# Patient Record
Sex: FEMALE | Race: WHITE | NOT HISPANIC OR LATINO | Employment: OTHER | URBAN - METROPOLITAN AREA
[De-identification: names, ages, dates, MRNs, and addresses within clinical notes are randomized per-mention and may not be internally consistent; named-entity substitution may affect disease eponyms.]

---

## 2017-01-23 ENCOUNTER — OFFICE VISIT - HEALTHEAST (OUTPATIENT)
Dept: ENDOCRINOLOGY | Facility: CLINIC | Age: 67
End: 2017-01-23

## 2017-01-23 DIAGNOSIS — E05.90 HYPERTHYROIDISM: ICD-10-CM

## 2017-01-23 LAB
CREAT SERPL-MCNC: 1.23 MG/DL (ref 0.6–1.1)
GFR SERPL CREATININE-BSD FRML MDRD: 44 ML/MIN/1.73M2

## 2017-01-23 RX ORDER — VALACYCLOVIR HYDROCHLORIDE 500 MG/1
500 TABLET, FILM COATED ORAL DAILY
Status: SHIPPED | COMMUNITY
Start: 2017-01-23

## 2017-01-23 ASSESSMENT — MIFFLIN-ST. JEOR: SCORE: 1228.48

## 2017-01-26 ENCOUNTER — COMMUNICATION - HEALTHEAST (OUTPATIENT)
Dept: ENDOCRINOLOGY | Facility: CLINIC | Age: 67
End: 2017-01-26

## 2017-01-26 DIAGNOSIS — E05.90 THYROTOXICOSIS: ICD-10-CM

## 2017-02-20 ENCOUNTER — RECORDS - HEALTHEAST (OUTPATIENT)
Dept: LAB | Facility: CLINIC | Age: 67
End: 2017-02-20

## 2017-02-20 LAB
CHOLEST SERPL-MCNC: 206 MG/DL
FASTING STATUS PATIENT QL REPORTED: NO
HDLC SERPL-MCNC: 60 MG/DL
LDLC SERPL CALC-MCNC: 99 MG/DL
TRIGL SERPL-MCNC: 236 MG/DL

## 2017-02-23 ENCOUNTER — COMMUNICATION - HEALTHEAST (OUTPATIENT)
Dept: ADMINISTRATIVE | Facility: CLINIC | Age: 67
End: 2017-02-23

## 2017-03-07 ENCOUNTER — AMBULATORY - HEALTHEAST (OUTPATIENT)
Dept: LAB | Facility: CLINIC | Age: 67
End: 2017-03-07

## 2017-03-07 DIAGNOSIS — E05.90 THYROTOXICOSIS: ICD-10-CM

## 2017-06-30 ENCOUNTER — COMMUNICATION - HEALTHEAST (OUTPATIENT)
Dept: LAB | Facility: CLINIC | Age: 67
End: 2017-06-30

## 2017-06-30 ENCOUNTER — AMBULATORY - HEALTHEAST (OUTPATIENT)
Dept: ENDOCRINOLOGY | Facility: CLINIC | Age: 67
End: 2017-06-30

## 2017-06-30 DIAGNOSIS — E05.90 THYROTOXICOSIS: ICD-10-CM

## 2017-07-17 ENCOUNTER — AMBULATORY - HEALTHEAST (OUTPATIENT)
Dept: LAB | Facility: CLINIC | Age: 67
End: 2017-07-17

## 2017-07-17 DIAGNOSIS — E05.90 THYROTOXICOSIS: ICD-10-CM

## 2017-07-24 ENCOUNTER — OFFICE VISIT - HEALTHEAST (OUTPATIENT)
Dept: ENDOCRINOLOGY | Facility: CLINIC | Age: 67
End: 2017-07-24

## 2017-07-24 DIAGNOSIS — E05.90 HYPERTHYROIDISM: ICD-10-CM

## 2017-07-24 ASSESSMENT — MIFFLIN-ST. JEOR: SCORE: 1266.58

## 2017-10-03 ENCOUNTER — HOSPITAL ENCOUNTER (OUTPATIENT)
Dept: CT IMAGING | Facility: HOSPITAL | Age: 67
Discharge: HOME | End: 2017-10-03

## 2017-10-03 ENCOUNTER — RECORDS - HEALTHEAST (OUTPATIENT)
Dept: ADMINISTRATIVE | Facility: OTHER | Age: 67
End: 2017-10-03

## 2017-10-03 DIAGNOSIS — R10.30 LOWER ABDOMINAL PAIN: ICD-10-CM

## 2018-02-06 ENCOUNTER — RECORDS - HEALTHEAST (OUTPATIENT)
Dept: LAB | Facility: CLINIC | Age: 68
End: 2018-02-06

## 2018-02-06 LAB
ALBUMIN SERPL-MCNC: 4.1 G/DL (ref 3.5–5)
ALP SERPL-CCNC: 74 U/L (ref 45–120)
ALT SERPL W P-5'-P-CCNC: 38 U/L (ref 0–45)
ANION GAP SERPL CALCULATED.3IONS-SCNC: 12 MMOL/L (ref 5–18)
AST SERPL W P-5'-P-CCNC: 41 U/L (ref 0–40)
BASOPHILS # BLD AUTO: 0.1 THOU/UL (ref 0–0.2)
BASOPHILS NFR BLD AUTO: 1 % (ref 0–2)
BILIRUB SERPL-MCNC: 0.7 MG/DL (ref 0–1)
BUN SERPL-MCNC: 17 MG/DL (ref 8–22)
CALCIUM SERPL-MCNC: 9.7 MG/DL (ref 8.5–10.5)
CHLORIDE BLD-SCNC: 91 MMOL/L (ref 98–107)
CHOLEST SERPL-MCNC: 195 MG/DL
CO2 SERPL-SCNC: 28 MMOL/L (ref 22–31)
CREAT SERPL-MCNC: 0.88 MG/DL (ref 0.6–1.1)
EOSINOPHIL # BLD AUTO: 0.2 THOU/UL (ref 0–0.4)
EOSINOPHIL NFR BLD AUTO: 2 % (ref 0–6)
ERYTHROCYTE [DISTWIDTH] IN BLOOD BY AUTOMATED COUNT: 11.9 % (ref 11–14.5)
FASTING STATUS PATIENT QL REPORTED: ABNORMAL
GFR SERPL CREATININE-BSD FRML MDRD: >60 ML/MIN/1.73M2
GLUCOSE BLD-MCNC: 151 MG/DL (ref 70–125)
HCT VFR BLD AUTO: 43.1 % (ref 35–47)
HDLC SERPL-MCNC: 66 MG/DL
HGB BLD-MCNC: 15.3 G/DL (ref 12–16)
LDLC SERPL CALC-MCNC: 72 MG/DL
LYMPHOCYTES # BLD AUTO: 3.3 THOU/UL (ref 0.8–4.4)
LYMPHOCYTES NFR BLD AUTO: 34 % (ref 20–40)
MCH RBC QN AUTO: 35.6 PG (ref 27–34)
MCHC RBC AUTO-ENTMCNC: 35.5 G/DL (ref 32–36)
MCV RBC AUTO: 100 FL (ref 80–100)
MONOCYTES # BLD AUTO: 0.9 THOU/UL (ref 0–0.9)
MONOCYTES NFR BLD AUTO: 9 % (ref 2–10)
NEUTROPHILS # BLD AUTO: 5 THOU/UL (ref 2–7.7)
NEUTROPHILS NFR BLD AUTO: 54 % (ref 50–70)
PLATELET # BLD AUTO: 288 THOU/UL (ref 140–440)
PMV BLD AUTO: 11.1 FL (ref 8.5–12.5)
POTASSIUM BLD-SCNC: 4.2 MMOL/L (ref 3.5–5)
PROT SERPL-MCNC: 7.1 G/DL (ref 6–8)
RBC # BLD AUTO: 4.3 MILL/UL (ref 3.8–5.4)
SODIUM SERPL-SCNC: 131 MMOL/L (ref 136–145)
TRIGL SERPL-MCNC: 284 MG/DL
WBC: 9.5 THOU/UL (ref 4–11)

## 2018-02-07 LAB — HCV AB SERPL QL IA: NEGATIVE

## 2018-04-16 ENCOUNTER — RECORDS - HEALTHEAST (OUTPATIENT)
Dept: LAB | Facility: CLINIC | Age: 68
End: 2018-04-16

## 2018-04-16 LAB
MAGNESIUM SERPL-MCNC: 1.5 MG/DL (ref 1.8–2.6)
VIT B12 SERPL-MCNC: 972 PG/ML (ref 213–816)

## 2018-04-17 LAB — 25(OH)D3 SERPL-MCNC: 60.4 NG/ML (ref 30–80)

## 2018-05-09 ENCOUNTER — COMMUNICATION - HEALTHEAST (OUTPATIENT)
Dept: ADMINISTRATIVE | Facility: CLINIC | Age: 68
End: 2018-05-09

## 2018-05-09 DIAGNOSIS — E05.90 THYROTOXICOSIS: ICD-10-CM

## 2018-05-21 ENCOUNTER — RECORDS - HEALTHEAST (OUTPATIENT)
Dept: LAB | Facility: CLINIC | Age: 68
End: 2018-05-21

## 2018-05-21 LAB
ALBUMIN SERPL-MCNC: 4.2 G/DL (ref 3.5–5)
ALP SERPL-CCNC: 74 U/L (ref 45–120)
ALT SERPL W P-5'-P-CCNC: 29 U/L (ref 0–45)
ANION GAP SERPL CALCULATED.3IONS-SCNC: 16 MMOL/L (ref 5–18)
AST SERPL W P-5'-P-CCNC: 35 U/L (ref 0–40)
BASOPHILS # BLD AUTO: 0.1 THOU/UL (ref 0–0.2)
BASOPHILS NFR BLD AUTO: 1 % (ref 0–2)
BILIRUB SERPL-MCNC: 0.6 MG/DL (ref 0–1)
BUN SERPL-MCNC: 15 MG/DL (ref 8–22)
CALCIUM SERPL-MCNC: 9.5 MG/DL (ref 8.5–10.5)
CHLORIDE BLD-SCNC: 88 MMOL/L (ref 98–107)
CHOLEST SERPL-MCNC: 193 MG/DL
CO2 SERPL-SCNC: 24 MMOL/L (ref 22–31)
CREAT SERPL-MCNC: 1.1 MG/DL (ref 0.6–1.1)
EOSINOPHIL # BLD AUTO: 0.1 THOU/UL (ref 0–0.4)
EOSINOPHIL NFR BLD AUTO: 1 % (ref 0–6)
ERYTHROCYTE [DISTWIDTH] IN BLOOD BY AUTOMATED COUNT: 13.1 % (ref 11–14.5)
FASTING STATUS PATIENT QL REPORTED: ABNORMAL
GFR SERPL CREATININE-BSD FRML MDRD: 49 ML/MIN/1.73M2
GLUCOSE BLD-MCNC: 242 MG/DL (ref 70–125)
HCT VFR BLD AUTO: 38.5 % (ref 35–47)
HDLC SERPL-MCNC: 65 MG/DL
HGB BLD-MCNC: 13.7 G/DL (ref 12–16)
LDLC SERPL CALC-MCNC: 79 MG/DL
LYMPHOCYTES # BLD AUTO: 2 THOU/UL (ref 0.8–4.4)
LYMPHOCYTES NFR BLD AUTO: 20 % (ref 20–40)
MAGNESIUM SERPL-MCNC: 1.6 MG/DL (ref 1.8–2.6)
MCH RBC QN AUTO: 35.9 PG (ref 27–34)
MCHC RBC AUTO-ENTMCNC: 35.6 G/DL (ref 32–36)
MCV RBC AUTO: 101 FL (ref 80–100)
MONOCYTES # BLD AUTO: 0.6 THOU/UL (ref 0–0.9)
MONOCYTES NFR BLD AUTO: 6 % (ref 2–10)
NEUTROPHILS # BLD AUTO: 7 THOU/UL (ref 2–7.7)
NEUTROPHILS NFR BLD AUTO: 72 % (ref 50–70)
PLATELET # BLD AUTO: 292 THOU/UL (ref 140–440)
PMV BLD AUTO: 11.3 FL (ref 8.5–12.5)
POTASSIUM BLD-SCNC: 4.4 MMOL/L (ref 3.5–5)
PROT SERPL-MCNC: 6.7 G/DL (ref 6–8)
RBC # BLD AUTO: 3.82 MILL/UL (ref 3.8–5.4)
SODIUM SERPL-SCNC: 128 MMOL/L (ref 136–145)
TRIGL SERPL-MCNC: 246 MG/DL
WBC: 9.7 THOU/UL (ref 4–11)

## 2018-06-05 ENCOUNTER — HOSPITAL ENCOUNTER (OUTPATIENT)
Dept: MAMMOGRAPHY | Facility: CLINIC | Age: 68
Discharge: HOME | End: 2018-06-05

## 2018-06-05 DIAGNOSIS — Z12.31 VISIT FOR SCREENING MAMMOGRAM: ICD-10-CM

## 2018-07-24 ENCOUNTER — RECORDS - HEALTHEAST (OUTPATIENT)
Dept: LAB | Facility: CLINIC | Age: 68
End: 2018-07-24

## 2018-07-25 LAB
ALBUMIN SERPL-MCNC: 4.2 G/DL (ref 3.5–5)
ALP SERPL-CCNC: 60 U/L (ref 45–120)
ALT SERPL W P-5'-P-CCNC: 27 U/L (ref 0–45)
ANION GAP SERPL CALCULATED.3IONS-SCNC: 12 MMOL/L (ref 5–18)
AST SERPL W P-5'-P-CCNC: 32 U/L (ref 0–40)
BILIRUB SERPL-MCNC: 0.9 MG/DL (ref 0–1)
BUN SERPL-MCNC: 17 MG/DL (ref 8–22)
CALCIUM SERPL-MCNC: 10 MG/DL (ref 8.5–10.5)
CHLORIDE BLD-SCNC: 90 MMOL/L (ref 98–107)
CO2 SERPL-SCNC: 25 MMOL/L (ref 22–31)
CREAT SERPL-MCNC: 1.06 MG/DL (ref 0.6–1.1)
GFR SERPL CREATININE-BSD FRML MDRD: 51 ML/MIN/1.73M2
GLUCOSE BLD-MCNC: 147 MG/DL (ref 70–125)
LIPASE SERPL-CCNC: 25 U/L (ref 0–52)
MAGNESIUM SERPL-MCNC: 2.1 MG/DL (ref 1.8–2.6)
POTASSIUM BLD-SCNC: 4 MMOL/L (ref 3.5–5)
PROT SERPL-MCNC: 6.8 G/DL (ref 6–8)
SODIUM SERPL-SCNC: 127 MMOL/L (ref 136–145)
T4 FREE SERPL-MCNC: 0.8 NG/DL (ref 0.7–1.8)
TSH SERPL DL<=0.005 MIU/L-ACNC: 2.51 UIU/ML (ref 0.3–5)

## 2018-08-09 ENCOUNTER — COMMUNICATION - HEALTHEAST (OUTPATIENT)
Dept: ENDOCRINOLOGY | Facility: CLINIC | Age: 68
End: 2018-08-09

## 2018-08-09 DIAGNOSIS — E05.90 THYROTOXICOSIS: ICD-10-CM

## 2018-08-23 ENCOUNTER — OFFICE VISIT - HEALTHEAST (OUTPATIENT)
Dept: ENDOCRINOLOGY | Facility: CLINIC | Age: 68
End: 2018-08-23

## 2018-08-23 DIAGNOSIS — E05.90 THYROTOXICOSIS: ICD-10-CM

## 2018-08-23 RX ORDER — AMOXICILLIN 500 MG/1
2000 TABLET, FILM COATED ORAL PRN
Status: SHIPPED | COMMUNITY
Start: 2018-08-23 | End: 2024-07-31

## 2018-08-23 ASSESSMENT — MIFFLIN-ST. JEOR: SCORE: 1226.21

## 2018-08-24 ENCOUNTER — COMMUNICATION - HEALTHEAST (OUTPATIENT)
Dept: LAB | Facility: CLINIC | Age: 68
End: 2018-08-24

## 2018-08-28 ENCOUNTER — RECORDS - HEALTHEAST (OUTPATIENT)
Dept: LAB | Facility: CLINIC | Age: 68
End: 2018-08-28

## 2018-08-28 LAB
CREAT UR-MCNC: 103.7 MG/DL
MICROALBUMIN UR-MCNC: 1.16 MG/DL (ref 0–1.99)
MICROALBUMIN/CREAT UR: 11.2 MG/G

## 2018-08-29 ENCOUNTER — RECORDS - HEALTHEAST (OUTPATIENT)
Dept: LAB | Facility: CLINIC | Age: 68
End: 2018-08-29

## 2018-08-29 LAB
T4 FREE SERPL-MCNC: 0.8 NG/DL (ref 0.7–1.8)
TSH SERPL DL<=0.005 MIU/L-ACNC: 2.28 UIU/ML (ref 0.3–5)
VIT B12 SERPL-MCNC: 833 PG/ML (ref 213–816)

## 2018-08-30 LAB — 25(OH)D3 SERPL-MCNC: 68.9 NG/ML (ref 30–80)

## 2018-10-28 ENCOUNTER — COMMUNICATION - HEALTHEAST (OUTPATIENT)
Dept: ENDOCRINOLOGY | Facility: CLINIC | Age: 68
End: 2018-10-28

## 2018-10-28 DIAGNOSIS — E05.90 THYROTOXICOSIS: ICD-10-CM

## 2018-11-20 ENCOUNTER — RECORDS - HEALTHEAST (OUTPATIENT)
Dept: LAB | Facility: CLINIC | Age: 68
End: 2018-11-20

## 2018-11-20 LAB
ANION GAP SERPL CALCULATED.3IONS-SCNC: 11 MMOL/L (ref 5–18)
BUN SERPL-MCNC: 17 MG/DL (ref 8–22)
CALCIUM SERPL-MCNC: 10.3 MG/DL (ref 8.5–10.5)
CHLORIDE BLD-SCNC: 93 MMOL/L (ref 98–107)
CO2 SERPL-SCNC: 28 MMOL/L (ref 22–31)
CREAT SERPL-MCNC: 0.97 MG/DL (ref 0.6–1.1)
GFR SERPL CREATININE-BSD FRML MDRD: 57 ML/MIN/1.73M2
GLUCOSE BLD-MCNC: 124 MG/DL (ref 70–125)
POTASSIUM BLD-SCNC: 4.2 MMOL/L (ref 3.5–5)
SODIUM SERPL-SCNC: 132 MMOL/L (ref 136–145)

## 2019-01-20 ENCOUNTER — COMMUNICATION - HEALTHEAST (OUTPATIENT)
Dept: ENDOCRINOLOGY | Facility: CLINIC | Age: 69
End: 2019-01-20

## 2019-01-20 DIAGNOSIS — E05.90 THYROTOXICOSIS: ICD-10-CM

## 2019-02-25 ENCOUNTER — RECORDS - HEALTHEAST (OUTPATIENT)
Dept: LAB | Facility: CLINIC | Age: 69
End: 2019-02-25

## 2019-02-26 LAB
ANION GAP SERPL CALCULATED.3IONS-SCNC: 12 MMOL/L (ref 5–18)
BUN SERPL-MCNC: 18 MG/DL (ref 8–22)
CALCIUM SERPL-MCNC: 9.8 MG/DL (ref 8.5–10.5)
CHLORIDE BLD-SCNC: 91 MMOL/L (ref 98–107)
CO2 SERPL-SCNC: 27 MMOL/L (ref 22–31)
CREAT SERPL-MCNC: 1.15 MG/DL (ref 0.6–1.1)
GFR SERPL CREATININE-BSD FRML MDRD: 47 ML/MIN/1.73M2
GLUCOSE BLD-MCNC: 112 MG/DL (ref 70–125)
POTASSIUM BLD-SCNC: 4.5 MMOL/L (ref 3.5–5)
SODIUM SERPL-SCNC: 130 MMOL/L (ref 136–145)

## 2019-04-03 ENCOUNTER — RECORDS - HEALTHEAST (OUTPATIENT)
Dept: LAB | Facility: CLINIC | Age: 69
End: 2019-04-03

## 2019-04-03 ENCOUNTER — RECORDS - HEALTHEAST (OUTPATIENT)
Dept: ADMINISTRATIVE | Facility: OTHER | Age: 69
End: 2019-04-03

## 2019-04-03 ENCOUNTER — AMBULATORY - HEALTHEAST (OUTPATIENT)
Dept: CARDIOLOGY | Facility: CLINIC | Age: 69
End: 2019-04-03

## 2019-04-03 LAB
ALBUMIN SERPL-MCNC: 4.5 G/DL (ref 3.5–5)
ALP SERPL-CCNC: 71 U/L (ref 45–120)
ALT SERPL W P-5'-P-CCNC: 41 U/L (ref 0–45)
ANION GAP SERPL CALCULATED.3IONS-SCNC: 12 MMOL/L (ref 5–18)
AST SERPL W P-5'-P-CCNC: 40 U/L (ref 0–40)
BILIRUB SERPL-MCNC: 0.7 MG/DL (ref 0–1)
BUN SERPL-MCNC: 19 MG/DL (ref 8–22)
CALCIUM SERPL-MCNC: 10.2 MG/DL (ref 8.5–10.5)
CHLORIDE BLD-SCNC: 91 MMOL/L (ref 98–107)
CO2 SERPL-SCNC: 27 MMOL/L (ref 22–31)
CREAT SERPL-MCNC: 1.01 MG/DL (ref 0.6–1.1)
GFR SERPL CREATININE-BSD FRML MDRD: 54 ML/MIN/1.73M2
GLUCOSE BLD-MCNC: 158 MG/DL (ref 70–125)
MAGNESIUM SERPL-MCNC: 1.9 MG/DL (ref 1.8–2.6)
POTASSIUM BLD-SCNC: 4.7 MMOL/L (ref 3.5–5)
PROT SERPL-MCNC: 7.3 G/DL (ref 6–8)
SODIUM SERPL-SCNC: 130 MMOL/L (ref 136–145)
TSH SERPL DL<=0.005 MIU/L-ACNC: 13.6 UIU/ML (ref 0.3–5)

## 2019-04-04 ENCOUNTER — COMMUNICATION - HEALTHEAST (OUTPATIENT)
Dept: ADMINISTRATIVE | Facility: CLINIC | Age: 69
End: 2019-04-04

## 2019-04-04 ENCOUNTER — AMBULATORY - HEALTHEAST (OUTPATIENT)
Dept: CARDIOLOGY | Facility: CLINIC | Age: 69
End: 2019-04-04

## 2019-04-04 ENCOUNTER — RECORDS - HEALTHEAST (OUTPATIENT)
Dept: ADMINISTRATIVE | Facility: OTHER | Age: 69
End: 2019-04-04

## 2019-04-04 ENCOUNTER — OFFICE VISIT - HEALTHEAST (OUTPATIENT)
Dept: CARDIOLOGY | Facility: CLINIC | Age: 69
End: 2019-04-04

## 2019-04-04 DIAGNOSIS — I10 ESSENTIAL HYPERTENSION: ICD-10-CM

## 2019-04-04 DIAGNOSIS — E78.5 DYSLIPIDEMIA: ICD-10-CM

## 2019-04-04 DIAGNOSIS — R06.09 DYSPNEA ON EXERTION: ICD-10-CM

## 2019-04-04 DIAGNOSIS — R00.2 PALPITATIONS: ICD-10-CM

## 2019-04-04 DIAGNOSIS — E03.9 HYPOTHYROIDISM, UNSPECIFIED TYPE: ICD-10-CM

## 2019-04-04 LAB
T3FREE SERPL-MCNC: 3.2 PG/ML (ref 1.9–3.9)
T4 FREE SERPL-MCNC: 0.6 NG/DL (ref 0.7–1.8)

## 2019-04-04 RX ORDER — OLMESARTAN MEDOXOMIL 40 MG/1
40 TABLET ORAL DAILY
Status: SHIPPED | COMMUNITY
Start: 2019-04-04 | End: 2024-03-13

## 2019-04-04 ASSESSMENT — MIFFLIN-ST. JEOR: SCORE: 1243.9

## 2019-04-05 ENCOUNTER — COMMUNICATION - HEALTHEAST (OUTPATIENT)
Dept: ENDOCRINOLOGY | Facility: CLINIC | Age: 69
End: 2019-04-05

## 2019-04-05 DIAGNOSIS — E05.90 HYPERTHYROIDISM: ICD-10-CM

## 2019-04-08 ENCOUNTER — AMBULATORY - HEALTHEAST (OUTPATIENT)
Dept: LAB | Facility: CLINIC | Age: 69
End: 2019-04-08

## 2019-04-08 DIAGNOSIS — E05.90 HYPERTHYROIDISM: ICD-10-CM

## 2019-04-08 LAB
CORTIS SERPL-MCNC: 8.5 UG/DL
T4 FREE SERPL-MCNC: 0.7 NG/DL (ref 0.7–1.8)
TSH SERPL DL<=0.005 MIU/L-ACNC: 9.09 UIU/ML (ref 0.3–5)

## 2019-04-10 ENCOUNTER — HOSPITAL ENCOUNTER (OUTPATIENT)
Dept: NUCLEAR MEDICINE | Facility: HOSPITAL | Age: 69
Discharge: HOME | End: 2019-04-10
Attending: INTERNAL MEDICINE

## 2019-04-10 ENCOUNTER — HOSPITAL ENCOUNTER (OUTPATIENT)
Dept: CARDIOLOGY | Facility: HOSPITAL | Age: 69
Discharge: HOME | End: 2019-04-10
Attending: INTERNAL MEDICINE

## 2019-04-10 DIAGNOSIS — R06.09 OTHER FORMS OF DYSPNEA: ICD-10-CM

## 2019-04-10 DIAGNOSIS — R06.09 DYSPNEA ON EXERTION: ICD-10-CM

## 2019-04-10 DIAGNOSIS — R00.2 PALPITATIONS: ICD-10-CM

## 2019-04-10 LAB — ACTH PLAS-MCNC: 14 PG/ML

## 2019-04-15 ENCOUNTER — AMBULATORY - HEALTHEAST (OUTPATIENT)
Dept: ENDOCRINOLOGY | Facility: CLINIC | Age: 69
End: 2019-04-15

## 2019-04-15 ENCOUNTER — COMMUNICATION - HEALTHEAST (OUTPATIENT)
Dept: ENDOCRINOLOGY | Facility: CLINIC | Age: 69
End: 2019-04-15

## 2019-04-15 DIAGNOSIS — E05.90 THYROTOXICOSIS: ICD-10-CM

## 2019-04-15 LAB
CV STRESS CURRENT BP HE: NORMAL
CV STRESS CURRENT HR HE: 76
CV STRESS CURRENT HR HE: 76
CV STRESS CURRENT HR HE: 77
CV STRESS CURRENT HR HE: 78
CV STRESS CURRENT HR HE: 79
CV STRESS CURRENT HR HE: 80
CV STRESS CURRENT HR HE: 82
CV STRESS CURRENT HR HE: 82
CV STRESS CURRENT HR HE: 84
CV STRESS CURRENT HR HE: 84
CV STRESS CURRENT HR HE: 88
CV STRESS CURRENT HR HE: 88
CV STRESS DEVIATION TIME HE: NORMAL
CV STRESS ECHO PERCENT HR HE: NORMAL
CV STRESS EXERCISE STAGE HE: NORMAL
CV STRESS FINAL RESTING BP HE: NORMAL
CV STRESS FINAL RESTING HR HE: 78
CV STRESS MAX HR HE: 88
CV STRESS MAX TREADMILL GRADE HE: 0
CV STRESS MAX TREADMILL SPEED HE: 0
CV STRESS PEAK DIA BP HE: NORMAL
CV STRESS PEAK SYS BP HE: NORMAL
CV STRESS PHASE HE: NORMAL
CV STRESS PROTOCOL HE: NORMAL
CV STRESS RESTING PT POSITION HE: NORMAL
CV STRESS ST DEVIATION AMOUNT HE: NORMAL
CV STRESS ST DEVIATION ELEVATION HE: NORMAL
CV STRESS ST EVELATION AMOUNT HE: NORMAL
CV STRESS TEST TYPE HE: NORMAL
CV STRESS TOTAL STAGE TIME MIN 1 HE: NORMAL
NUC STRESS EJECTION FRACTION: 75 %
STRESS ECHO BASELINE BP: NORMAL
STRESS ECHO BASELINE HR: 76
STRESS ECHO CALCULATED PERCENT HR: 58 %
STRESS ECHO LAST STRESS BP: NORMAL
STRESS ECHO LAST STRESS HR: 82

## 2019-05-03 ENCOUNTER — OFFICE VISIT - HEALTHEAST (OUTPATIENT)
Dept: CARDIOLOGY | Facility: CLINIC | Age: 69
End: 2019-05-03

## 2019-05-03 DIAGNOSIS — I49.1 PREMATURE ATRIAL CONTRACTIONS: ICD-10-CM

## 2019-05-03 DIAGNOSIS — I10 ESSENTIAL HYPERTENSION: ICD-10-CM

## 2019-05-03 DIAGNOSIS — E05.90 THYROTOXICOSIS WITHOUT THYROID STORM, UNSPECIFIED THYROTOXICOSIS TYPE: ICD-10-CM

## 2019-05-03 DIAGNOSIS — R06.09 DYSPNEA ON EXERTION: ICD-10-CM

## 2019-05-03 ASSESSMENT — MIFFLIN-ST. JEOR: SCORE: 1234.83

## 2019-05-28 ENCOUNTER — RECORDS - HEALTHEAST (OUTPATIENT)
Dept: LAB | Facility: CLINIC | Age: 69
End: 2019-05-28

## 2019-05-28 LAB — TSH SERPL DL<=0.005 MIU/L-ACNC: 3.25 UIU/ML (ref 0.3–5)

## 2019-07-11 ENCOUNTER — RECORDS - HEALTHEAST (OUTPATIENT)
Dept: ADMINISTRATIVE | Facility: OTHER | Age: 69
End: 2019-07-11

## 2019-07-11 ENCOUNTER — RECORDS - HEALTHEAST (OUTPATIENT)
Dept: LAB | Facility: CLINIC | Age: 69
End: 2019-07-11

## 2019-07-11 LAB
ALBUMIN SERPL-MCNC: 4.3 G/DL (ref 3.5–5)
ALP SERPL-CCNC: 64 U/L (ref 45–120)
ALT SERPL W P-5'-P-CCNC: 23 U/L (ref 0–45)
ANION GAP SERPL CALCULATED.3IONS-SCNC: 10 MMOL/L (ref 5–18)
AST SERPL W P-5'-P-CCNC: 31 U/L (ref 0–40)
BILIRUB SERPL-MCNC: 0.6 MG/DL (ref 0–1)
BUN SERPL-MCNC: 13 MG/DL (ref 8–28)
CALCIUM SERPL-MCNC: 10.1 MG/DL (ref 8.5–10.5)
CHLORIDE BLD-SCNC: 88 MMOL/L (ref 98–107)
CHOLEST SERPL-MCNC: 166 MG/DL
CO2 SERPL-SCNC: 27 MMOL/L (ref 22–31)
CREAT SERPL-MCNC: 1.05 MG/DL (ref 0.6–1.1)
ERYTHROCYTE [SEDIMENTATION RATE] IN BLOOD BY WESTERGREN METHOD: 4 MM/HR (ref 0–20)
FASTING STATUS PATIENT QL REPORTED: NO
GFR SERPL CREATININE-BSD FRML MDRD: 52 ML/MIN/1.73M2
GLUCOSE BLD-MCNC: 100 MG/DL (ref 70–125)
HDLC SERPL-MCNC: 71 MG/DL
LDLC SERPL CALC-MCNC: 72 MG/DL
MAGNESIUM SERPL-MCNC: 2 MG/DL (ref 1.8–2.6)
POTASSIUM BLD-SCNC: 3.9 MMOL/L (ref 3.5–5)
PROT SERPL-MCNC: 6.9 G/DL (ref 6–8)
SODIUM SERPL-SCNC: 125 MMOL/L (ref 136–145)
TRIGL SERPL-MCNC: 116 MG/DL
TSH SERPL DL<=0.005 MIU/L-ACNC: 3.24 UIU/ML (ref 0.3–5)

## 2019-07-12 ENCOUNTER — AMBULATORY - HEALTHEAST (OUTPATIENT)
Dept: VASCULAR SURGERY | Facility: CLINIC | Age: 69
End: 2019-07-12

## 2019-07-12 DIAGNOSIS — I73.9 PAD (PERIPHERAL ARTERY DISEASE) (H): ICD-10-CM

## 2019-07-12 DIAGNOSIS — I73.9 PVD (PERIPHERAL VASCULAR DISEASE) (H): ICD-10-CM

## 2019-07-12 LAB — 25(OH)D3 SERPL-MCNC: 71.4 NG/ML (ref 30–80)

## 2019-07-18 ENCOUNTER — RECORDS - HEALTHEAST (OUTPATIENT)
Dept: LAB | Facility: CLINIC | Age: 69
End: 2019-07-18

## 2019-07-19 LAB
ANION GAP SERPL CALCULATED.3IONS-SCNC: 11 MMOL/L (ref 5–18)
BUN SERPL-MCNC: 13 MG/DL (ref 8–28)
CALCIUM SERPL-MCNC: 9.8 MG/DL (ref 8.5–10.5)
CHLORIDE BLD-SCNC: 87 MMOL/L (ref 98–107)
CO2 SERPL-SCNC: 28 MMOL/L (ref 22–31)
CREAT SERPL-MCNC: 1.14 MG/DL (ref 0.6–1.1)
GFR SERPL CREATININE-BSD FRML MDRD: 47 ML/MIN/1.73M2
GLUCOSE BLD-MCNC: 112 MG/DL (ref 70–125)
POTASSIUM BLD-SCNC: 4.1 MMOL/L (ref 3.5–5)
SODIUM SERPL-SCNC: 126 MMOL/L (ref 136–145)

## 2019-08-01 ENCOUNTER — RECORDS - HEALTHEAST (OUTPATIENT)
Dept: LAB | Facility: CLINIC | Age: 69
End: 2019-08-01

## 2019-08-02 LAB
ALBUMIN SERPL-MCNC: 4.3 G/DL (ref 3.5–5)
ANION GAP SERPL CALCULATED.3IONS-SCNC: 8 MMOL/L (ref 5–18)
BUN SERPL-MCNC: 14 MG/DL (ref 8–28)
CALCIUM SERPL-MCNC: 9.9 MG/DL (ref 8.5–10.5)
CHLORIDE BLD-SCNC: 95 MMOL/L (ref 98–107)
CO2 SERPL-SCNC: 26 MMOL/L (ref 22–31)
CREAT SERPL-MCNC: 1 MG/DL (ref 0.6–1.1)
GFR SERPL CREATININE-BSD FRML MDRD: 55 ML/MIN/1.73M2
GLUCOSE BLD-MCNC: 204 MG/DL (ref 70–125)
PHOSPHATE SERPL-MCNC: 2.9 MG/DL (ref 2.5–4.5)
POTASSIUM BLD-SCNC: 4.1 MMOL/L (ref 3.5–5)
SODIUM SERPL-SCNC: 129 MMOL/L (ref 136–145)

## 2019-08-13 ENCOUNTER — RECORDS - HEALTHEAST (OUTPATIENT)
Dept: LAB | Facility: CLINIC | Age: 69
End: 2019-08-13

## 2019-08-13 LAB
ANION GAP SERPL CALCULATED.3IONS-SCNC: 14 MMOL/L (ref 5–18)
BUN SERPL-MCNC: 14 MG/DL (ref 8–28)
CALCIUM SERPL-MCNC: 10.4 MG/DL (ref 8.5–10.5)
CHLORIDE BLD-SCNC: 88 MMOL/L (ref 98–107)
CO2 SERPL-SCNC: 28 MMOL/L (ref 22–31)
CREAT SERPL-MCNC: 0.92 MG/DL (ref 0.6–1.1)
GFR SERPL CREATININE-BSD FRML MDRD: 60 ML/MIN/1.73M2
GLUCOSE BLD-MCNC: 162 MG/DL (ref 70–125)
POTASSIUM BLD-SCNC: 3.9 MMOL/L (ref 3.5–5)
SODIUM SERPL-SCNC: 130 MMOL/L (ref 136–145)

## 2019-08-28 ENCOUNTER — COMMUNICATION - HEALTHEAST (OUTPATIENT)
Dept: ADMINISTRATIVE | Facility: CLINIC | Age: 69
End: 2019-08-28

## 2019-09-11 ENCOUNTER — RECORDS - HEALTHEAST (OUTPATIENT)
Dept: LAB | Facility: CLINIC | Age: 69
End: 2019-09-11

## 2019-09-11 LAB
ANION GAP SERPL CALCULATED.3IONS-SCNC: 10 MMOL/L (ref 5–18)
BUN SERPL-MCNC: 16 MG/DL (ref 8–28)
CALCIUM SERPL-MCNC: 10.1 MG/DL (ref 8.5–10.5)
CHLORIDE BLD-SCNC: 96 MMOL/L (ref 98–107)
CO2 SERPL-SCNC: 24 MMOL/L (ref 22–31)
CREAT SERPL-MCNC: 0.91 MG/DL (ref 0.6–1.1)
GFR SERPL CREATININE-BSD FRML MDRD: >60 ML/MIN/1.73M2
GLUCOSE BLD-MCNC: 132 MG/DL (ref 70–125)
POTASSIUM BLD-SCNC: 4.9 MMOL/L (ref 3.5–5)
SODIUM SERPL-SCNC: 130 MMOL/L (ref 136–145)

## 2019-09-12 ENCOUNTER — COMMUNICATION - HEALTHEAST (OUTPATIENT)
Dept: ADMINISTRATIVE | Facility: CLINIC | Age: 69
End: 2019-09-12

## 2019-10-07 ENCOUNTER — RECORDS - HEALTHEAST (OUTPATIENT)
Dept: LAB | Facility: CLINIC | Age: 69
End: 2019-10-07

## 2019-10-07 ENCOUNTER — AMBULATORY - HEALTHEAST (OUTPATIENT)
Dept: CARDIOLOGY | Facility: CLINIC | Age: 69
End: 2019-10-07

## 2019-10-07 ENCOUNTER — RECORDS - HEALTHEAST (OUTPATIENT)
Dept: ADMINISTRATIVE | Facility: OTHER | Age: 69
End: 2019-10-07

## 2019-10-07 LAB
ALBUMIN SERPL-MCNC: 4.4 G/DL (ref 3.5–5)
ALP SERPL-CCNC: 84 U/L (ref 45–120)
ALT SERPL W P-5'-P-CCNC: 37 U/L (ref 0–45)
ANION GAP SERPL CALCULATED.3IONS-SCNC: 14 MMOL/L (ref 5–18)
AST SERPL W P-5'-P-CCNC: 27 U/L (ref 0–40)
BILIRUB SERPL-MCNC: 0.7 MG/DL (ref 0–1)
BUN SERPL-MCNC: 31 MG/DL (ref 8–28)
CALCIUM SERPL-MCNC: 10.7 MG/DL (ref 8.5–10.5)
CHLORIDE BLD-SCNC: 87 MMOL/L (ref 98–107)
CO2 SERPL-SCNC: 27 MMOL/L (ref 22–31)
CREAT SERPL-MCNC: 1.33 MG/DL (ref 0.6–1.1)
CREAT UR-MCNC: 81.5 MG/DL
GFR SERPL CREATININE-BSD FRML MDRD: 39 ML/MIN/1.73M2
GLUCOSE BLD-MCNC: 234 MG/DL (ref 70–125)
MICROALBUMIN UR-MCNC: <0.5 MG/DL (ref 0–1.99)
MICROALBUMIN/CREAT UR: NORMAL MG/G{CREAT}
POTASSIUM BLD-SCNC: 4.1 MMOL/L (ref 3.5–5)
PROT SERPL-MCNC: 7.1 G/DL (ref 6–8)
SODIUM SERPL-SCNC: 128 MMOL/L (ref 136–145)
TSH SERPL DL<=0.005 MIU/L-ACNC: 1.73 UIU/ML (ref 0.3–5)

## 2019-10-09 LAB — BACTERIA SPEC CULT: NORMAL

## 2019-10-10 ENCOUNTER — RECORDS - HEALTHEAST (OUTPATIENT)
Dept: LAB | Facility: CLINIC | Age: 69
End: 2019-10-10

## 2019-10-10 ENCOUNTER — OFFICE VISIT - HEALTHEAST (OUTPATIENT)
Dept: CARDIOLOGY | Facility: CLINIC | Age: 69
End: 2019-10-10

## 2019-10-10 DIAGNOSIS — I10 ESSENTIAL HYPERTENSION: ICD-10-CM

## 2019-10-10 DIAGNOSIS — I49.1 PAC (PREMATURE ATRIAL CONTRACTION): ICD-10-CM

## 2019-10-10 ASSESSMENT — MIFFLIN-ST. JEOR: SCORE: 1157.72

## 2019-10-11 LAB — 25(OH)D3 SERPL-MCNC: 56.6 NG/ML (ref 30–80)

## 2019-11-14 ENCOUNTER — RECORDS - HEALTHEAST (OUTPATIENT)
Dept: LAB | Facility: CLINIC | Age: 69
End: 2019-11-14

## 2019-11-14 LAB
ANION GAP SERPL CALCULATED.3IONS-SCNC: 15 MMOL/L (ref 5–18)
BUN SERPL-MCNC: 22 MG/DL (ref 8–28)
CALCIUM SERPL-MCNC: 10.1 MG/DL (ref 8.5–10.5)
CHLORIDE BLD-SCNC: 86 MMOL/L (ref 98–107)
CO2 SERPL-SCNC: 25 MMOL/L (ref 22–31)
CREAT SERPL-MCNC: 1.27 MG/DL (ref 0.6–1.1)
GFR SERPL CREATININE-BSD FRML MDRD: 42 ML/MIN/1.73M2
GLUCOSE BLD-MCNC: 251 MG/DL (ref 70–125)
POTASSIUM BLD-SCNC: 4.3 MMOL/L (ref 3.5–5)
SODIUM SERPL-SCNC: 126 MMOL/L (ref 136–145)

## 2019-11-26 ENCOUNTER — RECORDS - HEALTHEAST (OUTPATIENT)
Dept: LAB | Facility: CLINIC | Age: 69
End: 2019-11-26

## 2019-11-28 LAB — BACTERIA SPEC CULT: ABNORMAL

## 2019-12-11 ENCOUNTER — HOSPITAL ENCOUNTER (OUTPATIENT)
Dept: MAMMOGRAPHY | Facility: CLINIC | Age: 69
Discharge: HOME | End: 2019-12-11
Attending: FAMILY MEDICINE

## 2019-12-11 DIAGNOSIS — Z12.31 VISIT FOR SCREENING MAMMOGRAM: ICD-10-CM

## 2020-01-14 ENCOUNTER — TRANSFERRED RECORDS (OUTPATIENT)
Dept: HEALTH INFORMATION MANAGEMENT | Facility: CLINIC | Age: 70
End: 2020-01-14

## 2020-01-14 ENCOUNTER — RECORDS - HEALTHEAST (OUTPATIENT)
Dept: LAB | Facility: CLINIC | Age: 70
End: 2020-01-14

## 2020-01-14 LAB
ANION GAP SERPL CALCULATED.3IONS-SCNC: 10 MMOL/L (ref 5–18)
BUN SERPL-MCNC: 15 MG/DL (ref 8–28)
CALCIUM SERPL-MCNC: 9.3 MG/DL (ref 8.5–10.5)
CHLORIDE BLD-SCNC: 97 MMOL/L (ref 98–107)
CO2 SERPL-SCNC: 24 MMOL/L (ref 22–31)
CREAT SERPL-MCNC: 1.08 MG/DL (ref 0.6–1.1)
GFR SERPL CREATININE-BSD FRML MDRD: 50 ML/MIN/1.73M2
GLUCOSE BLD-MCNC: 266 MG/DL (ref 70–125)
POTASSIUM BLD-SCNC: 4.9 MMOL/L (ref 3.5–5)
SODIUM SERPL-SCNC: 131 MMOL/L (ref 136–145)

## 2020-01-28 ENCOUNTER — RECORDS - HEALTHEAST (OUTPATIENT)
Dept: LAB | Facility: CLINIC | Age: 70
End: 2020-01-28

## 2020-01-28 LAB
ANION GAP SERPL CALCULATED.3IONS-SCNC: 14 MMOL/L (ref 5–18)
BUN SERPL-MCNC: 13 MG/DL (ref 8–28)
CALCIUM SERPL-MCNC: 10.1 MG/DL (ref 8.5–10.5)
CHLORIDE BLD-SCNC: 95 MMOL/L (ref 98–107)
CO2 SERPL-SCNC: 26 MMOL/L (ref 22–31)
CREAT SERPL-MCNC: 1.01 MG/DL (ref 0.6–1.1)
ERYTHROCYTE [SEDIMENTATION RATE] IN BLOOD BY WESTERGREN METHOD: 1 MM/HR (ref 0–20)
GFR SERPL CREATININE-BSD FRML MDRD: 54 ML/MIN/1.73M2
GLUCOSE BLD-MCNC: 187 MG/DL (ref 70–125)
POTASSIUM BLD-SCNC: 4.8 MMOL/L (ref 3.5–5)
SODIUM SERPL-SCNC: 135 MMOL/L (ref 136–145)

## 2020-01-29 LAB — MAGNESIUM SERPL-MCNC: 1.8 MG/DL (ref 1.8–2.6)

## 2020-02-24 ENCOUNTER — RECORDS - HEALTHEAST (OUTPATIENT)
Dept: LAB | Facility: CLINIC | Age: 70
End: 2020-02-24

## 2020-02-25 ENCOUNTER — AMBULATORY - HEALTHEAST (OUTPATIENT)
Dept: PALLIATIVE MEDICINE | Facility: OTHER | Age: 70
End: 2020-02-25

## 2020-02-25 DIAGNOSIS — M79.7 FIBROMYALGIA: ICD-10-CM

## 2020-02-25 LAB
ALBUMIN SERPL-MCNC: 4.3 G/DL (ref 3.5–5)
ALP SERPL-CCNC: 66 U/L (ref 45–120)
ALT SERPL W P-5'-P-CCNC: 21 U/L (ref 0–45)
ANION GAP SERPL CALCULATED.3IONS-SCNC: 12 MMOL/L (ref 5–18)
AST SERPL W P-5'-P-CCNC: 27 U/L (ref 0–40)
BASOPHILS # BLD AUTO: 0.1 THOU/UL (ref 0–0.2)
BASOPHILS NFR BLD AUTO: 1 % (ref 0–2)
BILIRUB SERPL-MCNC: 0.9 MG/DL (ref 0–1)
BUN SERPL-MCNC: 16 MG/DL (ref 8–28)
C REACTIVE PROTEIN LHE: 0.5 MG/DL (ref 0–0.8)
CALCIUM SERPL-MCNC: 10.1 MG/DL (ref 8.5–10.5)
CHLORIDE BLD-SCNC: 97 MMOL/L (ref 98–107)
CO2 SERPL-SCNC: 25 MMOL/L (ref 22–31)
CREAT SERPL-MCNC: 1.12 MG/DL (ref 0.6–1.1)
EOSINOPHIL # BLD AUTO: 0.2 THOU/UL (ref 0–0.4)
EOSINOPHIL NFR BLD AUTO: 1 % (ref 0–6)
ERYTHROCYTE [DISTWIDTH] IN BLOOD BY AUTOMATED COUNT: 11.6 % (ref 11–14.5)
ERYTHROCYTE [SEDIMENTATION RATE] IN BLOOD BY WESTERGREN METHOD: 5 MM/HR (ref 0–20)
GFR SERPL CREATININE-BSD FRML MDRD: 48 ML/MIN/1.73M2
GLUCOSE BLD-MCNC: 241 MG/DL (ref 70–125)
HCT VFR BLD AUTO: 41.9 % (ref 35–47)
HGB BLD-MCNC: 14.2 G/DL (ref 12–16)
LYMPHOCYTES # BLD AUTO: 2.5 THOU/UL (ref 0.8–4.4)
LYMPHOCYTES NFR BLD AUTO: 22 % (ref 20–40)
MAGNESIUM SERPL-MCNC: 1.9 MG/DL (ref 1.8–2.6)
MCH RBC QN AUTO: 33 PG (ref 27–34)
MCHC RBC AUTO-ENTMCNC: 33.9 G/DL (ref 32–36)
MCV RBC AUTO: 97 FL (ref 80–100)
MONOCYTES # BLD AUTO: 0.8 THOU/UL (ref 0–0.9)
MONOCYTES NFR BLD AUTO: 7 % (ref 2–10)
NEUTROPHILS # BLD AUTO: 7.9 THOU/UL (ref 2–7.7)
NEUTROPHILS NFR BLD AUTO: 69 % (ref 50–70)
PLATELET # BLD AUTO: 272 THOU/UL (ref 140–440)
PMV BLD AUTO: 12.4 FL (ref 8.5–12.5)
POTASSIUM BLD-SCNC: 4.9 MMOL/L (ref 3.5–5)
PROT SERPL-MCNC: 6.8 G/DL (ref 6–8)
RBC # BLD AUTO: 4.3 MILL/UL (ref 3.8–5.4)
SODIUM SERPL-SCNC: 134 MMOL/L (ref 136–145)
TSH SERPL DL<=0.005 MIU/L-ACNC: 0.31 UIU/ML (ref 0.3–5)
VIT B12 SERPL-MCNC: 1269 PG/ML (ref 213–816)
WBC: 11.5 THOU/UL (ref 4–11)

## 2020-02-27 ENCOUNTER — RECORDS - HEALTHEAST (OUTPATIENT)
Dept: LAB | Facility: CLINIC | Age: 70
End: 2020-02-27

## 2020-02-27 LAB — T4 FREE SERPL-MCNC: 1.1 NG/DL (ref 0.7–1.8)

## 2020-05-05 ENCOUNTER — TRANSFERRED RECORDS (OUTPATIENT)
Dept: HEALTH INFORMATION MANAGEMENT | Facility: CLINIC | Age: 70
End: 2020-05-05

## 2020-06-10 ENCOUNTER — AMBULATORY - HEALTHEAST (OUTPATIENT)
Dept: CARDIOLOGY | Facility: CLINIC | Age: 70
End: 2020-06-10

## 2020-06-10 ENCOUNTER — RECORDS - HEALTHEAST (OUTPATIENT)
Dept: ADMINISTRATIVE | Facility: OTHER | Age: 70
End: 2020-06-10

## 2020-06-11 ENCOUNTER — OFFICE VISIT - HEALTHEAST (OUTPATIENT)
Dept: CARDIOLOGY | Facility: CLINIC | Age: 70
End: 2020-06-11

## 2020-06-11 DIAGNOSIS — I49.1 PAC (PREMATURE ATRIAL CONTRACTION): ICD-10-CM

## 2020-06-11 DIAGNOSIS — I10 ESSENTIAL HYPERTENSION: ICD-10-CM

## 2020-06-11 DIAGNOSIS — E78.5 DYSLIPIDEMIA: ICD-10-CM

## 2020-06-11 RX ORDER — METOPROLOL SUCCINATE 100 MG/1
200 TABLET, EXTENDED RELEASE ORAL
Status: ON HOLD | COMMUNITY
Start: 2020-06-08 | End: 2021-09-22

## 2020-06-22 ENCOUNTER — RECORDS - HEALTHEAST (OUTPATIENT)
Dept: LAB | Facility: CLINIC | Age: 70
End: 2020-06-22

## 2020-06-22 LAB
ANION GAP SERPL CALCULATED.3IONS-SCNC: 11 MMOL/L (ref 5–18)
BUN SERPL-MCNC: 14 MG/DL (ref 8–28)
CALCIUM SERPL-MCNC: 9.6 MG/DL (ref 8.5–10.5)
CHLORIDE BLD-SCNC: 88 MMOL/L (ref 98–107)
CO2 SERPL-SCNC: 28 MMOL/L (ref 22–31)
CREAT SERPL-MCNC: 1.07 MG/DL (ref 0.6–1.1)
GFR SERPL CREATININE-BSD FRML MDRD: 51 ML/MIN/1.73M2
GLUCOSE BLD-MCNC: 136 MG/DL (ref 70–125)
POTASSIUM BLD-SCNC: 5.1 MMOL/L (ref 3.5–5)
SODIUM SERPL-SCNC: 127 MMOL/L (ref 136–145)

## 2020-06-25 ENCOUNTER — COMMUNICATION - HEALTHEAST (OUTPATIENT)
Dept: CARDIOLOGY | Facility: CLINIC | Age: 70
End: 2020-06-25

## 2020-07-01 ENCOUNTER — COMMUNICATION - HEALTHEAST (OUTPATIENT)
Dept: CARDIOLOGY | Facility: CLINIC | Age: 70
End: 2020-07-01

## 2020-07-09 ENCOUNTER — RECORDS - HEALTHEAST (OUTPATIENT)
Dept: LAB | Facility: CLINIC | Age: 70
End: 2020-07-09

## 2020-07-09 LAB
ALBUMIN SERPL-MCNC: 4 G/DL (ref 3.5–5)
ALP SERPL-CCNC: 95 U/L (ref 45–120)
ALT SERPL W P-5'-P-CCNC: 32 U/L (ref 0–45)
ANION GAP SERPL CALCULATED.3IONS-SCNC: 11 MMOL/L (ref 5–18)
AST SERPL W P-5'-P-CCNC: 36 U/L (ref 0–40)
BILIRUB SERPL-MCNC: 0.9 MG/DL (ref 0–1)
BUN SERPL-MCNC: 26 MG/DL (ref 8–28)
CALCIUM SERPL-MCNC: 9.1 MG/DL (ref 8.5–10.5)
CHLORIDE BLD-SCNC: 86 MMOL/L (ref 98–107)
CO2 SERPL-SCNC: 26 MMOL/L (ref 22–31)
CREAT SERPL-MCNC: 1.14 MG/DL (ref 0.6–1.1)
GFR SERPL CREATININE-BSD FRML MDRD: 47 ML/MIN/1.73M2
GLUCOSE BLD-MCNC: 113 MG/DL (ref 70–125)
LIPASE SERPL-CCNC: 33 U/L (ref 0–52)
POTASSIUM BLD-SCNC: 5.5 MMOL/L (ref 3.5–5)
PROT SERPL-MCNC: 6.4 G/DL (ref 6–8)
SODIUM SERPL-SCNC: 123 MMOL/L (ref 136–145)

## 2020-07-10 LAB — BACTERIA SPEC CULT: NORMAL

## 2020-07-13 ENCOUNTER — RECORDS - HEALTHEAST (OUTPATIENT)
Dept: LAB | Facility: CLINIC | Age: 70
End: 2020-07-13

## 2020-07-13 LAB
ANION GAP SERPL CALCULATED.3IONS-SCNC: 6 MMOL/L (ref 5–18)
BUN SERPL-MCNC: 10 MG/DL (ref 8–28)
CALCIUM SERPL-MCNC: 8.8 MG/DL (ref 8.5–10.5)
CHLORIDE BLD-SCNC: 96 MMOL/L (ref 98–107)
CO2 SERPL-SCNC: 29 MMOL/L (ref 22–31)
CREAT SERPL-MCNC: 1 MG/DL (ref 0.6–1.1)
GFR SERPL CREATININE-BSD FRML MDRD: 55 ML/MIN/1.73M2
GLUCOSE BLD-MCNC: 155 MG/DL (ref 70–125)
POTASSIUM BLD-SCNC: 4.6 MMOL/L (ref 3.5–5)
SODIUM SERPL-SCNC: 131 MMOL/L (ref 136–145)

## 2020-08-10 ENCOUNTER — RECORDS - HEALTHEAST (OUTPATIENT)
Dept: LAB | Facility: CLINIC | Age: 70
End: 2020-08-10

## 2020-08-10 LAB
ANION GAP SERPL CALCULATED.3IONS-SCNC: 10 MMOL/L (ref 5–18)
BUN SERPL-MCNC: 12 MG/DL (ref 8–28)
CALCIUM SERPL-MCNC: 9.2 MG/DL (ref 8.5–10.5)
CHLORIDE BLD-SCNC: 98 MMOL/L (ref 98–107)
CHOLEST SERPL-MCNC: 209 MG/DL
CO2 SERPL-SCNC: 26 MMOL/L (ref 22–31)
CREAT SERPL-MCNC: 0.94 MG/DL (ref 0.6–1.1)
FASTING STATUS PATIENT QL REPORTED: NO
GFR SERPL CREATININE-BSD FRML MDRD: 59 ML/MIN/1.73M2
GLUCOSE BLD-MCNC: 107 MG/DL (ref 70–125)
HDLC SERPL-MCNC: 70 MG/DL
LDLC SERPL CALC-MCNC: 112 MG/DL
POTASSIUM BLD-SCNC: 4.9 MMOL/L (ref 3.5–5)
SODIUM SERPL-SCNC: 134 MMOL/L (ref 136–145)
TRIGL SERPL-MCNC: 133 MG/DL

## 2020-09-21 ENCOUNTER — RECORDS - HEALTHEAST (OUTPATIENT)
Dept: LAB | Facility: CLINIC | Age: 70
End: 2020-09-21

## 2020-09-23 LAB — BACTERIA SPEC CULT: ABNORMAL

## 2020-10-05 ENCOUNTER — RECORDS - HEALTHEAST (OUTPATIENT)
Dept: LAB | Facility: CLINIC | Age: 70
End: 2020-10-05

## 2020-10-05 LAB
ANION GAP SERPL CALCULATED.3IONS-SCNC: 10 MMOL/L (ref 5–18)
BUN SERPL-MCNC: 16 MG/DL (ref 8–28)
CALCIUM SERPL-MCNC: 9.7 MG/DL (ref 8.5–10.5)
CHLORIDE BLD-SCNC: 97 MMOL/L (ref 98–107)
CO2 SERPL-SCNC: 27 MMOL/L (ref 22–31)
CREAT SERPL-MCNC: 0.96 MG/DL (ref 0.6–1.1)
CREAT UR-MCNC: 87.2 MG/DL
GFR SERPL CREATININE-BSD FRML MDRD: 57 ML/MIN/1.73M2
GLUCOSE BLD-MCNC: 121 MG/DL (ref 70–125)
MICROALBUMIN UR-MCNC: 1.65 MG/DL (ref 0–1.99)
MICROALBUMIN/CREAT UR: 18.9 MG/G
POTASSIUM BLD-SCNC: 4.3 MMOL/L (ref 3.5–5)
SODIUM SERPL-SCNC: 134 MMOL/L (ref 136–145)

## 2020-12-26 ENCOUNTER — RECORDS - HEALTHEAST (OUTPATIENT)
Dept: LAB | Facility: CLINIC | Age: 70
End: 2020-12-26

## 2020-12-30 LAB
BACTERIA SPEC CULT: ABNORMAL
BACTERIA SPEC CULT: ABNORMAL

## 2021-01-11 ENCOUNTER — RECORDS - HEALTHEAST (OUTPATIENT)
Dept: ADMINISTRATIVE | Facility: OTHER | Age: 71
End: 2021-01-11

## 2021-03-03 ENCOUNTER — RECORDS - HEALTHEAST (OUTPATIENT)
Dept: LAB | Facility: CLINIC | Age: 71
End: 2021-03-03

## 2021-03-03 ENCOUNTER — RECORDS - HEALTHEAST (OUTPATIENT)
Dept: ADMINISTRATIVE | Facility: OTHER | Age: 71
End: 2021-03-03

## 2021-03-03 LAB
ALBUMIN SERPL-MCNC: 3.8 G/DL (ref 3.5–5)
ALP SERPL-CCNC: 85 U/L (ref 45–120)
ALT SERPL W P-5'-P-CCNC: 34 U/L (ref 0–45)
ANION GAP SERPL CALCULATED.3IONS-SCNC: 12 MMOL/L (ref 5–18)
AST SERPL W P-5'-P-CCNC: 34 U/L (ref 0–40)
BASOPHILS # BLD AUTO: 0.1 THOU/UL (ref 0–0.2)
BASOPHILS NFR BLD AUTO: 1 % (ref 0–2)
BILIRUB SERPL-MCNC: 0.6 MG/DL (ref 0–1)
BUN SERPL-MCNC: 16 MG/DL (ref 8–28)
C REACTIVE PROTEIN LHE: 3.6 MG/DL (ref 0–0.8)
CALCIUM SERPL-MCNC: 9 MG/DL (ref 8.5–10.5)
CHLORIDE BLD-SCNC: 102 MMOL/L (ref 98–107)
CK SERPL-CCNC: 57 U/L (ref 30–190)
CO2 SERPL-SCNC: 24 MMOL/L (ref 22–31)
CREAT SERPL-MCNC: 0.83 MG/DL (ref 0.6–1.1)
EOSINOPHIL # BLD AUTO: 0.3 THOU/UL (ref 0–0.4)
EOSINOPHIL NFR BLD AUTO: 2 % (ref 0–6)
ERYTHROCYTE [DISTWIDTH] IN BLOOD BY AUTOMATED COUNT: 12.3 % (ref 11–14.5)
ERYTHROCYTE [SEDIMENTATION RATE] IN BLOOD BY WESTERGREN METHOD: 10 MM/HR (ref 0–20)
GFR SERPL CREATININE-BSD FRML MDRD: >60 ML/MIN/1.73M2
GLUCOSE BLD-MCNC: 136 MG/DL (ref 70–125)
HCT VFR BLD AUTO: 41 % (ref 35–47)
HGB BLD-MCNC: 13.8 G/DL (ref 12–16)
IMM GRANULOCYTES # BLD: 0 THOU/UL
IMM GRANULOCYTES NFR BLD: 0 %
LYMPHOCYTES # BLD AUTO: 2.2 THOU/UL (ref 0.8–4.4)
LYMPHOCYTES NFR BLD AUTO: 18 % (ref 20–40)
MCH RBC QN AUTO: 32.5 PG (ref 27–34)
MCHC RBC AUTO-ENTMCNC: 33.7 G/DL (ref 32–36)
MCV RBC AUTO: 97 FL (ref 80–100)
MONOCYTES # BLD AUTO: 0.9 THOU/UL (ref 0–0.9)
MONOCYTES NFR BLD AUTO: 8 % (ref 2–10)
NEUTROPHILS # BLD AUTO: 8.5 THOU/UL (ref 2–7.7)
NEUTROPHILS NFR BLD AUTO: 71 % (ref 50–70)
PLATELET # BLD AUTO: 282 THOU/UL (ref 140–440)
PMV BLD AUTO: 11.8 FL (ref 8.5–12.5)
POTASSIUM BLD-SCNC: 4.5 MMOL/L (ref 3.5–5)
PROT SERPL-MCNC: 6.4 G/DL (ref 6–8)
RBC # BLD AUTO: 4.25 MILL/UL (ref 3.8–5.4)
RHEUMATOID FACT SERPL-ACNC: <15 IU/ML (ref 0–30)
SODIUM SERPL-SCNC: 138 MMOL/L (ref 136–145)
TSH SERPL DL<=0.005 MIU/L-ACNC: 2.41 UIU/ML (ref 0.3–5)
WBC: 11.9 THOU/UL (ref 4–11)

## 2021-03-04 LAB — B BURGDOR IGG+IGM SER QL: 0.22 INDEX VALUE

## 2021-03-05 LAB — ANA SER QL: 0.4 U

## 2021-03-26 ENCOUNTER — RECORDS - HEALTHEAST (OUTPATIENT)
Dept: ADMINISTRATIVE | Facility: OTHER | Age: 71
End: 2021-03-26

## 2021-03-30 ENCOUNTER — AMBULATORY - HEALTHEAST (OUTPATIENT)
Dept: ADMINISTRATIVE | Facility: CLINIC | Age: 71
End: 2021-03-30

## 2021-03-30 DIAGNOSIS — G60.9 IDIOPATHIC PERIPHERAL NEUROPATHY: ICD-10-CM

## 2021-03-30 DIAGNOSIS — M79.7 FIBROMYALGIA: ICD-10-CM

## 2021-04-05 ENCOUNTER — TRANSFERRED RECORDS (OUTPATIENT)
Dept: HEALTH INFORMATION MANAGEMENT | Facility: CLINIC | Age: 71
End: 2021-04-05

## 2021-04-11 ENCOUNTER — RECORDS - HEALTHEAST (OUTPATIENT)
Dept: LAB | Facility: CLINIC | Age: 71
End: 2021-04-11

## 2021-04-13 LAB — BACTERIA SPEC CULT: ABNORMAL

## 2021-05-18 ENCOUNTER — RECORDS - HEALTHEAST (OUTPATIENT)
Dept: ADMINISTRATIVE | Facility: OTHER | Age: 71
End: 2021-05-18

## 2021-05-18 RX ORDER — ACETAMINOPHEN 500 MG
1000 TABLET ORAL 2 TIMES DAILY
Status: ON HOLD | COMMUNITY
Start: 2021-05-18 | End: 2023-08-22

## 2021-05-24 ENCOUNTER — RECORDS - HEALTHEAST (OUTPATIENT)
Dept: ADMINISTRATIVE | Facility: CLINIC | Age: 71
End: 2021-05-24

## 2021-05-27 ENCOUNTER — RECORDS - HEALTHEAST (OUTPATIENT)
Dept: ADMINISTRATIVE | Facility: CLINIC | Age: 71
End: 2021-05-27

## 2021-05-27 NOTE — PATIENT INSTRUCTIONS - HE
Paige Mari,    It is my pleasure to see you today at the [facility].    My recommendations for this visit are:    1. TSH is significantly elevated, check catherine T3 and free T4, please call Endo physician today  2. Nuclear stress test and Holter for evaluation of heart   3. Monitor your blood pressure  4. Will see you again in 4 week      Daymohamud Mg MD, PhD

## 2021-05-27 NOTE — TELEPHONE ENCOUNTER
Dr. Klein patient     Per patient saw her Cardiologist today and was informed that her TSH is severally elevated. Please have Dr. Klein review her lab results in Epic and advise on what to do.     Cardiologist is Dr. Haritha yousif/[facility]    Paige @ [phone]

## 2021-05-28 ENCOUNTER — RECORDS - HEALTHEAST (OUTPATIENT)
Dept: ADMINISTRATIVE | Facility: CLINIC | Age: 71
End: 2021-05-28

## 2021-05-29 ENCOUNTER — RECORDS - HEALTHEAST (OUTPATIENT)
Dept: ADMINISTRATIVE | Facility: CLINIC | Age: 71
End: 2021-05-29

## 2021-05-30 ENCOUNTER — RECORDS - HEALTHEAST (OUTPATIENT)
Dept: ADMINISTRATIVE | Facility: CLINIC | Age: 71
End: 2021-05-30

## 2021-05-30 VITALS — HEIGHT: 62 IN | BODY MASS INDEX: 30.84 KG/M2 | WEIGHT: 167.6 LBS

## 2021-05-31 ENCOUNTER — RECORDS - HEALTHEAST (OUTPATIENT)
Dept: ADMINISTRATIVE | Facility: CLINIC | Age: 71
End: 2021-05-31

## 2021-05-31 VITALS — BODY MASS INDEX: 32.39 KG/M2 | HEIGHT: 62 IN | WEIGHT: 176 LBS

## 2021-06-01 ENCOUNTER — RECORDS - HEALTHEAST (OUTPATIENT)
Dept: ADMINISTRATIVE | Facility: CLINIC | Age: 71
End: 2021-06-01

## 2021-06-01 VITALS — WEIGHT: 167.1 LBS | HEIGHT: 62 IN | BODY MASS INDEX: 30.75 KG/M2

## 2021-06-02 VITALS — WEIGHT: 171 LBS | HEIGHT: 62 IN | BODY MASS INDEX: 31.47 KG/M2

## 2021-06-03 VITALS — BODY MASS INDEX: 31.1 KG/M2 | WEIGHT: 169 LBS | HEIGHT: 62 IN

## 2021-06-03 VITALS
HEART RATE: 76 BPM | HEIGHT: 62 IN | RESPIRATION RATE: 16 BRPM | WEIGHT: 152 LBS | DIASTOLIC BLOOD PRESSURE: 68 MMHG | SYSTOLIC BLOOD PRESSURE: 118 MMHG | BODY MASS INDEX: 27.97 KG/M2

## 2021-06-04 VITALS
SYSTOLIC BLOOD PRESSURE: 118 MMHG | BODY MASS INDEX: 28.9 KG/M2 | DIASTOLIC BLOOD PRESSURE: 69 MMHG | HEART RATE: 68 BPM | WEIGHT: 158 LBS

## 2021-06-05 ENCOUNTER — RECORDS - HEALTHEAST (OUTPATIENT)
Dept: RADIATION ONCOLOGY | Age: 71
End: 2021-06-05

## 2021-06-05 ENCOUNTER — RECORDS - HEALTHEAST (OUTPATIENT)
Dept: CARDIOLOGY | Facility: CLINIC | Age: 71
End: 2021-06-05

## 2021-06-05 DIAGNOSIS — R09.89 OTHER DYSPNEA AND RESPIRATORY ABNORMALITY: ICD-10-CM

## 2021-06-05 DIAGNOSIS — R55 SYNCOPE AND COLLAPSE: ICD-10-CM

## 2021-06-05 DIAGNOSIS — R06.09 OTHER DYSPNEA AND RESPIRATORY ABNORMALITY: ICD-10-CM

## 2021-06-08 NOTE — PROGRESS NOTES
Progress Note    Reason for Visit:  Chief Complaint     Thyroid Problem          Progress Note:    HPI:     Liam Canada MD  This patient is seen in consultation at the request of  because of hyperthyroidism.  Thank you for referring this pleasant 66-year-old female patient who initially started with hypothyroidism and has been intact on Synthroid 14 years then she became hyperthyroid and is currently on Tapazole 5 mg daily.  Patient has been on Tapazole for 5 years.    Her cousin had a thyroid storm and she received radioactive iodine treatment.    Last TSH is normal.    She is taking amitriptyline 20 mg aspirin 81 mg atenolol 50 mg chlorthalidone 25 mg she has no long-standing low-sodium vitamin D 5000 units a day Celexa 40 mg.    The patient had hysterectomy she's taken estradiol 1 mg daily.    She has type 2 diabetes is taken insulin Lantus 40 units and she takes NovoLog 4 units with breakfast and supper and 8 units if she ate a bedtime snack she takes it at bedtime last A1c is 6.5    She's think was 100 mg Pravachol 20 mg.    Review of Systems:    Nervous System: No headache, dizziness, fainting or memory loss. No tingling sensation of hand or feet.  Ears: No hearing loss or ringing in the ears  Eyes: No blurring of vision, redness, itching or dryness.  Nose: No nosebleed or loss of smell  Mouth: No mouth sores or loss of taste  Throat: No hoarseness or difficulty swallowing  Neck: No enlarged thyroid or lymph nodes.  Heart: No chest pain, palpitation or irregular heartbeat. No swelling of hands or feet  Lungs: No shortness of breath, cough, night sweats, wheezing or hemoptysis.  Gastrointestinal: No nausea or vomiting, constipation or diarrhea.  No acid reflux, abdominal pain or blood in stools.  Kidney/Bladdr: No polyuria, polydipsia, nocturia or hematuria.  Genital/Sexual: No loss of libido  Skin: No rash, hair loss or hirsutism.  No abnormal striae  Muscles/Joints/Bones: No morning stiffness, muscle  aches and pain or loss of height.    Current Medications:  Current Outpatient Prescriptions   Medication Sig     amitriptyline (ELAVIL) 10 MG tablet Take 20 mg by mouth bedtime as needed for sleep.     aspirin 81 MG EC tablet Take 81 mg by mouth daily.     atenolol (TENORMIN) 25 MG tablet Take 50 mg by mouth 2 (two) times a day.      blood sugar diagnostic (CONTOUR TEST STRIPS) Strp Use As Directed.     cetirizine (ZYRTEC) 10 MG tablet Take 10 mg by mouth daily as needed (itching).      chlorthalidone (HYGROTEN) 25 MG tablet Take 25 mg by mouth daily.     cholecalciferol, vitamin D3, 5,000 unit Tab Take 5,000 Units by mouth daily.     citalopram (CELEXA) 20 MG tablet Take 40 mg by mouth daily.      estradiol (ESTRACE) 2 MG tablet Take 1 mg by mouth daily. Takes 1/2 tab = 1mg     insulin aspart (NOVOLOG) 100 unit/mL injection Inject 4 Units under the skin daily with supper. Takes at breakfast and dinner, if eats before bedtime should take 8 units     insulin glargine (LANTUS) 100 unit/mL injection Inject 40 Units under the skin bedtime.      levoFLOXacin (LEVAQUIN) 500 MG tablet Take 500 mg by mouth daily.     lidocaine (XYLOCAINE) 5 % ointment Apply 1 application topically 3 (three) times a day as needed. For knee or back     losartan (COZAAR) 100 MG tablet Take 100 mg by mouth daily.     methimazole (TAPAZOLE) 5 MG tablet Take 5 mg by mouth daily.     metroNIDAZOLE (FLAGYL) 500 MG tablet Take 500 mg by mouth 3 (three) times a day.     MICRO THIN LANCETS MISC Use As Directed.     pravastatin (PRAVACHOL) 20 MG tablet Take 20 mg by mouth daily.      tiZANidine (ZANAFLEX) 2 MG tablet Take 4 mg by mouth bedtime.      traMADol (ULTRAM) 50 mg tablet Take 50 mg by mouth every 6 (six) hours as needed for pain.     valACYclovir (VALTREX) 1000 MG tablet Take 1,000 mg by mouth 2 (two) times a day.     cloNIDine HCl (CATAPRES) 0.1 MG tablet Take 0.1 mg by mouth 3 (three) times a day as needed. For SBP > 180 or DBP > 100      liraglutide (VICTOZA 2-TANNER) 0.6 mg/0.1 mL (18 mg/3 mL) PnIj injection Inject 1.2 mg under the skin daily. Not available from inpatient hospital pharmacy - OK for patient to use own supply     omeprazole (PRILOSEC) 20 MG capsule Take 20 mg by mouth daily.       Patients Active Problems:  Patient Active Problem List   Diagnosis     Hypertension     Taking Female Hormones For Postmenopausal HRT     Type 2 Diabetes Mellitus     Leukocytosis     Nicotine Dependence     Hyperthyroidism     Back Pain     Intractable headache     Knee osteoarthritis     HTN     KP on CPAP     GERD (gastroesophageal reflux disease)     Depression     Status post total knee replacement     Acute blood loss anemia     Spondylisthesis     Post-op pain     Accelerated hypertension     Altered mental state     Malignant hypertensive urgency     Headache     N&V (nausea and vomiting)     KP (obstructive sleep apnea)     Hypertensive emergency     Acute diverticulitis     Hyponatremia     Leukocytosis, unspecified type     Acute cystitis without hematuria     DM2       History:   reports that she quit smoking about 3 years ago. She does not have any smokeless tobacco history on file. She reports that she does not drink alcohol or use illicit drugs.   reports that she quit smoking about 3 years ago. She does not have any smokeless tobacco history on file. She reports that she does not drink alcohol or use illicit drugs.  History   Smoking Status     Former Smoker     Quit date: 1/30/2013   Smokeless Tobacco     Not on file      reports that she quit smoking about 3 years ago. She does not have any smokeless tobacco history on file. She reports that she does not drink alcohol or use illicit drugs.  History   Sexual Activity     Sexual activity: Not on file     Past Medical History   Diagnosis Date     Aseptic meningitis 6/1/09     Depression      Diabetes mellitus      Diverticulitis      GERD (gastroesophageal reflux disease)      Headache       History of D&C      x3     Hyperlipidemia      Hypertension      Hypothyroid      hypothyroid     Meningitis 2009     aseptic     Seasonal asthma      mild seasonal wheezing     Sleep apnea      not using her CPAP     Type 2 diabetes mellitus      UTI (urinary tract infection)      Vitamin D deficiency      Wheezing      mild, seasonal     No family history on file.  Past Medical History   Diagnosis Date     Aseptic meningitis 6/1/09     Depression      Diabetes mellitus      Diverticulitis      GERD (gastroesophageal reflux disease)      Headache      History of D&C      x3     Hyperlipidemia      Hypertension      Hypothyroid      hypothyroid     Meningitis 2009     aseptic     Seasonal asthma      mild seasonal wheezing     Sleep apnea      not using her CPAP     Type 2 diabetes mellitus      UTI (urinary tract infection)      Vitamin D deficiency      Wheezing      mild, seasonal     Past Surgical History   Procedure Laterality Date     Pr total abdom hysterectomy       Description: Hysterectomy;  Recorded: 06/03/2010;     Pr removal of ovary(s)       Description: Oophorectomy;  Recorded: 06/03/2010;     Appendectomy       at age 3     Bowel resection  12 years ago     Dilation and curettage of uterus       x3     Tonsillectomy and adenoidectomy       age 10     Bowel obstruction  2005     Knee arthroscopy w/ meniscectomy  3/10/14     partial medial and lateral meniscectomies, subpatellar chondroplasty     Pr total knee arthroplasty Right 11/24/2014     Procedure: RIGHT TOTAL KNEE ARTHROPLASTY;  Surgeon: Jules Harris MD;  Location: [facility];  Service: Orthopedics     Pr total abdom hysterectomy       Description: Hysterectomy;  Recorded: 06/03/2010;     Pr removal of ovary(s)       Description: Oophorectomy;  Recorded: 06/03/2010;     Joint replacement       Pr arthrodesis ant interbody min discectomy,lumbar N/A 1/30/2015     Procedure: ANTERIOR LUMBAR INTERBODY FUSION L4-5, INTERBODY GRAFT, BMP  "INSTRUMENTATION;  Surgeon: Zeeshan Guillaume MD;  Location: [facility] OR;  Service: Spine     Oophorectomy       Hysterectomy       age 39     Colon surgery  2004     sigmoid colectomy     Blepharoplasty Bilateral 8/17/2015     Procedure: BLEPHAROPLASTY BILATERAL UPPER LIDS;  Surgeon: Chasidy Bryant MD;  Location: [facility] OR;  Service:      Back surgery       Toe surgery         Vitals   height is 5' 2\" (1.575 m) and weight is 167 lb 9.6 oz (76 kg). Her blood pressure is 108/58.         Exam  General appearance: The patient looked well, not in acute distress.  Eyes: no evidence of thyroid eye disease.   Retinal exam: No evidence of diabetic retinopathy.  Mouth and Throat: Normal  Neck: No evidence of thyromegaly, enlarged lymph node or tenderness  Chest: Trachea is central. Chest is clear to auscultation and percussion. Breat sounds are normal.  Cardiovascular exam: JVP is not raised. Heart sounds are normal, no murmurs or rub  Peripheral pulses are palpable.   Abdomen: No masses or tenderness.    Back: No vertebral tenderness or kyphosis.  Extremities: No evidence of leg edema.   Skin: Normal to touch.  No abnormal striae  Neurologic exam:  Visual fields are intact by confrontation, grossly intact. No evidence of peripheral neuropathy.  Detailed foot exam normal.        Diagnosis:  No diagnosis found.    Orders:   No orders of the defined types were placed in this encounter.        Assessment and Plan:  Hyperthyroidism I have discussed the pathophysiology of the disease was a patient with different modalities of treatment I discussed side effects of Tapazole including a granulocytosis.    The patient is willing to do radioactive iodine treatment.  I did advise her I would check her thyroid function test and decrease Tapazole 2, 2.5 mg once a day.  Loss TSH is 2.4 before that it was 5.38.    We may try and stop Tapazole altogether.  The patient became hypothyroid again then we will do radioactive " iodine treatment and the patient agrees.    Type 2 diabetes well controlled at 6.4.    HRT she had hysterectomy 14 years ago she is quite adamant to continue with estrogen replacement despite side effects    Hypertension blood pressure is well controlled    Patient will return to clinic in 6 month I did spent 60 minutes with the patient more the 50% was spent on counseling and managing her care.

## 2021-06-09 NOTE — TELEPHONE ENCOUNTER
MICKY RN confirmed with pt and PCP that pt is taking Metoprolol succ 200 mg two times a day. Will update med list.

## 2021-06-09 NOTE — TELEPHONE ENCOUNTER
----- Message from Lucero Vincent sent at 7/1/2020  9:39 AM CDT -----    Primary cardiologist: Dr. Mg    Detailed reason for call: Bonnie from [facility] is calling to verify patient's metoprolol succinate (TOPROL-XL) 100 MG 24 hr tablet dose and wanted to update the medication list. Please advise [phone]    Device? no    Additional Info:

## 2021-06-09 NOTE — TELEPHONE ENCOUNTER
----- Message from Lucero Vincent sent at 6/25/2020  2:53 PM CDT -----    Primary cardiologist: Dr. Mg    Detailed reason for call: [facility] is calling to verify the dosage of the metoprolol succinate (TOPROL-XL) 100 MG 24 hr tablet so they can send it to the [facility] pharmacy. Please advise [phone]    Device? no

## 2021-06-09 NOTE — TELEPHONE ENCOUNTER
Reviewed med list with [facility]. Confused a bout dose of Atenolol vs Metoprolol. Pharm will call pt and then fax updated list. [phone].

## 2021-06-12 NOTE — PROGRESS NOTES
Progress Note    Reason for Visit:  Chief Complaint     Follow-up          Progress Note:    HPI:    This pleasant 67-year-old female patient is here for follow-up because of hypotension hyperthyroidism.    She was on Tapazole 2.5 mg once a day.    She also has type 2 diabetes.    Component      Latest Ref Rng & Units 3/30/2017 6/5/2017 7/17/2017   Sodium      136 - 145 mmol/L 128 (L) 133 (L)    Potassium      3.5 - 5.0 mmol/L 4.5 4.2    Chloride      98 - 107 mmol/L 87 (L) 95 (L)    CO2      22 - 31 mmol/L 26 23    Anion Gap, Calculation      5 - 18 mmol/L 15 15    Glucose      70 - 125 mg/dL 350 (H) 148 (H)    Calcium      8.5 - 10.5 mg/dL 9.9 9.8    BUN      8 - 22 mg/dL 18 18    Creatinine      0.60 - 1.10 mg/dL 1.13 (H) 0.93    GFR MDRD Af Amer      >60 mL/min/1.73m2 58 (L) >60    GFR MDRD Non Af Amer      >60 mL/min/1.73m2 48 (L) 60 (L)    Cholesterol      <=199 mg/dL      Triglycerides      <=149 mg/dL      HDL Cholesterol      >=50 mg/dL      LDL Calculated      <=129 mg/dL      Patient Fasting > 8hrs?            Free T4      0.7 - 1.8 ng/dL   0.9   T3, Total      45 - 175 ng/dL   121   TSH      0.30 - 5.00 uIU/mL   1.11         Review of Systems:    Nervous System: No headache, dizziness, fainting or memory loss. No tingling sensation of hand or feet.  Ears: No hearing loss or ringing in the ears  Eyes: No blurring of vision, redness, itching or dryness.  Nose: No nosebleed or loss of smell  Mouth: No mouth sores or loss of taste  Throat: No hoarseness or difficulty swallowing  Neck: No enlarged thyroid or lymph nodes.  Heart: No chest pain, palpitation or irregular heartbeat. No swelling of hands or feet  Lungs: No shortness of breath, cough, night sweats, wheezing or hemoptysis.  Gastrointestinal: No nausea or vomiting, constipation or diarrhea.  No acid reflux, abdominal pain or blood in stools.  Kidney/Bladdr: No polyuria, polydipsia, nocturia or hematuria.  Genital/Sexual: No loss of libido  Skin: No  rash, hair loss or hirsutism.  No abnormal striae  Muscles/Joints/Bones: No morning stiffness, muscle aches and pain or loss of height.    Current Medications:  Current Outpatient Prescriptions   Medication Sig     amitriptyline (ELAVIL) 10 MG tablet Take 20 mg by mouth bedtime as needed for sleep.     aspirin 81 MG EC tablet Take 81 mg by mouth daily.     atenolol (TENORMIN) 25 MG tablet Take 50 mg by mouth 2 (two) times a day.      blood sugar diagnostic (CONTOUR TEST STRIPS) Strp Use As Directed.     cetirizine (ZYRTEC) 10 MG tablet Take 10 mg by mouth daily as needed (itching).      chlorthalidone (HYGROTEN) 25 MG tablet Take 25 mg by mouth daily.     cholecalciferol, vitamin D3, 5,000 unit Tab Take 5,000 Units by mouth daily.     citalopram (CELEXA) 20 MG tablet Take 40 mg by mouth daily.      cloNIDine HCl (CATAPRES) 0.1 MG tablet Take 0.1 mg by mouth 3 (three) times a day as needed. For SBP > 180 or DBP > 100     estradiol (ESTRACE) 2 MG tablet Take 1 mg by mouth daily. Takes 1/2 tab = 1mg     insulin aspart (NOVOLOG) 100 unit/mL injection Inject 4 Units under the skin daily with supper. Takes at breakfast and dinner, if eats before bedtime should take 8 units     insulin glargine (LANTUS) 100 unit/mL injection Inject 40 Units under the skin bedtime.      levoFLOXacin (LEVAQUIN) 500 MG tablet Take 500 mg by mouth daily.     lidocaine (XYLOCAINE) 5 % ointment Apply 1 application topically 3 (three) times a day as needed. For knee or back     liraglutide (VICTOZA 2-TANNER) 0.6 mg/0.1 mL (18 mg/3 mL) PnIj injection Inject 1.2 mg under the skin daily. Not available from inpatient hospital pharmacy - OK for patient to use own supply     losartan (COZAAR) 100 MG tablet Take 100 mg by mouth daily.     methIMAzole (TAPAZOLE) 5 MG tablet Take 0.5 tablets (2.5 mg total) by mouth daily.     metroNIDAZOLE (FLAGYL) 500 MG tablet Take 500 mg by mouth 3 (three) times a day.     MICRO THIN LANCETS MISC Use As Directed.      omeprazole (PRILOSEC) 20 MG capsule Take 20 mg by mouth daily.     pravastatin (PRAVACHOL) 20 MG tablet Take 20 mg by mouth daily.      tiZANidine (ZANAFLEX) 2 MG tablet Take 4 mg by mouth bedtime.      traMADol (ULTRAM) 50 mg tablet Take 50 mg by mouth every 6 (six) hours as needed for pain.     valACYclovir (VALTREX) 1000 MG tablet Take 1,000 mg by mouth 2 (two) times a day.       Patients Active Problems:  Patient Active Problem List   Diagnosis     Hypertension     Taking Female Hormones For Postmenopausal HRT     Type 2 Diabetes Mellitus     Leukocytosis     Nicotine Dependence     Hyperthyroidism     Back Pain     Intractable headache     Knee osteoarthritis     HTN     KP on CPAP     GERD (gastroesophageal reflux disease)     Depression     Status post total knee replacement     Acute blood loss anemia     Spondylisthesis     Post-op pain     Accelerated hypertension     Altered mental state     Malignant hypertensive urgency     Headache     N&V (nausea and vomiting)     KP (obstructive sleep apnea)     Hypertensive emergency     Acute diverticulitis     Hyponatremia     Leukocytosis, unspecified type     Acute cystitis without hematuria     DM2       History:   reports that she quit smoking about 4 years ago. She does not have any smokeless tobacco history on file. She reports that she does not drink alcohol or use illicit drugs.   reports that she quit smoking about 4 years ago. She does not have any smokeless tobacco history on file. She reports that she does not drink alcohol or use illicit drugs.  History   Smoking Status     Former Smoker     Quit date: 1/30/2013   Smokeless Tobacco     Not on file      reports that she quit smoking about 4 years ago. She does not have any smokeless tobacco history on file. She reports that she does not drink alcohol or use illicit drugs.  History   Sexual Activity     Sexual activity: Not on file     Past Medical History:   Diagnosis Date     Aseptic meningitis  6/1/09     Depression      Diabetes mellitus      Diverticulitis      GERD (gastroesophageal reflux disease)      Headache      History of D&C     x3     Hyperlipidemia      Hypertension      Hypothyroid     hypothyroid     Meningitis 2009    aseptic     Seasonal asthma     mild seasonal wheezing     Sleep apnea     not using her CPAP     Type 2 diabetes mellitus      UTI (urinary tract infection)      Vitamin D deficiency      Wheezing     mild, seasonal     No family history on file.  Past Medical History:   Diagnosis Date     Aseptic meningitis 6/1/09     Depression      Diabetes mellitus      Diverticulitis      GERD (gastroesophageal reflux disease)      Headache      History of D&C     x3     Hyperlipidemia      Hypertension      Hypothyroid     hypothyroid     Meningitis 2009    aseptic     Seasonal asthma     mild seasonal wheezing     Sleep apnea     not using her CPAP     Type 2 diabetes mellitus      UTI (urinary tract infection)      Vitamin D deficiency      Wheezing     mild, seasonal     Past Surgical History:   Procedure Laterality Date     APPENDECTOMY      at age 3     BACK SURGERY       BLEPHAROPLASTY Bilateral 8/17/2015    Procedure: BLEPHAROPLASTY BILATERAL UPPER LIDS;  Surgeon: Chasidy Bryant MD;  Location: [facility];  Service:      bowel obstruction  2005     BOWEL RESECTION  12 years ago     COLON SURGERY  2004    sigmoid colectomy     DILATION AND CURETTAGE OF UTERUS      x3     HYSTERECTOMY      age 39     JOINT REPLACEMENT       KNEE ARTHROSCOPY W/ MENISCECTOMY  3/10/14    partial medial and lateral meniscectomies, subpatellar chondroplasty     OOPHORECTOMY       MS ARTHRODESIS ANT INTERBODY MIN DISCECTOMY,LUMBAR N/A 1/30/2015    Procedure: ANTERIOR LUMBAR INTERBODY FUSION L4-5, INTERBODY GRAFT, BMP INSTRUMENTATION;  Surgeon: Zeeshan Guillaume MD;  Location: [facility];  Service: Spine     MS REMOVAL OF OVARY(S)      Description: Oophorectomy;  Recorded: 06/03/2010;      "NE REMOVAL OF OVARY(S)      Description: Oophorectomy;  Recorded: 06/03/2010;     NE TOTAL ABDOM HYSTERECTOMY      Description: Hysterectomy;  Recorded: 06/03/2010;     NE TOTAL ABDOM HYSTERECTOMY      Description: Hysterectomy;  Recorded: 06/03/2010;     NE TOTAL KNEE ARTHROPLASTY Right 11/24/2014    Procedure: RIGHT TOTAL KNEE ARTHROPLASTY;  Surgeon: Jules Harris MD;  Location: [facility];  Service: Orthopedics     TOE SURGERY       TONSILLECTOMY AND ADENOIDECTOMY      age 10       Vitals   height is 5' 2\" (1.575 m) and weight is 176 lb (79.8 kg). Her blood pressure is 142/78.         Exam  General appearance: The patient looked well, not in acute distress.  Eyes: no evidence of thyroid eye disease.   Retinal exam: No evidence of diabetic retinopathy.  Mouth and Throat: Normal  Neck: No evidence of thyromegaly, enlarged lymph node or tenderness  Chest: Trachea is central. Chest is clear to auscultation and percussion. Breat sounds are normal.  Cardiovascular exam: JVP is not raised. Heart sounds are normal, no murmurs or rub  Peripheral pulses are palpable.   Abdomen: No masses or tenderness.    Back: No vertebral tenderness or kyphosis.  Extremities: No evidence of leg edema.   Skin: Normal to touch.  No abnormal striae  Neurologic exam:  Visual fields are intact by confrontation, grossly intact. No evidence of peripheral neuropathy.  Detailed foot exam normal.        Diagnosis:  No diagnosis found.    Orders:   No orders of the defined types were placed in this encounter.        Assessment and Plan:  Hyperthyroidism.    She was on Tapazole 2.5 mg once a day was stopped that 6 months ago her TSH is 1.1 free T4 came up from 0.7-0.9.    Total T3 came up from 99-1 21.    She does have heat intolerance with sweating.    I would put her back on Tapazole 2.5 mg once a day most likely will keep her on that long-term.    She has type 2 diabetes she is taken NovoLog 13 units 3 times a day Lantus 65 units at " bedtime    She stopped Victoza her A1c went up to 8.7 she is back on it 1.2 mg daily her blood sugar before breakfast the highest is 125 so we will continue with that    She does have hypertension difficult to control she takes atenolol 50 mg twice a day chlorthalidone 25 mg clonidine 0.1-0.3 when she gets spikes in her blood pressure which is difficult to control she takes losartan 100 mg this is followed by the primary care physician hopefully that episode will help control the blood pressure.    She does have hyperlipidemia on Pravachol 20 mg with continues at    Blood pressure 142/70 Ht she takes amitriptyline 20 mg plus aspirin.  Vitamin D 5000 units a day will monitor that    Patient return to clinic in 6 month I did spend 40 minutes with the patient more than 50% was spent on counseling and managing her care.

## 2021-06-14 ENCOUNTER — RECORDS - HEALTHEAST (OUTPATIENT)
Dept: ADMINISTRATIVE | Facility: OTHER | Age: 71
End: 2021-06-14

## 2021-06-14 RX ORDER — ARIPIPRAZOLE 2 MG/1
2 TABLET ORAL DAILY
Status: SHIPPED | COMMUNITY
Start: 2021-06-13 | End: 2024-03-13

## 2021-06-14 RX ORDER — PREGABALIN 150 MG/1
150 CAPSULE ORAL 2 TIMES DAILY
Status: ON HOLD | COMMUNITY
Start: 2021-05-23 | End: 2021-09-22

## 2021-06-19 NOTE — LETTER
Letter by Chaka Mg MD at      Author: Chaka Mg MD Service: -- Author Type: --    Filed:  Encounter Date: 8/28/2019 Status: (Other)         Paige Mari  [address]  [place] [address]      August 28, 2019      Dear Paige,    This letter is to remind you that you will be due for your follow up appointment with Dr. Chaka Mg . To help ensure you are in the best health possible, a regular follow-up with your cardiologist is essential.     Please call our Patient Scheduling Line at [phone] to schedule your appointment at your earliest convenience.  If you have recently scheduled an appointment, please disregard this letter.    We look forward to seeing you again. As always, we are available at the number  above for any questions or concerns you may have.      Sincerely,     The Physicians and Staff of [facility]

## 2021-06-19 NOTE — LETTER
Letter by Chaka Mg MD at      Author: Chaka Mg MD Service: -- Author Type: --    Filed:  Encounter Date: 9/12/2019 Status: (Other)         Paige Mari  [address]  [place] [address]      September 12, 2019      Dear Paige,    This letter is to remind you that you will be due for your follow up appointment with Dr. Chaka Mg  . To help ensure you are in the best health possible, a regular follow-up with your cardiologist is essential.     Please call our Patient Scheduling Line at [phone] to schedule your appointment at your earliest convenience.  If you have recently scheduled an appointment, please disregard this letter.    We look forward to seeing you again. As always, we are available at the number  above for any questions or concerns you may have.      Sincerely,     The Physicians and Staff of [facility]

## 2021-06-20 NOTE — PROGRESS NOTES
Progress Note    Reason for Visit:  Chief Complaint     Thyroid Problem          Progress Note:    HPI:       This patient is here for follow-up because of type 2 diabetes and hyper thyroidism    Component      Latest Ref Rng & Units 5/21/2018 7/24/2018   Sodium      136 - 145 mmol/L 128 (L) 127 (L)   Potassium      3.5 - 5.0 mmol/L 4.4 4.0   Chloride      98 - 107 mmol/L 88 (L) 90 (L)   CO2      22 - 31 mmol/L 24 25   Anion Gap, Calculation      5 - 18 mmol/L 16 12   Glucose      70 - 125 mg/dL 242 (H) 147 (H)   BUN      8 - 22 mg/dL 15 17   Creatinine      0.60 - 1.10 mg/dL 1.10 1.06   GFR MDRD Af Amer      >60 mL/min/1.73m2 60 (L) >60   GFR MDRD Non Af Amer      >60 mL/min/1.73m2 49 (L) 51 (L)   Bilirubin, Total      0.0 - 1.0 mg/dL 0.6 0.9   Calcium      8.5 - 10.5 mg/dL 9.5 10.0   Protein, Total      6.0 - 8.0 g/dL 6.7 6.8   ALBUMIN      3.5 - 5.0 g/dL 4.2 4.2   Alkaline Phosphatase      45 - 120 U/L 74 60   AST      0 - 40 U/L 35 32   ALT      0 - 45 U/L 29 27   Cholesterol      <=199 mg/dL 193    Triglycerides      <=149 mg/dL 246 (H)    HDL Cholesterol      >=50 mg/dL 65    LDL Calculated      <=129 mg/dL 79    Patient Fasting > 8hrs?       Unknown    Free T4      0.7 - 1.8 ng/dL  0.8   T3, Total      45 - 175 ng/dL     TSH      0.30 - 5.00 uIU/mL  2.51   Vitamin D, Total (25-Hydroxy)      30.0 - 80.0 ng/mL         Review of Systems:    Nervous System: No headache, dizziness, fainting or memory loss. No tingling sensation of hand or feet.  Ears: No hearing loss or ringing in the ears  Eyes: No blurring of vision, redness, itching or dryness.  Nose: No nosebleed or loss of smell  Mouth: No mouth sores or loss of taste  Throat: No hoarseness or difficulty swallowing  Neck: No enlarged thyroid or lymph nodes.  Heart: No chest pain, palpitation or irregular heartbeat. No swelling of hands or feet  Lungs: No shortness of breath, cough, night sweats, wheezing or hemoptysis.  Gastrointestinal: No nausea or  vomiting, constipation or diarrhea.  No acid reflux, abdominal pain or blood in stools.  Kidney/Bladdr: No polyuria, polydipsia, nocturia or hematuria.  Genital/Sexual: No loss of libido  Skin: No rash, hair loss or hirsutism.  No abnormal striae  Muscles/Joints/Bones: No morning stiffness, muscle aches and pain or loss of height.    Current Medications:  Current Outpatient Prescriptions   Medication Sig     amitriptyline (ELAVIL) 10 MG tablet Take 20 mg by mouth bedtime as needed for sleep.     aspirin 81 MG EC tablet Take 81 mg by mouth daily.     atenolol (TENORMIN) 25 MG tablet Take 50 mg by mouth 2 (two) times a day.      atenolol (TENORMIN) 50 MG tablet TALE 1 TAB(S) BY MOUTH TWICE A DAY FOR HIGH BLOOD PRESSURE     blood sugar diagnostic (CONTOUR TEST STRIPS) Strp Use As Directed.     cetirizine (ZYRTEC) 10 MG tablet Take 10 mg by mouth daily as needed (itching).      chlorthalidone (HYGROTEN) 25 MG tablet Take 25 mg by mouth daily.     cholecalciferol, vitamin D3, 5,000 unit Tab Take 5,000 Units by mouth daily.     citalopram (CELEXA) 20 MG tablet Take 40 mg by mouth daily.      cloNIDine HCl (CATAPRES) 0.1 MG tablet Take 0.1 mg by mouth 3 (three) times a day as needed. For SBP > 180 or DBP > 100     estradiol (ESTRACE) 2 MG tablet Take 1 mg by mouth daily. Takes 1/2 tab = 1mg     fluconazole (DIFLUCAN) 150 MG tablet TAKE 1 TAB(S) BY MOUTH ONCE A WEEK FOR YEAST ORALLY     insulin aspart (NOVOLOG) 100 unit/mL injection Inject 4 Units under the skin daily with supper. Takes at breakfast and dinner, if eats before bedtime should take 8 units     insulin glargine (LANTUS) 100 unit/mL injection Inject 40 Units under the skin bedtime.      LANTUS SOLOSTAR 100 unit/mL (3 mL) pen INJECT 58 UNITS UNDER SKIN EVERY NIGHT AT BEDTIME     levoFLOXacin (LEVAQUIN) 500 MG tablet Take 500 mg by mouth daily.     lidocaine (XYLOCAINE) 5 % ointment Apply 1 application topically 3 (three) times a day as needed. For knee or back  "    liraglutide (VICTOZA 2-TANNER) 0.6 mg/0.1 mL (18 mg/3 mL) PnIj injection Inject 1.2 mg under the skin daily. Not available from inpatient hospital pharmacy - OK for patient to use own supply     losartan (COZAAR) 100 MG tablet Take 100 mg by mouth daily.     methIMAzole (TAPAZOLE) 5 MG tablet TAKE 0.5 TABLETS (2.5 MG TOTAL) BY MOUTH DAILY.     metroNIDAZOLE (FLAGYL) 500 MG tablet Take 500 mg by mouth 3 (three) times a day.     MICRO THIN LANCETS MISC Use As Directed.     NOVOFINE 32 32 gauge x 1/4\" Ndle USE TO TEST TWICE A DAY     NOVOLOG FLEXPEN 100 unit/mL injection pen      omeprazole (PRILOSEC) 20 MG capsule Take 20 mg by mouth daily.     pravastatin (PRAVACHOL) 20 MG tablet Take 20 mg by mouth daily.      tiZANidine (ZANAFLEX) 2 MG tablet Take 4 mg by mouth bedtime.      traMADol (ULTRAM) 50 mg tablet Take 50 mg by mouth every 6 (six) hours as needed for pain.     valACYclovir (VALTREX) 1000 MG tablet Take 1,000 mg by mouth 2 (two) times a day.       Patients Active Problems:  Patient Active Problem List   Diagnosis     Hypertension     Taking Female Hormones For Postmenopausal HRT     Type 2 Diabetes Mellitus     Leukocytosis     Nicotine Dependence     Hyperthyroidism     Back Pain     Intractable headache     Knee osteoarthritis     HTN     KP on CPAP     GERD (gastroesophageal reflux disease)     Depression     Status post total knee replacement     Acute blood loss anemia     Spondylisthesis     Post-op pain     Accelerated hypertension     Altered mental state     Malignant hypertensive urgency     Headache     N&V (nausea and vomiting)     KP (obstructive sleep apnea)     Hypertensive emergency     Acute diverticulitis     Hyponatremia     Leukocytosis, unspecified type     Acute cystitis without hematuria     DM2       History:   reports that she quit smoking about 5 years ago. She does not have any smokeless tobacco history on file. She reports that she does not drink alcohol or use illicit drugs.   " reports that she quit smoking about 5 years ago. She does not have any smokeless tobacco history on file. She reports that she does not drink alcohol or use illicit drugs.  History   Smoking Status     Former Smoker     Quit date: 1/30/2013   Smokeless Tobacco     Not on file      reports that she quit smoking about 5 years ago. She does not have any smokeless tobacco history on file. She reports that she does not drink alcohol or use illicit drugs.  History   Sexual Activity     Sexual activity: Not on file     Past Medical History:   Diagnosis Date     Aseptic meningitis 6/1/09     Depression      Diabetes mellitus (H)      Diverticulitis      GERD (gastroesophageal reflux disease)      Headache      History of D&C     x3     Hyperlipidemia      Hypertension      Hypothyroid     hypothyroid     Meningitis 2009    aseptic     Seasonal asthma     mild seasonal wheezing     Sleep apnea     not using her CPAP     Type 2 diabetes mellitus (H)      UTI (urinary tract infection)      Vitamin D deficiency      Wheezing     mild, seasonal     Family History   Problem Relation Age of Onset     Breast cancer Neg Hx      Past Medical History:   Diagnosis Date     Aseptic meningitis 6/1/09     Depression      Diabetes mellitus (H)      Diverticulitis      GERD (gastroesophageal reflux disease)      Headache      History of D&C     x3     Hyperlipidemia      Hypertension      Hypothyroid     hypothyroid     Meningitis 2009    aseptic     Seasonal asthma     mild seasonal wheezing     Sleep apnea     not using her CPAP     Type 2 diabetes mellitus (H)      UTI (urinary tract infection)      Vitamin D deficiency      Wheezing     mild, seasonal     Past Surgical History:   Procedure Laterality Date     APPENDECTOMY      at age 3     BACK SURGERY       BLEPHAROPLASTY Bilateral 8/17/2015    Procedure: BLEPHAROPLASTY BILATERAL UPPER LIDS;  Surgeon: Chasidy Bryant MD;  Location: [facility];  Service:      bowel obstruction  2005      BOWEL RESECTION  12 years ago     COLON SURGERY  2004    sigmoid colectomy     DILATION AND CURETTAGE OF UTERUS      x3     HYSTERECTOMY      age 39     JOINT REPLACEMENT       KNEE ARTHROSCOPY W/ MENISCECTOMY  3/10/14    partial medial and lateral meniscectomies, subpatellar chondroplasty     OOPHORECTOMY       [place] ARTHRODESIS ANT INTERBODY MIN DISCECTOMY,LUMBAR N/A 1/30/2015    Procedure: ANTERIOR LUMBAR INTERBODY FUSION L4-5, INTERBODY GRAFT, BMP INSTRUMENTATION;  Surgeon: Zeeshan Guillaume MD;  Location: [facility];  Service: Spine     WA REMOVAL OF OVARY(S)      Description: Oophorectomy;  Recorded: 06/03/2010;     WA REMOVAL OF OVARY(S)      Description: Oophorectomy;  Recorded: 06/03/2010;     WA TOTAL ABDOM HYSTERECTOMY      Description: Hysterectomy;  Recorded: 06/03/2010;     WA TOTAL ABDOM HYSTERECTOMY      Description: Hysterectomy;  Recorded: 06/03/2010;     WA TOTAL KNEE ARTHROPLASTY Right 11/24/2014    Procedure: RIGHT TOTAL KNEE ARTHROPLASTY;  Surgeon: Jules Harris MD;  Location: [facility];  Service: Orthopedics     TOE SURGERY       TONSILLECTOMY AND ADENOIDECTOMY      age 10       Vitals   vitals were not taken for this visit.        Exam  General appearance: The patient looked well, not in acute distress.  Eyes: no evidence of thyroid eye disease.   Retinal exam: No evidence of diabetic retinopathy.  Mouth and Throat: Normal  Neck: No evidence of thyromegaly, enlarged lymph node or tenderness  Chest: Trachea is central. Chest is clear to auscultation and percussion. Breat sounds are normal.  Cardiovascular exam: JVP is not raised. Heart sounds are normal, no murmurs or rub  Peripheral pulses are palpable.   Abdomen: No masses or tenderness.    Back: No vertebral tenderness or kyphosis.  Extremities: No evidence of leg edema.   Skin: Normal to touch.  No abnormal striae  Neurologic exam:  Visual fields are intact by confrontation, grossly intact. No evidence of peripheral  neuropathy.  Detailed foot exam normal.        Diagnosis:  No diagnosis found.    Orders:   No orders of the defined types were placed in this encounter.        Assessment and Plan: Type 2 diabetes.  Patient is currently on Lantus or similar analog at 65 units at bedtime she is also taking NovoLog 15 units 3 times a day last A1c 7.2 blood sugar before breakfast ranges between 70 and 120.    She takes aspirin 81 mg.    She has severe peripheral neuropathy she is on Neurontin 100 mg daily we will increase that to 300.    We will continue with the same as her blood sugar.    Her sodium has been chronically low she has had chronic hyponatremia 127 for so many years this is mainly due to her medication so will she is taking chlorthalidone 25 mg daily advised her to take 12.5 mg daily.    She takes aspirin 81 mg vitamin D 5000 units a day her vitamin D is 60 will continue with that she takes Celexa 20 mg chronic    She has hypertension on clonidine 0.1 mg twice a day blood pressure 100/66 she will monitor that regularly    She takes Victoza 1.2 mg daily would continues that she could not tolerate losartan she is taking irbesartan 300 mg daily we will continue to again    Hyperthyroidism on Tapazole 2.5 mg daily we try to stop that in the past but the patient became hyperthyroid thyroid TSH is 2.513 T4 0.8 we will continue his    Detailed foot exam is normal    She has hypertension again noted on metoprolol 100 mg twice a day    Hyperlipidemia on Pravachol 20 mg daily controlled    Creatinine is 1.06.  Patient will return to clinic in 6 months.    I have reviewed and ordered clinical lab test    I have reviewed and ordered radiology tests.    I have reviewed and ordered her medication as required.    I have reviewed her test results and advised with the performing physician.    I have reviewed the patient's old records.    I have reviewed and summarized the patient old records.    I did spend 40 minutes with the patient  more than 50% was spent on counseling and managing her care.

## 2021-06-26 ENCOUNTER — HEALTH MAINTENANCE LETTER (OUTPATIENT)
Age: 71
End: 2021-06-26

## 2021-06-27 NOTE — PROGRESS NOTES
Progress Notes by Chaka Mg MD at 4/4/2019  9:30 AM     Author: Chaka Mg MD Service: -- Author Type: Physician    Filed: 4/4/2019 10:27 AM Encounter Date: 4/4/2019 Status: Signed    : Chaka Mg MD (Physician)           Click to link to [facility] HEART CARE NOTE    Thank you, Dr. Canada, for asking me to see Paige Mari in consultation at [facility] to evaluate irregular heartbeat and shortness of breath.      Assessment/Plan:   1.  Irregular heartbeats and dyspnea on exertion: The patient developed dyspnea on exertion first 5-6 months, irregular heartbeat 2 weeks.  The patient had no chest pain.  Her ECG showed normal sinus rhythm with nonspecific ST-T change.  Her symptoms could be caused by hypothyroidism secondary to converting from hyperthyroidism versus significant coronary artery disease, paroxysmal atrial fibrillation.  Her TSH is significantly elevated from 2.28 to 16.  The patient also has other symptoms from hypothyroidism including cold all the time, dry skin, fatigue.  Check free T3 and free T4.  I asked the patient to talk to her endocrinologist today.  The patient has multiple risk factors including type 2 diabetes, essential hypertension, hyperlipidemia.  Nuclear stress test is requested for ischemic evaluation.  Meantime also request a Holter monitor for evaluation of possible paroxysmal atrial fibrillation secondary to thyroid dysfunction.    2.  Essential hypertension: Her blood pressure has been low.  Her clonidine 0.1 mg 3 times daily was discontinued.  Continue Benicar and metoprolol at this time.  Continue to monitor her blood pressure.    3.  Dyslipidemia: Continue Zocor.    Thank you for the opportunity to be involved in the care of Paige Mari. If you have any questions, please feel free to contact me.  I will see the patient again in 4 weeks.    Much or all of the text in this note was generated through the use of Dragon  Dictate voice-to-text software. Errors in spelling or words which seem out of context are unintentional.   Sound alike errors, in particular, may have escaped editing.       History of Present Illness:   It is my pleasure to see Paige Mari at the [facility] for evaluation of Irregular Heart Beat and Shortness of Breath. Paige Mari is a 68 y.o. female with a medical history of essential hypertension, dyslipidemia, type 2 diabetes, hyperthyroidism.    The patient states that she developed dyspnea on exertion over last 5-6 months.  She developed irregular heartbeat at night over last 2 weeks.  She can feel her heart palpitated.  She had no chest pain.  She has occasional lightheadedness and dizziness, but no syncope.  She had no orthopnea, PND or leg edema.  She complains of cold all the time, dry skin, fatigue.  She gained 7-10 pounds over last 5-6 months.  Her blood pressure has been low, her medication has been reduced recently.  Her blood pressure is 86/60 mmHg this morning.  Her clonidine 0.1 mg 3 times daily was discontinued, used as needed.    The patient had the blood test yesterday at Dr. Canada office.   I personally reviewed it.  TSH is 13.6.  Sodium 130.    Past Medical History:     Patient Active Problem List   Diagnosis   ? Hypertension   ? Taking Female Hormones For Postmenopausal HRT   ? Type 2 Diabetes Mellitus   ? Leukocytosis   ? Nicotine Dependence   ? Hyperthyroidism   ? Back Pain   ? Intractable headache   ? Knee osteoarthritis   ? HTN   ? KP on CPAP   ? GERD (gastroesophageal reflux disease)   ? Depression   ? Status post total knee replacement   ? Acute blood loss anemia   ? Spondylisthesis   ? Post-op pain   ? Accelerated hypertension   ? Altered mental state   ? Malignant hypertensive urgency   ? Headache   ? N&V (nausea and vomiting)   ? PK (obstructive sleep apnea)   ? Hypertensive emergency   ? Acute diverticulitis   ? Hyponatremia   ? Leukocytosis,  unspecified type   ? Acute cystitis without hematuria   ? DM2       Past Surgical History:     Past Surgical History:   Procedure Laterality Date   ? APPENDECTOMY      at age 3   ? BACK SURGERY     ? BLEPHAROPLASTY Bilateral 8/17/2015    Procedure: BLEPHAROPLASTY BILATERAL UPPER LIDS;  Surgeon: Chasidy Bryant MD;  Location: [facility];  Service:    ? bowel obstruction  2005   ? BOWEL RESECTION  12 years ago   ? COLON SURGERY  2004    sigmoid colectomy   ? DILATION AND CURETTAGE OF UTERUS      x3   ? HYSTERECTOMY      age 39   ? JOINT REPLACEMENT     ? KNEE ARTHROSCOPY W/ MENISCECTOMY  3/10/14    partial medial and lateral meniscectomies, subpatellar chondroplasty   ? OOPHORECTOMY     ? [place] ARTHRODESIS ANT INTERBODY MIN DISCECTOMY,LUMBAR N/A 1/30/2015    Procedure: ANTERIOR LUMBAR INTERBODY FUSION L4-5, INTERBODY GRAFT, BMP INSTRUMENTATION;  Surgeon: Zeeshan Guillaume MD;  Location: [facility];  Service: Spine   ? OK REMOVAL OF OVARY(S)      Description: Oophorectomy;  Recorded: 06/03/2010;   ? OK REMOVAL OF OVARY(S)      Description: Oophorectomy;  Recorded: 06/03/2010;   ? OK TOTAL ABDOM HYSTERECTOMY      Description: Hysterectomy;  Recorded: 06/03/2010;   ? OK TOTAL ABDOM HYSTERECTOMY      Description: Hysterectomy;  Recorded: 06/03/2010;   ? OK TOTAL KNEE ARTHROPLASTY Right 11/24/2014    Procedure: RIGHT TOTAL KNEE ARTHROPLASTY;  Surgeon: Jules Harris MD;  Location: [facility];  Service: Orthopedics   ? TOE SURGERY     ? TONSILLECTOMY AND ADENOIDECTOMY      age 10       Family History:     Family History   Problem Relation Age of Onset   ? Breast cancer Neg Hx        Social History:    reports that she quit smoking about 15 months ago. she has never used smokeless tobacco. She reports that she does not drink alcohol or use drugs.    Review of Systems:   General: WNL  Eyes: WNL  Ears/Nose/Throat: WNL  Lungs: WNL  Heart: WNL  Stomach: WNL  Bladder: WNL  Muscle/Joints: WNL  Skin:  "WNL  Nervous System: WNL  Mental Health: WNL     Blood: WNL    Meds:     Current Outpatient Medications:   ?  amoxicillin (AMOXIL) 500 MG tablet, Take 500 mg by mouth as needed., Disp: , Rfl:   ?  aspirin 81 MG EC tablet, Take 81 mg by mouth daily., Disp: , Rfl:   ?  blood sugar diagnostic (CONTOUR TEST STRIPS) Strp, Use 1 each As Directed daily. , Disp: , Rfl:   ?  cetirizine (ZYRTEC) 10 MG tablet, Take 10 mg by mouth daily as needed (itching). , Disp: , Rfl:   ?  chlorthalidone (HYGROTEN) 25 MG tablet, Take 25 mg by mouth daily., Disp: , Rfl:   ?  cholecalciferol, vitamin D3, 5,000 unit Tab, Take 5,000 Units by mouth daily., Disp: , Rfl:   ?  citalopram (CELEXA) 20 MG tablet, Take 20 mg by mouth daily. , Disp: , Rfl:   ?  cloNIDine HCl (CATAPRES) 0.1 MG tablet, Take 0.1 mg by mouth 3 (three) times a day as needed. For SBP > 180 or DBP > 100, Disp: , Rfl:   ?  estradiol (ESTRACE) 2 MG tablet, Take 1 mg by mouth daily. Takes 1/2 tab = 1mg, Disp: , Rfl:   ?  gabapentin (NEURONTIN) 300 MG capsule, Take 1 capsule (300 mg total) by mouth 2 (two) times a day., Disp: 180 capsule, Rfl: 1  ?  insulin aspart (NOVOLOG) 100 unit/mL injection, Inject 13 Units under the skin 3 (three) times a day with meals. Sliding scale   , Disp: , Rfl:   ?  liraglutide (VICTOZA 2-TANNER) 0.6 mg/0.1 mL (18 mg/3 mL) PnIj injection, Inject 1.2 mg under the skin daily. Not available from inpatient hospital pharmacy - OK for patient to use own supply, Disp: , Rfl:   ?  magnesium oxide 200 mg magnesium tablet, Take 400 mg by mouth 2 (two) times a day., Disp: , Rfl:   ?  methIMAzole (TAPAZOLE) 5 MG tablet, TAKE 0.5 TABLETS (2.5 MG TOTAL) BY MOUTH DAILY., Disp: 45 tablet, Rfl: 0  ?  metoprolol succinate (TOPROL-XL) 50 MG 24 hr tablet, Take 100 mg by mouth 2 (two) times a day.    , Disp: , Rfl:   ?  MICRO THIN LANCETS [facility], Use As Directed., Disp: , Rfl:   ?  NOVOFINE 32 32 gauge x 1/4\" Ndle, USE TO TEST TWICE A DAY, Disp: , Rfl: 3  ?  olmesartan " "(BENICAR) 40 MG tablet, Take 40 mg by mouth daily., Disp: , Rfl:   ?  simvastatin (ZOCOR) 10 MG tablet, Take 1 tablet by mouth at bedtime., Disp: , Rfl:   ?  tiZANidine (ZANAFLEX) 2 MG tablet, Take 4 mg by mouth bedtime. , Disp: , Rfl:   ?  traMADol (ULTRAM) 50 mg tablet, Take 50 mg by mouth every 6 (six) hours as needed for pain., Disp: , Rfl:   ?  valACYclovir (VALTREX) 1000 MG tablet, Take 1,000 mg by mouth 2 (two) times a day., Disp: , Rfl:   ?  amitriptyline (ELAVIL) 10 MG tablet, Take 20 mg by mouth bedtime as needed for sleep., Disp: , Rfl:   ?  metroNIDAZOLE (FLAGYL) 500 MG tablet, Take 500 mg by mouth 3 (three) times a day., Disp: , Rfl:   ?  pravastatin (PRAVACHOL) 20 MG tablet, Take 20 mg by mouth daily. , Disp: , Rfl:      Allergies:   Morphine; Adhesive; Amlodipine; and Prednisone    Objective:      Physical Exam  171 lb (77.6 kg)  5' 2\" (1.575 m)  Body mass index is 31.28 kg/m .  BP (!) 86/60 (Patient Site: Left Arm, Patient Position: Sitting, Cuff Size: Adult Large)   Pulse 68   Resp 16   Ht 5' 2\" (1.575 m)   Wt 171 lb (77.6 kg)   BMI 31.28 kg/m      General Appearance:   Awake, Alert, No acute distress.   HEENT:  Pupil equal, reactive to light. No scleral icterus; the mucous membranes were moist. No oral ulcers or thrush.    Neck: No cervical bruits. No JVD. No thyromegaly. No lymph node enlargement or tenderness.   Chest: The spine was straight. The chest was symmetric.   Lungs:   Respirations unlabored. Lungs are clear to auscultation. No crackles. No wheezing.   Cardiovascular:   RRR, normal first and second heart sounds with no murmurs. No rubs or gallops.    Abdomen:  Soft. No tenderness. Non-distended. Bowels sounds are present   Extremities: Equal posterior tibial pulses. No leg edema.   Skin: No rashes or ulcers. Warm, Dry.   Musculoskeletal: No tenderness. No deformity.   Neurologic: Mood and affect are appropriate. No focal deficits.         EKG:  Personally reivewed  Normal sinus " rhythm  Nonspecific ST-T abnormality  Abnormal ECG    Cardiac Imaging Studies  ECHO on 5-:   Summary   Normal left ventricular cavity size and systolic function.   Normal left ventricle wall thickness.   Left ventricular ejection fraction is visually estimated to be 65 %.   Mild (grade 1) diastolic dysfunction with reversed E/A ratio.   No significant valvular abnormalities.   There is a small circumferential pericardial effusion with organization   present but no evidence of tamponade.    Lab Review   Lab Results   Component Value Date     (L) 04/03/2019    K 4.7 04/03/2019    CL 91 (L) 04/03/2019    CO2 27 04/03/2019    BUN 19 04/03/2019    CREATININE 1.01 04/03/2019    CALCIUM 10.2 04/03/2019     Lab Results   Component Value Date    WBC 9.7 05/21/2018    WBC 13.5 (H) 05/15/2015    HGB 13.7 05/21/2018    HCT 38.5 05/21/2018     (H) 05/21/2018     05/21/2018     Lab Results   Component Value Date    CHOL 193 05/21/2018    TRIG 246 (H) 05/21/2018    HDL 65 05/21/2018    LDLDIRECT 99 07/06/2016     Lab Results   Component Value Date    TROPONINI 0.02 05/15/2015     No results found for: BNP  Lab Results   Component Value Date    TSH 13.60 (H) 04/03/2019

## 2021-06-27 NOTE — PROGRESS NOTES
Progress Notes by Chaka Mg MD at 5/3/2019 11:10 AM     Author: Chaka Mg MD Service: -- Author Type: Physician    Filed: 5/3/2019 12:17 PM Encounter Date: 5/3/2019 Status: Signed    : Chaka Mg MD (Physician)           Click to link to [facility]     [facility] HEART CARE NOTE       Assessment/Plan:   1.  Dyspnea on exertion and irregular heartbeat: The patient's dyspnea on exertion could be related to hypothyroidism.  She had nuclear stress test which was reported negative for inducible myocardial ischemia, normal heart function.  The patient has no heart murmur.  Her irregular heartbeats most likely related to moderate the PACs secondary to the thyroid disorder.  Her Holter monitor did not record any episodes of atrial fibrillation.  Continue metoprolol 100 mg twice a day.    2.  Essential hypertension: Her blood pressure is at the low side.  Currently she is on metoprolol 100 mg twice a day, Benicar 40 mg daily, clonidine 0.1 mg 3 times daily.  The patient is going to reduce clonidine to 0.1 mg twice a day.    3.  Dyslipidemia: Continue pravastatin.    4.  Thyroid disorder: Patient has follow-up with her endocrinologist.    Thank you for the opportunity to be involved in the care of Paige Mari. If you have any questions, please feel free to contact me.  I will see the patient again in 3 months.    Much or all of the text in this note was generated through the use of Dragon Dictate voice-to-text software. Errors in spelling or words which seem out of context are unintentional.   Sound alike errors, in particular, may have escaped editing.       History of Present Illness:   It is my pleasure to see Paige Mari at the [facility] clinic for evaluation of Follow-up post hospital discharge.  Paige Mari is a 68 y.o. female with a medical history of essential hypertension, dyslipidemia, type 2 diabetes, hyperthyroidism.    The patient states that she still has  irregular heartbeat feeling, dyspnea on exertion.  She has been follow-up with her endocrinologist and adjust her thyroid medication.  She had no chest pain.  Her dyspnea on exertion is slightly improved.  She had no dizziness, orthopnea, PND or leg edema.  Her blood pressure and heart rate are controlled.    She had a nuclear stress test on April 10, 2019 which was reported negative for inducible myocardial ischemia, normal function.  Her Holter monitor on April 10, 2019 was reported essentially normal Holter findings.  There is moderate numbers of PACs.    Past Medical History:     Patient Active Problem List   Diagnosis   ? Hypertension   ? Taking Female Hormones For Postmenopausal HRT   ? Type 2 Diabetes Mellitus   ? Leukocytosis   ? Nicotine Dependence   ? Hyperthyroidism   ? Back Pain   ? Intractable headache   ? Knee osteoarthritis   ? HTN   ? KP on CPAP   ? GERD (gastroesophageal reflux disease)   ? Depression   ? Status post total knee replacement   ? Acute blood loss anemia   ? Spondylisthesis   ? Post-op pain   ? Accelerated hypertension   ? Altered mental state   ? Malignant hypertensive urgency   ? Headache   ? N&V (nausea and vomiting)   ? KP (obstructive sleep apnea)   ? Hypertensive emergency   ? Acute diverticulitis   ? Hyponatremia   ? Leukocytosis, unspecified type   ? Acute cystitis without hematuria   ? DM2       Past Surgical History:     Past Surgical History:   Procedure Laterality Date   ? APPENDECTOMY      at age 3   ? BACK SURGERY     ? BLEPHAROPLASTY Bilateral 8/17/2015    Procedure: BLEPHAROPLASTY BILATERAL UPPER LIDS;  Surgeon: Chasidy Bryant MD;  Location: [facility];  Service:    ? bowel obstruction  2005   ? BOWEL RESECTION  12 years ago   ? COLON SURGERY  2004    sigmoid colectomy   ? DILATION AND CURETTAGE OF UTERUS      x3   ? HYSTERECTOMY      age 39   ? JOINT REPLACEMENT     ? KNEE ARTHROSCOPY W/ MENISCECTOMY  3/10/14    partial medial and lateral meniscectomies,  subpatellar chondroplasty   ? OOPHORECTOMY     ? LA ARTHRODESIS ANT INTERBODY MIN DISCECTOMY,LUMBAR N/A 1/30/2015    Procedure: ANTERIOR LUMBAR INTERBODY FUSION L4-5, INTERBODY GRAFT, BMP INSTRUMENTATION;  Surgeon: Zeeshan Guillaume MD;  Location: [facility];  Service: Spine   ? LA REMOVAL OF OVARY(S)      Description: Oophorectomy;  Recorded: 06/03/2010;   ? LA REMOVAL OF OVARY(S)      Description: Oophorectomy;  Recorded: 06/03/2010;   ? LA TOTAL ABDOM HYSTERECTOMY      Description: Hysterectomy;  Recorded: 06/03/2010;   ? LA TOTAL ABDOM HYSTERECTOMY      Description: Hysterectomy;  Recorded: 06/03/2010;   ? LA TOTAL KNEE ARTHROPLASTY Right 11/24/2014    Procedure: RIGHT TOTAL KNEE ARTHROPLASTY;  Surgeon: Jules Harris MD;  Location: [facility];  Service: Orthopedics   ? TOE SURGERY     ? TONSILLECTOMY AND ADENOIDECTOMY      age 10       Family History:     Family History   Problem Relation Age of Onset   ? Breast cancer Neg Hx         Social History:    reports that she quit smoking about 16 months ago. She has never used smokeless tobacco. She reports that she does not drink alcohol or use drugs.    Review of Systems:   General: WNL  Eyes: WNL  Ears/Nose/Throat: WNL  Lungs: Cough, Shortness of Breath, Snoring, Wheezing  Heart: Shortness of Breath with activity, Irregular Heartbeat  Stomach: WNL  Bladder: WNL  Muscle/Joints: WNL  Skin: WNL  Nervous System: Dizziness, Loss of Balance  Mental Health: WNL     Blood: WNL    Meds:     Current Outpatient Medications:   ?  aspirin 81 MG EC tablet, Take 81 mg by mouth daily., Disp: , Rfl:   ?  blood sugar diagnostic (CONTOUR TEST STRIPS) Strp, Use 1 each As Directed daily. , Disp: , Rfl:   ?  cetirizine (ZYRTEC) 10 MG tablet, Take 10 mg by mouth daily as needed (itching). , Disp: , Rfl:   ?  chlorthalidone (HYGROTEN) 25 MG tablet, Take 25 mg by mouth daily., Disp: , Rfl:   ?  cholecalciferol, vitamin D3, 5,000 unit Tab, Take 5,000 Units by mouth  "daily., Disp: , Rfl:   ?  citalopram (CELEXA) 20 MG tablet, Take 20 mg by mouth daily. , Disp: , Rfl:   ?  cloNIDine HCl (CATAPRES) 0.1 MG tablet, Take 0.1 mg by mouth 3 (three) times a day as needed. For SBP > 180 or DBP > 100, Disp: , Rfl:   ?  estradiol (ESTRACE) 2 MG tablet, Take 1 mg by mouth daily. Takes 1/2 tab = 1mg, Disp: , Rfl:   ?  gabapentin (NEURONTIN) 300 MG capsule, Take 1 capsule (300 mg total) by mouth 2 (two) times a day., Disp: 180 capsule, Rfl: 1  ?  insulin aspart (NOVOLOG) 100 unit/mL injection, Inject 13 Units under the skin 3 (three) times a day with meals. Sliding scale   , Disp: , Rfl:   ?  liraglutide (VICTOZA 2-TANNER) 0.6 mg/0.1 mL (18 mg/3 mL) PnIj injection, Inject 1.2 mg under the skin daily. Not available from inpatient hospital pharmacy - OK for patient to use own supply, Disp: , Rfl:   ?  magnesium oxide 200 mg magnesium tablet, Take 400 mg by mouth 2 (two) times a day., Disp: , Rfl:   ?  metoprolol succinate (TOPROL-XL) 50 MG 24 hr tablet, Take 100 mg by mouth 2 (two) times a day.    , Disp: , Rfl:   ?  MICRO THIN LANCETS [facility], Use As Directed., Disp: , Rfl:   ?  NOVOFINE 32 32 gauge x 1/4\" Ndle, USE TO TEST TWICE A DAY, Disp: , Rfl: 3  ?  olmesartan (BENICAR) 40 MG tablet, Take 40 mg by mouth daily., Disp: , Rfl:   ?  simvastatin (ZOCOR) 10 MG tablet, Take 1 tablet by mouth at bedtime., Disp: , Rfl:   ?  tiZANidine (ZANAFLEX) 2 MG tablet, Take 4 mg by mouth bedtime. , Disp: , Rfl:   ?  traMADol (ULTRAM) 50 mg tablet, Take 50 mg by mouth every 6 (six) hours as needed for pain., Disp: , Rfl:   ?  amoxicillin (AMOXIL) 500 MG tablet, Take 500 mg by mouth as needed., Disp: , Rfl:   ?  metroNIDAZOLE (FLAGYL) 500 MG tablet, Take 500 mg by mouth 3 (three) times a day., Disp: , Rfl:   ?  pravastatin (PRAVACHOL) 20 MG tablet, Take 20 mg by mouth daily. , Disp: , Rfl:   ?  valACYclovir (VALTREX) 1000 MG tablet, Take 1,000 mg by mouth 2 (two) times a day., Disp: , Rfl:     Allergies: " "  Morphine; Adhesive; Amlodipine; and Prednisone      Objective:      Physical Exam  169 lb (76.7 kg)  5' 2\" (1.575 m)  Body mass index is 30.91 kg/m .  BP (!) 88/54 (Patient Site: Right Arm, Patient Position: Sitting, Cuff Size: Adult Large)   Pulse 68   Resp 16   Ht 5' 2\" (1.575 m)   Wt 169 lb (76.7 kg)   BMI 30.91 kg/m      General Appearance:   Awake, Alert, No acute distress.   HEENT:  Pupil equal and reactive to light. No scleral icterus; the mucous membranes were moist.   Neck: No cervical bruits. No JVD. No thyromegaly.     Chest: The spine was straight. The chest was symmetric.   Lungs:   Respirations unlabored; Lungs are clear to auscultation. No crackles. No wheezing.   Cardiovascular:   Regular rhythm and rate, normal first and second heart sounds with no murmurs. No rubs or gallops.    Abdomen:  Soft. No tenderness. Non-distended. Bowels sounds are present   Extremities: Equal tibial pulses. No leg edema.   Skin: No rashes or ulcers. Warm, Dry.   Musculoskeletal: No tenderness. No deformity.   Neurologic: Mood and affect are appropriate. No focal deficits.         EKG:  Personally reivewed  Normal sinus rhythm  Nonspecific ST-T abnormality  Abnormal ECG     Cardiac Imaging Studies  ECHO on 5-:   Summary   Normal left ventricular cavity size and systolic function.   Normal left ventricle wall thickness.   Left ventricular ejection fraction is visually estimated to be 65 %.   Mild (grade 1) diastolic dysfunction with reversed E/A ratio.   No significant valvular abnormalities.   There is a small circumferential pericardial effusion with organization   present but no evidence of tamponade.        Lab Review   Lab Results   Component Value Date     (L) 04/03/2019    K 4.7 04/03/2019    CL 91 (L) 04/03/2019    CO2 27 04/03/2019    BUN 19 04/03/2019    CREATININE 1.01 04/03/2019    CALCIUM 10.2 04/03/2019     Lab Results   Component Value Date    WBC 9.7 05/21/2018    WBC 13.5 (H) 05/15/2015 "    HGB 13.7 05/21/2018    HCT 38.5 05/21/2018     (H) 05/21/2018     05/21/2018     Lab Results   Component Value Date    CHOL 193 05/21/2018    CHOL 195 02/06/2018    CHOL 206 (H) 02/20/2017     Lab Results   Component Value Date    HDL 65 05/21/2018    HDL 66 02/06/2018    HDL 60 02/20/2017     Lab Results   Component Value Date    LDLCALC 79 05/21/2018    LDLCALC 72 02/06/2018    LDLCALC 99 02/20/2017     Lab Results   Component Value Date    TRIG 246 (H) 05/21/2018    TRIG 284 (H) 02/06/2018    TRIG 236 (H) 02/20/2017     No components found for: CHOLHDL  Lab Results   Component Value Date    TROPONINI 0.02 05/15/2015     No results found for: BNP  Lab Results   Component Value Date    TSH 9.09 (H) 04/08/2019

## 2021-06-28 NOTE — PROGRESS NOTES
Progress Notes by Chaka Mg MD at 10/10/2019  4:10 PM     Author: Chaka Mg MD Service: -- Author Type: Physician    Filed: 10/10/2019  4:50 PM Encounter Date: 10/10/2019 Status: Signed    : Chaka Mg MD (Physician)           Click to link to [facility]     [facility] HEART CARE NOTE       Assessment/Plan:   1.  Dyspnea on exertion and irregular heartbeat: The patient's dyspnea on exertion and irregular heartbeats are resolved.  She has no complaints of dyspnea on exertion and irregular heartbeats.  Currently she is on atenolol 50 mg twice a day.       2.  Essential hypertension: Her blood pressure has been controlled well with atenolol 50 mg twice a day, Benicar 40 mg daily, and clonidine 0.1 mg as needed.    3.  Dyslipidemia: The patient has off statins.    4.  Thyroid disorder: Patient has follow-up with her endocrinologist.    Thank you for the opportunity to be involved in the care of Paige Mari. If you have any questions, please feel free to contact me.  I will see the patient again as needed.    Much or all of the text in this note was generated through the use of Dragon Dictate voice-to-text software. Errors in spelling or words which seem out of context are unintentional.   Sound alike errors, in particular, may have escaped editing.       History of Present Illness:   It is my pleasure to see Paige Mari at the [facility] clinic for evaluation of Follow-up.  Paige Mari is a 69 y.o. female with a medical history of essential hypertension, dyslipidemia, type 2 diabetes, hyperthyroidism.    The patient states that she has done very well since last visit.  She had no chest pain, shortness of breath, dyspnea on exertion, palpitations, dizziness, orthopnea, PND or leg edema.  Her blood pressure and heart rate are controlled well.  The patient states that she feels fine.      Past Medical History:     Patient Active Problem List   Diagnosis   ? Hypertension   ?  Taking Female Hormones For Postmenopausal HRT   ? Type 2 Diabetes Mellitus   ? Leukocytosis   ? Nicotine Dependence   ? Hyperthyroidism   ? Back Pain   ? Intractable headache   ? Knee osteoarthritis   ? HTN   ? KP on CPAP   ? GERD (gastroesophageal reflux disease)   ? Depression   ? Status post total knee replacement   ? Acute blood loss anemia   ? Spondylisthesis   ? Post-op pain   ? Accelerated hypertension   ? Altered mental state   ? Malignant hypertensive urgency   ? Headache   ? N&V (nausea and vomiting)   ? KP (obstructive sleep apnea)   ? Hypertensive emergency   ? Acute diverticulitis   ? Hyponatremia   ? Leukocytosis, unspecified type   ? Acute cystitis without hematuria   ? DM2       Past Surgical History:     Past Surgical History:   Procedure Laterality Date   ? APPENDECTOMY      at age 3   ? BACK SURGERY     ? BLEPHAROPLASTY Bilateral 8/17/2015    Procedure: BLEPHAROPLASTY BILATERAL UPPER LIDS;  Surgeon: Chasidy Bryant MD;  Location: [facility];  Service:    ? bowel obstruction  2005   ? BOWEL RESECTION  12 years ago   ? COLON SURGERY  2004    sigmoid colectomy   ? DILATION AND CURETTAGE OF UTERUS      x3   ? HYSTERECTOMY      age 39   ? JOINT REPLACEMENT     ? KNEE ARTHROSCOPY W/ MENISCECTOMY  3/10/14    partial medial and lateral meniscectomies, subpatellar chondroplasty   ? OOPHORECTOMY     ? HI ARTHRODESIS ANT INTERBODY MIN DISCECTOMY,LUMBAR N/A 1/30/2015    Procedure: ANTERIOR LUMBAR INTERBODY FUSION L4-5, INTERBODY GRAFT, BMP INSTRUMENTATION;  Surgeon: Zeeshan Guillaume MD;  Location: [facility];  Service: Spine   ? HI REMOVAL OF OVARY(S)      Description: Oophorectomy;  Recorded: 06/03/2010;   ? HI REMOVAL OF OVARY(S)      Description: Oophorectomy;  Recorded: 06/03/2010;   ? HI TOTAL ABDOM HYSTERECTOMY      Description: Hysterectomy;  Recorded: 06/03/2010;   ? HI TOTAL ABDOM HYSTERECTOMY      Description: Hysterectomy;  Recorded: 06/03/2010;   ? HI TOTAL KNEE ARTHROPLASTY  Right 11/24/2014    Procedure: RIGHT TOTAL KNEE ARTHROPLASTY;  Surgeon: Jules Harris MD;  Location: [facility];  Service: Orthopedics   ? TOE SURGERY     ? TONSILLECTOMY AND ADENOIDECTOMY      age 10       Family History:     Family History   Problem Relation Age of Onset   ? Breast cancer Neg Hx         Social History:    reports that she quit smoking about 21 months ago. She has never used smokeless tobacco. She reports that she does not drink alcohol or use drugs.    Review of Systems:   General: WNL  Eyes: WNL  Ears/Nose/Throat: WNL  Lungs: WNL  Heart: WNL  Stomach: WNL  Bladder: WNL  Muscle/Joints: WNL  Skin: WNL  Nervous System: WNL  Mental Health: WNL     Blood: WNL    Meds:     Current Outpatient Medications:   ?  aspirin 81 MG EC tablet, Take 81 mg by mouth daily., Disp: , Rfl:   ?  atenolol (TENORMIN) 25 MG tablet, Take 2 tablets by mouth 2 (two) times a day.   , Disp: , Rfl:   ?  cetirizine (ZYRTEC) 10 MG tablet, Take 10 mg by mouth daily as needed (itching). , Disp: , Rfl:   ?  chlorthalidone (HYGROTEN) 25 MG tablet, Take 25 mg by mouth daily., Disp: , Rfl:   ?  cholecalciferol, vitamin D3, 5,000 unit Tab, Take 5,000 Units by mouth daily., Disp: , Rfl:   ?  citalopram (CELEXA) 20 MG tablet, Take 20 mg by mouth daily. , Disp: , Rfl:   ?  cloNIDine HCl (CATAPRES) 0.1 MG tablet, Take 0.1 mg by mouth 3 (three) times a day as needed. For SBP > 180 or DBP > 100, Disp: , Rfl:   ?  estradiol (ESTRACE) 2 MG tablet, Take 1 mg by mouth daily. Takes 1/2 tab = 1mg, Disp: , Rfl:   ?  gabapentin (NEURONTIN) 300 MG capsule, Take 1 capsule (300 mg total) by mouth 2 (two) times a day., Disp: 180 capsule, Rfl: 1  ?  insulin aspart (NOVOLOG) 100 unit/mL injection, Inject 13 Units under the skin 3 (three) times a day with meals. Sliding scale   , Disp: , Rfl:   ?  insulin detemir U-100 (LEVEMIR FLEXPEN) 100 unit/mL (3 mL) pen, Inject 25 Units under the skin at bedtime., Disp: , Rfl:   ?  liraglutide (VICTOZA 2-TANNER)  "0.6 mg/0.1 mL (18 mg/3 mL) PnIj injection, Inject 1.2 mg under the skin daily. Not available from inpatient hospital pharmacy - OK for patient to use own supply, Disp: , Rfl:   ?  magnesium oxide 200 mg magnesium tablet, Take 400 mg by mouth 2 (two) times a day., Disp: , Rfl:   ?  medical cannabis (Patient's own supply), by Other route see administration instructions. (The purpose of this order is to document that the patient reports taking medical cannabis. This is not a prescription, and is not used to certify that the patient has a  qualifying medical condition.), Disp: , Rfl:   ?  MICRO THIN LANCETS [facility], Use As Directed., Disp: , Rfl:   ?  olmesartan (BENICAR) 40 MG tablet, Take 40 mg by mouth daily., Disp: , Rfl:   ?  tiZANidine (ZANAFLEX) 2 MG tablet, Take 4 mg by mouth bedtime. , Disp: , Rfl:   ?  traMADol (ULTRAM) 50 mg tablet, Take 100 mg by mouth every 6 (six) hours as needed for pain.    , Disp: , Rfl:   ?  valACYclovir (VALTREX) 1000 MG tablet, Take 1,000 mg by mouth 2 (two) times a day., Disp: , Rfl:   ?  amoxicillin (AMOXIL) 500 MG tablet, Take 500 mg by mouth as needed., Disp: , Rfl:   ?  blood sugar diagnostic (CONTOUR TEST STRIPS) Strp, Use 1 each As Directed daily. , Disp: , Rfl:   ?  metroNIDAZOLE (FLAGYL) 500 MG tablet, Take 500 mg by mouth 3 (three) times a day., Disp: , Rfl:   ?  NOVOFINE 32 32 gauge x 1/4\" Ndle, USE TO TEST TWICE A DAY, Disp: , Rfl: 3    Allergies:   Morphine; Adhesive; Amlodipine; Other allergy (see comments); Prednisone; and Trazodone      Objective:      Physical Exam  152 lb (68.9 kg)  5' 2\" (1.575 m)  Body mass index is 27.8 kg/m .  /68 (Patient Site: Left Arm, Patient Position: Sitting, Cuff Size: Adult Regular)   Pulse 76   Resp 16   Ht 5' 2\" (1.575 m)   Wt 152 lb (68.9 kg)   BMI 27.80 kg/m      General Appearance:   Awake, Alert, No acute distress.   HEENT:  Pupil equal and reactive to light. No scleral icterus; the mucous membranes were moist.   Neck: No " cervical bruits. No JVD. No thyromegaly.     Chest: The spine was straight. The chest was symmetric.   Lungs:   Respirations unlabored; Lungs are clear to auscultation. No crackles. No wheezing.   Cardiovascular:   Regular rhythm and rate, normal first and second heart sounds with no murmurs. No rubs or gallops.    Abdomen:  Soft. No tenderness. Non-distended. Bowels sounds are present   Extremities: Equal tibial pulses. No leg edema.   Skin: No rashes or ulcers. Warm, Dry.   Musculoskeletal: No tenderness. No deformity.   Neurologic: Mood and affect are appropriate. No focal deficits.         EKG:  Personally reivewed  Normal sinus rhythm  Nonspecific ST-T abnormality  Abnormal ECG     Cardiac Imaging Studies  ECHO on 5-:   Summary   Normal left ventricular cavity size and systolic function.   Normal left ventricle wall thickness.   Left ventricular ejection fraction is visually estimated to be 65 %.   Mild (grade 1) diastolic dysfunction with reversed E/A ratio.   No significant valvular abnormalities.   There is a small circumferential pericardial effusion with organization   present but no evidence of tamponade.        Lab Review   Lab Results   Component Value Date     (L) 10/07/2019    K 4.1 10/07/2019    CL 87 (L) 10/07/2019    CO2 27 10/07/2019    BUN 31 (H) 10/07/2019    CREATININE 1.33 (H) 10/07/2019    CALCIUM 10.7 (H) 10/07/2019     Lab Results   Component Value Date    WBC 9.7 05/21/2018    WBC 13.5 (H) 05/15/2015    HGB 13.7 05/21/2018    HCT 38.5 05/21/2018     (H) 05/21/2018     05/21/2018     Lab Results   Component Value Date    CHOL 166 07/11/2019    CHOL 193 05/21/2018    CHOL 195 02/06/2018     Lab Results   Component Value Date    HDL 71 07/11/2019    HDL 65 05/21/2018    HDL 66 02/06/2018     Lab Results   Component Value Date    LDLCALC 72 07/11/2019    LDLCALC 79 05/21/2018    LDLCALC 72 02/06/2018     Lab Results   Component Value Date    TRIG 116 07/11/2019     TRIG 246 (H) 05/21/2018    TRIG 284 (H) 02/06/2018     No components found for: CHOLHDL  Lab Results   Component Value Date    TROPONINI 0.02 05/15/2015     No results found for: BNP  Lab Results   Component Value Date    TSH 1.73 10/07/2019

## 2021-06-29 NOTE — PROGRESS NOTES
"Progress Notes by Chaka Mg MD at 6/11/2020  2:30 PM     Author: Chaka Mg MD Service: -- Author Type: Physician    Filed: 6/11/2020  2:54 PM Encounter Date: 6/11/2020 Status: Signed    : Chaka Mg MD (Physician)       The patient has been notified of following:     \"This telephone visit will be conducted via a call between you and your physician/provider. We have found that certain health care needs can be provided without the need for a physical exam.  This service lets us provide the care you need with a phone conversation.  If a prescription is necessary we can send it directly to your pharmacy.  If lab work is needed we can place an order for that and you can then stop by our lab to have the test done at a later time. If during the course of the call the physician/provider feels a telephone visit is not appropriate, you will not be charged for this service.\" Verbal consent has been obtained for this service by care team member:     HEART CARE PHONE ENCOUNTER      The patient has chosen to have the visit conducted as a telephone visit, to reduce risk of exposure given the current status of Coronavirus in our community. This telephone visit is being conducted via a call between the patient and physician/provider. Health care needs are being provided without a physical exam.      Assessment/Plan:   1.  Frequent PACs: She has no palpitations.  Well controlled by beta-blocker.        2.  Essential hypertension: Her blood pressure has been elevated recently.  We discussed the management.  Continue metoprolol 200 mg twice a day, clonidine patch 0.3 mg/week, olmesartan 40 mg daily, start hydrochlorothiazide 25 mg daily.    After starting hydrochlorothiazide, her blood pressure was well-controlled yesterday and today around 120/60 mmHg. The patient continue to monitor her blood pressure.  She will repeat basic metabolic panel at Dr. Canada office in 2 weeks to make sure no hyponatremia.       3.  " Dyslipidemia: The patient has off statins.     4.  Thyroid disorder: Patient has follow-up with her endocrinologist.      Follow Up Plan: Follow up in 6 months.  I have reviewed the note as documented.  This accurately captures the substance of my conversation with the patient.    Total time of call between patient and provider was 9'22'' minutes   Start Time: 2:36 pm  Stop Time:  2:45 pm       History of Present Illness:   Paige Mari is a 69 y.o. female who is being evaluated via a billable telephone visit.    Paige Mari is a 69 y.o. female with a medical history of essential hypertension, dyslipidemia, type 2 diabetes, hyperthyroidism.     The patient states that she has done fine.  She had no chest pain, shortness of breath, dyspnea on exertion, palpitations, dizziness, orthopnea, PND or leg edema.    The patient's blood pressure has been fluctuated recently.  Sometimes it was a significantly elevated.  Currently she is on clonidine patch 0.3 mg/week, metoprolol succinate 200 mg twice a day, olmesartan 40 mg daily.  The patient added hydrochlorothiazide 25 mg daily.  Her blood pressure has been better controlled.  It is 118/68 today.    I have reviewed and updated the patient's Past Medical History, Social History, Family History and Medication List.  Past Medical History:     Patient Active Problem List   Diagnosis   ? Hypertension   ? Taking Female Hormones For Postmenopausal HRT   ? Type 2 Diabetes Mellitus   ? Leukocytosis   ? Nicotine Dependence   ? Hyperthyroidism   ? Back Pain   ? Intractable headache   ? Knee osteoarthritis   ? HTN   ? KP on CPAP   ? GERD (gastroesophageal reflux disease)   ? Depression   ? Status post total knee replacement   ? Acute blood loss anemia   ? Spondylisthesis   ? Post-op pain   ? Accelerated hypertension   ? Altered mental state   ? Malignant hypertensive urgency   ? Headache   ? N&V (nausea and vomiting)   ? KP (obstructive sleep apnea)   ? Hypertensive  emergency   ? Acute diverticulitis   ? Hyponatremia   ? Leukocytosis, unspecified type   ? Acute cystitis without hematuria   ? DM2       Past Surgical History:     Past Surgical History:   Procedure Laterality Date   ? APPENDECTOMY      at age 3   ? BACK SURGERY     ? BLEPHAROPLASTY Bilateral 8/17/2015    Procedure: BLEPHAROPLASTY BILATERAL UPPER LIDS;  Surgeon: Chasidy Bryant MD;  Location: [facility];  Service:    ? bowel obstruction  2005   ? BOWEL RESECTION  12 years ago   ? COLON SURGERY  2004    sigmoid colectomy   ? DILATION AND CURETTAGE OF UTERUS      x3   ? HYSTERECTOMY      age 39   ? JOINT REPLACEMENT     ? KNEE ARTHROSCOPY W/ MENISCECTOMY  3/10/14    partial medial and lateral meniscectomies, subpatellar chondroplasty   ? OOPHORECTOMY     ? KY ARTHRODESIS ANT INTERBODY MIN DISCECTOMY,LUMBAR N/A 1/30/2015    Procedure: ANTERIOR LUMBAR INTERBODY FUSION L4-5, INTERBODY GRAFT, BMP INSTRUMENTATION;  Surgeon: Zeeshan Guillaume MD;  Location: [facility];  Service: Spine   ? KY REMOVAL OF OVARY(S)      Description: Oophorectomy;  Recorded: 06/03/2010;   ? [place] REMOVAL OF OVARY(S)      Description: Oophorectomy;  Recorded: 06/03/2010;   ? KY TOTAL ABDOM HYSTERECTOMY      Description: Hysterectomy;  Recorded: 06/03/2010;   ? [place] TOTAL ABDOM HYSTERECTOMY      Description: Hysterectomy;  Recorded: 06/03/2010;   ? KY TOTAL KNEE ARTHROPLASTY Right 11/24/2014    Procedure: RIGHT TOTAL KNEE ARTHROPLASTY;  Surgeon: Jules Harris MD;  Location: [facility];  Service: Orthopedics   ? TOE SURGERY     ? TONSILLECTOMY AND ADENOIDECTOMY      age 10       Family History:     Family History   Problem Relation Age of Onset   ? Breast cancer Neg Hx        Social History:    reports that she quit smoking about 2 years ago. She has never used smokeless tobacco. She reports that she does not drink alcohol or use drugs.    Review of Systems:   12 systems are reviewed negative except for in HPI.    Meds:      Current Outpatient Medications:   ?  amoxicillin (AMOXIL) 500 MG tablet, Take 500 mg by mouth as needed., Disp: , Rfl:   ?  aspirin 81 MG EC tablet, Take 81 mg by mouth daily., Disp: , Rfl:   ?  blood sugar diagnostic (CONTOUR TEST STRIPS) Strp, Use 1 each As Directed daily. , Disp: , Rfl:   ?  cetirizine (ZYRTEC) 10 MG tablet, Take 10 mg by mouth daily as needed (itching). , Disp: , Rfl:   ?  cholecalciferol, vitamin D3, 5,000 unit Tab, Take 2,000 Units by mouth daily. , Disp: , Rfl:   ?  citalopram (CELEXA) 20 MG tablet, Take 20 mg by mouth daily. , Disp: , Rfl:   ?  cloNIDine (CATAPRES-TTS) 0.3 mg/24 hr, once a week., Disp: , Rfl:   ?  cloNIDine HCl (CATAPRES) 0.1 MG tablet, Take 0.1 mg by mouth 3 (three) times a day as needed. For SBP > 180 or DBP > 100, Disp: , Rfl:   ?  estradiol (ESTRACE) 2 MG tablet, Take 1 mg by mouth daily. Takes 1/2 tab = 1mg, Disp: , Rfl:   ?  gabapentin (NEURONTIN) 300 MG capsule, Take 1 capsule (300 mg total) by mouth 2 (two) times a day. (Patient taking differently: 2 capsules in the morning and 3 capsules at night), Disp: 180 capsule, Rfl: 1  ?  insulin aspart (NOVOLOG) 100 unit/mL injection, Inject 4-5 Units under the skin 3 (three) times a day with meals. , Disp: , Rfl:   ?  insulin detemir U-100 (LEVEMIR FLEXPEN) 100 unit/mL (3 mL) pen, Inject 30 Units under the skin at bedtime. , Disp: , Rfl:   ?  liraglutide (VICTOZA 2-TANNER) 0.6 mg/0.1 mL (18 mg/3 mL) PnIj injection, Inject 1.2 mg under the skin daily. Not available from inpatient hospital pharmacy - OK for patient to use own supply, Disp: , Rfl:   ?  magnesium oxide 200 mg magnesium tablet, Take 400 mg by mouth daily. , Disp: , Rfl:   ?  medical cannabis (Patient's own supply), by Other route see administration instructions. (The purpose of this order is to document that the patient reports taking medical cannabis. This is not a prescription, and is not used to certify that the patient has a  qualifying medical condition.),  "Disp: , Rfl:   ?  metoprolol succinate (TOPROL-XL) 100 MG 24 hr tablet, Take 2 tablets by mouth daily., Disp: , Rfl:   ?  MICRO THIN LANCETS [facility], Use As Directed., Disp: , Rfl:   ?  NOVOFINE 32 32 gauge x 1/4\" Ndle, USE TO TEST TWICE A DAY, Disp: , Rfl: 3  ?  olmesartan (BENICAR) 40 MG tablet, Take 40 mg by mouth daily., Disp: , Rfl:   ?  omeprazole (PRILOSEC) 20 MG capsule, Take 1 capsule by mouth daily before breakfast., Disp: , Rfl:   ?  tiZANidine (ZANAFLEX) 2 MG tablet, Take 4 mg by mouth bedtime. , Disp: , Rfl:   ?  traMADol (ULTRAM) 50 mg tablet, Take 100 mg by mouth 2 (two) times a day. , Disp: , Rfl:   ?  valACYclovir (VALTREX) 1000 MG tablet, Take 1,000 mg by mouth daily. , Disp: , Rfl:   ?  atenolol (TENORMIN) 25 MG tablet, Take 2 tablets by mouth 2 (two) times a day.   , Disp: , Rfl:   ?  chlorthalidone (HYGROTEN) 25 MG tablet, Take 25 mg by mouth daily., Disp: , Rfl:   ?  metroNIDAZOLE (FLAGYL) 500 MG tablet, Take 500 mg by mouth 3 (three) times a day., Disp: , Rfl:      Allergies:   Morphine; Adhesive; Amlodipine; Other allergy (see comments); Prednisone; and Trazodone    Objective:      Physical Exam    Physical examination are not performed given phone encounter.  :Physical Examination not performed given phone encounter  Lab Review   Lab Results   Component Value Date     (L) 02/24/2020    K 4.9 02/24/2020    CL 97 (L) 02/24/2020    CO2 25 02/24/2020    BUN 16 02/24/2020    CREATININE 1.12 (H) 02/24/2020    CALCIUM 10.1 02/24/2020     Lab Results   Component Value Date    WBC 11.5 (H) 02/24/2020    WBC 13.5 (H) 05/15/2015    HGB 14.2 02/24/2020    HCT 41.9 02/24/2020    MCV 97 02/24/2020     02/24/2020     Lab Results   Component Value Date    CHOL 166 07/11/2019    TRIG 116 07/11/2019    HDL 71 07/11/2019    LDLDIRECT 99 07/06/2016     Lab Results   Component Value Date    TROPONINI 0.02 05/15/2015     Lab Results   Component Value Date    TSH 0.31 02/24/2020                 "

## 2021-07-01 ENCOUNTER — RECORDS - HEALTHEAST (OUTPATIENT)
Dept: ADMINISTRATIVE | Facility: CLINIC | Age: 71
End: 2021-07-01

## 2021-07-01 ENCOUNTER — TELEPHONE (OUTPATIENT)
Dept: RHEUMATOLOGY | Facility: CLINIC | Age: 71
End: 2021-07-01

## 2021-07-03 NOTE — ADDENDUM NOTE
Addendum Note by Maria Ines Prather CMA at 8/27/2018  7:55 AM     Author: Maria Ines Prather CMA Service: -- Author Type: Certified Medical Assistant    Filed: 8/27/2018  7:55 AM Encounter Date: 8/23/2018 Status: Signed    : Maria Ines Prather CMA (Certified Medical Assistant)    Addended by: MARIA INES PRATHER on: 8/27/2018 07:55 AM        Modules accepted: Orders

## 2021-07-03 NOTE — ADDENDUM NOTE
Addendum Note by Bobbi Guzman RN at 4/5/2019  9:47 AM     Author: Bobbi Guzman RN Service: -- Author Type: Registered Nurse    Filed: 4/5/2019  9:47 AM Encounter Date: 4/5/2019 Status: Signed    : Bobbi Guzman RN (Registered Nurse)    Addended by: BOBBI GUZMAN on: 4/5/2019 09:47 AM        Modules accepted: Orders

## 2021-07-04 NOTE — TELEPHONE ENCOUNTER
Telephone Encounter by Sharron Montelongo at 7/1/2021 12:11 PM     Author: Sharron Montelongo Service: -- Author Type: --    Filed: 7/1/2021 12:23 PM Encounter Date: 7/1/2021 Status: Signed    : Sharron Montelongo       Patient missed their VV appointment today. Patient believes they were scheduled for an in-person but forgot to write appt details down. Patient states that they can hardly use their left hand and wrist and that they are in pain. When patient tries to lift anything, they experience shooting pain up their arm. Patient states they hurt all over - right shoulder, both of their hips, and lower back.     Patient scheduled appt for in person Oct. 13. Please advise if patient can be seen sooner.

## 2021-07-08 ENCOUNTER — TRANSFERRED RECORDS (OUTPATIENT)
Dept: HEALTH INFORMATION MANAGEMENT | Facility: CLINIC | Age: 71
End: 2021-07-08

## 2021-07-09 ENCOUNTER — MEDICAL CORRESPONDENCE (OUTPATIENT)
Dept: HEALTH INFORMATION MANAGEMENT | Facility: CLINIC | Age: 71
End: 2021-07-09

## 2021-07-14 ENCOUNTER — TELEPHONE (OUTPATIENT)
Dept: EDUCATION SERVICES | Facility: CLINIC | Age: 71
End: 2021-07-14

## 2021-07-14 NOTE — TELEPHONE ENCOUNTER
Records received  7/9/2021 11:01am inside E DM consult Fax  [email or web address] - [phone]  Referring: Dr Scar Tan  DX: DM Type 2

## 2021-07-15 NOTE — TELEPHONE ENCOUNTER
Date: 7/26/2021 Status: Scheduled   Time: 11:00 AM Length: 60   Visit Type: TELEPHONE VISIT NEW [1701] Copay: $0.00   Provider: Emma Guillermo RD

## 2021-07-21 ENCOUNTER — RECORDS - HEALTHEAST (OUTPATIENT)
Dept: ADMINISTRATIVE | Facility: CLINIC | Age: 71
End: 2021-07-21

## 2021-07-26 ENCOUNTER — TRANSCRIBE ORDERS (OUTPATIENT)
Dept: EDUCATION SERVICES | Facility: CLINIC | Age: 71
End: 2021-07-26

## 2021-07-26 ENCOUNTER — PATIENT OUTREACH (OUTPATIENT)
Dept: EDUCATION SERVICES | Facility: CLINIC | Age: 71
End: 2021-07-26
Payer: COMMERCIAL

## 2021-07-26 DIAGNOSIS — E11.9 TYPE 2 DIABETES MELLITUS WITHOUT COMPLICATION, UNSPECIFIED WHETHER LONG TERM INSULIN USE (H): Primary | ICD-10-CM

## 2021-07-26 PROCEDURE — G0108 DIAB MANAGE TRN  PER INDIV: HCPCS | Mod: 95 | Performed by: DIETITIAN, REGISTERED

## 2021-07-26 RX ORDER — PROCHLORPERAZINE 25 MG/1
SUPPOSITORY RECTAL
Qty: 3 EACH | Refills: 6 | Status: SHIPPED | OUTPATIENT
Start: 2021-07-26 | End: 2023-02-24

## 2021-07-26 RX ORDER — PROCHLORPERAZINE 25 MG/1
SUPPOSITORY RECTAL
Qty: 1 EACH | Refills: 0 | Status: SHIPPED | OUTPATIENT
Start: 2021-07-26 | End: 2023-07-31

## 2021-07-26 RX ORDER — PROCHLORPERAZINE 25 MG/1
SUPPOSITORY RECTAL
Qty: 1 EACH | Refills: 2 | Status: SHIPPED | OUTPATIENT
Start: 2021-07-26 | End: 2021-10-06

## 2021-07-26 RX ORDER — PROCHLORPERAZINE 25 MG/1
SUPPOSITORY RECTAL
Qty: 1 EACH | Refills: 2 | Status: SHIPPED | OUTPATIENT
Start: 2021-07-26 | End: 2023-02-24

## 2021-07-26 RX ORDER — PROCHLORPERAZINE 25 MG/1
SUPPOSITORY RECTAL
Qty: 3 EACH | Refills: 6 | Status: SHIPPED | OUTPATIENT
Start: 2021-07-26 | End: 2021-10-06

## 2021-07-26 NOTE — PROGRESS NOTES
"Type of Service: Telephone Visit    How would patient like to obtain AVS? Mail a copy      Diabetes Self-Management Education & Support    Presents for: Insulin Pump and CGM Start    SUBJECTIVE/OBJECTIVE:  Presents for: Insulin Pump and CGM Start  Accompanied by: Self  Diabetes education in the past 24mo: Yes  Focus of Visit: Insulin Pump, CGM  Type of Pump visit: Pre-pump  Type of CGM visit: Personal CGM Start  Diabetes type: Type 2  Date of diagnosis: 2004  Disease course: Improving  How confident are you filling out medical forms by yourself:: Extremely  Diabetes management related comments/concerns: pumps and CGM's checking BG  Transportation concerns: No  Difficulty affording diabetes medication?: No  Difficulty affording diabetes testing supplies?: No  Other concerns:: None, Physical impairment (arthritis - hard to check BG)  Cultural Influences/Ethnic Background:  Not  or     Diabetes Symptoms & Complications:  Fatigue: No  Neuropathy: Yes  Polydipsia: No  Polyphagia: No  Polyuria: No  Symptom course: Improving  Weight trend: Stable  Complications assessed today?: Yes  Autonomic neuropathy: Yes  CVA: No  Peripheral neuropathy: Yes  Retinopathy: No    Patient Problem List and Family Medical History reviewed for relevant medical history, current medical status, and diabetes risk factors.    Vitals:  There were no vitals taken for this visit.  Estimated body mass index is 28.9 kg/m  as calculated from the following:    Height as of 10/10/19: 1.575 m (5' 2\").    Weight as of 6/11/20: 71.7 kg (158 lb).   Last 3 BP:   BP Readings from Last 3 Encounters:   06/11/20 118/69   10/10/19 118/68       History   Smoking Status     Former Smoker     Quit date: 1/1/2018   Smokeless Tobacco     Never Used       Labs:  Last A1C 8% - 7/8/2021  Per chart review - records scanned into chart  Lab Results   Component Value Date    A1C 6.5 12/06/2016     Lab Results   Component Value Date     03/03/2021     Lab " Results   Component Value Date     08/10/2020    LDL 99 07/06/2016     Direct Measure HDL   Date Value Ref Range Status   08/10/2020 70 >=50 mg/dL Final   ]  GFR Estimate   Date Value Ref Range Status   03/03/2021 >60 >60 mL/min/1.73m2 Final     GFR Estimate If Black   Date Value Ref Range Status   03/03/2021 >60 >60 mL/min/1.73m2 Final     Lab Results   Component Value Date    CR 0.83 03/03/2021     No results found for: MICROALBUMIN    Healthy Eating:  Healthy Eating Assessed Today: Yes  Cultural/Zoroastrianism diet restrictions?: No  Meal planning/habits: Smaller portions  Meals include: Breakfast, Lunch, Dinner  Breakfast: cereal with milk  Lunch: left overs - mac & cheese  Dinner: meat, potatoes, carrots or peas.  Snacks: ice cream  Beverages: Diet soda, Milk    Being Active:  Not discussed today.    Monitoring:  Monitoring Assessed Today: Yes  Did patient bring glucose meter to appointment? : No  Times checking blood sugar at home (number): Never  Blood glucose trend: Decreasing      Taking Medications:  Diabetes Medication(s)     Insulin       insulin aspart (NOVOLOG) 100 unit/mL injection    [INSULIN ASPART (NOVOLOG) 100 UNIT/ML INJECTION] Inject 4-5 Units under the skin 3 (three) times a day with meals.      insulin detemir U-100 (LEVEMIR FLEXPEN) 100 unit/mL (3 mL) pen    [INSULIN DETEMIR U-100 (LEVEMIR FLEXPEN) 100 UNIT/ML (3 ML) PEN] Inject 30 Units under the skin at bedtime.     Incretin Mimetic Agents (GLP-1 Receptor Agonists)       liraglutide (VICTOZA 2-TANNER) 0.6 mg/0.1 mL (18 mg/3 mL) PnIj injection    [LIRAGLUTIDE (VICTOZA 2-TANNER) 0.6 MG/0.1 ML (18 MG/3 ML) PNIJ INJECTION] Inject 1.2 mg under the skin daily. Not available from inpatient hospital pharmacy - OK for patient to use own supply          Taking Medication Assessed Today: Yes  Current Treatments: Insulin Injections, Non-insulin Injectables  Dose schedule: Pre-breakfast, Pre-lunch, Pre-dinner, At bedtime  Given by:  Patient  Injection/Infusion sites: Abdomen  Problems taking diabetes medications regularly?: Yes  Diabetes medication side effects?: No    Problem Solving:  Problem Solving Assessed Today: Yes  Is the patient at risk for hypoglycemia?: Yes  Hypoglycemia Frequency: Never  Hypoglycemia Treatment: Other food (cereal)  Does patient have glucagon emergency kit?: No    Hypoglycemia symptoms  Dizziness or Light-Headedness: Yes  Headaches: Yes    Reducing Risks:  Reducing Risks Assessed Today: Yes  Diabetes Risks: Age over 45 years  Has dilated eye exam at least once a year?: Yes  Sees dentist every 6 months?: Yes  Feet checked by healthcare provider in the last year?: Yes    Healthy Coping:  Emotional response to diabetes: Ready to learn  Informal Support system:: Family  Stage of change: ACTION (Actively working towards change)  Patient Activation Measure Survey Score:  No flowsheet data found.    Diabetes knowledge and skills assessment:   Patient is knowledgeable in diabetes management concepts related to: Healthy Eating, Monitoring, Problem Solving and Reducing Risks    Patient needs further education on the following diabetes management concepts: Monitoring and Insulin Pump Concepts Balancing glucose and insulin  Carbohydrate counting  Calculating boluses  Problem solving with insulin pump therapy (BG monitoring; hypoglycemia signs/symptoms, treatment (glucagon) and prevention; hyperglycemia signs/symptoms, treatment and prevention; ketones, DKA signs/symptoms and prevention)  Hands on practice with basic pump button use    Based on learning assessment above, most appropriate setting for further diabetes education would be: Individual setting.      INTERVENTIONS:  Pt is interested in CGM and pump therapy. She has a few friends with pumps and CGM's. Hoping to get BG under better control.    Education provided today on:  AADE Self-Care Behaviors:  Healthy Eating: consistency in amount, composition, and timing of food  intake and portion control  Monitoring: purpose, log and interpret results and frequency of monitoring  Taking Medication: action of prescribed medication and proper site selection and rotation for injections    Opportunities for ongoing education and support in diabetes-self management were discussed.    Pt verbalized understanding of concepts discussed and recommendations provided today.       Education Materials Provided:  Emailing information on pumps and Dexcom      ASSESSMENT:  Patient interested in a pump, but not checking BG consistently - she is now aware the importance of checking BG    Goals Addressed as of 7/26/2021 at 11:58 AM        Medication 1 (pt-stated)     Added 7/26/21 by Emma Guillermo RD      Take medication as prescribed         Monitoring (pt-stated)     Added 7/26/21 by Emma Guillermo RD      Check BG 4x/day with CGM device            Patient's most recent   Lab Results   Component Value Date    A1C 6.5 12/06/2016    is not meeting goal of <7.0    PLAN  See Patient Instructions for co-developed, patient-stated behavior change goals.  AVS printed and provided to patient today. See Follow-Up section for recommended follow-up.    1. Plan to initiate CGM - dexcom preferred by patient  2. Working towards getting an insulin pump.  Time Spent: 30 minutes  Encounter Type: Individual    Any diabetes medication dose changes were made via the CDE Protocol and Collaborative Practice Agreement with the patient's referring provider. A copy of this encounter was shared with the provider.

## 2021-07-27 ENCOUNTER — OFFICE VISIT (OUTPATIENT)
Dept: RHEUMATOLOGY | Facility: CLINIC | Age: 71
End: 2021-07-27
Payer: COMMERCIAL

## 2021-07-27 VITALS
BODY MASS INDEX: 28.85 KG/M2 | SYSTOLIC BLOOD PRESSURE: 122 MMHG | HEIGHT: 62 IN | WEIGHT: 156.8 LBS | HEART RATE: 80 BPM | DIASTOLIC BLOOD PRESSURE: 84 MMHG

## 2021-07-27 DIAGNOSIS — M25.50 POLYARTHRALGIA: ICD-10-CM

## 2021-07-27 DIAGNOSIS — M15.0 PRIMARY OSTEOARTHRITIS INVOLVING MULTIPLE JOINTS: Primary | ICD-10-CM

## 2021-07-27 DIAGNOSIS — Z96.651 STATUS POST RIGHT KNEE REPLACEMENT: ICD-10-CM

## 2021-07-27 PROCEDURE — 99204 OFFICE O/P NEW MOD 45 MIN: CPT | Performed by: INTERNAL MEDICINE

## 2021-07-27 RX ORDER — SUCRALFATE 1 G/1
1 TABLET ORAL 4 TIMES DAILY PRN
COMMUNITY
End: 2022-01-20

## 2021-07-27 RX ORDER — CLONIDINE 0.3 MG/24H
PATCH, EXTENDED RELEASE TRANSDERMAL
COMMUNITY
Start: 2020-06-10 | End: 2021-09-18

## 2021-07-27 ASSESSMENT — MIFFLIN-ST. JEOR: SCORE: 1174.49

## 2021-07-27 NOTE — PROGRESS NOTES
This document was created using a software with less than 100% fidelity, at times resulting in unintended, even erroneous syntax and grammar.  The reader is advised to keep this under consideration while reviewing, interpreting this note.      Rheumatology Consult Note      Paige Mari     YOB: 1950 Age: 71 year old    Date of visit: 7/27/21    PCP: Liam Canada    Chief Complaint   Patient presents with:  Consult: joint pain, muscle pain       Assessment and Plan     Paige was seen today for consult.    Diagnoses and all orders for this visit:    Primary osteoarthritis involving multiple joints  -     X-ray bl Wrist G/E 3 vws; Future    Polyarthralgia  -     X-ray bl Wrist G/E 3 vws; Future    Status post right knee replacement       This patient has polyarthralgia in association with widespread osteoarthritis both appendicular, axial, on a variety of interventions for this, status post knee arthroplasty, lumbar surgery.  She has residual pain.  We discussed duloxetine.  She is going to check with her primary physician if that could be either substituted for Celexa or added to it.  Management principles of OA were reviewed.  X-rays of the wrist to be taken today.  We will meet here in 3 months or sooner.    Return to clinic: 3 months      HPI   Paige Mari is a 71 year old female  is seen today for evaluation of joint pains.  She reports that some of the worst pain is in the wrist, worse on the left side, sometimes worse in the morning and sometimes in the evening and also has woken up from sleep, activity makes it worse, tramadol helps, she is not observe swelling or recent trauma.  She has longstanding osteoarthritis related joint symptoms several years ago she recalls having had right knee arthroplasty, she had lumbar surgery twice she noted that this was secondary to an injury that she sustained at work.  She still gets pain in the right knee as well as pain in the lower back that  "she calls \"hip pain\" on the right side.  She noted overall pain level at 8.0/10 interfering with a variety of day-to-day activities.  It would appear that the best part of the days in the morning when she has stiffness of 30 minutes.  As she is up and about by evening her symptoms are at their worst.  She has noted generalized fatigue.  She has had dryness of mouth, she gets headaches dizziness.  She is noted some memory impairment.  She feels that her short-term memory is \"not good at all\".  She is a retired RN.  She has besides tramadol taken other measures including muscle relaxers.  Her work-up was done recently which is reviewed.  She reports no history of psoriasis herself or the family ulcerative colitis Crohn's disease.  There is no family history of rheumatoid arthritis..           Active Problem List     Patient Active Problem List   Diagnosis     Hypertension     Taking Female Hormones For Postmenopausal HRT     Type 2 Diabetes Mellitus     Leukocytosis     Nicotine Dependence     Hyperthyroidism     Back Pain     Intractable headache     Knee osteoarthritis     HTN     KP on CPAP     GERD (gastroesophageal reflux disease)     Depression     Status post total knee replacement     Acute blood loss anemia     Spondylisthesis     Post-op pain     Accelerated hypertension     Altered mental state     Malignant hypertensive urgency     Headache     N&V (nausea and vomiting)     KP (obstructive sleep apnea)     Hypertensive emergency     Acute diverticulitis     Hyponatremia     Leukocytosis, unspecified type     Acute cystitis without hematuria     DM2     Past Medical History   No past medical history on file.  Past Surgical History     Past Surgical History:   Procedure Laterality Date     APPENDECTOMY      at age 3     ARTHROSCOPY KNEE WITH MENISCECTOMY  3/10/14    partial medial and lateral meniscectomies, subpatellar chondroplasty     BACK SURGERY       BLEPHAROPLASTY Bilateral 8/17/2015    Procedure: " "BLEPHAROPLASTY BILATERAL UPPER LIDS;  Surgeon: Chasidy Bryant MD;  Location: [facility];  Service:      BOWEL RESECTION  12 years ago     C REMOVAL OF OVARY(S)      Description: Oophorectomy;  Recorded: 06/03/2010;     C REMOVAL OF OVARY(S)      Description: Oophorectomy;  Recorded: 06/03/2010;     C TOTAL ABDOM HYSTERECTOMY      Description: Hysterectomy;  Recorded: 06/03/2010;     C TOTAL ABDOM HYSTERECTOMY      Description: Hysterectomy;  Recorded: 06/03/2010;     C TOTAL KNEE ARTHROPLASTY Right 11/24/2014    Procedure: RIGHT TOTAL KNEE ARTHROPLASTY;  Surgeon: Jules Harris MD;  Location: [facility];  Service: Orthopedics     COLON SURGERY  2004    sigmoid colectomy     DILATION AND CURETTAGE      x3     HYSTERECTOMY      age 39     JOINT REPLACEMENT       OOPHORECTOMY       OTHER SURGICAL HISTORY  2005    bowel obstruction     MS ARTHRODESIS ANT INTERBODY MIN DISCECTOMY,LUMBAR N/A 1/30/2015    Procedure: ANTERIOR LUMBAR INTERBODY FUSION L4-5, INTERBODY GRAFT, BMP INSTRUMENTATION;  Surgeon: Zeeshan Guillaume MD;  Location: [facility];  Service: Spine     TOE SURGERY       TONSILLECTOMY & ADENOIDECTOMY      age 10     Allergy     Allergies   Allergen Reactions     Morphine Other (See Comments)     \"make me delirious,\"  \"nightmares\"     Adhesive [Mecrylate] Other (See Comments)     Can only tolerate tape for short periods of time shelia in sensitive areas     Amlodipine Unknown     Amlodipine Besylate [Amlodipine] Other (See Comments)     Doxazosin Mesylate [Doxazosin] Other (See Comments)     Irbesartan-Hydrochlorothiazide [Avalide] Other (See Comments)     Other Allergy (See Comments) [External Allergen Needs Reconciliation - See Comment] Unknown     Other reaction(s): skin rash     Prednisone Unknown     Caused extremely high blood sugars     Trazodone Unknown     Other reaction(s): hung over     Current Medication List     Current Outpatient Medications   Medication Sig     acetaminophen " (TYLENOL) 325 MG tablet [ACETAMINOPHEN (TYLENOL) 325 MG TABLET] Take 650 mg by mouth every 6 (six) hours as needed for pain.     amoxicillin (AMOXIL) 500 MG tablet [AMOXICILLIN (AMOXIL) 500 MG TABLET] Take 500 mg by mouth as needed.     ARIPiprazole (ABILIFY) 2 MG tablet [ARIPIPRAZOLE (ABILIFY) 2 MG TABLET]      aspirin 81 MG EC tablet [ASPIRIN 81 MG EC TABLET] Take 81 mg by mouth daily.     blood sugar diagnostic (CONTOUR TEST STRIPS) Strp [BLOOD SUGAR DIAGNOSTIC (CONTOUR TEST STRIPS) STRP] Use 1 each As Directed daily.      cetirizine (ZYRTEC) 10 MG tablet [CETIRIZINE (ZYRTEC) 10 MG TABLET] Take 10 mg by mouth daily as needed (itching).      cholecalciferol, vitamin D3, 5,000 unit Tab [CHOLECALCIFEROL, VITAMIN D3, 5,000 UNIT TAB] Take 2,000 Units by mouth daily.      citalopram (CELEXA) 20 MG tablet [CITALOPRAM (CELEXA) 20 MG TABLET] Take 40 mg by mouth daily.      cloNIDine (CATAPRES-TTS) 0.3 mg/24 hr [CLONIDINE (CATAPRES-TTS) 0.3 MG/24 HR] once a week.     cloNIDine (CATAPRES-TTS3) 0.3 MG/24HR WK patch once a week.     Continuous Blood Gluc  (DEXCOM G6 ) TORRIE Use to read blood sugars as per 's instructions.     diclofenac sodium (VOLTAREN) 1 % Gel [DICLOFENAC SODIUM (VOLTAREN) 1 % GEL] Apply 1 g topically daily as needed.     estradiol (ESTRACE) 2 MG tablet [ESTRADIOL (ESTRACE) 2 MG TABLET] Take 1 mg by mouth daily. Takes 1/2 tab = 1mg     insulin aspart (NOVOLOG) 100 unit/mL injection [INSULIN ASPART (NOVOLOG) 100 UNIT/ML INJECTION] Inject 4-5 Units under the skin 3 (three) times a day with meals.      insulin detemir U-100 (LEVEMIR FLEXPEN) 100 unit/mL (3 mL) pen [INSULIN DETEMIR U-100 (LEVEMIR FLEXPEN) 100 UNIT/ML (3 ML) PEN] Inject 30 Units under the skin at bedtime.      liraglutide (VICTOZA 2-TANNER) 0.6 mg/0.1 mL (18 mg/3 mL) PnIj injection [LIRAGLUTIDE (VICTOZA 2-TANNER) 0.6 MG/0.1 ML (18 MG/3 ML) PNIJ INJECTION] Inject 1.2 mg under the skin daily. Not available from inpatient  "hospital pharmacy - OK for patient to use own supply     medical cannabis (Patient's own supply) [MEDICAL CANNABIS (PATIENT'S OWN SUPPLY)] by Other route see administration instructions. (The purpose of this order is to document that the patient reports taking medical cannabis. This is not a prescription, and is not used to certify that the patient has a  qualifying medical condition.)     metoprolol succinate (TOPROL-XL) 100 MG 24 hr tablet [METOPROLOL SUCCINATE (TOPROL-XL) 100 MG 24 HR TABLET] Take 2 tablets by mouth 2 (two) times a day.     MICRO THIN LANCETS MISC [MICRO THIN LANCETS MISC] Use As Directed.     NOVOFINE 32 32 gauge x 1/4\" Ndle [NOVOFINE 32 32 GAUGE X 1/4\" NDLE] USE TO TEST TWICE A DAY     olmesartan (BENICAR) 40 MG tablet [OLMESARTAN (BENICAR) 40 MG TABLET] Take 40 mg by mouth daily.     omeprazole (PRILOSEC) 20 MG capsule [OMEPRAZOLE (PRILOSEC) 20 MG CAPSULE] Take 1 capsule by mouth daily before breakfast.     pregabalin (LYRICA) 100 MG capsule [PREGABALIN (LYRICA) 100 MG CAPSULE] Take 100 mg by mouth 2 (two) times a day.     tiZANidine (ZANAFLEX) 2 MG tablet [TIZANIDINE (ZANAFLEX) 2 MG TABLET] Take 4 mg by mouth bedtime.      traMADol (ULTRAM) 50 mg tablet [TRAMADOL (ULTRAM) 50 MG TABLET] Take 100 mg by mouth 2 (two) times a day.      valACYclovir (VALTREX) 1000 MG tablet [VALACYCLOVIR (VALTREX) 1000 MG TABLET] Take 1,000 mg by mouth daily.      Continuous Blood Gluc Sensor (DEXCOM G6 SENSOR) MISC Change every 10 days. (Patient not taking: Reported on 7/27/2021)     Continuous Blood Gluc Sensor (DEXCOM G6 SENSOR) MISC Change every 10 days. (Patient not taking: Reported on 7/27/2021)     Continuous Blood Gluc Transmit (DEXCOM G6 TRANSMITTER) MISC Change every 3 months. (Patient not taking: Reported on 7/27/2021)     Continuous Blood Gluc Transmit (DEXCOM G6 TRANSMITTER) MISC Change every 3 months. (Patient not taking: Reported on 7/27/2021)     magnesium oxide 200 mg magnesium tablet [MAGNESIUM " "OXIDE 200 MG MAGNESIUM TABLET] Take 400 mg by mouth daily.  (Patient not taking: Reported on 7/27/2021)     sucralfate (CARAFATE) 1 GM tablet Take 1 tablet by mouth 4 times daily     UNABLE TO FIND [UNABLE TO FIND] Med Name: (Patient not taking: Reported on 7/27/2021)     No current facility-administered medications for this visit.            Family History     Family History   Problem Relation Age of Onset     Breast Cancer No family hx of      No change in family history since the previous clinic visit.    Physical Exam     COMPREHENSIVE EXAMINATION:  Vitals:    07/27/21 0947   BP: 122/84   Pulse: 80   Weight: 71.1 kg (156 lb 12.8 oz)   Height: 1.575 m (5' 2\")     A well appearing alert oriented female. Vital data as noted above. Her eyes examined for inflammation/scleromalacia. ENT examined for oral mucositis, moisture, thrush, nasal deformity, external ear redness, deformity. Her neck is examined for suppleness and lymphadenopathy.  Cardiopulmonary examination without dyspnea at rest, use of accessory muscles of breathing, wheezing, edema, peripheral or central cyanosis.  Abdomen examined for softness, tenderness, obvious organomegaly.  Skin examined for heliotrope, malar area eruption, lupus pernio, periungual erythema, sclerodactyly, papules, erythema nodosum, purpura, nail pitting, onycholysis, and obvious psoriasis lesion. Neurological examination done for alertness, speech, facial symmetry,  tone and power in upper and lower extremities, and gait. The joint examination is performed for swelling, tenderness, warmth, erythema, and range of motion in the following joints: DIPs, PIPs, MCPs, wrists, first CMC's, elbows, shoulders, hips, knees, ankles, feet; spine for range of motion and paraspinal muscles for tenderness. The salient normal / abnormal findings are appended.  There is no synovitis in any of the palpable joints of upper and lower extremities, she has tenderness across the trapezius, cervical spine, " paraspinal region lumbosacral area, she has TKA scar on the right side.  She does not have dactylitis of digits or toes.  She has tenderness across her wrists, first CMC's .    Labs / Imaging (select studies)     No results found for: PPDINDURATIO, PPDREDNESS, TBGOLT, RHF, CCPABG, URIC, [id number], [id number], ANTIDNA, ANTIDNAINT, CARIA, [id number], [id number], ENASM, ENASCL, JO1, ENASSA, ENASSB, CENTA, Y2ZSGDS, G7TXCOL, [id number]   Hemoglobin   Date Value Ref Range Status   03/03/2021 13.8 12.0 - 16.0 g/dL Final   02/24/2020 14.2 12.0 - 16.0 g/dL Final   05/21/2018 13.7 12.0 - 16.0 g/dL Final     Urea Nitrogen   Date Value Ref Range Status   03/03/2021 16 8 - 28 mg/dL Final   10/05/2020 16 8 - 28 mg/dL Final   08/10/2020 12 8 - 28 mg/dL Final     Erythrocyte Sedimentation Rate   Date Value Ref Range Status   03/03/2021 10 0 - 20 mm/hr Final   02/24/2020 5 0 - 20 mm/hr Final   01/28/2020 1 0 - 20 mm/hr Final     CRP   Date Value Ref Range Status   03/03/2021 3.6 (H) 0.0 - 0.8 mg/dL Final   02/24/2020 0.5 0.0 - 0.8 mg/dL Final     AST   Date Value Ref Range Status   03/03/2021 34 0 - 40 U/L Final   07/09/2020 36 0 - 40 U/L Final   02/24/2020 27 0 - 40 U/L Final     Albumin   Date Value Ref Range Status   03/03/2021 3.8 3.5 - 5.0 g/dL Final   07/09/2020 4.0 3.5 - 5.0 g/dL Final   02/24/2020 4.3 3.5 - 5.0 g/dL Final     Alkaline Phosphatase   Date Value Ref Range Status   03/03/2021 85 45 - 120 U/L Final   07/09/2020 95 45 - 120 U/L Final   02/24/2020 66 45 - 120 U/L Final     ALT   Date Value Ref Range Status   03/03/2021 34 0 - 45 U/L Final   07/09/2020 32 0 - 45 U/L Final   02/24/2020 21 0 - 45 U/L Final          Immunization History     Immunization History   Administered Date(s) Administered     COVID-19,PF,Moderna 03/10/2021, 04/07/2021     Flu, Unspecified 10/21/2014, 11/01/2016     Pneumococcal 23 valent 07/09/2012

## 2021-08-26 ENCOUNTER — TRANSFERRED RECORDS (OUTPATIENT)
Dept: HEALTH INFORMATION MANAGEMENT | Facility: CLINIC | Age: 71
End: 2021-08-26

## 2021-08-26 ENCOUNTER — MEDICAL CORRESPONDENCE (OUTPATIENT)
Dept: HEALTH INFORMATION MANAGEMENT | Facility: CLINIC | Age: 71
End: 2021-08-26

## 2021-08-26 ENCOUNTER — LAB REQUISITION (OUTPATIENT)
Dept: LAB | Facility: CLINIC | Age: 71
End: 2021-08-26

## 2021-08-26 DIAGNOSIS — E11.9 TYPE 2 DIABETES MELLITUS WITHOUT COMPLICATIONS (H): ICD-10-CM

## 2021-08-26 PROCEDURE — 84681 ASSAY OF C-PEPTIDE: CPT | Performed by: FAMILY MEDICINE

## 2021-08-27 LAB — C PEPTIDE SERPL-MCNC: 0.2 NG/ML (ref 0.9–6.9)

## 2021-08-30 DIAGNOSIS — R05.9 COUGH: Primary | ICD-10-CM

## 2021-09-17 ENCOUNTER — HOSPITAL ENCOUNTER (INPATIENT)
Facility: HOSPITAL | Age: 71
LOS: 4 days | Discharge: HOME | DRG: 388 | End: 2021-09-22
Attending: EMERGENCY MEDICINE | Admitting: INTERNAL MEDICINE
Payer: COMMERCIAL

## 2021-09-17 ENCOUNTER — APPOINTMENT (OUTPATIENT)
Dept: CT IMAGING | Facility: HOSPITAL | Age: 71
DRG: 388 | End: 2021-09-17
Attending: EMERGENCY MEDICINE
Payer: COMMERCIAL

## 2021-09-17 DIAGNOSIS — F99 INSOMNIA DUE TO OTHER MENTAL DISORDER: ICD-10-CM

## 2021-09-17 DIAGNOSIS — D72.829 LEUKOCYTOSIS, UNSPECIFIED TYPE: ICD-10-CM

## 2021-09-17 DIAGNOSIS — K63.89 PNEUMATOSIS INTESTINALIS: ICD-10-CM

## 2021-09-17 DIAGNOSIS — K56.609 SMALL BOWEL OBSTRUCTION (H): ICD-10-CM

## 2021-09-17 DIAGNOSIS — F51.05 INSOMNIA DUE TO OTHER MENTAL DISORDER: ICD-10-CM

## 2021-09-17 DIAGNOSIS — M43.10 SPONDYLOLISTHESIS, UNSPECIFIED SPINAL REGION: ICD-10-CM

## 2021-09-17 DIAGNOSIS — I48.0 PAROXYSMAL ATRIAL FIBRILLATION (H): Primary | ICD-10-CM

## 2021-09-17 LAB
ALBUMIN SERPL-MCNC: 4 G/DL (ref 3.5–5)
ALP SERPL-CCNC: 108 U/L (ref 45–120)
ALT SERPL W P-5'-P-CCNC: 26 U/L (ref 0–45)
ANION GAP SERPL CALCULATED.3IONS-SCNC: 13 MMOL/L (ref 5–18)
AST SERPL W P-5'-P-CCNC: 28 U/L (ref 0–40)
BASOPHILS # BLD AUTO: 0.1 10E3/UL (ref 0–0.2)
BASOPHILS NFR BLD AUTO: 0 %
BILIRUB SERPL-MCNC: 0.7 MG/DL (ref 0–1)
BUN SERPL-MCNC: 16 MG/DL (ref 8–28)
CALCIUM SERPL-MCNC: 9.6 MG/DL (ref 8.5–10.5)
CHLORIDE BLD-SCNC: 100 MMOL/L (ref 98–107)
CO2 SERPL-SCNC: 25 MMOL/L (ref 22–31)
CREAT SERPL-MCNC: 1.13 MG/DL (ref 0.6–1.1)
EOSINOPHIL # BLD AUTO: 0.2 10E3/UL (ref 0–0.7)
EOSINOPHIL NFR BLD AUTO: 1 %
ERYTHROCYTE [DISTWIDTH] IN BLOOD BY AUTOMATED COUNT: 12.7 % (ref 10–15)
GFR SERPL CREATININE-BSD FRML MDRD: 49 ML/MIN/1.73M2
GLUCOSE BLD-MCNC: 389 MG/DL (ref 70–125)
HCT VFR BLD AUTO: 49.1 % (ref 35–47)
HGB BLD-MCNC: 16.7 G/DL (ref 11.7–15.7)
IMM GRANULOCYTES # BLD: 0.1 10E3/UL
IMM GRANULOCYTES NFR BLD: 0 %
LACTATE SERPL-SCNC: 2.5 MMOL/L (ref 0.7–2)
LYMPHOCYTES # BLD AUTO: 2.5 10E3/UL (ref 0.8–5.3)
LYMPHOCYTES NFR BLD AUTO: 13 %
MCH RBC QN AUTO: 33.3 PG (ref 26.5–33)
MCHC RBC AUTO-ENTMCNC: 34 G/DL (ref 31.5–36.5)
MCV RBC AUTO: 98 FL (ref 78–100)
MONOCYTES # BLD AUTO: 1.1 10E3/UL (ref 0–1.3)
MONOCYTES NFR BLD AUTO: 6 %
NEUTROPHILS # BLD AUTO: 14.9 10E3/UL (ref 1.6–8.3)
NEUTROPHILS NFR BLD AUTO: 80 %
NRBC # BLD AUTO: 0 10E3/UL
NRBC BLD AUTO-RTO: 0 /100
PLATELET # BLD AUTO: 294 10E3/UL (ref 150–450)
POTASSIUM BLD-SCNC: 4.6 MMOL/L (ref 3.5–5)
PROT SERPL-MCNC: 7.3 G/DL (ref 6–8)
RBC # BLD AUTO: 5.02 10E6/UL (ref 3.8–5.2)
SODIUM SERPL-SCNC: 138 MMOL/L (ref 136–145)
TROPONIN I SERPL-MCNC: 0.01 NG/ML (ref 0–0.29)
WBC # BLD AUTO: 18.7 10E3/UL (ref 4–11)

## 2021-09-17 PROCEDURE — 250N000011 HC RX IP 250 OP 636: Performed by: EMERGENCY MEDICINE

## 2021-09-17 PROCEDURE — 74177 CT ABD & PELVIS W/CONTRAST: CPT

## 2021-09-17 PROCEDURE — 84484 ASSAY OF TROPONIN QUANT: CPT | Performed by: EMERGENCY MEDICINE

## 2021-09-17 PROCEDURE — 83605 ASSAY OF LACTIC ACID: CPT | Performed by: EMERGENCY MEDICINE

## 2021-09-17 PROCEDURE — 93005 ELECTROCARDIOGRAM TRACING: CPT | Performed by: EMERGENCY MEDICINE

## 2021-09-17 PROCEDURE — 85025 COMPLETE CBC W/AUTO DIFF WBC: CPT | Performed by: EMERGENCY MEDICINE

## 2021-09-17 PROCEDURE — 82040 ASSAY OF SERUM ALBUMIN: CPT | Performed by: EMERGENCY MEDICINE

## 2021-09-17 PROCEDURE — 99285 EMERGENCY DEPT VISIT HI MDM: CPT | Mod: 25

## 2021-09-17 PROCEDURE — 36415 COLL VENOUS BLD VENIPUNCTURE: CPT | Performed by: EMERGENCY MEDICINE

## 2021-09-17 PROCEDURE — 83036 HEMOGLOBIN GLYCOSYLATED A1C: CPT | Performed by: INTERNAL MEDICINE

## 2021-09-17 PROCEDURE — 96376 TX/PRO/DX INJ SAME DRUG ADON: CPT

## 2021-09-17 PROCEDURE — 96375 TX/PRO/DX INJ NEW DRUG ADDON: CPT

## 2021-09-17 RX ORDER — ONDANSETRON 2 MG/ML
4 INJECTION INTRAMUSCULAR; INTRAVENOUS ONCE
Status: COMPLETED | OUTPATIENT
Start: 2021-09-17 | End: 2021-09-17

## 2021-09-17 RX ORDER — ONDANSETRON 2 MG/ML
4 INJECTION INTRAMUSCULAR; INTRAVENOUS EVERY 30 MIN PRN
Status: COMPLETED | OUTPATIENT
Start: 2021-09-17 | End: 2021-09-18

## 2021-09-17 RX ORDER — IOPAMIDOL 755 MG/ML
75 INJECTION, SOLUTION INTRAVASCULAR ONCE
Status: COMPLETED | OUTPATIENT
Start: 2021-09-18 | End: 2021-09-17

## 2021-09-17 RX ADMIN — ONDANSETRON 4 MG: 2 INJECTION INTRAMUSCULAR; INTRAVENOUS at 23:56

## 2021-09-17 RX ADMIN — IOPAMIDOL 75 ML: 755 INJECTION, SOLUTION INTRAVENOUS at 23:46

## 2021-09-17 RX ADMIN — ONDANSETRON 4 MG: 2 INJECTION INTRAMUSCULAR; INTRAVENOUS at 22:39

## 2021-09-17 ASSESSMENT — ENCOUNTER SYMPTOMS
ABDOMINAL PAIN: 1
NAUSEA: 1
ROS GI COMMENTS: POSITIVE FOR BLOATING.

## 2021-09-17 ASSESSMENT — MIFFLIN-ST. JEOR: SCORE: 1220.76

## 2021-09-18 ENCOUNTER — APPOINTMENT (OUTPATIENT)
Dept: RADIOLOGY | Facility: HOSPITAL | Age: 71
DRG: 388 | End: 2021-09-18
Attending: HOSPITALIST
Payer: COMMERCIAL

## 2021-09-18 ENCOUNTER — APPOINTMENT (OUTPATIENT)
Dept: RADIOLOGY | Facility: HOSPITAL | Age: 71
DRG: 388 | End: 2021-09-18
Attending: SURGERY
Payer: COMMERCIAL

## 2021-09-18 PROBLEM — K63.89 PNEUMATOSIS INTESTINALIS: Status: ACTIVE | Noted: 2021-09-18

## 2021-09-18 PROBLEM — K56.609 SMALL BOWEL OBSTRUCTION (H): Status: ACTIVE | Noted: 2021-09-18

## 2021-09-18 LAB
ALBUMIN UR-MCNC: NEGATIVE MG/DL
APPEARANCE UR: CLEAR
BILIRUB UR QL STRIP: NEGATIVE
COLOR UR AUTO: COLORLESS
GLUCOSE BLDC GLUCOMTR-MCNC: 295 MG/DL (ref 70–99)
GLUCOSE BLDC GLUCOMTR-MCNC: 314 MG/DL (ref 70–99)
GLUCOSE BLDC GLUCOMTR-MCNC: 356 MG/DL (ref 70–99)
GLUCOSE BLDC GLUCOMTR-MCNC: 370 MG/DL (ref 70–99)
GLUCOSE BLDC GLUCOMTR-MCNC: 503 MG/DL (ref 70–99)
GLUCOSE BLDC GLUCOMTR-MCNC: 507 MG/DL (ref 70–99)
GLUCOSE BLDC GLUCOMTR-MCNC: 528 MG/DL (ref 70–99)
GLUCOSE UR STRIP-MCNC: >1000 MG/DL
HBA1C MFR BLD: 8 %
HGB UR QL STRIP: NEGATIVE
KETONES UR STRIP-MCNC: ABNORMAL MG/DL
LACTATE SERPL-SCNC: 2.6 MMOL/L (ref 0.7–2)
LACTATE SERPL-SCNC: 3.5 MMOL/L (ref 0.7–2)
LEUKOCYTE ESTERASE UR QL STRIP: NEGATIVE
NITRATE UR QL: NEGATIVE
PH UR STRIP: 7 [PH] (ref 5–7)
SARS-COV-2 RNA RESP QL NAA+PROBE: NEGATIVE
SP GR UR STRIP: 1.02 (ref 1–1.03)
UROBILINOGEN UR STRIP-MCNC: <2 MG/DL

## 2021-09-18 PROCEDURE — 250N000012 HC RX MED GY IP 250 OP 636 PS 637: Performed by: INTERNAL MEDICINE

## 2021-09-18 PROCEDURE — 250N000012 HC RX MED GY IP 250 OP 636 PS 637: Performed by: STUDENT IN AN ORGANIZED HEALTH CARE EDUCATION/TRAINING PROGRAM

## 2021-09-18 PROCEDURE — 87635 SARS-COV-2 COVID-19 AMP PRB: CPT | Performed by: EMERGENCY MEDICINE

## 2021-09-18 PROCEDURE — C9803 HOPD COVID-19 SPEC COLLECT: HCPCS

## 2021-09-18 PROCEDURE — 99223 1ST HOSP IP/OBS HIGH 75: CPT | Performed by: INTERNAL MEDICINE

## 2021-09-18 PROCEDURE — 96375 TX/PRO/DX INJ NEW DRUG ADDON: CPT

## 2021-09-18 PROCEDURE — 36415 COLL VENOUS BLD VENIPUNCTURE: CPT | Performed by: INTERNAL MEDICINE

## 2021-09-18 PROCEDURE — 999N000065 XR ABDOMEN 1 VIEW

## 2021-09-18 PROCEDURE — 250N000011 HC RX IP 250 OP 636: Performed by: HOSPITALIST

## 2021-09-18 PROCEDURE — 83605 ASSAY OF LACTIC ACID: CPT | Performed by: INTERNAL MEDICINE

## 2021-09-18 PROCEDURE — 250N000011 HC RX IP 250 OP 636: Performed by: EMERGENCY MEDICINE

## 2021-09-18 PROCEDURE — 250N000012 HC RX MED GY IP 250 OP 636 PS 637: Performed by: HOSPITALIST

## 2021-09-18 PROCEDURE — 258N000003 HC RX IP 258 OP 636: Performed by: INTERNAL MEDICINE

## 2021-09-18 PROCEDURE — 96365 THER/PROPH/DIAG IV INF INIT: CPT | Mod: 59

## 2021-09-18 PROCEDURE — 96361 HYDRATE IV INFUSION ADD-ON: CPT

## 2021-09-18 PROCEDURE — 36415 COLL VENOUS BLD VENIPUNCTURE: CPT | Performed by: SURGERY

## 2021-09-18 PROCEDURE — 250N000013 HC RX MED GY IP 250 OP 250 PS 637: Performed by: EMERGENCY MEDICINE

## 2021-09-18 PROCEDURE — 83605 ASSAY OF LACTIC ACID: CPT | Performed by: SURGERY

## 2021-09-18 PROCEDURE — 120N000001 HC R&B MED SURG/OB

## 2021-09-18 PROCEDURE — 250N000013 HC RX MED GY IP 250 OP 250 PS 637: Performed by: INTERNAL MEDICINE

## 2021-09-18 PROCEDURE — 99222 1ST HOSP IP/OBS MODERATE 55: CPT | Performed by: SURGERY

## 2021-09-18 PROCEDURE — 250N000011 HC RX IP 250 OP 636: Performed by: INTERNAL MEDICINE

## 2021-09-18 PROCEDURE — 81003 URINALYSIS AUTO W/O SCOPE: CPT | Performed by: INTERNAL MEDICINE

## 2021-09-18 PROCEDURE — 74018 RADEX ABDOMEN 1 VIEW: CPT

## 2021-09-18 PROCEDURE — 96376 TX/PRO/DX INJ SAME DRUG ADON: CPT

## 2021-09-18 RX ORDER — HYDRALAZINE HYDROCHLORIDE 20 MG/ML
10 INJECTION INTRAMUSCULAR; INTRAVENOUS ONCE
Status: COMPLETED | OUTPATIENT
Start: 2021-09-18 | End: 2021-09-18

## 2021-09-18 RX ORDER — PIPERACILLIN SODIUM, TAZOBACTAM SODIUM 3; .375 G/15ML; G/15ML
3.38 INJECTION, POWDER, LYOPHILIZED, FOR SOLUTION INTRAVENOUS ONCE
Status: COMPLETED | OUTPATIENT
Start: 2021-09-18 | End: 2021-09-18

## 2021-09-18 RX ORDER — DEXTROSE MONOHYDRATE 25 G/50ML
25-50 INJECTION, SOLUTION INTRAVENOUS
Status: DISCONTINUED | OUTPATIENT
Start: 2021-09-18 | End: 2021-09-22 | Stop reason: HOSPADM

## 2021-09-18 RX ORDER — SODIUM CHLORIDE 9 MG/ML
INJECTION, SOLUTION INTRAVENOUS CONTINUOUS
Status: ACTIVE | OUTPATIENT
Start: 2021-09-18 | End: 2021-09-18

## 2021-09-18 RX ORDER — HYDRALAZINE HYDROCHLORIDE 20 MG/ML
10 INJECTION INTRAMUSCULAR; INTRAVENOUS EVERY 4 HOURS PRN
Status: DISCONTINUED | OUTPATIENT
Start: 2021-09-18 | End: 2021-09-21

## 2021-09-18 RX ORDER — ONDANSETRON 2 MG/ML
4 INJECTION INTRAMUSCULAR; INTRAVENOUS EVERY 6 HOURS PRN
Status: DISCONTINUED | OUTPATIENT
Start: 2021-09-18 | End: 2021-09-22 | Stop reason: HOSPADM

## 2021-09-18 RX ORDER — METOPROLOL TARTRATE 1 MG/ML
2.5 INJECTION, SOLUTION INTRAVENOUS EVERY 4 HOURS PRN
Status: DISCONTINUED | OUTPATIENT
Start: 2021-09-18 | End: 2021-09-21

## 2021-09-18 RX ORDER — CLONIDINE 0.3 MG/24H
1 PATCH, EXTENDED RELEASE TRANSDERMAL WEEKLY
Status: DISCONTINUED | OUTPATIENT
Start: 2021-09-18 | End: 2021-09-19

## 2021-09-18 RX ORDER — METOPROLOL SUCCINATE 100 MG/1
100 TABLET, EXTENDED RELEASE ORAL EVERY EVENING
Status: ON HOLD | COMMUNITY
End: 2021-09-22

## 2021-09-18 RX ORDER — LABETALOL HYDROCHLORIDE 5 MG/ML
10 INJECTION, SOLUTION INTRAVENOUS ONCE
Status: DISCONTINUED | OUTPATIENT
Start: 2021-09-18 | End: 2021-09-20 | Stop reason: CLARIF

## 2021-09-18 RX ORDER — TIOTROPIUM BROMIDE 18 UG/1
18 CAPSULE ORAL; RESPIRATORY (INHALATION) DAILY
COMMUNITY
End: 2021-10-04

## 2021-09-18 RX ORDER — PIPERACILLIN SODIUM, TAZOBACTAM SODIUM 3; .375 G/15ML; G/15ML
3.38 INJECTION, POWDER, LYOPHILIZED, FOR SOLUTION INTRAVENOUS EVERY 8 HOURS
Status: DISCONTINUED | OUTPATIENT
Start: 2021-09-18 | End: 2021-09-22

## 2021-09-18 RX ORDER — HYDRALAZINE HYDROCHLORIDE 20 MG/ML
10 INJECTION INTRAMUSCULAR; INTRAVENOUS EVERY 6 HOURS PRN
Status: DISCONTINUED | OUTPATIENT
Start: 2021-09-18 | End: 2021-09-18

## 2021-09-18 RX ORDER — LOSARTAN POTASSIUM 50 MG/1
100 TABLET ORAL ONCE
Status: COMPLETED | OUTPATIENT
Start: 2021-09-18 | End: 2021-09-18

## 2021-09-18 RX ORDER — METOPROLOL SUCCINATE 100 MG/1
100 TABLET, EXTENDED RELEASE ORAL ONCE
Status: DISCONTINUED | OUTPATIENT
Start: 2021-09-18 | End: 2021-09-20 | Stop reason: CLARIF

## 2021-09-18 RX ORDER — NICOTINE POLACRILEX 4 MG
15-30 LOZENGE BUCCAL
Status: DISCONTINUED | OUTPATIENT
Start: 2021-09-18 | End: 2021-09-22 | Stop reason: HOSPADM

## 2021-09-18 RX ADMIN — PIPERACILLIN SODIUM AND TAZOBACTAM SODIUM 3.38 G: 3; .375 INJECTION, POWDER, LYOPHILIZED, FOR SOLUTION INTRAVENOUS at 17:40

## 2021-09-18 RX ADMIN — INSULIN ASPART 4 UNITS: 100 INJECTION, SOLUTION INTRAVENOUS; SUBCUTANEOUS at 06:40

## 2021-09-18 RX ADMIN — HYDRALAZINE HYDROCHLORIDE 10 MG: 20 INJECTION INTRAMUSCULAR; INTRAVENOUS at 00:13

## 2021-09-18 RX ADMIN — DIATRIZOATE MEGLUMINE AND DIATRIZOATE SODIUM 1 ML: 660; 100 SOLUTION ORAL; RECTAL at 17:34

## 2021-09-18 RX ADMIN — HYDROMORPHONE HYDROCHLORIDE 0.5 MG: 1 INJECTION, SOLUTION INTRAMUSCULAR; INTRAVENOUS; SUBCUTANEOUS at 02:43

## 2021-09-18 RX ADMIN — INSULIN ASPART 4 UNITS: 100 INJECTION, SOLUTION INTRAVENOUS; SUBCUTANEOUS at 10:34

## 2021-09-18 RX ADMIN — HYDRALAZINE HYDROCHLORIDE 10 MG: 20 INJECTION INTRAMUSCULAR; INTRAVENOUS at 05:17

## 2021-09-18 RX ADMIN — INSULIN ASPART 5 UNITS: 100 INJECTION, SOLUTION INTRAVENOUS; SUBCUTANEOUS at 02:06

## 2021-09-18 RX ADMIN — PIPERACILLIN SODIUM AND TAZOBACTAM SODIUM 3.38 G: 3; .375 INJECTION, POWDER, LYOPHILIZED, FOR SOLUTION INTRAVENOUS at 10:05

## 2021-09-18 RX ADMIN — HYDRALAZINE HYDROCHLORIDE 10 MG: 20 INJECTION INTRAMUSCULAR; INTRAVENOUS at 10:02

## 2021-09-18 RX ADMIN — LOSARTAN POTASSIUM 100 MG: 50 TABLET, FILM COATED ORAL at 00:50

## 2021-09-18 RX ADMIN — INSULIN GLARGINE 15 UNITS: 100 INJECTION, SOLUTION SUBCUTANEOUS at 02:06

## 2021-09-18 RX ADMIN — INSULIN ASPART 7 UNITS: 100 INJECTION, SOLUTION INTRAVENOUS; SUBCUTANEOUS at 22:17

## 2021-09-18 RX ADMIN — CLONIDINE 1 PATCH: 0.3 PATCH, EXTENDED RELEASE TRANSDERMAL at 02:09

## 2021-09-18 RX ADMIN — ONDANSETRON 4 MG: 2 INJECTION INTRAMUSCULAR; INTRAVENOUS at 21:29

## 2021-09-18 RX ADMIN — INSULIN GLARGINE 15 UNITS: 100 INJECTION, SOLUTION SUBCUTANEOUS at 21:53

## 2021-09-18 RX ADMIN — PIPERACILLIN AND TAZOBACTAM 3.38 G: 3; .375 INJECTION, POWDER, LYOPHILIZED, FOR SOLUTION INTRAVENOUS; PARENTERAL at 01:31

## 2021-09-18 RX ADMIN — INSULIN ASPART 7 UNITS: 100 INJECTION, SOLUTION INTRAVENOUS; SUBCUTANEOUS at 23:05

## 2021-09-18 RX ADMIN — HYDROMORPHONE HYDROCHLORIDE 0.5 MG: 1 INJECTION, SOLUTION INTRAMUSCULAR; INTRAVENOUS; SUBCUTANEOUS at 06:48

## 2021-09-18 RX ADMIN — ONDANSETRON 4 MG: 2 INJECTION INTRAMUSCULAR; INTRAVENOUS at 11:37

## 2021-09-18 RX ADMIN — INSULIN ASPART 6 UNITS: 100 INJECTION, SOLUTION INTRAVENOUS; SUBCUTANEOUS at 19:07

## 2021-09-18 RX ADMIN — HYDROMORPHONE HYDROCHLORIDE 0.2 MG: 1 INJECTION, SOLUTION INTRAMUSCULAR; INTRAVENOUS; SUBCUTANEOUS at 13:12

## 2021-09-18 RX ADMIN — HYDROMORPHONE HYDROCHLORIDE 0.2 MG: 1 INJECTION, SOLUTION INTRAMUSCULAR; INTRAVENOUS; SUBCUTANEOUS at 00:20

## 2021-09-18 RX ADMIN — HYDROMORPHONE HYDROCHLORIDE 0.5 MG: 1 INJECTION, SOLUTION INTRAMUSCULAR; INTRAVENOUS; SUBCUTANEOUS at 01:39

## 2021-09-18 RX ADMIN — HYDRALAZINE HYDROCHLORIDE 10 MG: 20 INJECTION INTRAMUSCULAR; INTRAVENOUS at 14:45

## 2021-09-18 RX ADMIN — HYDROMORPHONE HYDROCHLORIDE 0.2 MG: 1 INJECTION, SOLUTION INTRAMUSCULAR; INTRAVENOUS; SUBCUTANEOUS at 10:33

## 2021-09-18 RX ADMIN — SODIUM CHLORIDE: 9 INJECTION, SOLUTION INTRAVENOUS at 02:38

## 2021-09-18 RX ADMIN — INSULIN ASPART 4 UNITS: 100 INJECTION, SOLUTION INTRAVENOUS; SUBCUTANEOUS at 15:06

## 2021-09-18 ASSESSMENT — ACTIVITIES OF DAILY LIVING (ADL): DEPENDENT_IADLS:: INDEPENDENT

## 2021-09-18 NOTE — PHARMACY-ADMISSION MEDICATION HISTORY
Pharmacy Note - Admission Medication History    Pertinent Provider Information: none     ______________________________________________________________________    Prior To Admission (PTA) med list completed and updated in EMR.       PTA Med List   Medication Sig Last Dose     acetaminophen (TYLENOL) 325 MG tablet [ACETAMINOPHEN (TYLENOL) 325 MG TABLET] Take 650 mg by mouth every 6 (six) hours as needed for pain. 9/17/2021 at Unknown time     amoxicillin (AMOXIL) 500 MG tablet Take 500 mg by mouth as needed Before dental appointments More than a month at Unknown time     ARIPiprazole (ABILIFY) 2 MG tablet Take 2 mg by mouth daily  9/17/2021 at Unknown time     aspirin 81 MG EC tablet [ASPIRIN 81 MG EC TABLET] Take 81 mg by mouth daily. 9/17/2021 at Unknown time     cetirizine (ZYRTEC) 10 MG tablet [CETIRIZINE (ZYRTEC) 10 MG TABLET] Take 10 mg by mouth daily as needed (itching).  9/17/2021 at Unknown time     cholecalciferol, vitamin D3, 5,000 unit Tab [CHOLECALCIFEROL, VITAMIN D3, 5,000 UNIT TAB] Take 2,000 Units by mouth daily.  9/17/2021 at Unknown time     cloNIDine (CATAPRES-TTS) 0.3 mg/24 hr Place 1 patch onto the skin once a week  9/17/2021 at Unknown time     diclofenac sodium (VOLTAREN) 1 % Gel [DICLOFENAC SODIUM (VOLTAREN) 1 % GEL] Apply 1 g topically daily as needed. 9/17/2021 at Unknown time     estradiol (ESTRACE) 1 MG tablet Take 1 mg by mouth daily  9/17/2021 at Unknown time     insulin aspart (NOVOLOG) 100 unit/mL injection Inject 6-15 Units Subcutaneous 3 times daily (with meals)  9/16/2021     insulin detemir U-100 (LEVEMIR FLEXPEN) 100 unit/mL (3 mL) pen Inject 20 Units Subcutaneous At Bedtime  9/16/21     liraglutide (VICTOZA 2-TANNER) 0.6 mg/0.1 mL (18 mg/3 mL) PnIj injection [LIRAGLUTIDE (VICTOZA 2-TANNER) 0.6 MG/0.1 ML (18 MG/3 ML) PNIJ INJECTION] Inject 1.2 mg under the skin daily. Not available from inpatient hospital pharmacy - OK for patient to use own supply 9/17/2021 at Unknown time     medical  cannabis (Patient's own supply) 1 Dose by Other route See Admin Instructions [employer] Tangerine Vape- daily at bedtime and as needed for pain 9/17/2021 at Unknown time     metoprolol succinate (TOPROL-XL) 100 MG 24 hr tablet Take 200 mg by mouth daily before breakfast  9/17/2021 at Unknown time     metoprolol succinate ER (TOPROL-XL) 100 MG 24 hr tablet Take 100 mg by mouth daily 9/17/2021 at Unknown time     olmesartan (BENICAR) 40 MG tablet [OLMESARTAN (BENICAR) 40 MG TABLET] Take 40 mg by mouth daily. 9/17/2021 at Unknown time     omeprazole (PRILOSEC) 20 MG capsule [OMEPRAZOLE (PRILOSEC) 20 MG CAPSULE] Take 1 capsule by mouth daily before breakfast. 9/17/2021 at Unknown time     pregabalin (LYRICA) 100 MG capsule [PREGABALIN (LYRICA) 100 MG CAPSULE] Take 100 mg by mouth 2 (two) times a day. 9/17/2021 at Unknown time     sucralfate (CARAFATE) 1 GM tablet Take 1 tablet by mouth 4 times daily as needed  9/17/2021 at Unknown time     tiotropium (SPIRIVA HANDIHALER) 18 MCG inhaled capsule Inhale 18 mcg into the lungs daily 9/17/2021 at Unknown time     tiZANidine (ZANAFLEX) 2 MG tablet [TIZANIDINE (ZANAFLEX) 2 MG TABLET] Take 4 mg by mouth bedtime.  9/17/2021 at Unknown time     traMADol (ULTRAM) 50 mg tablet [TRAMADOL (ULTRAM) 50 MG TABLET] Take 100 mg by mouth 2 (two) times a day.  9/17/2021 at Unknown time     valACYclovir (VALTREX) 500 MG tablet Take 500 mg by mouth daily  9/17/2021 at Unknown time       Information [facility](s[facility]  Method of interview communication: in-person    Summary of Changes to PTA Med List  New: spiriva handihaler  Discontinued: citalopram  Changed: valacyclovir (from 1000mg every day to 500mg every day)     Patient was asked about OTC/herbal products specifically.  PTA med list reflects this.    In the past week, patient estimated taking medication this percent of the time:  greater than 90%.    Allergies were reviewed, assessed, and updated with the  patient.      Medications currently not available for use during hospital stay. Family/Patient representative states they will bring Spriva inhaler and diclofenac gel to [facility].    The information provided in this note is only as accurate as the sources available at the time of the update(s).    Thank you for the opportunity to participate in the care of this patient.    Giana Hsu  9/18/2021 9:32 AM

## 2021-09-18 NOTE — ED PROVIDER NOTES
NAME: Paige Mari  AGE: 71 year old female  YOB: 1950  MRN: [id number]  EVALUATION DATE & TIME: 2021 11:17 PM    PCP: Liam Canada    ED PROVIDER: Mike Ramos M.D.      Chief Complaint   Patient presents with     Abdominal Pain     FINAL IMPRESSION:  1. Small bowel obstruction (H)    2. Pneumatosis intestinalis      MEDICAL DECISION MAKIN:20 PM I met with the patient, obtained history, performed an initial exam, and discussed options and plan for diagnostics and treatment here in the ED.  PPE worn including surgical mask, gloves.  12:36 AM I rechecked and updated the patient on imaging results.  12:40 AM I paged the hospitalist.  12:40 AM I paged General Surgery.  12:42 AM I spoke with Dr. Durham from General Surgery.  12:45 AM I spoke with the hospitalist Dr. Knight.    Patient was clinically assessed and consented to treatment. After assessment, medical decision making and workup were discussed with the patient. The patient was agreeable to plan for testing, workup, and treatment.  Pertinent Labs & Imaging studies reviewed. (See chart for details)         Paieg Mari is a 71 year oldfemale who presents with abdominal pain.   Differential diagnosis includes but not limited to small bowel obstruction, diverticulitis, colitis, perforated bowel, enteritis.  Patient with history of diverticulitis and presented with abdominal pain and nausea tonight.  Patient not vomiting however does feel bloated on examination concerning for dilated loops of bowel wall and small bowel obstruction given her past history of colectomy and subsequent small bowel obstruction followed by surgery for this.  This was several years ago and patient has been doing well.  Labs obtained from triage did show elevated white blood cell count along with elevated lactic acid which could be concerning for bowel injury with small bowel obstruction.  Hemoglobin was stable and metabolic panel also showed a  slightly elevated creatinine and glucose.  Patient blood pressure was also extremely elevated though she had not taken her evening medications.  Given possible small bowel obstruction and possible surgery patient was given IV medication for high blood pressure along with pain medication.  Antinausea medication was given and patient will be plan for CT.  CT scan returned showing a small bowel obstruction as well as some pneumatosis in a loop of bowel in the right lower quadrant.  Patient discussed with surgery who recommended admission, NG tube, and they will see her in the morning though at this time do not recommend surgery.  Patient has been already been ordered for her oral home medications for blood pressure which likely were partially absorbed given timeframe but NG tube will be placed.  IV fluids will be given depending on blood pressure and continued pain medication.  Patient then was discussed with hospitalist, Dr. Knight for admission.    The importance of close follow up was discussed. We reviewed warning signs and symptoms, and I instructed Ms. Mari to return to the emergency department immediately if she develops any new or worsening symptoms. I provided additional verbal discharge instructions. Ms. Mari expressed understanding and agreement with this plan of care, her questions were answered, and she was discharged in stable condition.       0 minutes of critical care time    MEDICATIONS GIVEN IN THE EMERGENCY:  Medications   ondansetron (ZOFRAN) injection 4 mg (4 mg Intravenous Given 9/17/21 5032)   metoprolol succinate ER (TOPROL-XL) 24 hr tablet 100 mg (0 mg Oral Hold 9/18/21 0050)   piperacillin-tazobactam (ZOSYN) 3.375 g vial to attach to  mL bag (has no administration in time range)   ondansetron (ZOFRAN) injection 4 mg (has no administration in time range)   HYDROmorphone (DILAUDID) injection 0.2 mg (has no administration in time range)   sodium chloride 0.9% infusion ( Intravenous New  Bag 9/18/21 0238)   HYDROmorphone (DILAUDID) injection 0.5 mg (0.5 mg Intravenous Given 9/18/21 0243)   glucose gel 15-30 g (has no administration in time range)     Or   dextrose 50 % injection 25-50 mL (has no administration in time range)     Or   glucagon injection 1 mg (has no administration in time range)   insulin aspart (NovoLOG) injection (RAPID ACTING) (5 Units Subcutaneous Given 9/18/21 0206)   cloNIDine (CATAPRES-TTS) Patch in Place ( Transdermal Patch in Place 9/18/21 0211)   cloNIDine (CATAPRES-TTS3) 0.3 MG/24HR WK patch 1 patch (1 patch Transdermal Patch/Med Applied 9/18/21 0209)   insulin glargine (LANTUS PEN) injection 15 Units (15 Units Subcutaneous Given 9/18/21 0206)   labetalol (NORMODYNE/TRANDATE) injection 10 mg (has no administration in time range)   hydrALAZINE (APRESOLINE) injection 10 mg (has no administration in time range)   ondansetron (ZOFRAN) injection 4 mg (4 mg Intravenous Given 9/17/21 2239)   HYDROmorphone (DILAUDID) injection 0.2 mg (0.2 mg Intravenous Given 9/18/21 0020)   iopamidol (ISOVUE-370) solution 75 mL (75 mLs Intravenous Given 9/17/21 2346)   hydrALAZINE (APRESOLINE) injection 10 mg (10 mg Intravenous Given 9/18/21 0013)   losartan (COZAAR) tablet 100 mg (100 mg Oral Given 9/18/21 0050)   piperacillin-tazobactam (ZOSYN) 3.375 g vial to attach to  mL bag (0 g Intravenous Stopped 9/18/21 0213)       NEW PRESCRIPTIONS STARTED AT TODAY'S ER VISIT:  New Prescriptions    No medications on file       =================================================================    HPI    Patient information was obtained from: the patient    Use of : N/A        Paige Mari is a 71 year old female with a past medical history of hypertension, hyperlipidemia, diabetes mellitus type 2, diverticulitis, bowel obstruction, s/p bowel resection (2009), s/p appendectomy who presents by walk in with her  for the evaluation of abdominal pain. Patient reports developing  abdominal pain in the middle of her abdomen which wraps around into the left side since 11:00 AM (~12 hours ago) this morning. Patient is also feeling nausea and bloating. She states she is still able to pass some gas. Patient reports her last bowel movement was yesterday. Patient states her current symptoms feel like her previous episodes of diverticulitis. Patient claims she had a previous bowel resection in which she had 16-18 inches of her bowel removed.    REVIEW OF SYSTEMS   Review of Systems   Gastrointestinal: Positive for abdominal pain and nausea.        Positive for bloating.   All other systems reviewed and are negative.     PAST MEDICAL HISTORY:  History reviewed. No pertinent past medical history.    PAST SURGICAL HISTORY:  Past Surgical History:   Procedure Laterality Date     APPENDECTOMY      at age 3     ARTHROSCOPY KNEE WITH MENISCECTOMY  3/10/14    partial medial and lateral meniscectomies, subpatellar chondroplasty     BACK SURGERY       BLEPHAROPLASTY Bilateral 8/17/2015    Procedure: BLEPHAROPLASTY BILATERAL UPPER LIDS;  Surgeon: Chasidy Bryant MD;  Location: [facility] OR;  Service:      BOWEL RESECTION  12 years ago     C REMOVAL OF OVARY(S)      Description: Oophorectomy;  Recorded: 06/03/2010;     C REMOVAL OF OVARY(S)      Description: Oophorectomy;  Recorded: 06/03/2010;     C TOTAL ABDOM HYSTERECTOMY      Description: Hysterectomy;  Recorded: 06/03/2010;     C TOTAL ABDOM HYSTERECTOMY      Description: Hysterectomy;  Recorded: 06/03/2010;     C TOTAL KNEE ARTHROPLASTY Right 11/24/2014    Procedure: RIGHT TOTAL KNEE ARTHROPLASTY;  Surgeon: Jules Harris MD;  Location: [facility];  Service: Orthopedics     COLON SURGERY  2004    sigmoid colectomy     DILATION AND CURETTAGE      x3     HYSTERECTOMY      age 39     JOINT REPLACEMENT       OOPHORECTOMY       OTHER SURGICAL HISTORY  2005    bowel obstruction     CT ARTHRODESIS ANT INTERBODY MIN DISCECTOMY,LUMBAR N/A 1/30/2015     Procedure: ANTERIOR LUMBAR INTERBODY FUSION L4-5, INTERBODY GRAFT, BMP INSTRUMENTATION;  Surgeon: Zeeshan Guillaume MD;  Location: [facility];  Service: Spine     TOE SURGERY       TONSILLECTOMY & ADENOIDECTOMY      age 10       CURRENT MEDICATIONS:      Current Facility-Administered Medications:      cloNIDine (CATAPRES-TTS) Patch in Place, , Transdermal, Q8H, Amber Knight MD, MD     cloNIDine (CATAPRES-TTS3) 0.3 MG/24HR WK patch 1 patch, 1 patch, Transdermal, Weekly, Amber Knight MD, MD, 1 patch at 09/18/21 0209     glucose gel 15-30 g, 15-30 g, Oral, Q15 Min PRN **OR** dextrose 50 % injection 25-50 mL, 25-50 mL, Intravenous, Q15 Min PRN **OR** glucagon injection 1 mg, 1 mg, Subcutaneous, Q15 Min PRN, Amber Knight MD, MD     hydrALAZINE (APRESOLINE) injection 10 mg, 10 mg, Intravenous, Q6H PRN, Amber Knight MD, MD     HYDROmorphone (DILAUDID) injection 0.2 mg, 0.2 mg, Intravenous, Q2H PRN, Amber Knight MD, MD     HYDROmorphone (DILAUDID) injection 0.5 mg, 0.5 mg, Intravenous, Q15 Min PRN, Mike Ramos MD, 0.5 mg at 09/18/21 0243     insulin aspart (NovoLOG) injection (RAPID ACTING), 1-6 Units, Subcutaneous, Q4H, Amber Knight MD, MD, 5 Units at 09/18/21 0206     insulin glargine (LANTUS PEN) injection 15 Units, 15 Units, Subcutaneous, At Bedtime, Amber Knight MD, MD, 15 Units at 09/18/21 0206     labetalol (NORMODYNE/TRANDATE) injection 10 mg, 10 mg, Intravenous, Once, Mike Ramos MD     metoprolol succinate ER (TOPROL-XL) 24 hr tablet 100 mg, 100 mg, Oral, Once, Mike Ramos MD     ondansetron (ZOFRAN) injection 4 mg, 4 mg, Intravenous, Q6H PRN, Amber Knight MD, MD     ondansetron (ZOFRAN) injection 4 mg, 4 mg, Intravenous, Q30 Min PRN, Mike Ramos MD, 4 mg at 09/17/21 8620     piperacillin-tazobactam (ZOSYN) 3.375 g vial to attach to  mL bag, 3.375 g, Intravenous, Q8H, Amber Knight MD, MD     sodium  chloride 0.9% infusion, , Intravenous, Continuous, Amber Knight MD, MD, Last Rate: 75 mL/hr at 09/18/21 0238, New Bag at 09/18/21 0238    Current Outpatient Medications:      acetaminophen (TYLENOL) 325 MG tablet, [ACETAMINOPHEN (TYLENOL) 325 MG TABLET] Take 650 mg by mouth every 6 (six) hours as needed for pain., Disp: , Rfl:      amoxicillin (AMOXIL) 500 MG tablet, [AMOXICILLIN (AMOXIL) 500 MG TABLET] Take 500 mg by mouth as needed., Disp: , Rfl:      ARIPiprazole (ABILIFY) 2 MG tablet, [ARIPIPRAZOLE (ABILIFY) 2 MG TABLET] , Disp: , Rfl:      aspirin 81 MG EC tablet, [ASPIRIN 81 MG EC TABLET] Take 81 mg by mouth daily., Disp: , Rfl:      blood sugar diagnostic (CONTOUR TEST STRIPS) Strp, [BLOOD SUGAR DIAGNOSTIC (CONTOUR TEST STRIPS) STRP] Use 1 each As Directed daily. , Disp: , Rfl:      cetirizine (ZYRTEC) 10 MG tablet, [CETIRIZINE (ZYRTEC) 10 MG TABLET] Take 10 mg by mouth daily as needed (itching). , Disp: , Rfl:      cholecalciferol, vitamin D3, 5,000 unit Tab, [CHOLECALCIFEROL, VITAMIN D3, 5,000 UNIT TAB] Take 2,000 Units by mouth daily. , Disp: , Rfl:      citalopram (CELEXA) 20 MG tablet, [CITALOPRAM (CELEXA) 20 MG TABLET] Take 40 mg by mouth daily. , Disp: , Rfl:      cloNIDine (CATAPRES-TTS) 0.3 mg/24 hr, [CLONIDINE (CATAPRES-TTS) 0.3 MG/24 HR] once a week., Disp: , Rfl:      cloNIDine (CATAPRES-TTS3) 0.3 MG/24HR WK patch, once a week., Disp: , Rfl:      Continuous Blood Gluc  (DEXCOM G6 ) TORRIE, Use to read blood sugars as per 's instructions., Disp: 1 each, Rfl: 0     Continuous Blood Gluc Sensor (DEXCOM G6 SENSOR) MISC, Change every 10 days. (Patient not taking: Reported on 7/27/2021), Disp: 3 each, Rfl: 6     Continuous Blood Gluc Sensor (DEXCOM G6 SENSOR) MISC, Change every 10 days. (Patient not taking: Reported on 7/27/2021), Disp: 3 each, Rfl: 6     Continuous Blood Gluc Transmit (DEXCOM G6 TRANSMITTER) MISC, Change every 3 months. (Patient not taking: Reported on  "7/27/2021), Disp: 1 each, Rfl: 2     Continuous Blood Gluc Transmit (DEXCOM G6 TRANSMITTER) MISC, Change every 3 months. (Patient not taking: Reported on 7/27/2021), Disp: 1 each, Rfl: 2     diclofenac sodium (VOLTAREN) 1 % Gel, [DICLOFENAC SODIUM (VOLTAREN) 1 % GEL] Apply 1 g topically daily as needed., Disp: , Rfl:      estradiol (ESTRACE) 2 MG tablet, [ESTRADIOL (ESTRACE) 2 MG TABLET] Take 1 mg by mouth daily. Takes 1/2 tab = 1mg, Disp: , Rfl:      insulin aspart (NOVOLOG) 100 unit/mL injection, [INSULIN ASPART (NOVOLOG) 100 UNIT/ML INJECTION] Inject 4-5 Units under the skin 3 (three) times a day with meals. , Disp: , Rfl:      insulin detemir U-100 (LEVEMIR FLEXPEN) 100 unit/mL (3 mL) pen, [INSULIN DETEMIR U-100 (LEVEMIR FLEXPEN) 100 UNIT/ML (3 ML) PEN] Inject 30 Units under the skin at bedtime. , Disp: , Rfl:      liraglutide (VICTOZA 2-TANNER) 0.6 mg/0.1 mL (18 mg/3 mL) PnIj injection, [LIRAGLUTIDE (VICTOZA 2-TANNER) 0.6 MG/0.1 ML (18 MG/3 ML) PNIJ INJECTION] Inject 1.2 mg under the skin daily. Not available from inpatient hospital pharmacy - OK for patient to use own supply, Disp: , Rfl:      magnesium oxide 200 mg magnesium tablet, [MAGNESIUM OXIDE 200 MG MAGNESIUM TABLET] Take 400 mg by mouth daily.  (Patient not taking: Reported on 7/27/2021), Disp: , Rfl:      medical cannabis (Patient's own supply), [MEDICAL CANNABIS (PATIENT'S OWN SUPPLY)] by Other route see administration instructions. (The purpose of this order is to document that the patient reports taking medical cannabis. This is not a prescription, and is not used to certify that the patient has a  qualifying medical condition.), Disp: , Rfl:      metoprolol succinate (TOPROL-XL) 100 MG 24 hr tablet, [METOPROLOL SUCCINATE (TOPROL-XL) 100 MG 24 HR TABLET] Take 2 tablets by mouth 2 (two) times a day., Disp: , Rfl:      MICRO THIN LANCETS MISC, [MICRO THIN LANCETS MISC] Use As Directed., Disp: , Rfl:      NOVOFINE 32 32 gauge x 1/4\" Ndle, [NOVOFINE 32 32 " "GAUGE X 1/4\" NDLE] USE TO TEST TWICE A DAY, Disp: , Rfl: 3     olmesartan (BENICAR) 40 MG tablet, [OLMESARTAN (BENICAR) 40 MG TABLET] Take 40 mg by mouth daily., Disp: , Rfl:      omeprazole (PRILOSEC) 20 MG capsule, [OMEPRAZOLE (PRILOSEC) 20 MG CAPSULE] Take 1 capsule by mouth daily before breakfast., Disp: , Rfl:      pregabalin (LYRICA) 100 MG capsule, [PREGABALIN (LYRICA) 100 MG CAPSULE] Take 100 mg by mouth 2 (two) times a day., Disp: , Rfl:      sucralfate (CARAFATE) 1 GM tablet, Take 1 tablet by mouth 4 times daily, Disp: , Rfl:      tiZANidine (ZANAFLEX) 2 MG tablet, [TIZANIDINE (ZANAFLEX) 2 MG TABLET] Take 4 mg by mouth bedtime. , Disp: , Rfl:      traMADol (ULTRAM) 50 mg tablet, [TRAMADOL (ULTRAM) 50 MG TABLET] Take 100 mg by mouth 2 (two) times a day. , Disp: , Rfl:      UNABLE TO FIND, [UNABLE TO FIND] Med Name: (Patient not taking: Reported on 7/27/2021), Disp: , Rfl:      valACYclovir (VALTREX) 1000 MG tablet, [VALACYCLOVIR (VALTREX) 1000 MG TABLET] Take 1,000 mg by mouth daily. , Disp: , Rfl:     ALLERGIES:  Allergies   Allergen Reactions     Morphine Other (See Comments)     \"make me delirious,\"  \"nightmares\"     Adhesive [Mecrylate] Other (See Comments)     Can only tolerate tape for short periods of time shelia in sensitive areas     Amlodipine Unknown     Amlodipine Besylate [Amlodipine] Other (See Comments)     Doxazosin Mesylate [Doxazosin] Other (See Comments)     Irbesartan-Hydrochlorothiazide [Avalide] Other (See Comments)     Other Allergy (See Comments) [External Allergen Needs Reconciliation - See Comment] Unknown     Other reaction(s): skin rash     Prednisone Unknown     Caused extremely high blood sugars     Trazodone Unknown     Other reaction(s): hung over       FAMILY HISTORY:  Family History   Problem Relation Age of Onset     Breast Cancer No family hx of        SOCIAL HISTORY:   Social History     Socioeconomic History     Marital status:      Spouse name: Not on file     " "Number of children: Not on file     Years of education: Not on file     Highest education level: Not on file   Occupational History     Not on file   Tobacco Use     Smoking status: Former Smoker     Quit date: 1/1/2018     Years since quitting: 3.7     Smokeless tobacco: Never Used   Substance and Sexual Activity     Alcohol use: No     Drug use: No     Sexual activity: Not on file   Other Topics Concern     Not on file   Social History Narrative     Not on file     Social Determinants of Health     Financial Resource Strain:      Difficulty of Paying Living Expenses:    Food Insecurity:      Worried About Running Out of Food in the Last Year:      Ran Out of Food in the Last Year:    Transportation Needs:      Lack of Transportation (Medical):      Lack of Transportation (Non-Medical):    Physical Activity:      Days of Exercise per Week:      Minutes of Exercise per Session:    Stress:      Feeling of Stress :    Social Connections:      Frequency of Communication with Friends and Family:      Frequency of Social Gatherings with Friends and Family:      Attends Muslim Services:      Active Member of Clubs or Organizations:      Attends Club or Organization Meetings:      Marital Status:    Intimate Partner Violence:      Fear of Current or Ex-Partner:      Emotionally Abused:      Physically Abused:      Sexually Abused:      PHYSICAL EXAM:    Vitals: BP (!) 159/89   Pulse 90   Temp 97  F (36.1  C)   Resp 16   Ht 1.575 m (5' 2\")   Wt 75.8 kg (167 lb)   SpO2 96%   BMI 30.54 kg/m     Physical Exam  Vitals and nursing note reviewed.   Constitutional:       General: She is not in acute distress.     Appearance: She is well-developed and normal weight. She is not ill-appearing or toxic-appearing.   HENT:      Head: Normocephalic.   Cardiovascular:      Rate and Rhythm: Normal rate and regular rhythm.      Heart sounds: Normal heart sounds.   Pulmonary:      Effort: Pulmonary effort is normal. No respiratory " distress.      Breath sounds: Normal breath sounds.   Abdominal:      General: A surgical scar is present. Bowel sounds are increased. There is distension.      Palpations: Abdomen is soft. There is no fluid wave or splenomegaly.      Tenderness: There is generalized abdominal tenderness and tenderness in the right upper quadrant, right lower quadrant and periumbilical area. There is no right CVA tenderness or left CVA tenderness.      Hernia: No hernia is present.      Comments: Midline well-healed scar.   Skin:     General: Skin is warm and dry.      Coloration: Skin is not jaundiced or pale.   Neurological:      General: No focal deficit present.      Mental Status: She is alert.   Psychiatric:         Mood and Affect: Mood normal.           LAB:  All pertinent labs reviewed and interpreted.  Labs Ordered and Resulted from Time of ED Arrival Up to the Time of Departure from the ED   COMPREHENSIVE METABOLIC PANEL - Abnormal; Notable for the following components:       Result Value    Creatinine 1.13 (*)     Glucose 389 (*)     GFR Estimate 49 (*)     All other components within normal limits   LACTIC ACID WHOLE BLOOD - Abnormal; Notable for the following components:    Lactic Acid 2.5 (*)     All other components within normal limits   CBC WITH PLATELETS AND DIFFERENTIAL - Abnormal; Notable for the following components:    WBC Count 18.7 (*)     Hemoglobin 16.7 (*)     Hematocrit 49.1 (*)     MCH 33.3 (*)     Absolute Neutrophils 14.9 (*)     Absolute Immature Granulocytes 0.1 (*)     All other components within normal limits   HEMOGLOBIN A1C - Abnormal; Notable for the following components:    Hemoglobin A1C 8.0 (*)     All other components within normal limits   GLUCOSE BY METER - Abnormal; Notable for the following components:    GLUCOSE BY METER POCT 370 (*)     All other components within normal limits   UA MACROSCOPIC WITH REFLEX TO MICRO AND CULTURE - Abnormal; Notable for the following components:     Glucose Urine >1000 (*)     Ketones Urine Trace (*)     All other components within normal limits    Narrative:     Microscopic not indicated   TROPONIN I - Normal   COVID-19 VIRUS (CORONAVIRUS) BY PCR - Normal    Narrative:     Testing was performed using the racheal  SARS-CoV-2 & Influenza A/B Assay on the racheal  Stella  System.  This test should be ordered for the detection of SARS-COV-2 in individuals who meet SARS-CoV-2 clinical and/or epidemiological criteria. Test performance is unknown in asymptomatic patients.  This test is for in vitro diagnostic use under the FDA EUA for laboratories certified under CLIA to perform moderate and/or high complexity testing. This test has not been FDA cleared or approved.  A negative test does not rule out the presence of PCR inhibitors in the specimen or target RNA in concentration below the limit of detection for the assay. The possibility of a false negative should be considered if the patient's recent exposure or clinical presentation suggests COVID-19.  [facility] are certified under the Clinical Laboratory Improvement Amendments of 1988 (CLIA-88) as qualified to perform moderate and/or high complexity laboratory testing.   GLUCOSE MONITOR NURSING POCT   GLUCOSE MONITOR NURSING POCT   CALL   CALL   NASOGASTRIC TUBE DECOMPRESSION   IP CARBOHYDRATE COUNTING BY NURSING   PATIENT EDUCATION   ASSESS FOR HYPOGLYCEMIA SYMPTOMS   NOTIFY PHYSICIAN   CBC WITH PLATELETS & DIFFERENTIAL    Narrative:     The following orders were created for panel order CBC with platelets + differential.  Procedure                               Abnormality         Status                     ---------                               -----------         ------                     CBC with platelets and d...[[id number]]  Abnormal            Final result                 Please view results for these tests on the individual orders.     RADIOLOGY:  CT Abdomen Pelvis w Contrast   Final Result    IMPRESSION:    1.  Small bowel obstruction.   2.  Difficult to exclude pneumatosis of small bowel in the right lower quadrant. Correlation with lactate recommended.      3.  Results called to Dr. Mike Ramos at 0015        EKG:   Performed at: 10:17 PM on September 17, 2021.  Impression: Normal sinus rhythm with left bundle branch block, no signs of acute ST elevation or irregular rhythm.  Rate: 79 bpm  Rhythm: Normal sinus rhythm  QRS Interval: 130 ms  QTc Interval: 463 ms  Comparison: Comparison a prior EKG from May 14, 2015 with change in QRS to the left bundle branch block and left axis deviation.  I have independently reviewed and interpreted the EKG(s) documented above.     PROCEDURES:   Procedures       I, Anibal Nichols, am serving as a scribe to document services personally performed by Dr. Mike Ramos  based on my observation and the provider's statements to me. I, Mike Ramos MD attest that Anibal Nichols is acting in a scribe capacity, has observed my performance of the services and has documented them in accordance with my direction.      Mike Ramos M.D.  Emergency Medicine  [facility] EMERGENCY DEPARTMENT  [facility] [facility] [address]  [phone]  Dept: [phone]     Mike Ramos MD  09/18/21 9060

## 2021-09-18 NOTE — ED NOTES
Paged Dr Knight regarding pt's  and order timing for Hydralazine. Last gave at 0030. Ok per Dr Knight to give PRN dose of hydralazine now.

## 2021-09-18 NOTE — CONSULTS
General surgery consult         ASSESSMENT     1. Small bowel obstruction (H)    2. Pneumatosis intestinalis    71-year-old patient that presents with abdominal pain and on CT she is found to have a colon full of stool as well as dilation of the small intestine raising a question of a small bowel obstruction.  I do have some concerns with the patient's tachycardia and abdominal exam.  Still I would like to decompress with an NG tube and assess with a Gastrografin small bowel follow-through.         PLAN      I would treat this patient with conservative management of bowel rest and do a Gastrografin small bowel follow-through.  If the contrast moves through her small bowel the Gastrografin will help move the stool through her colon.  If the contrast does not move through the small intestine then we know this small bowel obstruction issue will need to be addressed surgically.         CHIEF COMPLAINT     Chief Complaint   Patient presents with     Abdominal Pain         HPI     Paige Mari is a 71 year old female who presents to [facility] emergency department with abdominal pain through her mid abdomen and going over to the left side.  The patient complained of nausea and feeling bloated.  She has continued to pass gas and had her last bowel movement yesterday.  Patient had a CT scan which shows considerable and amount of stool in the colon and some dilation of the small intestine.  Surgery was consulted for evaluation of a small bowel obstruction.    The patient's past medical history significant for hypertension, hyperlipidemia, diabetes, history of diverticulitis, the patient is status post a bowel obstruction for which she had a bowel resection in 2009, status post appendectomy.         PAST MEDICAL HISTORY SURGICAL HISTORY     History reviewed. No pertinent past medical history. Past Surgical History:   Procedure Laterality Date     APPENDECTOMY      at age 3     ARTHROSCOPY KNEE WITH MENISCECTOMY  3/10/14     partial medial and lateral meniscectomies, subpatellar chondroplasty     BACK SURGERY       BLEPHAROPLASTY Bilateral 8/17/2015    Procedure: BLEPHAROPLASTY BILATERAL UPPER LIDS;  Surgeon: Chasidy Bryant MD;  Location: [facility] OR;  Service:      BOWEL RESECTION  12 years ago     C REMOVAL OF OVARY(S)      Description: Oophorectomy;  Recorded: 06/03/2010;     C REMOVAL OF OVARY(S)      Description: Oophorectomy;  Recorded: 06/03/2010;     C TOTAL ABDOM HYSTERECTOMY      Description: Hysterectomy;  Recorded: 06/03/2010;     C TOTAL ABDOM HYSTERECTOMY      Description: Hysterectomy;  Recorded: 06/03/2010;     C TOTAL KNEE ARTHROPLASTY Right 11/24/2014    Procedure: RIGHT TOTAL KNEE ARTHROPLASTY;  Surgeon: Jules Harris MD;  Location: [facility];  Service: Orthopedics     COLON SURGERY  2004    sigmoid colectomy     DILATION AND CURETTAGE      x3     HYSTERECTOMY      age 39     JOINT REPLACEMENT       OOPHORECTOMY       OTHER SURGICAL HISTORY  2005    bowel obstruction     NJ ARTHRODESIS ANT INTERBODY MIN DISCECTOMY,LUMBAR N/A 1/30/2015    Procedure: ANTERIOR LUMBAR INTERBODY FUSION L4-5, INTERBODY GRAFT, BMP INSTRUMENTATION;  Surgeon: Zeeshan Guillaume MD;  Location: [facility];  Service: Spine     TOE SURGERY       TONSILLECTOMY & ADENOIDECTOMY      age 10          CURRENT MEDICATIONS ALLERGIES     Current Outpatient Rx   Medication Sig Dispense Refill     acetaminophen (TYLENOL) 325 MG tablet [ACETAMINOPHEN (TYLENOL) 325 MG TABLET] Take 650 mg by mouth every 6 (six) hours as needed for pain.       amoxicillin (AMOXIL) 500 MG tablet Take 500 mg by mouth as needed Before dental appointments       ARIPiprazole (ABILIFY) 2 MG tablet Take 2 mg by mouth daily        aspirin 81 MG EC tablet [ASPIRIN 81 MG EC TABLET] Take 81 mg by mouth daily.       cetirizine (ZYRTEC) 10 MG tablet [CETIRIZINE (ZYRTEC) 10 MG TABLET] Take 10 mg by mouth daily as needed (itching).        cholecalciferol, vitamin  D3, 5,000 unit Tab [CHOLECALCIFEROL, VITAMIN D3, 5,000 UNIT TAB] Take 2,000 Units by mouth daily.        cloNIDine (CATAPRES-TTS) 0.3 mg/24 hr Place 1 patch onto the skin once a week        diclofenac sodium (VOLTAREN) 1 % Gel [DICLOFENAC SODIUM (VOLTAREN) 1 % GEL] Apply 1 g topically daily as needed.       estradiol (ESTRACE) 1 MG tablet Take 1 mg by mouth daily        insulin aspart (NOVOLOG) 100 unit/mL injection Inject 6-15 Units Subcutaneous 3 times daily (with meals)        insulin detemir U-100 (LEVEMIR FLEXPEN) 100 unit/mL (3 mL) pen Inject 20 Units Subcutaneous At Bedtime        liraglutide (VICTOZA 2-TANNER) 0.6 mg/0.1 mL (18 mg/3 mL) PnIj injection [LIRAGLUTIDE (VICTOZA 2-TANNER) 0.6 MG/0.1 ML (18 MG/3 ML) PNIJ INJECTION] Inject 1.2 mg under the skin daily. Not available from inpatient hospital pharmacy - OK for patient to use own supply       medical cannabis (Patient's own supply) 1 Dose by Other route See Admin Instructions [employer] Vape- daily at bedtime and as needed for pain       metoprolol succinate (TOPROL-XL) 100 MG 24 hr tablet Take 200 mg by mouth daily before breakfast        metoprolol succinate ER (TOPROL-XL) 100 MG 24 hr tablet Take 100 mg by mouth daily       olmesartan (BENICAR) 40 MG tablet [OLMESARTAN (BENICAR) 40 MG TABLET] Take 40 mg by mouth daily.       omeprazole (PRILOSEC) 20 MG capsule [OMEPRAZOLE (PRILOSEC) 20 MG CAPSULE] Take 1 capsule by mouth daily before breakfast.       pregabalin (LYRICA) 100 MG capsule [PREGABALIN (LYRICA) 100 MG CAPSULE] Take 100 mg by mouth 2 (two) times a day.       sucralfate (CARAFATE) 1 GM tablet Take 1 tablet by mouth 4 times daily as needed        tiotropium (SPIRIVA HANDIHALER) 18 MCG inhaled capsule Inhale 18 mcg into the lungs daily       tiZANidine (ZANAFLEX) 2 MG tablet [TIZANIDINE (ZANAFLEX) 2 MG TABLET] Take 4 mg by mouth bedtime.        traMADol (ULTRAM) 50 mg tablet [TRAMADOL (ULTRAM) 50 MG TABLET] Take 100 mg by mouth 2 (two) times  "a day.        valACYclovir (VALTREX) 500 MG tablet Take 500 mg by mouth daily        blood sugar diagnostic (CONTOUR TEST STRIPS) Strp [BLOOD SUGAR DIAGNOSTIC (CONTOUR TEST STRIPS) STRP] Use 1 each As Directed daily.        Continuous Blood Gluc  (DEXCOM G6 ) TORRIE Use to read blood sugars as per 's instructions. 1 each 0     Continuous Blood Gluc Sensor (DEXCOM G6 SENSOR) MISC Change every 10 days. (Patient not taking: Reported on 7/27/2021) 3 each 6     Continuous Blood Gluc Sensor (DEXCOM G6 SENSOR) MISC Change every 10 days. (Patient not taking: Reported on 7/27/2021) 3 each 6     Continuous Blood Gluc Transmit (DEXCOM G6 TRANSMITTER) MISC Change every 3 months. (Patient not taking: Reported on 7/27/2021) 1 each 2     Continuous Blood Gluc Transmit (DEXCOM G6 TRANSMITTER) MISC Change every 3 months. (Patient not taking: Reported on 7/27/2021) 1 each 2     MICRO THIN LANCETS MISC [MICRO THIN LANCETS MISC] Use As Directed.       NOVOFINE 32 32 gauge x 1/4\" Ndle [NOVOFINE 32 32 GAUGE X 1/4\" NDLE] USE TO TEST TWICE A DAY  3    Allergies   Allergen Reactions     Morphine Other (See Comments)     \"make me delirious,\"  \"nightmares\"     Adhesive [Mecrylate] Other (See Comments)     Can only tolerate tape for short periods of time shelia in sensitive areas     Amlodipine Unknown     Amlodipine Besylate [Amlodipine] Other (See Comments)     Doxazosin Mesylate [Doxazosin] Other (See Comments)     Irbesartan-Hydrochlorothiazide [Avalide] Other (See Comments)     Other Allergy (See Comments) [External Allergen Needs Reconciliation - See Comment] Unknown     Other reaction(s): skin rash     Prednisone Unknown     Caused extremely high blood sugars     Trazodone Unknown     Other reaction(s): hung over          FAMILY HISTORY     Family History   Problem Relation Age of Onset     Breast Cancer No family hx of          SOCIAL HISTORY     Social History     Socioeconomic History     Marital status: " "     Spouse name: None     Number of children: None     Years of education: None     Highest education level: None   Occupational History     None   Tobacco Use     Smoking status: Former Smoker     Quit date: 1/1/2018     Years since quitting: 3.7     Smokeless tobacco: Never Used   Substance and Sexual Activity     Alcohol use: No     Drug use: No     Sexual activity: None   Other Topics Concern     None   Social History Narrative     None     Social Determinants of Health     Financial Resource Strain:      Difficulty of Paying Living Expenses:    Food Insecurity:      Worried About Running Out of Food in the Last Year:      Ran Out of Food in the Last Year:    Transportation Needs:      Lack of Transportation (Medical):      Lack of Transportation (Non-Medical):    Physical Activity:      Days of Exercise per Week:      Minutes of Exercise per Session:    Stress:      Feeling of Stress :    Social Connections:      Frequency of Communication with Friends and Family:      Frequency of Social Gatherings with Friends and Family:      Attends Orthodox Services:      Active Member of Clubs or Organizations:      Attends Club or Organization Meetings:      Marital Status:    Intimate Partner Violence:      Fear of Current or Ex-Partner:      Emotionally Abused:      Physically Abused:      Sexually Abused:          REVIEW OF SYSTEMS       12 point review of systems are unremarkable except for the symptoms described in the HPI    PHYSICAL EXAM     VITAL SIGNS: BP (!) 180/86   Pulse 93   Temp 98.4  F (36.9  C) (Oral)   Resp 16   Ht 1.575 m (5' 2\")   Wt 75.8 kg (167 lb)   SpO2 94%   BMI 30.54 kg/m     General : Alert, cooperative, appears stated age, she is uncomfortable  Skin: Skin color, texture, turgor normal, no rashes or lesions   Lymph nodes: No obvious adenopathy   Head: Normocephalic, without obvious abnormality, atraumatic   HEENT: PERRL, conjunctiva/corneas clear, EOM's intact, no scleral icterus "   Throat: Lips, mucosa, and tongue normal; teeth and gums normal    Neck: Supple, thyroid not enlarged   Back: No CVA tenderness   Lungs: Clear to auscultation bilaterally, respirations unlabored, no rales, rhonchi or wheezes   Chest Wall:No tenderness or deformity   Heart: Regular rate and rhythm, S1, S2 normal, no murmur, rub or gallop   Abdomen: Soft, tender to palpation with some guarding,   Extremities : No obvious swelling,  Neurologic: Cranial Nerves II-XII normal, moves all extremities equally, no focal findings          RADIOLOGY      CT Abdomen Pelvis w Contrast   Final Result   IMPRESSION:    1.  Small bowel obstruction.   2.  Difficult to exclude pneumatosis of small bowel in the right lower quadrant. Correlation with lactate recommended.      3.  Results called to Dr. Mike Ramos at 0015          EKG     Reviewed        LABS     Results for orders placed or performed during the hospital encounter of 09/17/21   CT Abdomen Pelvis w Contrast    Impression    IMPRESSION:   1.  Small bowel obstruction.  2.  Difficult to exclude pneumatosis of small bowel in the right lower quadrant. Correlation with lactate recommended.    3.  Results called to Dr. Mike Ramos at 0015   Comprehensive metabolic panel   Result Value Ref Range    Sodium 138 136 - 145 mmol/L    Potassium 4.6 3.5 - 5.0 mmol/L    Chloride 100 98 - 107 mmol/L    Carbon Dioxide (CO2) 25 22 - 31 mmol/L    Anion Gap 13 5 - 18 mmol/L    Urea Nitrogen 16 8 - 28 mg/dL    Creatinine 1.13 (H) 0.60 - 1.10 mg/dL    Calcium 9.6 8.5 - 10.5 mg/dL    Glucose 389 (H) 70 - 125 mg/dL    Alkaline Phosphatase 108 45 - 120 U/L    AST 28 0 - 40 U/L    ALT 26 0 - 45 U/L    Protein Total 7.3 6.0 - 8.0 g/dL    Albumin 4.0 3.5 - 5.0 g/dL    Bilirubin Total 0.7 0.0 - 1.0 mg/dL    GFR Estimate 49 (L) >60 mL/min/1.73m2   Lactic acid whole blood   Result Value Ref Range    Lactic Acid 2.5 (H) 0.7 - 2.0 mmol/L   Troponin I (now)   Result Value Ref Range    Troponin I  0.01 0.00 - 0.29 ng/mL   CBC with platelets and differential   Result Value Ref Range    WBC Count 18.7 (H) 4.0 - 11.0 10e3/uL    RBC Count 5.02 3.80 - 5.20 10e6/uL    Hemoglobin 16.7 (H) 11.7 - 15.7 g/dL    Hematocrit 49.1 (H) 35.0 - 47.0 %    MCV 98 78 - 100 fL    MCH 33.3 (H) 26.5 - 33.0 pg    MCHC 34.0 31.5 - 36.5 g/dL    RDW 12.7 10.0 - 15.0 %    Platelet Count 294 150 - 450 10e3/uL    % Neutrophils 80 %    % Lymphocytes 13 %    % Monocytes 6 %    % Eosinophils 1 %    % Basophils 0 %    % Immature Granulocytes 0 %    NRBCs per 100 WBC 0 <1 /100    Absolute Neutrophils 14.9 (H) 1.6 - 8.3 10e3/uL    Absolute Lymphocytes 2.5 0.8 - 5.3 10e3/uL    Absolute Monocytes 1.1 0.0 - 1.3 10e3/uL    Absolute Eosinophils 0.2 0.0 - 0.7 10e3/uL    Absolute Basophils 0.1 0.0 - 0.2 10e3/uL    Absolute Immature Granulocytes 0.1 (H) <=0.0 10e3/uL    Absolute NRBCs 0.0 10e3/uL   Asymptomatic COVID-19 Virus (Coronavirus) by PCR Nasopharyngeal    Specimen: Nasopharyngeal; Swab   Result Value Ref Range    SARS CoV2 PCR Negative Negative   Result Value Ref Range    Hemoglobin A1C 8.0 (H) <=5.6 %   Glucose by meter   Result Value Ref Range    GLUCOSE BY METER POCT 370 (H) 70 - 99 mg/dL   UA reflex to Microscopic and Culture    Specimen: Urine, Clean Catch   Result Value Ref Range    Color Urine Colorless Colorless, Straw, Light Yellow, Yellow    Appearance Urine Clear Clear    Glucose Urine >1000 (A) Negative mg/dL    Bilirubin Urine Negative Negative    Ketones Urine Trace (A) Negative mg/dL    Specific Gravity Urine 1.023 1.001 - 1.030    Blood Urine Negative Negative    pH Urine 7.0 5.0 - 7.0    Protein Albumin Urine Negative Negative mg/dL    Urobilinogen Urine <2.0 <2.0 mg/dL    Nitrite Urine Negative Negative    Leukocyte Esterase Urine Negative Negative   Lactic acid whole blood   Result Value Ref Range    Lactic Acid 2.6 (H) 0.7 - 2.0 mmol/L   Glucose by meter   Result Value Ref Range    GLUCOSE BY METER POCT 295 (H) 70 - 99  mg/dL   Glucose by meter   Result Value Ref Range    GLUCOSE BY METER POCT 314 (H) 70 - 99 mg/dL           [facility]  [phone]

## 2021-09-18 NOTE — ED NOTES
Pt reports abdomen feels less bloated and uncomfortable. Pt reports pain level is improved since NG and dilaudid.

## 2021-09-18 NOTE — H&P
Admission History and Physical   Paige Mari,  1950, MRN [id number]    [facility]  No admission diagnoses are documented for this encounter.    PCP: Liam Canada, [phone]   Code status:  No Order       Extended Emergency Contact Information  Primary Emergency Contact: Liam Mari  Address: [address] [place] [address] [facility]  Home Phone: [phone]  Relation: Spouse  Secondary Emergency Contact: Merline Kaye   [facility]  Home Phone: [phone]  Relation: Other       Assessment and Plan   Paige Mari is a 71 year old female presents with     Abdominal pain  Acute SBO  -cannot r/o pneumatosis of small bowel in the right lower quadrant  -ngt, npo, ivf, pain control  -Zosyn iv  -surgical consultation    Lactic acidosis  -ivf  -recheck in am    DM 2  -uncontrolled with hyperglycemia  -on insulin chronically  -lantus  -novolog q 4h prn  -ivf    Accelerated htn  -pain control  -hydralazine prn    DVT Prophylaxis: scd  Diet: None     Central Lines: None     Gomes Catheter: Not present       Disposition: Admitted inpatient. Anticipated Length of Stay in midnights (including a midnight in the Emergency Department after triage if applicable) is more than 2 nights          Chief Complaint: abd pain     HPI:    Paige Mari is a 71 year old old female with a past medical history of hypertension, hyperlipidemia, diabetes mellitus type 2, diverticulitis, bowel obstruction, s/p bowel resection (), s/p appendectomy who presents by walk in with her  for the evaluation of abdominal pain. Patient reports developing abdominal pain in the middle of her abdomen which wraps around into the left side since 11:00 AM (~12 hours ago) this morning. Patient is also feeling nausea and bloating. She states she is still able to pass some gas. Patient reports her last bowel movement was yesterday. Patient states her current symptoms feel like her  "previous episodes of diverticulitis.     H/o surgery for diverticulitis and sbo. This is her 2nd episodes of sbo.     History was obtained from pt/chart review.      Medical History  History reviewed. No pertinent past medical history.     Surgical History  She  has a past surgical history that includes TOTAL ABDOM HYSTERECTOMY; REMOVAL OF OVARY(S); appendectomy; Bowel Resection (12 years ago); Dilation and curettage; tonsillectomy & adenoidectomy; other surgical history (2005); Arthroscopy Knee With Meniscectomy (3/10/14); TOTAL KNEE ARTHROPLASTY (Right, 11/24/2014); TOTAL ABDOM HYSTERECTOMY; REMOVAL OF OVARY(S); joint replacement; Pr Arthrodesis Ant Interbody Min Discectomy,Lumbar (N/A, 1/30/2015); Oophorectomy; Hysterectomy; Colon surgery (2004); Blepharoplasty (Bilateral, 8/17/2015); back surgery; and Toe Surgery.       Social History  Reviewed, and she  reports that she quit smoking about 3 years ago. She has never used smokeless tobacco. She reports that she does not drink alcohol and does not use drugs.       Allergies  Allergies   Allergen Reactions     Morphine Other (See Comments)     \"make me delirious,\"  \"nightmares\"     Adhesive [Mecrylate] Other (See Comments)     Can only tolerate tape for short periods of time shelia in sensitive areas     Amlodipine Unknown     Amlodipine Besylate [Amlodipine] Other (See Comments)     Doxazosin Mesylate [Doxazosin] Other (See Comments)     Irbesartan-Hydrochlorothiazide [Avalide] Other (See Comments)     Other Allergy (See Comments) [External Allergen Needs Reconciliation - See Comment] Unknown     Other reaction(s): skin rash     Prednisone Unknown     Caused extremely high blood sugars     Trazodone Unknown     Other reaction(s): hung over    Family History  Reviewed, and family history is not on file.  Not pertinent to chief complaints or current medical problems.        Prior to Admission Medications   (Not in a hospital admission)    Await review     Review of " "Systems:  A 12 point comprehensive review of systems was negative except as noted.    ROS  Physical Exam:  Temp:  [97  F (36.1  C)] 97  F (36.1  C)  Pulse:  [73-82] 80  Resp:  [16] 16  BP: (191-246)/(102-132) 191/102  SpO2:  [95 %-98 %] 97 %    BP (!) 191/102   Pulse 80   Temp 97  F (36.1  C)   Resp 16   Ht 1.575 m (5' 2\")   Wt 75.8 kg (167 lb)   SpO2 97%   BMI 30.54 kg/m      Physical Exam  Vitals and nursing note reviewed.   Constitutional:       General: She is not in acute distress.     Appearance: She is obese. She is not ill-appearing or toxic-appearing.   HENT:      Head: Normocephalic and atraumatic.      Right Ear: External ear normal.      Left Ear: External ear normal.      Nose: Nose normal.      Mouth/Throat:      Mouth: Mucous membranes are moist.   Eyes:      General: No scleral icterus.        Right eye: No discharge.         Left eye: No discharge.      Extraocular Movements: Extraocular movements intact.      Conjunctiva/sclera: Conjunctivae normal.      Pupils: Pupils are equal, round, and reactive to light.   Cardiovascular:      Rate and Rhythm: Normal rate and regular rhythm.      Pulses: Normal pulses.      Heart sounds: No murmur heard.     Pulmonary:      Effort: Pulmonary effort is normal. No respiratory distress.      Breath sounds: Normal breath sounds.   Abdominal:      General: There is distension.      Tenderness: There is abdominal tenderness.      Comments: Ngt in place  bs diminished   Musculoskeletal:      Cervical back: Normal range of motion and neck supple. No rigidity.      Right lower leg: No edema.      Left lower leg: No edema.   Skin:     General: Skin is warm and dry.      Coloration: Skin is not jaundiced.   Neurological:      General: No focal deficit present.      Mental Status: She is alert. Mental status is at baseline.      Cranial Nerves: No cranial nerve deficit.   Psychiatric:         Mood and Affect: Mood normal.               Pertinent Labs  Lab Results: " personally reviewed.   Results for KRYSTEN LONDON (MRN [id number]) as of 9/18/2021 00:43   Ref. Range 9/17/2021 22:40   Sodium Latest Ref Range: 136 - 145 mmol/L 138   Potassium Latest Ref Range: 3.5 - 5.0 mmol/L 4.6   Chloride Latest Ref Range: 98 - 107 mmol/L 100   Carbon Dioxide Latest Ref Range: 22 - 31 mmol/L 25   Urea Nitrogen Latest Ref Range: 8 - 28 mg/dL 16   Creatinine Latest Ref Range: 0.60 - 1.10 mg/dL 1.13 (H)   GFR Estimate Latest Ref Range: >60 mL/min/1.73m2 49 (L)   Calcium Latest Ref Range: 8.5 - 10.5 mg/dL 9.6   Anion Gap Latest Ref Range: 5 - 18 mmol/L 13   Albumin Latest Ref Range: 3.5 - 5.0 g/dL 4.0   Protein Total Latest Ref Range: 6.0 - 8.0 g/dL 7.3   Bilirubin Total Latest Ref Range: 0.0 - 1.0 mg/dL 0.7   Alkaline Phosphatase Latest Ref Range: 45 - 120 U/L 108   ALT Latest Ref Range: 0 - 45 U/L 26   AST Latest Ref Range: 0 - 40 U/L 28   Lactic Acid Latest Ref Range: 0.7 - 2.0 mmol/L 2.5 (H)   Troponin I Latest Ref Range: 0.00 - 0.29 ng/mL 0.01   Glucose Latest Ref Range: 70 - 125 mg/dL 389 (H)   WBC Latest Ref Range: 4.0 - 11.0 10e3/uL 18.7 (H)   Hemoglobin Latest Ref Range: 11.7 - 15.7 g/dL 16.7 (H)   Hematocrit Latest Ref Range: 35.0 - 47.0 % 49.1 (H)   Platelet Count Latest Ref Range: 150 - 450 10e3/uL 294   RBC Count Latest Ref Range: 3.80 - 5.20 10e6/uL 5.02   MCV Latest Ref Range: 78 - 100 fL 98   MCH Latest Ref Range: 26.5 - 33.0 pg 33.3 (H)   MCHC Latest Ref Range: 31.5 - 36.5 g/dL 34.0   RDW Latest Ref Range: 10.0 - 15.0 % 12.7   % Neutrophils Latest Units: % 80   % Lymphocytes Latest Units: % 13   % Monocytes Latest Units: % 6   % Eosinophils Latest Units: % 1   Absolute Basophils Latest Ref Range: 0.0 - 0.2 10e3/uL 0.1   % Basophils Latest Units: % 0   Absolute Eosinophils Latest Ref Range: 0.0 - 0.7 10e3/uL 0.2   Absolute Immature Granulocytes Latest Ref Range: <=0.0 10e3/uL 0.1 (H)   Absolute Lymphocytes Latest Ref Range: 0.8 - 5.3 10e3/uL 2.5   Absolute Monocytes Latest Ref  Range: 0.0 - 1.3 10e3/uL 1.1   % Immature Granulocytes Latest Units: % 0   Absolute Neutrophils Latest Ref Range: 1.6 - 8.3 10e3/uL 14.9 (H)   Absolute NRBCs Latest Units: 10e3/uL 0.0   NRBCs per 100 WBC Latest Ref Range: <1 /100 0     Pertinent Radiology  Radiology Results: Personally reviewed impression/s    EXAM: CT ABDOMEN PELVIS W CONTRAST  LOCATION: [facility]  DATE/TIME: 9/17/2021 11:44 PM     INDICATION: Middle and left abdominal pain.  COMPARISON: 10/03/2017                                                            IMPRESSION:   1.  Small bowel obstruction.  2.  Difficult to exclude pneumatosis of small bowel in the right lower quadrant. Correlation with lactate recommended.    Advanced Care Planning  Discharge planning discussed with patient        [facility]    Amber Knight MD  Office: [phone]

## 2021-09-18 NOTE — CONSULTS
"Care Management Initial Consult    General Information  Assessment completed with: PatientPaige via i pad  Type of CM/SW Visit: Initial Assessment    Primary Care Provider verified and updated as needed: Yes   Readmission within the last 30 days: no previous admission in last 30 days      Reason for Consult: discharge planning  Advance Care Planning: Advance Care Planning Reviewed: other (comment) (\"I don't have one\")          Communication Assessment  Patient's communication style: spoken language (English or Bilingual)             Cognitive  Cognitive/Neuro/Behavioral: WDL                      Living Environment:   People in home: spouse  Liam  Current living Arrangements: apartment      Able to return to prior arrangements: yes       Family/Social Support:  Care provided by: self  Provides care for: no one  Marital Status:     Liam       Description of Support System: Supportive, Involved    Support Assessment: Adequate family and caregiver support, Adequate social supports, Patient communicates needs well met    Current Resources:   Patient receiving home care services: No     Community Resources: None  Equipment currently used at home: glucometer  Supplies currently used at home: Diabetic Supplies, Other (\"reading glasses, partial dentures\")    Employment/Financial:  Employment Status: retired     Employment/ Comments: \"no  benefits,  does\"  Financial Concerns:     Referral to Financial Counselor: No       Lifestyle & Psychosocial Needs:  Social Determinants of Health     Tobacco Use: Medium Risk     Smoking Tobacco Use: Former Smoker     Smokeless Tobacco Use: Never Used   Alcohol Use:      Frequency of Alcohol Consumption:      Average Number of Drinks:      Frequency of Binge Drinking:    Financial Resource Strain:      Difficulty of Paying Living Expenses:    Food Insecurity:      Worried About Running Out of Food in the Last Year:      Ran Out of Food in the Last " Year:    Transportation Needs:      Lack of Transportation (Medical):      Lack of Transportation (Non-Medical):    Physical Activity:      Days of Exercise per Week:      Minutes of Exercise per Session:    Stress:      Feeling of Stress :    Social Connections:      Frequency of Communication with Friends and Family:      Frequency of Social Gatherings with Friends and Family:      Attends Druze Services:      Active Member of Clubs or Organizations:      Attends Club or Organization Meetings:      Marital Status:    Intimate Partner Violence:      Fear of Current or Ex-Partner:      Emotionally Abused:      Physically Abused:      Sexually Abused:    Depression:      PHQ-2 Score:    Housing Stability:      Unable to Pay for Housing in the Last Year:      Number of Places Lived in the Last Year:      Unstable Housing in the Last Year:        Functional Status:  Prior to admission patient needed assistance:   Dependent ADLs:: Independent  Dependent IADLs:: Independent       Mental Health Status:          Chemical Dependency Status:                Values/Beliefs:  Spiritual, Cultural Beliefs, Druze Practices, Values that affect care:                 Additional Information:  Paige lives in an apartment with her . She is independent with ADLs at baseline.     Likely no discharge needs at this time.     to transport at discharge.    Anamaria Mcginnis RN

## 2021-09-18 NOTE — PLAN OF CARE
Problem: Adult Inpatient Plan of Care  Goal: Optimal Comfort and Wellbeing  Outcome: Improving     Problem: Adult Inpatient Plan of Care  Goal: Readiness for Transition of Care  Outcome: Improving   FTAD dressing peeled off during shift. Pt had NG resecured to nare. RN replaced dressing x2 this AM. Pt had frequent emesis and elevated BP. Pt reported throbbing headache. PRN hydralazine administered twice this AM. PRN dilaudid given x2 this AM. MD notified. MD ordered IV metoprolol if BP > 230. Pt to have gastrografin with challenge at 1800 tonight prior to xray imaging. Xray currently confirming placement of NG tube after advancing tube from 50 cm to 60 cm.   No other concerns noted.  Joseph Landers RN  9/18/2021  3:31 PM

## 2021-09-18 NOTE — PROGRESS NOTES
.  Hospitalist Progress Note    Assessment/Plan  Active Problems:    Small bowel obstruction (H)    Pneumatosis intestinalis    Paige Mari is a 71 year old female presented with abdominal pain.  Work-up with CT abdomen showed small bowel obstruction with difficulty to identify the point of transition.     Abdominal pain  Acute SBO  -CT abdomen showed small bowel obstruction with difficult to identify the point of transition  -Cannot r/o pneumatosis of small bowel in the right lower quadrant  -Surgery following, continue with conservative management  -N.p.o., IVF, pain control, NG tube in place.  -Plan for Gastrografin small bowel follow-through.  -On IV Zosyn     Lactic acidosis likely 2/2 to ischemia  -C/w IVF, monitor    Diabetes mellitus with hyperglycemia with long-term insulin  -A1c is 8.  -Blood sugars significantly high  -Currently on Lantus 15 units at bedtime and NovoLog sliding scale every 4 hours.  -Monitor closely.       Accelerated hypertension  -Control pain adequately  -Continue PTA clonidine patch.  Holding PTA p.o. meds while patient n.p.o.  -As needed hydralazine and Lopressor ordered.  -Monitor closely on telemetry    Barriers to Discharge:  Clinical improvement    Anticipated discharge date/Disposition: 1-2 days    Subjective  Patient is new to me. Chart reviewed and events noted.  Patient seen and examined.   Reports abd pain 7/10. No chest pain, sob. Nausea+. Multiple episodes of emesis early today      Objective    Vital signs in last 24 hours  Temp:  [97  F (36.1  C)-98.4  F (36.9  C)] 98  F (36.7  C)  Pulse:  [] 100  Resp:  [16-18] 18  BP: (139-246)/() 199/100  SpO2:  [94 %-98 %] 96 % [unfilled]      Weight:   [unfilled] Weight change:     Intake/Output last 3 shifts  I/O last 3 completed shifts:  In: 170 [NG/GT:120; IV Piggyback:50]  Out: 250 [Emesis/NG output:250]  Body mass index is 30.54 kg/m .    Physical Exam    General Appearance:    Alert, cooperative, no  distress, appears stated age  HEENT:EOMI,At,NC. NG in place   Lungs:     CTAB   Cardiovascular:    RRR   Abdomen:     Soft,   Neurologic:   Grossly intact  Psychiatry: Appropriate affect     Pertinent Labs   Lab Results: personally reviewed.   Recent Labs   Lab 09/17/21  2240      CO2 25   BUN 16   ALBUMIN 4.0   ALKPHOS 108   ALT 26   AST 28     Recent Labs   Lab 09/17/21  2240   WBC 18.7*   HGB 16.7*   HCT 49.1*        Recent Labs   Lab 09/17/21  2240   TROPONINI 0.01     Invalid input(s): POCGLUFGR    Medications  Drug and lactation database from the [place] [employer] Library of Medicine:  http://toxnet.nlm.nih.gov/cgi-bin/sis/htmlgen?LACT      Pertinent Radiology   Radiology Results:  Personally reviewed impressions    Reviewed labs in last 24 hours.    Advanced Care Planning:  Discharge Planning discussed with patient  Total time with this patient is 35 min with greater than 50% of time spent in coordination of care.  Care discussed and coordinated with her care team.    This Note is created using dragon voice recognition software.  Errors in spelling or words which seems out of context are unintentional.  Sounds alike errors may have escaped editing.    Lauren Guan MD  Hospitalist

## 2021-09-18 NOTE — ED TRIAGE NOTES
Pt c/o abd pain since 1100 today, denies vomiting or diarrhea but does c/o nausea. States hx diverticulitis

## 2021-09-18 NOTE — ED NOTES
"[facility] ED Handoff Report    ED Chief Complaint: SBO    ED Diagnosis:  (K56.609) Small bowel obstruction (H)  Comment: NA  Plan: NA    (K63.89) Pneumatosis intestinalis  Comment: NA  Plan: IV, rest, NG       PMH:  History reviewed. No pertinent past medical history.     Code Status:  No Order     Falls Risk: Yes Band: Not applicable    Current Living Situation/Residence: lives with a significant other     Elimination Status: Continent: Yes     Activity Level: Independent    Patients Preferred Language:  English     Needed: No    Vital Signs:  BP (!) 156/88   Pulse 102   Temp 98  F (36.7  C) (Axillary)   Resp 18   Ht 1.575 m (5' 2\")   Wt 75.8 kg (167 lb)   SpO2 96%   BMI 30.54 kg/m       Cardiac Rhythm: NSR    Pain Score: 2/10    Is the Patient Confused:  No    Last Food or Drink: 9/17/21    Focused Assessment:  SBO    Tests Performed: Done: Labs and imaging    Treatments Provided:  NG, IV, abx, fluids, meds    Family Dynamics/Concerns: No    Family Updated On Visitor Policy: Yes    Plan of Care Communicated to Family: Yes    Who Was Updated about Plan of Care: family members    Belongings Checklist Done and Signed by Patient: Yes    Covid: asymptomatic , negative    Additional Information: Pleasant, cooperative, normally independent with cares at home    Suman Marion RN   9/18/2021 4:26 PM         "

## 2021-09-19 ENCOUNTER — APPOINTMENT (OUTPATIENT)
Dept: CT IMAGING | Facility: HOSPITAL | Age: 71
DRG: 388 | End: 2021-09-19
Payer: COMMERCIAL

## 2021-09-19 ENCOUNTER — ANESTHESIA (OUTPATIENT)
Dept: INTENSIVE CARE | Facility: HOSPITAL | Age: 71
End: 2021-09-19

## 2021-09-19 ENCOUNTER — ANESTHESIA EVENT (OUTPATIENT)
Dept: INTENSIVE CARE | Facility: HOSPITAL | Age: 71
End: 2021-09-19

## 2021-09-19 ENCOUNTER — APPOINTMENT (OUTPATIENT)
Dept: CARDIOLOGY | Facility: HOSPITAL | Age: 71
DRG: 388 | End: 2021-09-19
Attending: INTERNAL MEDICINE
Payer: COMMERCIAL

## 2021-09-19 ENCOUNTER — APPOINTMENT (OUTPATIENT)
Dept: RADIOLOGY | Facility: HOSPITAL | Age: 71
DRG: 388 | End: 2021-09-19
Attending: SURGERY
Payer: COMMERCIAL

## 2021-09-19 LAB
ALBUMIN SERPL-MCNC: 3.1 G/DL (ref 3.5–5)
ALBUMIN SERPL-MCNC: 3.6 G/DL (ref 3.5–5)
ALP SERPL-CCNC: 67 U/L (ref 45–120)
ALT SERPL W P-5'-P-CCNC: 19 U/L (ref 0–45)
ANION GAP SERPL CALCULATED.3IONS-SCNC: 21 MMOL/L (ref 5–18)
ANION GAP SERPL CALCULATED.3IONS-SCNC: 27 MMOL/L (ref 5–18)
ANION GAP SERPL CALCULATED.3IONS-SCNC: 7 MMOL/L (ref 5–18)
AST SERPL W P-5'-P-CCNC: 30 U/L (ref 0–40)
ATRIAL RATE - MUSE: 134 BPM
BASE EXCESS BLDV CALC-SCNC: 4.2 MMOL/L
BASOPHILS # BLD AUTO: 0 10E3/UL (ref 0–0.2)
BASOPHILS # BLD AUTO: 0.1 10E3/UL (ref 0–0.2)
BASOPHILS NFR BLD AUTO: 0 %
BASOPHILS NFR BLD AUTO: 0 %
BILIRUB SERPL-MCNC: 1 MG/DL (ref 0–1)
BUN SERPL-MCNC: 17 MG/DL (ref 8–28)
BUN SERPL-MCNC: 18 MG/DL (ref 8–28)
BUN SERPL-MCNC: 21 MG/DL (ref 8–28)
CALCIUM SERPL-MCNC: 8.6 MG/DL (ref 8.5–10.5)
CALCIUM SERPL-MCNC: 9.2 MG/DL (ref 8.5–10.5)
CALCIUM SERPL-MCNC: 9.9 MG/DL (ref 8.5–10.5)
CHLORIDE BLD-SCNC: 102 MMOL/L (ref 98–107)
CHLORIDE BLD-SCNC: 111 MMOL/L (ref 98–107)
CHLORIDE BLD-SCNC: 117 MMOL/L (ref 98–107)
CO2 SERPL-SCNC: 15 MMOL/L (ref 22–31)
CO2 SERPL-SCNC: 18 MMOL/L (ref 22–31)
CO2 SERPL-SCNC: 26 MMOL/L (ref 22–31)
CREAT SERPL-MCNC: 0.77 MG/DL (ref 0.6–1.1)
CREAT SERPL-MCNC: 1.11 MG/DL (ref 0.6–1.1)
CREAT SERPL-MCNC: 1.35 MG/DL (ref 0.6–1.1)
DIASTOLIC BLOOD PRESSURE - MUSE: NORMAL MMHG
EOSINOPHIL # BLD AUTO: 0 10E3/UL (ref 0–0.7)
EOSINOPHIL # BLD AUTO: 0 10E3/UL (ref 0–0.7)
EOSINOPHIL NFR BLD AUTO: 0 %
EOSINOPHIL NFR BLD AUTO: 0 %
ERYTHROCYTE [DISTWIDTH] IN BLOOD BY AUTOMATED COUNT: 13.2 % (ref 10–15)
ERYTHROCYTE [DISTWIDTH] IN BLOOD BY AUTOMATED COUNT: 13.3 % (ref 10–15)
GFR SERPL CREATININE-BSD FRML MDRD: 39 ML/MIN/1.73M2
GFR SERPL CREATININE-BSD FRML MDRD: 50 ML/MIN/1.73M2
GFR SERPL CREATININE-BSD FRML MDRD: 77 ML/MIN/1.73M2
GLUCOSE BLD-MCNC: 100 MG/DL (ref 70–125)
GLUCOSE BLD-MCNC: 307 MG/DL (ref 70–125)
GLUCOSE BLD-MCNC: 582 MG/DL (ref 70–125)
GLUCOSE BLDC GLUCOMTR-MCNC: 102 MG/DL (ref 70–99)
GLUCOSE BLDC GLUCOMTR-MCNC: 111 MG/DL (ref 70–99)
GLUCOSE BLDC GLUCOMTR-MCNC: 130 MG/DL (ref 70–99)
GLUCOSE BLDC GLUCOMTR-MCNC: 156 MG/DL (ref 70–99)
GLUCOSE BLDC GLUCOMTR-MCNC: 169 MG/DL (ref 70–99)
GLUCOSE BLDC GLUCOMTR-MCNC: 237 MG/DL (ref 70–99)
GLUCOSE BLDC GLUCOMTR-MCNC: 251 MG/DL (ref 70–99)
GLUCOSE BLDC GLUCOMTR-MCNC: 256 MG/DL (ref 70–99)
GLUCOSE BLDC GLUCOMTR-MCNC: 266 MG/DL (ref 70–99)
GLUCOSE BLDC GLUCOMTR-MCNC: 267 MG/DL (ref 70–99)
GLUCOSE BLDC GLUCOMTR-MCNC: 267 MG/DL (ref 70–99)
GLUCOSE BLDC GLUCOMTR-MCNC: 275 MG/DL (ref 70–99)
GLUCOSE BLDC GLUCOMTR-MCNC: 294 MG/DL (ref 70–99)
GLUCOSE BLDC GLUCOMTR-MCNC: 460 MG/DL (ref 70–99)
GLUCOSE BLDC GLUCOMTR-MCNC: 525 MG/DL (ref 70–99)
GLUCOSE BLDC GLUCOMTR-MCNC: 564 MG/DL (ref 70–99)
GLUCOSE BLDC GLUCOMTR-MCNC: 92 MG/DL (ref 70–99)
GLUCOSE BLDC GLUCOMTR-MCNC: 94 MG/DL (ref 70–99)
HBA1C MFR BLD: 8.1 %
HCO3 BLDV-SCNC: 27 MMOL/L (ref 24–30)
HCT VFR BLD AUTO: 37.5 % (ref 35–47)
HCT VFR BLD AUTO: 49.5 % (ref 35–47)
HGB BLD-MCNC: 12.7 G/DL (ref 11.7–15.7)
HGB BLD-MCNC: 16.6 G/DL (ref 11.7–15.7)
HOLD SPECIMEN: NORMAL
IMM GRANULOCYTES # BLD: 0.1 10E3/UL
IMM GRANULOCYTES # BLD: 0.2 10E3/UL
IMM GRANULOCYTES NFR BLD: 1 %
IMM GRANULOCYTES NFR BLD: 1 %
INTERPRETATION ECG - MUSE: NORMAL
LACTATE SERPL-SCNC: 2.4 MMOL/L (ref 0.7–2)
LACTATE SERPL-SCNC: 7.5 MMOL/L (ref 0.7–2)
LACTATE SERPL-SCNC: 7.6 MMOL/L (ref 0.7–2)
LACTATE SERPL-SCNC: 8.1 MMOL/L (ref 0.7–2)
LYMPHOCYTES # BLD AUTO: 1.3 10E3/UL (ref 0.8–5.3)
LYMPHOCYTES # BLD AUTO: 2.3 10E3/UL (ref 0.8–5.3)
LYMPHOCYTES NFR BLD AUTO: 12 %
LYMPHOCYTES NFR BLD AUTO: 6 %
MAGNESIUM SERPL-MCNC: 1.8 MG/DL (ref 1.8–2.6)
MAGNESIUM SERPL-MCNC: 1.8 MG/DL (ref 1.8–2.6)
MCH RBC QN AUTO: 33.3 PG (ref 26.5–33)
MCH RBC QN AUTO: 33.3 PG (ref 26.5–33)
MCHC RBC AUTO-ENTMCNC: 33.5 G/DL (ref 31.5–36.5)
MCHC RBC AUTO-ENTMCNC: 33.9 G/DL (ref 31.5–36.5)
MCV RBC AUTO: 98 FL (ref 78–100)
MCV RBC AUTO: 99 FL (ref 78–100)
MONOCYTES # BLD AUTO: 0.7 10E3/UL (ref 0–1.3)
MONOCYTES # BLD AUTO: 2 10E3/UL (ref 0–1.3)
MONOCYTES NFR BLD AUTO: 10 %
MONOCYTES NFR BLD AUTO: 3 %
NEUTROPHILS # BLD AUTO: 15.8 10E3/UL (ref 1.6–8.3)
NEUTROPHILS # BLD AUTO: 20.9 10E3/UL (ref 1.6–8.3)
NEUTROPHILS NFR BLD AUTO: 77 %
NEUTROPHILS NFR BLD AUTO: 90 %
NRBC # BLD AUTO: 0 10E3/UL
NRBC # BLD AUTO: 0 10E3/UL
NRBC BLD AUTO-RTO: 0 /100
NRBC BLD AUTO-RTO: 0 /100
OXYHGB MFR BLDV: 64.8 % (ref 70–75)
P AXIS - MUSE: NORMAL DEGREES
PCO2 BLDV: 42 MM HG (ref 35–50)
PH BLDV: 7.44 [PH] (ref 7.35–7.45)
PHOSPHATE SERPL-MCNC: <0.7 MG/DL (ref 2.5–4.5)
PLATELET # BLD AUTO: 228 10E3/UL (ref 150–450)
PLATELET # BLD AUTO: 320 10E3/UL (ref 150–450)
PO2 BLDV: 32 MM HG (ref 25–47)
POTASSIUM BLD-SCNC: 2.7 MMOL/L (ref 3.5–5)
POTASSIUM BLD-SCNC: 3.5 MMOL/L (ref 3.5–5)
POTASSIUM BLD-SCNC: 4 MMOL/L (ref 3.5–5)
PR INTERVAL - MUSE: 104 MS
PROT SERPL-MCNC: 6.6 G/DL (ref 6–8)
QRS DURATION - MUSE: 124 MS
QT - MUSE: 380 MS
QTC - MUSE: 567 MS
R AXIS - MUSE: -52 DEGREES
RBC # BLD AUTO: 3.81 10E6/UL (ref 3.8–5.2)
RBC # BLD AUTO: 4.99 10E6/UL (ref 3.8–5.2)
SAO2 % BLDV: 65.9 % (ref 70–75)
SODIUM SERPL-SCNC: 144 MMOL/L (ref 136–145)
SODIUM SERPL-SCNC: 150 MMOL/L (ref 136–145)
SODIUM SERPL-SCNC: 150 MMOL/L (ref 136–145)
SYSTOLIC BLOOD PRESSURE - MUSE: NORMAL MMHG
T AXIS - MUSE: 106 DEGREES
TROPONIN I SERPL-MCNC: 0.06 NG/ML (ref 0–0.29)
VENTRICULAR RATE- MUSE: 134 BPM
WBC # BLD AUTO: 20.2 10E3/UL (ref 4–11)
WBC # BLD AUTO: 22.9 10E3/UL (ref 4–11)

## 2021-09-19 PROCEDURE — 36569 INSJ PICC 5 YR+ W/O IMAGING: CPT

## 2021-09-19 PROCEDURE — 36415 COLL VENOUS BLD VENIPUNCTURE: CPT | Performed by: HOSPITALIST

## 2021-09-19 PROCEDURE — 83605 ASSAY OF LACTIC ACID: CPT | Performed by: INTERNAL MEDICINE

## 2021-09-19 PROCEDURE — 250N000009 HC RX 250: Performed by: INTERNAL MEDICINE

## 2021-09-19 PROCEDURE — 93010 ELECTROCARDIOGRAM REPORT: CPT | Performed by: INTERNAL MEDICINE

## 2021-09-19 PROCEDURE — 82565 ASSAY OF CREATININE: CPT | Performed by: HOSPITALIST

## 2021-09-19 PROCEDURE — 258N000003 HC RX IP 258 OP 636: Performed by: HOSPITALIST

## 2021-09-19 PROCEDURE — 85025 COMPLETE CBC W/AUTO DIFF WBC: CPT | Performed by: INTERNAL MEDICINE

## 2021-09-19 PROCEDURE — 80048 BASIC METABOLIC PNL TOTAL CA: CPT | Performed by: INTERNAL MEDICINE

## 2021-09-19 PROCEDURE — 99233 SBSQ HOSP IP/OBS HIGH 50: CPT | Performed by: HOSPITALIST

## 2021-09-19 PROCEDURE — 250N000011 HC RX IP 250 OP 636

## 2021-09-19 PROCEDURE — 258N000003 HC RX IP 258 OP 636: Performed by: INTERNAL MEDICINE

## 2021-09-19 PROCEDURE — 93005 ELECTROCARDIOGRAM TRACING: CPT

## 2021-09-19 PROCEDURE — 74176 CT ABD & PELVIS W/O CONTRAST: CPT

## 2021-09-19 PROCEDURE — 250N000011 HC RX IP 250 OP 636: Performed by: INTERNAL MEDICINE

## 2021-09-19 PROCEDURE — 250N000012 HC RX MED GY IP 250 OP 636 PS 637: Performed by: INTERNAL MEDICINE

## 2021-09-19 PROCEDURE — 99207 PR APP CREDIT; MD BILLING SHARED VISIT: CPT | Performed by: INTERNAL MEDICINE

## 2021-09-19 PROCEDURE — 93306 TTE W/DOPPLER COMPLETE: CPT | Mod: 26 | Performed by: INTERNAL MEDICINE

## 2021-09-19 PROCEDURE — 74018 RADEX ABDOMEN 1 VIEW: CPT

## 2021-09-19 PROCEDURE — 83605 ASSAY OF LACTIC ACID: CPT | Performed by: SURGERY

## 2021-09-19 PROCEDURE — 84132 ASSAY OF SERUM POTASSIUM: CPT | Performed by: INTERNAL MEDICINE

## 2021-09-19 PROCEDURE — 83735 ASSAY OF MAGNESIUM: CPT | Performed by: INTERNAL MEDICINE

## 2021-09-19 PROCEDURE — C8929 TTE W OR WO FOL WCON,DOPPLER: HCPCS

## 2021-09-19 PROCEDURE — 36415 COLL VENOUS BLD VENIPUNCTURE: CPT | Performed by: INTERNAL MEDICINE

## 2021-09-19 PROCEDURE — 255N000002 HC RX 255 OP 636: Performed by: HOSPITALIST

## 2021-09-19 PROCEDURE — 84484 ASSAY OF TROPONIN QUANT: CPT | Performed by: INTERNAL MEDICINE

## 2021-09-19 PROCEDURE — 82805 BLOOD GASES W/O2 SATURATION: CPT | Performed by: INTERNAL MEDICINE

## 2021-09-19 PROCEDURE — 87040 BLOOD CULTURE FOR BACTERIA: CPT | Performed by: INTERNAL MEDICINE

## 2021-09-19 PROCEDURE — 36592 COLLECT BLOOD FROM PICC: CPT | Performed by: INTERNAL MEDICINE

## 2021-09-19 PROCEDURE — 258N000003 HC RX IP 258 OP 636: Performed by: STUDENT IN AN ORGANIZED HEALTH CARE EDUCATION/TRAINING PROGRAM

## 2021-09-19 PROCEDURE — 200N000001 HC R&B ICU

## 2021-09-19 PROCEDURE — 85025 COMPLETE CBC W/AUTO DIFF WBC: CPT | Performed by: STUDENT IN AN ORGANIZED HEALTH CARE EDUCATION/TRAINING PROGRAM

## 2021-09-19 PROCEDURE — 83605 ASSAY OF LACTIC ACID: CPT | Performed by: STUDENT IN AN ORGANIZED HEALTH CARE EDUCATION/TRAINING PROGRAM

## 2021-09-19 PROCEDURE — C1751 CATH, INF, PER/CENT/MIDLINE: HCPCS

## 2021-09-19 PROCEDURE — 250N000009 HC RX 250: Performed by: HOSPITALIST

## 2021-09-19 PROCEDURE — 99291 CRITICAL CARE FIRST HOUR: CPT | Performed by: INTERNAL MEDICINE

## 2021-09-19 PROCEDURE — 250N000013 HC RX MED GY IP 250 OP 250 PS 637: Performed by: INTERNAL MEDICINE

## 2021-09-19 PROCEDURE — C9113 INJ PANTOPRAZOLE SODIUM, VIA: HCPCS | Performed by: INTERNAL MEDICINE

## 2021-09-19 PROCEDURE — 250N000011 HC RX IP 250 OP 636: Performed by: HOSPITALIST

## 2021-09-19 PROCEDURE — 250N000011 HC RX IP 250 OP 636: Performed by: STUDENT IN AN ORGANIZED HEALTH CARE EDUCATION/TRAINING PROGRAM

## 2021-09-19 PROCEDURE — 84100 ASSAY OF PHOSPHORUS: CPT | Performed by: INTERNAL MEDICINE

## 2021-09-19 PROCEDURE — 83036 HEMOGLOBIN GLYCOSYLATED A1C: CPT | Performed by: INTERNAL MEDICINE

## 2021-09-19 PROCEDURE — 36415 COLL VENOUS BLD VENIPUNCTURE: CPT | Performed by: STUDENT IN AN ORGANIZED HEALTH CARE EDUCATION/TRAINING PROGRAM

## 2021-09-19 RX ORDER — HEPARIN SODIUM 5000 [USP'U]/.5ML
5000 INJECTION, SOLUTION INTRAVENOUS; SUBCUTANEOUS EVERY 12 HOURS
Status: DISCONTINUED | OUTPATIENT
Start: 2021-09-19 | End: 2021-09-22 | Stop reason: HOSPADM

## 2021-09-19 RX ORDER — TIZANIDINE 2 MG/1
2 TABLET ORAL AT BEDTIME
Status: DISCONTINUED | OUTPATIENT
Start: 2021-09-19 | End: 2021-09-22 | Stop reason: HOSPADM

## 2021-09-19 RX ORDER — FUROSEMIDE 10 MG/ML
20 INJECTION INTRAMUSCULAR; INTRAVENOUS ONCE
Status: COMPLETED | OUTPATIENT
Start: 2021-09-19 | End: 2021-09-19

## 2021-09-19 RX ORDER — DEXTROSE MONOHYDRATE 25 G/50ML
25-50 INJECTION, SOLUTION INTRAVENOUS
Status: DISCONTINUED | OUTPATIENT
Start: 2021-09-19 | End: 2021-09-20

## 2021-09-19 RX ORDER — DEXTROSE MONOHYDRATE 100 MG/ML
INJECTION, SOLUTION INTRAVENOUS CONTINUOUS PRN
Status: DISCONTINUED | OUTPATIENT
Start: 2021-09-19 | End: 2021-09-22 | Stop reason: HOSPADM

## 2021-09-19 RX ORDER — OLANZAPINE 10 MG/2ML
5 INJECTION, POWDER, FOR SOLUTION INTRAMUSCULAR DAILY PRN
Status: DISCONTINUED | OUTPATIENT
Start: 2021-09-19 | End: 2021-09-22 | Stop reason: HOSPADM

## 2021-09-19 RX ORDER — HYDRALAZINE HYDROCHLORIDE 20 MG/ML
10-20 INJECTION INTRAMUSCULAR; INTRAVENOUS EVERY 6 HOURS PRN
Status: DISCONTINUED | OUTPATIENT
Start: 2021-09-19 | End: 2021-09-22 | Stop reason: HOSPADM

## 2021-09-19 RX ORDER — DEXMEDETOMIDINE HYDROCHLORIDE 4 UG/ML
.2-1.2 INJECTION, SOLUTION INTRAVENOUS CONTINUOUS
Status: DISCONTINUED | OUTPATIENT
Start: 2021-09-19 | End: 2021-09-22 | Stop reason: HOSPADM

## 2021-09-19 RX ORDER — LORAZEPAM 2 MG/ML
0.5 INJECTION INTRAMUSCULAR ONCE
Status: COMPLETED | OUTPATIENT
Start: 2021-09-19 | End: 2021-09-19

## 2021-09-19 RX ORDER — LORAZEPAM 2 MG/ML
INJECTION INTRAMUSCULAR
Status: COMPLETED
Start: 2021-09-19 | End: 2021-09-19

## 2021-09-19 RX ORDER — SODIUM CHLORIDE, SODIUM LACTATE, POTASSIUM CHLORIDE, CALCIUM CHLORIDE 600; 310; 30; 20 MG/100ML; MG/100ML; MG/100ML; MG/100ML
INJECTION, SOLUTION INTRAVENOUS CONTINUOUS
Status: DISCONTINUED | OUTPATIENT
Start: 2021-09-19 | End: 2021-09-20

## 2021-09-19 RX ORDER — LIDOCAINE 40 MG/G
CREAM TOPICAL
Status: ACTIVE | OUTPATIENT
Start: 2021-09-19 | End: 2021-09-22

## 2021-09-19 RX ORDER — POTASSIUM CHLORIDE 29.8 MG/ML
20 INJECTION INTRAVENOUS
Status: COMPLETED | OUTPATIENT
Start: 2021-09-19 | End: 2021-09-19

## 2021-09-19 RX ORDER — NICOTINE POLACRILEX 4 MG
15-30 LOZENGE BUCCAL
Status: DISCONTINUED | OUTPATIENT
Start: 2021-09-19 | End: 2021-09-20

## 2021-09-19 RX ORDER — ARIPIPRAZOLE 2 MG/1
2 TABLET ORAL DAILY
Status: DISCONTINUED | OUTPATIENT
Start: 2021-09-19 | End: 2021-09-22 | Stop reason: HOSPADM

## 2021-09-19 RX ORDER — METOPROLOL TARTRATE 1 MG/ML
5 INJECTION, SOLUTION INTRAVENOUS ONCE
Status: COMPLETED | OUTPATIENT
Start: 2021-09-19 | End: 2021-09-19

## 2021-09-19 RX ORDER — LIDOCAINE 40 MG/G
CREAM TOPICAL
Status: DISCONTINUED | OUTPATIENT
Start: 2021-09-19 | End: 2021-09-22 | Stop reason: HOSPADM

## 2021-09-19 RX ORDER — METOPROLOL TARTRATE 1 MG/ML
5 INJECTION, SOLUTION INTRAVENOUS EVERY 6 HOURS PRN
Status: DISCONTINUED | OUTPATIENT
Start: 2021-09-19 | End: 2021-09-22 | Stop reason: HOSPADM

## 2021-09-19 RX ADMIN — HYDRALAZINE HYDROCHLORIDE 10 MG: 20 INJECTION INTRAMUSCULAR; INTRAVENOUS at 01:05

## 2021-09-19 RX ADMIN — METOPROLOL TARTRATE 5 MG: 5 INJECTION INTRAVENOUS at 18:49

## 2021-09-19 RX ADMIN — METOPROLOL TARTRATE 5 MG: 5 INJECTION INTRAVENOUS at 04:40

## 2021-09-19 RX ADMIN — POTASSIUM PHOSPHATE, MONOBASIC AND POTASSIUM PHOSPHATE, DIBASIC 15 MMOL: 224; 236 INJECTION, SOLUTION, CONCENTRATE INTRAVENOUS at 20:41

## 2021-09-19 RX ADMIN — METOPROLOL TARTRATE 2.5 MG: 5 INJECTION INTRAVENOUS at 00:17

## 2021-09-19 RX ADMIN — LORAZEPAM 0.5 MG: 2 INJECTION INTRAMUSCULAR at 19:57

## 2021-09-19 RX ADMIN — HEPARIN SODIUM 5000 UNITS: 5000 INJECTION, SOLUTION INTRAVENOUS; SUBCUTANEOUS at 11:29

## 2021-09-19 RX ADMIN — INSULIN ASPART 2 UNITS: 100 INJECTION, SOLUTION INTRAVENOUS; SUBCUTANEOUS at 23:33

## 2021-09-19 RX ADMIN — POTASSIUM CHLORIDE 20 MEQ: 29.8 INJECTION, SOLUTION INTRAVENOUS at 16:39

## 2021-09-19 RX ADMIN — PIPERACILLIN SODIUM AND TAZOBACTAM SODIUM 3.38 G: 3; .375 INJECTION, POWDER, LYOPHILIZED, FOR SOLUTION INTRAVENOUS at 00:55

## 2021-09-19 RX ADMIN — TIZANIDINE 2 MG: 2 TABLET ORAL at 20:37

## 2021-09-19 RX ADMIN — LIDOCAINE HYDROCHLORIDE 3 ML: 10 INJECTION, SOLUTION EPIDURAL; INFILTRATION; INTRACAUDAL; PERINEURAL at 11:00

## 2021-09-19 RX ADMIN — POTASSIUM PHOSPHATE, MONOBASIC AND POTASSIUM PHOSPHATE, DIBASIC 15 MMOL: 224; 236 INJECTION, SOLUTION, CONCENTRATE INTRAVENOUS at 22:49

## 2021-09-19 RX ADMIN — INSULIN GLARGINE 15 UNITS: 100 INJECTION, SOLUTION SUBCUTANEOUS at 20:52

## 2021-09-19 RX ADMIN — POTASSIUM CHLORIDE 20 MEQ: 29.8 INJECTION, SOLUTION INTRAVENOUS at 15:27

## 2021-09-19 RX ADMIN — FUROSEMIDE 20 MG: 10 INJECTION, SOLUTION INTRAMUSCULAR; INTRAVENOUS at 15:06

## 2021-09-19 RX ADMIN — PIPERACILLIN SODIUM AND TAZOBACTAM SODIUM 3.38 G: 3; .375 INJECTION, POWDER, LYOPHILIZED, FOR SOLUTION INTRAVENOUS at 11:28

## 2021-09-19 RX ADMIN — SODIUM CHLORIDE, POTASSIUM CHLORIDE, SODIUM LACTATE AND CALCIUM CHLORIDE 150 ML: 600; 310; 30; 20 INJECTION, SOLUTION INTRAVENOUS at 04:42

## 2021-09-19 RX ADMIN — PIPERACILLIN SODIUM AND TAZOBACTAM SODIUM 3.38 G: 3; .375 INJECTION, POWDER, LYOPHILIZED, FOR SOLUTION INTRAVENOUS at 18:45

## 2021-09-19 RX ADMIN — SODIUM CHLORIDE, POTASSIUM CHLORIDE, SODIUM LACTATE AND CALCIUM CHLORIDE 500 ML: 600; 310; 30; 20 INJECTION, SOLUTION INTRAVENOUS at 04:38

## 2021-09-19 RX ADMIN — DILTIAZEM HYDROCHLORIDE 5 MG/HR: 5 INJECTION INTRAVENOUS at 03:16

## 2021-09-19 RX ADMIN — PERFLUTREN 3 ML: 6.52 INJECTION, SUSPENSION INTRAVENOUS at 09:10

## 2021-09-19 RX ADMIN — DEXMEDETOMIDINE HYDROCHLORIDE 0.6 MCG/KG/HR: 4 INJECTION, SOLUTION INTRAVENOUS at 22:53

## 2021-09-19 RX ADMIN — POTASSIUM CHLORIDE 20 MEQ: 29.8 INJECTION, SOLUTION INTRAVENOUS at 17:43

## 2021-09-19 RX ADMIN — METOPROLOL TARTRATE 5 MG: 5 INJECTION INTRAVENOUS at 03:41

## 2021-09-19 RX ADMIN — HEPARIN SODIUM 5000 UNITS: 5000 INJECTION, SOLUTION INTRAVENOUS; SUBCUTANEOUS at 20:57

## 2021-09-19 RX ADMIN — ARIPIPRAZOLE 2 MG: 2 TABLET ORAL at 20:37

## 2021-09-19 RX ADMIN — SODIUM CHLORIDE 500 ML: 9 INJECTION, SOLUTION INTRAVENOUS at 03:15

## 2021-09-19 RX ADMIN — HYDROMORPHONE HYDROCHLORIDE 0.2 MG: 1 INJECTION, SOLUTION INTRAMUSCULAR; INTRAVENOUS; SUBCUTANEOUS at 19:27

## 2021-09-19 RX ADMIN — SODIUM CHLORIDE 5.5 UNITS/HR: 9 INJECTION, SOLUTION INTRAVENOUS at 03:35

## 2021-09-19 RX ADMIN — LORAZEPAM 0.5 MG: 2 INJECTION INTRAMUSCULAR; INTRAVENOUS at 02:12

## 2021-09-19 RX ADMIN — SODIUM CHLORIDE, POTASSIUM CHLORIDE, SODIUM LACTATE AND CALCIUM CHLORIDE 1000 ML: 600; 310; 30; 20 INJECTION, SOLUTION INTRAVENOUS at 03:01

## 2021-09-19 RX ADMIN — DEXMEDETOMIDINE HYDROCHLORIDE 1 MCG/KG/HR: 4 INJECTION, SOLUTION INTRAVENOUS at 09:08

## 2021-09-19 RX ADMIN — LORAZEPAM 0.5 MG: 2 INJECTION INTRAMUSCULAR; INTRAVENOUS at 19:57

## 2021-09-19 RX ADMIN — PANTOPRAZOLE SODIUM 40 MG: 40 INJECTION, POWDER, FOR SOLUTION INTRAVENOUS at 11:30

## 2021-09-19 RX ADMIN — PREGABALIN 100 MG: 75 CAPSULE ORAL at 20:57

## 2021-09-19 RX ADMIN — VANCOMYCIN HYDROCHLORIDE 1500 MG: 5 INJECTION, POWDER, LYOPHILIZED, FOR SOLUTION INTRAVENOUS at 03:41

## 2021-09-19 RX ADMIN — DEXMEDETOMIDINE HYDROCHLORIDE 0.6 MCG/KG/HR: 4 INJECTION, SOLUTION INTRAVENOUS at 21:03

## 2021-09-19 RX ADMIN — DEXMEDETOMIDINE HYDROCHLORIDE 0.2 MCG/KG/HR: 4 INJECTION, SOLUTION INTRAVENOUS at 04:38

## 2021-09-19 RX ADMIN — SODIUM CHLORIDE, POTASSIUM CHLORIDE, SODIUM LACTATE AND CALCIUM CHLORIDE 150 ML/HR: 600; 310; 30; 20 INJECTION, SOLUTION INTRAVENOUS at 09:15

## 2021-09-19 RX ADMIN — OLANZAPINE 5 MG: 10 INJECTION, POWDER, FOR SOLUTION INTRAMUSCULAR at 02:04

## 2021-09-19 RX ADMIN — SODIUM CHLORIDE, POTASSIUM CHLORIDE, SODIUM LACTATE AND CALCIUM CHLORIDE: 600; 310; 30; 20 INJECTION, SOLUTION INTRAVENOUS at 22:51

## 2021-09-19 RX ADMIN — SODIUM CHLORIDE, POTASSIUM CHLORIDE, SODIUM LACTATE AND CALCIUM CHLORIDE: 600; 310; 30; 20 INJECTION, SOLUTION INTRAVENOUS at 16:08

## 2021-09-19 NOTE — PROGRESS NOTES
ICU NOTE    Confuse, agitated, non focal, moving and kicking.     Precedex drip was started.    Soft restrains upper extremities.     Bobby Armendariz MD  Pulmonary Critical Care   09/19/21  4:27 AM

## 2021-09-19 NOTE — CONSULTS
PULMONARY / CRITICAL CARE CONSULT NOTE    Date / Time of Admission:  9/17/2021 11:17 PM    Assessment:     Paige Mari is a 71 year old female with history of HTN, DM, diverticulosis, bowel obstruction s/p resection 2009.   Brought to ED for evaluation of nausea and abdominal pain on 9/18/2021.   Initial labs showed hyperglycemia, lactic acidosis, elevated WBC.   Abdomen CT scan showed small bowel obstruction, difficult to exclude pneumatosis intestinalis.  Patient was evaluated by surgery recommending conservative management and a follow up gastrograffin, study showed oral contrast throughout the GI tract including large bowel, no free air.   Rapid response was called on 9/19, patient was confuse, anxious, tachycardic and hypertensive, follow up labs showed hyperglycemia and lactic acidosis.   Patient was started on IV fluids, received ativan.   A follow up abdomen CT scan showed resolution of small bowel dilation.   Patient was transferred to ICU, and ICU service was consulted.   On telemetry, HR was 170's, a fib with RVR. Patient was sedated, difficult to arouse, withdraws to painful stimulus, benign abdominal exam.     1. Atrial fibrillation with RVR  2. DKA  3. Dehydration   4. Lactic acidosis   5. Resolution of partial small bowel obstruction   6. Metabolic encephalopathy   7. Acute kidney injury   8. HTN  9. DM     Advance Directives: Full code    Plan:   1. Titrate FiO2 to keep SpO2 > 92%  2. Follow up ABG  3. IV fluids LR bolus then LR drip   4. Insulin drip  5. Start diltiazem drip   6. Metoprolol IV PRN  7. Serial cardiac enzymes  8. Follow up echocardiogram  9. Follow up lactic acid in AM  10. Continue empiric Abx zosyn and vancomycin for now  11. NPO  12. PPI for GI prophylaxis   13. Monitor kidney function  14. Supplement electrolytes  15.  DVT prophylaxis heparin subcutaneous and SCDs  16. Minimize pain meds and sedatives    Please contact me if you have any questions.  Total critical care time,  not including separately billable procedure time: 45 minutes  This patient had a high probability of imminent or life threatening deterioration due to acute respiratory failure which required my direct attention, intervention and personal management.     Bobby Armendariz  Pulmonary / Critical Care  09/19/2021 3:00 AM    ICU DAILY CHECKLIST                         Can patient transfer out of MICU? no    FAST HUG:    Feeding:  Feeding: no.  Patient is receiving NPO    Gomes: yes  Analgesia/Sedation:no    Thromboembolic prophylaxis: Yes; Mode:  Heparin and SCDs  HOB>30:  Yes  Stress Ulcer Protocol Active: Yes; Mode: PPI  Glycemic Control: Any glucose > 180 yes; Mode of Insulin Therapy: Insulin gtt    INTUBATED:  Can patient have daily waking:  No  Can patient have spontaneous breathing trial:  No    Restraints? no    PHYSICAL THERAPY AND MOBILITY:  Can patient have PT and mobility trial: no  Activity: bedrest    Reason for consult :  DM with hyperglycemia , lactic acidosis      HPI:  Paige Mari is a 71 year old female with history of HTN, DM, diverticulosis, bowel obstruction s/p resection 2009.   Brought to ED for evaluation of nausea and abdominal pain on 9/18/2021.   Initial labs showed hyperglycemia, lactic acidosis, elevated WBC.   Abdomen CT scan showed small bowel obstruction, difficult to exclude pneumatosis intestinalis.  Patient was evaluated by surgery recommending conservative management and a follow up gastrograffin, study showed oral contrast throughout the GI tract including large bowel, no free air.   Rapid response was called on 9/19, patient was confuse, anxious, tachycardic and hypertensive, follow up labs showed hyperglycemia and lactic acidosis.   Patient was started on IV fluids, received ativan.   A follow up abdomen CT scan showed resolution of small bowel dilation.   Patient was transferred to ICU, and ICU service was consulted.     PMH  HTN  DM  Diverticulosis  Bowel  obstruction s/p resection 2009.       Allergies: Morphine, Adhesive [mecrylate], Amlodipine, Amlodipine besylate [amlodipine], Doxazosin mesylate [doxazosin], Irbesartan-hydrochlorothiazide [avalide], Other allergy (see comments) [external allergen needs reconciliation - see comment], Prednisone, and Trazodone     MEDS:  Current Facility-Administered Medications   Medication     dextrose 10% infusion     glucose gel 15-30 g    Or     dextrose 50 % injection 25-50 mL    Or     glucagon injection 1 mg     glucose gel 15-30 g    Or     dextrose 50 % injection 25-50 mL    Or     glucagon injection 1 mg     diltiazem (CARDIZEM) 125 mg in sodium chloride 0.9 % 125 mL infusion     hydrALAZINE (APRESOLINE) injection 10 mg     hydrALAZINE (APRESOLINE) injection 10-20 mg     HYDROmorphone (DILAUDID) injection 0.2 mg     insulin regular (HumuLIN R, NovoLIN R) infusion - ADULT IV Mixture     labetalol (NORMODYNE/TRANDATE) injection 10 mg     lactated ringers infusion     lidocaine (LMX4) cream     lidocaine 1 % 0.1-1 mL     metoprolol (LOPRESSOR) injection 2.5 mg     metoprolol (LOPRESSOR) injection 5 mg     metoprolol succinate ER (TOPROL-XL) 24 hr tablet 100 mg     OLANZapine (zyPREXA) injection 5 mg     ondansetron (ZOFRAN) injection 4 mg     piperacillin-tazobactam (ZOSYN) 3.375 g vial to attach to  mL bag     sodium chloride (PF) 0.9% PF flush 3 mL     sodium chloride (PF) 0.9% PF flush 3 mL     vancomycin (VANCOCIN) 1,500 mg in sodium chloride 0.9 % 250 mL intermittent infusion    Followed by     [START ON 9/20/2021] vancomycin 1000 mg in 0.9% NaCl 250 mL intermittent infusion 1,000 mg     Social History     Socioeconomic History     Marital status:      Spouse name: Not on file     Number of children: Not on file     Years of education: Not on file     Highest education level: Not on file   Occupational History     Not on file   Tobacco Use     Smoking status: Former Smoker     Quit date: 1/1/2018     Years  "since quitting: 3.7     Smokeless tobacco: Never Used   Substance and Sexual Activity     Alcohol use: No     Drug use: No     Sexual activity: Not on file   Other Topics Concern     Not on file   Social History Narrative     Not on file     Social Determinants of Health     Financial Resource Strain:      Difficulty of Paying Living Expenses:    Food Insecurity:      Worried About Running Out of Food in the Last Year:      Ran Out of Food in the Last Year:    Transportation Needs:      Lack of Transportation (Medical):      Lack of Transportation (Non-Medical):    Physical Activity:      Days of Exercise per Week:      Minutes of Exercise per Session:    Stress:      Feeling of Stress :    Social Connections:      Frequency of Communication with Friends and Family:      Frequency of Social Gatherings with Friends and Family:      Attends Voodoo Services:      Active Member of Clubs or Organizations:      Attends Club or Organization Meetings:      Marital Status:    Intimate Partner Violence:      Fear of Current or Ex-Partner:      Emotionally Abused:      Physically Abused:      Sexually Abused:      Family History   Problem Relation Age of Onset     Breast Cancer No family hx of      ROS  - patient is mild sedated after dose of benzos, arousable to touch    Objective:   VITALS:  BP (!) 171/74 (BP Location: Left arm)   Pulse (!) 126   Temp 96.8  F (36  C) (Axillary)   Resp 22   Ht 1.575 m (5' 2\")   Wt 75.8 kg (167 lb)   SpO2 96%   BMI 30.54 kg/m    VENT:  Resp: 22    EXAM:   Gen: sedated, withdraws to pain stimulus   HEENT: pale conjunctiva, dry mucosa  Neck: no thyromegaly, masses or JVD  Lungs: clear  CV: regular, no murmurs or gallops appreciated  Abdomen: soft, NT, BS increase in frequency  Ext: no edema  Neuro: sedated, withdraws to pain stimulus       Data Review:  Recent Labs   Lab 09/19/21  0331 09/19/21  0144 09/19/21  0123 09/19/21  0008 09/18/21  2251 09/18/21  2147   * 564* 582* 525* " 528* 503*        9/19/2021 01:23   Sodium 144   Potassium 3.5   Chloride 102   Carbon Dioxide 15 (L)   Urea Nitrogen 18   Creatinine 1.35 (H)   GFR Estimate 39 (L)   Calcium 9.9   Anion Gap 27 (H)        9/17/2021 22:40 9/18/2021 07:10 9/18/2021 15:35 9/19/2021 01:23 9/19/2021 02:06   Lactic Acid 2.5 (H) 2.6 (H) 3.5 (H) 7.5 (HH) 8.1 (HH)        9/19/2021 02:06   WBC 22.9 (H)   Hemoglobin 16.6 (H)   Hematocrit 49.5 (H)   Platelet Count 320   RBC Count 4.99   MCV 99   MCH 33.3 (H)   MCHC 33.5   RDW 13.2   % Neutrophils 90   % Lymphocytes 6   % Monocytes 3   % Eosinophils 0   Absolute Basophils 0.0   % Basophils 0   Absolute Eosinophils 0.0   Absolute Immature Granulocytes 0.1 (H)   Absolute Lymphocytes 1.3   Absolute Monocytes 0.7   % Immature Granulocytes 1   Absolute Neutrophils 20.9 (H)   Absolute NRBCs 0.0   NRBCs per 100 WBC 0      9/18/2021 02:28   Color Urine Colorless   Appearance Urine Clear   Glucose Urine >1000 (A)   Bilirubin Urine Negative   Ketones Urine Trace (A)   Specific Gravity Urine 1.023   pH Urine 7.0   Protein Albumin Urine Negative   Urobilinogen mg/dL <2.0   Nitrite Urine Negative   Blood Urine Negative   Leukocyte Esterase Urine Negative     CT ABDOMEN AND PELVIS WITHOUT CONTRAST  LOCATION: [facility]  DATE/TIME: 9/19/2021 2:39 AM     INDICATION: Severe hyperglycemia. Hypothermia. Confusion.  COMPARISON: 09/17/2021.     TECHNIQUE: CT scan of the abdomen and pelvis was performed without IV contrast. Oral contrast was administered. Multiplanar reformats were obtained. Dose reduction techniques were used.  CONTRAST: None.     FINDINGS:     LOWER CHEST: Coronary artery calcification.     HEPATOBILIARY: Unremarkable.     SPLEEN: Unremarkable.  PANCREAS: Mild diffuse pancreatic atrophy.  ADRENAL GLANDS: Unremarkable.  KIDNEYS/BLADDER: Small probable cyst in the interpolar region of the left kidney. No follow-up is necessary.  BOWEL: No dilatation of the small or large  bowel. The small bowel dilatation that was present on the recent comparison study has resolved. Oral contrast material that has been administered is present throughout the colon, all the way to the rectum. The appendix is not visualized. No bowel wall thickening, pneumatosis or free intraperitoneal gas.  LYMPH NODES: Unremarkable.  PELVIC ORGANS: No acute findings   MUSCULOSKELETAL: Fusion hardware in the lumbar spine.  OTHER: Atherosclerotic calcification in the abdominal aorta.  IMPRESSION:   1. No acute abnormality identified in the abdomen or pelvis.  2. Interval resolution of the small bowel dilatation that was present on the recent comparison study dated 09/17/2021.     By:  oBbby Armendariz MD, 09/19/2021  3:00 AM  Primary Care Physician:  Liam Canada

## 2021-09-19 NOTE — PLAN OF CARE
Pt had multiple BM. From Hard to formed to loosed and incontinent. Pt BG came down to 503 from 507 after insulin administratio. At HS Pt BG was 528, MD notified, new order followed.

## 2021-09-19 NOTE — PROCEDURES
"PICC Line Insertion Procedure Note  Pt. Name: Paige Mari  MRN:        [id number]    Procedure: Insertion of a  triple Lumen  5 fr  Bard SOLO (valved) Power PICC, Lot number [id number]    Indications: Vascular access and Lab Draws    Contraindications : None    Procedure Details   Patient identified with 2 identifiers and \"Time Out\" conducted.  .     Central line insertion bundle followed: hand hygeine performed prior to procedure, site cleansed with cholraprep, hat, mask, sterile gloves,sterile gown worn, patient draped with maximum barrier head to toe drape, sterile field maintained.    The vein was assessed and found to be compressible and of adequate size. 3 ml 1% Lidocaine administered sq to the insertion site. A 5 Fr PICC was inserted into the brachial vein of the right arm with ultrasound guidance. 1 attempt(s) required to access vein.   Catheter threaded without difficulty. Good blood return noted.    Modified Seldinger Technique used for insertion.    The 8 sharps that are included in the PICC insertion kit were accounted for and disposed of in the sharps container prior to breakdown of the sterile field.    Catheter secured with Statlock, biopatch and Tegaderm dressing applied.    Findings:  Total catheter length  36 cm, with 0 cm exposed. Mid upper arm circumference is 37 cm. Catheter was flushed with 30 cc NS. Patient  tolerated procedure well.    Tip placement verified by 3CG Tip positioning System. Tip placement in the SVC.    CLABSI prevention brochure left at bedside.    Patient's primary RN notified PICC is ready for use.    Comments:              "

## 2021-09-19 NOTE — PROGRESS NOTES
Pt pulled NG out at 2015 and did not want put back in. On-call general surgeon notified and GG SBFT 3-hours post GG administration ordered.     To note: right before imaging pt did have medium formed brown stool.     Image results discussed with Dr. Durham and new order for RP SBFT in AM placed.

## 2021-09-19 NOTE — PROGRESS NOTES
General Surgery Progress Note  Hospital Day # 1    Subjective:   Pt is sedated on Pradaxa as she was exhibiting significant agitation at the time she was transferred to the intensive care unit.  She currently is calm her vital signs are stable and she is not exhibiting any symptoms of abdominal pain.    Vitals:    09/19/21 0915 09/19/21 0930 09/19/21 0945 09/19/21 1001   BP: 122/60 122/58 109/57 123/71   BP Location:       Pulse: 79 79 75 81   Resp: 16 27 23 (!) 41   Temp:       TempSrc:       SpO2: 98% 98% 98% 97%   Weight:       Height:           Physical Exam:  Sedated she is not responding to questions  Lungs:  CTA  CV:       RRR  Ab:       Soft, + BS,     Recent Labs   Lab 09/19/21  0206   WBC 22.9*   HGB 16.6*   HCT 49.5*          Recent Labs   Lab 09/19/21  0525   *   CO2 18*   BUN 17   ALBUMIN 3.6   ALKPHOS 67   ALT 19   AST 30   EXAM: CT ABDOMEN AND PELVIS WITHOUT CONTRAST  LOCATION: [facility]  DATE/TIME: 9/19/2021 2:39 AM     INDICATION: Severe hyperglycemia. Hypothermia. Confusion.  COMPARISON: 09/17/2021.     TECHNIQUE: CT scan of the abdomen and pelvis was performed without IV contrast. Oral contrast was administered. Multiplanar reformats were obtained. Dose reduction techniques were used.  CONTRAST: None.     FINDINGS:     LOWER CHEST: Coronary artery calcification.     HEPATOBILIARY: Unremarkable.     SPLEEN: Unremarkable.     PANCREAS: Mild diffuse pancreatic atrophy.     ADRENAL GLANDS: Unremarkable.     KIDNEYS/BLADDER: Small probable cyst in the interpolar region of the left kidney. No follow-up is necessary.     BOWEL: No dilatation of the small or large bowel. The small bowel dilatation that was present on the recent comparison study has resolved. Oral contrast material that has been administered is present throughout the colon, all the way to the rectum. The   appendix is not visualized. No bowel wall thickening, pneumatosis or free intraperitoneal  gas.     LYMPH NODES: Unremarkable.     PELVIC ORGANS: No acute findings.     MUSCULOSKELETAL: Fusion hardware in the lumbar spine.     OTHER: Atherosclerotic calcification in the abdominal aorta.                                                                      IMPRESSION:   1. No acute abnormality identified in the abdomen or pelvis.  2. Interval resolution of the small bowel dilatation that was present on the recent comparison study dated 09/17/2021.     Assessment:  Pt with concerns about small bowel obstruction and question of pneumatosis on her initial CT scan then responded nicely to a Gastrografin challenge and passed a considerable amount of stool.  The patient though is continue to have an elevation of her white blood cell count and her lactic acid which is now up to 7 from her morning lab draws.  This concerned me about the question of pneumatosis in the small bowel however the repeat CT scan shows improvement across the intra-abdominal space without any evidence of pneumatosis on that most recent CT.  I suspect the elevation of the patient's lactic acid is a fluid balance issue and associate with her DKA..    Plan: We will continue to work with the critical care team to optimize her medical status.  I had thought we may need to evaluate her abdomen with the laparoscope but as I palpate her abdomen and can appreciate that she really is not exhibiting clinical symptoms of an acute abdomen I do not think that is warranted at this time.  We will continue to follow this patient closely.    Tan Durham MD  [facility]  [phone]

## 2021-09-19 NOTE — PROGRESS NOTES
.  Hospitalist Progress Note    Assessment/Plan  Active Problems:    Small bowel obstruction (H)    Pneumatosis intestinalis    Paige Mari is a 71 year old female presented with abdominal pain.  Work-up with CT abdomen showed small bowel obstruction with difficulty to identify the point of transition.     Rapid response called on 9/19 for agitation.  Patient was found to be in A. fib RVR and was transferred to ICU on diltiazem drip and started on Precedex.     Abdominal pain  Acute SBO  -CT abdomen on admission showed small bowel obstruction with difficult to identify the point of transition.  -Cannot r/o pneumatosis of small bowel in the right lower quadrant  -Repeat CT abdomen 9/19 showed resolution of SBO.   -Surgery following, appreciate input.   -IVF, Pain control prn     Lactic acidosis likely 2/2 to ischemia  -C/w IVF, monitor. Lactate trending down  -Started on Van+Zosyn for possible infection given the lactate noted to be trending up overnight.    DKA   -A1c is 8.1  -Blood sugars significantly high on admission  -Started on insulin drip overnight  -Currently on Lantus 15 units at bedtime and NovoLog sliding scale every 4 hours.  -Monitor closely.       Accelerated hypertension - improving  -Control pain adequately  -Continue PTA clonidine patch.  Holding PTA p.o. meds while patient n.p.o.  -As needed hydralazine and Lopressor ordered.  -Monitor closely on telemetry    Agitation/Metabolic encephalopathy  -On precedex drip  -Monitor in ICU    A. Fib RVR - converted to SR this AM  -Off dil drip since AM.    Hypernatremia likely pseudo given hyperglycemia.  -Recheck NA now.     JACKSON likely pre-renal  -Creat improving. Monitor.   -Urine out-put slightly low, doing lasix X1    Barriers to Discharge:  Clinical improvement    Anticipated discharge date/Disposition: TBD    Subjective  Chart reviewed and events noted.  Patient seen and examined.   Sedated in ICU , on precedex drip. Grimaces with pain  stimulation.      Objective    Vital signs in last 24 hours  Temp:  [96.6  F (35.9  C)-99.9  F (37.7  C)] 98.4  F (36.9  C)  Pulse:  [] 76  Resp:  [16-41] 23  BP: ()/() 112/65  SpO2:  [92 %-100 %] 98 % [unfilled]      Weight:   [unfilled] Weight change:     Intake/Output last 3 shifts  I/O last 3 completed shifts:  In: 2493.1 [I.V.:2493.1]  Out: 250 [Urine:250]  Body mass index is 30.54 kg/m .    Physical Exam    General Appearance:  Somnolent, NAD  HEENT:At,NC   Lungs:   CTAB   Cardiovascular:  RRR   Abdomen:   Soft,non distended   Neurologic: Somnolent, unable to assess neuro exam.  Psychiatry: Appropriate affect     Pertinent Labs   Lab Results: personally reviewed.   Recent Labs   Lab 09/19/21  0525 09/19/21  0123 09/17/21  2240   * 144 138   CO2 18* 15* 25   BUN 17 18 16   ALBUMIN 3.6  --  4.0   ALKPHOS 67  --  108   ALT 19  --  26   AST 30  --  28     Recent Labs   Lab 09/19/21  1137 09/19/21  0206 09/17/21  2240   WBC 20.2* 22.9* 18.7*   HGB 12.7 16.6* 16.7*   HCT 37.5 49.5* 49.1*    320 294     Recent Labs   Lab 09/19/21  0211 09/17/21  2240   TROPONINI 0.06 0.01     Invalid input(s): POCGLUFGR    Medications  Drug and lactation database from the [place] [employer] Medicine:  http://toxnet.nlm.nih.gov/cgi-bin/sis/htmlgen?LACT      Pertinent Radiology   Radiology Results:  Personally reviewed impressions    Reviewed labs in last 24 hours.    Advanced Care Planning:  Discharge Planning discussed with patient  Total time with this patient is 35 min with greater than 50% of time spent in coordination of care.  Care discussed and coordinated with her RN, MIRANDA/IRVIN    This Note is created using dragon voice recognition software.  Errors in spelling or words which seems out of context are unintentional.  Sounds alike errors may have escaped editing.    Lauren Guan MD  Hospitalist

## 2021-09-19 NOTE — PLAN OF CARE
Problem: Risk for Delirium  Goal: Improved Behavioral Control  Outcome: Improving     Problem: Restraint for Non-Violent/Non-Self-Destructive Behavior  Goal: Prevent/Manage Potential Problems  Description: Maintain safety of patient and others during period of restraint.  Promote psychological and physical wellbeing.  Prevent injury to skin and involved body parts.  Promote nutrition, hydration, and elimination.  Outcome: Improving     Pt transferred to ICU via bed after rapid response called.  Pt confused trying to climb out of bed.  's.  Blood sugar over 400.  Lactic acid elevated.  Stat CT done to check bowel obstruction.  Pt placed on dilt gtt, insulin gtt, and precidex gtt.  Pt restrained for safety.  Will continue to monitor

## 2021-09-19 NOTE — PROGRESS NOTES
"Pharmacy Vancomycin Initial Note  Date of Service 2021  Patient's  1950  71 year old, female    Indication: Sepsis    Current estimated CrCl = Estimated Creatinine Clearance: 35.9 mL/min (A) (based on SCr of 1.35 mg/dL (H)).    Creatinine for last 3 days  2021: 10:40 PM Creatinine 1.13 mg/dL  2021:  1:23 AM Creatinine 1.35 mg/dL    Recent Vancomycin Level(s) for last 3 days  No results found for requested labs within last 72 hours.      Vancomycin IV Administrations (past 72 hours)      No vancomycin orders with administrations in past 72 hours.                Nephrotoxins and other renal medications (From now, onward)    Start     Dose/Rate Route Frequency Ordered Stop    21 0800  piperacillin-tazobactam (ZOSYN) 3.375 g vial to attach to  mL bag     Note to Pharmacy: For [facility], [facility] and [facility]: For Zosyn-naive patients, use the \"Zosyn initial dose + extended infusion\" order panel.    3.375 g  over 240 Minutes Intravenous EVERY 8 HOURS 21 0122            Contrast Orders - past 72 hours (72h ago, onward)    Start     Dose/Rate Route Frequency Ordered Stop    21 1730  diatrizoate meglumine-sodium (GASTROGRAFIN/GASTROVIEW) 66-10 % solution 120 mL      120 mL Oral ONCE 21 1725 21 1734    21 0000  iopamidol (ISOVUE-370) solution 75 mL      75 mL Intravenous ONCE 21 2338 21 2346        Loading dose: 1500 mg at 03:00 2021.  Regimen: 1000 mg IV every 24 hours.  Start time: 02:22 on 2021  Exposure target: AUC24 (range)400-600 mg/L.hr   AUC24,ss: 468 mg/L.hr  Probability of AUC24 > 400: 68 %  Ctrough,ss: 14.7 mg/L  Probability of Ctrough,ss > 20: 22 %  Probability of nephrotoxicity (Lodise ODILON ): 10 %          Plan:  1. Start vancomycin  1500 mg IV x1 dose, then 1000 mg IV q24h.   2. Vancomycin monitoring method: AUC  3. Vancomycin therapeutic monitoring goal: 400-600 mg*h/L  4. Pharmacy will check vancomycin levels as appropriate " in 1-3 Days.    5. Serum creatinine levels will be ordered a minimum of twice weekly.      Mary Beth Nielsen, [facility]

## 2021-09-19 NOTE — PLAN OF CARE
Problem: Risk for Delirium  Goal: Optimal Coping  Outcome: Improving     Problem: Restraint for Non-Violent/Non-Self-Destructive Behavior  Goal: Prevent/Manage Potential Problems  Description: Maintain safety of patient and others during period of restraint.  Promote psychological and physical wellbeing.  Prevent injury to skin and involved body parts.  Promote nutrition, hydration, and elimination.  Outcome: Improving   Pt converted to NSR on diltiazem drip, drip stopped at 0920.  Pt remained sinus rest of shift.  Insulin drip stopped and transitioned to sliding scale after anion gap closed.  K 2.7, IV replacement ordered. Pt mostly sleepy during shift, precedex weaned off, more appropriately interactive in afternoon but still confused and disoriented x3.  Lasix ordered for low UOP with good results.  Family updated at bedside by RN and MIRANDA KIM consult ordered to address discharge planning and potential help with medication management.

## 2021-09-19 NOTE — PROGRESS NOTES
Critical Care Update:  Patient seen this am by Dr. Crowell. Drdesi reviewed, and nursing care reviewed with patients nurse. Also discussed with Dr. Durham.    Vital signs stable, now with sinus mechanism and controlled HR. Blood glucose coming down. CT abd/pelvis suggests resolved SBO. Lactate trending down, but mostly unchanged.    Will repeat evaluation for need for ex lap, but still may need.  Repeat lactic acid.   Titrate diltiazem off if able, likely start metoprolol IV/PO to maintain sinus rhythm  IVF resuscitation  Possibly transition to subcutaneous glargine once blood glucose better, and AG closed    Will have PICC placed for better IV access for current infusions, medications.    Additional CC time was 25 minutes.    Liam Sanchez, DO

## 2021-09-19 NOTE — SIGNIFICANT EVENT
"Significant Event Note    Time of event: 2:10 AM September 19, 2021    Sepsis Evaluation Progress Note    I was called to see Paige Mari due to abnormal vital signs triggering the Sepsis SIRS screening alert. She is known to have an infection, SBO, on zosyn.    Physical Exam   Vital Signs:  Temp: 96.8  F (36  C) Temp src: Axillary BP: (!) 171/74 Pulse: (!) 126   Resp: 22 SpO2: 96 % O2 Device: None (Room air)       General: acutely ill appearing  Mental Status: altered level of consciousness based on alert, following commands but unable to answer orientation questions, significantly agitated.   CV: Regular rhythm, tachycardiac rate appreciated  Pulm: clear to auscultation in all fields, moderate increased work of breathing      Data   Lactic Acid   Date Value Ref Range Status   09/19/2021 7.5 (HH) 0.7 - 2.0 mmol/L Final   09/18/2021 3.5 (H) 0.7 - 2.0 mmol/L Final       EKG showing sinus tachycardia, no acute ischemic changes appreciated.    Assessment & Plan   Paige Mari meets SIRS criteria AND has a lactate >2 or other evidence of acute organ damage.  These vital signs, lab and physical exam findings constitute a diagnosis of SEVERE SEPSIS.    Sepsis Time-Zero (time severe sepsis diagnosis confirmed):  09/19/21 as this was the time when Lactate resulted, and the level was > 2.0     Anti-infectives (From now, onward)    Start     Dose/Rate Route Frequency Ordered Stop    09/18/21 0800  piperacillin-tazobactam (ZOSYN) 3.375 g vial to attach to  mL bag     Note to Pharmacy: For [facility], [facility] and [facility]: For Zosyn-naive patients, use the \"Zosyn initial dose + extended infusion\" order panel.    3.375 g  over 240 Minutes Intravenous EVERY 8 HOURS 09/18/21 0122          Current antibiotic coverage is appropriate for source of infection. Adding vancomycin to broaden coverage.     3 Hour Severe Sepsis Bundle Completion:  1. Initial Lactic Acid result shown above. Repeat lactic acid ordered for 2 hours from now. "   2. Blood Cultures before Antibiotics: Yes x2  3. Broad Spectrum Antibiotics Administered: yes  4. Fluids: 1500 mL fluids ORDERED to be given    5. Planning to repeat CT for assessment of SBO, concern for possible perforation.     Gave 5 mg IM zyprexa for agitation with lab draw and EKG. Plan for 0.5 mg IV ativan for agitation/anxiety during CT.    Disposition: The patient will be transferred to the ICU. Discussed case with Dr. Johnson.    Judi Holley MD    Sepsis Criteria   Sepsis: 2+ SIRS criteria due to infection  Severe Sepsis: Sepsis AND 1+ new sign of acute organ dysfunction (Note: lactate >2 or acute encephalopathy each qualify as organ dysfunction)  Septic Shock: Sepsis AND hypotension despite volume resuscitation with 30 ml/kg crystalloid or lactate >=4  Note: HYPOTENSION is defined as 2 BP readings measured 3 hrs apart that have a SBP <90, MAP <65, or decrease >40 mmHg, occurring 6 hrs before or after t-zero      Addendum 2:30 AM  Notified of lactic acid 8.1. Will continue interventions as above. Continuing zosyn, adding vancomycin for broad spectrum abx coverage. Adequate fluid resuscitation ordered. Patient in emergent CT for evaluation of SBO. Intensivist aware and accepting transfer of patient.

## 2021-09-19 NOTE — SIGNIFICANT EVENT
Significant Event Note    Time of event: 11:00     Description of event:  Called about blood sugar of 507 at 19:00. Changed sliding scale from medium sliding scale to high sliding scale. Patient was given bedtime lantus 15 units at 22:00, and then 7 units aspart at 22:45, but sugar continued to increase to 528.     Plan:  - Ordered 7 units aspart to be given at 23:00.     Lisy Mariscal MD  [facility], PGY-1  Pager # [phone]

## 2021-09-20 ENCOUNTER — APPOINTMENT (OUTPATIENT)
Dept: CT IMAGING | Facility: HOSPITAL | Age: 71
DRG: 388 | End: 2021-09-20
Attending: INTERNAL MEDICINE
Payer: COMMERCIAL

## 2021-09-20 LAB
ALBUMIN UR-MCNC: NEGATIVE MG/DL
ANION GAP SERPL CALCULATED.3IONS-SCNC: 10 MMOL/L (ref 5–18)
ANION GAP SERPL CALCULATED.3IONS-SCNC: 11 MMOL/L (ref 5–18)
APPEARANCE UR: CLEAR
BACTERIA #/AREA URNS HPF: ABNORMAL /HPF
BILIRUB UR QL STRIP: NEGATIVE
BUN SERPL-MCNC: 17 MG/DL (ref 8–28)
BUN SERPL-MCNC: 20 MG/DL (ref 8–28)
CALCIUM SERPL-MCNC: 8.3 MG/DL (ref 8.5–10.5)
CALCIUM SERPL-MCNC: 8.5 MG/DL (ref 8.5–10.5)
CHLORIDE BLD-SCNC: 107 MMOL/L (ref 98–107)
CHLORIDE BLD-SCNC: 111 MMOL/L (ref 98–107)
CO2 SERPL-SCNC: 23 MMOL/L (ref 22–31)
CO2 SERPL-SCNC: 25 MMOL/L (ref 22–31)
COLOR UR AUTO: COLORLESS
CREAT SERPL-MCNC: 1 MG/DL (ref 0.6–1.1)
CREAT SERPL-MCNC: 1.03 MG/DL (ref 0.6–1.1)
ERYTHROCYTE [DISTWIDTH] IN BLOOD BY AUTOMATED COUNT: 13.5 % (ref 10–15)
GFR SERPL CREATININE-BSD FRML MDRD: 54 ML/MIN/1.73M2
GFR SERPL CREATININE-BSD FRML MDRD: 56 ML/MIN/1.73M2
GLUCOSE BLD-MCNC: 124 MG/DL (ref 70–125)
GLUCOSE BLD-MCNC: 197 MG/DL (ref 70–125)
GLUCOSE BLDC GLUCOMTR-MCNC: 131 MG/DL (ref 70–99)
GLUCOSE BLDC GLUCOMTR-MCNC: 132 MG/DL (ref 70–99)
GLUCOSE BLDC GLUCOMTR-MCNC: 181 MG/DL (ref 70–99)
GLUCOSE BLDC GLUCOMTR-MCNC: 185 MG/DL (ref 70–99)
GLUCOSE BLDC GLUCOMTR-MCNC: 194 MG/DL (ref 70–99)
GLUCOSE BLDC GLUCOMTR-MCNC: 197 MG/DL (ref 70–99)
GLUCOSE UR STRIP-MCNC: NEGATIVE MG/DL
HCT VFR BLD AUTO: 38 % (ref 35–47)
HGB BLD-MCNC: 12.7 G/DL (ref 11.7–15.7)
HGB UR QL STRIP: ABNORMAL
KETONES UR STRIP-MCNC: NEGATIVE MG/DL
LACTATE SERPL-SCNC: 1 MMOL/L (ref 0.7–2)
LACTATE SERPL-SCNC: 1.6 MMOL/L (ref 0.7–2)
LEUKOCYTE ESTERASE UR QL STRIP: NEGATIVE
MAGNESIUM SERPL-MCNC: 1.5 MG/DL (ref 1.8–2.6)
MAGNESIUM SERPL-MCNC: 2.3 MG/DL (ref 1.8–2.6)
MCH RBC QN AUTO: 33.3 PG (ref 26.5–33)
MCHC RBC AUTO-ENTMCNC: 33.4 G/DL (ref 31.5–36.5)
MCV RBC AUTO: 100 FL (ref 78–100)
NITRATE UR QL: NEGATIVE
PH UR STRIP: 7 [PH] (ref 5–7)
PHOSPHATE SERPL-MCNC: 2.1 MG/DL (ref 2.5–4.5)
PLATELET # BLD AUTO: 193 10E3/UL (ref 150–450)
POTASSIUM BLD-SCNC: 3.1 MMOL/L (ref 3.5–5)
POTASSIUM BLD-SCNC: 3.4 MMOL/L (ref 3.5–5)
POTASSIUM BLD-SCNC: 3.7 MMOL/L (ref 3.5–5)
POTASSIUM BLD-SCNC: 3.9 MMOL/L (ref 3.5–5)
PROCALCITONIN SERPL-MCNC: 0.1 NG/ML (ref 0–0.49)
RBC # BLD AUTO: 3.81 10E6/UL (ref 3.8–5.2)
RBC URINE: 8 /HPF
SODIUM SERPL-SCNC: 143 MMOL/L (ref 136–145)
SODIUM SERPL-SCNC: 144 MMOL/L (ref 136–145)
SP GR UR STRIP: 1.01 (ref 1–1.03)
UROBILINOGEN UR STRIP-MCNC: <2 MG/DL
WBC # BLD AUTO: 19.4 10E3/UL (ref 4–11)
WBC URINE: 1 /HPF

## 2021-09-20 PROCEDURE — 99231 SBSQ HOSP IP/OBS SF/LOW 25: CPT | Performed by: NURSE PRACTITIONER

## 2021-09-20 PROCEDURE — 80048 BASIC METABOLIC PNL TOTAL CA: CPT | Performed by: INTERNAL MEDICINE

## 2021-09-20 PROCEDURE — 99233 SBSQ HOSP IP/OBS HIGH 50: CPT | Performed by: HOSPITALIST

## 2021-09-20 PROCEDURE — 81001 URINALYSIS AUTO W/SCOPE: CPT | Performed by: INTERNAL MEDICINE

## 2021-09-20 PROCEDURE — 87040 BLOOD CULTURE FOR BACTERIA: CPT | Performed by: INTERNAL MEDICINE

## 2021-09-20 PROCEDURE — 250N000009 HC RX 250: Performed by: INTERNAL MEDICINE

## 2021-09-20 PROCEDURE — 250N000011 HC RX IP 250 OP 636: Performed by: INTERNAL MEDICINE

## 2021-09-20 PROCEDURE — 99223 1ST HOSP IP/OBS HIGH 75: CPT | Performed by: NURSE PRACTITIONER

## 2021-09-20 PROCEDURE — 99233 SBSQ HOSP IP/OBS HIGH 50: CPT | Performed by: INTERNAL MEDICINE

## 2021-09-20 PROCEDURE — 83605 ASSAY OF LACTIC ACID: CPT | Performed by: INTERNAL MEDICINE

## 2021-09-20 PROCEDURE — 250N000011 HC RX IP 250 OP 636: Performed by: HOSPITALIST

## 2021-09-20 PROCEDURE — 85027 COMPLETE CBC AUTOMATED: CPT | Performed by: INTERNAL MEDICINE

## 2021-09-20 PROCEDURE — 200N000001 HC R&B ICU

## 2021-09-20 PROCEDURE — 250N000013 HC RX MED GY IP 250 OP 250 PS 637: Performed by: INTERNAL MEDICINE

## 2021-09-20 PROCEDURE — 83735 ASSAY OF MAGNESIUM: CPT | Performed by: INTERNAL MEDICINE

## 2021-09-20 PROCEDURE — C9113 INJ PANTOPRAZOLE SODIUM, VIA: HCPCS | Performed by: INTERNAL MEDICINE

## 2021-09-20 PROCEDURE — 84145 PROCALCITONIN (PCT): CPT | Performed by: INTERNAL MEDICINE

## 2021-09-20 PROCEDURE — 36592 COLLECT BLOOD FROM PICC: CPT | Performed by: INTERNAL MEDICINE

## 2021-09-20 PROCEDURE — 84132 ASSAY OF SERUM POTASSIUM: CPT | Performed by: INTERNAL MEDICINE

## 2021-09-20 PROCEDURE — 250N000012 HC RX MED GY IP 250 OP 636 PS 637: Performed by: INTERNAL MEDICINE

## 2021-09-20 PROCEDURE — 70450 CT HEAD/BRAIN W/O DYE: CPT

## 2021-09-20 PROCEDURE — 258N000003 HC RX IP 258 OP 636: Performed by: INTERNAL MEDICINE

## 2021-09-20 PROCEDURE — 258N000003 HC RX IP 258 OP 636: Performed by: HOSPITALIST

## 2021-09-20 PROCEDURE — 250N000013 HC RX MED GY IP 250 OP 250 PS 637: Performed by: NURSE PRACTITIONER

## 2021-09-20 PROCEDURE — 120N000013 HC R&B IMCU

## 2021-09-20 PROCEDURE — 84100 ASSAY OF PHOSPHORUS: CPT | Performed by: INTERNAL MEDICINE

## 2021-09-20 RX ORDER — CALCIUM GLUCONATE 20 MG/ML
1 INJECTION, SOLUTION INTRAVENOUS ONCE
Status: COMPLETED | OUTPATIENT
Start: 2021-09-20 | End: 2021-09-20

## 2021-09-20 RX ORDER — ACETAMINOPHEN 325 MG/1
650 TABLET ORAL EVERY 4 HOURS PRN
Status: DISCONTINUED | OUTPATIENT
Start: 2021-09-20 | End: 2021-09-22 | Stop reason: HOSPADM

## 2021-09-20 RX ORDER — ACETAMINOPHEN 650 MG/1
650 SUPPOSITORY RECTAL EVERY 4 HOURS PRN
Status: DISCONTINUED | OUTPATIENT
Start: 2021-09-20 | End: 2021-09-22 | Stop reason: HOSPADM

## 2021-09-20 RX ORDER — METOPROLOL SUCCINATE 100 MG/1
200 TABLET, EXTENDED RELEASE ORAL DAILY
Status: DISCONTINUED | OUTPATIENT
Start: 2021-09-20 | End: 2021-09-21

## 2021-09-20 RX ORDER — OXYCODONE HYDROCHLORIDE 5 MG/1
5 TABLET ORAL EVERY 4 HOURS PRN
Status: DISCONTINUED | OUTPATIENT
Start: 2021-09-20 | End: 2021-09-22 | Stop reason: HOSPADM

## 2021-09-20 RX ORDER — NALOXONE HYDROCHLORIDE 0.4 MG/ML
0.2 INJECTION, SOLUTION INTRAMUSCULAR; INTRAVENOUS; SUBCUTANEOUS
Status: DISCONTINUED | OUTPATIENT
Start: 2021-09-20 | End: 2021-09-22 | Stop reason: HOSPADM

## 2021-09-20 RX ORDER — POTASSIUM CHLORIDE 29.8 MG/ML
20 INJECTION INTRAVENOUS
Status: COMPLETED | OUTPATIENT
Start: 2021-09-20 | End: 2021-09-20

## 2021-09-20 RX ORDER — MAGNESIUM SULFATE 4 G/50ML
4 INJECTION INTRAVENOUS ONCE
Status: COMPLETED | OUTPATIENT
Start: 2021-09-20 | End: 2021-09-20

## 2021-09-20 RX ORDER — RAMELTEON 8 MG/1
8 TABLET ORAL AT BEDTIME
Status: DISCONTINUED | OUTPATIENT
Start: 2021-09-21 | End: 2021-09-22 | Stop reason: HOSPADM

## 2021-09-20 RX ORDER — OLANZAPINE 2.5 MG/1
2.5 TABLET, FILM COATED ORAL 3 TIMES DAILY
Status: DISCONTINUED | OUTPATIENT
Start: 2021-09-20 | End: 2021-09-21

## 2021-09-20 RX ORDER — OLANZAPINE 5 MG/1
10 TABLET, ORALLY DISINTEGRATING ORAL AT BEDTIME
Status: COMPLETED | OUTPATIENT
Start: 2021-09-20 | End: 2021-09-20

## 2021-09-20 RX ORDER — NALOXONE HYDROCHLORIDE 0.4 MG/ML
0.4 INJECTION, SOLUTION INTRAMUSCULAR; INTRAVENOUS; SUBCUTANEOUS
Status: DISCONTINUED | OUTPATIENT
Start: 2021-09-20 | End: 2021-09-22 | Stop reason: HOSPADM

## 2021-09-20 RX ORDER — DOCUSATE SODIUM 100 MG/1
200 CAPSULE, LIQUID FILLED ORAL 2 TIMES DAILY
COMMUNITY
End: 2022-05-26

## 2021-09-20 RX ORDER — MULTIPLE VITAMINS W/ MINERALS TAB 9MG-400MCG
1 TAB ORAL DAILY
COMMUNITY
End: 2024-07-31

## 2021-09-20 RX ORDER — LANOLIN ALCOHOL/MO/W.PET/CERES
1000 CREAM (GRAM) TOPICAL DAILY
COMMUNITY
End: 2023-08-17

## 2021-09-20 RX ORDER — DILTIAZEM HYDROCHLORIDE 5 MG/ML
10 INJECTION INTRAVENOUS ONCE
Status: COMPLETED | OUTPATIENT
Start: 2021-09-20 | End: 2021-09-20

## 2021-09-20 RX ORDER — DULOXETIN HYDROCHLORIDE 30 MG/1
30 CAPSULE, DELAYED RELEASE ORAL DAILY
Status: ON HOLD | COMMUNITY
End: 2022-01-26

## 2021-09-20 RX ADMIN — Medication 5 MG: at 20:28

## 2021-09-20 RX ADMIN — INSULIN ASPART 1 UNITS: 100 INJECTION, SOLUTION INTRAVENOUS; SUBCUTANEOUS at 03:51

## 2021-09-20 RX ADMIN — OLANZAPINE 10 MG: 5 TABLET, ORALLY DISINTEGRATING ORAL at 20:28

## 2021-09-20 RX ADMIN — METOPROLOL SUCCINATE 200 MG: 100 TABLET, FILM COATED, EXTENDED RELEASE ORAL at 17:46

## 2021-09-20 RX ADMIN — SODIUM PHOSPHATE, MONOBASIC, MONOHYDRATE 9 MMOL: 276; 142 INJECTION, SOLUTION INTRAVENOUS at 05:53

## 2021-09-20 RX ADMIN — DILTIAZEM HYDROCHLORIDE 10 MG: 5 INJECTION INTRAVENOUS at 16:32

## 2021-09-20 RX ADMIN — OLANZAPINE 2.5 MG: 2.5 TABLET ORAL at 17:31

## 2021-09-20 RX ADMIN — PIPERACILLIN SODIUM AND TAZOBACTAM SODIUM 3.38 G: 3; .375 INJECTION, POWDER, LYOPHILIZED, FOR SOLUTION INTRAVENOUS at 12:01

## 2021-09-20 RX ADMIN — HYDRALAZINE HYDROCHLORIDE 20 MG: 20 INJECTION INTRAMUSCULAR; INTRAVENOUS at 16:13

## 2021-09-20 RX ADMIN — INSULIN ASPART 2 UNITS: 100 INJECTION, SOLUTION INTRAVENOUS; SUBCUTANEOUS at 09:13

## 2021-09-20 RX ADMIN — HEPARIN SODIUM 5000 UNITS: 5000 INJECTION, SOLUTION INTRAVENOUS; SUBCUTANEOUS at 09:17

## 2021-09-20 RX ADMIN — POTASSIUM CHLORIDE 20 MEQ: 29.8 INJECTION, SOLUTION INTRAVENOUS at 06:02

## 2021-09-20 RX ADMIN — HEPARIN SODIUM 5000 UNITS: 5000 INJECTION, SOLUTION INTRAVENOUS; SUBCUTANEOUS at 20:28

## 2021-09-20 RX ADMIN — PREGABALIN 100 MG: 75 CAPSULE ORAL at 09:21

## 2021-09-20 RX ADMIN — OXYCODONE HYDROCHLORIDE 5 MG: 5 TABLET ORAL at 16:56

## 2021-09-20 RX ADMIN — TIZANIDINE 2 MG: 2 TABLET ORAL at 20:28

## 2021-09-20 RX ADMIN — PREGABALIN 100 MG: 75 CAPSULE ORAL at 20:28

## 2021-09-20 RX ADMIN — PIPERACILLIN SODIUM AND TAZOBACTAM SODIUM 3.38 G: 3; .375 INJECTION, POWDER, LYOPHILIZED, FOR SOLUTION INTRAVENOUS at 19:25

## 2021-09-20 RX ADMIN — INSULIN GLARGINE 15 UNITS: 100 INJECTION, SOLUTION SUBCUTANEOUS at 11:52

## 2021-09-20 RX ADMIN — MAGNESIUM SULFATE HEPTAHYDRATE 4 G: 80 INJECTION, SOLUTION INTRAVENOUS at 06:14

## 2021-09-20 RX ADMIN — INSULIN ASPART 1 UNITS: 100 INJECTION, SOLUTION INTRAVENOUS; SUBCUTANEOUS at 11:52

## 2021-09-20 RX ADMIN — VANCOMYCIN HYDROCHLORIDE 1000 MG: 5 INJECTION, POWDER, LYOPHILIZED, FOR SOLUTION INTRAVENOUS at 01:11

## 2021-09-20 RX ADMIN — ACETAMINOPHEN 650 MG: 650 SUPPOSITORY RECTAL at 21:07

## 2021-09-20 RX ADMIN — ACETAMINOPHEN 650 MG: 650 SUPPOSITORY RECTAL at 16:56

## 2021-09-20 RX ADMIN — OLANZAPINE 2.5 MG: 2.5 TABLET ORAL at 14:35

## 2021-09-20 RX ADMIN — PANTOPRAZOLE SODIUM 40 MG: 40 INJECTION, POWDER, FOR SOLUTION INTRAVENOUS at 06:36

## 2021-09-20 RX ADMIN — METOPROLOL TARTRATE 5 MG: 5 INJECTION INTRAVENOUS at 15:23

## 2021-09-20 RX ADMIN — DEXMEDETOMIDINE HYDROCHLORIDE 0.5 MCG/KG/HR: 4 INJECTION, SOLUTION INTRAVENOUS at 06:27

## 2021-09-20 RX ADMIN — DILTIAZEM HYDROCHLORIDE 5 MG/HR: 5 INJECTION INTRAVENOUS at 16:33

## 2021-09-20 RX ADMIN — POTASSIUM CHLORIDE 20 MEQ: 29.8 INJECTION, SOLUTION INTRAVENOUS at 18:39

## 2021-09-20 RX ADMIN — INSULIN GLARGINE 15 UNITS: 100 INJECTION, SOLUTION SUBCUTANEOUS at 20:30

## 2021-09-20 RX ADMIN — INSULIN ASPART 2 UNITS: 100 INJECTION, SOLUTION INTRAVENOUS; SUBCUTANEOUS at 23:39

## 2021-09-20 RX ADMIN — PIPERACILLIN SODIUM AND TAZOBACTAM SODIUM 3.38 G: 3; .375 INJECTION, POWDER, LYOPHILIZED, FOR SOLUTION INTRAVENOUS at 03:45

## 2021-09-20 RX ADMIN — ARIPIPRAZOLE 2 MG: 2 TABLET ORAL at 09:26

## 2021-09-20 RX ADMIN — CALCIUM GLUCONATE 1 G: 20 INJECTION, SOLUTION INTRAVENOUS at 17:31

## 2021-09-20 RX ADMIN — POTASSIUM CHLORIDE 20 MEQ: 29.8 INJECTION, SOLUTION INTRAVENOUS at 17:31

## 2021-09-20 RX ADMIN — SODIUM CHLORIDE, POTASSIUM CHLORIDE, SODIUM LACTATE AND CALCIUM CHLORIDE: 600; 310; 30; 20 INJECTION, SOLUTION INTRAVENOUS at 05:46

## 2021-09-20 RX ADMIN — POTASSIUM CHLORIDE 20 MEQ: 29.8 INJECTION, SOLUTION INTRAVENOUS at 08:00

## 2021-09-20 ASSESSMENT — MIFFLIN-ST. JEOR: SCORE: 1147.27

## 2021-09-20 NOTE — PROGRESS NOTES
ICU Update:    Patient had negative Head CT.  Seen by Psych, remains off of precedex.   Will transfer patient to floor, discussed with hospitalist.     Updated daughter via phone ( hasn't been sleeping well, was napping). Explained plans for transfer, negative head CT, awaiting her to clear.    Fanny Noriega MD

## 2021-09-20 NOTE — PLAN OF CARE
Problem: Adult Inpatient Plan of Care  Goal: Plan of Care Review  Outcome: Improving  This evening, Pt is alert to self; but, continues to demonstrate confusion, and remains significantly restlessness, with repeated attempts to roll self out of bed. Restlessness effectively managed with  O.5 mg IV Ativan, and Precedex drip titrated to RASS Goal. IV opioid analgesic administered PRN, per Pt request s/t c/o ABD. Vital signs stable. Cardiac rhythm remains normal sinus, with occasional unifocal PVCs. Electrolyte replacement, per Protocol. IV ABX administered, as scheduled. Pt remains NPO. PPI scheduled for am. Subcutaneous anticoagulation administered, as ordered. Below the knee sequential compression devices in place, bilaterally. Will continue to monitor. Arnav Pritchett RN

## 2021-09-20 NOTE — CONSULTS
INITIAL PSYCHIATRIC CONSULTATION  This note was created with the help of Dragon dictation system. Grammatical and typing errors are not intentional.                  REASON FOR REQUEST: Agitated delirium    ASSESSMENT/RECOMMENDATIONS/PLAN :    Metabolic encephalopathy likely exacerbated in the context of medical condition, advanced age, ICU delirium.  Cognitive and behavioral changes due to above.  Sleep difficulties/disturbance  Depression NOS  Agitation, impulsivity, restlessness: Needing Precedex  Sundowning syndrome: Exacerbating above  Recommendations:  Efforts at sleep regulation. Redirection, reorientation.   Olanzapine 2.5 mg 3 times a day x2 days.  Olanzapine 10 mg at bedtime x1 dose.  Rozerem 8 mg at bedtime starting tomorrow.  Melatonin sublingual 5 mg with supper x1 dose.  Abilify 2 mg daily.  Case discussed with dr Noriega.     MENTAL STATUS EXAMINATION:   Appearance: Stated age, fluctuating levels of consciousness, pleasant, cooperative.  Speech: Slow, devoid of content  Thought Process: Increased latency.  Thought content: Does not appear to respond to internally generated stimuli, no acute visual or auditory hallucination; No manic symptoms, no paranoia no delusional thoughts.  Thought Formation: No loosening of association or flight of ideas.  Judgment: Impaired  Insight : Impaired  Attention : Adequate  Memory: Impaired, difficulty recalling events from last few days, doesn't know the name of the hospital at [facility]. Interestingly patient could not remember her first or last name when asked.  Fund Of Knowledge: Depressed  Affect: Neutral  Mood: Congruent  Alert and Oriented: Awake, orientation x2  Comprehension: Depressed   Generative thought content: Reduced  Language: Intact  Gait and Ambulation: With assist of one.  Problem solving: Impaired. Executive functioning : Impaired.   Cognitive set shifting : Impaired.  Delayed responses to questions.   No tremors. No muscle rigidity.  Psychomotor  "slow.   Not engaging in a spontaneous conversation.    BP (!) 141/71   Pulse 69   Temp 97.7  F (36.5  C) (Axillary)   Resp 25   Ht 1.575 m (5' 2\")   Wt 68.4 kg (150 lb 12.8 oz)   SpO2 97%   BMI 27.58 kg/m        HISTORY OF PRESENT ILLNESS:   Presenting history to include: Per [facility] [place]:   Paige Mari is a 71 year old old female with a past medical history of hypertension, hyperlipidemia, diabetes mellitus type 2, diverticulitis, bowel obstruction, s/p bowel resection (2009), s/p appendectomy who presents by walk in with her  for the evaluation of abdominal pain. Patient reports developing abdominal pain in the middle of her abdomen which wraps around into the left side since 11:00 AM (~12 hours ago) this morning. Patient is also feeling nausea and bloating. She states she is still able to pass some gas. Patient reports her last bowel movement was yesterday. Patient states her current symptoms feel like her previous episodes of diverticulitis. H/o surgery for diverticulitis and sbo. This is her 2nd episodes of sbo. History was obtained from pt/chart   Upon assessment, patient was noted to be resting comfortably in ICU bed, awake and pleasant. She was able to remember the name of the [facility] but did not know what precipitated this hospitalization nor when she came to hospital. She had no recollection of events from last night.  She did not report of any visual or auditory hallucinations, delusions or paranoia. She did not report of any thoughts of self-harm or suicidality. Could not tell me if she was taking any medication for anxiety or depression. She was unable to recall her home medications as well. She is still confused, disoriented, needing redirection as well as cueing when answering questions. She was unable to locate her earrings when I mentioned of them. She also did not know whether this was morning or evening even after being told that it was almost 10 AM.   Just before I " "saw Ms Lemus, Dr Noriega had asked her all orientation questions including her name. She did not know what her last name was but when I started spelling it she was able to give me correct spelling of her name but could not verbalize it for a second. She also did not tell me her first name when asked. But when I asked her Is you first name Paige; she spontaneously said yes. There was a delay in responses to questions.   Reviewed home medications to include but not limited to Abilify 2 mg, medical cannabis, Lyrica 100 mg twice a day.  Per chart review patient's history is significant for depression. Unclear how long she has taken Abilify. She is a retired RN.    Review of Systems:As per HPI. Remainders of 12 point review of systems negative.  Psychiatric ROS:  Patient is unable to provide any meaningful information in regards to psychiatric review of system.:         PFSH reviewed  and not pertinent to chief complaint/reason for visit  BP (!) 141/65   Pulse 57   Temp 97.7  F (36.5  C) (Axillary)   Resp 27   Ht 1.575 m (5' 2\")   Wt 68.4 kg (150 lb 12.8 oz)   SpO2 96%   BMI 27.58 kg/m    No results found for: AMPHET, PCP, BARBIT, OXYCODONE, THC, ETOH  @24HOURRESULTS@  Recent Results (from the past 72 hour(s))   Comprehensive metabolic panel    Collection Time: 09/17/21 10:40 PM   Result Value Ref Range    Sodium 138 136 - 145 mmol/L    Potassium 4.6 3.5 - 5.0 mmol/L    Chloride 100 98 - 107 mmol/L    Carbon Dioxide (CO2) 25 22 - 31 mmol/L    Anion Gap 13 5 - 18 mmol/L    Urea Nitrogen 16 8 - 28 mg/dL    Creatinine 1.13 (H) 0.60 - 1.10 mg/dL    Calcium 9.6 8.5 - 10.5 mg/dL    Glucose 389 (H) 70 - 125 mg/dL    Alkaline Phosphatase 108 45 - 120 U/L    AST 28 0 - 40 U/L    ALT 26 0 - 45 U/L    Protein Total 7.3 6.0 - 8.0 g/dL    Albumin 4.0 3.5 - 5.0 g/dL    Bilirubin Total 0.7 0.0 - 1.0 mg/dL    GFR Estimate 49 (L) >60 mL/min/1.73m2   Lactic acid whole blood    Collection Time: 09/17/21 10:40 PM   Result Value Ref " Range    Lactic Acid 2.5 (H) 0.7 - 2.0 mmol/L   Troponin I (now)    Collection Time: 09/17/21 10:40 PM   Result Value Ref Range    Troponin I 0.01 0.00 - 0.29 ng/mL   CBC with platelets and differential    Collection Time: 09/17/21 10:40 PM   Result Value Ref Range    WBC Count 18.7 (H) 4.0 - 11.0 10e3/uL    RBC Count 5.02 3.80 - 5.20 10e6/uL    Hemoglobin 16.7 (H) 11.7 - 15.7 g/dL    Hematocrit 49.1 (H) 35.0 - 47.0 %    MCV 98 78 - 100 fL    MCH 33.3 (H) 26.5 - 33.0 pg    MCHC 34.0 31.5 - 36.5 g/dL    RDW 12.7 10.0 - 15.0 %    Platelet Count 294 150 - 450 10e3/uL    % Neutrophils 80 %    % Lymphocytes 13 %    % Monocytes 6 %    % Eosinophils 1 %    % Basophils 0 %    % Immature Granulocytes 0 %    NRBCs per 100 WBC 0 <1 /100    Absolute Neutrophils 14.9 (H) 1.6 - 8.3 10e3/uL    Absolute Lymphocytes 2.5 0.8 - 5.3 10e3/uL    Absolute Monocytes 1.1 0.0 - 1.3 10e3/uL    Absolute Eosinophils 0.2 0.0 - 0.7 10e3/uL    Absolute Basophils 0.1 0.0 - 0.2 10e3/uL    Absolute Immature Granulocytes 0.1 (H) <=0.0 10e3/uL    Absolute NRBCs 0.0 10e3/uL   Hemoglobin A1c    Collection Time: 09/17/21 10:40 PM   Result Value Ref Range    Hemoglobin A1C 8.0 (H) <=5.6 %   Asymptomatic COVID-19 Virus (Coronavirus) by PCR Nasopharyngeal    Collection Time: 09/18/21  1:34 AM    Specimen: Nasopharyngeal; Swab   Result Value Ref Range    SARS CoV2 PCR Negative Negative   Glucose by meter    Collection Time: 09/18/21  1:54 AM   Result Value Ref Range    GLUCOSE BY METER POCT 370 (H) 70 - 99 mg/dL   UA reflex to Microscopic and Culture    Collection Time: 09/18/21  2:28 AM    Specimen: Urine, Clean Catch   Result Value Ref Range    Color Urine Colorless Colorless, Straw, Light Yellow, Yellow    Appearance Urine Clear Clear    Glucose Urine >1000 (A) Negative mg/dL    Bilirubin Urine Negative Negative    Ketones Urine Trace (A) Negative mg/dL    Specific Gravity Urine 1.023 1.001 - 1.030    Blood Urine Negative Negative    pH Urine 7.0 5.0 - 7.0     Protein Albumin Urine Negative Negative mg/dL    Urobilinogen Urine <2.0 <2.0 mg/dL    Nitrite Urine Negative Negative    Leukocyte Esterase Urine Negative Negative   Glucose by meter    Collection Time: 09/18/21  6:30 AM   Result Value Ref Range    GLUCOSE BY METER POCT 295 (H) 70 - 99 mg/dL   Lactic acid whole blood    Collection Time: 09/18/21  7:10 AM   Result Value Ref Range    Lactic Acid 2.6 (H) 0.7 - 2.0 mmol/L   Glucose by meter    Collection Time: 09/18/21 10:17 AM   Result Value Ref Range    GLUCOSE BY METER POCT 314 (H) 70 - 99 mg/dL   Glucose by meter    Collection Time: 09/18/21  2:51 PM   Result Value Ref Range    GLUCOSE BY METER POCT 356 (H) 70 - 99 mg/dL   Lactic acid whole blood    Collection Time: 09/18/21  3:35 PM   Result Value Ref Range    Lactic Acid 3.5 (H) 0.7 - 2.0 mmol/L   Glucose by meter    Collection Time: 09/18/21  7:00 PM   Result Value Ref Range    GLUCOSE BY METER POCT 507 (HH) 70 - 99 mg/dL   Glucose by meter    Collection Time: 09/18/21  9:47 PM   Result Value Ref Range    GLUCOSE BY METER POCT 503 (HH) 70 - 99 mg/dL   Glucose by meter    Collection Time: 09/18/21 10:51 PM   Result Value Ref Range    GLUCOSE BY METER POCT 528 (HH) 70 - 99 mg/dL   Glucose by meter    Collection Time: 09/19/21 12:08 AM   Result Value Ref Range    GLUCOSE BY METER POCT 525 (HH) 70 - 99 mg/dL   Basic metabolic panel    Collection Time: 09/19/21  1:23 AM   Result Value Ref Range    Sodium 144 136 - 145 mmol/L    Potassium 3.5 3.5 - 5.0 mmol/L    Chloride 102 98 - 107 mmol/L    Carbon Dioxide (CO2) 15 (L) 22 - 31 mmol/L    Anion Gap 27 (H) 5 - 18 mmol/L    Urea Nitrogen 18 8 - 28 mg/dL    Creatinine 1.35 (H) 0.60 - 1.10 mg/dL    Calcium 9.9 8.5 - 10.5 mg/dL    Glucose 582 (HH) 70 - 125 mg/dL    GFR Estimate 39 (L) >60 mL/min/1.73m2   Extra Green Top (Lithium Heparin) ON ICE    Collection Time: 09/19/21  1:23 AM   Result Value Ref Range    Hold Specimen JIC    Lactic acid whole blood    Collection  Time: 09/19/21  1:23 AM   Result Value Ref Range    Lactic Acid 7.5 (HH) 0.7 - 2.0 mmol/L   Glucose by meter    Collection Time: 09/19/21  1:44 AM   Result Value Ref Range    GLUCOSE BY METER POCT 564 (HH) 70 - 99 mg/dL   ECG 12-LEAD WITH MUSE (LHE)    Collection Time: 09/19/21  1:51 AM   Result Value Ref Range    Systolic Blood Pressure  mmHg    Diastolic Blood Pressure  mmHg    Ventricular Rate 134 BPM    Atrial Rate 134 BPM    MT Interval 104 ms    QRS Duration 124 ms     ms    QTc 567 ms    P Axis  degrees    R AXIS -52 degrees    T Axis 106 degrees    Interpretation ECG       Atrial fibrillation with rapid ventricular response  Left axis deviation  Left bundle branch block  Abnormal ECG  When compared with ECG of 17-SEP-2021 22:17,  Atrial fibrillation has replaced Sinus rhythm  Vent. rate has increased BY  55 BPM  ST now depressed in Lateral leads  T wave inversion more evident in Lateral leads  Confirmed by GAGAN DAILY MD LOC: ([id number]) on 9/19/2021 11:49:36 AM     Blood Culture Peripheral Blood    Collection Time: 09/19/21  2:06 AM    Specimen: Peripheral Blood   Result Value Ref Range    Culture No growth after 12 hours    Lactic acid whole blood    Collection Time: 09/19/21  2:06 AM   Result Value Ref Range    Lactic Acid 8.1 (HH) 0.7 - 2.0 mmol/L   CBC with platelets and differential    Collection Time: 09/19/21  2:06 AM   Result Value Ref Range    WBC Count 22.9 (H) 4.0 - 11.0 10e3/uL    RBC Count 4.99 3.80 - 5.20 10e6/uL    Hemoglobin 16.6 (H) 11.7 - 15.7 g/dL    Hematocrit 49.5 (H) 35.0 - 47.0 %    MCV 99 78 - 100 fL    MCH 33.3 (H) 26.5 - 33.0 pg    MCHC 33.5 31.5 - 36.5 g/dL    RDW 13.2 10.0 - 15.0 %    Platelet Count 320 150 - 450 10e3/uL    % Neutrophils 90 %    % Lymphocytes 6 %    % Monocytes 3 %    % Eosinophils 0 %    % Basophils 0 %    % Immature Granulocytes 1 %    NRBCs per 100 WBC 0 <1 /100    Absolute Neutrophils 20.9 (H) 1.6 - 8.3 10e3/uL    Absolute Lymphocytes 1.3 0.8 - 5.3  10e3/uL    Absolute Monocytes 0.7 0.0 - 1.3 10e3/uL    Absolute Eosinophils 0.0 0.0 - 0.7 10e3/uL    Absolute Basophils 0.0 0.0 - 0.2 10e3/uL    Absolute Immature Granulocytes 0.1 (H) <=0.0 10e3/uL    Absolute NRBCs 0.0 10e3/uL   Hemoglobin A1c    Collection Time: 09/19/21  2:06 AM   Result Value Ref Range    Hemoglobin A1C 8.1 (H) <=5.6 %   Blood Culture Peripheral Blood    Collection Time: 09/19/21  2:11 AM    Specimen: Peripheral Blood   Result Value Ref Range    Culture No growth after 12 hours    Extra Green Top (Lithium Heparin) Tube    Collection Time: 09/19/21  2:11 AM   Result Value Ref Range    Hold Specimen JIC    Troponin I    Collection Time: 09/19/21  2:11 AM   Result Value Ref Range    Troponin I 0.06 0.00 - 0.29 ng/mL   Extra Red Top Tube    Collection Time: 09/19/21  2:22 AM   Result Value Ref Range    Hold Specimen JIC    Extra Blue Top Tube    Collection Time: 09/19/21  2:23 AM   Result Value Ref Range    Hold Specimen JIC    Glucose by meter    Collection Time: 09/19/21  3:31 AM   Result Value Ref Range    GLUCOSE BY METER POCT 460 (H) 70 - 99 mg/dL   Glucose by meter    Collection Time: 09/19/21  4:46 AM   Result Value Ref Range    GLUCOSE BY METER POCT 294 (H) 70 - 99 mg/dL   Comprehensive metabolic panel    Collection Time: 09/19/21  5:25 AM   Result Value Ref Range    Sodium 150 (H) 136 - 145 mmol/L    Potassium 3.5 3.5 - 5.0 mmol/L    Chloride 111 (H) 98 - 107 mmol/L    Carbon Dioxide (CO2) 18 (L) 22 - 31 mmol/L    Anion Gap 21 (H) 5 - 18 mmol/L    Urea Nitrogen 17 8 - 28 mg/dL    Creatinine 1.11 (H) 0.60 - 1.10 mg/dL    Calcium 9.2 8.5 - 10.5 mg/dL    Glucose 307 (H) 70 - 125 mg/dL    Alkaline Phosphatase 67 45 - 120 U/L    AST 30 0 - 40 U/L    ALT 19 0 - 45 U/L    Protein Total 6.6 6.0 - 8.0 g/dL    Albumin 3.6 3.5 - 5.0 g/dL    Bilirubin Total 1.0 0.0 - 1.0 mg/dL    GFR Estimate 50 (L) >60 mL/min/1.73m2   Magnesium    Collection Time: 09/19/21  5:25 AM   Result Value Ref Range     Magnesium 1.8 1.8 - 2.6 mg/dL   Potassium    Collection Time: 09/19/21  5:25 AM   Result Value Ref Range    Potassium 3.5 3.5 - 5.0 mmol/L   Lactic acid whole blood    Collection Time: 09/19/21  5:26 AM   Result Value Ref Range    Lactic Acid 7.6 (HH) 0.7 - 2.0 mmol/L   Glucose by meter    Collection Time: 09/19/21  5:37 AM   Result Value Ref Range    GLUCOSE BY METER POCT 267 (H) 70 - 99 mg/dL   Glucose by meter    Collection Time: 09/19/21  6:27 AM   Result Value Ref Range    GLUCOSE BY METER POCT 256 (H) 70 - 99 mg/dL   Glucose by meter    Collection Time: 09/19/21  7:31 AM   Result Value Ref Range    GLUCOSE BY METER POCT 275 (H) 70 - 99 mg/dL   Glucose by meter    Collection Time: 09/19/21  8:04 AM   Result Value Ref Range    GLUCOSE BY METER POCT 267 (H) 70 - 99 mg/dL   Glucose by meter    Collection Time: 09/19/21  8:32 AM   Result Value Ref Range    GLUCOSE BY METER POCT 266 (H) 70 - 99 mg/dL   Glucose by meter    Collection Time: 09/19/21  9:33 AM   Result Value Ref Range    GLUCOSE BY METER POCT 251 (H) 70 - 99 mg/dL   Glucose by meter    Collection Time: 09/19/21 10:35 AM   Result Value Ref Range    GLUCOSE BY METER POCT 169 (H) 70 - 99 mg/dL   Glucose by meter    Collection Time: 09/19/21 11:34 AM   Result Value Ref Range    GLUCOSE BY METER POCT 130 (H) 70 - 99 mg/dL   Lactic acid whole blood    Collection Time: 09/19/21 11:37 AM   Result Value Ref Range    Lactic Acid 2.4 (H) 0.7 - 2.0 mmol/L   Blood gas venous    Collection Time: 09/19/21 11:37 AM   Result Value Ref Range    pH Venous 7.44 7.35 - 7.45    pCO2 Venous 42 35 - 50 mm Hg    pO2 Venous 32 25 - 47 mm Hg    Bicarbonate Venous 27 24 - 30 mmol/L    Base Excess/Deficit (+/-) 4.2   mmol/L    Oxyhemoglobin Venous 64.8 (L) 70.0 - 75.0 %    O2 Sat, Venous 65.9 (L) 70.0 - 75.0 %   CBC with platelets and differential    Collection Time: 09/19/21 11:37 AM   Result Value Ref Range    WBC Count 20.2 (H) 4.0 - 11.0 10e3/uL    RBC Count 3.81 3.80 - 5.20  10e6/uL    Hemoglobin 12.7 11.7 - 15.7 g/dL    Hematocrit 37.5 35.0 - 47.0 %    MCV 98 78 - 100 fL    MCH 33.3 (H) 26.5 - 33.0 pg    MCHC 33.9 31.5 - 36.5 g/dL    RDW 13.3 10.0 - 15.0 %    Platelet Count 228 150 - 450 10e3/uL    % Neutrophils 77 %    % Lymphocytes 12 %    % Monocytes 10 %    % Eosinophils 0 %    % Basophils 0 %    % Immature Granulocytes 1 %    NRBCs per 100 WBC 0 <1 /100    Absolute Neutrophils 15.8 (H) 1.6 - 8.3 10e3/uL    Absolute Lymphocytes 2.3 0.8 - 5.3 10e3/uL    Absolute Monocytes 2.0 (H) 0.0 - 1.3 10e3/uL    Absolute Eosinophils 0.0 0.0 - 0.7 10e3/uL    Absolute Basophils 0.1 0.0 - 0.2 10e3/uL    Absolute Immature Granulocytes 0.2 (H) <=0.0 10e3/uL    Absolute NRBCs 0.0 10e3/uL   Glucose by meter    Collection Time: 09/19/21 12:40 PM   Result Value Ref Range    GLUCOSE BY METER POCT 111 (H) 70 - 99 mg/dL   Glucose by meter    Collection Time: 09/19/21  1:55 PM   Result Value Ref Range    GLUCOSE BY METER POCT 102 (H) 70 - 99 mg/dL   Renal panel    Collection Time: 09/19/21  2:26 PM   Result Value Ref Range    Sodium 150 (H) 136 - 145 mmol/L    Potassium 2.7 (LL) 3.5 - 5.0 mmol/L    Chloride 117 (H) 98 - 107 mmol/L    Carbon Dioxide (CO2) 26 22 - 31 mmol/L    Anion Gap 7 5 - 18 mmol/L    Urea Nitrogen 21 8 - 28 mg/dL    Creatinine 0.77 0.60 - 1.10 mg/dL    Calcium 8.6 8.5 - 10.5 mg/dL    Glucose 100 70 - 125 mg/dL    Albumin 3.1 (L) 3.5 - 5.0 g/dL    Phosphorus <0.7 (L) 2.5 - 4.5 mg/dL    GFR Estimate 77 >60 mL/min/1.73m2   Magnesium    Collection Time: 09/19/21  2:26 PM   Result Value Ref Range    Magnesium 1.8 1.8 - 2.6 mg/dL   Glucose by meter    Collection Time: 09/19/21  3:01 PM   Result Value Ref Range    GLUCOSE BY METER POCT 94 70 - 99 mg/dL   Glucose by meter    Collection Time: 09/19/21  4:00 PM   Result Value Ref Range    GLUCOSE BY METER POCT 92 70 - 99 mg/dL   Potassium    Collection Time: 09/19/21  8:10 PM   Result Value Ref Range    Potassium 4.0 3.5 - 5.0 mmol/L   Glucose by  meter    Collection Time: 09/19/21  8:50 PM   Result Value Ref Range    GLUCOSE BY METER POCT 156 (H) 70 - 99 mg/dL   Glucose by meter    Collection Time: 09/19/21 11:33 PM   Result Value Ref Range    GLUCOSE BY METER POCT 237 (H) 70 - 99 mg/dL   Phosphorus    Collection Time: 09/20/21  3:36 AM   Result Value Ref Range    Phosphorus 2.1 (L) 2.5 - 4.5 mg/dL   Magnesium    Collection Time: 09/20/21  3:36 AM   Result Value Ref Range    Magnesium 1.5 (L) 1.8 - 2.6 mg/dL   Potassium    Collection Time: 09/20/21  3:36 AM   Result Value Ref Range    Potassium 3.4 (L) 3.5 - 5.0 mmol/L   Lactic acid whole blood    Collection Time: 09/20/21  4:18 AM   Result Value Ref Range    Lactic Acid 1.0 0.7 - 2.0 mmol/L       PMH: History reviewed. No pertinent past medical history.        Current Medications:Scheduled Meds:    ARIPiprazole  2 mg Oral Daily     heparin ANTICOAGULANT  5,000 Units Subcutaneous Q12H     insulin aspart  1-6 Units Subcutaneous Q4H     insulin glargine  15 Units Subcutaneous At Bedtime     labetalol  10 mg Intravenous Once     magnesium sulfate  4 g Intravenous Once     metoprolol succinate ER  100 mg Oral Once     pantoprazole  40 mg Per Feeding Tube QAM AC    Or     pantoprazole (PROTONIX) IV  40 mg Intravenous QAM AC     piperacillin-tazobactam  3.375 g Intravenous Q8H     pregabalin  100 mg Oral BID     sodium chloride (PF)  3 mL Intracatheter Q8H     sodium phosphate  9 mmol Intravenous Once     tiZANidine  2 mg Oral At Bedtime     vancomycin  1,000 mg Intravenous Q24H     Continuous Infusions:    dexmedetomidine 0.3 mcg/kg/hr (09/20/21 0757)     dextrose       dilTIAZem HCl-Sodium Chloride Stopped (09/19/21 0919)     lactated ringers 150 mL/hr at 09/20/21 0705     PRN Meds:.dextrose, glucose **OR** dextrose **OR** glucagon, glucose **OR** dextrose **OR** glucagon, glucose **OR** dextrose **OR** glucagon, hydrALAZINE, hydrALAZINE, HYDROmorphone, lidocaine 4%, lidocaine 4%, lidocaine (buffered or not  buffered), lidocaine (buffered or not buffered), metoprolol, metoprolol, OLANZapine, ondansetron, sodium chloride (PF), sodium chloride (PF)                Family History: PERSONALLY REVIEWED.  Family History   Problem Relation Age of Onset     Breast Cancer No family hx of      Pertinent Family hx not pertinent to Chief Complaint or reason for visit.     Social History:  PERSONALLY REVIEWED.  Social History     Socioeconomic History     Marital status:      Spouse name: Not on file     Number of children: Not on file     Years of education: Not on file     Highest education level: Not on file   Occupational History     Not on file   Tobacco Use     Smoking status: Former Smoker     Quit date: 1/1/2018     Years since quitting: 3.7     Smokeless tobacco: Never Used   Substance and Sexual Activity     Alcohol use: No     Drug use: No     Sexual activity: Not on file   Other Topics Concern     Not on file   Social History Narrative     Not on file     Social Determinants of Health     Financial Resource Strain:      Difficulty of Paying Living Expenses:    Food Insecurity:      Worried About Running Out of Food in the Last Year:      Ran Out of Food in the Last Year:    Transportation Needs:      Lack of Transportation (Medical):      Lack of Transportation (Non-Medical):    Physical Activity:      Days of Exercise per Week:      Minutes of Exercise per Session:    Stress:      Feeling of Stress :    Social Connections:      Frequency of Communication with Friends and Family:      Frequency of Social Gatherings with Friends and Family:      Attends [employer] Services:      Active Member of Clubs or Organizations:      Attends Club or Organization Meetings:      Marital Status:    Intimate Partner Violence:      Fear of Current or Ex-Partner:      Emotionally Abused:      Physically Abused:      Sexually Abused:     not pertinent to Chief Complaint or reason for visit.             Allergies as of 06/01/2014  Reviewed     Review of Systems:As per HPI. Remainders of 12 point review of systems negative.    Review of Pertinent Laboratory:      PERSONALLY REVIEWED.    Physical Exam: Temp:  [97.4  F (36.3  C)-98.9  F (37.2  C)] 97.7  F (36.5  C)  Pulse:  [] 57  Resp:  [15-43] 27  BP: ()/(50-75) 141/65  SpO2:  [94 %-100 %] 96 %   Vitals: reviewed in chart     Physical exam as per medical team: reviewed in chart      diagnoses, risk and benefits of medications discussed with staff. Care coordination with care management team.   Thank you for this consultation.       Yani Bryant; NP  [facility]        This note was created with the help of Dragon dictation system. Grammatical and typing errors are not intentional.

## 2021-09-20 NOTE — PROGRESS NOTES
General Surgery Progress Note:    Hospital Day # 2    ASSESSMENT:   1. Paroxysmal atrial fibrillation (H)    2. Small bowel obstruction (H)    3. Pneumatosis intestinalis        Paige Mari is a 71 year old female with history of hypertension, DM 2, and remote bowel resection secondary to small bowel obstruction in 2009.  Patient was admitted with DKA and a small bowel obstruction was noted.  SBFT was negative and patient had a large amount of stool after administration of Gastrografin.  There is still concern that we would need to surgically explore the patient given the question of pneumatosis on the CT scan and an elevated lactic acid.      Today the patient's abdominal pain has resolved, she is passing gas, and her lactic acid is normal.    PLAN:   Okay to start clear liquid diet and advance diet as tolerated  No surgical intervention required at this time and surgery will sign off      SUBJECTIVE:   Paige Mari is comfortable and reports no pain.  She also denies nausea and vomiting.  Patient reports she is passing flatus.    Patient Vitals for the past 24 hrs:   BP Temp Temp src Pulse Resp SpO2 Weight   09/20/21 1000 (!) 150/71 -- -- 68 17 96 % --   09/20/21 0900 112/79 -- -- 60 22 97 % --   09/20/21 0800 115/58 -- -- 56 22 96 % --   09/20/21 0730 -- -- -- 57 27 96 % --   09/20/21 0729 -- 97.7  F (36.5  C) Axillary -- -- -- --   09/20/21 0715 -- -- -- 58 27 96 % --   09/20/21 0700 (!) 141/65 -- -- 59 19 97 % --   09/20/21 0645 -- -- -- 59 26 96 % --   09/20/21 0630 -- -- -- 60 23 95 % --   09/20/21 0615 -- -- -- 60 23 96 % --   09/20/21 0600 120/61 98.4  F (36.9  C) Axillary 61 19 97 % --   09/20/21 0545 -- -- -- 62 15 96 % --   09/20/21 0530 -- -- -- 61 21 95 % --   09/20/21 0515 -- -- -- 62 17 94 % --   09/20/21 0500 109/57 -- -- 62 20 94 % --   09/20/21 0445 -- -- -- 62 20 94 % --   09/20/21 0430 -- -- -- 63 21 94 % --   09/20/21 0415 -- -- -- 63 20 94 % --   09/20/21 0400 106/52 98.4  F (36.9  C)  Axillary 63 20 95 % --   09/20/21 0345 -- -- -- 63 20 95 % --   09/20/21 0330 -- -- -- 62 18 95 % --   09/20/21 0315 -- -- -- 62 17 95 % --   09/20/21 0300 114/56 -- -- 63 21 96 % --   09/20/21 0245 -- -- -- 63 19 95 % 68.4 kg (150 lb 12.8 oz)   09/20/21 0230 -- -- -- 64 20 95 % --   09/20/21 0215 -- -- -- 64 18 96 % --   09/20/21 0200 106/58 98.6  F (37  C) Axillary 64 18 95 % --   09/20/21 0145 -- -- -- 64 17 95 % --   09/20/21 0130 -- -- -- 65 18 94 % --   09/20/21 0115 -- -- -- 65 19 96 % --   09/20/21 0100 98/51 -- -- 66 19 95 % --   09/20/21 0045 -- -- -- 66 20 96 % --   09/20/21 0030 -- -- -- 66 19 96 % --   09/20/21 0015 -- -- -- 66 19 96 % --   09/20/21 0000 104/50 98.5  F (36.9  C) Axillary 66 19 97 % --   09/19/21 2345 -- -- -- 66 19 97 % --   09/19/21 2330 -- -- -- 66 19 96 % --   09/19/21 2315 -- -- -- 67 18 96 % --   09/19/21 2300 98/52 -- -- 67 18 96 % --   09/19/21 2245 -- -- -- 67 18 96 % --   09/19/21 2230 -- -- -- 68 20 95 % --   09/19/21 2215 -- -- -- 69 17 96 % --   09/19/21 2200 90/54 98.3  F (36.8  C) Axillary 70 16 97 % --   09/19/21 2145 -- -- -- 70 19 95 % --   09/19/21 2140 -- -- -- 72 19 95 % --   09/19/21 2130 -- -- -- 73 18 95 % --   09/19/21 2115 -- -- -- 80 17 94 % --   09/19/21 2108 -- -- -- 118 (!) 37 94 % --   09/19/21 2107 -- -- -- 84 15 94 % --   09/19/21 2100 (!) 144/66 -- -- 95 19 96 % --   09/19/21 2045 -- -- -- 97 19 96 % --   09/19/21 2035 -- -- -- 99 23 96 % --   09/19/21 2032 -- -- -- 105 (!) 33 95 % --   09/19/21 2031 -- -- -- 112 (!) 38 95 % --   09/19/21 2030 -- -- -- 109 (!) 35 95 % --   09/19/21 2029 -- -- -- 101 19 95 % --   09/19/21 2027 -- -- -- 104 (!) 34 95 % --   09/19/21 2026 -- -- -- 104 21 95 % --   09/19/21 2019 -- -- -- 118 28 96 % --   09/19/21 2018 -- -- -- (!) 126 22 95 % --   09/19/21 2017 -- -- -- 110 27 95 % --   09/19/21 2016 -- -- -- (!) 125 (!) 43 96 % --   09/19/21 2015 -- -- -- (!) 137 (!) 33 96 % --   09/19/21 2014 -- -- -- 99 25 96 % --    09/19/21 2013 -- -- -- 98 27 96 % --   09/19/21 2012 -- -- -- 98 25 96 % --   09/19/21 2000 (!) 149/75 98.9  F (37.2  C) Oral 103 21 94 % --   09/19/21 1945 -- -- -- 94 21 96 % --   09/19/21 1930 -- -- -- 99 22 97 % --   09/19/21 1927 -- -- -- 102 22 97 % --   09/19/21 1915 -- -- -- 104 22 97 % --   09/19/21 1900 (!) 154/70 97.4  F (36.3  C) Oral 92 17 98 % --   09/19/21 1800 (!) 142/66 -- -- 93 16 98 % --   09/19/21 1706 (!) 146/66 -- -- 87 17 100 % --   09/19/21 1700 (!) 165/72 -- -- 87 18 100 % --   09/19/21 1600 114/58 98.2  F (36.8  C) Axillary 76 22 98 % --   09/19/21 1500 109/54 -- -- 71 (!) 31 98 % --   09/19/21 1400 123/59 -- -- 77 16 99 % --   09/19/21 1300 112/65 98.4  F (36.9  C) Tympanic 76 23 98 % --   09/19/21 1230 114/67 -- -- 74 18 98 % --   09/19/21 1215 112/60 -- -- 74 24 98 % --   09/19/21 1200 105/59 -- -- 74 19 99 % --   09/19/21 1145 109/57 -- -- 74 18 100 % --   09/19/21 1130 107/58 -- -- 73 23 99 % --       Physical Exam:  General: NAD, pleasant  CV:RRR  LUNGS:CTA bilaterally  ABD: Soft, nontender, well-healed old midline scar  EXT:no CCE    No results displayed because visit has over 200 results.           Joceline Miller, APRN CNP

## 2021-09-20 NOTE — PHARMACY-ADMISSION MEDICATION HISTORY
Pharmacy Note - Admission Medication History    Pertinent Provider Information:   Family brought in pillbox & pill bottles due to concerns regarding medication list. A few minor changes were made to the list, mostly OTCs and Lyrica dose corrected. However, a few medications still have some questions:  -- Metoprolol: patient report and pill bottle were consistent, but medication was not found in pillbox.  -- Tizanidine: patient report and pill bottle were consistent (2 mg x2 tabs at bedtime), but pillbox contained 2 mg x3 tabs at bedtime.  -- Cymbalta: Recently filled and pill bottle provided, but was not present in pillbox.  -- Citalopram: Filled in August and pill bottle provided, but was not present in pillbox, and no pills appear to be missing (40 mg x 1/2 tab daily).     ______________________________________________________________________    Prior To Admission (PTA) med list completed and updated in EMR.       PTA Med List   Medication Sig Note Last Dose     acetaminophen (TYLENOL) 500 MG tablet Take 1,000 mg by mouth 2 times daily  9/20/2021: Updated based on pillbox contents. 9/17/2021 at Unknown time     amoxicillin (AMOXIL) 500 MG tablet Take 2,000 mg by mouth as needed Before dental appointments  More than a month at Unknown time     ARIPiprazole (ABILIFY) 2 MG tablet Take 2 mg by mouth daily   9/17/2021 at AM     aspirin (ASA) 325 MG EC tablet Take 325 mg by mouth 2 times daily  9/20/2021: Updated from 81 mg daily based on pillbox contents. 9/17/2021 at Unknown time     cetirizine (ZYRTEC) 10 MG tablet Take 10 mg by mouth daily  9/20/2021: Updated based on pillbox contents. 9/17/2021 at Unknown time     cholecalciferol (VITAMIN D3) 25 mcg (1000 units) capsule Take 1,000 Units by mouth daily  9/20/2021: Consistent with pillbox. 9/17/2021 at Unknown time     cloNIDine (CATAPRES-TTS) 0.3 mg/24 hr Place 1 patch onto the skin once a week   9/17/2021 at Unknown time     cyanocobalamin (VITAMIN B-12) 1000 MCG  tablet Take 1,000 mcg by mouth daily 9/20/2021: Added to med list based on pills located in pillbox.      diclofenac sodium (VOLTAREN) 1 % Gel [DICLOFENAC SODIUM (VOLTAREN) 1 % GEL] Apply 1 g topically daily as needed. 9/20/2021: Not brought in with medications on 9/20/21. 9/17/2021 at Unknown time     docusate sodium (COLACE) 100 MG capsule Take 200 mg by mouth 2 times daily 9/20/2021: Added to med list based on pillbox contents.      DULoxetine (CYMBALTA) 30 MG capsule Take 30 mg by mouth daily 9/20/2021: Added to med list based on pill bottles brought in by family 9/20/21 & does have recent fill history (9/5/21). However, med was not located in the set-up pillbox also brought in at that time.      estradiol (ESTRACE) 1 MG tablet Take 1 mg by mouth daily   9/17/2021 at AM     insulin aspart (NOVOLOG) 100 unit/mL injection Inject 6-15 Units Subcutaneous 3 times daily (with meals)  9/20/2021: Not brought in with other medications 9/20/21. 9/16/2021     insulin detemir U-100 (LEVEMIR FLEXPEN) 100 unit/mL (3 mL) pen Inject 20 Units Subcutaneous At Bedtime   9/16/21     liraglutide (VICTOZA 2-TANNER) 0.6 mg/0.1 mL (18 mg/3 mL) PnIj injection [LIRAGLUTIDE (VICTOZA 2-TANNER) 0.6 MG/0.1 ML (18 MG/3 ML) PNIJ INJECTION] Inject 1.2 mg under the skin daily. Not available from inpatient hospital pharmacy - OK for patient to use own supply 9/20/2021: Not brought in with other medications 9/20/21. 9/17/2021 at Unknown time     medical cannabis (Patient's own supply) 1 Dose by Other route See Admin Instructions Malena Lopeze Vape- 1-4 puffs q6h PRN  9/17/2021 at Unknown time     metoprolol succinate (TOPROL-XL) 100 MG 24 hr tablet Take 200 mg by mouth daily before breakfast  9/20/2021: Consistent with pill bottle; however, none located in pillbox. Per initial interview, patient was confident in this dosing regimen. LF 9/12/21. 9/17/2021 at Unknown time     metoprolol succinate ER (TOPROL-XL) 100 MG 24 hr tablet Take 100 mg by  mouth every evening  9/20/2021: Consistent with pill bottle; however, none located in pillbox. Per initial interview, patient was confident in this dosing regimen. LF 9/12/21. 9/17/2021 at Unknown time     multivitamin w/minerals (THERA-VIT-M) tablet Take 1 tablet by mouth daily 9/20/2021: Added based on pillbox contents.      olmesartan (BENICAR) 40 MG tablet [OLMESARTAN (BENICAR) 40 MG TABLET] Take 40 mg by mouth daily.  9/17/2021 at PM     omeprazole (PRILOSEC) 20 MG capsule [OMEPRAZOLE (PRILOSEC) 20 MG CAPSULE] Take 1 capsule by mouth daily before breakfast.  9/17/2021 at AM     pregabalin (LYRICA) 150 MG capsule Take 150 mg by mouth 2 times daily  9/20/2021: Dose adjusted to 150 mg BID based on prescription bottles provided by family 9/20/21. Consistent with fill history. 9/17/2021 at Unknown time     sucralfate (CARAFATE) 1 GM tablet Take 1 tablet by mouth 4 times daily as needed  9/20/2021: Scheduled QID per pill bottle; however, last filled April and not in pillbox, so likely patient is taking PRN. 9/17/2021 at Unknown time     tiotropium (SPIRIVA HANDIHALER) 18 MCG inhaled capsule Inhale 18 mcg into the lungs daily  9/17/2021 at Unknown time     tiZANidine (ZANAFLEX) 2 MG tablet [TIZANIDINE (ZANAFLEX) 2 MG TABLET] Take 4 mg by mouth bedtime.  9/20/2021: Per Rx, 2 tabs, but pillbox contained 3 at bedtime. 9/17/2021 at Unknown time     traMADol (ULTRAM) 50 mg tablet [TRAMADOL (ULTRAM) 50 MG TABLET] Take 100 mg by mouth 2 (two) times a day.   9/17/2021 at Unknown time     valACYclovir (VALTREX) 500 MG tablet Take 500 mg by mouth daily   9/17/2021 at AM       The information provided in this note is only as accurate as the sources available at the time of the update(s).    Thank you for the opportunity to participate in the care of this patient.    Merline Arredondo, [facility]  9/20/2021 3:47 PM

## 2021-09-20 NOTE — PROGRESS NOTES
.  Hospitalist Progress Note    Assessment/Plan  Active Problems:    Small bowel obstruction (H)    Pneumatosis intestinalis    Paige Mari is a 71 year old female presented with abdominal pain.  Work-up with CT abdomen showed small bowel obstruction with difficulty to identify the point of transition.     Rapid response called on 9/19 for agitation.  Patient was found to be in A. fib RVR and was transferred to ICU on diltiazem drip and started on Precedex for agitation. Pt was off of dilt drip on 9/19 but restarted 9/20 as pt went in A. Fib RVR.     Abdominal pain  Acute SBO - resolved  -CT abdomen on admission showed small bowel obstruction with difficult to identify the point of transition.  -Cannot r/o pneumatosis of small bowel in the right lower quadrant  -Repeat CT abdomen 9/19 showed resolution of SBO.   -Surgery following, appreciate input.   -Pain control prn, IV Zosyn. Vanc discontinued now.      Lactic acidosis likely 2/2 to ischemia - resolved  -C/w IVF, monitor. Lactate trending down    DKA - resolved.  -A1c is 8.1  -Blood sugars significantly high on admission, off insulin drip now.   -Currently on Lantus 15 units at bedtime and NovoLog sliding scale every 4 hours.  -Monitor closely.       Accelerated hypertension -   -Control pain adequately  -Continue PTA clonidine patch.  Holding PTA p.o. meds while patient n.p.o.  -As needed hydralazine and Lopressor ordered.  -Monitor closely on telemetry  -restarted dilt drip for A. Fib RVR, hopefully will help with BP as well    Agitation/Metabolic encephalopathy/delirium  -Off precedex drip 9/20  -CT head neg for acute findings.  -Psy following. Started on abilify, zyprexa,rozerem  -Can consider neurology eval if mentation doesn't improve.    A. Fib RVR - back in RVR today  -Off dil drip on 9/19 and restarted today.  -PTA metoprolol resumed  -ECHO showed EF     Hypernatremia likely pseudo given hyperglycemia - resolved    HypoK- replete as needed    JACKSON  likely pre-renal - resolved.     DVT ppx: Heparin  Diet: NPO    Barriers to Discharge:  Clinical improvement    Anticipated discharge date/Disposition: TBD    Subjective  Chart reviewed and events noted.  Patient seen and examined.   In bed, on 1:1. Trying to get off bed. Responds to her name. Not reliable historian.  Per RN, pt having high urine output. Intensivist evaluating for DI      Objective    Vital signs in last 24 hours  Temp:  [97.4  F (36.3  C)-100.5  F (38.1  C)] 100.5  F (38.1  C)  Pulse:  [] 94  Resp:  [15-43] 23  BP: ()/() 240/110  SpO2:  [92 %-100 %] 96 % [unfilled]      Weight:   [unfilled] Weight change:     Intake/Output last 3 shifts  I/O last 3 completed shifts:  In: 4547.4 [P.O.:460; I.V.:4087.4]  Out: 2465 [Urine:2465]  Body mass index is 27.58 kg/m .    Physical Exam    General Appearance:  Awake, alert,  NAD  HEENT:AT,NC   Lungs:   CTAB   Cardiovascular:  Irregularly irregular   Abdomen:   Soft, moans on palpation   Neurologic: Awake, alert, oriented X0, moving all extremities.   Psychiatry: No agitation     Pertinent Labs   Lab Results: personally reviewed.   Recent Labs   Lab 09/20/21  1543 09/20/21  1203 09/19/21  1426 09/19/21  0525 09/19/21  0525 09/19/21  0123 09/17/21  2240    144 150*   < > 150*   < > 138   CO2 25 23 26   < > 18*   < > 25   BUN 17 20 21   < > 17   < > 16   ALBUMIN  --   --  3.1*  --  3.6  --  4.0   ALKPHOS  --   --   --   --  67  --  108   ALT  --   --   --   --  19  --  26   AST  --   --   --   --  30  --  28    < > = values in this interval not displayed.     Recent Labs   Lab 09/20/21  1202 09/19/21  1137 09/19/21  0206   WBC 19.4* 20.2* 22.9*   HGB 12.7 12.7 16.6*   HCT 38.0 37.5 49.5*    228 320     Recent Labs   Lab 09/19/21  0211 09/17/21  2240   TROPONINI 0.06 0.01     Invalid input(s): POCGLUFGR    Medications  Drug and lactation database from the [employer] of  Medicine:  http://toxnet.nlm.nih.gov/cgi-bin/sis/htmlgen?LACT      Pertinent Radiology   Radiology Results:  Personally reviewed impressions    Reviewed labs in last 24 hours.    Advanced Care Planning:  Total time with this patient is 35 min with greater than 50% of time spent in coordination of care.  Care discussed and coordinated with her RN, intensivist, MIRANDA/IRVIN    This Note is created using dragon voice recognition software.  Errors in spelling or words which seems out of context are unintentional.  Sounds alike errors may have escaped editing.    Lauren Guan MD  Hospitalist

## 2021-09-20 NOTE — PROGRESS NOTES
PULMONARY / CRITICAL CARE PROGRESS NOTE    Date / Time of Admission:  9/17/2021 11:17 PM    Assessment:   Active Problems:    Small bowel obstruction (H)    Pneumatosis intestinalis    71yoF with history of hypertension, DMII and remote bowel obstruction in 2009 requiring resection presented 9/18 with SBO that was managed conservatively and  Has now resolved but complicated by delirium.    Code Status:  Full Code  Plan:   Pulmonary:  No Issues    C/V:  1) Afib with RVR--resolved  -Continue home toprol XL  -Stopped diltiazem drip  -Echo found moderately dilated LA, normal LV and RV.     ID:  1) Leukocytosis in part related to SBO  -Zosyn for now, stopping Vancomycin as there is no evidence of MRSA.     Renal:  1) JACKSON  2) Hypernatremia  3) Lactic acidosis--resolved  -Stop LR, she is overloaded  -If surgery ok, will give her free water in attempt to improve sodium    GI:  1) SBO--resolved, exam benign  -Appreciate surgery input  -NPO except for meds now until surgery oks clears.       Neuro:  1) Delirium  2) Concern for severe memory deficit--cannot remember name, seems more than simple delirium  -CT head to rule out other pathology  -Appreciate psych input  -Off of Precedex    Heme  1) DVT prophylaxis  -May require anticoagulation if any further atrial fibrillation episodes, will monitor  -subcutaneous heparin for now    Endo:  1) Hyperglycemia  -Lantus increased to BID with sliding scale.       ICU DAILY CHECKLIST                           Can patient transfer out of MICU? no    FAST HUG:    Feeding:  Feeding: No.  Patient is receiving NPO    Gomes: Yes-attempt to remove later today  Analgesia/Sedation:No   Thromboembolic prophylaxis: Yes; Mode:  Heparin  HOB>30:  Yes  Stress Ulcer Protocol Active: Yes; Mode: PPI  Glycemic Control: Any glucose > 180 yes Sliding Scale Insulin and Long Acting Insulin        Subjective:   HPI:  Paige Mari is a 71 year old female with history of DMII, hypertension and bowel  "obstruction in 2009 with resection who presented 9/18 with SBO. Rapid response called for afib with RVR, delirium and lactic acidosis.  SBO managed conservatively and repeat imaging showed resolution of obstruction. Lactic acidosis also resolved.     Active Problems:    Small bowel obstruction (H)    Pneumatosis intestinalis      Allergies: Morphine, Adhesive [mecrylate], Amlodipine, Amlodipine besylate [amlodipine], Doxazosin mesylate [doxazosin], Irbesartan-hydrochlorothiazide [avalide], Other allergy (see comments) [external allergen needs reconciliation - see comment], Prednisone, and Trazodone     MEDS:  Scheduled Meds:    ARIPiprazole  2 mg Oral Daily     heparin ANTICOAGULANT  5,000 Units Subcutaneous Q12H     insulin aspart  1-6 Units Subcutaneous Q4H     insulin glargine  15 Units Subcutaneous At Bedtime     pantoprazole  40 mg Per Feeding Tube QAM AC    Or     pantoprazole (PROTONIX) IV  40 mg Intravenous QAM AC     piperacillin-tazobactam  3.375 g Intravenous Q8H     pregabalin  100 mg Oral BID     sodium chloride (PF)  3 mL Intracatheter Q8H     sodium phosphate  9 mmol Intravenous Once     tiZANidine  2 mg Oral At Bedtime     vancomycin  1,000 mg Intravenous Q24H     Continuous Infusions:    dexmedetomidine Stopped (09/20/21 0852)     dextrose       PRN Meds:.dextrose, glucose **OR** dextrose **OR** glucagon, hydrALAZINE, hydrALAZINE, HYDROmorphone, lidocaine 4%, lidocaine 4%, lidocaine (buffered or not buffered), lidocaine (buffered or not buffered), metoprolol, metoprolol, OLANZapine, ondansetron, sodium chloride (PF), sodium chloride (PF)      Objective:   VITALS:  /79   Pulse 60   Temp 97.7  F (36.5  C) (Axillary)   Resp 22   Ht 1.575 m (5' 2\")   Wt 68.4 kg (150 lb 12.8 oz)   SpO2 97%   BMI 27.58 kg/m    EXAM:  General appearance: alert, appears stated age and cooperative  Neck: no adenopathy and supple, symmetrical, trachea midline  Lungs: clear to auscultation bilaterally  Heart: RRR, " S1 and S2  Abdomen: soft, non-tender; bowel sounds normal; no masses,  no organomegaly  Extremities: Trace edema in legs  Skin: Skin color, texture, turgor normal. No rashes or lesions  Neurologic: moving all 4s, answering questions but doesn't know name even with prompting. Not oriented to place either.       I&O:     Intake/Output Summary (Last 24 hours) at 9/20/2021 0951  Last data filed at 9/20/2021 0631  Gross per 24 hour   Intake 4548.4 ml   Output 1405 ml   Net 3143.4 ml       Data Review:  CT abd/pelvis  EXAM: CT ABDOMEN AND PELVIS WITHOUT CONTRAST  LOCATION: [facility]  DATE/TIME: 9/19/2021 2:39 AM     INDICATION: Severe hyperglycemia. Hypothermia. Confusion.  COMPARISON: 09/17/2021.     TECHNIQUE: CT scan of the abdomen and pelvis was performed without IV contrast. Oral contrast was administered. Multiplanar reformats were obtained. Dose reduction techniques were used.  CONTRAST: None.     FINDINGS:     LOWER CHEST: Coronary artery calcification.     HEPATOBILIARY: Unremarkable.     SPLEEN: Unremarkable.     PANCREAS: Mild diffuse pancreatic atrophy.     ADRENAL GLANDS: Unremarkable.     KIDNEYS/BLADDER: Small probable cyst in the interpolar region of the left kidney. No follow-up is necessary.     BOWEL: No dilatation of the small or large bowel. The small bowel dilatation that was present on the recent comparison study has resolved. Oral contrast material that has been administered is present throughout the colon, all the way to the rectum. The   appendix is not visualized. No bowel wall thickening, pneumatosis or free intraperitoneal gas.     LYMPH NODES: Unremarkable.     PELVIC ORGANS: No acute findings.     MUSCULOSKELETAL: Fusion hardware in the lumbar spine.     OTHER: Atherosclerotic calcification in the abdominal aorta.                                                                      IMPRESSION:   1. No acute abnormality identified in the abdomen or pelvis.  2.  Interval resolution of the small bowel dilatation that was present on the recent comparison study dated 09/17/2021.     LABS  Results for orders placed or performed during the hospital encounter of 09/17/21   Basic metabolic panel   Result Value Ref Range    Sodium 144 136 - 145 mmol/L    Potassium 3.5 3.5 - 5.0 mmol/L    Chloride 102 98 - 107 mmol/L    Carbon Dioxide (CO2) 15 (L) 22 - 31 mmol/L    Anion Gap 27 (H) 5 - 18 mmol/L    Urea Nitrogen 18 8 - 28 mg/dL    Creatinine 1.35 (H) 0.60 - 1.10 mg/dL    Calcium 9.9 8.5 - 10.5 mg/dL    Glucose 582 (HH) 70 - 125 mg/dL    GFR Estimate 39 (L) >60 mL/min/1.73m2     Lab Results   Component Value Date    WBC 20.2 (H) 09/19/2021    HGB 12.7 09/19/2021    HCT 37.5 09/19/2021    MCV 98 09/19/2021     09/19/2021         By:  Fanny Noriega MD

## 2021-09-21 ENCOUNTER — APPOINTMENT (OUTPATIENT)
Dept: RADIOLOGY | Facility: HOSPITAL | Age: 71
DRG: 388 | End: 2021-09-21
Attending: HOSPITALIST
Payer: COMMERCIAL

## 2021-09-21 ENCOUNTER — APPOINTMENT (OUTPATIENT)
Dept: PHYSICAL THERAPY | Facility: HOSPITAL | Age: 71
DRG: 388 | End: 2021-09-21
Attending: NURSE PRACTITIONER
Payer: COMMERCIAL

## 2021-09-21 LAB
ANION GAP SERPL CALCULATED.3IONS-SCNC: 5 MMOL/L (ref 5–18)
BUN SERPL-MCNC: 15 MG/DL (ref 8–28)
CALCIUM SERPL-MCNC: 8.1 MG/DL (ref 8.5–10.5)
CHLORIDE BLD-SCNC: 112 MMOL/L (ref 98–107)
CO2 SERPL-SCNC: 25 MMOL/L (ref 22–31)
CREAT SERPL-MCNC: 1 MG/DL (ref 0.6–1.1)
ERYTHROCYTE [DISTWIDTH] IN BLOOD BY AUTOMATED COUNT: 13.3 % (ref 10–15)
GFR SERPL CREATININE-BSD FRML MDRD: 56 ML/MIN/1.73M2
GLUCOSE BLD-MCNC: 133 MG/DL (ref 70–125)
GLUCOSE BLDC GLUCOMTR-MCNC: 110 MG/DL (ref 70–99)
GLUCOSE BLDC GLUCOMTR-MCNC: 132 MG/DL (ref 70–99)
GLUCOSE BLDC GLUCOMTR-MCNC: 175 MG/DL (ref 70–99)
GLUCOSE BLDC GLUCOMTR-MCNC: 346 MG/DL (ref 70–99)
HCT VFR BLD AUTO: 34.4 % (ref 35–47)
HGB BLD-MCNC: 11.3 G/DL (ref 11.7–15.7)
LACTATE SERPL-SCNC: 1 MMOL/L (ref 0.7–2)
MAGNESIUM SERPL-MCNC: 2.3 MG/DL (ref 1.8–2.6)
MCH RBC QN AUTO: 32.8 PG (ref 26.5–33)
MCHC RBC AUTO-ENTMCNC: 32.8 G/DL (ref 31.5–36.5)
MCV RBC AUTO: 100 FL (ref 78–100)
PHOSPHATE SERPL-MCNC: 3.5 MG/DL (ref 2.5–4.5)
PLATELET # BLD AUTO: 174 10E3/UL (ref 150–450)
POTASSIUM BLD-SCNC: 3.6 MMOL/L (ref 3.5–5)
POTASSIUM BLD-SCNC: 3.6 MMOL/L (ref 3.5–5)
RBC # BLD AUTO: 3.44 10E6/UL (ref 3.8–5.2)
SODIUM SERPL-SCNC: 142 MMOL/L (ref 136–145)
WBC # BLD AUTO: 13.7 10E3/UL (ref 4–11)

## 2021-09-21 PROCEDURE — 83735 ASSAY OF MAGNESIUM: CPT | Performed by: INTERNAL MEDICINE

## 2021-09-21 PROCEDURE — 85027 COMPLETE CBC AUTOMATED: CPT | Performed by: STUDENT IN AN ORGANIZED HEALTH CARE EDUCATION/TRAINING PROGRAM

## 2021-09-21 PROCEDURE — 250N000011 HC RX IP 250 OP 636: Performed by: INTERNAL MEDICINE

## 2021-09-21 PROCEDURE — 36592 COLLECT BLOOD FROM PICC: CPT | Performed by: INTERNAL MEDICINE

## 2021-09-21 PROCEDURE — C9113 INJ PANTOPRAZOLE SODIUM, VIA: HCPCS | Performed by: INTERNAL MEDICINE

## 2021-09-21 PROCEDURE — 999N000065 XR CHEST PICC LINE PLACEMENT 1 VIEW

## 2021-09-21 PROCEDURE — 99233 SBSQ HOSP IP/OBS HIGH 50: CPT | Performed by: HOSPITALIST

## 2021-09-21 PROCEDURE — 97162 PT EVAL MOD COMPLEX 30 MIN: CPT | Mod: GP

## 2021-09-21 PROCEDURE — 99232 SBSQ HOSP IP/OBS MODERATE 35: CPT | Performed by: NURSE PRACTITIONER

## 2021-09-21 PROCEDURE — 97140 MANUAL THERAPY 1/> REGIONS: CPT | Mod: GP

## 2021-09-21 PROCEDURE — 97116 GAIT TRAINING THERAPY: CPT | Mod: GP

## 2021-09-21 PROCEDURE — 80048 BASIC METABOLIC PNL TOTAL CA: CPT | Performed by: INTERNAL MEDICINE

## 2021-09-21 PROCEDURE — 71046 X-RAY EXAM CHEST 2 VIEWS: CPT

## 2021-09-21 PROCEDURE — 250N000013 HC RX MED GY IP 250 OP 250 PS 637: Performed by: HOSPITALIST

## 2021-09-21 PROCEDURE — 210N000001 HC R&B IMCU HEART CARE

## 2021-09-21 PROCEDURE — 83605 ASSAY OF LACTIC ACID: CPT | Performed by: HOSPITALIST

## 2021-09-21 PROCEDURE — 250N000012 HC RX MED GY IP 250 OP 636 PS 637: Performed by: INTERNAL MEDICINE

## 2021-09-21 PROCEDURE — 84100 ASSAY OF PHOSPHORUS: CPT | Performed by: INTERNAL MEDICINE

## 2021-09-21 PROCEDURE — 250N000013 HC RX MED GY IP 250 OP 250 PS 637: Performed by: INTERNAL MEDICINE

## 2021-09-21 PROCEDURE — 99233 SBSQ HOSP IP/OBS HIGH 50: CPT | Performed by: INTERNAL MEDICINE

## 2021-09-21 PROCEDURE — 258N000003 HC RX IP 258 OP 636: Performed by: STUDENT IN AN ORGANIZED HEALTH CARE EDUCATION/TRAINING PROGRAM

## 2021-09-21 RX ORDER — TRAMADOL HYDROCHLORIDE 50 MG/1
100 TABLET ORAL 2 TIMES DAILY
Status: DISCONTINUED | OUTPATIENT
Start: 2021-09-21 | End: 2021-09-22 | Stop reason: HOSPADM

## 2021-09-21 RX ORDER — METOPROLOL SUCCINATE 25 MG/1
100 TABLET, EXTENDED RELEASE ORAL 2 TIMES DAILY
Status: DISCONTINUED | OUTPATIENT
Start: 2021-09-21 | End: 2021-09-22 | Stop reason: HOSPADM

## 2021-09-21 RX ADMIN — TIZANIDINE 2 MG: 2 TABLET ORAL at 22:01

## 2021-09-21 RX ADMIN — METOPROLOL SUCCINATE 100 MG: 100 TABLET, FILM COATED, EXTENDED RELEASE ORAL at 08:00

## 2021-09-21 RX ADMIN — OXYCODONE HYDROCHLORIDE 5 MG: 5 TABLET ORAL at 19:32

## 2021-09-21 RX ADMIN — PREGABALIN 100 MG: 75 CAPSULE ORAL at 08:01

## 2021-09-21 RX ADMIN — METOPROLOL SUCCINATE 100 MG: 100 TABLET, FILM COATED, EXTENDED RELEASE ORAL at 20:58

## 2021-09-21 RX ADMIN — INSULIN ASPART 1 UNITS: 100 INJECTION, SOLUTION INTRAVENOUS; SUBCUTANEOUS at 11:59

## 2021-09-21 RX ADMIN — TRAMADOL HYDROCHLORIDE 100 MG: 50 TABLET, FILM COATED ORAL at 09:25

## 2021-09-21 RX ADMIN — INSULIN GLARGINE 15 UNITS: 100 INJECTION, SOLUTION SUBCUTANEOUS at 20:58

## 2021-09-21 RX ADMIN — TRAMADOL HYDROCHLORIDE 100 MG: 50 TABLET, FILM COATED ORAL at 20:58

## 2021-09-21 RX ADMIN — RAMELTEON 8 MG: 8 TABLET, FILM COATED ORAL at 22:01

## 2021-09-21 RX ADMIN — PREGABALIN 100 MG: 75 CAPSULE ORAL at 20:58

## 2021-09-21 RX ADMIN — PIPERACILLIN SODIUM AND TAZOBACTAM SODIUM 3.38 G: 3; .375 INJECTION, POWDER, LYOPHILIZED, FOR SOLUTION INTRAVENOUS at 02:44

## 2021-09-21 RX ADMIN — ARIPIPRAZOLE 2 MG: 2 TABLET ORAL at 08:01

## 2021-09-21 RX ADMIN — INSULIN ASPART 3 UNITS: 100 INJECTION, SOLUTION INTRAVENOUS; SUBCUTANEOUS at 20:58

## 2021-09-21 RX ADMIN — PANTOPRAZOLE SODIUM 40 MG: 40 INJECTION, POWDER, FOR SOLUTION INTRAVENOUS at 07:45

## 2021-09-21 RX ADMIN — SODIUM CHLORIDE, POTASSIUM CHLORIDE, SODIUM LACTATE AND CALCIUM CHLORIDE 500 ML: 600; 310; 30; 20 INJECTION, SOLUTION INTRAVENOUS at 03:16

## 2021-09-21 RX ADMIN — INSULIN GLARGINE 15 UNITS: 100 INJECTION, SOLUTION SUBCUTANEOUS at 08:00

## 2021-09-21 RX ADMIN — OLANZAPINE 2.5 MG: 2.5 TABLET ORAL at 08:01

## 2021-09-21 RX ADMIN — SODIUM CHLORIDE, POTASSIUM CHLORIDE, SODIUM LACTATE AND CALCIUM CHLORIDE 500 ML: 600; 310; 30; 20 INJECTION, SOLUTION INTRAVENOUS at 02:27

## 2021-09-21 RX ADMIN — PIPERACILLIN SODIUM AND TAZOBACTAM SODIUM 3.38 G: 3; .375 INJECTION, POWDER, LYOPHILIZED, FOR SOLUTION INTRAVENOUS at 19:32

## 2021-09-21 RX ADMIN — INSULIN ASPART 2 UNITS: 100 INJECTION, SOLUTION INTRAVENOUS; SUBCUTANEOUS at 17:47

## 2021-09-21 RX ADMIN — HEPARIN SODIUM 5000 UNITS: 5000 INJECTION, SOLUTION INTRAVENOUS; SUBCUTANEOUS at 20:58

## 2021-09-21 RX ADMIN — HYDRALAZINE HYDROCHLORIDE 10 MG: 20 INJECTION INTRAMUSCULAR; INTRAVENOUS at 16:13

## 2021-09-21 RX ADMIN — PIPERACILLIN SODIUM AND TAZOBACTAM SODIUM 3.38 G: 3; .375 INJECTION, POWDER, LYOPHILIZED, FOR SOLUTION INTRAVENOUS at 10:03

## 2021-09-21 ASSESSMENT — ACTIVITIES OF DAILY LIVING (ADL)
DOING_ERRANDS_INDEPENDENTLY_DIFFICULTY: YES
DIFFICULTY_COMMUNICATING: NO
EQUIPMENT_CURRENTLY_USED_AT_HOME: GLUCOMETER
FALL_HISTORY_WITHIN_LAST_SIX_MONTHS: NO
DIFFICULTY_EATING/SWALLOWING: NO
DRESSING/BATHING_DIFFICULTY: NO
WHICH_OF_THE_ABOVE_FUNCTIONAL_RISKS_HAD_A_RECENT_ONSET_OR_CHANGE?: COGNITION
WALKING_OR_CLIMBING_STAIRS_DIFFICULTY: NO
WEAR_GLASSES_OR_BLIND: YES
TOILETING_ISSUES: YES
HEARING_DIFFICULTY_OR_DEAF: NO
TOILETING_ASSISTANCE: TOILETING DIFFICULTY, ASSISTANCE 1 PERSON
CONCENTRATING,_REMEMBERING_OR_MAKING_DECISIONS_DIFFICULTY: NO

## 2021-09-21 NOTE — PROGRESS NOTES
Psychiatric Progress Note  Metabolic encephalopathy: Resolving  Cognitive and behavioral changes due to above: Resolving  Depression with anxiety.    PLAN/RECOMMENDATIONS:  Discontinue Zyprexa.   Rozerem 8 mg at bedtime.  Pregabalin 100 mg twice daily.  Aripiprazole 2 mg daily.    SUBJECTIVE:  Pleasant, cooperative, aware of hospital environment.  She did not remember events from yesterday.  No hallucinations, no psychosis.  Pleasant and cooperative.  Able to recite her home medications.  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  MENTAL STATUS EXAMINATION:   General Appearance: Not in acute distress, resting comfortably in bed.    Behavior: Good eye contact, no bizarre ideations  Speech: Coherent.  Thought Process: Linear, clear.  Thought content: No evidence of hallucinations, delusions or paranoia.    Thought Formation: Associations are connected  Judgment: Fair  Insight : Intact  Attention : Adequate  Memory: Sufficient for today's events, difficulty recalling events from last few days  Fund Of Knowledge: Average  Affect: Neutral  Mood: Congruent  Alert : Awake  Suicidal ideation: Absent  Homicidal ideation: Absent  Orientation: X 2- 3, much improved  Comprehension: Sufficient pertaining to current medical needs  Generative thought content: Adequate.  Spontaneous conversation  Language: Intact  Gait and Ambulation: Gait and ambulation Slow with assist  Musculoskeletal: No tonal abnormalities        Vital signs in last 24 hours  Temp:  [98.5  F (36.9  C)-101.8  F (38.8  C)] 98.7  F (37.1  C)  Pulse:  [] 71  Resp:  [7-38] 21  BP: ()/() 165/78  SpO2:  [92 %-100 %] 99 %

## 2021-09-21 NOTE — PROGRESS NOTES
.  Hospitalist Progress Note    Assessment/Plan  Active Problems:    Small bowel obstruction (H)    Pneumatosis intestinalis    Paige Mari is a 71 year old female presented with abdominal pain.  Work-up with CT abdomen showed small bowel obstruction with difficulty to identify the point of transition.     Rapid response called on 9/19 for agitation.  Patient was found to be in A. fib RVR and was transferred to ICU on diltiazem drip and started on Precedex for agitation. Pt was off of dilt drip on 9/19 but restarted 9/20 as pt went in A. Fib RVR. Now off drip, transferring to cardiac tele today     Abdominal pain  Acute SBO - resolved  -CT abdomen on admission showed small bowel obstruction with difficult to identify the point of transition.  -Cannot r/o pneumatosis of small bowel in the right lower quadrant  -Repeat CT abdomen 9/19 showed resolution of SBO.   -Surgery following, appreciate input. No plans for surgical intervention now as pt improving clinically  -Pain control prn, IV Zosyn. Vanc discontinued now.     Fever - unclear etiology. ?abdominal source ?  -Mild leukocytosis. Procal neg.  -UA no e/o UTI. Check CXR ?  -Blood NGTD       Lactic acidosis - resolved  -C/w IVF, monitor. Lactate wnl now    DKA - resolved.  -A1c is 8.1  -Blood sugars significantly high on admission, off insulin drip now.   -Currently on Lantus 15 units at bedtime and NovoLog sliding scale .  -Monitor closely.       Accelerated hypertension -   -Control pain adequately. Some episodes of hypotension noted overnight.  -Continue PTA clonidine patch.  Resumed metoprolol today  -As needed hydralazine and Lopressor ordered.  -Monitor closely on telemetry    Agitation/Metabolic encephalopathy/delirium - resolved  -Off precedex drip 9/20  -CT head neg for acute findings.  -Psy following. Started on abilify, rozerem. Scheduled Zyprexa discontinued as pt calm.   -Off 1:1 now.      A. Fib RVR - now in SR  -Dilt drip off now.  -PTA  metoprolol resumed    Hypernatremia likely pseudo given hyperglycemia - resolved    HypoK- replete as needed    JACKSON likely pre-renal - resolved.     DVT ppx: Heparin  Diet: CLD    Barriers to Discharge:  Clinical improvement    Anticipated discharge date/Disposition: TBD    Subjective  Chart reviewed and events noted.  Patient seen and examined.   Pt awake, alert and off 1:1 today. Denies any chest pain, sob. Tolerating clear liquid diet. Has BM+.      Objective    Vital signs in last 24 hours  Temp:  [97.9  F (36.6  C)-101.8  F (38.8  C)] 97.9  F (36.6  C)  Pulse:  [] 73  Resp:  [7-38] 24  BP: ()/() 178/94  SpO2:  [93 %-100 %] 98 % [unfilled]      Weight:   [unfilled] Weight change:     Intake/Output last 3 shifts  I/O last 3 completed shifts:  In: 2584.09 [P.O.:1100; I.V.:1484.09]  Out: 3705 [Urine:3705]  Body mass index is 27.58 kg/m .    Physical Exam    General Appearance:  Awake, alert,  NAD  HEENT:AT,NC   Lungs:   CTAB   Cardiovascular:  RRR   Abdomen:   Soft, NT,ND   Neurologic: Awake, alert, oriented, moving all extremities.   Psychiatry: No agitation     Pertinent Labs   Lab Results: personally reviewed.   Recent Labs   Lab 09/21/21  0314 09/20/21  1543 09/20/21  1203 09/19/21  1426 09/19/21  1426 09/19/21  0525 09/19/21  0525 09/19/21  0123 09/17/21  2240    143 144   < > 150*   < > 150*   < > 138   CO2 25 25 23   < > 26   < > 18*   < > 25   BUN 15 17 20   < > 21   < > 17   < > 16   ALBUMIN  --   --   --   --  3.1*  --  3.6  --  4.0   ALKPHOS  --   --   --   --   --   --  67  --  108   ALT  --   --   --   --   --   --  19  --  26   AST  --   --   --   --   --   --  30  --  28    < > = values in this interval not displayed.     Recent Labs   Lab 09/21/21  0314 09/20/21  1202 09/19/21  1137   WBC 13.7* 19.4* 20.2*   HGB 11.3* 12.7 12.7   HCT 34.4* 38.0 37.5    193 228     Recent Labs   Lab 09/19/21  0211 09/17/21  2240   TROPONINI 0.06 0.01     Invalid input(s):  POCGLUFGR    Medications  Drug and lactation database from the [place] [employer] Medicine:  http://toxnet.nlm.nih.gov/cgi-bin/sis/htmlgen?LACT      Pertinent Radiology   Radiology Results:  Personally reviewed impressions    Reviewed labs in last 24 hours.    Advanced Care Planning:  Total time with this patient is 35 min with greater than 50% of time spent in coordination of care.  Care discussed and coordinated with her RN, intensivist, MIRANDA/IRVIN    This Note is created using dragon voice recognition software.  Errors in spelling or words which seems out of context are unintentional.  Sounds alike errors may have escaped editing.    Lauren Guan MD  Hospitalist

## 2021-09-21 NOTE — PLAN OF CARE
Problem: Adult Inpatient Plan of Care  Goal: Plan of Care Review  Outcome: No Change    Elevated blood pressure during the early evening and during the start of my shift.  At 2100 patient had an episode of low blood pressure, discussed with intensivist, no new orders at that time.  Blood pressure resumed to be normal until 0200.  Another episode of low blood pressures at 0200.  Discussed with resident.  500 ml of LR bolus was given x2.  Blood pressure normal afterward.    Patient has been very drowsy after HS medications.  Will wake up to voice but will quickly fall asleep.  Able to reposition self in bed.       Juan An RN

## 2021-09-21 NOTE — PROGRESS NOTES
PULMONARY / CRITICAL CARE PROGRESS NOTE    Date / Time of Admission:  9/17/2021 11:17 PM    Assessment:   Active Problems:    Small bowel obstruction (H)    Pneumatosis intestinalis    71yoF with history of hypertension, DMII and remote bowel obstruction in 2009 requiring resection presented 9/18 with SBO that was managed conservatively and  Has now resolved but complicated by delirium that has now resolved.     Code Status:  Full Code  Plan:   Pulmonary:  No Issues    C/V:  1) Afib with RVR--resolved  -Continue home toprol XL but at 100mg BID (can increase to 200mg qam, 100mg qpm if not staying in sinus rhythm.   -Stopped diltiazem drip  -Echo found moderately dilated LA, normal LV and RV.     ID:  1) Leukocytosis in part related to SBO  2) Fever despite normal procalcitonin and lactate.   -Zosyn for abdominal coverage. Consider additional imaging if change in exam  -Trend WBC which has improved  -Blood cultures 9/21    Renal:  1) JACKSON--resolved  2) Hypernatremia--resolved  3) Lactic acidosis--resolved  -Continue to monitor    GI:  1) SBO--resolved, exam benign  -Appreciate surgery input  -advance to clears per surgery    Neuro:  1) Delirium--resolved  -CT head negative 9/20  -Appreciate psych input  -stop Zyprexa as patient now calm and oriented.     Heme  1) DVT prophylaxis  -May require anticoagulation if any further atrial fibrillation episodes, will monitor  -subcutaneous heparin for now    Endo:  1) Hyperglycemia  -Lantus increased to BID with sliding scale.       ICU DAILY CHECKLIST                           Can patient transfer out of MICU? Yes--discussed with hospitalist.     FAST HUG:    Feeding:  Feeding: No.  Patient is receiving clears  Gomes: No, removed  Analgesia/Sedation:No   Thromboembolic prophylaxis: Yes; Mode:  Heparin  HOB>30:  Yes  Stress Ulcer Protocol Active: Yes; Mode: PPI  Glycemic Control: Any glucose > 180 yes Sliding Scale Insulin and Long Acting Insulin    Updated daughter via phone.      Subjective:   HPI:  Paige Mari is a 71 year old female with history of DMII, hypertension and bowel obstruction in 2009 with resection who presented 9/18 with SBO. Rapid response called for afib with RVR, delirium and lactic acidosis.  SBO managed conservatively and repeat imaging showed resolution of obstruction. Lactic acidosis also resolved.     Back into Afib with RVR overnight, febrile. This morning after zyprexa calm and oriented.     Active Problems:    Small bowel obstruction (H)    Pneumatosis intestinalis      Allergies: Morphine, Adhesive [mecrylate], Amlodipine, Amlodipine besylate [amlodipine], Doxazosin mesylate [doxazosin], Irbesartan-hydrochlorothiazide [avalide], Other allergy (see comments) [external allergen needs reconciliation - see comment], Prednisone, and Trazodone     MEDS:  Scheduled Meds:    ARIPiprazole  2 mg Oral Daily     [Held by provider] heparin ANTICOAGULANT  5,000 Units Subcutaneous Q12H     insulin aspart  1-7 Units Subcutaneous TID AC     insulin aspart  1-5 Units Subcutaneous At Bedtime     insulin glargine  15 Units Subcutaneous BID     metoprolol succinate ER  100 mg Oral BID     pantoprazole  40 mg Per Feeding Tube QAM AC    Or     pantoprazole (PROTONIX) IV  40 mg Intravenous QAM AC     piperacillin-tazobactam  3.375 g Intravenous Q8H     pregabalin  100 mg Oral BID     ramelteon  8 mg Oral At Bedtime     sodium chloride (PF)  3 mL Intracatheter Q8H     tiZANidine  2 mg Oral At Bedtime     traMADol  100 mg Oral BID     Continuous Infusions:    dexmedetomidine Stopped (09/20/21 0852)     dextrose       PRN Meds:.acetaminophen **OR** acetaminophen, dextrose, glucose **OR** dextrose **OR** glucagon, hydrALAZINE, hydrALAZINE, HYDROmorphone, lidocaine 4%, lidocaine 4%, lidocaine (buffered or not buffered), lidocaine (buffered or not buffered), metoprolol, naloxone **OR** naloxone **OR** naloxone **OR** naloxone, OLANZapine, ondansetron, oxyCODONE, sodium chloride (PF),  "sodium chloride (PF)      Objective:   VITALS:  BP (!) 165/78 (BP Location: Right arm)   Pulse 71   Temp 98.7  F (37.1  C) (Oral)   Resp 21   Ht 1.575 m (5' 2\")   Wt 68.4 kg (150 lb 12.8 oz)   SpO2 99%   BMI 27.58 kg/m    EXAM:  General appearance: alert, appears stated age and cooperative  Neck: no adenopathy and supple, symmetrical, trachea midline  Lungs: clear to auscultation bilaterally  Heart: RRR, S1 and S2  Abdomen: soft, non-tender; bowel sounds normal; no masses,  no organomegaly  Extremities: Trace edema in legs  Skin: Skin color, texture, turgor normal. No rashes or lesions  Neurologic: moving all 4s, answering questions but doesn't know name even with prompting. Not oriented to place either.       I&O:     Intake/Output Summary (Last 24 hours) at 9/20/2021 0951  Last data filed at 9/20/2021 0631  Gross per 24 hour   Intake 4548.4 ml   Output 1405 ml   Net 3143.4 ml       Data Review:  CT abd/pelvis  EXAM: CT ABDOMEN AND PELVIS WITHOUT CONTRAST  LOCATION: [facility]  DATE/TIME: 9/19/2021 2:39 AM     INDICATION: Severe hyperglycemia. Hypothermia. Confusion.  COMPARISON: 09/17/2021.     TECHNIQUE: CT scan of the abdomen and pelvis was performed without IV contrast. Oral contrast was administered. Multiplanar reformats were obtained. Dose reduction techniques were used.  CONTRAST: None.     FINDINGS:     LOWER CHEST: Coronary artery calcification.     HEPATOBILIARY: Unremarkable.     SPLEEN: Unremarkable.     PANCREAS: Mild diffuse pancreatic atrophy.     ADRENAL GLANDS: Unremarkable.     KIDNEYS/BLADDER: Small probable cyst in the interpolar region of the left kidney. No follow-up is necessary.     BOWEL: No dilatation of the small or large bowel. The small bowel dilatation that was present on the recent comparison study has resolved. Oral contrast material that has been administered is present throughout the colon, all the way to the rectum. The   appendix is not " visualized. No bowel wall thickening, pneumatosis or free intraperitoneal gas.     LYMPH NODES: Unremarkable.     PELVIC ORGANS: No acute findings.     MUSCULOSKELETAL: Fusion hardware in the lumbar spine.     OTHER: Atherosclerotic calcification in the abdominal aorta.                                                                      IMPRESSION:   1. No acute abnormality identified in the abdomen or pelvis.  2. Interval resolution of the small bowel dilatation that was present on the recent comparison study dated 09/17/2021.     LABS  Results for orders placed or performed during the hospital encounter of 09/17/21   Basic metabolic panel   Result Value Ref Range    Sodium 142 136 - 145 mmol/L    Potassium 3.6 3.5 - 5.0 mmol/L    Chloride 112 (H) 98 - 107 mmol/L    Carbon Dioxide (CO2) 25 22 - 31 mmol/L    Anion Gap 5 5 - 18 mmol/L    Urea Nitrogen 15 8 - 28 mg/dL    Creatinine 1.00 0.60 - 1.10 mg/dL    Calcium 8.1 (L) 8.5 - 10.5 mg/dL    Glucose 133 (H) 70 - 125 mg/dL    GFR Estimate 56 (L) >60 mL/min/1.73m2     Lab Results   Component Value Date    WBC 13.7 (H) 09/21/2021    HGB 11.3 (L) 09/21/2021    HCT 34.4 (L) 09/21/2021     09/21/2021     09/21/2021         By:  Fanny Noriega MD

## 2021-09-21 NOTE — PROGRESS NOTES
Patient transferred to room 333 at this time. Belongings packed up and taken with patient. Chart and medications as well. Escorted patient via wheelchair with nurse. Verbal phone report given to DEMETRIS Mireles at 1350.

## 2021-09-21 NOTE — PROGRESS NOTES
Care Management Follow Up    Length of Stay (days): 3    Expected Discharge Date:  09/23/2021     Concerns to be Addressed:  Delirium:  Psychiatry following:  Medication Management; Small Bowel Obstruction:  Advance Diet to Clear Liquid per Surgery; Discharge Planning:  PT and OT Evaluations  Patient plan of care discussed at interdisciplinary rounds: Yes    Anticipated Discharge Disposition:  T. B. D.       Anticipated Discharge Services:  [facility]  Anticipated Discharge DME:  YESSICA MIRELES    Patient/family educated on Medicare website which has current facility and service quality ratings:  [facility]  Education Provided on the Discharge Plan:  Pending    Patient/Family in Agreement with the Plan:  Pending      Referrals Placed by CM/SW:  YESSICA MIRELES  Private pay costs discussed:  T. BMax MIRELES    Additional Information:  The patient lives in an apartment with her spouse, Liam.  The patient is independent in all ADLs at baseline and has no services.    Awaiting PT and OT Evaluations and recommendations to assist with Discharge Planning.    Care Management to follow.       Crystal Parsons CM

## 2021-09-22 ENCOUNTER — APPOINTMENT (OUTPATIENT)
Dept: OCCUPATIONAL THERAPY | Facility: HOSPITAL | Age: 71
DRG: 388 | End: 2021-09-22
Attending: NURSE PRACTITIONER
Payer: COMMERCIAL

## 2021-09-22 VITALS
WEIGHT: 164 LBS | HEIGHT: 62 IN | TEMPERATURE: 97.6 F | DIASTOLIC BLOOD PRESSURE: 83 MMHG | BODY MASS INDEX: 30.18 KG/M2 | HEART RATE: 70 BPM | RESPIRATION RATE: 20 BRPM | SYSTOLIC BLOOD PRESSURE: 169 MMHG | OXYGEN SATURATION: 98 %

## 2021-09-22 PROBLEM — G93.41 METABOLIC ENCEPHALOPATHY: Status: ACTIVE | Noted: 2021-09-22

## 2021-09-22 PROBLEM — E83.42 HYPOMAGNESEMIA: Status: ACTIVE | Noted: 2021-09-22

## 2021-09-22 PROBLEM — E87.20 LACTIC ACIDOSIS: Status: ACTIVE | Noted: 2021-09-22

## 2021-09-22 PROBLEM — I48.91 ATRIAL FIBRILLATION WITH RVR (H): Status: ACTIVE | Noted: 2021-09-22

## 2021-09-22 PROBLEM — N17.9 AKI (ACUTE KIDNEY INJURY) (H): Status: ACTIVE | Noted: 2021-09-22

## 2021-09-22 PROBLEM — E87.6 HYPOKALEMIA: Status: ACTIVE | Noted: 2021-09-22

## 2021-09-22 PROBLEM — R41.0 DELIRIUM: Status: ACTIVE | Noted: 2021-09-22

## 2021-09-22 PROBLEM — E87.0 HYPERNATREMIA: Status: ACTIVE | Noted: 2021-09-22

## 2021-09-22 PROBLEM — R65.10 SIRS (SYSTEMIC INFLAMMATORY RESPONSE SYNDROME) (H): Status: ACTIVE | Noted: 2021-09-22

## 2021-09-22 LAB
ERYTHROCYTE [DISTWIDTH] IN BLOOD BY AUTOMATED COUNT: 12.7 % (ref 10–15)
HCT VFR BLD AUTO: 37.9 % (ref 35–47)
HGB BLD-MCNC: 12.5 G/DL (ref 11.7–15.7)
MAGNESIUM SERPL-MCNC: 1.8 MG/DL (ref 1.8–2.6)
MCH RBC QN AUTO: 32.8 PG (ref 26.5–33)
MCHC RBC AUTO-ENTMCNC: 33 G/DL (ref 31.5–36.5)
MCV RBC AUTO: 100 FL (ref 78–100)
PHOSPHATE SERPL-MCNC: 3.9 MG/DL (ref 2.5–4.5)
PLATELET # BLD AUTO: 186 10E3/UL (ref 150–450)
POTASSIUM BLD-SCNC: 3.3 MMOL/L (ref 3.5–5)
RBC # BLD AUTO: 3.81 10E6/UL (ref 3.8–5.2)
WBC # BLD AUTO: 11.5 10E3/UL (ref 4–11)

## 2021-09-22 PROCEDURE — 250N000013 HC RX MED GY IP 250 OP 250 PS 637: Performed by: HOSPITALIST

## 2021-09-22 PROCEDURE — 250N000013 HC RX MED GY IP 250 OP 250 PS 637: Performed by: INTERNAL MEDICINE

## 2021-09-22 PROCEDURE — 250N000011 HC RX IP 250 OP 636: Performed by: INTERNAL MEDICINE

## 2021-09-22 PROCEDURE — 84132 ASSAY OF SERUM POTASSIUM: CPT | Performed by: HOSPITALIST

## 2021-09-22 PROCEDURE — 99239 HOSP IP/OBS DSCHRG MGMT >30: CPT | Performed by: FAMILY MEDICINE

## 2021-09-22 PROCEDURE — 250N000012 HC RX MED GY IP 250 OP 636 PS 637: Performed by: FAMILY MEDICINE

## 2021-09-22 PROCEDURE — 85027 COMPLETE CBC AUTOMATED: CPT | Performed by: FAMILY MEDICINE

## 2021-09-22 PROCEDURE — 83735 ASSAY OF MAGNESIUM: CPT | Performed by: HOSPITALIST

## 2021-09-22 PROCEDURE — 97165 OT EVAL LOW COMPLEX 30 MIN: CPT | Mod: GO | Performed by: OCCUPATIONAL THERAPIST

## 2021-09-22 PROCEDURE — C9113 INJ PANTOPRAZOLE SODIUM, VIA: HCPCS | Performed by: INTERNAL MEDICINE

## 2021-09-22 PROCEDURE — 250N000013 HC RX MED GY IP 250 OP 250 PS 637: Performed by: FAMILY MEDICINE

## 2021-09-22 PROCEDURE — 84100 ASSAY OF PHOSPHORUS: CPT | Performed by: HOSPITALIST

## 2021-09-22 RX ORDER — METOPROLOL SUCCINATE 100 MG/1
100 TABLET, EXTENDED RELEASE ORAL 2 TIMES DAILY
Start: 2021-09-22 | End: 2022-01-20

## 2021-09-22 RX ORDER — DULOXETIN HYDROCHLORIDE 30 MG/1
30 CAPSULE, DELAYED RELEASE ORAL DAILY
Status: DISCONTINUED | OUTPATIENT
Start: 2021-09-22 | End: 2021-09-22 | Stop reason: HOSPADM

## 2021-09-22 RX ORDER — POTASSIUM CHLORIDE 1500 MG/1
40 TABLET, EXTENDED RELEASE ORAL ONCE
Status: DISCONTINUED | OUTPATIENT
Start: 2021-09-22 | End: 2021-09-22 | Stop reason: HOSPADM

## 2021-09-22 RX ORDER — RAMELTEON 8 MG/1
8 TABLET ORAL AT BEDTIME
Qty: 15 TABLET | Refills: 0 | Status: SHIPPED | OUTPATIENT
Start: 2021-09-22 | End: 2021-10-06

## 2021-09-22 RX ORDER — LANOLIN ALCOHOL/MO/W.PET/CERES
1000 CREAM (GRAM) TOPICAL DAILY
Status: DISCONTINUED | OUTPATIENT
Start: 2021-09-22 | End: 2021-09-22 | Stop reason: HOSPADM

## 2021-09-22 RX ORDER — LOSARTAN POTASSIUM 50 MG/1
100 TABLET ORAL DAILY
Status: DISCONTINUED | OUTPATIENT
Start: 2021-09-22 | End: 2021-09-22 | Stop reason: HOSPADM

## 2021-09-22 RX ORDER — PREGABALIN 100 MG/1
100 CAPSULE ORAL 2 TIMES DAILY
Qty: 60 CAPSULE | Refills: 0 | Status: SHIPPED | OUTPATIENT
Start: 2021-09-22 | End: 2021-10-06

## 2021-09-22 RX ADMIN — PANTOPRAZOLE SODIUM 40 MG: 40 INJECTION, POWDER, FOR SOLUTION INTRAVENOUS at 06:19

## 2021-09-22 RX ADMIN — PREGABALIN 100 MG: 75 CAPSULE ORAL at 10:00

## 2021-09-22 RX ADMIN — HEPARIN SODIUM 5000 UNITS: 5000 INJECTION, SOLUTION INTRAVENOUS; SUBCUTANEOUS at 10:00

## 2021-09-22 RX ADMIN — DULOXETINE HYDROCHLORIDE 30 MG: 30 CAPSULE, DELAYED RELEASE ORAL at 10:00

## 2021-09-22 RX ADMIN — CYANOCOBALAMIN TAB 1000 MCG 1000 MCG: 1000 TAB at 10:00

## 2021-09-22 RX ADMIN — INSULIN GLARGINE 20 UNITS: 100 INJECTION, SOLUTION SUBCUTANEOUS at 10:00

## 2021-09-22 RX ADMIN — ARIPIPRAZOLE 2 MG: 2 TABLET ORAL at 10:00

## 2021-09-22 RX ADMIN — OXYCODONE HYDROCHLORIDE 5 MG: 5 TABLET ORAL at 00:17

## 2021-09-22 RX ADMIN — OXYCODONE HYDROCHLORIDE 5 MG: 5 TABLET ORAL at 06:24

## 2021-09-22 RX ADMIN — TRAMADOL HYDROCHLORIDE 100 MG: 50 TABLET, FILM COATED ORAL at 10:16

## 2021-09-22 RX ADMIN — INSULIN ASPART 2 UNITS: 100 INJECTION, SOLUTION INTRAVENOUS; SUBCUTANEOUS at 08:54

## 2021-09-22 RX ADMIN — METOPROLOL SUCCINATE 100 MG: 100 TABLET, FILM COATED, EXTENDED RELEASE ORAL at 10:00

## 2021-09-22 RX ADMIN — PIPERACILLIN SODIUM AND TAZOBACTAM SODIUM 3.38 G: 3; .375 INJECTION, POWDER, LYOPHILIZED, FOR SOLUTION INTRAVENOUS at 03:29

## 2021-09-22 RX ADMIN — LOSARTAN POTASSIUM 100 MG: 50 TABLET, FILM COATED ORAL at 10:00

## 2021-09-22 ASSESSMENT — MIFFLIN-ST. JEOR: SCORE: 1207.15

## 2021-09-22 NOTE — PROGRESS NOTES
Care Management Discharge Note    Discharge Date: 09/22/2021     Expected Time of Departure: home per family transport.    Discharge Disposition: Home    Discharge Services: None    Discharge DME: None    Discharge Transportation:  (Family)    Private pay costs discussed: Not applicable    PAS Confirmation Code:  (NA)  Patient/family educated on Medicare website which has current facility and service quality ratings:  (NA)    Education Provided on the Discharge Plan:  Per team.  Persons Notified of Discharge Plans: Yes  Patient/Family in Agreement with the Plan: yes    Handoff Referral Completed: No    Additional Information:  Patient is  and independent with activities of daily living at baseline.  Therapy agrees with a home discharge, family support. No care management needs identified.     Gaviota Cooley RN

## 2021-09-22 NOTE — PROGRESS NOTES
Occupational Therapy      09/22/21 1130   Quick Adds   Type of Visit Initial Occupational Therapy Evaluation   Living Environment   People in home spouse   Current Living Arrangements house   Home Accessibility stairs to enter home;stairs within home   Living Environment Comments Tub/shower, SC, GB - pt wears non-slip pool shoes in shower    Self-Care   Usual Activity Tolerance good   Current Activity Tolerance moderate   Equipment Currently Used at Home shower chair;grab bar, tub/shower   Activity/Exercise/Self-Care Comment Pt was I with all ADL/IADL - can drive but prefers  d/t her neuropathy    Disability/Function   Hearing Difficulty or Deaf no   Concentrating, Remembering or Making Decisions Difficulty no   Difficulty Communicating no   Difficulty Eating/Swallowing no   Walking or Climbing Stairs Difficulty no   Dressing/Bathing Difficulty no   Toileting issues no   Fall history within last six months no   General Information   Onset of Illness/Injury or Date of Surgery 09/17/21   Referring Physician Crystal Buchanan APRN CNP   Patient/Family Therapy Goal Statement (OT) To return home    Existing Precautions/Restrictions fall   Cognitive Status Examination   Orientation Status orientation to person, place and time  (Some delay in recall of date )   Affect/Mental Status (Cognitive) WNL   Follows Commands WNL   Visual Perception   Visual Impairment/Limitations WFL   Sensory   Sensory Quick Adds No deficits were identified   Pain Assessment   Patient Currently in Pain No   Range of Motion Comprehensive   General Range of Motion no range of motion deficits identified   Strength Comprehensive (MMT)   General Manual Muscle Testing (MMT) Assessment no strength deficits identified   Coordination   Upper Extremity Coordination No deficits were identified   Bed Mobility   Bed Mobility supine-sit   Supine-Sit [place] (Bed Mobility) modified independence   Transfers   Transfers toilet transfer;sit-stand  transfer   Sit-Stand Transfer   Sit-Stand [place] (Transfers) supervision   Toilet Transfer   [place] Level (Toilet Transfer) supervision   Balance   Balance Assessment standing balance: dynamic;standing balance: static;sit to stand balance;sitting balance: dynamic;sitting balance: static   Sitting Balance: Static WFL   Sitting Balance: Dynamic WFL   Sit-to-Stand Balance WFL   Standing Balance: Static WFL   Standing Balance: Dynamic good balance   Balance Comments Some furniture surfing noted   Grooming Assessment   [place] Level (Grooming) independent   Toileting   [place] Level (Toileting) supervision   Clinical Impression   Criteria for Skilled Therapeutic Interventions Met (OT) no;no problems identified which require skilled intervention   Clinical Decision Making Complexity (OT) low complexity   Risk & Benefits of therapy have been explained evaluation/treatment results reviewed   OT Discharge Planning    OT Discharge Recommendation (DC Rec) home   OT Rationale for DC Rec Pt safe with ADL and mobility in room.    Total Evaluation Time (Minutes)   Total Evaluation Time (Minutes) 20

## 2021-09-22 NOTE — PROGRESS NOTES
"Full Spiritual Care Note: Met with Paige just before she was discharged. She denies any concerns at this time, saying \"I can't wait to get out of her and go home to my [place].\" Kept our visit short as her  was waiting for her in the parking lot. [place] shared.     Plan of Care: Will remain available for further support as patient/family needs/desires.    Rachel Christian M.Div.  Staff   [phone]    "

## 2021-09-22 NOTE — PLAN OF CARE
Physical Therapy Discharge Summary    Reason for therapy discharge:    Discharged to home.    Progress towards therapy goal(s). See goals on Care Plan in [facility] electronic health record for goal details.  Goals not met.  Barriers to achieving goals:   discharge from facility.    Therapy recommendation(s):    Continue home exercise program.

## 2021-09-22 NOTE — PLAN OF CARE
Problem: Risk for Delirium  Goal: Improved Attention and Thought Clarity  Outcome: Improving    Patient alert and oriented now. Calls appropriately.      Problem: Infection (Intestinal Obstruction)  Goal: Absence of Infection Signs and Symptoms  Outcome: Improving    Afebrile. Vitals stable. On room air. Denied any abdominal pain. Had bowel movement yesterday. On IV zosyn.

## 2021-09-22 NOTE — DISCHARGE SUMMARY
[facility] MEDICINE  DISCHARGE SUMMARY  [facility]     Primary Care Physician: Liam Canada  Admission Date: 9/17/2021   Discharge Provider: Debbi Joy DO Discharge Date: 9/22/2021   Diet: diabetic and cardiac   Code Status: Full Code   Activity: as tolerated        Condition at Discharge: stable      REASON FOR PRESENTATION(See Admission Note for Details)   71-year-old female with DM2 presented with SBO, DKA and is since developed atrial fibrillation with RVR, delirium and agitation requiring Precedex drip.     PRINCIPAL & ACTIVE DISCHARGE DIAGNOSES     Active Problems:    Benign essential hypertension    Mood disorder (H)    Small bowel obstruction (H)    Pneumatosis intestinalis    Hypokalemia    SIRS (systemic inflammatory response syndrome) (H)    Metabolic encephalopathy    Delirium    Atrial fibrillation with RVR (H)    JACKSON (acute kidney injury) (H)    DKA (diabetic ketoacidoses) (H)    Lactic acidosis    COPD (chronic obstructive pulmonary disease) (H)    Hypernatremia    Hypomagnesemia      SIGNIFICANT FINDINGS (Imaging, labs):     Impression:     IMPRESSION: Right PICC tip now in the upper SVC. Lungs are clear. No pleural effusion. Heart size and pulmonary vascularity within normal limits.     Impression:     IMPRESSION: Right PICC with the tip extending into the neck, presumably within the right internal jugular vein. Recommend repositioning.     Clear lungs. The cardiac silhouette and pulmonary vasculature are normal.     These findings and recommendations were discussed via telephone by Dr. Robin with the nurse Phill Callahan at 5:15 PM on 09/21/2021.     Impression:     IMPRESSION:   1.  No CT evidence for acute intracranial process.     2.  Brain atrophy and presumed chronic microvascular ischemic changes as above.     3.  Progression of chronic ischemic changes overall from prior study 03/13/2015 with no acute process noted.     Interpretation Summary        The left atrium is moderately dilated.   Hyperdynamic left ventricular function   Normal right ventricle size and systolic function.   No significant valve disease.   ______________________________________________________________________________     Impression:     IMPRESSION: Passing of a majority of oral contrast through the gastrointestinal tract, with mild residual contrast mostly seen in the colon and rectum. No evidence for obstruction or small bowel dilatation. Surgical changes spine.      Impression:     IMPRESSION:   1. No acute abnormality identified in the abdomen or pelvis.   2. Interval resolution of the small bowel dilatation that was present on the recent comparison study dated 09/17/2021.      FINDINGS: At 3 hours, oral contrast extends throughout the gastrointestinal tract, including large bowel. Findings consistent with incomplete or resolving obstructive process. No evidence for free air. Surgical changes lumbar spine.     Impression:     IMPRESSION: New NG tube in good position in the stomach. Stable postop changes in the lumbar spine. Dilated small bowel loops seen on prior CT are not visible on the current x-ray.      Impression:     IMPRESSION:   1.  Small bowel obstruction.   2.  Difficult to exclude pneumatosis of small bowel in the right lower quadrant. Correlation with lactate recommended.     3.  Results called to Dr. Mike Ramos at 0015       PENDING LABS     [unfilled]      PROCEDURES ( this hospitalization only)      Procedure(s):  LAPAROSCOPY possible laparototomy    RECOMMENDATIONS TO OUTPATIENT PROVIDER FOR F/U VISIT     Follow up CBC within 1 week to ensure leukocytosis resolved  Repeat potassium in 3 to 5 days  Follow-up with cardiology for further atrial fibrillation monitoring    DISPOSITION     Home    SUMMARY OF HOSPITAL COURSE:      Small bowel obstruction  Patient initially presented to the hospital with complaints of abdominal pain.  CT abdomen and pelvis  at time of admission with small bowel obstruction and possible pneumatosis of small bowel and right lower quadrant.  Resolution of SBO after Gastrografin small bowel follow-through.  Repeat CT abdomen pelvis 9/19 with resolved SBO.  As patient remains with elevated white blood cell count which was concerning for findings of pneumatosis intestinalis but as of 9/21 abdominal pain is resolved and no plans for surgical intervention.  Tolerating regular diet at time of discharge     SIRS  Patient with fever, leukocytosis although normal procalcitonin and lactic acid.  Urinalysis without signs of infection, repeat CT abdomen and pelvis without any intra-abdominal infection.  Chest x-ray 9/21 with clear lungs.  Unclear exactly as to what the cause of fever was.  Patient has been on empiric vancomycin and Zosyn, vancomycin discontinued 9/20.  Leukocytosis improving, still not clear as to cause so will discharge to complete 7 days of oral Augmentin.  Should have follow up CBC outpatient to ensure leukocytosis completely resolved     Delirium, metabolic encephalopathy  9/19 patient with acute agitation requiring Precedex drip for patient safety.  Multifactorial as patient febrile, small bowel obstruction, JACKSON.  Does have baseline depression and anxiety.  CT head negative for any acute etiology on 9/20.  No longer necessitating Zyprexa as needed.  Patient had been followed by psychiatry during hospital stay.  Continue home aripiprazole.  Ramelteon was added for sleep difficulty.     A. fib with RVR  9/19 patient developed new atrial fibrillation with RVR.  Briefly on diltiazem drip currently rate controlled.  TTE 9/19 with moderately dilated left atrium, ejection fraction not noted.  Not currently on anticoagulation as this was the first incident of A. fib and with patient's SIRS/DKA/SBO it was provoked and resolved quickly.  Metoprolol XL changed to 100 mg p.o. twice daily.  Patient should follow-up with her outpatient  cardiologist for further monitoring, would benefit from anticoagulation if atrial fibrillation returns.    JACKSON-resolved  Creatinine 1.35 at time of admission secondary to SBO, questionable infection.  Resolved with IV fluids.  May resume home olmesartan     DM2, DKA  Patient initially with DKA, lactic acidosis and on insulin drip.  DKA likely secondary to SBO, infection.  A1c 8.1. Has been transitioned to subcu insulin and doing well.  Continue home Victoza, glargine, mealtime based insulin.     COPD  Not currently in exacerbation.  Tiotropium daily     HTN  Clonidine 0.3 mg/24 hr patch, Metoprolol  mg p.o. every morning, olmesartan 40 mg p.o. daily     Mood disorder with chronic pain  Pregabalin 150 mg p.o. twice daily, Tizanidine 4 mg p.o. q. Bedtime, duloxetine 30 mg p.o. daily     Hypokalemia  Potassium replacement protocol     Hypomagnesemia  Potassium replacement protocol    Hypernatremia  Resolved    Discharge Medications with Med changes:        Review of your medicines      START taking      Dose / Directions   amoxicillin-clavulanate 875-125 MG tablet  Commonly known as: AUGMENTIN  Indication: Infection Within the Abdomen  Used for: Leukocytosis, unspecified type      Dose: 1 tablet  Take 1 tablet by mouth every 12 hours for 7 days  Quantity: 14 tablet  Refills: 0     ramelteon 8 MG tablet  Commonly known as: ROZEREM  Used for: Insomnia due to other mental disorder      Dose: 8 mg  Take 1 tablet (8 mg) by mouth At Bedtime for 15 days  Quantity: 15 tablet  Refills: 0        CONTINUE these medicines which may have CHANGED, or have new prescriptions. If we are uncertain of the size of tablets/capsules you have at home, strength may be listed as something that might have changed.      Dose / Directions   metoprolol succinate  MG 24 hr tablet  Commonly known as: TOPROL-XL  This may have changed:     when to take this    Another medication with the same name was removed. Continue taking this  medication, and follow the directions you see here.  Used for: Paroxysmal atrial fibrillation (H)      Dose: 100 mg  Take 1 tablet (100 mg) by mouth 2 times daily  Refills: 0     pregabalin 100 MG capsule  Commonly known as: LYRICA  This may have changed:     medication strength    how much to take  Used for: Spondylolisthesis, unspecified spinal region      Dose: 100 mg  Take 1 capsule (100 mg) by mouth 2 times daily  Quantity: 60 capsule  Refills: 0        CONTINUE these medicines which have NOT CHANGED      Dose / Directions   acetaminophen 500 MG tablet  Commonly known as: TYLENOL      Dose: 1,000 mg  Take 1,000 mg by mouth 2 times daily  Refills: 0     amoxicillin 500 MG tablet  Commonly known as: AMOXIL  Used for: Thyrotoxicosis, Uncontrolled type 2 diabetes mellitus without complication, with long-term current use of insulin      Dose: 2,000 mg  Take 2,000 mg by mouth as needed Before dental appointments  Refills: 0     ARIPiprazole 2 MG tablet  Commonly known as: ABILIFY      Dose: 2 mg  Take 2 mg by mouth daily  Refills: 0     BLOOD GLUCOSE TEST STRIPS Strp      Dose: 1 each  [BLOOD SUGAR DIAGNOSTIC (CONTOUR TEST STRIPS) STRP] Use 1 each As Directed daily.  Refills: 0     cetirizine 10 MG tablet  Commonly known as: zyrTEC      Dose: 10 mg  Take 10 mg by mouth daily  Refills: 0     cholecalciferol 25 mcg (1000 units) capsule  Commonly known as: VITAMIN D3      Dose: 1,000 Units  Take 1,000 Units by mouth daily  Refills: 0     cloNIDine 0.3 MG/24HR WK patch  Commonly known as: CATAPRES-TTS3      Dose: 1 patch  Place 1 patch onto the skin once a week  Refills: 0     cyanocobalamin 1000 MCG tablet  Commonly known as: VITAMIN B-12      Dose: 1,000 mcg  Take 1,000 mcg by mouth daily  Refills: 0     Dexcom G6  Geeta  Used for: Diabetes mellitus type 2, uncontrolled (H)      Use to read blood sugars as per 's instructions.  Quantity: 1 each  Refills: 0     * Dexcom G6 Sensor Misc  Used for:  Diabetes mellitus type 2, uncontrolled (H)      Change every 10 days.  Quantity: 3 each  Refills: 6     * Dexcom G6 Sensor Misc  Used for: Diabetes mellitus type 2, uncontrolled (H)      Change every 10 days.  Quantity: 3 each  Refills: 6     * Dexcom G6 Transmitter Misc  Used for: Diabetes mellitus type 2, uncontrolled (H)      Change every 3 months.  Quantity: 1 each  Refills: 2     * Dexcom G6 Transmitter Misc  Used for: Diabetes mellitus type 2, uncontrolled (H)      Change every 3 months.  Quantity: 1 each  Refills: 2     diclofenac 1 % topical gel  Commonly known as: VOLTAREN      Dose: 1 g  [DICLOFENAC SODIUM (VOLTAREN) 1 % GEL] Apply 1 g topically daily as needed.  Refills: 0     docusate sodium 100 MG capsule  Commonly known as: COLACE      Dose: 200 mg  Take 200 mg by mouth 2 times daily  Refills: 0     DULoxetine 30 MG capsule  Commonly known as: CYMBALTA      Dose: 30 mg  Take 30 mg by mouth daily  Refills: 0     estradiol 1 MG tablet  Commonly known as: ESTRACE      Dose: 1 mg  Take 1 mg by mouth daily  Refills: 0     insulin aspart 100 UNITS/ML vial  Commonly known as: NovoLOG VIAL      Dose: 6-15 Units  Inject 6-15 Units Subcutaneous 3 times daily (with meals)  Refills: 0     insulin detemir 100 UNIT/ML pen  Commonly known as: LEVEMIR PEN      Dose: 20 Units  Inject 20 Units Subcutaneous At Bedtime  Refills: 0     liraglutide 18 MG/3ML solution  Commonly known as: VICTOZA      Dose: 1.2 mg  [LIRAGLUTIDE (VICTOZA 2-TANNER) 0.6 MG/0.1 ML (18 MG/3 ML) PNIJ INJECTION] Inject 1.2 mg under the skin daily. Not available from inpatient hospital pharmacy - OK for patient to use own supply  Refills: 0     medical cannabis  (Patient's own supply)      Dose: 1 Dose  1 Dose by Other route See Admin Instructions [employer] Tangerine Vape- 1-4 puffs q6h PRN  Refills: 0     multivitamin w/minerals tablet      Dose: 1 tablet  Take 1 tablet by mouth daily  Refills: 0     NovoFine 32G X 6 MM miscellaneous  Generic drug:  "insulin pen needle      [NOVOFINE 32 32 GAUGE X 1/4\" NDLE] USE TO TEST TWICE A DAY  Refills: 3     olmesartan 40 MG tablet  Commonly known as: BENICAR      Dose: 40 mg  [OLMESARTAN (BENICAR) 40 MG TABLET] Take 40 mg by mouth daily.  Refills: 0     omeprazole 20 MG DR capsule  Commonly known as: priLOSEC      Dose: 1 capsule  [OMEPRAZOLE (PRILOSEC) 20 MG CAPSULE] Take 1 capsule by mouth daily before breakfast.  Refills: 0     Spiriva HandiHaler 18 MCG inhaled capsule  Generic drug: tiotropium      Dose: 18 mcg  Inhale 18 mcg into the lungs daily  Refills: 0     sucralfate 1 GM tablet  Commonly known as: CARAFATE      Dose: 1 tablet  Take 1 tablet by mouth 4 times daily as needed  Refills: 0     tiZANidine 2 MG tablet  Commonly known as: ZANAFLEX      Dose: 4 mg  [TIZANIDINE (ZANAFLEX) 2 MG TABLET] Take 4 mg by mouth bedtime.  Refills: 0     traMADol 50 MG tablet  Commonly known as: ULTRAM      Dose: 100 mg  [TRAMADOL (ULTRAM) 50 MG TABLET] Take 100 mg by mouth 2 (two) times a day.  Refills: 0     UNABLE TO FIND      [MICRO THIN LANCETS MISC] Use As Directed.  Refills: 0     valACYclovir 500 MG tablet  Commonly known as: VALTREX  Indication: viral suppression      Dose: 500 mg  Take 500 mg by mouth daily  Refills: 0         * This list has 4 medication(s) that are the same as other medications prescribed for you. Read the directions carefully, and ask your doctor or other care provider to review them with you.            STOP taking    aspirin 325 MG EC tablet  Commonly known as: ASA              Where to get your medicines      These medications were sent to [facility]/pharmacy #[id number] [place], [place] - [address] [place] [id number]    Phone: [phone]     amoxicillin-clavulanate 875-125 MG tablet    ramelteon 8 MG tablet     Information about where to get these medications is not yet available    Ask your nurse or doctor about these medications    pregabalin 100 MG capsule   " "          Rationale for medication changes:      Metoprolol dose simplified  Lyrica dose decreased as patient on supratherapeutic levels        Consults   ICU, psychiatry, general surgery      Immunizations given this encounter     [unfilled]       Anticoagulation Information      Recent INR results: No results for input(s): INR in the last 168 hours.      Discharge Orders     Discharge Procedure Orders   Med Therapy Management Referral   Referral Priority: Routine Referral Type: Med Therapy Management   Requested Specialty: Pharmacist   Number of Visits Requested: 1     Reason for your hospital stay   Order Comments: DKA, JACKSON, small bowel obstruction, SIRS     Follow-up and recommended labs and tests    Order Comments: Follow up with primary care provider, Liam Canada, within 7 days for hospital follow- up.  The following labs/tests are recommended: CBC and BMP  Patient should also follow up with her primary cardiology for further evaluation of paroxysmal atrial fibrillation.    RN discharge follow up clinic visit (P/[facility] only) within 7 days.     Activity   Order Comments: Your activity upon discharge: activity as tolerated     Order Specific Question Answer Comments   Is discharge order? Yes      Diet   Order Comments: Follow this diet upon discharge: Orders Placed This Encounter      Consistent Carb 60 grams CHO per Meal Diet     Order Specific Question Answer Comments   Is discharge order? Yes      Examination     Vital signs:  Temp: 97.6  F (36.4  C) Temp src: Axillary BP: (!) 169/83 Pulse: 70   Resp: 20 SpO2: 98 % O2 Device: None (Room air)   Height: 157.5 cm (5' 2\") Weight: 74.4 kg (164 lb)  Estimated body mass index is 30 kg/m  as calculated from the following:    Height as of this encounter: 1.575 m (5' 2\").    Weight as of this encounter: 74.4 kg (164 lb).         Physical Examination:   General appearance - alert, well appearing, and in no distress and oriented to person, place, and " time  Mental status - alert, oriented to person, place, and time, normal mood, behavior, speech, dress, motor activity, and thought processes  HEENT - sclera anicteric, left eye normal, right eye normal, nares normal and patent, no erythema  Lymphatics - no palpable lymphadenopathy, no hepatosplenomegaly  Respiratory - clear to auscultation, no wheezes, rales or rhonchi, symmetric air entry, no tachypnea, retractions or cyanosis  Cardiac - normal rate, regular rhythm, normal S1, S2, no murmurs, rubs, clicks or gallops, no JVD  Abdomen - soft, nontender, nondistended, no masses or organomegaly no rebound tenderness noted bowel sounds normal, no bladder distension noted  Neurological - alert, oriented, normal speech, no focal findings or movement disorder noted  Musculoskeletal - no joint tenderness, deformity or swelling, full range of motion without pain  Extremities - peripheral pulses normal, no pedal edema, no clubbing or cyanosis  Skin - normal coloration and turgor, no rashes, no suspicious skin lesions noted      Please see EMR for more detailed significant labs, imaging, consultant notes etc.  Total time spent on discharge: 50 minutes    Debbi Joy DO    [facility] Service: Ph:[phone]    CC:Lima Canada

## 2021-09-22 NOTE — PLAN OF CARE
Problem: Adult Inpatient Plan of Care  Goal: Optimal Comfort and Wellbeing  Outcome: Improving     Problem: Adult Inpatient Plan of Care  Goal: Readiness for Transition of Care  Outcome: Improving     Pt alert and oriented this evening.  No evidence of confusion.  Pt able to use call light appropriately and express needs. Pt ambulates to bathroom with standby assist and walker.  Pt was excited with diet was advanced to diabetic diet. Pt tolerated advanced diet well.

## 2021-09-22 NOTE — PROCEDURES
Was called to reposition PICC line. PICC pulled back 3 cm  For a total of 3 cm external length and power flushed. Asked charge RN to obtain a new xray order to check line placement.

## 2021-09-24 LAB
BACTERIA BLD CULT: NO GROWTH
BACTERIA BLD CULT: NO GROWTH

## 2021-09-25 LAB
BACTERIA BLD CULT: NO GROWTH
BACTERIA BLD CULT: NO GROWTH
GLUCOSE BLDC GLUCOMTR-MCNC: 165 MG/DL (ref 70–99)
GLUCOSE BLDC GLUCOMTR-MCNC: 201 MG/DL (ref 70–99)

## 2021-10-01 ENCOUNTER — LAB REQUISITION (OUTPATIENT)
Dept: LAB | Facility: CLINIC | Age: 71
End: 2021-10-01

## 2021-10-01 DIAGNOSIS — D72.829 ELEVATED WHITE BLOOD CELL COUNT, UNSPECIFIED: ICD-10-CM

## 2021-10-01 DIAGNOSIS — N17.9 ACUTE KIDNEY FAILURE, UNSPECIFIED (H): ICD-10-CM

## 2021-10-01 LAB
ALBUMIN SERPL-MCNC: 3.5 G/DL (ref 3.5–5)
ALP SERPL-CCNC: 98 U/L (ref 45–120)
ALT SERPL W P-5'-P-CCNC: 22 U/L (ref 0–45)
ANION GAP SERPL CALCULATED.3IONS-SCNC: 12 MMOL/L (ref 5–18)
AST SERPL W P-5'-P-CCNC: 24 U/L (ref 0–40)
BASOPHILS # BLD MANUAL: 0.2 10E3/UL (ref 0–0.2)
BASOPHILS NFR BLD MANUAL: 2 %
BILIRUB SERPL-MCNC: 0.3 MG/DL (ref 0–1)
BUN SERPL-MCNC: 18 MG/DL (ref 8–28)
CALCIUM SERPL-MCNC: 9.5 MG/DL (ref 8.5–10.5)
CHLORIDE BLD-SCNC: 105 MMOL/L (ref 98–107)
CO2 SERPL-SCNC: 23 MMOL/L (ref 22–31)
CREAT SERPL-MCNC: 0.95 MG/DL (ref 0.6–1.1)
EOSINOPHIL # BLD MANUAL: 0.5 10E3/UL (ref 0–0.7)
EOSINOPHIL NFR BLD MANUAL: 5 %
ERYTHROCYTE [DISTWIDTH] IN BLOOD BY AUTOMATED COUNT: 12.3 % (ref 10–15)
GFR SERPL CREATININE-BSD FRML MDRD: 60 ML/MIN/1.73M2
GLUCOSE BLD-MCNC: 90 MG/DL (ref 70–125)
HCT VFR BLD AUTO: 36.5 % (ref 35–47)
HGB BLD-MCNC: 12 G/DL (ref 11.7–15.7)
LYMPHOCYTES # BLD MANUAL: 3.7 10E3/UL (ref 0.8–5.3)
LYMPHOCYTES NFR BLD MANUAL: 37 %
MCH RBC QN AUTO: 32.4 PG (ref 26.5–33)
MCHC RBC AUTO-ENTMCNC: 32.9 G/DL (ref 31.5–36.5)
MCV RBC AUTO: 99 FL (ref 78–100)
METAMYELOCYTES # BLD MANUAL: 0.1 10E3/UL
METAMYELOCYTES NFR BLD MANUAL: 1 %
MONOCYTES # BLD MANUAL: 0.7 10E3/UL (ref 0–1.3)
MONOCYTES NFR BLD MANUAL: 7 %
NEUTROPHILS # BLD MANUAL: 4.8 10E3/UL (ref 1.6–8.3)
NEUTROPHILS NFR BLD MANUAL: 48 %
PLAT MORPH BLD: ABNORMAL
PLATELET # BLD AUTO: 315 10E3/UL (ref 150–450)
POTASSIUM BLD-SCNC: 4.8 MMOL/L (ref 3.5–5)
PROT SERPL-MCNC: 6.2 G/DL (ref 6–8)
RBC # BLD AUTO: 3.7 10E6/UL (ref 3.8–5.2)
RBC MORPH BLD: ABNORMAL
SODIUM SERPL-SCNC: 140 MMOL/L (ref 136–145)
WBC # BLD AUTO: 9.9 10E3/UL (ref 4–11)

## 2021-10-01 PROCEDURE — 36415 COLL VENOUS BLD VENIPUNCTURE: CPT | Performed by: FAMILY MEDICINE

## 2021-10-01 PROCEDURE — 80053 COMPREHEN METABOLIC PANEL: CPT | Performed by: FAMILY MEDICINE

## 2021-10-01 PROCEDURE — 85027 COMPLETE CBC AUTOMATED: CPT | Performed by: FAMILY MEDICINE

## 2021-10-04 RX ORDER — PREGABALIN 150 MG/1
CAPSULE ORAL
COMMUNITY
Start: 2021-09-13 | End: 2022-01-20

## 2021-10-04 RX ORDER — CITALOPRAM HYDROBROMIDE 40 MG/1
TABLET ORAL
COMMUNITY
Start: 2021-08-01 | End: 2021-10-06

## 2021-10-04 RX ORDER — MAGNESIUM OXIDE 400 MG/1
400 TABLET ORAL DAILY
COMMUNITY

## 2021-10-04 RX ORDER — TIOTROPIUM BROMIDE INHALATION SPRAY 3.12 UG/1
SPRAY, METERED RESPIRATORY (INHALATION)
COMMUNITY
Start: 2021-09-03 | End: 2022-01-20

## 2021-10-04 RX ORDER — GABAPENTIN 300 MG/1
CAPSULE ORAL
COMMUNITY
Start: 2021-02-10 | End: 2021-10-06

## 2021-10-06 ENCOUNTER — OFFICE VISIT (OUTPATIENT)
Dept: PULMONOLOGY | Facility: OTHER | Age: 71
End: 2021-10-06
Payer: COMMERCIAL

## 2021-10-06 ENCOUNTER — ALLIED HEALTH/NURSE VISIT (OUTPATIENT)
Dept: PULMONOLOGY | Facility: OTHER | Age: 71
End: 2021-10-06
Payer: COMMERCIAL

## 2021-10-06 VITALS
SYSTOLIC BLOOD PRESSURE: 142 MMHG | WEIGHT: 167.6 LBS | HEART RATE: 58 BPM | HEIGHT: 62 IN | BODY MASS INDEX: 30.84 KG/M2 | OXYGEN SATURATION: 98 % | DIASTOLIC BLOOD PRESSURE: 76 MMHG

## 2021-10-06 DIAGNOSIS — R06.00 DYSPNEA, UNSPECIFIED TYPE: Primary | ICD-10-CM

## 2021-10-06 DIAGNOSIS — R05.9 COUGH: ICD-10-CM

## 2021-10-06 PROCEDURE — 94726 PLETHYSMOGRAPHY LUNG VOLUMES: CPT | Performed by: INTERNAL MEDICINE

## 2021-10-06 PROCEDURE — 99204 OFFICE O/P NEW MOD 45 MIN: CPT | Performed by: INTERNAL MEDICINE

## 2021-10-06 PROCEDURE — 94729 DIFFUSING CAPACITY: CPT | Performed by: INTERNAL MEDICINE

## 2021-10-06 PROCEDURE — 94060 EVALUATION OF WHEEZING: CPT | Performed by: INTERNAL MEDICINE

## 2021-10-06 RX ORDER — ALBUTEROL SULFATE 90 UG/1
1-2 AEROSOL, METERED RESPIRATORY (INHALATION) EVERY 6 HOURS
Qty: 18 G | Refills: 4 | Status: SHIPPED | OUTPATIENT
Start: 2021-10-06 | End: 2024-03-13

## 2021-10-06 ASSESSMENT — MIFFLIN-ST. JEOR: SCORE: 1223.48

## 2021-10-06 NOTE — PROGRESS NOTES
"Pulmonary Clinic Consult Note  Date of Service: 10/06/2021    Reason for Consultation: sob    History:     HPI: Patient is a pleasant 71-year-old female, retired nurse from [employer], was referred here for evaluation of shortness of breath.    Patient was referred here several months ago.  However, a few weeks ago, patient was hospitalized with abdominal pain.  During her hospitalization, patient developed atrial fibrillation with RVR, agitated delirium requiring Precedex drip, and was found to have small bowel obstruction with possible pneumatosis of the small bowel and right lower quadrant.  She was treated conservatively.    With respect to her shortness of breath, patient noted that she has been short of breath for several months.  She denies any cough.  She intermittently wheezes. Patient notes that she has 3 flights of stairs totaling 24 steps.  She does go up and down on the stairs several times a day.  She has hard time walking 1 block.  She does not exercise formally. She notes that used to smoke but was never a heavy smoker.  She smoked a few cigarettes per day.  She thinks that she has approximately 15-pack-year history.  She currently is vaping.  No previous history of COPD.  However, she was placed on Spiriva several weeks ago to see if that will help her symptoms.    PMHx/PSHx:  Atrial fib  H/o SBO  DM  Mood disorder  Appendectomy  Back surgery  Knee arthroscopy  Total abdominal hysterectomy  Sigmoid colectomy    Social Hx:   Tobacco: smoked a total of 15 years, smoked occasional cigarettes. She quit 8 years, and started vaping 8 years  Occupational exposures: retired. RN at [employer], 10 years ago.   Travel: no recent travel  Pets: cat    Review of Systems - 10 point review of system negative except for what is mentioned in the HPI.    Exam/Data:   BP (!) 142/76   Pulse 58   Ht 1.575 m (5' 2\")   Wt 76 kg (167 lb 9.6 oz)   SpO2 98%   BMI 30.65 kg/m      EXAM:  GEN: comfortable, NAD  HEENT: NCAT, " "EMOI  CVS: S1S2, RRR  Lung: CTA bilaterally  Abd: soft, nt, + BS. No masses  Ext: no c/c/e  Neuro: nonfocal  Skin: no visible rash  Musculoskeletal: FROM all extremities  Psych: appropriate    DATA    Labs: reviewed in EMR and outside records where pertinent.     PFT DATA: normal except mildly decrease DLCO    Echocardiogram Complete  The left atrium is moderately dilated.  Hyperdynamic left ventricular function  Normal right ventricle size and systolic function.  No significant valve disease.        Assessment/Plan:   Paige Mari is a 71 year old female, smoker, currently vaping, retired nurse from [employer], who was referred here for evaluation of shortness of breath.  Chest x-ray with patient was hospitalized recently at [facility] on 9/2021 did not reveal any abnormalities.  Patient had PFTs that are normal with exception of mildly decreased DLCO.  Etiology for shortness of breath is likely multifactorial secondary to deconditioning.  She has been started on Spiriva several weeks ago by PCP to see if this would help her symptoms.  Echo when she was hospitalized in unremarkable.    Recommendations:    Start albuterol inhaler  On Spiriva.  Patient did not notice much difference, asked patient to try off after her prescription runs out.  Diet weight loss, exercise  Consider CT chest  Vaccinated for covid and will be getting her booster shot soon    Follow up in 3 months    Theresa Brunson MD  Pulmonary and Critical Care Medicine    Family History   Problem Relation Age of Onset     Breast Cancer No family hx of      Allergies   Allergen Reactions     Morphine Other (See Comments)     \"make me delirious,\"  \"nightmares\"  Other reaction(s): delirium  \"make me delirious,\"  \"nightmares\"       Adhesive [Mecrylate] Other (See Comments)     Can only tolerate tape for short periods of time shelia in sensitive areas     Amlodipine Unknown and Other (See Comments)     Other reaction(s): delerium     " Amlodipine Besylate [Amlodipine] Other (See Comments)     Avalide Other (See Comments)     Other reaction(s): hyponatremia     Doxazosin Other (See Comments)     Other reaction(s): feels foggy     Other Allergy (See Comments) [External Allergen Needs Reconciliation - See Comment] Unknown     Other reaction(s): skin rash     Prednisone Unknown     Caused extremely high blood sugars     Trazodone Unknown     Other reaction(s): hung over       Medications:     Current Outpatient Medications   Medication Sig Dispense Refill     acetaminophen (TYLENOL) 500 MG tablet Take 1,000 mg by mouth 2 times daily        amoxicillin (AMOXIL) 500 MG tablet Take 2,000 mg by mouth as needed Before dental appointments       ARIPiprazole (ABILIFY) 2 MG tablet Take 2 mg by mouth daily        blood sugar diagnostic (CONTOUR TEST STRIPS) Strp [BLOOD SUGAR DIAGNOSTIC (CONTOUR TEST STRIPS) STRP] Use 1 each As Directed daily.        cetirizine (ZYRTEC) 10 MG tablet Take 10 mg by mouth daily        cholecalciferol (VITAMIN D3) 25 mcg (1000 units) capsule Take 1,000 Units by mouth daily        cloNIDine (CATAPRES-TTS) 0.3 mg/24 hr Place 1 patch onto the skin once a week        Continuous Blood Gluc  (DEXCOM G6 ) TORRIE Use to read blood sugars as per 's instructions. 1 each 0     Continuous Blood Gluc Sensor (DEXCOM G6 SENSOR) MISC Change every 10 days. 3 each 6     Continuous Blood Gluc Transmit (DEXCOM G6 TRANSMITTER) MISC Change every 3 months. 1 each 2     cyanocobalamin (VITAMIN B-12) 1000 MCG tablet Take 1,000 mcg by mouth daily       diclofenac sodium (VOLTAREN) 1 % Gel [DICLOFENAC SODIUM (VOLTAREN) 1 % GEL] Apply 1 g topically daily as needed.       docusate sodium (COLACE) 100 MG capsule Take 200 mg by mouth 2 times daily       DULoxetine (CYMBALTA) 30 MG capsule Take 30 mg by mouth daily       estradiol (ESTRACE) 1 MG tablet Take 1 mg by mouth daily        insulin aspart (NOVOLOG) 100 unit/mL injection Inject  "6-15 Units Subcutaneous 3 times daily (with meals)        insulin detemir U-100 (LEVEMIR FLEXPEN) 100 unit/mL (3 mL) pen Inject 20 Units Subcutaneous At Bedtime        liraglutide (VICTOZA 2-TANNER) 0.6 mg/0.1 mL (18 mg/3 mL) PnIj injection [LIRAGLUTIDE (VICTOZA 2-TANNER) 0.6 MG/0.1 ML (18 MG/3 ML) PNIJ INJECTION] Inject 1.2 mg under the skin daily. Not available from inpatient hospital pharmacy - OK for patient to use own supply       magnesium oxide (MAG-OX) 400 MG tablet 1 tab(s)       medical cannabis (Patient's own supply) 1 Dose by Other route See Admin Instructions [employer] Vape- 1-4 puffs q6h PRN       metoprolol succinate ER (TOPROL-XL) 100 MG 24 hr tablet Take 1 tablet (100 mg) by mouth 2 times daily       multivitamin w/minerals (THERA-VIT-M) tablet Take 1 tablet by mouth daily       NOVOFINE 32 32 gauge x 1/4\" Ndle [NOVOFINE 32 32 GAUGE X 1/4\" NDLE] USE TO TEST TWICE A DAY  3     olmesartan (BENICAR) 40 MG tablet [OLMESARTAN (BENICAR) 40 MG TABLET] Take 40 mg by mouth daily.       omeprazole (PRILOSEC) 20 MG capsule [OMEPRAZOLE (PRILOSEC) 20 MG CAPSULE] Take 1 capsule by mouth daily before breakfast.       pregabalin (LYRICA) 150 MG capsule TAKE 1 CAPSULE BY MOUTH TWICE A DAY       SPIRIVA RESPIMAT 2.5 MCG/ACT inhaler 2 PUFFS INHALATION ONCE A DAY       sucralfate (CARAFATE) 1 GM tablet Take 1 tablet by mouth 4 times daily as needed        tiZANidine (ZANAFLEX) 2 MG tablet [TIZANIDINE (ZANAFLEX) 2 MG TABLET] Take 4 mg by mouth bedtime.        traMADol (ULTRAM) 50 mg tablet [TRAMADOL (ULTRAM) 50 MG TABLET] Take 100 mg by mouth 2 (two) times a day.        valACYclovir (VALTREX) 500 MG tablet Take 500 mg by mouth daily          Much or all of the text in this note was generated through the use of the Dragon Dictate voice-to-text software. Errors in spelling or words which seem out of context are unintentional. Sound alike errors, in particular, may have escaped editing.  "

## 2021-10-07 LAB
DLCOCOR-%PRED-PRE: 70 %
DLCOCOR-PRE: 12.7 ML/MIN/MMHG
DLCOUNC-%PRED-PRE: 67 %
DLCOUNC-PRE: 12.12 ML/MIN/MMHG
DLCOUNC-PRED: 18.04 ML/MIN/MMHG
ERV-%PRED-PRE: 46 %
ERV-PRE: 0.2 L
ERV-PRED: 0.42 L
EXPTIME-PRE: 6.83 SEC
FEF2575-%PRED-POST: 110 %
FEF2575-%PRED-PRE: 97 %
FEF2575-POST: 1.9 L/SEC
FEF2575-PRE: 1.68 L/SEC
FEF2575-PRED: 1.72 L/SEC
FEFMAX-%PRED-PRE: 122 %
FEFMAX-PRE: 6.35 L/SEC
FEFMAX-PRED: 5.19 L/SEC
FEV1-%PRED-PRE: 82 %
FEV1-PRE: 1.67 L
FEV1FEV6-PRE: 81 %
FEV1FEV6-PRED: 79 %
FEV1FVC-PRE: 81 %
FEV1FVC-PRED: 78 %
FEV1SVC-PRE: 76 %
FEV1SVC-PRED: 73 %
FIFMAX-PRE: 4.6 L/SEC
FRCPLETH-%PRED-PRE: 80 %
FRCPLETH-PRE: 2.08 L
FRCPLETH-PRED: 2.6 L
FVC-%PRED-PRE: 79 %
FVC-PRE: 2.06 L
FVC-PRED: 2.59 L
IC-%PRED-PRE: 79 %
IC-PRE: 1.87 L
IC-PRED: 2.34 L
RVPLETH-%PRED-PRE: 87 %
RVPLETH-PRE: 1.76 L
RVPLETH-PRED: 2 L
TLCPLETH-%PRED-PRE: 85 %
TLCPLETH-PRE: 3.95 L
TLCPLETH-PRED: 4.6 L
VA-%PRED-PRE: 84 %
VA-PRE: 3.63 L
VC-%PRED-PRE: 79 %
VC-PRE: 2.2 L
VC-PRED: 2.76 L

## 2021-10-13 ENCOUNTER — LAB REQUISITION (OUTPATIENT)
Dept: LAB | Facility: CLINIC | Age: 71
End: 2021-10-13

## 2021-10-13 DIAGNOSIS — N30.90 CYSTITIS, UNSPECIFIED WITHOUT HEMATURIA: ICD-10-CM

## 2021-10-13 PROCEDURE — 87086 URINE CULTURE/COLONY COUNT: CPT | Performed by: NURSE PRACTITIONER

## 2021-10-22 LAB — BACTERIA UR CULT: ABNORMAL

## 2021-12-07 ENCOUNTER — LAB (OUTPATIENT)
Dept: LAB | Facility: CLINIC | Age: 71
End: 2021-12-07

## 2021-12-07 ENCOUNTER — OFFICE VISIT (OUTPATIENT)
Dept: ENDOCRINOLOGY | Facility: CLINIC | Age: 71
End: 2021-12-07
Payer: COMMERCIAL

## 2021-12-07 VITALS
DIASTOLIC BLOOD PRESSURE: 82 MMHG | HEIGHT: 62 IN | BODY MASS INDEX: 29.74 KG/M2 | SYSTOLIC BLOOD PRESSURE: 124 MMHG | WEIGHT: 161.6 LBS | HEART RATE: 84 BPM

## 2021-12-07 DIAGNOSIS — Z79.4 OTHER SPECIFIED DIABETES MELLITUS WITH HYPERGLYCEMIA, WITH LONG-TERM CURRENT USE OF INSULIN (H): ICD-10-CM

## 2021-12-07 DIAGNOSIS — E66.3 OVERWEIGHT (BMI 25.0-29.9): ICD-10-CM

## 2021-12-07 DIAGNOSIS — Z79.4 OTHER SPECIFIED DIABETES MELLITUS WITH DIABETIC POLYNEUROPATHY, WITH LONG-TERM CURRENT USE OF INSULIN (H): ICD-10-CM

## 2021-12-07 DIAGNOSIS — E78.2 MIXED HYPERLIPIDEMIA: ICD-10-CM

## 2021-12-07 DIAGNOSIS — R94.6 ABNORMAL FINDING ON THYROID FUNCTION TEST: ICD-10-CM

## 2021-12-07 DIAGNOSIS — I10 HYPERTENSION, UNSPECIFIED TYPE: ICD-10-CM

## 2021-12-07 DIAGNOSIS — E10.29 TYPE 1 DIABETES MELLITUS WITH MICROALBUMINURIA (H): ICD-10-CM

## 2021-12-07 DIAGNOSIS — E13.42 OTHER SPECIFIED DIABETES MELLITUS WITH DIABETIC POLYNEUROPATHY, WITH LONG-TERM CURRENT USE OF INSULIN (H): ICD-10-CM

## 2021-12-07 DIAGNOSIS — E13.65 OTHER SPECIFIED DIABETES MELLITUS WITH HYPERGLYCEMIA, WITH LONG-TERM CURRENT USE OF INSULIN (H): ICD-10-CM

## 2021-12-07 DIAGNOSIS — R25.1 TREMOR: ICD-10-CM

## 2021-12-07 DIAGNOSIS — E21.3 HYPERPARATHYROIDISM (H): ICD-10-CM

## 2021-12-07 DIAGNOSIS — E10.65 TYPE 1 DIABETES MELLITUS WITH HYPERGLYCEMIA (H): ICD-10-CM

## 2021-12-07 DIAGNOSIS — I65.23 CAROTID ATHEROSCLEROSIS, BILATERAL: ICD-10-CM

## 2021-12-07 DIAGNOSIS — K76.0 HEPATIC STEATOSIS: ICD-10-CM

## 2021-12-07 DIAGNOSIS — R80.9 TYPE 1 DIABETES MELLITUS WITH MICROALBUMINURIA (H): ICD-10-CM

## 2021-12-07 DIAGNOSIS — E10.42 TYPE 1 DIABETES MELLITUS WITH DIABETIC POLYNEUROPATHY (H): ICD-10-CM

## 2021-12-07 LAB
CHOLEST SERPL-MCNC: 244 MG/DL
FASTING STATUS PATIENT QL REPORTED: ABNORMAL
FASTING STATUS PATIENT QL REPORTED: ABNORMAL
GLUCOSE BLD-MCNC: 176 MG/DL (ref 70–125)
HBA1C MFR BLD: 9.3 % (ref 0–5.6)
HDLC SERPL-MCNC: 62 MG/DL
LDLC SERPL CALC-MCNC: ABNORMAL MG/DL
T3 SERPL-MCNC: 93 NG/DL (ref 60–181)
T4 FREE SERPL-MCNC: 0.89 NG/DL (ref 0.7–1.8)
TOTAL PROTEIN SERUM FOR ELP: 6.5 G/DL (ref 6–8)
TRIGL SERPL-MCNC: 445 MG/DL
TSH SERPL DL<=0.005 MIU/L-ACNC: 3.45 UIU/ML (ref 0.3–5)

## 2021-12-07 PROCEDURE — 80061 LIPID PANEL: CPT

## 2021-12-07 PROCEDURE — 82947 ASSAY GLUCOSE BLOOD QUANT: CPT

## 2021-12-07 PROCEDURE — 86341 ISLET CELL ANTIBODY: CPT | Mod: 90

## 2021-12-07 PROCEDURE — 82985 ASSAY OF GLYCATED PROTEIN: CPT

## 2021-12-07 PROCEDURE — 86337 INSULIN ANTIBODIES: CPT | Mod: 90

## 2021-12-07 PROCEDURE — 84480 ASSAY TRIIODOTHYRONINE (T3): CPT

## 2021-12-07 PROCEDURE — 95251 CONT GLUC MNTR ANALYSIS I&R: CPT | Performed by: INTERNAL MEDICINE

## 2021-12-07 PROCEDURE — 99215 OFFICE O/P EST HI 40 MIN: CPT | Performed by: INTERNAL MEDICINE

## 2021-12-07 PROCEDURE — 83520 IMMUNOASSAY QUANT NOS NONAB: CPT | Mod: 90

## 2021-12-07 PROCEDURE — 86376 MICROSOMAL ANTIBODY EACH: CPT

## 2021-12-07 PROCEDURE — 84445 ASSAY OF TSI GLOBULIN: CPT | Mod: 90

## 2021-12-07 PROCEDURE — 36415 COLL VENOUS BLD VENIPUNCTURE: CPT

## 2021-12-07 PROCEDURE — 84165 PROTEIN E-PHORESIS SERUM: CPT | Performed by: PATHOLOGY

## 2021-12-07 PROCEDURE — 99000 SPECIMEN HANDLING OFFICE-LAB: CPT

## 2021-12-07 PROCEDURE — 84155 ASSAY OF PROTEIN SERUM: CPT

## 2021-12-07 PROCEDURE — 84439 ASSAY OF FREE THYROXINE: CPT

## 2021-12-07 PROCEDURE — 84443 ASSAY THYROID STIM HORMONE: CPT

## 2021-12-07 PROCEDURE — 83036 HEMOGLOBIN GLYCOSYLATED A1C: CPT | Mod: 59

## 2021-12-07 PROCEDURE — 84681 ASSAY OF C-PEPTIDE: CPT

## 2021-12-07 ASSESSMENT — MIFFLIN-ST. JEOR: SCORE: 1191.39

## 2021-12-07 NOTE — PROGRESS NOTES
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    Diagnosed with DM: diagnosed >20 years ago on a screening lab test. At the time of DM diagnosis her body weight was similar to what it currently is (BMI ~ 30 kg/m2). She was immediately started on insulin (MDI). She has been on insulin therapy to date.     History of DKA/HHS: she denies a history of DKA, admitted to the hospital 9/2021 with SBO and found to have an anion gap metabolic acidosis and glucose ~600 mg/dL but had significantly elevated lactic acid at this time with negative urine ketones a few days after admission, serum ketones were not checked     FH of DM: multiple cousins, aunts, uncles all on her mother's side of the family with DM and all are on insulin (further details not known and she does not think any have DM-1)    FH of autoimmune disease: cousin has a thyroid condition (details not known) no other known AI disease in the family     Glycemic control over the years:         Current DM therapy:  -Detemir 0-0-0-20  -NovoLog before all meals/snacks and she gives 5 - 10 U based on her experience as a DM and what she plans to eat, she does not count carbs, she gives NovoLog a few times per day outside meals as well (generally 6- 10 U) and this is generally anytime during the day but often around 10 PM    -Victoza 1.2 mg daily (stable dose for years)     Prior DM therapy:   -metformin stopped many years ago because it wasn't thought to be helping (was well tolerated)  -she has not been on an SGLT-2 inhibitor     Current glycemic control:  -episode of hypoglycemia every 2-3 months     Diet:  -generally 2 meals daily, lunch and dinner  -bedtime snack  -does not drink calories     Physical activity: sedentary    Tobacco/alcohol use: history of significant alcohol use (sober since 2009), history of cigarette use (quit remotely) but currently vapes    No personal or FH of: pancreatitis, pancreatitic  cancer, MTC, MEN     She has semi-frequent UTIs with the most recent being 1 month ago.         Complications noted in the A/P section.     # Thyroid function    Per my chart review she was on Synthroid for many years and then developed thyrotoxicosis and was treated with methimazole for many years. She was seen a few times by an [facility] endocrinologist years ago and treated with methimazole for thyrotoxicosis. Per my reivew of the notes and labs/iamging, it does not appear that a workup for etiology of thyrotoxicosis was ever done. She has not taken a medication for her thyroid in years.     2010: TSH undetectable, mildly elevated free T4   2012: TSH 0.04          12/2021: TSH 3.4, free T4 0.89, TT3 93, TRAb undetectable, TPO Ab significantly elevated, TSI pending       No prior thyroid imaging.     Objective:    /82.    DM foot exam performed and she has absent sensation to monofilament testing bilaterally. Reduced pedal pulses. No ulcers/skinbreakdown.    No lipohypertrophy at her insulin injection sites on her abdomen.     Thyroid examined and normal without nodularity. No cervical LAD. No thyoid eye disease.    RRR.     Possible mild pill rolling tremor at rest.    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 (HARRIETT)  -12/7/2021: C-peptide 0.7 ng/mL with concurrent venous glucose 176 mg/dL, CEFERINO Ab >250 (ULN 5.0 IU/mL), insulin Ab 0.6 (ULN 0.4 U/mL), IA-2 Ab 44.1 (ULN 7.4 U/mL)   -CT A/P 9/2021: Mild diffuse pancreatic atrophy  -12/7/2021: fructosamine 365 umol/L (corresponds to HbA1c ~9.0%), HbA1c 9.3% (unremarkable CBC 10/2021)  # No prior DM retinopathy, she has an upcoming eye exam   # Hypertension, on clonidine, metoprolol, olmesartan, microalbuminuria     -10/2021: GFR 60 (baseline)  -12/2021: urine microalbumin/Cr 30.8 mg/g  # Painful peripheral neuropathy, no prior DM foot ulcerations, on neuropathic pharmacologic therapy  # No prior ASCVD event, mild carotid atherosclerosis on US 2015, she  takes ASA   # Mixed hyperlipidemia  -12/2021: , HDL 62, LDL not reported given elevated TG,    -She has a prior statin intolerance (myalgias)   # Hepatic steatosis    Labs returned after our appointment today that showed she is insulinopenic but still secretes some insulin. MAXI Ab significantly elevated. She has HARRIETT.     Levemir given once daily is inappropriate therapy given the biologic effect is <24 hours and she has HARRIETT.    We reviewed her CGM data in detail and I recommend the following:  -Levemir 12-0-12-0 (will likely switch to Tresiba at next appointment)   -fixed dose of NovoLog 8 units pre-meal, can consider an insulin to carbohydrate ratio at next appointment  -start a correction scale at mealtimes with NovoLog, with a sensitivity of 30 mg/dL starting at 140 mg/dL   -ok to continue Victoza for now  -may benefit from metformin down the road    Referred to podiatry. Given her severe neuropathy I checked some monoclonal protein studies, 12/2021: SPEP negative. UPEP pending.    I rx glucagon and glucose tabs and we reviewed management of hypoglycemia.    Referred to a CDE.     Improvement in glycemic control will improve TG. Will lipid panel before next appointment in 2 months and likely start a statin +/- icosapent ethyl at that time.     # Thyroid function    TSI pending. Repeat TSH before next appointment.     # Bone and mineral metabolism    Many normal calcium values over the years, with 2 high serum calcium values (both remote). She had a remote mildly elevated PTH (only checked once) drawn shortly after a mildly elevated calcium value (10.6 with ULN 10.5 mg/dL), but on the day that PTH was drawn serum calcium was normal and serum phosphorous was mildly elevated. Serum calcium value of 12.7 mg/dL when she had an JACKSON in 2014.     Ordered minerals, Cr, vitamin D.     # Possible mild pill rolling tremor at rest.    I did let her PCP know.    71 minutes spent on the date of the encounter doing  chart review, history and exam, documentation and further activities as noted above.

## 2021-12-07 NOTE — LETTER
12/7/2021         RE: Paige Mari  [address]  [place] [address]        Dear Colleague,    Thank you for referring your patient, Paige Mari, to the [facility]. Please see a copy of my visit note below.    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    Diagnosed with DM: diagnosed >20 years ago on a screening lab test. At the time of DM diagnosis her body weight was similar to what it currently is (BMI ~ 30 kg/m2). She was immediately started on insulin (MDI). She has been on insulin therapy to date.     History of DKA/HHS: she denies a history of DKA, admitted to the hospital 9/2021 with SBO and found to have an anion gap metabolic acidosis and glucose ~600 mg/dL but had significantly elevated lactic acid at this time with negative urine ketones a few days after admission, serum ketones were not checked     FH of DM: multiple cousins, aunts, uncles all on her mother's side of the family with DM and all are on insulin (further details not known and she does not think any have DM-1)    FH of autoimmune disease: cousin has a thyroid condition (details not known) no other known AI disease in the family     Glycemic control over the years:         Current DM therapy:  -Detemir 0-0-0-20  -NovoLog before all meals/snacks and she gives 5 - 10 U based on her experience as a DM and what she plans to eat, she does not count carbs, she gives NovoLog a few times per day outside meals as well (generally 6- 10 U) and this is generally anytime during the day but often around 10 PM    -Victoza 1.2 mg daily (stable dose for years)     Prior DM therapy:   -metformin stopped many years ago because it wasn't thought to be helping (was well tolerated)  -she has not been on an SGLT-2 inhibitor     Current glycemic control:  -episode of hypoglycemia every 2-3 months     Diet:  -generally 2 meals daily, lunch and  dinner  -bedtime snack  -does not drink calories     Physical activity: sedentary    Tobacco/alcohol use: history of significant alcohol use (sober since 2009), history of cigarette use (quit remotely) but currently vapes    No personal or FH of: pancreatitis, pancreatitic cancer, MTC, MEN     She has semi-frequent UTIs with the most recent being 1 month ago.         Complications noted in the A/P section.     # Thyroid function    Per my chart review she was on Synthroid for many years and then developed thyrotoxicosis and was treated with methimazole for many years. She was seen a few times by an [facility] endocrinologist years ago and treated with methimazole for thyrotoxicosis. Per my reivew of the notes and labs/iamging, it does not appear that a workup for etiology of thyrotoxicosis was ever done. She has not taken a medication for her thyroid in years.     2010: TSH undetectable, mildly elevated free T4   2012: TSH 0.04          12/2021: TSH 3.4, free T4 0.89, TT3 93, TRAb undetectable, TPO Ab significantly elevated, TSI pending       No prior thyroid imaging.     Objective:    /82.    DM foot exam performed and she has absent sensation to monofilament testing bilaterally. Reduced pedal pulses. No ulcers/skinbreakdown.    No lipohypertrophy at her insulin injection sites on her abdomen.     Thyroid examined and normal without nodularity. No cervical LAD. No thyoid eye disease.    RRR.     Possible mild pill rolling tremor at rest.    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 (HARRIETT)  -12/7/2021: C-peptide 0.7 ng/mL with concurrent venous glucose 176 mg/dL, CEFERINO Ab >250 (ULN 5.0 IU/mL), insulin Ab 0.6 (ULN 0.4 U/mL), IA-2 Ab 44.1 (ULN 7.4 U/mL)   -CT A/P 9/2021: Mild diffuse pancreatic atrophy  -12/7/2021: fructosamine 365 umol/L (corresponds to HbA1c ~9.0%), HbA1c 9.3% (unremarkable CBC 10/2021)  # No prior DM retinopathy, she has an upcoming eye exam   # Hypertension, on clonidine, metoprolol,  olmesartan, microalbuminuria     -10/2021: GFR 60 (baseline)  -12/2021: urine microalbumin/Cr 30.8 mg/g  # Painful peripheral neuropathy, no prior DM foot ulcerations, on neuropathic pharmacologic therapy  # No prior ASCVD event, mild carotid atherosclerosis on US 2015, she takes ASA   # Mixed hyperlipidemia  -12/2021: , HDL 62, LDL not reported given elevated TG,    -She has a prior statin intolerance (myalgias)   # Hepatic steatosis    Labs returned after our appointment today that showed she is insulinopenic but still secretes some insulin. MAXI Ab significantly elevated. She has HARRIETT.     Levemir given once daily is inappropriate therapy given the biologic effect is <24 hours and she has HARRIETT.    We reviewed her CGM data in detail and I recommend the following:  -Levemir 12-0-12-0 (will likely switch to Tresiba at next appointment)   -fixed dose of NovoLog 8 units pre-meal, can consider an insulin to carbohydrate ratio at next appointment  -start a correction scale at mealtimes with NovoLog, with a sensitivity of 30 mg/dL starting at 140 mg/dL   -ok to continue Victoza for now  -may benefit from metformin down the road    Referred to podiatry. Given her severe neuropathy I checked some monoclonal protein studies, 12/2021: SPEP negative. UPEP pending.    I rx glucagon and glucose tabs and we reviewed management of hypoglycemia.    Referred to a CDE.     Improvement in glycemic control will improve TG. Will lipid panel before next appointment in 2 months and likely start a statin +/- icosapent ethyl at that time.     # Thyroid function    TSI pending. Repeat TSH before next appointment.     # Bone and mineral metabolism    Many normal calcium values over the years, with 2 high serum calcium values (both remote). She had a remote mildly elevated PTH (only checked once) drawn shortly after a mildly elevated calcium value (10.6 with ULN 10.5 mg/dL), but on the day that PTH was drawn serum calcium was  normal and serum phosphorous was mildly elevated. Serum calcium value of 12.7 mg/dL when she had an JACKSON in 2014.     Ordered minerals, Cr, vitamin D.     # Possible mild pill rolling tremor at rest.    I did let her PCP know.    71 minutes spent on the date of the encounter doing chart review, history and exam, documentation and further activities as noted above.       Again, thank you for allowing me to participate in the care of your patient.        Sincerely,        Maynor Bardales MD

## 2021-12-09 LAB
ALBUMIN PERCENT: 65.4 % (ref 51–67)
ALBUMIN SERPL ELPH-MCNC: 4.3 G/DL (ref 3.2–4.7)
ALPHA 1 PERCENT: 2.8 % (ref 2–4)
ALPHA 2 PERCENT: 11.8 % (ref 5–13)
ALPHA1 GLOB SERPL ELPH-MCNC: 0.2 G/DL (ref 0.1–0.3)
ALPHA2 GLOB SERPL ELPH-MCNC: 0.8 G/DL (ref 0.4–0.9)
B-GLOBULIN SERPL ELPH-MCNC: 0.7 G/DL (ref 0.7–1.2)
BETA PERCENT: 10.5 % (ref 10–17)
C PEPTIDE SERPL-MCNC: 0.7 NG/ML (ref 0.9–6.9)
GAMMA GLOB SERPL ELPH-MCNC: 0.6 G/DL (ref 0.6–1.4)
GAMMA GLOBULIN PERCENT: 9.5 % (ref 9–20)
PATH ICD:: NORMAL
PROT PATTERN SERPL ELPH-IMP: NORMAL
REVIEWING PATHOLOGIST: NORMAL
THYROPEROXIDASE AB SERPL-ACNC: 232 IU/ML (ref 0–6)
TOTAL PROTEIN SERUM FOR ELP (SYNCED VALUE): 6.5 G/DL

## 2021-12-10 DIAGNOSIS — Z79.4 OTHER SPECIFIED DIABETES MELLITUS WITH HYPERGLYCEMIA, WITH LONG-TERM CURRENT USE OF INSULIN (H): Primary | ICD-10-CM

## 2021-12-10 DIAGNOSIS — E13.65 OTHER SPECIFIED DIABETES MELLITUS WITH HYPERGLYCEMIA, WITH LONG-TERM CURRENT USE OF INSULIN (H): Primary | ICD-10-CM

## 2021-12-10 DIAGNOSIS — E66.3 OVERWEIGHT (BMI 25.0-29.9): ICD-10-CM

## 2021-12-10 DIAGNOSIS — R94.6 ABNORMAL FINDING ON THYROID FUNCTION TEST: ICD-10-CM

## 2021-12-10 LAB
FRUCTOSAMINE SERPL-SCNC: 365 UMOL/L
TSH RECEP AB SER-ACNC: <1.1 IU/L (ref 0–1.75)

## 2021-12-11 LAB — ISLET CELL512 AB SER IA-ACNC: 44.1 U/ML

## 2021-12-12 LAB — GAD65 AB SER IA-ACNC: >250 IU/ML

## 2021-12-13 ENCOUNTER — LAB (OUTPATIENT)
Dept: LAB | Facility: CLINIC | Age: 71
End: 2021-12-13
Payer: COMMERCIAL

## 2021-12-13 DIAGNOSIS — E13.65 OTHER SPECIFIED DIABETES MELLITUS WITH HYPERGLYCEMIA, WITH LONG-TERM CURRENT USE OF INSULIN (H): ICD-10-CM

## 2021-12-13 DIAGNOSIS — Z79.4 OTHER SPECIFIED DIABETES MELLITUS WITH HYPERGLYCEMIA, WITH LONG-TERM CURRENT USE OF INSULIN (H): ICD-10-CM

## 2021-12-13 LAB — INSULIN AB SER IA-ACNC: 0.6 U/ML

## 2021-12-13 PROCEDURE — 84166 PROTEIN E-PHORESIS/URINE/CSF: CPT | Performed by: PATHOLOGY

## 2021-12-14 ENCOUNTER — LAB (OUTPATIENT)
Dept: LAB | Facility: CLINIC | Age: 71
End: 2021-12-14
Payer: COMMERCIAL

## 2021-12-14 DIAGNOSIS — E13.65 OTHER SPECIFIED DIABETES MELLITUS WITH HYPERGLYCEMIA, WITH LONG-TERM CURRENT USE OF INSULIN (H): ICD-10-CM

## 2021-12-14 DIAGNOSIS — Z79.4 OTHER SPECIFIED DIABETES MELLITUS WITH HYPERGLYCEMIA, WITH LONG-TERM CURRENT USE OF INSULIN (H): ICD-10-CM

## 2021-12-14 DIAGNOSIS — E66.3 OVERWEIGHT (BMI 25.0-29.9): ICD-10-CM

## 2021-12-14 DIAGNOSIS — R94.6 ABNORMAL FINDING ON THYROID FUNCTION TEST: ICD-10-CM

## 2021-12-14 LAB
CREAT UR-MCNC: 72 MG/DL
MICROALBUMIN UR-MCNC: 2.22 MG/DL (ref 0–1.99)
MICROALBUMIN/CREAT UR: 30.8 MG/G CR

## 2021-12-14 PROCEDURE — 82043 UR ALBUMIN QUANTITATIVE: CPT

## 2021-12-16 LAB
PATH ICD:: NORMAL
PROT PATTERN UR ELPH-IMP: NORMAL
PROTEIN TOTAL TIMED URINE MG/SPEC LHE: NORMAL
REVIEWING PATHOLOGIST: NORMAL
SPECIMEN VOL UR: 1800 ML
TSI SER-ACNC: <1 TSI INDEX

## 2021-12-17 LAB — ZNT8 AB SERPL IA-ACNC: 64.3 U/ML

## 2022-01-11 ENCOUNTER — ALLIED HEALTH/NURSE VISIT (OUTPATIENT)
Dept: EDUCATION SERVICES | Facility: CLINIC | Age: 72
End: 2022-01-11
Payer: COMMERCIAL

## 2022-01-11 ENCOUNTER — LAB (OUTPATIENT)
Dept: LAB | Facility: CLINIC | Age: 72
End: 2022-01-11

## 2022-01-11 VITALS — WEIGHT: 164.5 LBS | BODY MASS INDEX: 30.39 KG/M2

## 2022-01-11 DIAGNOSIS — R94.6 ABNORMAL FINDING ON THYROID FUNCTION TEST: ICD-10-CM

## 2022-01-11 DIAGNOSIS — E66.3 OVERWEIGHT (BMI 25.0-29.9): ICD-10-CM

## 2022-01-11 DIAGNOSIS — E21.3 HYPERPARATHYROIDISM (H): ICD-10-CM

## 2022-01-11 DIAGNOSIS — E10.65 TYPE 1 DIABETES MELLITUS WITH HYPERGLYCEMIA (H): ICD-10-CM

## 2022-01-11 DIAGNOSIS — R80.9 TYPE 1 DIABETES MELLITUS WITH MICROALBUMINURIA (H): ICD-10-CM

## 2022-01-11 DIAGNOSIS — E10.29 TYPE 1 DIABETES MELLITUS WITH MICROALBUMINURIA (H): ICD-10-CM

## 2022-01-11 LAB
ALBUMIN SERPL-MCNC: 3.9 G/DL (ref 3.5–5)
CALCIUM SERPL-MCNC: 10.1 MG/DL (ref 8.5–10.5)
CHOLEST SERPL-MCNC: 236 MG/DL
CREAT SERPL-MCNC: 0.85 MG/DL (ref 0.6–1.1)
FASTING STATUS PATIENT QL REPORTED: ABNORMAL
FASTING STATUS PATIENT QL REPORTED: ABNORMAL
GFR SERPL CREATININE-BSD FRML MDRD: 72 ML/MIN/1.73M2
GLUCOSE BLD-MCNC: 158 MG/DL (ref 70–125)
HBA1C MFR BLD: 8.6 % (ref 0–5.6)
HDLC SERPL-MCNC: 67 MG/DL
LDLC SERPL CALC-MCNC: 95 MG/DL
PHOSPHATE SERPL-MCNC: 3.9 MG/DL (ref 2.5–4.5)
TRIGL SERPL-MCNC: 372 MG/DL
TSH SERPL DL<=0.005 MIU/L-ACNC: 1.51 UIU/ML (ref 0.3–5)

## 2022-01-11 PROCEDURE — 84100 ASSAY OF PHOSPHORUS: CPT

## 2022-01-11 PROCEDURE — 82565 ASSAY OF CREATININE: CPT

## 2022-01-11 PROCEDURE — 82306 VITAMIN D 25 HYDROXY: CPT

## 2022-01-11 PROCEDURE — 82040 ASSAY OF SERUM ALBUMIN: CPT

## 2022-01-11 PROCEDURE — 83036 HEMOGLOBIN GLYCOSYLATED A1C: CPT

## 2022-01-11 PROCEDURE — 36415 COLL VENOUS BLD VENIPUNCTURE: CPT

## 2022-01-11 PROCEDURE — 80061 LIPID PANEL: CPT

## 2022-01-11 PROCEDURE — 84443 ASSAY THYROID STIM HORMONE: CPT

## 2022-01-11 PROCEDURE — G0108 DIAB MANAGE TRN  PER INDIV: HCPCS

## 2022-01-11 PROCEDURE — 82947 ASSAY GLUCOSE BLOOD QUANT: CPT

## 2022-01-11 PROCEDURE — 82310 ASSAY OF CALCIUM: CPT

## 2022-01-11 RX ORDER — INSULIN DEGLUDEC 100 U/ML
24 INJECTION, SOLUTION SUBCUTANEOUS DAILY
Qty: 30 ML | Refills: 1 | Status: ON HOLD | OUTPATIENT
Start: 2022-01-11 | End: 2022-01-26

## 2022-01-11 NOTE — PATIENT INSTRUCTIONS
1. Eat 3 balanced meals each day - Monitor carb intake and limit to 45-60 grams per meal  This would be equal to 3-4 choices ~  1 choice = 15 grams    Do not wait longer than 4-5 hours to eat something  Snacks limit to no more than 30 grams of carbohydrates or 2 choices  Make sure you include protein source with each meal and at bedtime - this has been shown to help with blood glucose elevations    2. Check blood sugars several times each day   Fasting and before meal target is 80 - 130   2 hours after a meal target is < 180  remember to bring meter and log book to all appointments    3. Incorporate 30 minutes activity into each day - does not need to be all at one time & walking counts    4. Take diabetes medications as prescribed Continue Victoza 1.2 mg daily , when you start Tresiba start with 24 units once daily, Continue Novolog  Correction scale of +1 unit for every 30 mg/dL more than 140 mg/dL  Less than 140 - no extra insulin  141-170-- +1 unit  171-200 - +2 units  201-230 - + 3 units  231- 260  + 4 units  261- 290  + 5 units   291 - 320  + 6 units    Then to cover your meals we will start on a carb to insulin ratio of 1 unit for every 8 grams of carbohydrates   So for example if you had a meal with 60 grams of carbs you would give yourself 8 units  60/8 = 7.5     See Dr Bardales in Feb and we will take it from there

## 2022-01-11 NOTE — LETTER
"    1/11/2022         RE: Paige Mari  [address]  [place] [address]        Dear Colleague,    Thank you for referring your patient, Paige Mari, to the [facility]. Please see a copy of my visit note below.      Diabetes Self-Management Education & Support    Presents for: Individual review    SUBJECTIVE/OBJECTIVE:  Presents for: Individual review  Accompanied by: Self  Diabetes education in the past 24 mo: Yes  Focus of Visit: Monitoring,Self-care behavioral goal setting,Healthy Eating,Taking Medication  Diabetes type: Type 1  Date of diagnosis: HARRIETT  How confident are you filling out medical forms by yourself:: Extremely  Transportation concerns: No  Difficulty affording diabetes medication?: Sometimes (using the [employer] assistance program)  Difficulty affording diabetes testing supplies?: Sometimes  Other concerns:: None  Cultural Influences/Ethnic Background:  Not  or       Diabetes Symptoms & Complications:     Complications assessed today?: No    Patient Problem List and Family Medical History reviewed for relevant medical history, current medical status, and diabetes risk factors.    Vitals:  Wt 74.6 kg (164 lb 8 oz)   BMI 30.39 kg/m    Estimated body mass index is 30.39 kg/m  as calculated from the following:    Height as of 12/7/21: 1.567 m (5' 1.69\").    Weight as of this encounter: 74.6 kg (164 lb 8 oz).   Last 3 BP:   BP Readings from Last 3 Encounters:   12/07/21 124/82   10/06/21 (!) 142/76   09/22/21 (!) 169/83       History   Smoking Status     Former Smoker     Quit date: 9/15/2021   Smokeless Tobacco     Never Used       Labs:  Lab Results   Component Value Date    A1C 8.6 01/11/2022     Lab Results   Component Value Date     12/07/2021     Lab Results   Component Value Date    LDL  12/07/2021      Comment:      Cannot estimate LDL when triglyceride exceeds 400 mg/dL    LDL 99 07/06/2016     Direct Measure HDL   Date " Value Ref Range Status   12/07/2021 62 >=50 mg/dL Final     Comment:     HDL Cholesterol Reference Range:     0-2 years:   No reference ranges established for patients under 2 years old  at [facility] for lipid analytes.    2-8 years:  Greater than 45 mg/dL     18 years and older:   Female: Greater than or equal to 50 mg/dL   Male:   Greater than or equal to 40 mg/dL   ]  GFR Estimate   Date Value Ref Range Status   10/01/2021 60 (L) >60 mL/min/1.73m2 Final     Comment:     As of July 11, 2021, eGFR is calculated by the CKD-EPI creatinine equation, without race adjustment. eGFR can be influenced by muscle mass, exercise, and diet. The reported eGFR is an estimation only and is only applicable if the renal function is stable.   03/03/2021 >60 >60 mL/min/1.73m2 Final     GFR Estimate If Black   Date Value Ref Range Status   03/03/2021 >60 >60 mL/min/1.73m2 Final     Lab Results   Component Value Date    CR 0.95 10/01/2021     No results found for: MICROALBUMIN    Healthy Eating:  Healthy Eating Assessed Today: Yes  Cultural/Sabianist diet restrictions?: No  Meal planning/habits: Avoiding sweets  Meals include: Breakfast,Lunch,Dinner,Evening Snack  Has patient met with a dietitian in the past?: Yes    Being Active:       Monitoring:  Monitoring Assessed Today: Yes  Did patient bring glucose meter to appointment? : Yes  Blood Glucose Meter: CGM (Dexcom G6)  Times checking blood sugar at home (number): 5+  Times checking blood sugar at home (per): Day  Blood glucose trend: Fluctuating  *    Taking Medications:  Diabetes Medication(s)     Diabetic Other       Glucagon 3 MG/DOSE POWD    Spray 3 mg in nostril daily as needed (severe hypoglycemia)     Glucagon, rDNA, (GLUCAGON EMERGENCY) 1 MG KIT    1 mg IM one time, PRN severe hypoglycemia causing altered consciousness affecting ability to take food by mouth     glucose (BD GLUCOSE) 5 g chewable tablet    Take 3 tablets (15 g) by mouth daily as needed  (hypoglycemia)    Insulin       insulin aspart (NOVOLOG) 100 unit/mL injection    Inject 6-15 Units Subcutaneous 3 times daily (with meals)      insulin degludec (TRESIBA FLEXTOUCH) 100 UNIT/ML pen    Inject 24 Units Subcutaneous daily     insulin detemir U-100 (LEVEMIR FLEXPEN) 100 unit/mL (3 mL) pen    Inject 20 Units Subcutaneous At Bedtime     Incretin Mimetic Agents (GLP-1 Receptor Agonists)       liraglutide (VICTOZA 2-TANNER) 0.6 mg/0.1 mL (18 mg/3 mL) PnIj injection    [LIRAGLUTIDE (VICTOZA 2-TANNER) 0.6 MG/0.1 ML (18 MG/3 ML) PNIJ INJECTION] Inject 1.2 mg under the skin daily. Not available from inpatient hospital pharmacy - OK for patient to use own supply          Taking Medication Assessed Today: Yes  Current Treatments: Insulin Injections,Non-insulin Injectables  Dose schedule: Pre-breakfast,Pre-lunch,Pre-dinner,At bedtime  Given by: Patient  Injection/Infusion sites: Abdomen  Problems taking diabetes medications regularly?: No  Diabetes medication side effects?: No    Problem Solving:  Problem Solving Assessed Today: Yes  Is the patient at risk for hypoglycemia?: Yes  Hypoglycemia Frequency: Never  Is the patient at risk for DKA?: Yes  Does patient have DKA prevention plan?:  (will assess at upcoming visit)     Reducing Risks:  Diabetes Risks: Age over 45 years,History of gestational diabetes,Sedentary Lifestyle,Family History  CAD Risks: Diabetes Mellitus,Dyslipidemia,Hypertension,Obesity,Post-menopausal,Sedentary lifestyle    Healthy Coping:  Healthy Coping Assessed Today: Yes  Emotional response to diabetes: Ready to learn  Informal Support system:: Family  Stage of change: PREPARATION (Decided to change - considering how)  Support resources: In-person Offerings  Patient Activation Measure Survey Score:  No flowsheet data found.    Diabetes knowledge and skills assessment:   Patient is knowledgeable in diabetes management concepts related to: Monitoring, Reducing Risks and Healthy Coping    Patient needs  further education on the following diabetes management concepts: Healthy Eating, Being Active, Monitoring, Taking Medication, Problem Solving, Reducing Risks and Healthy Coping    Based on learning assessment above, most appropriate setting for further diabetes education would be: Individual setting.      INTERVENTIONS:    Education provided today on:  AADE Self-Care Behaviors:  Healthy Eating: carbohydrate counting, consistency in amount, composition, and timing of food intake, portion control and label reading  Being Active: relationship to blood glucose and describe appropriate activity program  Monitoring: purpose, log and interpret results, individual blood glucose targets and frequency of monitoring  Taking Medication: action of prescribed medication, proper site selection and rotation for injections and when to take medications    Opportunities for ongoing education and support in diabetes-self management were discussed.    Pt verbalized understanding of concepts discussed and recommendations provided today.       Education Materials Provided:  Carbohydrate Counting, My Plate Planner and label reading      ASSESSMENT:    Paige is a very pleasant 71-year-old female who presents today for consult and assessment regarding her diabetes self-management skills with an A1c not at ADA goal.  She arrives today unaccompanied and was wearing her Dexcom G6 CGM and had her .  Most recent blood glucose data was downloaded from  and reviewed today.  She reports she is taking all diabetes medications that are currently prescribed to her with no missed or skipped doses.      Paige is a new patient for me today-[facility] review [facility] with the most recent being on 7/26/2021.  She was also seen by Dr. Bardales with endocrinology on 12/7/2021-both his notes and labs were reviewed.    As noted in previous chart notes Paige has expressed interest in getting an insulin pump.  This  "was brought up with her today and she said she still has interest however is concerned with the cost.  Informed her that this was something we could work on more in the future.  In lieu of her expressed interest in getting an insulin pump I asked her if she was familiar with monitoring carbohydrates or more specifically \"counting carbohydrates\" as this would be a component needed in using a pump.  She replied, she was not.     Education was then provided to her today on the identification of foods that will/will not affect her blood sugars, the   relationship between foods containing carbohydrates and blood sugars and how to calculate the amount of carbohydrates recommended for women for both meals and snacks.  Paige grasp this concept quickly and was able to calculate the correct amount of carbohydrates in several different food scenarios that were presented to her.    Based upon her current total daily dose of insulin which was 52 units (24 units basal with an average of 8 units for breakfast and 10 units lunch and dinner) calculated her insulin carb ratio to be 1:8   (450/52) Moving forward she will use this to calculate the dose of Novolog to administer to cover food she will be eating.  This will be in addition to the amount of insulin that is indicated using the correction scale.  Again we practiced with several scenarios calculating dosage and she did well.      Following Dr. Bardales's notes we transitioned her basal insulin Levemir to Tresiba today.  She was instructed to continue with 24 units daily.    It was agreed to schedule follow-up with myself after she meets again with Dr. Bardales in February.        Patient's most recent   Lab Results   Component Value Date    A1C 8.6 01/11/2022    is not meeting goal of 7.5%    PLAN  See Patient Instructions for co-developed, patient-stated behavior change goals.  AVS printed and provided to patient today. See Follow-Up section for recommended " follow-up.    Thank you,  Jil Pascual RN [facility]  Certified Diabetes Care and   Visit type : DSMT      Time Spent: 60 minutes  Encounter Type: Individual    Any diabetes medication dose changes were made via the CDE Protocol and Collaborative Practice Agreement with the patient's endocrinology provider. A copy of this encounter was shared with the provider.

## 2022-01-11 NOTE — PROGRESS NOTES
"  Diabetes Self-Management Education & Support    Presents for: Individual review    SUBJECTIVE/OBJECTIVE:  Presents for: Individual review  Accompanied by: Self  Diabetes education in the past 24 mo: Yes  Focus of Visit: Monitoring,Self-care behavioral goal setting,Healthy Eating,Taking Medication  Diabetes type: Type 1  Date of diagnosis: HARRIETT  How confident are you filling out medical forms by yourself:: Extremely  Transportation concerns: No  Difficulty affording diabetes medication?: Sometimes (using the [employer] assistance program)  Difficulty affording diabetes testing supplies?: Sometimes  Other concerns:: None  Cultural Influences/Ethnic Background:  Not  or       Diabetes Symptoms & Complications:     Complications assessed today?: No    Patient Problem List and Family Medical History reviewed for relevant medical history, current medical status, and diabetes risk factors.    Vitals:  Wt 74.6 kg (164 lb 8 oz)   BMI 30.39 kg/m    Estimated body mass index is 30.39 kg/m  as calculated from the following:    Height as of 12/7/21: 1.567 m (5' 1.69\").    Weight as of this encounter: 74.6 kg (164 lb 8 oz).   Last 3 BP:   BP Readings from Last 3 Encounters:   12/07/21 124/82   10/06/21 (!) 142/76   09/22/21 (!) 169/83       History   Smoking Status     Former Smoker     Quit date: 9/15/2021   Smokeless Tobacco     Never Used       Labs:  Lab Results   Component Value Date    A1C 8.6 01/11/2022     Lab Results   Component Value Date     12/07/2021     Lab Results   Component Value Date    LDL  12/07/2021      Comment:      Cannot estimate LDL when triglyceride exceeds 400 mg/dL    LDL 99 07/06/2016     Direct Measure HDL   Date Value Ref Range Status   12/07/2021 62 >=50 mg/dL Final     Comment:     HDL Cholesterol Reference Range:     0-2 years:   No reference ranges established for patients under 2 years old  at [facility] for lipid analytes.    2-8 years:  Greater than 45 mg/dL "     18 years and older:   Female: Greater than or equal to 50 mg/dL   Male:   Greater than or equal to 40 mg/dL   ]  GFR Estimate   Date Value Ref Range Status   10/01/2021 60 (L) >60 mL/min/1.73m2 Final     Comment:     As of July 11, 2021, eGFR is calculated by the CKD-EPI creatinine equation, without race adjustment. eGFR can be influenced by muscle mass, exercise, and diet. The reported eGFR is an estimation only and is only applicable if the renal function is stable.   03/03/2021 >60 >60 mL/min/1.73m2 Final     GFR Estimate If Black   Date Value Ref Range Status   03/03/2021 >60 >60 mL/min/1.73m2 Final     Lab Results   Component Value Date    CR 0.95 10/01/2021     No results found for: MICROALBUMIN    Healthy Eating:  Healthy Eating Assessed Today: Yes  Cultural/Christian diet restrictions?: No  Meal planning/habits: Avoiding sweets  Meals include: Breakfast,Lunch,Dinner,Evening Snack  Has patient met with a dietitian in the past?: Yes    Being Active:       Monitoring:  Monitoring Assessed Today: Yes  Did patient bring glucose meter to appointment? : Yes  Blood Glucose Meter: CGM (Dexcom G6)  Times checking blood sugar at home (number): 5+  Times checking blood sugar at home (per): Day  Blood glucose trend: Fluctuating  *    Taking Medications:  Diabetes Medication(s)     Diabetic Other       Glucagon 3 MG/DOSE POWD    Spray 3 mg in nostril daily as needed (severe hypoglycemia)     Glucagon, rDNA, (GLUCAGON EMERGENCY) 1 MG KIT    1 mg IM one time, PRN severe hypoglycemia causing altered consciousness affecting ability to take food by mouth     glucose (BD GLUCOSE) 5 g chewable tablet    Take 3 tablets (15 g) by mouth daily as needed (hypoglycemia)    Insulin       insulin aspart (NOVOLOG) 100 unit/mL injection    Inject 6-15 Units Subcutaneous 3 times daily (with meals)      insulin degludec (TRESIBA FLEXTOUCH) 100 UNIT/ML pen    Inject 24 Units Subcutaneous daily     insulin detemir U-100 (LEVEMIR FLEXPEN)  100 unit/mL (3 mL) pen    Inject 20 Units Subcutaneous At Bedtime     Incretin Mimetic Agents (GLP-1 Receptor Agonists)       liraglutide (VICTOZA 2-TANNER) 0.6 mg/0.1 mL (18 mg/3 mL) PnIj injection    [LIRAGLUTIDE (VICTOZA 2-TANNER) 0.6 MG/0.1 ML (18 MG/3 ML) PNIJ INJECTION] Inject 1.2 mg under the skin daily. Not available from inpatient hospital pharmacy - OK for patient to use own supply          Taking Medication Assessed Today: Yes  Current Treatments: Insulin Injections,Non-insulin Injectables  Dose schedule: Pre-breakfast,Pre-lunch,Pre-dinner,At bedtime  Given by: Patient  Injection/Infusion sites: Abdomen  Problems taking diabetes medications regularly?: No  Diabetes medication side effects?: No    Problem Solving:  Problem Solving Assessed Today: Yes  Is the patient at risk for hypoglycemia?: Yes  Hypoglycemia Frequency: Never  Is the patient at risk for DKA?: Yes  Does patient have DKA prevention plan?:  (will assess at upcoming visit)     Reducing Risks:  Diabetes Risks: Age over 45 years,History of gestational diabetes,Sedentary Lifestyle,Family History  CAD Risks: Diabetes Mellitus,Dyslipidemia,Hypertension,Obesity,Post-menopausal,Sedentary lifestyle    Healthy Coping:  Healthy Coping Assessed Today: Yes  Emotional response to diabetes: Ready to learn  Informal Support system:: Family  Stage of change: PREPARATION (Decided to change - considering how)  Support resources: In-person Offerings  Patient Activation Measure Survey Score:  No flowsheet data found.    Diabetes knowledge and skills assessment:   Patient is knowledgeable in diabetes management concepts related to: Monitoring, Reducing Risks and Healthy Coping    Patient needs further education on the following diabetes management concepts: Healthy Eating, Being Active, Monitoring, Taking Medication, Problem Solving, Reducing Risks and Healthy Coping    Based on learning assessment above, most appropriate setting for further diabetes education would  be: Individual setting.      INTERVENTIONS:    Education provided today on:  AADE Self-Care Behaviors:  Healthy Eating: carbohydrate counting, consistency in amount, composition, and timing of food intake, portion control and label reading  Being Active: relationship to blood glucose and describe appropriate activity program  Monitoring: purpose, log and interpret results, individual blood glucose targets and frequency of monitoring  Taking Medication: action of prescribed medication, proper site selection and rotation for injections and when to take medications    Opportunities for ongoing education and support in diabetes-self management were discussed.    Pt verbalized understanding of concepts discussed and recommendations provided today.       Education Materials Provided:  Carbohydrate Counting, My Plate Planner and label reading      ASSESSMENT:    Paige is a very pleasant 71-year-old female who presents today for consult and assessment regarding her diabetes self-management skills with an A1c not at ADA goal.  She arrives today unaccompanied and was wearing her Dexcom G6 CGM and had her .  Most recent blood glucose data was downloaded from  and reviewed today.  She reports she is taking all diabetes medications that are currently prescribed to her with no missed or skipped doses.      Paige is a new patient for me today-[facility] with the most recent being on 7/26/2021.  She was also seen by Dr. Bardales with endocrinology on 12/7/2021-both his notes and labs were reviewed.    As noted in previous chart notes Paige has expressed interest in getting an insulin pump.  This was brought up with her today and she said she still has interest however is concerned with the cost.  Informed her that this was something we could work on more in the future.  In lieu of her expressed interest in getting an insulin pump I asked her if she was familiar with  "monitoring carbohydrates or more specifically \"counting carbohydrates\" as this would be a component needed in using a pump.  She replied, she was not.     Education was then provided to her today on the identification of foods that will/will not affect her blood sugars, the   relationship between foods containing carbohydrates and blood sugars and how to calculate the amount of carbohydrates recommended for women for both meals and snacks.  Paige grasp this concept quickly and was able to calculate the correct amount of carbohydrates in several different food scenarios that were presented to her.    Based upon her current total daily dose of insulin which was 52 units (24 units basal with an average of 8 units for breakfast and 10 units lunch and dinner) calculated her insulin carb ratio to be 1:8   (450/52) Moving forward she will use this to calculate the dose of Novolog to administer to cover food she will be eating.  This will be in addition to the amount of insulin that is indicated using the correction scale.  Again we practiced with several scenarios calculating dosage and she did well.      Following Dr. Bardales's notes we transitioned her basal insulin Levemir to Tresiba today.  She was instructed to continue with 24 units daily.    It was agreed to schedule follow-up with myself after she meets again with Dr. Bardales in February.        Patient's most recent   Lab Results   Component Value Date    A1C 8.6 01/11/2022    is not meeting goal of 7.5%    PLAN  See Patient Instructions for co-developed, patient-stated behavior change goals.  AVS printed and provided to patient today. See Follow-Up section for recommended follow-up.    Thank you,  Jil Pascual RN [facility]  Certified Diabetes Care and   Visit type : DSMT      Time Spent: 60 minutes  Encounter Type: Individual    Any diabetes medication dose changes were made via the CDE Protocol and Collaborative Practice Agreement with the " patient's endocrinology provider. A copy of this encounter was shared with the provider.

## 2022-01-15 LAB
DEPRECATED CALCIDIOL+CALCIFEROL SERPL-MC: <61 UG/L (ref 20–75)
VITAMIN D2 SERPL-MCNC: <5 UG/L
VITAMIN D3 SERPL-MCNC: 56 UG/L

## 2022-01-17 ENCOUNTER — VIRTUAL VISIT (OUTPATIENT)
Dept: PULMONOLOGY | Facility: OTHER | Age: 72
End: 2022-01-17
Payer: COMMERCIAL

## 2022-01-17 DIAGNOSIS — R06.00 DYSPNEA, UNSPECIFIED TYPE: Primary | ICD-10-CM

## 2022-01-17 PROCEDURE — 99441 PR PHYSICIAN TELEPHONE EVALUATION 5-10 MIN: CPT | Mod: 95 | Performed by: INTERNAL MEDICINE

## 2022-01-17 NOTE — PROGRESS NOTES
Paige is a 71 year old who is being evaluated via a billable telephone visit.      How would you like to obtain your AVS? MyChart  If the video visit is dropped, the invitation should be resent by: Text to cell phone: [phone]  Will anyone else be joining your video visit? No      Phone start Time: 3:50 PM    Video-Visit Details    Type of service:  Telephone Visit    Telephone End Time: 3:55 PM    Originating Location (pt. Location): Home    Distant Location (provider location):  [facility]     Platform used for Video Visit: telephone    PULMONARY CLINIC FOLLOW UP NOTE    History:     HPI: Paige Mari is a 71 year old female , retired nurse from [employer], who had been referred to us initially for sob that has been ongoing for months.  Patient is able to go up 2-3 flights of stairs.  She is able to walk 1 block with difficulty however.  She does not exercise.  She is a smoker and smokes a few cigarettes per day.  Patient has had PFTs that showed mildly decreased DLCO.  Her shortness of breath was felt to be secondary to deconditioning.    Interval History: pt was called this afternoon. She feels her sob is not terrible. She has sob with exertion. She is able to go up 2-3 flights of stairs. She notes that she occasionally coughs phlegm. No fever. No hemoptysis. She is on albuterol inhaler. She uses it a few times a day. She stopped spiriva and has not noted any different. She denies any abx or steroids for respiratory issues.    PMHx/PSHx:  Atrial fib  H/o SBO  DM  Mood disorder  Appendectomy  Back surgery  Knee arthroscopy  Total abdominal hysterectomy  Sigmoid colectomy     Social Hx:   Tobacco: smoked a total of 15 years, smoked occasional cigarettes. She quit 8 years, and started vaping 8 years  Occupational exposures: retired. RN at [employer], 10 years ago.   Travel: no recent travel  Pets: cat    ROS: 10 point review of system done. Pertinent findings are noted in the  HPI.    Exam/Data:   Unable because phone visit    Data:     Labs personally reviewed.        PFT DATA: normal except mildly decrease DLCO     Echocardiogram Complete  The left atrium is moderately dilated.  Hyperdynamic left ventricular function  Normal right ventricle size and systolic function.  No significant valve disease.      Assessment/Plan:     Paige Mari is a 71 year old female, smoker, currently vaping, retired nurse from [employer], who was referred here for evaluation of shortness of breath.  Chest x-ray with patient was hospitalized recently at [facility] on 9/2021 did not reveal any abnormalities.  Patient had PFTs that are normal with exception of mildly decreased DLCO.  Etiology for shortness of breath is likely multifactorial secondary to deconditioning.  She has been started on Spiriva several weeks ago by PCP to see if this would help her symptoms.  Echo when she was hospitalized in unremarkable.     Recommendations:  Continue albuterol inhaler  Stopped spiriva. Not much difference.  Consider CT chest  Vaccinated for COVID and has received her boosted.  Encouraged to remain active.     FOLLOW UP: 4 months    Theresa Brunson MD  Pulmonary and Critical Care Medicine  Electronically Signed on 01/17/2022    Current Outpatient Medications   Medication Sig Dispense Refill     acetaminophen (TYLENOL) 500 MG tablet Take 1,000 mg by mouth 2 times daily        albuterol (PROAIR HFA/PROVENTIL HFA/VENTOLIN HFA) 108 (90 Base) MCG/ACT inhaler Inhale 1-2 puffs into the lungs every 6 hours 18 g 4     amoxicillin (AMOXIL) 500 MG tablet Take 2,000 mg by mouth as needed Before dental appointments       ARIPiprazole (ABILIFY) 2 MG tablet Take 2 mg by mouth daily        blood sugar diagnostic (CONTOUR TEST STRIPS) Strp [BLOOD SUGAR DIAGNOSTIC (CONTOUR TEST STRIPS) STRP] Use 1 each As Directed daily.        cetirizine (ZYRTEC) 10 MG tablet Take 10 mg by mouth daily        cholecalciferol (VITAMIN  "D3) 25 mcg (1000 units) capsule Take 1,000 Units by mouth daily        cloNIDine (CATAPRES-TTS) 0.3 mg/24 hr Place 1 patch onto the skin once a week        Continuous Blood Gluc  (DEXCOM G6 ) TORRIE Use to read blood sugars as per 's instructions. 1 each 0     Continuous Blood Gluc Sensor (DEXCOM G6 SENSOR) MISC Change every 10 days. 3 each 6     Continuous Blood Gluc Transmit (DEXCOM G6 TRANSMITTER) MISC Change every 3 months. 1 each 2     cyanocobalamin (VITAMIN B-12) 1000 MCG tablet Take 1,000 mcg by mouth daily       diclofenac sodium (VOLTAREN) 1 % Gel [DICLOFENAC SODIUM (VOLTAREN) 1 % GEL] Apply 1 g topically daily as needed.       docusate sodium (COLACE) 100 MG capsule Take 200 mg by mouth 2 times daily       estradiol (ESTRACE) 1 MG tablet Take 1 mg by mouth daily        insulin aspart (NOVOLOG) 100 unit/mL injection Inject 6-15 Units Subcutaneous 3 times daily (with meals)        insulin degludec (TRESIBA FLEXTOUCH) 100 UNIT/ML pen Inject 24 Units Subcutaneous daily 30 mL 1     insulin detemir U-100 (LEVEMIR FLEXPEN) 100 unit/mL (3 mL) pen Inject 20 Units Subcutaneous At Bedtime        liraglutide (VICTOZA 2-TANNER) 0.6 mg/0.1 mL (18 mg/3 mL) PnIj injection [LIRAGLUTIDE (VICTOZA 2-TANNER) 0.6 MG/0.1 ML (18 MG/3 ML) PNIJ INJECTION] Inject 1.2 mg under the skin daily. Not available from inpatient hospital pharmacy - OK for patient to use own supply       magnesium oxide (MAG-OX) 400 MG tablet Take 400 mg by mouth daily        medical cannabis (Patient's own supply) 1 Dose by Other route See Admin Instructions [employer] Vape- 1-4 puffs q6h PRN       metoprolol succinate ER (TOPROL-XL) 100 MG 24 hr tablet Take 1 tablet (100 mg) by mouth 2 times daily       multivitamin w/minerals (THERA-VIT-M) tablet Take 1 tablet by mouth daily       NOVOFINE 32 32 gauge x 1/4\" Ndle [NOVOFINE 32 32 GAUGE X 1/4\" NDLE] USE TO TEST TWICE A DAY  3     olmesartan (BENICAR) 40 MG tablet [OLMESARTAN " "(BENICAR) 40 MG TABLET] Take 40 mg by mouth daily.       omeprazole (PRILOSEC) 20 MG capsule [OMEPRAZOLE (PRILOSEC) 20 MG CAPSULE] Take 1 capsule by mouth daily before breakfast.       pregabalin (LYRICA) 150 MG capsule TAKE 1 CAPSULE BY MOUTH TWICE A DAY       tiZANidine (ZANAFLEX) 2 MG tablet [TIZANIDINE (ZANAFLEX) 2 MG TABLET] Take 4 mg by mouth bedtime.        traMADol (ULTRAM) 50 mg tablet [TRAMADOL (ULTRAM) 50 MG TABLET] Take 100 mg by mouth 2 (two) times a day.        valACYclovir (VALTREX) 500 MG tablet Take 500 mg by mouth daily As needed       DULoxetine (CYMBALTA) 30 MG capsule Take 30 mg by mouth daily (Patient not taking: Reported on 1/11/2022)       Glucagon 3 MG/DOSE POWD Spray 3 mg in nostril daily as needed (severe hypoglycemia) (Patient not taking: Reported on 1/17/2022) 2 each 3     Glucagon, rDNA, (GLUCAGON EMERGENCY) 1 MG KIT 1 mg IM one time, PRN severe hypoglycemia causing altered consciousness affecting ability to take food by mouth (Patient not taking: Reported on 1/17/2022) 1 kit 1     glucose (BD GLUCOSE) 5 g chewable tablet Take 3 tablets (15 g) by mouth daily as needed (hypoglycemia) (Patient not taking: Reported on 1/17/2022) 90 tablet 3     SPIRIVA RESPIMAT 2.5 MCG/ACT inhaler 2 PUFFS INHALATION ONCE A DAY (Patient not taking: Reported on 1/11/2022)       sucralfate (CARAFATE) 1 GM tablet Take 1 tablet by mouth 4 times daily as needed        Allergies   Allergen Reactions     Morphine Other (See Comments)     \"make me delirious,\"  \"nightmares\"  Other reaction(s): delirium  \"make me delirious,\"  \"nightmares\"       Adhesive [Mecrylate] Other (See Comments)     Can only tolerate tape for short periods of time shelia in sensitive areas     Amlodipine Unknown and Other (See Comments)     Other reaction(s): delerium     Avalide Other (See Comments)     Other reaction(s): hyponatremia     Doxazosin Other (See Comments)     Other reaction(s): feels foggy     Other Allergy (See Comments) [External " Allergen Needs Reconciliation - See Comment] Unknown     Other reaction(s): skin rash     Prednisone Unknown     Caused extremely high blood sugars     Trazodone Unknown     Other reaction(s): hung over       Meds and Allergies: See EHR for the updated medication list and Allergies. These were reviewed.     Much or all of the text in this note was generated through the use of the Dragon Dictate voice-to-text software. Errors in spelling or words which seem out of context are unintentional. Sound alike errors, in particular, may have escaped editing.

## 2022-01-20 ENCOUNTER — HOSPITAL ENCOUNTER (INPATIENT)
Facility: HOSPITAL | Age: 72
LOS: 6 days | Discharge: HOME HEALTH | DRG: 092 | End: 2022-01-26
Attending: EMERGENCY MEDICINE | Admitting: INTERNAL MEDICINE
Payer: COMMERCIAL

## 2022-01-20 ENCOUNTER — APPOINTMENT (OUTPATIENT)
Dept: CT IMAGING | Facility: HOSPITAL | Age: 72
DRG: 092 | End: 2022-01-20
Attending: EMERGENCY MEDICINE
Payer: COMMERCIAL

## 2022-01-20 ENCOUNTER — APPOINTMENT (OUTPATIENT)
Dept: RADIOLOGY | Facility: HOSPITAL | Age: 72
DRG: 092 | End: 2022-01-20
Attending: EMERGENCY MEDICINE
Payer: COMMERCIAL

## 2022-01-20 ENCOUNTER — APPOINTMENT (OUTPATIENT)
Dept: MRI IMAGING | Facility: HOSPITAL | Age: 72
DRG: 092 | End: 2022-01-20
Attending: EMERGENCY MEDICINE
Payer: COMMERCIAL

## 2022-01-20 DIAGNOSIS — R80.9 TYPE 1 DIABETES MELLITUS WITH MICROALBUMINURIA (H): ICD-10-CM

## 2022-01-20 DIAGNOSIS — G89.29 CHRONIC BACK PAIN, UNSPECIFIED BACK LOCATION, UNSPECIFIED BACK PAIN LATERALITY: ICD-10-CM

## 2022-01-20 DIAGNOSIS — G93.40 ACUTE ENCEPHALOPATHY: ICD-10-CM

## 2022-01-20 DIAGNOSIS — E10.29 TYPE 1 DIABETES MELLITUS WITH MICROALBUMINURIA (H): ICD-10-CM

## 2022-01-20 DIAGNOSIS — M54.9 CHRONIC BACK PAIN, UNSPECIFIED BACK LOCATION, UNSPECIFIED BACK PAIN LATERALITY: ICD-10-CM

## 2022-01-20 DIAGNOSIS — R41.0 CONFUSION: ICD-10-CM

## 2022-01-20 DIAGNOSIS — M17.9 OSTEOARTHRITIS OF KNEE, UNSPECIFIED LATERALITY, UNSPECIFIED OSTEOARTHRITIS TYPE: ICD-10-CM

## 2022-01-20 DIAGNOSIS — D72.829 LEUKOCYTOSIS, UNSPECIFIED TYPE: ICD-10-CM

## 2022-01-20 DIAGNOSIS — G89.18 POST-OP PAIN: Primary | ICD-10-CM

## 2022-01-20 LAB
ALBUMIN SERPL-MCNC: 4.4 G/DL (ref 3.5–5)
ALBUMIN UR-MCNC: 600 MG/DL
ALP SERPL-CCNC: 115 U/L (ref 45–120)
ALT SERPL W P-5'-P-CCNC: 24 U/L (ref 0–45)
AMMONIA PLAS-SCNC: 27 UMOL/L (ref 11–35)
ANION GAP SERPL CALCULATED.3IONS-SCNC: 15 MMOL/L (ref 5–18)
APPEARANCE UR: CLEAR
AST SERPL W P-5'-P-CCNC: 21 U/L (ref 0–40)
ATRIAL RATE - MUSE: 93 BPM
BASE EXCESS BLDV CALC-SCNC: 1.9 MMOL/L
BASOPHILS # BLD AUTO: 0.1 10E3/UL (ref 0–0.2)
BASOPHILS NFR BLD AUTO: 0 %
BILIRUB SERPL-MCNC: 0.9 MG/DL (ref 0–1)
BILIRUB UR QL STRIP: NEGATIVE
BUN SERPL-MCNC: 25 MG/DL (ref 8–28)
CALCIUM SERPL-MCNC: 10.9 MG/DL (ref 8.5–10.5)
CHLORIDE BLD-SCNC: 101 MMOL/L (ref 98–107)
CO2 SERPL-SCNC: 25 MMOL/L (ref 22–31)
COLOR UR AUTO: YELLOW
CREAT SERPL-MCNC: 0.99 MG/DL (ref 0.6–1.1)
CREAT SERPL-MCNC: 1.1 MG/DL (ref 0.6–1.1)
DIASTOLIC BLOOD PRESSURE - MUSE: 98 MMHG
EOSINOPHIL # BLD AUTO: 0 10E3/UL (ref 0–0.7)
EOSINOPHIL NFR BLD AUTO: 0 %
ERYTHROCYTE [DISTWIDTH] IN BLOOD BY AUTOMATED COUNT: 13.1 % (ref 10–15)
GFR SERPL CREATININE-BSD FRML MDRD: 53 ML/MIN/1.73M2
GFR SERPL CREATININE-BSD FRML MDRD: 60 ML/MIN/1.73M2
GLUCOSE BLD-MCNC: 155 MG/DL (ref 70–125)
GLUCOSE BLDC GLUCOMTR-MCNC: 129 MG/DL (ref 70–99)
GLUCOSE BLDC GLUCOMTR-MCNC: 273 MG/DL (ref 70–99)
GLUCOSE BLDC GLUCOMTR-MCNC: 84 MG/DL (ref 70–99)
GLUCOSE BLDC GLUCOMTR-MCNC: 92 MG/DL (ref 70–99)
GLUCOSE UR STRIP-MCNC: >1000 MG/DL
GRANULAR CAST: 1 /LPF
HCO3 BLDV-SCNC: 24 MMOL/L (ref 24–30)
HCT VFR BLD AUTO: 50 % (ref 35–47)
HGB BLD-MCNC: 17.4 G/DL (ref 11.7–15.7)
HGB UR QL STRIP: ABNORMAL
HYALINE CASTS: 4 /LPF
IMM GRANULOCYTES # BLD: 0.1 10E3/UL
IMM GRANULOCYTES NFR BLD: 1 %
INTERPRETATION ECG - MUSE: NORMAL
KETONES BLD-SCNC: 0.15 MMOL/L
KETONES UR STRIP-MCNC: 60 MG/DL
LACTATE SERPL-SCNC: 2 MMOL/L (ref 0.7–2)
LACTATE SERPL-SCNC: 2.8 MMOL/L (ref 0.7–2)
LEUKOCYTE ESTERASE UR QL STRIP: NEGATIVE
LYMPHOCYTES # BLD AUTO: 2.3 10E3/UL (ref 0.8–5.3)
LYMPHOCYTES NFR BLD AUTO: 11 %
MAGNESIUM SERPL-MCNC: 2.1 MG/DL (ref 1.8–2.6)
MCH RBC QN AUTO: 32.3 PG (ref 26.5–33)
MCHC RBC AUTO-ENTMCNC: 34.8 G/DL (ref 31.5–36.5)
MCV RBC AUTO: 93 FL (ref 78–100)
MONOCYTES # BLD AUTO: 1.1 10E3/UL (ref 0–1.3)
MONOCYTES NFR BLD AUTO: 5 %
MUCOUS THREADS #/AREA URNS LPF: PRESENT /LPF
NEUTROPHILS # BLD AUTO: 17.5 10E3/UL (ref 1.6–8.3)
NEUTROPHILS NFR BLD AUTO: 83 %
NITRATE UR QL: NEGATIVE
NRBC # BLD AUTO: 0 10E3/UL
NRBC BLD AUTO-RTO: 0 /100
OXYHGB MFR BLDV: 56.6 % (ref 70–75)
P AXIS - MUSE: 58 DEGREES
PCO2 BLDV: 46 MM HG (ref 35–50)
PH BLDV: 7.38 [PH] (ref 7.35–7.45)
PH UR STRIP: 6 [PH] (ref 5–7)
PLATELET # BLD AUTO: 347 10E3/UL (ref 150–450)
PO2 BLDV: 29 MM HG (ref 25–47)
POTASSIUM BLD-SCNC: 3.4 MMOL/L (ref 3.5–5)
PR INTERVAL - MUSE: 174 MS
PROT SERPL-MCNC: 8.4 G/DL (ref 6–8)
QRS DURATION - MUSE: 78 MS
QT - MUSE: 376 MS
QTC - MUSE: 467 MS
R AXIS - MUSE: 9 DEGREES
RBC # BLD AUTO: 5.38 10E6/UL (ref 3.8–5.2)
RBC URINE: 1 /HPF
SAO2 % BLDV: 57.5 % (ref 70–75)
SARS-COV-2 RNA RESP QL NAA+PROBE: NEGATIVE
SODIUM SERPL-SCNC: 141 MMOL/L (ref 136–145)
SP GR UR STRIP: 1.04 (ref 1–1.03)
SQUAMOUS EPITHELIAL: 1 /HPF
SYSTOLIC BLOOD PRESSURE - MUSE: 193 MMHG
T AXIS - MUSE: 96 DEGREES
TROPONIN I SERPL-MCNC: <0.01 NG/ML (ref 0–0.29)
UROBILINOGEN UR STRIP-MCNC: <2 MG/DL
VENTRICULAR RATE- MUSE: 93 BPM
WBC # BLD AUTO: 21 10E3/UL (ref 4–11)
WBC URINE: 11 /HPF

## 2022-01-20 PROCEDURE — 70551 MRI BRAIN STEM W/O DYE: CPT

## 2022-01-20 PROCEDURE — 85025 COMPLETE CBC W/AUTO DIFF WBC: CPT | Performed by: EMERGENCY MEDICINE

## 2022-01-20 PROCEDURE — 120N000001 HC R&B MED SURG/OB

## 2022-01-20 PROCEDURE — 250N000013 HC RX MED GY IP 250 OP 250 PS 637: Performed by: INTERNAL MEDICINE

## 2022-01-20 PROCEDURE — 96366 THER/PROPH/DIAG IV INF ADDON: CPT

## 2022-01-20 PROCEDURE — 250N000011 HC RX IP 250 OP 636: Performed by: INTERNAL MEDICINE

## 2022-01-20 PROCEDURE — 82010 KETONE BODYS QUAN: CPT | Performed by: EMERGENCY MEDICINE

## 2022-01-20 PROCEDURE — 999N000127 HC STATISTIC PERIPHERAL IV START W US GUIDANCE

## 2022-01-20 PROCEDURE — 87040 BLOOD CULTURE FOR BACTERIA: CPT | Performed by: EMERGENCY MEDICINE

## 2022-01-20 PROCEDURE — 71045 X-RAY EXAM CHEST 1 VIEW: CPT

## 2022-01-20 PROCEDURE — 36415 COLL VENOUS BLD VENIPUNCTURE: CPT | Performed by: EMERGENCY MEDICINE

## 2022-01-20 PROCEDURE — 99223 1ST HOSP IP/OBS HIGH 75: CPT | Performed by: INTERNAL MEDICINE

## 2022-01-20 PROCEDURE — 93005 ELECTROCARDIOGRAM TRACING: CPT | Performed by: EMERGENCY MEDICINE

## 2022-01-20 PROCEDURE — 99285 EMERGENCY DEPT VISIT HI MDM: CPT | Mod: 25

## 2022-01-20 PROCEDURE — 96368 THER/DIAG CONCURRENT INF: CPT

## 2022-01-20 PROCEDURE — 96365 THER/PROPH/DIAG IV INF INIT: CPT

## 2022-01-20 PROCEDURE — 87086 URINE CULTURE/COLONY COUNT: CPT | Performed by: EMERGENCY MEDICINE

## 2022-01-20 PROCEDURE — 70450 CT HEAD/BRAIN W/O DYE: CPT

## 2022-01-20 PROCEDURE — 84484 ASSAY OF TROPONIN QUANT: CPT | Performed by: EMERGENCY MEDICINE

## 2022-01-20 PROCEDURE — 250N000011 HC RX IP 250 OP 636: Performed by: EMERGENCY MEDICINE

## 2022-01-20 PROCEDURE — C9803 HOPD COVID-19 SPEC COLLECT: HCPCS

## 2022-01-20 PROCEDURE — 82805 BLOOD GASES W/O2 SATURATION: CPT | Performed by: EMERGENCY MEDICINE

## 2022-01-20 PROCEDURE — 87635 SARS-COV-2 COVID-19 AMP PRB: CPT | Performed by: EMERGENCY MEDICINE

## 2022-01-20 PROCEDURE — 80053 COMPREHEN METABOLIC PANEL: CPT | Performed by: EMERGENCY MEDICINE

## 2022-01-20 PROCEDURE — 83605 ASSAY OF LACTIC ACID: CPT | Performed by: EMERGENCY MEDICINE

## 2022-01-20 PROCEDURE — 74177 CT ABD & PELVIS W/CONTRAST: CPT

## 2022-01-20 PROCEDURE — 258N000003 HC RX IP 258 OP 636: Performed by: EMERGENCY MEDICINE

## 2022-01-20 PROCEDURE — 81001 URINALYSIS AUTO W/SCOPE: CPT | Performed by: EMERGENCY MEDICINE

## 2022-01-20 PROCEDURE — 96361 HYDRATE IV INFUSION ADD-ON: CPT

## 2022-01-20 PROCEDURE — 82140 ASSAY OF AMMONIA: CPT | Performed by: EMERGENCY MEDICINE

## 2022-01-20 PROCEDURE — 83735 ASSAY OF MAGNESIUM: CPT | Performed by: EMERGENCY MEDICINE

## 2022-01-20 PROCEDURE — 82565 ASSAY OF CREATININE: CPT | Performed by: INTERNAL MEDICINE

## 2022-01-20 RX ORDER — METOPROLOL SUCCINATE 100 MG/1
100 TABLET, EXTENDED RELEASE ORAL 2 TIMES DAILY
Status: DISCONTINUED | OUTPATIENT
Start: 2022-01-20 | End: 2022-01-25

## 2022-01-20 RX ORDER — TRAMADOL HYDROCHLORIDE 50 MG/1
100 TABLET ORAL 2 TIMES DAILY
Status: DISCONTINUED | OUTPATIENT
Start: 2022-01-20 | End: 2022-01-23

## 2022-01-20 RX ORDER — METOPROLOL SUCCINATE 100 MG/1
200 TABLET, EXTENDED RELEASE ORAL EVERY MORNING
COMMUNITY
End: 2023-02-02

## 2022-01-20 RX ORDER — IOPAMIDOL 755 MG/ML
75 INJECTION, SOLUTION INTRAVASCULAR ONCE
Status: COMPLETED | OUTPATIENT
Start: 2022-01-20 | End: 2022-01-20

## 2022-01-20 RX ORDER — LIDOCAINE 40 MG/G
CREAM TOPICAL
Status: DISCONTINUED | OUTPATIENT
Start: 2022-01-20 | End: 2022-01-26 | Stop reason: HOSPADM

## 2022-01-20 RX ORDER — ONDANSETRON 2 MG/ML
4 INJECTION INTRAMUSCULAR; INTRAVENOUS EVERY 6 HOURS PRN
Status: DISCONTINUED | OUTPATIENT
Start: 2022-01-20 | End: 2022-01-26 | Stop reason: HOSPADM

## 2022-01-20 RX ORDER — BENZTROPINE MESYLATE 0.5 MG/1
1 TABLET ORAL 3 TIMES DAILY PRN
Status: DISCONTINUED | OUTPATIENT
Start: 2022-01-20 | End: 2022-01-26 | Stop reason: HOSPADM

## 2022-01-20 RX ORDER — ESTRADIOL 1 MG/1
1 TABLET ORAL DAILY
Status: DISCONTINUED | OUTPATIENT
Start: 2022-01-21 | End: 2022-01-26 | Stop reason: HOSPADM

## 2022-01-20 RX ORDER — ONDANSETRON 4 MG/1
4 TABLET, ORALLY DISINTEGRATING ORAL EVERY 6 HOURS PRN
Status: DISCONTINUED | OUTPATIENT
Start: 2022-01-20 | End: 2022-01-26 | Stop reason: HOSPADM

## 2022-01-20 RX ORDER — PANTOPRAZOLE SODIUM 20 MG/1
40 TABLET, DELAYED RELEASE ORAL EVERY MORNING
Status: DISCONTINUED | OUTPATIENT
Start: 2022-01-21 | End: 2022-01-26 | Stop reason: HOSPADM

## 2022-01-20 RX ORDER — AMOXICILLIN 250 MG
2 CAPSULE ORAL 2 TIMES DAILY PRN
Status: DISCONTINUED | OUTPATIENT
Start: 2022-01-20 | End: 2022-01-26 | Stop reason: HOSPADM

## 2022-01-20 RX ORDER — POLYETHYLENE GLYCOL 3350 17 G/17G
17 POWDER, FOR SOLUTION ORAL DAILY
Status: DISCONTINUED | OUTPATIENT
Start: 2022-01-20 | End: 2022-01-26 | Stop reason: HOSPADM

## 2022-01-20 RX ORDER — ALBUTEROL SULFATE 90 UG/1
1-2 AEROSOL, METERED RESPIRATORY (INHALATION) EVERY 6 HOURS
Status: DISCONTINUED | OUTPATIENT
Start: 2022-01-20 | End: 2022-01-26

## 2022-01-20 RX ORDER — PIPERACILLIN SODIUM, TAZOBACTAM SODIUM 3; .375 G/15ML; G/15ML
3.38 INJECTION, POWDER, LYOPHILIZED, FOR SOLUTION INTRAVENOUS ONCE
Status: COMPLETED | OUTPATIENT
Start: 2022-01-20 | End: 2022-01-20

## 2022-01-20 RX ORDER — AMOXICILLIN 250 MG
1 CAPSULE ORAL 2 TIMES DAILY PRN
Status: DISCONTINUED | OUTPATIENT
Start: 2022-01-20 | End: 2022-01-26 | Stop reason: HOSPADM

## 2022-01-20 RX ORDER — HYDRALAZINE HYDROCHLORIDE 20 MG/ML
10 INJECTION INTRAMUSCULAR; INTRAVENOUS EVERY 6 HOURS PRN
Status: DISCONTINUED | OUTPATIENT
Start: 2022-01-20 | End: 2022-01-26 | Stop reason: HOSPADM

## 2022-01-20 RX ORDER — METOPROLOL SUCCINATE 100 MG/1
100 TABLET, EXTENDED RELEASE ORAL 2 TIMES DAILY
COMMUNITY

## 2022-01-20 RX ORDER — ACETAMINOPHEN 325 MG/1
650 TABLET ORAL EVERY 6 HOURS PRN
Status: DISCONTINUED | OUTPATIENT
Start: 2022-01-20 | End: 2022-01-26 | Stop reason: HOSPADM

## 2022-01-20 RX ORDER — PREGABALIN 50 MG/1
100 CAPSULE ORAL 3 TIMES DAILY
Status: DISCONTINUED | OUTPATIENT
Start: 2022-01-20 | End: 2022-01-22

## 2022-01-20 RX ORDER — PREGABALIN 200 MG/1
200 CAPSULE ORAL 3 TIMES DAILY
Status: ON HOLD | COMMUNITY
End: 2022-01-26

## 2022-01-20 RX ORDER — METOPROLOL SUCCINATE 100 MG/1
100 TABLET, EXTENDED RELEASE ORAL DAILY
Status: DISCONTINUED | OUTPATIENT
Start: 2022-01-20 | End: 2022-01-20

## 2022-01-20 RX ORDER — ARIPIPRAZOLE 2 MG/1
2 TABLET ORAL DAILY
Status: DISCONTINUED | OUTPATIENT
Start: 2022-01-21 | End: 2022-01-26 | Stop reason: HOSPADM

## 2022-01-20 RX ORDER — NICOTINE POLACRILEX 4 MG
15-30 LOZENGE BUCCAL
Status: DISCONTINUED | OUTPATIENT
Start: 2022-01-20 | End: 2022-01-26 | Stop reason: HOSPADM

## 2022-01-20 RX ORDER — LOSARTAN POTASSIUM 25 MG/1
25 TABLET ORAL DAILY
Status: DISCONTINUED | OUTPATIENT
Start: 2022-01-20 | End: 2022-01-23

## 2022-01-20 RX ORDER — ACETAMINOPHEN 650 MG/1
650 SUPPOSITORY RECTAL EVERY 6 HOURS PRN
Status: DISCONTINUED | OUTPATIENT
Start: 2022-01-20 | End: 2022-01-26 | Stop reason: HOSPADM

## 2022-01-20 RX ORDER — DEXTROSE MONOHYDRATE 25 G/50ML
25-50 INJECTION, SOLUTION INTRAVENOUS
Status: DISCONTINUED | OUTPATIENT
Start: 2022-01-20 | End: 2022-01-26 | Stop reason: HOSPADM

## 2022-01-20 RX ADMIN — SODIUM CHLORIDE, POTASSIUM CHLORIDE, SODIUM LACTATE AND CALCIUM CHLORIDE 1000 ML: 600; 310; 30; 20 INJECTION, SOLUTION INTRAVENOUS at 16:13

## 2022-01-20 RX ADMIN — LOSARTAN POTASSIUM 25 MG: 25 TABLET, FILM COATED ORAL at 17:21

## 2022-01-20 RX ADMIN — ENOXAPARIN SODIUM 40 MG: 40 INJECTION SUBCUTANEOUS at 16:16

## 2022-01-20 RX ADMIN — IOPAMIDOL 75 ML: 755 INJECTION, SOLUTION INTRAVENOUS at 15:03

## 2022-01-20 RX ADMIN — PIPERACILLIN SODIUM AND TAZOBACTAM SODIUM 3.38 G: 3; .375 INJECTION, POWDER, LYOPHILIZED, FOR SOLUTION INTRAVENOUS at 18:00

## 2022-01-20 RX ADMIN — VANCOMYCIN HYDROCHLORIDE 1500 MG: 5 INJECTION, POWDER, LYOPHILIZED, FOR SOLUTION INTRAVENOUS at 16:57

## 2022-01-20 RX ADMIN — ALBUTEROL SULFATE 1 PUFF: 90 AEROSOL, METERED RESPIRATORY (INHALATION) at 16:17

## 2022-01-20 RX ADMIN — METOPROLOL SUCCINATE 100 MG: 25 TABLET, EXTENDED RELEASE ORAL at 17:21

## 2022-01-20 RX ADMIN — SODIUM CHLORIDE, POTASSIUM CHLORIDE, SODIUM LACTATE AND CALCIUM CHLORIDE 1000 ML: 600; 310; 30; 20 INJECTION, SOLUTION INTRAVENOUS at 20:21

## 2022-01-20 ASSESSMENT — ACTIVITIES OF DAILY LIVING (ADL)
ADLS_ACUITY_SCORE: 14
ADLS_ACUITY_SCORE: 12
ADLS_ACUITY_SCORE: 14
ADLS_ACUITY_SCORE: 12
ADLS_ACUITY_SCORE: 8
ADLS_ACUITY_SCORE: 12
ADLS_ACUITY_SCORE: 8

## 2022-01-20 ASSESSMENT — ENCOUNTER SYMPTOMS
CONFUSION: 1
ABDOMINAL PAIN: 0
FEVER: 0
HEADACHES: 1
NAUSEA: 0

## 2022-01-20 NOTE — ED NOTES
Bed: JNED-01  Expected date: 1/20/22  Expected time:   Means of arrival: Ambulance  Comments:  Magdiel  AMS - hyperglycemic  71 F

## 2022-01-20 NOTE — ED PROVIDER NOTES
"EMERGENCY DEPARTMENT ENCOUNTER      NAME: Paige Mari  AGE: 71 year old female  YOB: 1950  MRN: [id number]  EVALUATION DATE & TIME: 1/20/2022 10:30 AM    PCP: Liam Canada    ED PROVIDER: David Kinsey MD        Chief Complaint   Patient presents with     Hyperglycemia     Altered Mental Status         FINAL IMPRESSION:  1. Confusion    2. Leukocytosis, unspecified type          ED COURSE & MEDICAL DECISION MAKING:    Pertinent Labs & Imaging studies reviewed. (See chart for details)  71 year old female presents to the Emergency Department for evaluation of confusion    10:30 AM I met with the patient for the initial interview and physical examination. Discussed plan for treatment and workup in the ED.   10:44 AM Patient's blood sugar was recorded at 273.  10:57 AM Spoke with the patient's .  1:10 PM I updated the patient.  1:17 PM I spoke with Dr. Phelps, [facility].    At the conclusion of the encounter I discussed the results of all of the tests and the disposition. The questions were answered. The patient or family acknowledged understanding and was agreeable with the care plan.     ED Course as of 01/20/22 1348   Thu Jan 20, 2022   1043 Per : saw a doctor last week and maybe got sleeping pills,  is not sure. Worse confusion yesterday afternoon. Similar episode a few weeks ago. Also had delirium in Sept during hospital stay.    1044 Fallen more and shaking in hands more since last week. Gradually get worse   1047 Per PDMP review is on narcotics    1048 She was \"groggy\" last night after taking a \"pill\". ? Sleeping pill.    1052 Has presented similar with delirium per .    1057 Presents with what sounds like delirium is on multiple medications including narcotics and is recently been started on some sleeping pills and is using marijuana   1057 No focal neurological deficits besides confusion is not a tPA candidate therefore stroke code not initiated   1058 As " he reports he had multiple episodes similar to this in the past   1058 Will obtain CT head, initiate labs looking for DKA, ammonia, VBG to look for increased CO2, sepsis, ACS, COVID-19, low sodium,   1058 Blood sugar was reading high last night at 300 for medics.  Here blood sugars 273   1058 No [place] breathing therefore doubt DKA based on history and exam   1059 Meningitis/encephalitis unlikely   1307 Head CT w/o contrast   1311 WBC(!): 21.0  Will get chest x-ray, CT abdomen, send blood cultures, give Zosyn, send lactic acid   1311 No abdominal tenderness on repeat examination.  Patient still infusing cannot provide any history   1311 SARS CoV2 PCR: Negative   1312 Hemoglobin(!): 17.4  Likely dehydrated IV fluids ordered     Discussed with the hospitalist patient as patient possibly could have a stroke however she is not a stroke code candidate based on timing of symptoms.  Patient seemed to be more confused now than it was earlier we will obtain point-of-care glucose.  will obtain MRI        PPE: Provider wore gloves, N95 mask, eye protection  MEDICATIONS GIVEN IN THE EMERGENCY:  Medications   lactated ringers BOLUS 1,000 mL (has no administration in time range)   piperacillin-tazobactam (ZOSYN) 3.375 g vial to attach to  mL bag (has no administration in time range)   hydrALAZINE (APRESOLINE) injection 10 mg (has no administration in time range)   lidocaine 1 % 0.1-1 mL (has no administration in time range)   lidocaine (LMX4) cream (has no administration in time range)   sodium chloride (PF) 0.9% PF flush 3 mL (has no administration in time range)   sodium chloride (PF) 0.9% PF flush 3 mL (has no administration in time range)   acetaminophen (TYLENOL) tablet 650 mg (has no administration in time range)     Or   acetaminophen (TYLENOL) Suppository 650 mg (has no administration in time range)   melatonin tablet 1 mg (has no administration in time range)   ondansetron (ZOFRAN-ODT) ODT tab 4 mg (has no  "administration in time range)     Or   ondansetron (ZOFRAN) injection 4 mg (has no administration in time range)   enoxaparin ANTICOAGULANT (LOVENOX) injection 40 mg (has no administration in time range)   polyethylene glycol (MIRALAX) Packet 17 g (has no administration in time range)   senna-docusate (SENOKOT-S/PERICOLACE) 8.6-50 MG per tablet 1 tablet (has no administration in time range)     Or   senna-docusate (SENOKOT-S/PERICOLACE) 8.6-50 MG per tablet 2 tablet (has no administration in time range)   benztropine (COGENTIN) tablet 1 mg (has no administration in time range)   QUEtiapine (SEROquel) half-tab 12.5 mg (has no administration in time range)       NEW PRESCRIPTIONS STARTED AT TODAY'S ER VISIT  New Prescriptions    No medications on file          =================================================================    HPI    Triage note  \"-Patient arrives from private residence via EMS with complaints of hyperglycemia and altered mental status. EMS reports patient's glucometer read \"HIGH\" before bed, checked this morning and glucometer read over 400,  gave her insulin, EMS recheck was 338.  reported to EMS that patient was confused and answering \"yes\" to most questions. Patient was able to walk from apartment to ambulance with stand by assist. Blood sugar upon ED arrival is 273.  \"      Patient information was obtained from: patient and EMS    Use of : N/A        Paige Mari is a 71 year old female with a pertinent history of leukocytosis, GERD, diabetes mellitus type 2, hypertension, diverticulitis, hyponatremia,altered mental state, SIRS, atrial fibrillation, JACKSON, DKA, lactic acidosis, and COPD who presents to this ED EMS for evaluation of hyperglycemia and altered mental status.    HPI and ROS limited due to altered mental status.    Per EMS, the patient's last blood sugar was recorded at >400, and EMS was called. The patient is confused.     Per patient, currently, the " "patient is feeling \"pretty good,\" but endorses a slight headache. She is unsure about the year or her location.  Started off by saying \"1970 something\" then stated I do not know the patient reports that her hands are swelling. When asked if she is taking medications for chronic pain, she responds \"not really.\" The patient denies chest pain, abdominal pain, nausea, or any other complaints at this time.    Of note, the patient has a Dexcom monitoring device on her abdomen.    REVIEW OF SYSTEMS   Review of Systems   Unable to perform ROS: Mental status change   Constitutional: Negative for fever.   Cardiovascular: Negative for chest pain.   Gastrointestinal: Negative for abdominal pain and nausea.   Neurological: Positive for headaches.   Psychiatric/Behavioral: Positive for confusion.       PAST MEDICAL HISTORY:  History reviewed. No pertinent past medical history.    PAST SURGICAL HISTORY:  Past Surgical History:   Procedure Laterality Date     APPENDECTOMY      at age 3     ARTHROSCOPY KNEE WITH MENISCECTOMY  3/10/14    partial medial and lateral meniscectomies, subpatellar chondroplasty     BACK SURGERY       BLEPHAROPLASTY Bilateral 8/17/2015    Procedure: BLEPHAROPLASTY BILATERAL UPPER LIDS;  Surgeon: Chasidy Bryant MD;  Location: [facility] OR;  Service:      BOWEL RESECTION  12 years ago     COLON SURGERY  2004    sigmoid colectomy     DILATION AND CURETTAGE      x3     HYSTERECTOMY      age 39     JOINT REPLACEMENT       OOPHORECTOMY       OTHER SURGICAL HISTORY  2005    bowel obstruction     PICC TRIPLE LUMEN PLACEMENT  9/19/2021          OK ARTHRODESIS ANT INTERBODY MIN DISCECTOMY,LUMBAR N/A 1/30/2015    Procedure: ANTERIOR LUMBAR INTERBODY FUSION L4-5, INTERBODY GRAFT, BMP INSTRUMENTATION;  Surgeon: Zeeshan Guillaume MD;  Location: [facility] Main OR;  Service: Spine     TOE SURGERY       TONSILLECTOMY & ADENOIDECTOMY      age 10     ZZC REMOVAL OF OVARY(S)      Description: Oophorectomy;  Recorded: " "06/03/2010;     [facility] REMOVAL OF OVARY(S)      Description: Oophorectomy;  Recorded: 06/03/2010;     [facility] TOTAL ABDOM HYSTERECTOMY      Description: Hysterectomy;  Recorded: 06/03/2010;     [facility] TOTAL ABDOM HYSTERECTOMY      Description: Hysterectomy;  Recorded: 06/03/2010;     [facility] TOTAL KNEE ARTHROPLASTY Right 11/24/2014    Procedure: RIGHT TOTAL KNEE ARTHROPLASTY;  Surgeon: Jules Harris MD;  Location: [facility];  Service: Orthopedics           CURRENT MEDICATIONS:    acetaminophen (TYLENOL) 500 MG tablet  albuterol (PROAIR HFA/PROVENTIL HFA/VENTOLIN HFA) 108 (90 Base) MCG/ACT inhaler  amoxicillin (AMOXIL) 500 MG tablet  ARIPiprazole (ABILIFY) 2 MG tablet  blood sugar diagnostic (CONTOUR TEST STRIPS) Strp  cetirizine (ZYRTEC) 10 MG tablet  cholecalciferol (VITAMIN D3) 25 mcg (1000 units) capsule  cloNIDine (CATAPRES-TTS) 0.3 mg/24 hr  Continuous Blood Gluc  (DEXCOM G6 ) TORRIE  Continuous Blood Gluc Sensor (DEXCOM G6 SENSOR) MISC  Continuous Blood Gluc Transmit (DEXCOM G6 TRANSMITTER) MISC  cyanocobalamin (VITAMIN B-12) 1000 MCG tablet  diclofenac sodium (VOLTAREN) 1 % Gel  docusate sodium (COLACE) 100 MG capsule  DULoxetine (CYMBALTA) 30 MG capsule  estradiol (ESTRACE) 1 MG tablet  Glucagon 3 MG/DOSE POWD  Glucagon, rDNA, (GLUCAGON EMERGENCY) 1 MG KIT  glucose (BD GLUCOSE) 5 g chewable tablet  insulin aspart (NOVOLOG) 100 unit/mL injection  insulin degludec (TRESIBA FLEXTOUCH) 100 UNIT/ML pen  insulin detemir U-100 (LEVEMIR FLEXPEN) 100 unit/mL (3 mL) pen  liraglutide (VICTOZA 2-TANNER) 0.6 mg/0.1 mL (18 mg/3 mL) PnIj injection  magnesium oxide (MAG-OX) 400 MG tablet  medical cannabis (Patient's own supply)  metoprolol succinate ER (TOPROL-XL) 100 MG 24 hr tablet  multivitamin w/minerals (THERA-VIT-M) tablet  NOVOFINE 32 32 gauge x 1/4\" Ndle  olmesartan (BENICAR) 40 MG tablet  omeprazole (PRILOSEC) 20 MG capsule  pregabalin (LYRICA) 150 MG capsule  SPIRIVA RESPIMAT 2.5 MCG/ACT inhaler  sucralfate " "(CARAFATE) 1 GM tablet  tiZANidine (ZANAFLEX) 2 MG tablet  traMADol (ULTRAM) 50 mg tablet  valACYclovir (VALTREX) 500 MG tablet        ALLERGIES:  Allergies   Allergen Reactions     Morphine Other (See Comments)     \"make me delirious,\"  \"nightmares\"  Other reaction(s): delirium  \"make me delirious,\"  \"nightmares\"       Adhesive [Mecrylate] Other (See Comments)     Can only tolerate tape for short periods of time shelia in sensitive areas     Amlodipine Unknown and Other (See Comments)     Other reaction(s): delerium     Avalide Other (See Comments)     Other reaction(s): hyponatremia     Doxazosin Other (See Comments)     Other reaction(s): feels foggy     Other Allergy (See Comments) [External Allergen Needs Reconciliation - See Comment] Unknown     Other reaction(s): skin rash     Prednisone Unknown     Caused extremely high blood sugars     Trazodone Unknown     Other reaction(s): hung over       FAMILY HISTORY:  Family History   Problem Relation Age of Onset     Breast Cancer No family hx of        SOCIAL HISTORY:   Social History     Socioeconomic History     Marital status:      Spouse name: Not on file     Number of children: Not on file     Years of education: Not on file     Highest education level: Not on file   Occupational History     Not on file   Tobacco Use     Smoking status: Former Smoker     Quit date: 9/15/2021     Years since quittin.3     Smokeless tobacco: Never Used   Substance and Sexual Activity     Alcohol use: No     Drug use: No     Sexual activity: Not on file   Other Topics Concern     Not on file   Social History Narrative     Not on file     Social Determinants of Health     Financial Resource Strain: Not on file   Food Insecurity: Not on file   Transportation Needs: Not on file   Physical Activity: Not on file   Stress: Not on file   Social Connections: Not on file   Intimate Partner Violence: Not on file   Housing Stability: Not on file       VITALS:  BP (!) 174/131   " Pulse 84   Temp 98.4  F (36.9  C) (Oral)   Resp 18   SpO2 97%     PHYSICAL EXAM      Vitals: BP (!) 174/131   Pulse 84   Temp 98.4  F (36.9  C) (Oral)   Resp 18   SpO2 97%   General: Appears in no acute distress, awake, alert, interactive.  Eyes: Conjunctivae non-injected. Sclera anicteric. Pupils equal.  HENT: Atraumatic. Dry mucus membranes  Neck: Supple.  Respiratory/Chest: Respiration unlabored.  Heart: no murmurs  Abdomen: non distended, soft nontender  Musculoskeletal: Normal extremities. No edema or erythema. No pitting edema to lower extremities  Skin: Normal color. No rash or diaphoresis.  Neurologic: Face symmetric, moves all extremities spontaneously. Speech clear. Alert, follows commands. Confused. Unsure of year or place. Pill rolling 2 fingers.  Psychiatric:Affect appropriate.       LAB:  All pertinent labs reviewed and interpreted.  Results for orders placed or performed during the hospital encounter of 01/20/22   Head CT w/o contrast    Impression    IMPRESSION:  1.  No CT evidence for acute intracranial process.  2.  Brain atrophy and presumed chronic microvascular ischemic changes similar to the prior exam.   Ketone Beta-Hydroxybutyrate Quantitative   Result Value Ref Range    Ketone (Beta-Hydroxybutyrate) Quantitative 0.15 <=0.3 mmol/L   Comprehensive metabolic panel   Result Value Ref Range    Sodium 141 136 - 145 mmol/L    Potassium 3.4 (L) 3.5 - 5.0 mmol/L    Chloride 101 98 - 107 mmol/L    Carbon Dioxide (CO2) 25 22 - 31 mmol/L    Anion Gap 15 5 - 18 mmol/L    Urea Nitrogen 25 8 - 28 mg/dL    Creatinine 1.10 0.60 - 1.10 mg/dL    Calcium 10.9 (H) 8.5 - 10.5 mg/dL    Glucose 155 (H) 70 - 125 mg/dL    Alkaline Phosphatase 115 45 - 120 U/L    AST 21 0 - 40 U/L    ALT 24 0 - 45 U/L    Protein Total 8.4 (H) 6.0 - 8.0 g/dL    Albumin 4.4 3.5 - 5.0 g/dL    Bilirubin Total 0.9 0.0 - 1.0 mg/dL    GFR Estimate 53 (L) >60 mL/min/1.73m2   Blood gas venous   Result Value Ref Range    pH Venous 7.38  7.35 - 7.45    pCO2 Venous 46 35 - 50 mm Hg    pO2 Venous 29 25 - 47 mm Hg    Bicarbonate Venous 24 24 - 30 mmol/L    Base Excess/Deficit (+/-) 1.9   mmol/L    Oxyhemoglobin Venous 56.6 (L) 70.0 - 75.0 %    O2 Sat, Venous 57.5 (L) 70.0 - 75.0 %   Troponin I (now)   Result Value Ref Range    Troponin I <0.01 0.00 - 0.29 ng/mL   Result Value Ref Range    Magnesium 2.1 1.8 - 2.6 mg/dL   Asymptomatic COVID-19 Virus (Coronavirus) by PCR Nasopharyngeal    Specimen: Nasopharyngeal; Swab   Result Value Ref Range    SARS CoV2 PCR Negative Negative   CBC with platelets and differential   Result Value Ref Range    WBC Count 21.0 (H) 4.0 - 11.0 10e3/uL    RBC Count 5.38 (H) 3.80 - 5.20 10e6/uL    Hemoglobin 17.4 (H) 11.7 - 15.7 g/dL    Hematocrit 50.0 (H) 35.0 - 47.0 %    MCV 93 78 - 100 fL    MCH 32.3 26.5 - 33.0 pg    MCHC 34.8 31.5 - 36.5 g/dL    RDW 13.1 10.0 - 15.0 %    Platelet Count 347 150 - 450 10e3/uL    % Neutrophils 83 %    % Lymphocytes 11 %    % Monocytes 5 %    % Eosinophils 0 %    % Basophils 0 %    % Immature Granulocytes 1 %    NRBCs per 100 WBC 0 <1 /100    Absolute Neutrophils 17.5 (H) 1.6 - 8.3 10e3/uL    Absolute Lymphocytes 2.3 0.8 - 5.3 10e3/uL    Absolute Monocytes 1.1 0.0 - 1.3 10e3/uL    Absolute Eosinophils 0.0 0.0 - 0.7 10e3/uL    Absolute Basophils 0.1 0.0 - 0.2 10e3/uL    Absolute Immature Granulocytes 0.1 <=0.4 10e3/uL    Absolute NRBCs 0.0 10e3/uL   Glucose by meter   Result Value Ref Range    GLUCOSE BY METER POCT 273 (H) 70 - 99 mg/dL       RADIOLOGY:  Reviewed all pertinent imaging. Please see official radiology report.  Head CT w/o contrast   Final Result   IMPRESSION:   1.  No CT evidence for acute intracranial process.   2.  Brain atrophy and presumed chronic microvascular ischemic changes similar to the prior exam.      XR Chest Port 1 View    (Results Pending)   CT Abdomen Pelvis w Contrast    (Results Pending)   MR Brain w/o Contrast    (Results Pending)       EKG:    Performed at:  20-JAN-2022 10:45:40    Impression: Sinus rhythm with premature atrial complexes    Rate: 93 bpm  Rhythm: SR with premature atrial  complexes  Axis: 9  KS Interval: 174 ms  QRS Interval: 78 ms  QTc Interval: 467 ms  ST Changes:  Nonspecific ST changes  Comparison: When compared to ECG of 19-SEP-2021 01:51, SR replaces Afib.    I have independently reviewed and interpreted the EKG(s) documented above.    PROCEDURES:   none      I, Rosa Galeas, am serving as a scribe to document services personally performed by Herb Kinsey MD based on my observation and the provider's statements to me. I, Dr. Herb Kinsey, attest that Rosa Galeas is acting in a scribe capacity, has observed my performance of the services and has documented them in accordance with my direction.    Herb Kinsey MD  Emergency Medicine  [facility] EMERGENCY DEPARTMENT  [address] [place] [id number]  [phone]       Herb Kinsey MD  01/20/22 1110

## 2022-01-20 NOTE — PHARMACY-VANCOMYCIN DOSING SERVICE
Pharmacy Vancomycin Initial Note  Date of Service 2022  Patient's  1950  71 year old, female    Indication: Sepsis    Current estimated CrCl = Estimated Creatinine Clearance: 48.2 mL/min (based on SCr of 0.99 mg/dL).    Creatinine for last 3 days  2022: 11:58 AM Creatinine 1.10 mg/dL;  1:43 PM Creatinine 0.99 mg/dL    Recent Vancomycin Level(s) for last 3 days  No results found for requested labs within last 72 hours.      Vancomycin IV Administrations (past 72 hours)      No vancomycin orders with administrations in past 72 hours.                Nephrotoxins and other renal medications (From now, onward)    Start     Dose/Rate Route Frequency Ordered Stop    22 1700  vancomycin (VANCOCIN) 1,500 mg in sodium chloride 0.9 % 250 mL intermittent infusion         1,500 mg  over 90 Minutes Intravenous ONCE 22 1607      22 1330  piperacillin-tazobactam (ZOSYN) 3.375 g vial to attach to  mL bag         3.375 g  over 30 Minutes Intravenous ONCE 22 1309            Contrast Orders - past 72 hours (72h ago, onward)            Start     Dose/Rate Route Frequency Ordered Stop    22 1530  iopamidol (ISOVUE-370) solution 75 mL         75 mL Intravenous ONCE 22 1502 22 1503                Plan:  1. Start vancomycin 1500 mg IV once in ED.   2. Please re-consult pharmacy if vancomycin is to continue inpatient.     Emilia Avila, [facility]

## 2022-01-20 NOTE — ED NOTES
"Spoke with patient's spouse Liam to assist with filling out MRI form. Patient has had episodes similar to this previously, spouse is concerned that patient fell a couple nights ago, last night patient was very lethargic and unable to speak, spouse does not know whether or not she has had a stroke. Patient's behavior has been fluctuating, some days patient is alert and oriented and other times she is unresponsive, lethargic \"in a daze\" and not making sense, states spouse.    "

## 2022-01-20 NOTE — PHARMACY-ADMISSION MEDICATION HISTORY
Pharmacy Note - Admission Medication History    Pertinent Provider Information: At this time patient could not add any details about her home medications.   does not help with meds.  Reviewed filling history and called clinic.  There is no filling history for any short acting insulin or Victoza but left on list as historical information.  Lyrica up to 200 mg TID per filling history and clinic but with current CrCl 30-60 would suggest a 50% dose reduction.  Metoprolol dose increased to match clinic list but caution as unclear compliance at home. Recent amoxicillin 1/10/22 x7 days.     ______________________________________________________________________    Prior To Admission (PTA) med list completed and updated in EMR.       PTA Med List   Medication Sig Note Last Dose     acetaminophen (TYLENOL) 500 MG tablet Take 1,000 mg by mouth 2 times daily   Unknown at Unknown time     acetaminophen-codeine (TYLENOL #3) 300-30 MG tablet Take 1 tablet by mouth every 6 hours as needed for severe pain  Unknown at Unknown time     albuterol (PROAIR HFA/PROVENTIL HFA/VENTOLIN HFA) 108 (90 Base) MCG/ACT inhaler Inhale 1-2 puffs into the lungs every 6 hours  Unknown at Unknown time     amoxicillin (AMOXIL) 500 MG tablet Take 2,000 mg by mouth as needed Before dental appointments  Unknown at Unknown time     ARIPiprazole (ABILIFY) 2 MG tablet Take 2 mg by mouth daily   Unknown at Unknown time     cetirizine (ZYRTEC) 10 MG tablet Take 10 mg by mouth daily   Unknown at Unknown time     cholecalciferol (VITAMIN D3) 25 mcg (1000 units) capsule Take 1,000 Units by mouth daily   Unknown at Unknown time     cloNIDine (CATAPRES-TTS) 0.3 mg/24 hr Place 1 patch onto the skin once a week   Unknown at Unknown time     cyanocobalamin (VITAMIN B-12) 1000 MCG tablet Take 1,000 mcg by mouth daily  Unknown at Unknown time     diclofenac sodium (VOLTAREN) 1 % Gel [DICLOFENAC SODIUM (VOLTAREN) 1 % GEL] Apply 1 g topically daily as needed.   Unknown at Unknown time     docusate sodium (COLACE) 100 MG capsule Take 200 mg by mouth 2 times daily  Unknown at Unknown time     DULoxetine (CYMBALTA) 30 MG capsule Take 30 mg by mouth daily  1/20/2022: Filling but per clinic notes she is not taking Unknown at Unknown time     estradiol (ESTRACE) 1 MG tablet Take 1 mg by mouth daily   Unknown at Unknown time     insulin aspart (NOVOLOG) 100 unit/mL injection Inject 6-15 Units Subcutaneous 3 times daily (with meals)  1/20/2022: No fill history seen for any short acting insulin Unknown at Unknown time     insulin degludec (TRESIBA FLEXTOUCH) 100 UNIT/ML pen Inject 24 Units Subcutaneous daily  Unknown at Unknown time     liraglutide (VICTOZA 2-TANNER) 0.6 mg/0.1 mL (18 mg/3 mL) PnIj injection [LIRAGLUTIDE (VICTOZA 2-TANNER) 0.6 MG/0.1 ML (18 MG/3 ML) PNIJ INJECTION] Inject 1.2 mg under the skin daily. Not available from inpatient hospital pharmacy - OK for patient to use own supply 1/20/2022: Clinic list has active but no fill history seen Unknown at Unknown time     magnesium oxide (MAG-OX) 400 MG tablet Take 400 mg by mouth daily   Unknown at Unknown time     metoprolol succinate ER (TOPROL-XL) 100 MG 24 hr tablet Take 200 mg by mouth every morning  Unknown at Unknown time     metoprolol succinate ER (TOPROL-XL) 100 MG 24 hr tablet Take 150 mg by mouth At Bedtime  Unknown at Unknown time     multivitamin w/minerals (THERA-VIT-M) tablet Take 1 tablet by mouth daily  Unknown at Unknown time     olmesartan (BENICAR) 40 MG tablet [OLMESARTAN (BENICAR) 40 MG TABLET] Take 40 mg by mouth daily.  Unknown at Unknown time     omeprazole (PRILOSEC) 20 MG capsule [OMEPRAZOLE (PRILOSEC) 20 MG CAPSULE] Take 1 capsule by mouth daily before breakfast.  Unknown at Unknown time     Pregabalin (LYRICA) 200 MG capsule Take 200 mg by mouth 3 times daily  Unknown at Unknown time     tiZANidine (ZANAFLEX) 2 MG tablet [TIZANIDINE (ZANAFLEX) 2 MG TABLET] Take 4 mg by mouth bedtime.   Unknown at  Unknown time     traMADol (ULTRAM) 50 mg tablet [TRAMADOL (ULTRAM) 50 MG TABLET] Take 100 mg by mouth 2 (two) times a day.   Unknown at Unknown time     valACYclovir (VALTREX) 500 MG tablet Take 500 mg by mouth daily As needed  Unknown at Unknown time       Information source(s): Family member, Clinic records, Hospital records and CareEverywhere/SureScripts  Method of interview communication: N/A    Summary of Changes to PTA Med List  New: none  Discontinued: per pulm note not using any scheduled inhalers at this time so Spiriva removed  Changed: Lyrica, metoprolol    Compliance and list accuracy are unknown/unclear.    The information provided in this note is only as accurate as the sources available at the time of the update(s).    Thank you for the opportunity to participate in the care of this patient.    Marly Galindo [facility]  1/20/2022 2:43 PM

## 2022-01-20 NOTE — ED TRIAGE NOTES
"-Patient arrives from private residence via EMS with complaints of hyperglycemia and altered mental status. EMS reports patient's glucometer read \"HIGH\" before bed, checked this morning and glucometer read over 400,  gave her insulin, EMS recheck was 338.  reported to EMS that patient was confused and answering \"yes\" to most questions. Patient was able to walk from apartment to ambulance with stand by assist. Blood sugar upon ED arrival is 273.  "

## 2022-01-20 NOTE — H&P
ADMISSION HISTORY & PHYSICAL      Scar Tan, [phone]  ASSESSMENT AND PLAN:  71 year old female with a history of GERD, DM 2, HTN, diverticulitis, encephalopathy/delirium, atrial fibrillation-not on anticoagulation, COPD, marijuana use, mood disorder, SBO, chronic pain who presents with acute encephalopathy.     Acute metabolic encephalopathy   CT head reviewed and negative   R/O stroke. MRI brain pending.  Only neurologic deficit is speech difficulty.  Able to answer yes, no and okay to questions.  Time of onset 1/19 in the evening.  Out of tPA range if is acute CVA.   Possibly delirium in the setting of underlying vascular dementia.  Has a history of delirium and prior admission   Consult neurology for input   OT-SLUMS   Possible need for placement?   Sepsis work-up: White count elevated, chest x-ray pending, urinalysis negative for UTI, lactic acid elevated.  No obvious signs of infection at this time.  Status post 1 dose Zosyn in the emergency department.  Blood cultures also obtained.  COVID-negative.  CT abdomen pelvis with no evidence of acute infection.   Cardiac work-up: EKG reviewed with no evidence of ST elevation or T wave inversions.  Troponin negative x1   Delirium order set   Psychiatry to see    Mild acute kidney insufficiency   Resolved with IV fluids    Hypokalemia   Likely secondary to poor p.o. intake   Optimized and trend    Diabetes type 2 with hyperglycemia   No evidence of DKA   Per pharmacy report patient has not been filling any insulin, Victoza or Tresiba which are listed in the med rec.   Diabetes order set.  Last A1c of 8.6 on 1/11/2022.  Sliding scale insulin.  Adjust as needed    Essential hypertension-currently elevated   Reportedly on clonidine patch, metoprolol, olmesartan   Holding clonidine patch.  Decreased dose of metoprolol ordered, on losartan switched to losartan.  Adjust as needed    History of atrial fibrillation   Diagnosed during last admission with SBO.  "    Was initiated on metoprolol  mg twice daily on last discharge note.  No anticoagulation was ordered.    Chronic pain syndrome   Holding home tizanidine, Lyrica and tramadol due to current altered mental status.    GERD-continue home omeprazole    Mood disorder-continue Abilify    COPD-no evidence of acute exacerbation   Continue home inhaler    Code Status: Unclear.  Full code by default.  Attempted to discuss with patient but unable to obtain answer due to clinical status.  Called  and also attempted to discuss but has been also seems confused.  Notes that there is paperwork \"somewhere\".  Unable to give me decision regarding CODE STATUS.  DVT prophylaxis: lovenox     Disposition:  -Anticipated Length of Stay in midnights and medical necessity (including a midnight in the Emergency Department after triage if applicable): 2 midnights  -Discharge barriers: Improvement in encephalopathy, rule out sepsis and rule out stroke      CHIEF COMPLAINT:  Altered mental status    HISTORY OF PRESENTING ILLNESS:  Patient is a 71 year old female with history significant for GERD, DM 2, HTN, diverticulitis, encephalopathy/delirium, atrial fibrillation-not on anticoagulation, COPD, marijuana use, mood disorder, SBO, chronic pain who presents with acute encephalopathy.  Patient is a poor historian as she is only able to answer yes and sometimes no and okay.  History is obtained through the  who is also a poor historian and the electronic medical record.  Patient's  notes that the patient started not acting like herself yesterday evening around dinnertime.  He notes he would tell her to do things and she would say yes and then walk away.  He noted that she seemed to be staring off and not following commands.  He checked her blood glucose and found it to be very high.  Apparently a neighbor came by and helped him administer some insulin and helped lower her blood glucose to the mid 300s.  Has been also " "notes that patient wet the bed overnight.  At some point EMS was called and presented the patient to the hospital.  On my evaluation, patient is in no respiratory distress.  She is resting comfortably in bed.  She answers yes to having pain all over her body including arms, head, chest, abdomen.  She answers okay to some questions.  It is unclear what is bothering her as every question I asked her is answered with yes.  I am concerned she may have had a stroke or may have recurrence of her delirium.  ROS limited secondary patient's clinical status    PMH/PSH:  Patient Active Problem List   Diagnosis     Benign essential hypertension     Taking Female Hormones For Postmenopausal HRT     Type 2 Diabetes Mellitus     Leukocytosis     Nicotine Dependence     Hyperthyroidism     Back Pain     Intractable headache     Knee osteoarthritis     HTN     KP on CPAP     GERD (gastroesophageal reflux disease)     Mood disorder (H)     Status post total knee replacement     Acute blood loss anemia     Spondylisthesis     Post-op pain     Accelerated hypertension     Altered mental state     Malignant hypertensive urgency     Headache     N&V (nausea and vomiting)     KP (obstructive sleep apnea)     Hypertensive emergency     Acute diverticulitis     Hyponatremia     Leukocytosis, unspecified type     Acute cystitis without hematuria     DM2     Small bowel obstruction (H)     Pneumatosis intestinalis     Hypokalemia     SIRS (systemic inflammatory response syndrome) (H)     Metabolic encephalopathy     Delirium     Atrial fibrillation with RVR (H)     JACKSON (acute kidney injury) (H)     DKA (diabetic ketoacidoses)     Lactic acidosis     COPD (chronic obstructive pulmonary disease) (H)     Hypernatremia     Hypomagnesemia     Confusion       ALLERGIES:  Allergies   Allergen Reactions     Morphine Other (See Comments)     \"make me delirious,\"  \"nightmares\"  Other reaction(s): delirium  \"make me delirious,\"  \"nightmares\"       " Adhesive [Mecrylate] Other (See Comments)     Can only tolerate tape for short periods of time shelia in sensitive areas     Amlodipine Unknown and Other (See Comments)     Other reaction(s): delerium     Avalide Other (See Comments)     Other reaction(s): hyponatremia     Doxazosin Other (See Comments)     Other reaction(s): feels foggy     Other Allergy (See Comments) [External Allergen Needs Reconciliation - See Comment] Unknown     Other reaction(s): skin rash     Prednisone Unknown     Caused extremely high blood sugars     Trazodone Unknown     Other reaction(s): hung over       MEDICATIONS:  Reviewed.  Current Facility-Administered Medications   Medication     acetaminophen (TYLENOL) tablet 650 mg    Or     acetaminophen (TYLENOL) Suppository 650 mg     albuterol (PROVENTIL HFA/VENTOLIN HFA) inhaler     ARIPiprazole (ABILIFY) tablet 2 mg     benztropine (COGENTIN) tablet 1 mg     enoxaparin ANTICOAGULANT (LOVENOX) injection 40 mg     estradiol (ESTRACE) tablet 1 mg     hydrALAZINE (APRESOLINE) injection 10 mg     lactated ringers BOLUS 1,000 mL     lidocaine (LMX4) cream     lidocaine 1 % 0.1-1 mL     losartan (COZAAR) tablet 25 mg     melatonin tablet 1 mg     metoprolol succinate ER (TOPROL-XL) 24 hr tablet 100 mg     [START ON 1/21/2022] omeprazole (priLOSEC) CR capsule 20 mg     ondansetron (ZOFRAN-ODT) ODT tab 4 mg    Or     ondansetron (ZOFRAN) injection 4 mg     piperacillin-tazobactam (ZOSYN) 3.375 g vial to attach to  mL bag     polyethylene glycol (MIRALAX) Packet 17 g     [Held by provider] pregabalin (LYRICA) capsule 100 mg     QUEtiapine (SEROquel) half-tab 12.5 mg     senna-docusate (SENOKOT-S/PERICOLACE) 8.6-50 MG per tablet 1 tablet    Or     senna-docusate (SENOKOT-S/PERICOLACE) 8.6-50 MG per tablet 2 tablet     sodium chloride (PF) 0.9% PF flush 3 mL     sodium chloride (PF) 0.9% PF flush 3 mL     [Held by provider] tiZANidine (ZANAFLEX) tablet 4 mg     [Held by provider] traMADol  "(ULTRAM) tablet 100 mg     Current Outpatient Medications   Medication     acetaminophen (TYLENOL) 500 MG tablet     acetaminophen-codeine (TYLENOL #3) 300-30 MG tablet     albuterol (PROAIR HFA/PROVENTIL HFA/VENTOLIN HFA) 108 (90 Base) MCG/ACT inhaler     amoxicillin (AMOXIL) 500 MG tablet     ARIPiprazole (ABILIFY) 2 MG tablet     cetirizine (ZYRTEC) 10 MG tablet     cholecalciferol (VITAMIN D3) 25 mcg (1000 units) capsule     cloNIDine (CATAPRES-TTS) 0.3 mg/24 hr     cyanocobalamin (VITAMIN B-12) 1000 MCG tablet     diclofenac sodium (VOLTAREN) 1 % Gel     docusate sodium (COLACE) 100 MG capsule     DULoxetine (CYMBALTA) 30 MG capsule     estradiol (ESTRACE) 1 MG tablet     insulin aspart (NOVOLOG) 100 unit/mL injection     insulin degludec (TRESIBA FLEXTOUCH) 100 UNIT/ML pen     liraglutide (VICTOZA 2-TANNER) 0.6 mg/0.1 mL (18 mg/3 mL) PnIj injection     magnesium oxide (MAG-OX) 400 MG tablet     metoprolol succinate ER (TOPROL-XL) 100 MG 24 hr tablet     metoprolol succinate ER (TOPROL-XL) 100 MG 24 hr tablet     multivitamin w/minerals (THERA-VIT-M) tablet     olmesartan (BENICAR) 40 MG tablet     omeprazole (PRILOSEC) 20 MG capsule     Pregabalin (LYRICA) 200 MG capsule     tiZANidine (ZANAFLEX) 2 MG tablet     traMADol (ULTRAM) 50 mg tablet     valACYclovir (VALTREX) 500 MG tablet     blood sugar diagnostic (CONTOUR TEST STRIPS) Strp     Continuous Blood Gluc  (DEXCOM G6 ) TORRIE     Continuous Blood Gluc Sensor (DEXCOM G6 SENSOR) MISC     Continuous Blood Gluc Transmit (DEXCOM G6 TRANSMITTER) MISC     Glucagon, rDNA, (GLUCAGON EMERGENCY) 1 MG KIT     glucose (BD GLUCOSE) 5 g chewable tablet     medical cannabis (Patient's own supply)     NOVOFINE 32 32 gauge x 1/4\" Ndle       SOCIAL HISTORY:  Social History     Socioeconomic History     Marital status:      Spouse name: Not on file     Number of children: Not on file     Years of education: Not on file     Highest education level: Not on " file   Occupational History     Not on file   Tobacco Use     Smoking status: Former Smoker     Quit date: 9/15/2021     Years since quittin.3     Smokeless tobacco: Never Used   Substance and Sexual Activity     Alcohol use: No     Drug use: No     Sexual activity: Not on file   Other Topics Concern     Not on file   Social History Narrative     Not on file     Social Determinants of Health     Financial Resource Strain: Not on file   Food Insecurity: Not on file   Transportation Needs: Not on file   Physical Activity: Not on file   Stress: Not on file   Social Connections: Not on file   Intimate Partner Violence: Not on file   Housing Stability: Not on file       FAMILY HISTORY:  Family History   Problem Relation Age of Onset     Breast Cancer No family hx of      Reviewed and non contributory     ROS:  ROS limited secondary to patient's clinical status.    PHYSICAL EXAM:  BP (!) 157/87   Pulse 88   Temp 98.4  F (36.9  C) (Oral)   Resp 26   SpO2 96%   No intake/output data recorded.  No intake/output data recorded.  GENRL: Alert.  Answering yes and okay to all my questions.  Unable to assess orientation.  No respiratory distress. Lying in bed   HEENT: PERRL. EOM intact. no lymphadenopathy or thyromegaly  CHEST: Clear to auscultation bilaterally. No wheezes, rhonchi or crackles. Breathing easily   HEART: Regular rate and rhythm, S1S2 auscultated. No murmurs  ABDMN: Soft. Non-tender, non-distended. No organomegaly. No guarding or rigidity. Bowel sounds present   EXTRM: No pedal edema, DP pulses 2+.   NEURO: Following commands.   strength is equal but weak bilaterally.  Lower extremities are also weak but she is able to lift both legs off the bed and wiggle her toes.    PSYCH: Unable to fully assess.    INTGM: No skin rash, no cyanosis or clubbing    DIAGNOSTIC DATA:  Recent Results (from the past 24 hour(s))   Glucose by meter    Collection Time: 22 10:33 AM   Result Value Ref Range    GLUCOSE BY  METER POCT 273 (H) 70 - 99 mg/dL   ECG 12-LEAD WITH MUSE (LHE)    Collection Time: 01/20/22 10:45 AM   Result Value Ref Range    Systolic Blood Pressure 193 mmHg    Diastolic Blood Pressure 98 mmHg    Ventricular Rate 93 BPM    Atrial Rate 93 BPM    TN Interval 174 ms    QRS Duration 78 ms     ms    QTc 467 ms    P Axis 58 degrees    R AXIS 9 degrees    T Axis 96 degrees    Interpretation ECG       Sinus rhythm with Premature atrial complexes  Nonspecific ST and T wave abnormality  Abnormal ECG  When compared with ECG of 20-JAN-2022 10:45,  Premature atrial complexes are now Present  Confirmed by SEE ED PROVIDER NOTE FOR, ECG INTERPRETATION ([id number]),  JULY MEJIA ([id number]) on 1/20/2022 2:37:44 PM     Ketone Beta-Hydroxybutyrate Quantitative    Collection Time: 01/20/22 11:58 AM   Result Value Ref Range    Ketone (Beta-Hydroxybutyrate) Quantitative 0.15 <=0.3 mmol/L   Comprehensive metabolic panel    Collection Time: 01/20/22 11:58 AM   Result Value Ref Range    Sodium 141 136 - 145 mmol/L    Potassium 3.4 (L) 3.5 - 5.0 mmol/L    Chloride 101 98 - 107 mmol/L    Carbon Dioxide (CO2) 25 22 - 31 mmol/L    Anion Gap 15 5 - 18 mmol/L    Urea Nitrogen 25 8 - 28 mg/dL    Creatinine 1.10 0.60 - 1.10 mg/dL    Calcium 10.9 (H) 8.5 - 10.5 mg/dL    Glucose 155 (H) 70 - 125 mg/dL    Alkaline Phosphatase 115 45 - 120 U/L    AST 21 0 - 40 U/L    ALT 24 0 - 45 U/L    Protein Total 8.4 (H) 6.0 - 8.0 g/dL    Albumin 4.4 3.5 - 5.0 g/dL    Bilirubin Total 0.9 0.0 - 1.0 mg/dL    GFR Estimate 53 (L) >60 mL/min/1.73m2   Blood gas venous    Collection Time: 01/20/22 11:58 AM   Result Value Ref Range    pH Venous 7.38 7.35 - 7.45    pCO2 Venous 46 35 - 50 mm Hg    pO2 Venous 29 25 - 47 mm Hg    Bicarbonate Venous 24 24 - 30 mmol/L    Base Excess/Deficit (+/-) 1.9   mmol/L    Oxyhemoglobin Venous 56.6 (L) 70.0 - 75.0 %    O2 Sat, Venous 57.5 (L) 70.0 - 75.0 %   Troponin I (now)    Collection Time: 01/20/22 11:58 AM   Result Value  Ref Range    Troponin I <0.01 0.00 - 0.29 ng/mL   Magnesium    Collection Time: 01/20/22 11:58 AM   Result Value Ref Range    Magnesium 2.1 1.8 - 2.6 mg/dL   Asymptomatic COVID-19 Virus (Coronavirus) by PCR Nasopharyngeal    Collection Time: 01/20/22 11:58 AM    Specimen: Nasopharyngeal; Swab   Result Value Ref Range    SARS CoV2 PCR Negative Negative   CBC with platelets and differential    Collection Time: 01/20/22 11:58 AM   Result Value Ref Range    WBC Count 21.0 (H) 4.0 - 11.0 10e3/uL    RBC Count 5.38 (H) 3.80 - 5.20 10e6/uL    Hemoglobin 17.4 (H) 11.7 - 15.7 g/dL    Hematocrit 50.0 (H) 35.0 - 47.0 %    MCV 93 78 - 100 fL    MCH 32.3 26.5 - 33.0 pg    MCHC 34.8 31.5 - 36.5 g/dL    RDW 13.1 10.0 - 15.0 %    Platelet Count 347 150 - 450 10e3/uL    % Neutrophils 83 %    % Lymphocytes 11 %    % Monocytes 5 %    % Eosinophils 0 %    % Basophils 0 %    % Immature Granulocytes 1 %    NRBCs per 100 WBC 0 <1 /100    Absolute Neutrophils 17.5 (H) 1.6 - 8.3 10e3/uL    Absolute Lymphocytes 2.3 0.8 - 5.3 10e3/uL    Absolute Monocytes 1.1 0.0 - 1.3 10e3/uL    Absolute Eosinophils 0.0 0.0 - 0.7 10e3/uL    Absolute Basophils 0.1 0.0 - 0.2 10e3/uL    Absolute Immature Granulocytes 0.1 <=0.4 10e3/uL    Absolute NRBCs 0.0 10e3/uL   Lactic acid whole blood    Collection Time: 01/20/22  1:42 PM   Result Value Ref Range    Lactic Acid 2.8 (H) 0.7 - 2.0 mmol/L   Ammonia (on ice)    Collection Time: 01/20/22  1:43 PM   Result Value Ref Range    Ammonia 27 11 - 35 umol/L   Creatinine    Collection Time: 01/20/22  1:43 PM   Result Value Ref Range    Creatinine 0.99 0.60 - 1.10 mg/dL    GFR Estimate 60 (L) >60 mL/min/1.73m2   Glucose by meter    Collection Time: 01/20/22  2:14 PM   Result Value Ref Range    GLUCOSE BY METER POCT 84 70 - 99 mg/dL     All lab studies reviewed personally  Radiology report reviewed.      Merline Phelps DO  [facility]  [phone]

## 2022-01-21 ENCOUNTER — APPOINTMENT (OUTPATIENT)
Dept: NEUROLOGY | Facility: HOSPITAL | Age: 72
DRG: 092 | End: 2022-01-21
Attending: INTERNAL MEDICINE
Payer: COMMERCIAL

## 2022-01-21 ENCOUNTER — DOCUMENTATION ONLY (OUTPATIENT)
Dept: OTHER | Facility: CLINIC | Age: 72
End: 2022-01-21
Payer: COMMERCIAL

## 2022-01-21 ENCOUNTER — APPOINTMENT (OUTPATIENT)
Dept: NEUROLOGY | Facility: HOSPITAL | Age: 72
DRG: 092 | End: 2022-01-21
Attending: PSYCHIATRY & NEUROLOGY
Payer: COMMERCIAL

## 2022-01-21 ENCOUNTER — APPOINTMENT (OUTPATIENT)
Dept: OCCUPATIONAL THERAPY | Facility: HOSPITAL | Age: 72
DRG: 092 | End: 2022-01-21
Attending: INTERNAL MEDICINE
Payer: COMMERCIAL

## 2022-01-21 LAB
ALBUMIN SERPL-MCNC: 3.4 G/DL (ref 3.5–5)
ALP SERPL-CCNC: 74 U/L (ref 45–120)
ALT SERPL W P-5'-P-CCNC: 17 U/L (ref 0–45)
ANION GAP SERPL CALCULATED.3IONS-SCNC: 11 MMOL/L (ref 5–18)
AST SERPL W P-5'-P-CCNC: 21 U/L (ref 0–40)
BILIRUB SERPL-MCNC: 1.2 MG/DL (ref 0–1)
BUN SERPL-MCNC: 26 MG/DL (ref 8–28)
CALCIUM SERPL-MCNC: 9.5 MG/DL (ref 8.5–10.5)
CHLORIDE BLD-SCNC: 105 MMOL/L (ref 98–107)
CO2 SERPL-SCNC: 24 MMOL/L (ref 22–31)
CREAT SERPL-MCNC: 0.76 MG/DL (ref 0.6–1.1)
ERYTHROCYTE [DISTWIDTH] IN BLOOD BY AUTOMATED COUNT: 13.6 % (ref 10–15)
GFR SERPL CREATININE-BSD FRML MDRD: 83 ML/MIN/1.73M2
GLUCOSE BLD-MCNC: 161 MG/DL (ref 70–125)
GLUCOSE BLDC GLUCOMTR-MCNC: 220 MG/DL (ref 70–99)
GLUCOSE BLDC GLUCOMTR-MCNC: 222 MG/DL (ref 70–99)
GLUCOSE BLDC GLUCOMTR-MCNC: 242 MG/DL (ref 70–99)
GLUCOSE BLDC GLUCOMTR-MCNC: 275 MG/DL (ref 70–99)
HCT VFR BLD AUTO: 43.7 % (ref 35–47)
HGB BLD-MCNC: 14.7 G/DL (ref 11.7–15.7)
MCH RBC QN AUTO: 31.8 PG (ref 26.5–33)
MCHC RBC AUTO-ENTMCNC: 33.6 G/DL (ref 31.5–36.5)
MCV RBC AUTO: 95 FL (ref 78–100)
PLATELET # BLD AUTO: 260 10E3/UL (ref 150–450)
POTASSIUM BLD-SCNC: 3.2 MMOL/L (ref 3.5–5)
PROT SERPL-MCNC: 6.9 G/DL (ref 6–8)
RBC # BLD AUTO: 4.62 10E6/UL (ref 3.8–5.2)
SODIUM SERPL-SCNC: 140 MMOL/L (ref 136–145)
WBC # BLD AUTO: 18.4 10E3/UL (ref 4–11)

## 2022-01-21 PROCEDURE — 999N000203 HC STATISTICAL VASC ACCESS NURSE TIME, 16-31 MINUTES

## 2022-01-21 PROCEDURE — 250N000012 HC RX MED GY IP 250 OP 636 PS 637: Performed by: INTERNAL MEDICINE

## 2022-01-21 PROCEDURE — 97530 THERAPEUTIC ACTIVITIES: CPT | Mod: GO

## 2022-01-21 PROCEDURE — 85027 COMPLETE CBC AUTOMATED: CPT | Performed by: INTERNAL MEDICINE

## 2022-01-21 PROCEDURE — 95720 EEG PHY/QHP EA INCR W/VEEG: CPT | Performed by: PSYCHIATRY & NEUROLOGY

## 2022-01-21 PROCEDURE — 999N000127 HC STATISTIC PERIPHERAL IV START W US GUIDANCE

## 2022-01-21 PROCEDURE — 36415 COLL VENOUS BLD VENIPUNCTURE: CPT | Performed by: INTERNAL MEDICINE

## 2022-01-21 PROCEDURE — 80053 COMPREHEN METABOLIC PANEL: CPT | Performed by: INTERNAL MEDICINE

## 2022-01-21 PROCEDURE — 99233 SBSQ HOSP IP/OBS HIGH 50: CPT | Performed by: FAMILY MEDICINE

## 2022-01-21 PROCEDURE — 99223 1ST HOSP IP/OBS HIGH 75: CPT | Mod: 25 | Performed by: PSYCHIATRY & NEUROLOGY

## 2022-01-21 PROCEDURE — 250N000013 HC RX MED GY IP 250 OP 250 PS 637: Performed by: INTERNAL MEDICINE

## 2022-01-21 PROCEDURE — 97166 OT EVAL MOD COMPLEX 45 MIN: CPT | Mod: GO

## 2022-01-21 PROCEDURE — 250N000011 HC RX IP 250 OP 636: Performed by: INTERNAL MEDICINE

## 2022-01-21 PROCEDURE — 250N000011 HC RX IP 250 OP 636: Performed by: PSYCHIATRY & NEUROLOGY

## 2022-01-21 PROCEDURE — 95813 EEG EXTND MNTR 61-119 MIN: CPT

## 2022-01-21 PROCEDURE — 200N000001 HC R&B ICU

## 2022-01-21 PROCEDURE — 99222 1ST HOSP IP/OBS MODERATE 55: CPT | Performed by: NURSE PRACTITIONER

## 2022-01-21 PROCEDURE — 95714 VEEG EA 12-26 HR UNMNTR: CPT

## 2022-01-21 PROCEDURE — 258N000003 HC RX IP 258 OP 636: Performed by: PSYCHIATRY & NEUROLOGY

## 2022-01-21 PROCEDURE — 97535 SELF CARE MNGMENT TRAINING: CPT | Mod: GO

## 2022-01-21 PROCEDURE — 250N000011 HC RX IP 250 OP 636: Performed by: FAMILY MEDICINE

## 2022-01-21 RX ORDER — LORAZEPAM 2 MG/ML
0.5 INJECTION INTRAMUSCULAR EVERY 4 HOURS PRN
Status: DISCONTINUED | OUTPATIENT
Start: 2022-01-21 | End: 2022-01-26 | Stop reason: HOSPADM

## 2022-01-21 RX ADMIN — PANTOPRAZOLE SODIUM 40 MG: 20 TABLET, DELAYED RELEASE ORAL at 08:04

## 2022-01-21 RX ADMIN — ALBUTEROL SULFATE 2 PUFF: 90 AEROSOL, METERED RESPIRATORY (INHALATION) at 20:37

## 2022-01-21 RX ADMIN — INSULIN ASPART 2 UNITS: 100 INJECTION, SOLUTION INTRAVENOUS; SUBCUTANEOUS at 08:02

## 2022-01-21 RX ADMIN — ALBUTEROL SULFATE 2 PUFF: 90 AEROSOL, METERED RESPIRATORY (INHALATION) at 17:35

## 2022-01-21 RX ADMIN — LEVETIRACETAM 1000 MG: 100 INJECTION, SOLUTION INTRAVENOUS at 17:50

## 2022-01-21 RX ADMIN — LORAZEPAM 0.5 MG: 2 INJECTION INTRAMUSCULAR; INTRAVENOUS at 10:49

## 2022-01-21 RX ADMIN — LOSARTAN POTASSIUM 25 MG: 25 TABLET, FILM COATED ORAL at 08:04

## 2022-01-21 RX ADMIN — ARIPIPRAZOLE 2 MG: 2 TABLET ORAL at 08:04

## 2022-01-21 RX ADMIN — HYDRALAZINE HYDROCHLORIDE 10 MG: 20 INJECTION, SOLUTION INTRAMUSCULAR; INTRAVENOUS at 22:39

## 2022-01-21 RX ADMIN — ACYCLOVIR SODIUM 600 MG: 500 INJECTION, SOLUTION INTRAVENOUS at 18:19

## 2022-01-21 RX ADMIN — ONDANSETRON 4 MG: 4 TABLET, ORALLY DISINTEGRATING ORAL at 09:12

## 2022-01-21 RX ADMIN — POLYETHYLENE GLYCOL 3350 17 G: 17 POWDER, FOR SOLUTION ORAL at 08:05

## 2022-01-21 RX ADMIN — LEVETIRACETAM 500 MG: 100 INJECTION, SOLUTION INTRAVENOUS at 17:35

## 2022-01-21 RX ADMIN — ESTRADIOL 1 MG: 1 TABLET ORAL at 08:05

## 2022-01-21 RX ADMIN — ACETAMINOPHEN 650 MG: 325 TABLET ORAL at 20:35

## 2022-01-21 RX ADMIN — INSULIN ASPART 2 UNITS: 100 INJECTION, SOLUTION INTRAVENOUS; SUBCUTANEOUS at 12:28

## 2022-01-21 RX ADMIN — INSULIN ASPART 3 UNITS: 100 INJECTION, SOLUTION INTRAVENOUS; SUBCUTANEOUS at 17:49

## 2022-01-21 RX ADMIN — ALBUTEROL SULFATE 2 PUFF: 90 AEROSOL, METERED RESPIRATORY (INHALATION) at 09:12

## 2022-01-21 RX ADMIN — METOPROLOL SUCCINATE 100 MG: 25 TABLET, EXTENDED RELEASE ORAL at 18:08

## 2022-01-21 RX ADMIN — ENOXAPARIN SODIUM 40 MG: 40 INJECTION SUBCUTANEOUS at 14:09

## 2022-01-21 RX ADMIN — METOPROLOL SUCCINATE 100 MG: 25 TABLET, EXTENDED RELEASE ORAL at 08:04

## 2022-01-21 ASSESSMENT — ACTIVITIES OF DAILY LIVING (ADL)
ADLS_ACUITY_SCORE: 11
ADLS_ACUITY_SCORE: 13
ADLS_ACUITY_SCORE: 13
ADLS_ACUITY_SCORE: 8
ADLS_ACUITY_SCORE: 11
ADLS_ACUITY_SCORE: 11
ADLS_ACUITY_SCORE: 8
ADLS_ACUITY_SCORE: 8
ADLS_ACUITY_SCORE: 11
ADLS_ACUITY_SCORE: 13
ADLS_ACUITY_SCORE: 13
ADLS_ACUITY_SCORE: 11
ADLS_ACUITY_SCORE: 13
ADLS_ACUITY_SCORE: 11
ADLS_ACUITY_SCORE: 8
ADLS_ACUITY_SCORE: 11
ADLS_ACUITY_SCORE: 11
ADLS_ACUITY_SCORE: 8
ADLS_ACUITY_SCORE: 11
ADLS_ACUITY_SCORE: 8
ADLS_ACUITY_SCORE: 13
ADLS_ACUITY_SCORE: 11
PREVIOUS_RESPONSIBILITIES: MEDICATION MANAGEMENT;FINANCES

## 2022-01-21 NOTE — PROGRESS NOTES
Extended bedside EEG was performed that included photic, auditory, and sensory stimulation. Hyperventilation was not possible given the patient's clinical state.   EEG # .  EEG [id number] system used.

## 2022-01-21 NOTE — PROGRESS NOTES
"CLINICAL NUTRITION SERVICES - ASSESSMENT NOTE     Nutrition Prescription    RECOMMENDATIONS FOR MDs/PROVIDERS TO ORDER:  None at this time    Malnutrition Status:    Does not meet criteria with current information    Recommendations already ordered by Registered Dietitian (RD):  None at this time    Future/Additional Recommendations:  Monitor po intake - adequacy and quality, diet ed needs     REASON FOR ASSESSMENT  Paige Mari is a/an 71 year old female assessed by the dietitian for Admission Nutrition Risk Screen for unsure of weight loss, not eating poorly    Per chart, pt with hyperglycemia, AMS, acute metabolic encephalopathy, possible seizure disorder. Leukocytosis, DM2, htn, a fib hisotry, chronic pain syndrome, gerd, mood disorder, copd    NUTRITION HISTORY  Pt is busy receiving cares    CURRENT NUTRITION ORDERS  Diet: 60 gm cho per meal  Intake/Tolerance: nothing recorded yet    LABS  Labs reviewed  FSB, 220    MEDICATIONS  Medications reviewed  Novolog, pantoprazole, miralax    ANTHROPOMETRICS  Height: 5' 1\"  Most Recent Weight: 164 lb (22)  IBW: 47.7 kg  BMI: 30.4  BMI: Obesity Grade I BMI 30-34.9  Weight History:   Wt Readings from Last 3 Encounters:   22 74.6 kg (164 lb 8 oz)   21 73.3 kg (161 lb 9.6 oz)   10/06/21 76 kg (167 lb 9.6 oz)       Dosing Weight: 54.4 kg    ASSESSED NUTRITION NEEDS  Estimated Energy Needs: 1360 - 1525 kcals/day (25-28 kcal/kg)  Justification: Obese  Estimated Protein Needs: 55 grams protein/day (1 gm/kg)  Justification: Maintenance  Estimated Fluid Needs: 4397-8626 mL/day (1 mL/kcal)   Justification: Maintenance    PHYSICAL FINDINGS  See malnutrition section below.  Per chart,  Skin- intact  +2 bilateral VICKY  GI - wdl    MALNUTRITION:  % Weight Loss:  None noted  % Intake:  No decreased intake noted  Subcutaneous Fat Loss:  Unable to assess  Muscle Loss:  Unable to assess  Fluid Retention:  Mild to moderate    Malnutrition Diagnosis: Patient does " not meet two of the above criteria necessary for diagnosing malnutrition      NUTRITION DIAGNOSIS  Altered nutrition-related laboratory values related to DM as evidenced by elevated BG    INTERVENTIONS  None at this time    Goals  Meet estimated nutrition needs  >75% meals  BG >70 <180 mg/dL     Monitoring/Evaluation  Progress toward goals will be monitored and evaluated per protocol.

## 2022-01-21 NOTE — PROGRESS NOTES
Occupational Therapy      01/21/22 0730   Quick Adds   Type of Visit Initial Occupational Therapy Evaluation   Living Environment   People in home spouse   Current Living Arrangements house   Home Accessibility stairs to enter home;stairs within home   Transportation Anticipated family or friend will provide   Living Environment Comments Tub/shower w/ SC, unclear on accuracy of pt report at this time, pt not able to provide appropriate responses to prompts/not accurate historian at this time   Self-Care   Usual Activity Tolerance good   Current Activity Tolerance fair   Equipment Currently Used at Home shower chair;grab bar, tub/shower   Instrumental Activities of Daily Living (IADL)   Previous Responsibilities medication management;finances   Disability/Function   Hearing Difficulty or Deaf no   Wear Glasses or Blind yes   Vision Management glasses   Concentrating, Remembering or Making Decisions Difficulty yes   Difficulty Communicating no   Difficulty Eating/Swallowing no   Walking or Climbing Stairs Difficulty no   Dressing/Bathing Difficulty yes   Dressing/Bathing bathing difficulty, requires equipment   Toileting issues no   Doing Errands Independently Difficulty (such as shopping) yes   Errands Management Spouse    Fall history within last six months yes   Number of times patient has fallen within last six months   (unknown )   Change in Functional Status Since Onset of Current Illness/Injury yes   General Information   Onset of Illness/Injury or Date of Surgery 01/20/22   Referring Physician Lucy Bear MD    Patient/Family Therapy Goal Statement (OT) none stated    Additional Occupational Profile Info/Pertinent History of Current Problem Hyperglycemia, GERD, DM2, HTN, A-fib, COPD, mood disorder, chronic pain, presenting w/ acute enecephelopathy/delirum.    Existing Precautions/Restrictions fall   Cognitive Status Examination   Orientation Status not oriented to person, place or time   Affect/Mental Status  (Cognitive) unresponsive   Follows Commands 0-24% accuracy   Range of Motion Comprehensive   General Range of Motion no range of motion deficits identified   Bed Mobility   Bed Mobility supine-sit;sit-supine   Supine-Sit [place] (Bed Mobility) verbal cues;supervision   Sit-Supine [place] (Bed Mobility) verbal cues;supervision   Assistive Device (Bed Mobility) bed rails   Transfers   Transfers sit-stand transfer   Sit-Stand Transfer   Sit-Stand [place] (Transfers) contact guard;verbal cues;supervision   Balance   Balance Assessment standing balance: static;standing balance: dynamic   Standing Balance: Static fair balance   Standing Balance: Dynamic fair balance   Activities of Daily Living   BADL Assessment upper body dressing;lower body dressing;grooming   Upper Body Dressing Assessment   [place] Level (Upper Body Dressing) minimum assist (75% patient effort);verbal cues   Position (Upper Body Dressing) unsupported sitting   Lower Body Dressing Assessment   [place] Level (Lower Body Dressing) supervision;verbal cues   Position (Lower Body Dressing) sitting up in bed   Grooming Assessment   [place] Level (Grooming) set up;verbal cues   Position (Grooming) unsupported sitting   Clinical Impression   Criteria for Skilled Therapeutic Interventions Met (OT) yes;skilled treatment is necessary   OT Diagnosis decreased safety awareness/cognition, decreased balance/endurance/strength    OT Problem List-Impairments impacting ADL problems related to;activity tolerance impaired;balance;cognition;mobility;strength   Assessment of Occupational Performance 3-5 Performance Deficits   Identified Performance Deficits decreased cognition/safety awareness, decreased balance, tnfr/mobility safety, dressing/toileting    Planned Therapy Interventions (OT) ADL retraining;IADL retraining;balance training;cognition;strengthening   Clinical Decision Making Complexity (OT) moderate complexity   Therapy Frequency  (OT) Daily   Predicted Duration of Therapy 5   Risk & Benefits of therapy have been explained evaluation/treatment results reviewed;patient   OT Discharge Planning    OT Discharge Recommendation (DC Rec) Transitional Care Facility;Home with assist   OT Rationale for DC Rec Pt assistx1 for trnf/mobility/self cares, OT recommeding 24 hour supervision/assist w/ all self care tasks/mobility to ensure safety d/t decreasesd safety awareness.    Total Evaluation Time (Minutes)   Total Evaluation Time (Minutes) 6

## 2022-01-21 NOTE — ED NOTES
DHEERAJ assessed, chart reviewed, spoke to dtr, Merline [phone], she sent POLST and HCD, [facility] and per [facility]tr, pt would refuse TCU when she is lucid and probably would if needed, but Merline worried that pt's  is unable to physically and safely care for her at home. They live together and he had a hard time caring for her last night. Pt has walker, doesn't always use and may need service.      Pt was recently here for some mild confusion as well and then just woke up clear as a bell and remembers who she is and then discharged home. Daughter is bewildered and concern for her safety as she was so confused today.

## 2022-01-21 NOTE — CONSULTS
NEUROLOGY INPATIENT CONSULTATION NOTE       [facility]  [address] [place] [address]  Tel: [phone]  Fax: [phone]  [email or web address]     Paige Mari,  1950, MRN [id number]  PCP: Scar Tan  Date: 2022     ASSESSMENT & PLAN     Diagnosis code: Acute encephalopathy    Acute encephalopathy  71-year-old female with HTN, DM, atrial fibrillation, COPD, marijuana use, chronic pain admitted with altered mental status    MRI of the brain unremarkable.    Urinalysis and blood culture negative    Lab work significant for borderline elevated calcium, glucose and a low potassium    Check EEG    Altered mental status likely multifactorial due to pain medication, marijuana use, electrolyte abnormality and possible infection    Check vitamin B-1, B12, TSH, ABG and ammonia    She has resting tremor on the right with cogwheel rigidity that raise the possibility of Parkinson disease and can be followed up as an outpatient    Thank you again for this referral, please feel free to contact me if you have any questions.    Jared Celis MD  [facility]  (Formerly, [facility], ..)     CHIEF COMPLAINT <principal problem not specified>     HISTORY OF PRESENT ILLNESS     We have been requested by Dr. Bear to evaluate Paige Mari who is a 71 year old  female for altered mental status possible dementia    Patient is a 71-year-old female with history of HTN, DM, atrial fibrillation, COPD, marijuana use, chronic pain who presented with altered mental status.  Patient is a poor historian and most of the history was obtained from family members.   who himself is not a great historian felt that patient was not acting herself around dinnertime on 2022.  She would not respond appropriately and was staring off in space.  Hx of glucose that was very high and neighbor helped them to give her  some insulin.  Due to persistent confusion paramedics were called and she was brought to the emergency room.  She had MRI of the brain that was unremarkable neurology was consulted for encephalopathy and probable underlying dementia. Lab work included elevated lactic acid, glucose.  Ammonia level was normal.  Blood cultures were negative urine cultures are pending     PROBLEM LIST      Patient Active Problem List   Diagnosis Code     Benign essential hypertension I10     Taking Female Hormones For Postmenopausal HRT Z79.890     Type 2 Diabetes Mellitus E11.9     Leukocytosis D72.829     Nicotine Dependence F17.200     Hyperthyroidism E05.90     Back Pain M54.9     Intractable headache R51.9     Knee osteoarthritis M17.10     HTN I10     KP on CPAP G47.33, Z99.89     GERD (gastroesophageal reflux disease) K21.9     Mood disorder (H) F39     Status post total knee replacement Z96.659     Acute blood loss anemia D62     Spondylisthesis M43.10     Post-op pain G89.18     Accelerated hypertension I10     Altered mental state R41.82     Malignant hypertensive urgency I16.0     Headache R51.9     N&V (nausea and vomiting) R11.2     KP (obstructive sleep apnea) G47.33     Hypertensive emergency I16.1     Acute diverticulitis K57.92     Hyponatremia E87.1     Leukocytosis, unspecified type D72.829     Acute cystitis without hematuria N30.00     DM2 E11.65     Small bowel obstruction (H) K56.609     Pneumatosis intestinalis K63.89     Hypokalemia E87.6     SIRS (systemic inflammatory response syndrome) (H) R65.10     Metabolic encephalopathy G93.41     Delirium R41.0     Atrial fibrillation with RVR (H) I48.91     JACKSON (acute kidney injury) (H) N17.9     DKA (diabetic ketoacidoses) E11.10     Lactic acidosis E87.2     COPD (chronic obstructive pulmonary disease) (H) J44.9     Hypernatremia E87.0     Hypomagnesemia E83.42     Confusion R41.0      Clinically Significant Risk Factors Present on Admission          #  "Hypercalcemia: Ca = 10.9 mg/dL (Ref range: 8.5 - 10.5 mg/dL) and/or iCa = N/A on admission, will monitor as appropriate       # CKD, Stage 2 (GFR 60-89): Will monitor and treat as appropriate  - last Cr =  0.99 mg/dL (Ref range: 0.60 - 1.10 mg/dL)  - last GFR = 60 mL/min/1.73m2 (Ref range: >60 mL/min/1.73m2)     PAST MEDICAL & SURGICAL HISTORY     Past Medical History: Patient  has no past medical history on file.    Past Surgical History: She  has a past surgical history that includes TOTAL ABDOM HYSTERECTOMY; REMOVAL OF OVARY(S); appendectomy; Bowel Resection (12 years ago); Dilation and curettage; tonsillectomy & adenoidectomy; other surgical history (2005); Arthroscopy Knee With Meniscectomy (3/10/14); TOTAL KNEE ARTHROPLASTY (Right, 11/24/2014); TOTAL ABDOM HYSTERECTOMY; REMOVAL OF OVARY(S); joint replacement; Pr Arthrodesis Ant Interbody Min Discectomy,Lumbar (N/A, 1/30/2015); Oophorectomy; Hysterectomy; Colon surgery (2004); Blepharoplasty (Bilateral, 8/17/2015); back surgery; Toe Surgery; and PICC/Midline Placement (9/19/2021).     SOCIAL HISTORY     Reviewed, and she  reports that she quit smoking about 4 months ago. She has never used smokeless tobacco. She reports that she does not drink alcohol and does not use drugs.     FAMILY HISTORY     Reviewed, and family history is not on file.     ALLERGIES     Allergies   Allergen Reactions     Morphine Other (See Comments)     \"make me delirious,\"  \"nightmares\"  Other reaction(s): delirium  \"make me delirious,\"  \"nightmares\"       Adhesive [Mecrylate] Other (See Comments)     Can only tolerate tape for short periods of time shelia in sensitive areas     Amlodipine Unknown and Other (See Comments)     Other reaction(s): delerium     Avalide Other (See Comments)     Other reaction(s): hyponatremia     Doxazosin Other (See Comments)     Other reaction(s): feels foggy     Other Allergy (See Comments) [External Allergen Needs Reconciliation - See Comment] Unknown     " Other reaction(s): skin rash     Prednisone Unknown     Caused extremely high blood sugars     Trazodone Unknown     Other reaction(s): hung over        REVIEW OF SYSTEMS     Pertinent items are noted in HPI.     HOME & HOSPITAL MEDICATIONS     Prior to Admission Medications  Medications Prior to Admission   Medication Sig Dispense Refill Last Dose     acetaminophen (TYLENOL) 500 MG tablet Take 1,000 mg by mouth 2 times daily    Unknown at Unknown time     acetaminophen-codeine (TYLENOL #3) 300-30 MG tablet Take 1 tablet by mouth every 6 hours as needed for severe pain   Unknown at Unknown time     albuterol (PROAIR HFA/PROVENTIL HFA/VENTOLIN HFA) 108 (90 Base) MCG/ACT inhaler Inhale 1-2 puffs into the lungs every 6 hours 18 g 4 Unknown at Unknown time     amoxicillin (AMOXIL) 500 MG tablet Take 2,000 mg by mouth as needed Before dental appointments   Unknown at Unknown time     ARIPiprazole (ABILIFY) 2 MG tablet Take 2 mg by mouth daily    Unknown at Unknown time     cetirizine (ZYRTEC) 10 MG tablet Take 10 mg by mouth daily    Unknown at Unknown time     cholecalciferol (VITAMIN D3) 25 mcg (1000 units) capsule Take 1,000 Units by mouth daily    Unknown at Unknown time     cloNIDine (CATAPRES-TTS) 0.3 mg/24 hr Place 1 patch onto the skin once a week    Unknown at Unknown time     cyanocobalamin (VITAMIN B-12) 1000 MCG tablet Take 1,000 mcg by mouth daily   Unknown at Unknown time     diclofenac sodium (VOLTAREN) 1 % Gel [DICLOFENAC SODIUM (VOLTAREN) 1 % GEL] Apply 1 g topically daily as needed.   Unknown at Unknown time     docusate sodium (COLACE) 100 MG capsule Take 200 mg by mouth 2 times daily   Unknown at Unknown time     DULoxetine (CYMBALTA) 30 MG capsule Take 30 mg by mouth daily    Unknown at Unknown time     estradiol (ESTRACE) 1 MG tablet Take 1 mg by mouth daily    Unknown at Unknown time     insulin aspart (NOVOLOG) 100 unit/mL injection Inject 6-15 Units Subcutaneous 3 times daily (with meals)     Unknown at Unknown time     insulin degludec (TRESIBA FLEXTOUCH) 100 UNIT/ML pen Inject 24 Units Subcutaneous daily 30 mL 1 Unknown at Unknown time     liraglutide (VICTOZA 2-TANNER) 0.6 mg/0.1 mL (18 mg/3 mL) PnIj injection [LIRAGLUTIDE (VICTOZA 2-TANNER) 0.6 MG/0.1 ML (18 MG/3 ML) PNIJ INJECTION] Inject 1.2 mg under the skin daily. Not available from inpatient hospital pharmacy - OK for patient to use own supply   Unknown at Unknown time     magnesium oxide (MAG-OX) 400 MG tablet Take 400 mg by mouth daily    Unknown at Unknown time     metoprolol succinate ER (TOPROL-XL) 100 MG 24 hr tablet Take 200 mg by mouth every morning   Unknown at Unknown time     metoprolol succinate ER (TOPROL-XL) 100 MG 24 hr tablet Take 150 mg by mouth At Bedtime   Unknown at Unknown time     multivitamin w/minerals (THERA-VIT-M) tablet Take 1 tablet by mouth daily   Unknown at Unknown time     olmesartan (BENICAR) 40 MG tablet [OLMESARTAN (BENICAR) 40 MG TABLET] Take 40 mg by mouth daily.   Unknown at Unknown time     omeprazole (PRILOSEC) 20 MG capsule [OMEPRAZOLE (PRILOSEC) 20 MG CAPSULE] Take 1 capsule by mouth daily before breakfast.   Unknown at Unknown time     Pregabalin (LYRICA) 200 MG capsule Take 200 mg by mouth 3 times daily   Unknown at Unknown time     tiZANidine (ZANAFLEX) 2 MG tablet [TIZANIDINE (ZANAFLEX) 2 MG TABLET] Take 4 mg by mouth bedtime.    Unknown at Unknown time     traMADol (ULTRAM) 50 mg tablet [TRAMADOL (ULTRAM) 50 MG TABLET] Take 100 mg by mouth 2 (two) times a day.    Unknown at Unknown time     valACYclovir (VALTREX) 500 MG tablet Take 500 mg by mouth daily As needed   Unknown at Unknown time     blood sugar diagnostic (CONTOUR TEST STRIPS) Strp [BLOOD SUGAR DIAGNOSTIC (CONTOUR TEST STRIPS) STRP] Use 1 each As Directed daily.         Continuous Blood Gluc  (DEXCOM G6 ) TORRIE Use to read blood sugars as per 's instructions. 1 each 0      Continuous Blood Gluc Sensor (DEXCOM G6  "SENSOR) MISC Change every 10 days. 3 each 6      Continuous Blood Gluc Transmit (DEXCOM G6 TRANSMITTER) MISC Change every 3 months. 1 each 2      Glucagon, rDNA, (GLUCAGON EMERGENCY) 1 MG KIT 1 mg IM one time, PRN severe hypoglycemia causing altered consciousness affecting ability to take food by mouth (Patient not taking: Reported on 1/17/2022) 1 kit 1      glucose (BD GLUCOSE) 5 g chewable tablet Take 3 tablets (15 g) by mouth daily as needed (hypoglycemia) (Patient not taking: Reported on 1/17/2022) 90 tablet 3      medical cannabis (Patient's own supply) 1 Dose by Other route See Admin Instructions [employer] Vape- 1-4 puffs q6h PRN        NOVOFINE 32 32 gauge x 1/4\" Ndle [NOVOFINE 32 32 GAUGE X 1/4\" NDLE] USE TO TEST TWICE A DAY  3        Hospital Medications    albuterol  1-2 puff Inhalation Q6H     ARIPiprazole  2 mg Oral Daily     enoxaparin ANTICOAGULANT  40 mg Subcutaneous Q24H     estradiol  1 mg Oral Daily     insulin aspart  1-7 Units Subcutaneous TID AC     insulin aspart  1-5 Units Subcutaneous At Bedtime     losartan  25 mg Oral Daily     metoprolol succinate ER  100 mg Oral BID     pantoprazole  40 mg Oral QAM     polyethylene glycol  17 g Oral Daily     [Held by provider] Pregabalin  100 mg Oral TID     sodium chloride (PF)  3 mL Intracatheter Q8H     [Held by provider] tiZANidine  4 mg Oral At Bedtime     [Held by provider] traMADol  100 mg Oral BID        PHYSICAL EXAM     Vital signs  Temp:  [98.3  F (36.8  C)-99.1  F (37.3  C)] 98.3  F (36.8  C)  Pulse:  [75-90] 75  Resp:  [11-28] 20  BP: (140-193)/() 142/86  SpO2:  [94 %-98 %] 98 %    Weight:   Wt Readings from Last 1 Encounters:   01/11/22 74.6 kg (164 lb 8 oz)        General Physical Exam: Patient is alert and oriented x 2. Vital signs were reviewed and are documented in EMR. Neck was supple, no carotid bruit, thyromegaly, JVD or lymphadenopathy noted.  Neurological Exam:  Mental status: Patient is alert and oriented x2, " smiling at times inappropriately  Speech: Normal with no dysarthria  Cranial nerves: Extraocular movements are intact face symmetrical tongue midline  Motor: She has right resting tremor with cogwheel rigidity moves all 4 extremities  Reflexes: Trace positive biceps triceps.  Absent at knees ankles toes downgoing  Sensory: Decreased pinprick below knees  Coordination: Did not cooperate  Gait: Deferred for safety     DIAGNOSTIC STUDIES     Pertinent Radiology   Radiology Results: Reviewed impression and images     CT  1.  No CT evidence for acute intracranial process.  2.  Brain atrophy and presumed chronic microvascular ischemic changes similar to the prior exam.    MRI  1. No acute intracranial abnormality.  2. Mild generalized volume loss and chronic microvascular ischemic change.    Pertinent Labs   Lab Results: Personally Reviewed   Recent Results (from the past 24 hour(s))   Glucose by meter    Collection Time: 01/20/22 10:33 AM   Result Value Ref Range    GLUCOSE BY METER POCT 273 (H) 70 - 99 mg/dL   ECG 12-LEAD WITH MUSE (LHE)    Collection Time: 01/20/22 10:45 AM   Result Value Ref Range    Systolic Blood Pressure 193 mmHg    Diastolic Blood Pressure 98 mmHg    Ventricular Rate 93 BPM    Atrial Rate 93 BPM    CT Interval 174 ms    QRS Duration 78 ms     ms    QTc 467 ms    P Axis 58 degrees    R AXIS 9 degrees    T Axis 96 degrees    Interpretation ECG       Sinus rhythm with Premature atrial complexes  Nonspecific ST and T wave abnormality  Abnormal ECG  When compared with ECG of 20-JAN-2022 10:45,  Premature atrial complexes are now Present  Confirmed by SEE ED PROVIDER NOTE FOR, ECG INTERPRETATION ([id number]),  JULY MEJIA ([id number]) on 1/20/2022 2:37:44 PM     Ketone Beta-Hydroxybutyrate Quantitative    Collection Time: 01/20/22 11:58 AM   Result Value Ref Range    Ketone (Beta-Hydroxybutyrate) Quantitative 0.15 <=0.3 mmol/L   Comprehensive metabolic panel    Collection Time: 01/20/22 11:58 AM    Result Value Ref Range    Sodium 141 136 - 145 mmol/L    Potassium 3.4 (L) 3.5 - 5.0 mmol/L    Chloride 101 98 - 107 mmol/L    Carbon Dioxide (CO2) 25 22 - 31 mmol/L    Anion Gap 15 5 - 18 mmol/L    Urea Nitrogen 25 8 - 28 mg/dL    Creatinine 1.10 0.60 - 1.10 mg/dL    Calcium 10.9 (H) 8.5 - 10.5 mg/dL    Glucose 155 (H) 70 - 125 mg/dL    Alkaline Phosphatase 115 45 - 120 U/L    AST 21 0 - 40 U/L    ALT 24 0 - 45 U/L    Protein Total 8.4 (H) 6.0 - 8.0 g/dL    Albumin 4.4 3.5 - 5.0 g/dL    Bilirubin Total 0.9 0.0 - 1.0 mg/dL    GFR Estimate 53 (L) >60 mL/min/1.73m2   Blood gas venous    Collection Time: 01/20/22 11:58 AM   Result Value Ref Range    pH Venous 7.38 7.35 - 7.45    pCO2 Venous 46 35 - 50 mm Hg    pO2 Venous 29 25 - 47 mm Hg    Bicarbonate Venous 24 24 - 30 mmol/L    Base Excess/Deficit (+/-) 1.9   mmol/L    Oxyhemoglobin Venous 56.6 (L) 70.0 - 75.0 %    O2 Sat, Venous 57.5 (L) 70.0 - 75.0 %   Troponin I (now)    Collection Time: 01/20/22 11:58 AM   Result Value Ref Range    Troponin I <0.01 0.00 - 0.29 ng/mL   Magnesium    Collection Time: 01/20/22 11:58 AM   Result Value Ref Range    Magnesium 2.1 1.8 - 2.6 mg/dL   Asymptomatic COVID-19 Virus (Coronavirus) by PCR Nasopharyngeal    Collection Time: 01/20/22 11:58 AM    Specimen: Nasopharyngeal; Swab   Result Value Ref Range    SARS CoV2 PCR Negative Negative   CBC with platelets and differential    Collection Time: 01/20/22 11:58 AM   Result Value Ref Range    WBC Count 21.0 (H) 4.0 - 11.0 10e3/uL    RBC Count 5.38 (H) 3.80 - 5.20 10e6/uL    Hemoglobin 17.4 (H) 11.7 - 15.7 g/dL    Hematocrit 50.0 (H) 35.0 - 47.0 %    MCV 93 78 - 100 fL    MCH 32.3 26.5 - 33.0 pg    MCHC 34.8 31.5 - 36.5 g/dL    RDW 13.1 10.0 - 15.0 %    Platelet Count 347 150 - 450 10e3/uL    % Neutrophils 83 %    % Lymphocytes 11 %    % Monocytes 5 %    % Eosinophils 0 %    % Basophils 0 %    % Immature Granulocytes 1 %    NRBCs per 100 WBC 0 <1 /100    Absolute Neutrophils 17.5 (H)  1.6 - 8.3 10e3/uL    Absolute Lymphocytes 2.3 0.8 - 5.3 10e3/uL    Absolute Monocytes 1.1 0.0 - 1.3 10e3/uL    Absolute Eosinophils 0.0 0.0 - 0.7 10e3/uL    Absolute Basophils 0.1 0.0 - 0.2 10e3/uL    Absolute Immature Granulocytes 0.1 <=0.4 10e3/uL    Absolute NRBCs 0.0 10e3/uL   UA with Microscopic reflex to Culture    Collection Time: 01/20/22  1:30 PM    Specimen: Urine, Catheter   Result Value Ref Range    Color Urine Yellow Colorless, Straw, Light Yellow, Yellow    Appearance Urine Clear Clear    Glucose Urine >1000 (A) Negative mg/dL    Bilirubin Urine Negative Negative    Ketones Urine 60  (A) Negative mg/dL    Specific Gravity Urine 1.037 (H) 1.001 - 1.030    Blood Urine 0.1 mg/dL (A) Negative    pH Urine 6.0 5.0 - 7.0    Protein Albumin Urine 600  (A) Negative mg/dL    Urobilinogen Urine <2.0 <2.0 mg/dL    Nitrite Urine Negative Negative    Leukocyte Esterase Urine Negative Negative    Mucus Urine Present (A) None Seen /LPF    RBC Urine 1 <=2 /HPF    WBC Urine 11 (H) <=5 /HPF    Squamous Epithelials Urine 1 <=1 /HPF    Hyaline Casts Urine 4 (H) <=2 /LPF    Granular Casts Urine 1 (H) None Seen /LPF   Blood Culture Line, venous    Collection Time: 01/20/22  1:42 PM    Specimen: Line, venous; Blood   Result Value Ref Range    Culture No growth after 12 hours    Blood Culture Peripheral Blood    Collection Time: 01/20/22  1:42 PM    Specimen: Peripheral Blood   Result Value Ref Range    Culture No growth after 12 hours    Lactic acid whole blood    Collection Time: 01/20/22  1:42 PM   Result Value Ref Range    Lactic Acid 2.8 (H) 0.7 - 2.0 mmol/L   Ammonia (on ice)    Collection Time: 01/20/22  1:43 PM   Result Value Ref Range    Ammonia 27 11 - 35 umol/L   Creatinine    Collection Time: 01/20/22  1:43 PM   Result Value Ref Range    Creatinine 0.99 0.60 - 1.10 mg/dL    GFR Estimate 60 (L) >60 mL/min/1.73m2   Glucose by meter    Collection Time: 01/20/22  2:14 PM   Result Value Ref Range    GLUCOSE BY METER  POCT 84 70 - 99 mg/dL   Glucose by meter    Collection Time: 01/20/22  4:50 PM   Result Value Ref Range    GLUCOSE BY METER POCT 92 70 - 99 mg/dL   Lactic acid whole blood    Collection Time: 01/20/22  6:00 PM   Result Value Ref Range    Lactic Acid 2.0 0.7 - 2.0 mmol/L   Glucose by meter    Collection Time: 01/20/22  9:05 PM   Result Value Ref Range    GLUCOSE BY METER POCT 129 (H) 70 - 99 mg/dL       Total time spent for face to face visit, reviewing labs/imaging studies, counseling and coordination of care was: 1 Hour 15 Minutes More than 50% of this time was spent on counseling and coordination of care.      This note was dictated using voice recognition software.  Any grammatical or context distortions are unintentional and inherent to the software.

## 2022-01-21 NOTE — UTILIZATION REVIEW
Admission Status; Secondary Review Determination   Under the authority of the Utilization Management Committee, the utilization review process indicated a secondary review on Paige Mari. The review outcome is based on review of the medical records, discussions with staff, and applying clinical experience noted on the date of the review.   (x) Inpatient Status Appropriate - This patient's medical care is consistent with medical management for inpatient care and reasonable inpatient medical practice.     RATIONALE FOR DETERMINATION   71 year old female with a past medical history of GERD, DM 2, HTN, diverticulitis, encephalopathy/delirium, atrial fibrillation-not on anticoagulation, COPD, marijuana use, mood disorder, SBO, chronic pain who presents with acute encephalopathy, neurology consult , negative MRI for acute CVA but still very confused , EEG is abnormal and concern for seizure, receiving IV ativan , awaiting for neurology update regarding anti seizure medications, crossing 2nd midnight discussed with Dr. Bear    At the time of admission with the information available to the attending physician more than 2 nights Hospital complex care was anticipated, based on patient risk of adverse outcome if treated as outpatient and complex care required. Inpatient admission is appropriate based on the Medicare guidelines.   The information on this document is developed by the utilization review team in order for the business office to ensure compliance. This only denotes the appropriateness of proper admission status and does not reflect the quality of care rendered.   The definitions of Inpatient Status and Observation Status used in making the determination above are those provided in the CMS Coverage Manual, Chapter 1 and Chapter 6, section 70.4.   Sincerely,   Kevin Lang MD  Utilization Review  Physician Advisor  [facility]

## 2022-01-21 NOTE — PROGRESS NOTES
[facility]    Medicine Progress Note - Hospitalist Service    Date of Admission:  1/20/2022    Assessment & Plan             71 year old female with a history of  DM 2, previous episodes encephalopathy/delirium, atrial fibrillation-not on anticoagulation, COPD, marijuana use, mood disorder, SBO, chronic pain who presents with acute encephalopathy.      Acute metabolic encephalopathy              recurrent (happened in hospital last fall)   EEG reported abnormal this am, possible seizure disorder; started on IV ativan during EEG; may need further seizure work up   Differential includes polypharmacy too   CT head reviewed and negative              . MRI brain negative   Appreciate neuro consult    noted speech difficulty, rigidity and small tremor  Able to answer yes, no and okay to Q              OT-SLUMS              No evidence sepsis (given zosyn in ED              Delirium order set              Psychiatry to see     Leukocytosis   Improving overnight; no clear infection   unsure if she could have some stress to margination if she is having seizures.   ED work-up really unremarkable including CT abdomen pelvis   UA unremarkable   Chest x-ray unremarkable   Given 1 dose of Zosyn in the ER, hold off on further abx     Mild acute kidney insufficiency              Resolved with IV fluids     Hypokalemia              Likely secondary to poor p.o. intake              Optimized and trend     Diabetes type 2 with hyperglycemia              un controlled with A1C 8.6              Per pharmacy report patient has not been filling any insulin, Victoza or Tresiba which are listed in the med rec.              Diabetes order set.      Essential hypertension-\   BP improved since admission              Reportedly on clonidine patch, metoprolol, olmesartan              Holding clonidine patch.     Decreased dose of metoprolol ordered,     losartan switched to losartan      History of atrial fibrillation    "           Diagnosed during last admission with SBO.               Was initiated on metoprolol  mg twice daily on last discharge note.  No anticoagulation was ordered.     Chronic pain syndrome              Holding home tizanidine, Lyrica and tramadol due to current altered mental status.     GERD-continue home omeprazole     Mood disorder-continue Abilify     COPD-no evidence of acute exacerbation              Continue home inhaler             Diet: Combination Diet Regular Diet Adult; Moderate Consistent Carb (60 g CHO per Meal) Diet    DVT Prophylaxis: Enoxaparin (Lovenox) SQ  Gomes Catheter: Not present  Central Lines: None  Cardiac Monitoring: None  Code Status:  discussed with ; full code; reviewd ACP documents also    Disposition Plan   Expected Discharge: 2 days   Anticipated discharge location:  await PT and OT  Delays: NA           The patient's care was discussed with the Bedside Nurse, Patient and Patient's Family.    Lucy Bear MD  Hospitalist Service  [facility]    Text page via [employer] Paging/Directory         Clinically Significant Risk Factors Present on Admission                    ______________________________________________________________________    Interval History   --- Patient seen and chart reviewed.  Care discussed with nursing staff.  --- More awake today.  --- EEG noted to be abnormal, concerning for seizure.  --- Patient pleasant but does not remember the events of yesterday or how she got here.  She does not remember previously being hospitalized with confusion but remembers being here for a bowel obstruction.  --- She smokes cigarettes, cannot tell me how much but tells me it is \"not too much.\"  Smokes marijuana and when I asked why she cannot remember but later is able to tell me that it is due to pain.  When I asked her why she has pain, she points to the bruises on her arms.  --- Notes tremor in bilateral arms worse on the right which she states " has been going on for a while    Data reviewed today: I reviewed all medications, new labs and imaging results over the last 24 hours.     Physical Exam   Vital Signs: Temp: 98  F (36.7  C) Temp src: Oral BP: (!) 160/79 Pulse: 76   Resp: 18 SpO2: 98 % O2 Device: None (Room air)    Weight: 0 lbs 0 oz  General Appearance: Awake and pleasant, seen during EEG monitoring  Respiratory: Clear to auscultation bilaterally  Cardiovascular: Regular rate and rhythm  GI: Soft and nontender without hepatosplenomegaly  Skin: Bruising in bilateral arm  Other: Neurologically she is grossly nonfocal.  She has a resting tremor bilateral arms, more on the right than the left.  Initially only seen in the right but then a faint tremor in the left arm.  Rigidity noted in arms and legs    Data   Recent Labs   Lab 01/21/22  1207 01/21/22  0801 01/21/22  0658 01/20/22  1414 01/20/22  1343 01/20/22  1158   WBC  --   --  18.4*  --   --  21.0*   HGB  --   --  14.7  --   --  17.4*   MCV  --   --  95  --   --  93   PLT  --   --  260  --   --  347   NA  --   --  140  --   --  141   POTASSIUM  --   --  3.2*  --   --  3.4*   CHLORIDE  --   --  105  --   --  101   CO2  --   --  24  --   --  25   BUN  --   --  26  --   --  25   CR  --   --  0.76  --  0.99 1.10   ANIONGAP  --   --  11  --   --  15   BRUNO  --   --  9.5  --   --  10.9*   * 222* 161*   < >  --  155*   ALBUMIN  --   --  3.4*  --   --  4.4   PROTTOTAL  --   --  6.9  --   --  8.4*   BILITOTAL  --   --  1.2*  --   --  0.9   ALKPHOS  --   --  74  --   --  115   ALT  --   --  17  --   --  24   AST  --   --  21  --   --  21    < > = values in this interval not displayed.     Recent Results (from the past 24 hour(s))   CT Abdomen Pelvis w Contrast    Narrative    EXAM: CT ABDOMEN PELVIS W CONTRAST  LOCATION: [facility]  DATE/TIME: 1/20/2022 3:02 PM    INDICATION: Confusion. Leukocytosis. Hyperglycemia.  COMPARISON: CT AP 9/19/2021  TECHNIQUE: CT scan of the abdomen  and pelvis was performed following injection of IV contrast. Multiplanar reformats were obtained. Dose reduction techniques were used.  CONTRAST: Isovue 370 75ml    FINDINGS:   LOWER CHEST: Visualized lungs are clear. No pleural effusion. Heart size normal with no pericardial effusion.     HEPATOBILIARY: Mild diffuse hepatic steatosis. Liver otherwise normal. No bile duct dilatation. No calcified gallstones.    PANCREAS: Normal.    SPLEEN: Spleen size normal.    ADRENAL GLANDS: Normal.    KIDNEY/BLADDER: Stable 1 cm cyst left kidney requires no follow-up. Kidneys, ureters and bladder are otherwise normal.    BOWEL: Appendectomy. Rectosigmoid anastomosis. Bowel is otherwise normal with no obstruction or inflammatory change. No free fluid. No free air.    LYMPH NODES: No lymphadenopathy.    VASCULATURE: Moderate calcified atheromatous plaque throughout the normal caliber abdominal aorta.      PELVIC ORGANS: Hysterectomy. Pelvis otherwise unremarkable.    MUSCULOSKELETAL: Bilateral diego and pedicle screw fixation L4-L5.      Impression    IMPRESSION:   1.  No acute findings in the abdomen and pelvis.  2.  Incidental findings as detailed above.   XR Chest 1 View    Narrative    EXAM: XR CHEST 1 VIEW  LOCATION: [facility]  DATE/TIME: 1/20/2022 3:01 PM    INDICATION: luekocytosis  COMPARISON: 9/21/2021      Impression    IMPRESSION: Negative chest.   MR Brain w/o Contrast    Narrative    EXAM: MR BRAIN W/O CONTRAST  LOCATION: [facility]  DATE/TIME: 1/20/2022 3:14 PM    INDICATION: Word finding difficulty. Confusion.  COMPARISON: CT head 01/20/2022. MRI 08/12/2019.  TECHNIQUE: Routine multiplanar multisequence head MRI without intravenous contrast.    FINDINGS: Image quality is mildly degraded by motion.    INTRACRANIAL CONTENTS: No diffusion abnormality to suggest acute or subacute infarction. No mass, acute hemorrhage, or extra-axial fluid collections. Scattered  nonspecific T2/FLAIR hyperintensities within the cerebral white matter most consistent with   mild chronic microvascular ischemic change. Mild generalized cerebral atrophy. No hydrocephalus. Normal position of the cerebellar tonsils.     SELLA: No abnormality accounting for technique.    OSSEOUS STRUCTURES/SOFT TISSUES: Normal marrow signal. The major intracranial vascular flow voids are maintained.     ORBITS: No abnormality accounting for technique.     SINUSES/MASTOIDS: No paranasal sinus mucosal disease. No middle ear or mastoid effusion.       Impression    IMPRESSION:  1. No acute intracranial abnormality.  2. Mild generalized volume loss and chronic microvascular ischemic change.

## 2022-01-21 NOTE — PLAN OF CARE
Problem: Risk for Delirium  Goal: Improved Behavioral Control  Outcome: Improving  Intervention: Prevent and Manage Agitation  Complementary Therapy: music therapy provided via TV. Pt has been calm and sleeping  Goal: Improved Attention and Thought Clarity  Outcome: Improving  Intervention: Maximize Cognitive Function  Sensory Stimulation Regulation:   quiet environment promoted   visual stimulation minimized   tactile stimulation minimized   lighting decreased   care clustered   auditory stimulation minimized  Reorientation Measures: reorientation provided     Problem: Diabetes Comorbidity  Goal: Blood Glucose Level Within Targeted Range  Outcome: Improving  Intervention: Monitor and Manage Glycemia  Glycemic Management:   blood glucose monitored   oral hydration promoted

## 2022-01-21 NOTE — PLAN OF CARE
Pt arrived on unit around 19:30 from ED. Transferred to bed via sliding board.   Alert, but confused x4. Answers yes to most questions even when questions are open ended. Unable to state her last name and birthday.   - Second LR bolus started on unit d/t pt not being available earlier for ED to give.   - Purewick in place to manage urine output.    Problem: Diabetes Comorbidity  Goal: Blood Glucose Level Within Targeted Range  Outcome: Improving   Blood sugar at HS was 129. Offered something to drink and pt said, 'yes' and 'ok'. Water at bedside.    Problem: Risk for Delirium  Goal: Improved Attention and Thought Clarity  Outcome: No Change   Bed alarm turned on. Light to bathroom left on. Non-slip socks on.  - Pt not attempting to get out of bed.   - Pleasant and smiling.  - Cooperative, but minimal evidence of understanding d/t minimal responses.

## 2022-01-21 NOTE — PROGRESS NOTES
Neurology follow-up note    Patient had an EEG that shows diffuse slowing with periodic sharp discharges that at times evolve into rhythmic activity suggesting electrographic seizure.  She received 0.5 mg of Ativan during the recording with some attenuation of those discharges.  Recommendation:    1.  Keppra 1000 mg IV followed by maintenance dose of 500 mg twice daily  2.  Continuous EEG monitoring  3.  Lumbar puncture under fluoroscopy send CSF for routine studies and HSV by PCR  4.  Pending CSF results start acyclovir 10 mg/kg IV every 8 hours  5.  Check Keppra level in the morning    Jared Celis MD

## 2022-01-21 NOTE — ED NOTES
"[facility] ED Handoff Report    ED Chief Complaint: Hyperglycemia and Altered Mental Status    ED Diagnosis:  (R41.0) Confusion  Comment: Pt answers \"yes\" to all questions, but does not follow instruction given    (D72.829) Leukocytosis, unspecified type      PMH:  History reviewed. No pertinent past medical history.     Code Status:  Prior     Falls Risk: Yes Band: Applied    Current Living Situation/Residence: lives with a significant other     Elimination Status: Continent: No     Activity Level: Assist of 1-2    Patients Preferred Language:  English     Needed: No    Vital Signs:  BP (!) 158/86   Pulse 76   Temp 98.4  F (36.9  C) (Oral)   Resp 26   SpO2 96%      Cardiac Rhythm: NA    Pain Score: 0/10    Is the Patient Confused:  Yes    Last Food or Drink: 1/19/22    Focused Assessment:  Pt came in via EMS due to increased BG and increased altered mental status.  Pt was found to have leukocytosis.  Pt BG has been 90s-140s.  Being treated for UTI.  Purewick in place, but pt does not understand.    Tests Performed: Done: Labs and Imaging    Treatments Provided:  IV fluids and IV antibiotics    Family Dynamics/Concerns: No    Family Updated On Visitor Policy: Yes    Plan of Care Communicated to Family: Yes    Who Was Updated about Plan of Care:  and daughter Merline [phone]    Belongings Checklist Done and Signed by Patient: Yes    Medications sent with patient: yes    Covid: asymptomatic , negative      RN: Marilu Burch   1/20/2022 6:39 PM         "

## 2022-01-21 NOTE — CONSULTS
INITIAL PSYCHIATRIC CONSULTATION  This note was created with the help of Dragon dictation system. Grammatical and typing errors are not intentional.          REASON FOR REQUEST: Delirium- med adjustment?;       ASSESSMENT/RECOMMENDATIONS/PLAN :   Mild intermittent metabolic encephalopathy likely in the context of medical condition: Resolving.  History of delirium  Cognitive and behavioral changes due to above.  Depression with anxiety  Mood disorder due to medical condition, chronic pain syndrome by history.    Recommendations:  Minimize delirium genic medications.  Resume Abilify 2 mg daily.  Duloxetine 30 mg daily.  Efforts at sleep regulation.    Melatonin 5 mg at bedtime.      MENTAL STATUS EXAMINATION:   General Appearance: NAD, Comfortable  Speech: Slow.  Thought Process: Increased latency  Thought content: No psychosis, NO SI  Thought Formation: Loosening of associations.  Judgment: Depressed  Insight : Depressed  Attention : Adequate  Memory: Depressed for medical condition, hospital stay  Fund Of Knowledge: Depressed  Affect: Neutral  Mood: Congruent  Alert : Arousable  Orientation: X 2  Comprehension: Depressed  Generative thought content:Slow  Language: Intact  Gait and Ambulation:Slow. Some assist for ambulation  Musculoskeletal: No tonal abnormalities  BP (!) 160/79 (BP Location: Left arm)   Pulse 76   Temp 98  F (36.7  C) (Oral)   Resp 18   SpO2 98%       HISTORY OF PRESENT ILLNESS:   Presenting history to include: Per HMS/Specialists:   Patient is a 71 year old female with history significant for GERD, DM 2, HTN, diverticulitis, encephalopathy/delirium, atrial fibrillation-not on anticoagulation, COPD, marijuana use, mood disorder, SBO, chronic pain who presents with acute encephalopathy.  Patient is a poor historian as she is only able to answer yes and sometimes no and okay.  History is obtained through the  who is also a poor historian and the electronic medical record.  Patient's   notes that the patient started not acting like herself yesterday evening around dinnertime.  He notes he would tell her to do things and she would say yes and then walk away.  He noted that she seemed to be staring off and not following commands.  He checked her blood glucose and found it to be very high.  Apparently a neighbor came by and helped him administer some insulin and helped lower her blood glucose to the mid 300s.  Has been also notes that patient wet the bed overnight.  At some point EMS was called and presented the patient to the hospital.  On my evaluation, patient is in no respiratory distress.  She is resting comfortably in bed.  She answers yes to having pain all over her body including arms, head, chest, abdomen.  She answers okay to some questions.  It is unclear what is bothering her as every question I asked her is answered with yes.  I am concerned she may have had a stroke or may have recurrence of her delirium.  ROS limited secondary patient's clinical status    Upon assessment, patient was noted to be resting comfortably, getting ready for EEG.  She was pleasant, cooperative.  She did not know how she got to the hospital, had little difficulty telling me the name of the hospital however once daily and she was able to recall it.  She could not recall her home address nor events from the ER.  She did not report of any hallucinations, delusions or paranoia.  She did not report of any rafael or rafael.  She denied thoughts of self-harm.  She was unable to recite her home medications, could not tell me why she was taking Abilify or duloxetine nor could provide details of her other medications.  Home medications reviewed to include but not limited to duloxetine 30 mg daily Lyrica 200 mg 3 times a day Abilify 2 mg daily.  Patient is noted to be from a previous assessment in September 2021, refer to my notes for details.  Patient was in ICU necessitating ICU sedation protocol, agitation,  impulsivity and restlessness when not sedated, difficulty decreasing the sedation protocol due to aggressiveness, agitation and restlessness.  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  Patient did not remember her September hospitalization this morning.  Patient is a retired RN.  Review of Systems:As per HPI. Remainders of 12 point review of systems negative.  Psychiatric ROS:  Does not offer any concerns for psychiatric review of system.      PFSH reviewed  and not pertinent to chief complaint/reason for visit  BP (!) 160/79 (BP Location: Left arm)   Pulse 76   Temp 98  F (36.7  C) (Oral)   Resp 18   SpO2 98%   No results found for: AMPHET, PCP, BARBIT, OXYCODONE, THC, ETOH  @24HOURRESULTS@  Recent Results (from the past 72 hour(s))   Glucose by meter    Collection Time: 01/20/22 10:33 AM   Result Value Ref Range    GLUCOSE BY METER POCT 273 (H) 70 - 99 mg/dL   ECG 12-LEAD WITH MUSE (LHE)    Collection Time: 01/20/22 10:45 AM   Result Value Ref Range    Systolic Blood Pressure 193 mmHg    Diastolic Blood Pressure 98 mmHg    Ventricular Rate 93 BPM    Atrial Rate 93 BPM    NY Interval 174 ms    QRS Duration 78 ms     ms    QTc 467 ms    P Axis 58 degrees    R AXIS 9 degrees    T Axis 96 degrees    Interpretation ECG       Sinus rhythm with Premature atrial complexes  Nonspecific ST and T wave abnormality  Abnormal ECG  When compared with ECG of 20-JAN-2022 10:45,  Premature atrial complexes are now Present  Confirmed by SEE ED PROVIDER NOTE FOR, ECG INTERPRETATION ([id number]),  JULY MEJIA ([id number]) on 1/20/2022 2:37:44 PM     Ketone Beta-Hydroxybutyrate Quantitative    Collection Time: 01/20/22 11:58 AM   Result Value Ref Range    Ketone (Beta-Hydroxybutyrate) Quantitative 0.15 <=0.3 mmol/L   Comprehensive metabolic panel    Collection Time: 01/20/22 11:58 AM   Result Value Ref Range     Sodium 141 136 - 145 mmol/L    Potassium 3.4 (L) 3.5 - 5.0 mmol/L    Chloride 101 98 - 107 mmol/L    Carbon Dioxide (CO2) 25 22 - 31 mmol/L    Anion Gap 15 5 - 18 mmol/L    Urea Nitrogen 25 8 - 28 mg/dL    Creatinine 1.10 0.60 - 1.10 mg/dL    Calcium 10.9 (H) 8.5 - 10.5 mg/dL    Glucose 155 (H) 70 - 125 mg/dL    Alkaline Phosphatase 115 45 - 120 U/L    AST 21 0 - 40 U/L    ALT 24 0 - 45 U/L    Protein Total 8.4 (H) 6.0 - 8.0 g/dL    Albumin 4.4 3.5 - 5.0 g/dL    Bilirubin Total 0.9 0.0 - 1.0 mg/dL    GFR Estimate 53 (L) >60 mL/min/1.73m2   Blood gas venous    Collection Time: 01/20/22 11:58 AM   Result Value Ref Range    pH Venous 7.38 7.35 - 7.45    pCO2 Venous 46 35 - 50 mm Hg    pO2 Venous 29 25 - 47 mm Hg    Bicarbonate Venous 24 24 - 30 mmol/L    Base Excess/Deficit (+/-) 1.9   mmol/L    Oxyhemoglobin Venous 56.6 (L) 70.0 - 75.0 %    O2 Sat, Venous 57.5 (L) 70.0 - 75.0 %   Troponin I (now)    Collection Time: 01/20/22 11:58 AM   Result Value Ref Range    Troponin I <0.01 0.00 - 0.29 ng/mL   Magnesium    Collection Time: 01/20/22 11:58 AM   Result Value Ref Range    Magnesium 2.1 1.8 - 2.6 mg/dL   Asymptomatic COVID-19 Virus (Coronavirus) by PCR Nasopharyngeal    Collection Time: 01/20/22 11:58 AM    Specimen: Nasopharyngeal; Swab   Result Value Ref Range    SARS CoV2 PCR Negative Negative   CBC with platelets and differential    Collection Time: 01/20/22 11:58 AM   Result Value Ref Range    WBC Count 21.0 (H) 4.0 - 11.0 10e3/uL    RBC Count 5.38 (H) 3.80 - 5.20 10e6/uL    Hemoglobin 17.4 (H) 11.7 - 15.7 g/dL    Hematocrit 50.0 (H) 35.0 - 47.0 %    MCV 93 78 - 100 fL    MCH 32.3 26.5 - 33.0 pg    MCHC 34.8 31.5 - 36.5 g/dL    RDW 13.1 10.0 - 15.0 %    Platelet Count 347 150 - 450 10e3/uL    % Neutrophils 83 %    % Lymphocytes 11 %    % Monocytes 5 %    % Eosinophils 0 %    % Basophils 0 %    % Immature Granulocytes 1 %    NRBCs per 100 WBC 0 <1 /100    Absolute Neutrophils 17.5 (H) 1.6 - 8.3 10e3/uL    Absolute  Lymphocytes 2.3 0.8 - 5.3 10e3/uL    Absolute Monocytes 1.1 0.0 - 1.3 10e3/uL    Absolute Eosinophils 0.0 0.0 - 0.7 10e3/uL    Absolute Basophils 0.1 0.0 - 0.2 10e3/uL    Absolute Immature Granulocytes 0.1 <=0.4 10e3/uL    Absolute NRBCs 0.0 10e3/uL   UA with Microscopic reflex to Culture    Collection Time: 01/20/22  1:30 PM    Specimen: Urine, Catheter   Result Value Ref Range    Color Urine Yellow Colorless, Straw, Light Yellow, Yellow    Appearance Urine Clear Clear    Glucose Urine >1000 (A) Negative mg/dL    Bilirubin Urine Negative Negative    Ketones Urine 60  (A) Negative mg/dL    Specific Gravity Urine 1.037 (H) 1.001 - 1.030    Blood Urine 0.1 mg/dL (A) Negative    pH Urine 6.0 5.0 - 7.0    Protein Albumin Urine 600  (A) Negative mg/dL    Urobilinogen Urine <2.0 <2.0 mg/dL    Nitrite Urine Negative Negative    Leukocyte Esterase Urine Negative Negative    Mucus Urine Present (A) None Seen /LPF    RBC Urine 1 <=2 /HPF    WBC Urine 11 (H) <=5 /HPF    Squamous Epithelials Urine 1 <=1 /HPF    Hyaline Casts Urine 4 (H) <=2 /LPF    Granular Casts Urine 1 (H) None Seen /LPF   Blood Culture Line, venous    Collection Time: 01/20/22  1:42 PM    Specimen: Line, venous; Blood   Result Value Ref Range    Culture No growth after 12 hours    Blood Culture Peripheral Blood    Collection Time: 01/20/22  1:42 PM    Specimen: Peripheral Blood   Result Value Ref Range    Culture No growth after 12 hours    Lactic acid whole blood    Collection Time: 01/20/22  1:42 PM   Result Value Ref Range    Lactic Acid 2.8 (H) 0.7 - 2.0 mmol/L   Ammonia (on ice)    Collection Time: 01/20/22  1:43 PM   Result Value Ref Range    Ammonia 27 11 - 35 umol/L   Creatinine    Collection Time: 01/20/22  1:43 PM   Result Value Ref Range    Creatinine 0.99 0.60 - 1.10 mg/dL    GFR Estimate 60 (L) >60 mL/min/1.73m2   Glucose by meter    Collection Time: 01/20/22  2:14 PM   Result Value Ref Range    GLUCOSE BY METER POCT 84 70 - 99 mg/dL   Glucose  by meter    Collection Time: 01/20/22  4:50 PM   Result Value Ref Range    GLUCOSE BY METER POCT 92 70 - 99 mg/dL   Lactic acid whole blood    Collection Time: 01/20/22  6:00 PM   Result Value Ref Range    Lactic Acid 2.0 0.7 - 2.0 mmol/L   Glucose by meter    Collection Time: 01/20/22  9:05 PM   Result Value Ref Range    GLUCOSE BY METER POCT 129 (H) 70 - 99 mg/dL   CBC with platelets    Collection Time: 01/21/22  6:58 AM   Result Value Ref Range    WBC Count 18.4 (H) 4.0 - 11.0 10e3/uL    RBC Count 4.62 3.80 - 5.20 10e6/uL    Hemoglobin 14.7 11.7 - 15.7 g/dL    Hematocrit 43.7 35.0 - 47.0 %    MCV 95 78 - 100 fL    MCH 31.8 26.5 - 33.0 pg    MCHC 33.6 31.5 - 36.5 g/dL    RDW 13.6 10.0 - 15.0 %    Platelet Count 260 150 - 450 10e3/uL       PMH: History reviewed. No pertinent past medical history.        Current Medications:Scheduled Meds:    albuterol  1-2 puff Inhalation Q6H     ARIPiprazole  2 mg Oral Daily     enoxaparin ANTICOAGULANT  40 mg Subcutaneous Q24H     estradiol  1 mg Oral Daily     insulin aspart  1-7 Units Subcutaneous TID AC     insulin aspart  1-5 Units Subcutaneous At Bedtime     losartan  25 mg Oral Daily     metoprolol succinate ER  100 mg Oral BID     pantoprazole  40 mg Oral QAM     polyethylene glycol  17 g Oral Daily     [Held by provider] Pregabalin  100 mg Oral TID     sodium chloride (PF)  3 mL Intracatheter Q8H     [Held by provider] tiZANidine  4 mg Oral At Bedtime     [Held by provider] traMADol  100 mg Oral BID     Continuous Infusions:  PRN Meds:.acetaminophen **OR** acetaminophen, benztropine, glucose **OR** dextrose **OR** glucagon, hydrALAZINE, lidocaine 4%, lidocaine (buffered or not buffered), melatonin, ondansetron **OR** ondansetron, QUEtiapine, senna-docusate **OR** senna-docusate, sodium chloride (PF)                Family History: PERSONALLY REVIEWED.  Family History   Problem Relation Age of Onset     Breast Cancer No family hx of      Pertinent Family hx not pertinent to  Chief Complaint or reason for visit.     Social History:  PERSONALLY REVIEWED.  Social History     Socioeconomic History     Marital status:      Spouse name: Not on file     Number of children: Not on file     Years of education: Not on file     Highest education level: Not on file   Occupational History     Not on file   Tobacco Use     Smoking status: Former Smoker     Quit date: 9/15/2021     Years since quittin.3     Smokeless tobacco: Never Used   Substance and Sexual Activity     Alcohol use: No     Drug use: No     Sexual activity: Not on file   Other Topics Concern     Not on file   Social History Narrative     Not on file     Social Determinants of Health     Financial Resource Strain: Not on file   Food Insecurity: Not on file   Transportation Needs: Not on file   Physical Activity: Not on file   Stress: Not on file   Social Connections: Not on file   Intimate Partner Violence: Not on file   Housing Stability: Not on file    not pertinent to Chief Complaint or reason for visit.             Allergies as of 2014 Reviewed     Review of Systems:As per HPI. Remainders of 12 point review of systems negative.    Review of Pertinent Laboratory:      PERSONALLY REVIEWED.    Physical Exam: Temp:  [98  F (36.7  C)-99.1  F (37.3  C)] 98  F (36.7  C)  Pulse:  [75-90] 76  Resp:  [11-28] 18  BP: (140-193)/() 160/79  SpO2:  [94 %-98 %] 98 %   Vitals: reviewed in chart     Physical exam as per medical team: reviewed in chart      diagnoses, risk and benefits of medications discussed with staff. Care coordination with care management team.   Thank you for this consultation.       Yani Bryant; NP  [facility]        This note was created with the help of Dragon dictation system. Grammatical and typing errors are not intentional.

## 2022-01-22 ENCOUNTER — APPOINTMENT (OUTPATIENT)
Dept: PHYSICAL THERAPY | Facility: HOSPITAL | Age: 72
DRG: 092 | End: 2022-01-22
Attending: INTERNAL MEDICINE
Payer: COMMERCIAL

## 2022-01-22 ENCOUNTER — APPOINTMENT (OUTPATIENT)
Dept: INTERVENTIONAL RADIOLOGY/VASCULAR | Facility: HOSPITAL | Age: 72
DRG: 092 | End: 2022-01-22
Attending: INTERNAL MEDICINE
Payer: COMMERCIAL

## 2022-01-22 LAB
ALBUMIN SERPL-MCNC: 3.6 G/DL (ref 3.5–5)
ALP SERPL-CCNC: 75 U/L (ref 45–120)
ALT SERPL W P-5'-P-CCNC: 21 U/L (ref 0–45)
ANION GAP SERPL CALCULATED.3IONS-SCNC: 16 MMOL/L (ref 5–18)
APPEARANCE CSF: CLEAR
AST SERPL W P-5'-P-CCNC: 22 U/L (ref 0–40)
BILIRUB SERPL-MCNC: 0.9 MG/DL (ref 0–1)
BUN SERPL-MCNC: 15 MG/DL (ref 8–28)
C REACTIVE PROTEIN LHE: 3.1 MG/DL (ref 0–0.8)
CALCIUM SERPL-MCNC: 8.9 MG/DL (ref 8.5–10.5)
CHLORIDE BLD-SCNC: 102 MMOL/L (ref 98–107)
CO2 SERPL-SCNC: 20 MMOL/L (ref 22–31)
COLOR CSF: COLORLESS
CREAT SERPL-MCNC: 0.81 MG/DL (ref 0.6–1.1)
ERYTHROCYTE [DISTWIDTH] IN BLOOD BY AUTOMATED COUNT: 13.2 % (ref 10–15)
ERYTHROCYTE [SEDIMENTATION RATE] IN BLOOD BY WESTERGREN METHOD: 6 MM/HR (ref 0–20)
GFR SERPL CREATININE-BSD FRML MDRD: 77 ML/MIN/1.73M2
GLUCOSE BLD-MCNC: 306 MG/DL (ref 70–125)
GLUCOSE BLDC GLUCOMTR-MCNC: 221 MG/DL (ref 70–99)
GLUCOSE BLDC GLUCOMTR-MCNC: 241 MG/DL (ref 70–99)
GLUCOSE BLDC GLUCOMTR-MCNC: 252 MG/DL (ref 70–99)
GLUCOSE BLDC GLUCOMTR-MCNC: 306 MG/DL (ref 70–99)
GLUCOSE CSF-MCNC: 161 MG/DL (ref 40–75)
HCT VFR BLD AUTO: 43.9 % (ref 35–47)
HGB BLD-MCNC: 15.3 G/DL (ref 11.7–15.7)
HOLD SPECIMEN: NORMAL
MAGNESIUM SERPL-MCNC: 1.6 MG/DL (ref 1.8–2.6)
MCH RBC QN AUTO: 32.3 PG (ref 26.5–33)
MCHC RBC AUTO-ENTMCNC: 34.9 G/DL (ref 31.5–36.5)
MCV RBC AUTO: 93 FL (ref 78–100)
PLATELET # BLD AUTO: 267 10E3/UL (ref 150–450)
POTASSIUM BLD-SCNC: 3.2 MMOL/L (ref 3.5–5)
POTASSIUM BLD-SCNC: 4.1 MMOL/L (ref 3.5–5)
PROCALCITONIN SERPL-MCNC: 0.06 NG/ML (ref 0–0.49)
PROT CSF-MCNC: 74 MG/DL (ref 15–45)
PROT SERPL-MCNC: 6.8 G/DL (ref 6–8)
RBC # BLD AUTO: 4.73 10E6/UL (ref 3.8–5.2)
RBC # CSF MANUAL: 115 /UL (ref 0–2)
SODIUM SERPL-SCNC: 138 MMOL/L (ref 136–145)
TUBE # CSF: 4
WBC # BLD AUTO: 16.9 10E3/UL (ref 4–11)
WBC # CSF MANUAL: 5 /UL (ref 0–5)

## 2022-01-22 PROCEDURE — 36415 COLL VENOUS BLD VENIPUNCTURE: CPT | Performed by: INTERNAL MEDICINE

## 2022-01-22 PROCEDURE — 86789 WEST NILE VIRUS ANTIBODY: CPT | Performed by: PSYCHIATRY & NEUROLOGY

## 2022-01-22 PROCEDURE — 99223 1ST HOSP IP/OBS HIGH 75: CPT | Performed by: INTERNAL MEDICINE

## 2022-01-22 PROCEDURE — 99233 SBSQ HOSP IP/OBS HIGH 50: CPT | Performed by: PSYCHIATRY & NEUROLOGY

## 2022-01-22 PROCEDURE — 84157 ASSAY OF PROTEIN OTHER: CPT | Performed by: PSYCHIATRY & NEUROLOGY

## 2022-01-22 PROCEDURE — 99358 PROLONG SERVICE W/O CONTACT: CPT | Performed by: PSYCHIATRY & NEUROLOGY

## 2022-01-22 PROCEDURE — 250N000011 HC RX IP 250 OP 636: Performed by: INTERNAL MEDICINE

## 2022-01-22 PROCEDURE — 009U3ZX DRAINAGE OF SPINAL CANAL, PERCUTANEOUS APPROACH, DIAGNOSTIC: ICD-10-PCS | Performed by: RADIOLOGY

## 2022-01-22 PROCEDURE — 200N000001 HC R&B ICU

## 2022-01-22 PROCEDURE — 272N000452 HC KIT SHRLOCK 5FR POWER PICC TRIPLE LUMEN

## 2022-01-22 PROCEDURE — 97530 THERAPEUTIC ACTIVITIES: CPT | Mod: GP

## 2022-01-22 PROCEDURE — 86788 WEST NILE VIRUS AB IGM: CPT | Performed by: PSYCHIATRY & NEUROLOGY

## 2022-01-22 PROCEDURE — 250N000012 HC RX MED GY IP 250 OP 636 PS 637: Performed by: INTERNAL MEDICINE

## 2022-01-22 PROCEDURE — 84145 PROCALCITONIN (PCT): CPT | Performed by: INTERNAL MEDICINE

## 2022-01-22 PROCEDURE — 87483 CNS DNA AMP PROBE TYPE 12-25: CPT | Performed by: PSYCHIATRY & NEUROLOGY

## 2022-01-22 PROCEDURE — 85027 COMPLETE CBC AUTOMATED: CPT | Performed by: INTERNAL MEDICINE

## 2022-01-22 PROCEDURE — 89050 BODY FLUID CELL COUNT: CPT | Performed by: PSYCHIATRY & NEUROLOGY

## 2022-01-22 PROCEDURE — 82945 GLUCOSE OTHER FLUID: CPT | Performed by: PSYCHIATRY & NEUROLOGY

## 2022-01-22 PROCEDURE — 250N000011 HC RX IP 250 OP 636: Performed by: PSYCHIATRY & NEUROLOGY

## 2022-01-22 PROCEDURE — 36415 COLL VENOUS BLD VENIPUNCTURE: CPT | Performed by: STUDENT IN AN ORGANIZED HEALTH CARE EDUCATION/TRAINING PROGRAM

## 2022-01-22 PROCEDURE — 87205 SMEAR GRAM STAIN: CPT | Performed by: PSYCHIATRY & NEUROLOGY

## 2022-01-22 PROCEDURE — 250N000013 HC RX MED GY IP 250 OP 250 PS 637: Performed by: FAMILY MEDICINE

## 2022-01-22 PROCEDURE — 258N000003 HC RX IP 258 OP 636: Performed by: PSYCHIATRY & NEUROLOGY

## 2022-01-22 PROCEDURE — 85652 RBC SED RATE AUTOMATED: CPT | Performed by: INTERNAL MEDICINE

## 2022-01-22 PROCEDURE — 62328 DX LMBR SPI PNXR W/FLUOR/CT: CPT

## 2022-01-22 PROCEDURE — 84132 ASSAY OF SERUM POTASSIUM: CPT | Performed by: STUDENT IN AN ORGANIZED HEALTH CARE EDUCATION/TRAINING PROGRAM

## 2022-01-22 PROCEDURE — 97161 PT EVAL LOW COMPLEX 20 MIN: CPT | Mod: GP

## 2022-01-22 PROCEDURE — 87529 HSV DNA AMP PROBE: CPT | Performed by: PSYCHIATRY & NEUROLOGY

## 2022-01-22 PROCEDURE — 258N000003 HC RX IP 258 OP 636: Performed by: INTERNAL MEDICINE

## 2022-01-22 PROCEDURE — 86140 C-REACTIVE PROTEIN: CPT | Performed by: INTERNAL MEDICINE

## 2022-01-22 PROCEDURE — 99233 SBSQ HOSP IP/OBS HIGH 50: CPT | Performed by: INTERNAL MEDICINE

## 2022-01-22 PROCEDURE — 999N000157 HC STATISTIC RCP TIME EA 10 MIN

## 2022-01-22 PROCEDURE — 83735 ASSAY OF MAGNESIUM: CPT | Performed by: INTERNAL MEDICINE

## 2022-01-22 PROCEDURE — 80366 DRUG SCREENING PREGABALIN: CPT | Performed by: PSYCHIATRY & NEUROLOGY

## 2022-01-22 PROCEDURE — 84132 ASSAY OF SERUM POTASSIUM: CPT | Performed by: INTERNAL MEDICINE

## 2022-01-22 PROCEDURE — 250N000013 HC RX MED GY IP 250 OP 250 PS 637: Performed by: INTERNAL MEDICINE

## 2022-01-22 PROCEDURE — 36569 INSJ PICC 5 YR+ W/O IMAGING: CPT

## 2022-01-22 PROCEDURE — 250N000009 HC RX 250: Performed by: INTERNAL MEDICINE

## 2022-01-22 RX ORDER — NICOTINE POLACRILEX 4 MG
15-30 LOZENGE BUCCAL
Status: DISCONTINUED | OUTPATIENT
Start: 2022-01-22 | End: 2022-01-22

## 2022-01-22 RX ORDER — LIDOCAINE 40 MG/G
CREAM TOPICAL
Status: DISCONTINUED | OUTPATIENT
Start: 2022-01-22 | End: 2022-01-26 | Stop reason: HOSPADM

## 2022-01-22 RX ORDER — DEXTROSE MONOHYDRATE 25 G/50ML
25-50 INJECTION, SOLUTION INTRAVENOUS
Status: DISCONTINUED | OUTPATIENT
Start: 2022-01-22 | End: 2022-01-22

## 2022-01-22 RX ORDER — PREGABALIN 100 MG/1
100 CAPSULE ORAL 3 TIMES DAILY
Status: DISCONTINUED | OUTPATIENT
Start: 2022-01-22 | End: 2022-01-26 | Stop reason: HOSPADM

## 2022-01-22 RX ORDER — POTASSIUM CHLORIDE 1500 MG/1
40 TABLET, EXTENDED RELEASE ORAL ONCE
Status: COMPLETED | OUTPATIENT
Start: 2022-01-22 | End: 2022-01-22

## 2022-01-22 RX ORDER — CEFTRIAXONE 2 G/1
2 INJECTION, POWDER, FOR SOLUTION INTRAMUSCULAR; INTRAVENOUS EVERY 12 HOURS
Status: DISCONTINUED | OUTPATIENT
Start: 2022-01-22 | End: 2022-01-23

## 2022-01-22 RX ORDER — AMPICILLIN 2 G/1
2 INJECTION, POWDER, FOR SOLUTION INTRAVENOUS EVERY 4 HOURS
Status: DISCONTINUED | OUTPATIENT
Start: 2022-01-22 | End: 2022-01-23

## 2022-01-22 RX ORDER — MAGNESIUM OXIDE 400 MG/1
400 TABLET ORAL 2 TIMES DAILY
Status: COMPLETED | OUTPATIENT
Start: 2022-01-22 | End: 2022-01-23

## 2022-01-22 RX ORDER — LIDOCAINE 40 MG/G
CREAM TOPICAL
Status: ACTIVE | OUTPATIENT
Start: 2022-01-22 | End: 2022-01-25

## 2022-01-22 RX ADMIN — ALBUTEROL SULFATE 2 PUFF: 90 AEROSOL, METERED RESPIRATORY (INHALATION) at 04:02

## 2022-01-22 RX ADMIN — ALBUTEROL SULFATE 2 PUFF: 90 AEROSOL, METERED RESPIRATORY (INHALATION) at 22:09

## 2022-01-22 RX ADMIN — ACYCLOVIR SODIUM 600 MG: 500 INJECTION, SOLUTION INTRAVENOUS at 09:50

## 2022-01-22 RX ADMIN — METOPROLOL SUCCINATE 100 MG: 25 TABLET, EXTENDED RELEASE ORAL at 20:15

## 2022-01-22 RX ADMIN — PREGABALIN 100 MG: 100 CAPSULE ORAL at 14:22

## 2022-01-22 RX ADMIN — ALBUTEROL SULFATE 2 PUFF: 90 AEROSOL, METERED RESPIRATORY (INHALATION) at 16:30

## 2022-01-22 RX ADMIN — HYDRALAZINE HYDROCHLORIDE 10 MG: 20 INJECTION, SOLUTION INTRAMUSCULAR; INTRAVENOUS at 04:39

## 2022-01-22 RX ADMIN — PANTOPRAZOLE SODIUM 40 MG: 20 TABLET, DELAYED RELEASE ORAL at 07:56

## 2022-01-22 RX ADMIN — ESTRADIOL 1 MG: 1 TABLET ORAL at 07:56

## 2022-01-22 RX ADMIN — LOSARTAN POTASSIUM 25 MG: 25 TABLET, FILM COATED ORAL at 07:55

## 2022-01-22 RX ADMIN — LEVETIRACETAM 500 MG: 100 INJECTION, SOLUTION INTRAVENOUS at 16:30

## 2022-01-22 RX ADMIN — CEFTRIAXONE SODIUM 2 G: 2 INJECTION, POWDER, FOR SOLUTION INTRAMUSCULAR; INTRAVENOUS at 23:01

## 2022-01-22 RX ADMIN — PREGABALIN 100 MG: 100 CAPSULE ORAL at 20:14

## 2022-01-22 RX ADMIN — CEFTRIAXONE SODIUM 2 G: 2 INJECTION, POWDER, FOR SOLUTION INTRAMUSCULAR; INTRAVENOUS at 12:53

## 2022-01-22 RX ADMIN — VANCOMYCIN HYDROCHLORIDE 1500 MG: 5 INJECTION, POWDER, LYOPHILIZED, FOR SOLUTION INTRAVENOUS at 13:21

## 2022-01-22 RX ADMIN — AMPICILLIN SODIUM 2 G: 2 INJECTION, POWDER, FOR SOLUTION INTRAMUSCULAR; INTRAVENOUS at 12:44

## 2022-01-22 RX ADMIN — AMPICILLIN SODIUM 2 G: 2 INJECTION, POWDER, FOR SOLUTION INTRAMUSCULAR; INTRAVENOUS at 23:43

## 2022-01-22 RX ADMIN — METOPROLOL SUCCINATE 100 MG: 25 TABLET, EXTENDED RELEASE ORAL at 07:55

## 2022-01-22 RX ADMIN — MAGNESIUM OXIDE TAB 400 MG (241.3 MG ELEMENTAL MG) 400 MG: 400 (241.3 MG) TAB at 10:36

## 2022-01-22 RX ADMIN — HYDRALAZINE HYDROCHLORIDE 10 MG: 20 INJECTION, SOLUTION INTRAMUSCULAR; INTRAVENOUS at 23:36

## 2022-01-22 RX ADMIN — HYDRALAZINE HYDROCHLORIDE 10 MG: 20 INJECTION, SOLUTION INTRAMUSCULAR; INTRAVENOUS at 16:25

## 2022-01-22 RX ADMIN — POLYETHYLENE GLYCOL 3350 17 G: 17 POWDER, FOR SOLUTION ORAL at 07:55

## 2022-01-22 RX ADMIN — QUETIAPINE 12.5 MG: 25 TABLET, FILM COATED ORAL at 07:57

## 2022-01-22 RX ADMIN — PREGABALIN 100 MG: 100 CAPSULE ORAL at 10:37

## 2022-01-22 RX ADMIN — ACETAMINOPHEN 650 MG: 325 TABLET ORAL at 07:55

## 2022-01-22 RX ADMIN — POTASSIUM CHLORIDE 40 MEQ: 1500 TABLET, EXTENDED RELEASE ORAL at 04:39

## 2022-01-22 RX ADMIN — INSULIN ASPART 4 UNITS: 100 INJECTION, SOLUTION INTRAVENOUS; SUBCUTANEOUS at 07:55

## 2022-01-22 RX ADMIN — AMPICILLIN SODIUM 2 G: 2 INJECTION, POWDER, FOR SOLUTION INTRAMUSCULAR; INTRAVENOUS at 16:25

## 2022-01-22 RX ADMIN — ACYCLOVIR SODIUM 600 MG: 500 INJECTION, SOLUTION INTRAVENOUS at 01:16

## 2022-01-22 RX ADMIN — AMPICILLIN SODIUM 2 G: 2 INJECTION, POWDER, FOR SOLUTION INTRAMUSCULAR; INTRAVENOUS at 20:14

## 2022-01-22 RX ADMIN — ARIPIPRAZOLE 2 MG: 2 TABLET ORAL at 07:56

## 2022-01-22 RX ADMIN — ACYCLOVIR SODIUM 600 MG: 500 INJECTION, SOLUTION INTRAVENOUS at 17:04

## 2022-01-22 RX ADMIN — LEVETIRACETAM 500 MG: 100 INJECTION, SOLUTION INTRAVENOUS at 04:00

## 2022-01-22 RX ADMIN — MAGNESIUM OXIDE TAB 400 MG (241.3 MG ELEMENTAL MG) 400 MG: 400 (241.3 MG) TAB at 20:14

## 2022-01-22 RX ADMIN — LIDOCAINE HYDROCHLORIDE 1 ML: 10 INJECTION, SOLUTION EPIDURAL; INFILTRATION; INTRACAUDAL; PERINEURAL at 12:36

## 2022-01-22 RX ADMIN — INSULIN GLARGINE 20 UNITS: 100 INJECTION, SOLUTION SUBCUTANEOUS at 20:16

## 2022-01-22 ASSESSMENT — ACTIVITIES OF DAILY LIVING (ADL)
ADLS_ACUITY_SCORE: 13
ADLS_ACUITY_SCORE: 13
ADLS_ACUITY_SCORE: 14
ADLS_ACUITY_SCORE: 13
ADLS_ACUITY_SCORE: 14
ADLS_ACUITY_SCORE: 13
ADLS_ACUITY_SCORE: 14
ADLS_ACUITY_SCORE: 13
ADLS_ACUITY_SCORE: 14
ADLS_ACUITY_SCORE: 13
ADLS_ACUITY_SCORE: 14
ADLS_ACUITY_SCORE: 14
ADLS_ACUITY_SCORE: 13

## 2022-01-22 ASSESSMENT — MIFFLIN-ST. JEOR: SCORE: 1127.38

## 2022-01-22 NOTE — PLAN OF CARE
BP was high throughout the shift, managed with PRN hydralazine. No agitation noted, but when awake pt would frequently pick at EEG leads. Around 0000 short run of Vtach noted, hospitalist notified. Potassium protocol ordered and ran.    Pa Maxwell RN

## 2022-01-22 NOTE — PHARMACY-VANCOMYCIN DOSING SERVICE
Pharmacy Vancomycin Initial Note  Date of Service 2022  Patient's  1950  71 year old, female    Indication: CNS infection    Current estimated CrCl = Estimated Creatinine Clearance: 55.4 mL/min (based on SCr of 0.81 mg/dL).    Creatinine for last 3 days  2022: 11:58 AM Creatinine 1.10 mg/dL;  1:43 PM Creatinine 0.99 mg/dL  2022:  6:58 AM Creatinine 0.76 mg/dL  2022:  7:34 AM Creatinine 0.81 mg/dL    Recent Vancomycin Level(s) for last 3 days  No results found for requested labs within last 72 hours.      Vancomycin IV Administrations (past 72 hours)                   vancomycin (VANCOCIN) 1,500 mg in sodium chloride 0.9 % 250 mL intermittent infusion (mg) 1,500 mg New Bag 22 1657                Nephrotoxins and other renal medications (From now, onward)    Start     Dose/Rate Route Frequency Ordered Stop    22 1700  acyclovir (ZOVIRAX) 600 mg in sodium chloride 0.9 % 100 mL intermittent infusion         10 mg/kg × 59.5 kg (Adjusted)  100 mL/hr over 1 Hours Intravenous EVERY 8 HOURS 22 1542            Contrast Orders - past 72 hours (72h ago, onward)            Start     Dose/Rate Route Frequency Ordered Stop    22 1530  iopamidol (ISOVUE-370) solution 75 mL         75 mL Intravenous ONCE 22 1502 22 1503              Plan:  1. Start vancomycin  1500 mg IV q12h.   2. Vancomycin monitoring method: Trough (Method 1 = dosing nomogram)  3. Vancomycin therapeutic monitoring goal: 15-20 mg/L  4. Pharmacy will check vancomycin levels as appropriate in 1-3 Days.    5. Serum creatinine levels will be ordered daily for the first week of therapy and at least twice weekly for subsequent weeks.      Merline Arredondo [facility]

## 2022-01-22 NOTE — PROGRESS NOTES
Physical Therapy     01/22/22 1300   Quick Adds   Type of Visit Initial PT Evaluation   Living Environment   People in home spouse   Current Living Arrangements house   Home Accessibility stairs to enter home;stairs within home   Number of Stairs, Main Entrance 4   Stair Railings, Main Entrance railings on both sides of stairs   Number of Stairs, Within Home, Primary 8   Self-Care   Usual Activity Tolerance good   Current Activity Tolerance fair   Disability/Function   Hearing Difficulty or Deaf no   Wear Glasses or Blind yes   Vision Management glasses   Concentrating, Remembering or Making Decisions Difficulty yes   Difficulty Communicating no   Walking or Climbing Stairs Difficulty no   Dressing/Bathing Difficulty no   Doing Errands Independently Difficulty (such as shopping) yes   Errands Management Spouse    Fall history within last six months no   General Information   Onset of Illness/Injury or Date of Surgery 01/20/22   Referring Physician Merline Phelps DO   Pertinent History of Current Problem (include personal factors and/or comorbidities that impact the POC) acute encephalopathy   Existing Precautions/Restrictions fall   Cognition   Orientation Status (Cognition) oriented to;person;disoriented to;place;time   Affect/Mental Status (Cognition) WFL   Follows Commands (Cognition) WFL   Safety Deficit (Cognition) minimal deficit   Cognitive Status Comments required occasional cuing to maintain focus and complete tasks.     Pain Assessment   Patient Currently in Pain No   Range of Motion (ROM)   ROM Quick Adds ROM WFL   Strength   Manual Muscle Testing Quick Adds Strength WFL   Bed Mobility   Bed Mobility supine-sit   Supine-Sit [place] (Bed Mobility) moderate assist (50% patient effort)   Bed Mobility Limitations decreased ability to use legs for bridging/pushing;decreased ability to use arms for pushing/pulling   Impairments Contributing to Impaired Bed Mobility impaired motor control    Assistive Device (Bed Mobility) bed rails;draw sheet   Transfers   Transfers sit-stand transfer   Sit-Stand Transfer   Sit-Stand [place] (Transfers) contact guard   Assistive Device (Sit-Stand Transfers) walker, front-wheeled   Gait/Stairs (Locomotion)   [place] Level (Gait) contact guard   Assistive Device (Gait) walker, front-wheeled   Distance in Feet (Required for LE Total Joints) 5'   Pattern (Gait) step-through   Deviations/Abnormal Patterns (Gait) stride length decreased;rosa decreased   Balance   Balance other (describe)   Sitting Balance: Static normal balance   Standing Balance: Static fair balance  (utilized UE support from FWW)   Balance Quick Add Sitting balance: Static;Standing balance: static   Clinical Impression   Criteria for Skilled Therapeutic Intervention yes, treatment indicated   PT Diagnosis (PT) impaired functional mobility    Influenced by the following impairments limited endurance, decreased strength and coordination   Functional limitations due to impairments difficulty with gait and stairs   Clinical Presentation Evolving/Changing   Clinical Presentation Rationale Presents as diagnosed   Clinical Decision Making (Complexity) low complexity   Therapy Frequency (PT) Daily   Predicted Duration of Therapy Intervention (days/wks) 5 days   Planned Therapy Interventions (PT) bed mobility training;gait training;stair training;strengthening   Risk & Benefits of therapy have been explained evaluation/treatment results reviewed;participants included;patient   PT Discharge Planning    PT Discharge Recommendation (DC Rec) Transitional Care Facility;home with assist   PT Rationale for DC Rec requires assist of 1/CGA for most functional tasks and would need 24 hour assist at this time due to decreased safety awareness.    Total Evaluation Time   Total Evaluation Time (Minutes) 10     Starla Ny, PT

## 2022-01-22 NOTE — PROCEDURES
Interventional Neuroradiology Post Procedure    Patient Name: Paige Mari  MRN: [id number]  Date of Procedure: January 22, 2022    Procedure: lumbar puncture  Radiologist: Tan Da Silva MD    Fluoro Time: 1.2 minutes  Air Kerma: 78 mGy    EBL: trace  Complications: none    Patient reevaluated immediately prior to sedation and prior to procedure.    Preliminary Report:   (See dictation for full detail)  LP at L3-4 yields 15 ml clear CSF    Assess/Plan:  Bedrest for one hour  Samples to lab with orders as per requesting FRANCISCO JAVIER Da Silva MD   Emergency pager: [phone]  Office: [phone]

## 2022-01-22 NOTE — PROGRESS NOTES
EEG stopped at 1330 per Dr. Cm. Patient is still confused but more responsive this afternoon. Patient is calm and pleasant, joking with 1:1 and nursing staff.

## 2022-01-22 NOTE — PROGRESS NOTES
[facility]    Medicine Progress Note - Hospitalist Service    Date of Admission:  1/20/2022    Assessment & Plan            71 year old female with a history of  DM 2, previous episodes encephalopathy/delirium, atrial fibrillation-not on anticoagulation, COPD, marijuana use, mood disorder, SBO, chronic pain who presents with acute encephalopathy.      1.Acute metabolic encephalopathy  -she has had this in the past  -complex issues with pain meds  -EEG abnl and now on continuous EEG per neuro  -still needs LP--I have placed order and placed call to radiology this am  -on acyclovir per neuro til tap--should get tap with IR today--I did talk to them  -with ongoing leukocytosis would add vanco and ceftriaxone now too  -get ID to see too    -CT and mri neg  -neuro  -psych  -Id to see    -holding Tramadol with risk sz    2.Leukocytosis  -with concern possible CNS infection  -get LP today  -on acyclovir, need to check PCR HSV on LP  -would cover her with vanco/ceftriaxone/amp too at least til see LP  -get ID to see    3.Mild acute kidney insufficiency  -watch Creat close    4.Hypokalemia/hypomag  -replace            5.Diabetes type 2 with hyperglycemia  -uncontrolled  -concern she was not filling meds  -for now start lantus 20  -sliding scale and carb count       6.Essential hypertension              BP improved since admission              Reportedly on clonidine patch, metoprolol, olmesartan              Holding clonidine patch.                Decreased dose of metoprolol ordered,                losartan switched to losartan      7.History of atrial fibrillation  - Diagnosed during last admission with SBO.   - Was initiated on metoprolol  mg twice daily on last discharge note.  No anticoagulation was ordered.  -here on DVT prevent with lovenox--hold for LP     8.Chronic pain syndrome  - Holding home tizanidine and Tramadol  -ok restart Lyrica per neuro  -must hold Tramadol for risk  Sz     9.GERD-continue home omeprazole     10.Mood disorder-continue Abilify     11.COPD-no evidence of acute exacerbation              Continue home inhaler    12.Overweight--bmi 28            Diet--npo  DVT Prophylaxis: holding lovenox for LP  Gomes Catheter: Not present  Central Lines: None  Cardiac Monitoring: None  Code Status: Full Code      Disposition Plan   Expected Discharge: 01/24/2022     Anticipated discharge location:  Awaiting care coordination huddle  Delays:     OT Consult  PT Consult            The patient's care was discussed with the Bedside Nurse, Care Coordinator/ and Patient.  I tried to update daughter at her number at 1109 today---left message to update    Tamy Calvert MD  Hospitalist Service  [facility]  Securely message with the Vocera Web Console (learn more here)  Text page via [employer] Paging/Directory           ______________________________________________________________________    Interval History   She was on EEG, looked uncomfortable, confused    Data reviewed today: I reviewed all medications, new labs and imaging results over the last 24 hours. I personally reviewed no images or EKG's today.    Physical Exam   Vital Signs: Temp: 98.2  F (36.8  C) Temp src: Oral BP: (!) 147/73 Pulse: 75   Resp: 19 SpO2: 97 % O2 Device: None (Room air)    Weight: 149 lbs 14.6 oz  Constitutional: awake, fatigued, alert, uncooperative and mild distress  Eyes: sclera clear  Respiratory: no increased work of breathing, good air exchange, no retractions and clear to auscultation  Cardiovascular: Normal apical impulse, regular rate and rhythm, normal S1 and S2, no S3 or S4, and no murmur noted  GI: normal bowel sounds, soft, non-distended and non-tender  Skin: no bruising or bleeding  Musculoskeletal: no lower extremity pitting edema present  Neurologic: Mental Status Exam:  Level of Alertness:   consufsed  Motor Exam:  moves all extremities well and  symmetrically  Neuropsychiatric: General: restless  Affect: anxious    Data   Recent Labs   Lab 01/22/22  0743 01/22/22  0734 01/22/22  0254 01/21/22  2102 01/21/22  0801 01/21/22  0658 01/20/22  1414 01/20/22  1343 01/20/22  1158   WBC  --  16.9*  --   --   --  18.4*  --   --  21.0*   HGB  --  15.3  --   --   --  14.7  --   --  17.4*   MCV  --  93  --   --   --  95  --   --  93   PLT  --  267  --   --   --  260  --   --  347   NA  --  138  --   --   --  140  --   --  141   POTASSIUM  --  4.1 3.2*  --   --  3.2*  --   --  3.4*   CHLORIDE  --  102  --   --   --  105  --   --  101   CO2  --  20*  --   --   --  24  --   --  25   BUN  --  15  --   --   --  26  --   --  25   CR  --  0.81  --   --   --  0.76  --  0.99 1.10   ANIONGAP  --  16  --   --   --  11  --   --  15   BRUNO  --  8.9  --   --   --  9.5  --   --  10.9*   * 306*  --  242*   < > 161*   < >  --  155*   ALBUMIN  --  3.6  --   --   --  3.4*  --   --  4.4   PROTTOTAL  --  6.8  --   --   --  6.9  --   --  8.4*   BILITOTAL  --  0.9  --   --   --  1.2*  --   --  0.9   ALKPHOS  --  75  --   --   --  74  --   --  115   ALT  --  21  --   --   --  17  --   --  24   AST  --  22  --   --   --  21  --   --  21    < > = values in this interval not displayed.     No results found for this or any previous visit (from the past 24 hour(s)).

## 2022-01-22 NOTE — PLAN OF CARE
Problem: Adult Inpatient Plan of Care  Goal: Plan of Care Review  Outcome: No Change     Problem: Adult Inpatient Plan of Care  Goal: Patient-Specific Goal (Individualized)  Outcome: No Change     Continuous EEG started 1700, patient keeps itching, pulling on wires, patient pulled out 2 iv's earlier today so 1:1 CNA now in room.   Patient needs lots of redirection, is pleasant and cooperative but very forgetful.  No seizures noted this evening.

## 2022-01-22 NOTE — PROGRESS NOTES
Care Management Follow Up    Length of Stay (days): 2    Expected Discharge Date: 01/24/2022       Concerns to be Addressed:   Alteration in mentation, encephalopathy: ICU for continuous EEG. Neurology and ID following (planning LP). Psychiatry following. Currently receiving IV Acyclovir, IV Ampicillin, IV Rocephin, IV Vancomycin and IV Keppra.   Patient plan of care discussed at interdisciplinary rounds: Yes    Anticipated Discharge Disposition:  Discharge goal is pending response to treatment, medical needs and mobility closer to discharge.      Anticipated Discharge Services:  To be determined by destination, patient/family preferences, medical needs and mobility status closer to the time of discharge.  Anticipated Discharge DME:  To be determined.     Patient/family educated on Medicare website which has current facility and service quality ratings:  NA  Education Provided on the Discharge Plan:  Per team  Patient/Family in Agreement with the Plan:  Yes    Referrals Placed by CM/SW:  None  Private pay costs discussed: Not applicable     Additional Information:  Patient lives at home with her spouse Liam. Liam is patient's primary HCA, daughter Merline first alternate. Likely to need TCU but Merline thinks patient will refuse. Merline is worried that spouse would be unable to physically and safely care for patient at home. Awaiting therapy recommendations.     Gaviota Cooley RN

## 2022-01-22 NOTE — PROGRESS NOTES
Patient is increasingly distressed this morning, crying out in pain, not tolerating light touch like nerves are over firing. Patient also fidgeting constantly, worsening tremors in hands, biting lip then gaping her mouth open, appearance of EPS. Patient is more difficult to redirect, takes repetitive simple commands before she able to comprehend. PRN cogentin requested from pharmacy, tylenol given for pain and Seroquel for agitation.

## 2022-01-22 NOTE — CONSULTS
"Infectious Disease Consultation:  Requesting MD:  Reason for consult:      HISTORY:  Pt admitted with confusion and mildly elevated white count.  She knows her phone number and simple addition problems when asked, by me.  Her neck is supple.  There will be an LP today and she is on empiric abx.  She does not look septic.  She does have bad dentition.            Pertinent past history, past infectious disease history:     PMH/PSH:      Patient Active Problem List   Diagnosis     Benign essential hypertension     Taking Female Hormones For Postmenopausal HRT     Type 2 Diabetes Mellitus     Leukocytosis     Nicotine Dependence     Hyperthyroidism     Back Pain     Intractable headache     Knee osteoarthritis     HTN     KP on CPAP     GERD (gastroesophageal reflux disease)     Mood disorder (H)     Status post total knee replacement     Acute blood loss anemia     Spondylisthesis     Post-op pain     Accelerated hypertension     Altered mental state     Malignant hypertensive urgency     Headache     N&V (nausea and vomiting)     KP (obstructive sleep apnea)     Hypertensive emergency     Acute diverticulitis     Hyponatremia     Leukocytosis, unspecified type     Acute cystitis without hematuria     DM2     Small bowel obstruction (H)     Pneumatosis intestinalis     Hypokalemia     SIRS (systemic inflammatory response syndrome) (H)     Metabolic encephalopathy     Delirium     Atrial fibrillation with RVR (H)     JACKSON (acute kidney injury) (H)     DKA (diabetic ketoacidoses)     Lactic acidosis     COPD (chronic obstructive pulmonary disease) (H)     Hypernatremia     Hypomagnesemia     Confusion         ALLERGIES:        Allergies   Allergen Reactions     Morphine Other (See Comments)       \"make me delirious,\"  \"nightmares\"  Other reaction(s): delirium  \"make me delirious,\"  \"nightmares\"        Adhesive [Mecrylate] Other (See Comments)       Can only tolerate tape for short periods of time shelia in sensitive areas "     Amlodipine Unknown and Other (See Comments)       Other reaction(s): delerium     Avalide Other (See Comments)       Other reaction(s): hyponatremia     Doxazosin Other (See Comments)       Other reaction(s): feels foggy     Other Allergy (See Comments) [External Allergen Needs Reconciliation - See Comment] Unknown       Other reaction(s): skin rash     Prednisone Unknown       Caused extremely high blood sugars     Trazodone Unknown       Other reaction(s): hung over         MEDICATIONS:  Reviewed.      Current Facility-Administered Medications   Medication     acetaminophen (TYLENOL) tablet 650 mg     Or     acetaminophen (TYLENOL) Suppository 650 mg     albuterol (PROVENTIL HFA/VENTOLIN HFA) inhaler     ARIPiprazole (ABILIFY) tablet 2 mg     benztropine (COGENTIN) tablet 1 mg     enoxaparin ANTICOAGULANT (LOVENOX) injection 40 mg     estradiol (ESTRACE) tablet 1 mg     hydrALAZINE (APRESOLINE) injection 10 mg     lactated ringers BOLUS 1,000 mL     lidocaine (LMX4) cream     lidocaine 1 % 0.1-1 mL     losartan (COZAAR) tablet 25 mg     melatonin tablet 1 mg     metoprolol succinate ER (TOPROL-XL) 24 hr tablet 100 mg     [START ON 1/21/2022] omeprazole (priLOSEC) CR capsule 20 mg     ondansetron (ZOFRAN-ODT) ODT tab 4 mg     Or     ondansetron (ZOFRAN) injection 4 mg     piperacillin-tazobactam (ZOSYN) 3.375 g vial to attach to  mL bag     polyethylene glycol (MIRALAX) Packet 17 g     [Held by provider] pregabalin (LYRICA) capsule 100 mg     QUEtiapine (SEROquel) half-tab 12.5 mg     senna-docusate (SENOKOT-S/PERICOLACE) 8.6-50 MG per tablet 1 tablet     Or     senna-docusate (SENOKOT-S/PERICOLACE) 8.6-50 MG per tablet 2 tablet     sodium chloride (PF) 0.9% PF flush 3 mL     sodium chloride (PF) 0.9% PF flush 3 mL     [Held by provider] tiZANidine (ZANAFLEX) tablet 4 mg     [Held by provider] traMADol (ULTRAM) tablet 100 mg          Current Outpatient Medications   Medication     acetaminophen (TYLENOL)  "500 MG tablet     acetaminophen-codeine (TYLENOL #3) 300-30 MG tablet     albuterol (PROAIR HFA/PROVENTIL HFA/VENTOLIN HFA) 108 (90 Base) MCG/ACT inhaler     amoxicillin (AMOXIL) 500 MG tablet     ARIPiprazole (ABILIFY) 2 MG tablet     cetirizine (ZYRTEC) 10 MG tablet     cholecalciferol (VITAMIN D3) 25 mcg (1000 units) capsule     cloNIDine (CATAPRES-TTS) 0.3 mg/24 hr     cyanocobalamin (VITAMIN B-12) 1000 MCG tablet     diclofenac sodium (VOLTAREN) 1 % Gel     docusate sodium (COLACE) 100 MG capsule     DULoxetine (CYMBALTA) 30 MG capsule     estradiol (ESTRACE) 1 MG tablet     insulin aspart (NOVOLOG) 100 unit/mL injection     insulin degludec (TRESIBA FLEXTOUCH) 100 UNIT/ML pen     liraglutide (VICTOZA 2-TANNER) 0.6 mg/0.1 mL (18 mg/3 mL) PnIj injection     magnesium oxide (MAG-OX) 400 MG tablet     metoprolol succinate ER (TOPROL-XL) 100 MG 24 hr tablet     metoprolol succinate ER (TOPROL-XL) 100 MG 24 hr tablet     multivitamin w/minerals (THERA-VIT-M) tablet     olmesartan (BENICAR) 40 MG tablet     omeprazole (PRILOSEC) 20 MG capsule     Pregabalin (LYRICA) 200 MG capsule     tiZANidine (ZANAFLEX) 2 MG tablet     traMADol (ULTRAM) 50 mg tablet     valACYclovir (VALTREX) 500 MG tablet     blood sugar diagnostic (CONTOUR TEST STRIPS) Strp     Continuous Blood Gluc  (DEXCOM G6 ) TORRIE     Continuous Blood Gluc Sensor (DEXCOM G6 SENSOR) MISC     Continuous Blood Gluc Transmit (DEXCOM G6 TRANSMITTER) MISC     Glucagon, rDNA, (GLUCAGON EMERGENCY) 1 MG KIT     glucose (BD GLUCOSE) 5 g chewable tablet     medical cannabis (Patient's own supply)     NOVOFINE 32 32 gauge x 1/4\" Ndle         SOCIAL HISTORY:  Social History   Social History      Socioeconomic History     Marital status:        Spouse name: Not on file     Number of children: Not on file     Years of education: Not on file     Highest education level: Not on file   Occupational History     Not on file   Tobacco Use     Smoking status: " "Former Smoker       Quit date: 9/15/2021       Years since quittin.3     Smokeless tobacco: Never Used   Substance and Sexual Activity     Alcohol use: No     Drug use: No     Sexual activity: Not on file   Other Topics Concern     Not on file   Social History Narrative     Not on file      Social Determinants of Health      Financial Resource Strain: Not on file   Food Insecurity: Not on file   Transportation Needs: Not on file   Physical Activity: Not on file   Stress: Not on file   Social Connections: Not on file   Intimate Partner Violence: Not on file   Housing Stability: Not on file            FAMILY HISTORY:  Family History         Family History   Problem Relation Age of Onset     Breast Cancer No family hx of           Reviewed and non contributory       Medications:  Reviewed prior to admission meds as applicable in chart review.  Current meds are reviewed in the EMR listed MAR.     ANTIBIOTICS:    Current:acv,amp, ctx, vanco   Prior:   Allergy to:see list    SH/FH and  travel history(if applicable to consult):above    REVIEW OF SYSTEMS:  All other systems negative    EXAMINATION:  BP (!) 166/80   Pulse 74   Temp 98.9  F (37.2  C) (Oral)   Resp 23   Ht 1.549 m (5' 1\")   Wt 68 kg (149 lb 14.6 oz)   SpO2 100%   BMI 28.33 kg/m    Alert, awake  Vitals tabulated above, reviewed  HEENT:poor dentition, otherwise nl, speech and hearing nl  Neck supple without lymphadenopathy  Sclera clear  CARDIOVASCULAR regular rate and rhythm, no murmur  Lungs CLEAR TO AUSCULTATION   Abdomen soft, NT/ND, absent HEPATOSPLENOMEGALY  Skin normal  Joints normal  Neurologic exam non focal  Wound:  NA        CLINICAL DATABASE FOR---LAB/MICRO/CULTURES/IMAGING STUDIES:  Results for KRYSTEN LONDON (MRN [id number]) as of 2022 13:09   Ref. Range 2022 10:33 2022 11:58 2022 13:30 2022 13:42 2022 13:42 2022 13:43 2022 14:14 2022 16:50 2022 18:00 2022 21:05 2022 06:58 " 1/21/2022 08:01 1/21/2022 12:07 1/21/2022 17:48 1/21/2022 21:02 1/22/2022 02:54 1/22/2022 07:34 1/22/2022 07:43 1/22/2022 12:39   Sodium Latest Ref Range: 136 - 145 mmol/L  141         140      138     Potassium Latest Ref Range: 3.5 - 5.0 mmol/L  3.4 (L)         3.2 (L)     3.2 (L) 4.1     Chloride Latest Ref Range: 98 - 107 mmol/L  101         105      102     Carbon Dioxide Latest Ref Range: 22 - 31 mmol/L  25         24      20 (L)     Urea Nitrogen Latest Ref Range: 8 - 28 mg/dL  25         26      15     Creatinine Latest Ref Range: 0.60 - 1.10 mg/dL  1.10    0.99     0.76      0.81     GFR Estimate Latest Ref Range: >60 mL/min/1.73m2  53 (L)    60 (L)     83      77     Calcium Latest Ref Range: 8.5 - 10.5 mg/dL  10.9 (H)         9.5      8.9     Anion Gap Latest Ref Range: 5 - 18 mmol/L  15         11      16     Magnesium Latest Ref Range: 1.8 - 2.6 mg/dL  2.1               1.6 (L)     Albumin Latest Ref Range: 3.5 - 5.0 g/dL  4.4         3.4 (L)      3.6     Protein Total Latest Ref Range: 6.0 - 8.0 g/dL  8.4 (H)         6.9      6.8     Bilirubin Total Latest Ref Range: 0.0 - 1.0 mg/dL  0.9         1.2 (H)      0.9     Alkaline Phosphatase Latest Ref Range: 45 - 120 U/L  115         74      75     ALT Latest Ref Range: 0 - 45 U/L  24         17      21     AST Latest Ref Range: 0 - 40 U/L  21         21      22     Ammonia Latest Ref Range: 11 - 35 umol/L      27                Ketone Quantitative Latest Ref Range: <=0.3 mmol/L  0.15                    Lactic Acid Latest Ref Range: 0.7 - 2.0 mmol/L    2.8 (H)     2.0             Troponin I Latest Ref Range: 0.00 - 0.29 ng/mL  <0.01                    Glucose Latest Ref Range: 70 - 125 mg/dL  155 (H)         161 (H)      306 (H)     GLUCOSE BY METER POCT Latest Ref Range: 70 - 99 mg/dL 273 (H)      84 92  129 (H)  222 (H) 220 (H) 275 (H) 242 (H)   306 (H) 252 (H)   Ph Venous Latest Ref Range: 7.35 - 7.45   7.38                    PCO2 Venous Latest Ref  Range: 35 - 50 mm Hg  46                    PO2 Venous Latest Ref Range: 25 - 47 mm Hg  29                    O2 Sat, Venous Latest Ref Range: 70.0 - 75.0 %  57.5 (L)                    Bicarbonate Venous Latest Ref Range: 24 - 30 mmol/L  24                    Base Excess Venous Latest Ref Range:   mmol/L  1.9                    Oxyhemoglobin Venous Latest Ref Range: 70.0 - 75.0 %  56.6 (L)                    WBC Latest Ref Range: 4.0 - 11.0 10e3/uL  21.0 (H)         18.4 (H)      16.9 (H)     Hemoglobin Latest Ref Range: 11.7 - 15.7 g/dL  17.4 (H)         14.7      15.3     Hematocrit Latest Ref Range: 35.0 - 47.0 %  50.0 (H)         43.7      43.9     Platelet Count Latest Ref Range: 150 - 450 10e3/uL  347         260      267     RBC Count Latest Ref Range: 3.80 - 5.20 10e6/uL  5.38 (H)         4.62      4.73     MCV Latest Ref Range: 78 - 100 fL  93         95      93     MCH Latest Ref Range: 26.5 - 33.0 pg  32.3         31.8      32.3     MCHC Latest Ref Range: 31.5 - 36.5 g/dL  34.8         33.6      34.9     RDW Latest Ref Range: 10.0 - 15.0 %  13.1         13.6      13.2     % Neutrophils Latest Units: %  83                    % Lymphocytes Latest Units: %  11                    % Monocytes Latest Units: %  5                    % Eosinophils Latest Units: %  0                    % Basophils Latest Units: %  0                    Absolute Basophils Latest Ref Range: 0.0 - 0.2 10e3/uL  0.1                    Absolute Eosinophils Latest Ref Range: 0.0 - 0.7 10e3/uL  0.0                    Absolute Immature Granulocytes Latest Ref Range: <=0.4 10e3/uL  0.1                    Absolute Lymphocytes Latest Ref Range: 0.8 - 5.3 10e3/uL  2.3                    Absolute Monocytes Latest Ref Range: 0.0 - 1.3 10e3/uL  1.1                    % Immature Granulocytes Latest Units: %  1                    Absolute Neutrophils Latest Ref Range: 1.6 - 8.3 10e3/uL  17.5 (H)                    Absolute NRBCs Latest Units:  10e3/uL  0.0                    NRBCs per 100 WBC Latest Ref Range: <1 /100  0                    Color Urine Latest Ref Range: Colorless, Straw, Light Yellow, Yellow    Yellow                   Appearance Urine Latest Ref Range: Clear    Clear                   Glucose Urine Latest Ref Range: Negative mg/dL   >1000 (A)                   Bilirubin Urine Latest Ref Range: Negative    Negative                   Ketones Urine Latest Ref Range: Negative mg/dL   60 (A)                   Specific Gravity Urine Latest Ref Range: 1.001 - 1.030    1.037 (H)                   pH Urine Latest Ref Range: 5.0 - 7.0    6.0                   Protein Albumin Urine Latest Ref Range: Negative mg/dL   600 (A)                   Urobilinogen mg/dL Latest Ref Range: <2.0 mg/dL   <2.0                   Nitrite Urine Latest Ref Range: Negative    Negative                   Blood Urine Latest Ref Range: Negative    0.1 mg/dL (A)                   Leukocyte Esterase Urine Latest Ref Range: Negative    Negative                   WBC Urine Latest Ref Range: <=5 /HPF   11 (H)                   RBC Urine Latest Ref Range: <=2 /HPF   1                   Squamous Epithelial /HPF Urine Latest Ref Range: <=1 /HPF   1                   Mucus Urine Latest Ref Range: None Seen /LPF   Present (A)                   Hyaline Casts Latest Ref Range: <=2 /LPF   4 (H)                   Granular Casts Latest Ref Range: None Seen /LPF   1 (H)                   BLOOD CULTURE Unknown    Rpt Rpt                 URINE CULTURE Unknown   Rpt (A)                   SARS CoV2 PCR Latest Ref Range: Negative   Negative                      serrratia UTI oct 21  This admit less than 10K e coli in urine    IMPRESSION:  Confusion--not much during my visit  White count 21K admit      PLAN:  LP  Will follow  CRP, ESR, proC hallie CORMIER MD  [facility]  Office [phone]

## 2022-01-22 NOTE — PLAN OF CARE
Problem: Obstructive Sleep Apnea Risk or Actual (Comorbidity Management)  Goal: Unobstructed Breathing During Sleep  Outcome: Improving   BIPAP stby because of EEG test wires on.

## 2022-01-22 NOTE — PRE-PROCEDURE
"Interventional Neuroradiology Pre Sedation Assessment    We are asked to perform a lumbar puncture on this 71 year old female        Allergies   Allergen Reactions     Morphine Other (See Comments)     \"make me delirious,\"  \"nightmares\"  Other reaction(s): delirium  \"make me delirious,\"  \"nightmares\"       Adhesive [Mecrylate] Other (See Comments)     Can only tolerate tape for short periods of time shelia in sensitive areas     Amlodipine Unknown and Other (See Comments)     Other reaction(s): delerium     Avalide Other (See Comments)     Other reaction(s): hyponatremia     Doxazosin Other (See Comments)     Other reaction(s): feels foggy     Other Allergy (See Comments) [External Allergen Needs Reconciliation - See Comment] Unknown     Other reaction(s): skin rash     Prednisone Unknown     Caused extremely high blood sugars     Trazodone Unknown     Other reaction(s): hung over       Sedation history: Previous problems with sedation. Has had some delirium and nightmares with morphine    Exam:   BP (!) 166/80   Pulse 74   Temp 98.9  F (37.2  C) (Oral)   Resp 23   Ht 1.549 m (5' 1\")   Wt 68 kg (149 lb 14.6 oz)   SpO2 100%   BMI 28.33 kg/m    Neuro: confusion  Cardiac: A fib  Lungs: clear  Mallampati: 3  ASA: 3    Okay to proceed with planned procedure using moderate IV sedation  Procedure its risks, benefits, and alternatives were discussed with daughter.  Questions and answered and they give written and verbal consent to proceed.    Tan Da Silva MD  Emergency pager: [phone]  Office: [phone]  "

## 2022-01-23 LAB
ALBUMIN SERPL-MCNC: 3.1 G/DL (ref 3.5–5)
ALP SERPL-CCNC: 60 U/L (ref 45–120)
ALT SERPL W P-5'-P-CCNC: 17 U/L (ref 0–45)
ANION GAP SERPL CALCULATED.3IONS-SCNC: 10 MMOL/L (ref 5–18)
AST SERPL W P-5'-P-CCNC: 20 U/L (ref 0–40)
BILIRUB SERPL-MCNC: 0.8 MG/DL (ref 0–1)
BUN SERPL-MCNC: 12 MG/DL (ref 8–28)
C GATTII+NEOFOR DNA CSF QL NAA+NON-PROBE: NEGATIVE
CALCIUM SERPL-MCNC: 8.3 MG/DL (ref 8.5–10.5)
CHLORIDE BLD-SCNC: 105 MMOL/L (ref 98–107)
CMV DNA CSF QL NAA+NON-PROBE: NEGATIVE
CO2 SERPL-SCNC: 24 MMOL/L (ref 22–31)
CREAT SERPL-MCNC: 0.74 MG/DL (ref 0.6–1.1)
E COLI K1 AG CSF QL: NEGATIVE
ERYTHROCYTE [DISTWIDTH] IN BLOOD BY AUTOMATED COUNT: 13.4 % (ref 10–15)
EV RNA SPEC QL NAA+PROBE: NEGATIVE
GFR SERPL CREATININE-BSD FRML MDRD: 85 ML/MIN/1.73M2
GLUCOSE BLD-MCNC: 204 MG/DL (ref 70–125)
GLUCOSE BLDC GLUCOMTR-MCNC: 220 MG/DL (ref 70–99)
GLUCOSE BLDC GLUCOMTR-MCNC: 257 MG/DL (ref 70–99)
GLUCOSE BLDC GLUCOMTR-MCNC: 286 MG/DL (ref 70–99)
GLUCOSE BLDC GLUCOMTR-MCNC: 300 MG/DL (ref 70–99)
GP B STREP DNA CSF QL NAA+NON-PROBE: NEGATIVE
HAEM INFLU DNA CSF QL NAA+NON-PROBE: NEGATIVE
HCT VFR BLD AUTO: 42 % (ref 35–47)
HGB BLD-MCNC: 14.7 G/DL (ref 11.7–15.7)
HHV6 DNA CSF QL NAA+NON-PROBE: NEGATIVE
HSV1 DNA CSF QL NAA+NON-PROBE: NEGATIVE
HSV1 DNA CSF QL NAA+PROBE: NOT DETECTED
HSV2 DNA CSF QL NAA+NON-PROBE: NEGATIVE
HSV2 DNA CSF QL NAA+PROBE: NOT DETECTED
L MONOCYTOG DNA CSF QL NAA+NON-PROBE: NEGATIVE
LEVETIRACETAM (KEPPRA): 7.6 UG/ML (ref 6–46)
MCH RBC QN AUTO: 32.5 PG (ref 26.5–33)
MCHC RBC AUTO-ENTMCNC: 35 G/DL (ref 31.5–36.5)
MCV RBC AUTO: 93 FL (ref 78–100)
N MEN DNA CSF QL NAA+NON-PROBE: NEGATIVE
PARECHOVIRUS A RNA CSF QL NAA+NON-PROBE: NEGATIVE
PLATELET # BLD AUTO: 241 10E3/UL (ref 150–450)
POTASSIUM BLD-SCNC: 3.1 MMOL/L (ref 3.5–5)
POTASSIUM BLD-SCNC: 3.5 MMOL/L (ref 3.5–5)
PROT SERPL-MCNC: 6.1 G/DL (ref 6–8)
RBC # BLD AUTO: 4.53 10E6/UL (ref 3.8–5.2)
S PNEUM DNA CSF QL NAA+NON-PROBE: NEGATIVE
SODIUM SERPL-SCNC: 139 MMOL/L (ref 136–145)
TSH SERPL DL<=0.005 MIU/L-ACNC: 1.42 UIU/ML (ref 0.3–5)
VIT B12 SERPL-MCNC: 902 PG/ML (ref 213–816)
VZV DNA CSF QL NAA+NON-PROBE: NEGATIVE
WBC # BLD AUTO: 14.3 10E3/UL (ref 4–11)

## 2022-01-23 PROCEDURE — 258N000003 HC RX IP 258 OP 636: Performed by: INTERNAL MEDICINE

## 2022-01-23 PROCEDURE — 250N000011 HC RX IP 250 OP 636: Performed by: PSYCHIATRY & NEUROLOGY

## 2022-01-23 PROCEDURE — 84132 ASSAY OF SERUM POTASSIUM: CPT | Performed by: INTERNAL MEDICINE

## 2022-01-23 PROCEDURE — 250N000013 HC RX MED GY IP 250 OP 250 PS 637: Performed by: INTERNAL MEDICINE

## 2022-01-23 PROCEDURE — 99233 SBSQ HOSP IP/OBS HIGH 50: CPT | Performed by: PSYCHIATRY & NEUROLOGY

## 2022-01-23 PROCEDURE — 99233 SBSQ HOSP IP/OBS HIGH 50: CPT | Performed by: INTERNAL MEDICINE

## 2022-01-23 PROCEDURE — 82607 VITAMIN B-12: CPT | Performed by: PSYCHIATRY & NEUROLOGY

## 2022-01-23 PROCEDURE — 120N000001 HC R&B MED SURG/OB

## 2022-01-23 PROCEDURE — 80053 COMPREHEN METABOLIC PANEL: CPT | Performed by: INTERNAL MEDICINE

## 2022-01-23 PROCEDURE — 84443 ASSAY THYROID STIM HORMONE: CPT | Performed by: PSYCHIATRY & NEUROLOGY

## 2022-01-23 PROCEDURE — 80177 DRUG SCRN QUAN LEVETIRACETAM: CPT | Performed by: PSYCHIATRY & NEUROLOGY

## 2022-01-23 PROCEDURE — 258N000003 HC RX IP 258 OP 636: Performed by: PSYCHIATRY & NEUROLOGY

## 2022-01-23 PROCEDURE — 250N000011 HC RX IP 250 OP 636: Performed by: INTERNAL MEDICINE

## 2022-01-23 PROCEDURE — 85027 COMPLETE CBC AUTOMATED: CPT | Performed by: INTERNAL MEDICINE

## 2022-01-23 RX ORDER — LOSARTAN POTASSIUM 50 MG/1
50 TABLET ORAL DAILY
Status: DISCONTINUED | OUTPATIENT
Start: 2022-01-24 | End: 2022-01-26 | Stop reason: HOSPADM

## 2022-01-23 RX ORDER — POTASSIUM CHLORIDE 1500 MG/1
40 TABLET, EXTENDED RELEASE ORAL ONCE
Status: COMPLETED | OUTPATIENT
Start: 2022-01-23 | End: 2022-01-23

## 2022-01-23 RX ORDER — LOSARTAN POTASSIUM 25 MG/1
25 TABLET ORAL ONCE
Status: COMPLETED | OUTPATIENT
Start: 2022-01-23 | End: 2022-01-23

## 2022-01-23 RX ADMIN — POLYETHYLENE GLYCOL 3350 17 G: 17 POWDER, FOR SOLUTION ORAL at 08:12

## 2022-01-23 RX ADMIN — AMPICILLIN SODIUM 2 G: 2 INJECTION, POWDER, FOR SOLUTION INTRAMUSCULAR; INTRAVENOUS at 11:43

## 2022-01-23 RX ADMIN — ARIPIPRAZOLE 2 MG: 2 TABLET ORAL at 08:12

## 2022-01-23 RX ADMIN — AMPICILLIN SODIUM 2 G: 2 INJECTION, POWDER, FOR SOLUTION INTRAMUSCULAR; INTRAVENOUS at 03:41

## 2022-01-23 RX ADMIN — AMPICILLIN SODIUM 2 G: 2 INJECTION, POWDER, FOR SOLUTION INTRAMUSCULAR; INTRAVENOUS at 08:11

## 2022-01-23 RX ADMIN — PREGABALIN 100 MG: 100 CAPSULE ORAL at 20:17

## 2022-01-23 RX ADMIN — PANTOPRAZOLE SODIUM 40 MG: 20 TABLET, DELAYED RELEASE ORAL at 08:12

## 2022-01-23 RX ADMIN — PREGABALIN 100 MG: 100 CAPSULE ORAL at 13:02

## 2022-01-23 RX ADMIN — HYDRALAZINE HYDROCHLORIDE 10 MG: 20 INJECTION, SOLUTION INTRAMUSCULAR; INTRAVENOUS at 18:01

## 2022-01-23 RX ADMIN — LEVETIRACETAM 500 MG: 100 INJECTION, SOLUTION INTRAVENOUS at 16:03

## 2022-01-23 RX ADMIN — MAGNESIUM OXIDE TAB 400 MG (241.3 MG ELEMENTAL MG) 400 MG: 400 (241.3 MG) TAB at 20:17

## 2022-01-23 RX ADMIN — ENOXAPARIN SODIUM 40 MG: 40 INJECTION SUBCUTANEOUS at 06:29

## 2022-01-23 RX ADMIN — ACYCLOVIR SODIUM 600 MG: 500 INJECTION, SOLUTION INTRAVENOUS at 00:15

## 2022-01-23 RX ADMIN — ACETAMINOPHEN 650 MG: 325 TABLET ORAL at 03:45

## 2022-01-23 RX ADMIN — MAGNESIUM OXIDE TAB 400 MG (241.3 MG ELEMENTAL MG) 400 MG: 400 (241.3 MG) TAB at 08:12

## 2022-01-23 RX ADMIN — ACYCLOVIR SODIUM 600 MG: 500 INJECTION, SOLUTION INTRAVENOUS at 08:12

## 2022-01-23 RX ADMIN — METOPROLOL SUCCINATE 100 MG: 25 TABLET, EXTENDED RELEASE ORAL at 20:10

## 2022-01-23 RX ADMIN — POTASSIUM CHLORIDE 40 MEQ: 1500 TABLET, EXTENDED RELEASE ORAL at 04:12

## 2022-01-23 RX ADMIN — LOSARTAN POTASSIUM 25 MG: 25 TABLET, FILM COATED ORAL at 15:57

## 2022-01-23 RX ADMIN — LOSARTAN POTASSIUM 25 MG: 25 TABLET, FILM COATED ORAL at 08:12

## 2022-01-23 RX ADMIN — LEVETIRACETAM 500 MG: 100 INJECTION, SOLUTION INTRAVENOUS at 04:18

## 2022-01-23 RX ADMIN — METOPROLOL SUCCINATE 100 MG: 25 TABLET, EXTENDED RELEASE ORAL at 08:12

## 2022-01-23 RX ADMIN — ALBUTEROL SULFATE 2 PUFF: 90 AEROSOL, METERED RESPIRATORY (INHALATION) at 20:22

## 2022-01-23 RX ADMIN — ESTRADIOL 1 MG: 1 TABLET ORAL at 08:12

## 2022-01-23 RX ADMIN — VANCOMYCIN HYDROCHLORIDE 1500 MG: 5 INJECTION, POWDER, LYOPHILIZED, FOR SOLUTION INTRAVENOUS at 12:53

## 2022-01-23 RX ADMIN — ALBUTEROL SULFATE 2 PUFF: 90 AEROSOL, METERED RESPIRATORY (INHALATION) at 08:30

## 2022-01-23 RX ADMIN — ALBUTEROL SULFATE 2 PUFF: 90 AEROSOL, METERED RESPIRATORY (INHALATION) at 16:03

## 2022-01-23 RX ADMIN — PREGABALIN 100 MG: 100 CAPSULE ORAL at 08:12

## 2022-01-23 RX ADMIN — CEFTRIAXONE SODIUM 2 G: 2 INJECTION, POWDER, FOR SOLUTION INTRAMUSCULAR; INTRAVENOUS at 11:43

## 2022-01-23 RX ADMIN — VANCOMYCIN HYDROCHLORIDE 1500 MG: 5 INJECTION, POWDER, LYOPHILIZED, FOR SOLUTION INTRAVENOUS at 01:49

## 2022-01-23 ASSESSMENT — ACTIVITIES OF DAILY LIVING (ADL)
ADLS_ACUITY_SCORE: 13
ADLS_ACUITY_SCORE: 13
ADLS_ACUITY_SCORE: 12
ADLS_ACUITY_SCORE: 13
ADLS_ACUITY_SCORE: 13
ADLS_ACUITY_SCORE: 12
ADLS_ACUITY_SCORE: 13
ADLS_ACUITY_SCORE: 13
ADLS_ACUITY_SCORE: 12
ADLS_ACUITY_SCORE: 13
ADLS_ACUITY_SCORE: 13
ADLS_ACUITY_SCORE: 12
ADLS_ACUITY_SCORE: 12
ADLS_ACUITY_SCORE: 13
ADLS_ACUITY_SCORE: 12

## 2022-01-23 ASSESSMENT — MIFFLIN-ST. JEOR
SCORE: 1184.38
SCORE: 1188.1

## 2022-01-23 NOTE — PROGRESS NOTES
NEUROLOGY PROGRESS NOTE     ASSESSMENT & PLAN   Visit diagnosis: Confusion-rule out seizure    Confusion-rule out seizure      MRI brain negative for structural lesions    Initial EEG was reported to be positive for ongoing seizures    Video EEG evaluation shows generalized periodic discharges-?  Triphasic waves (prelim)    Stop video EEG monitoring    Continue Keppra    Keppra level    Check Lyrica level as that could be as that could be the cause for the patient's triphasic waves    Hold on tramadol use    Lumbar puncture later today.  Results pending.    Check B12/TSH.  Ammonia normal.  Electrolytes normal.    Monitor for lethargy and headaches after the lumbar puncture.  Currently she is improving and would recommend conservative management.    Neurology Discharge Planning : TBD    Patient Active Problem List    Diagnosis Date Noted     Confusion 01/20/2022     Priority: Medium     Hypokalemia 09/22/2021     Priority: Medium     SIRS (systemic inflammatory response syndrome) (H) 09/22/2021     Priority: Medium     Metabolic encephalopathy 09/22/2021     Priority: Medium     Delirium 09/22/2021     Priority: Medium     Atrial fibrillation with RVR (H) 09/22/2021     Priority: Medium     JACKSON (acute kidney injury) (H) 09/22/2021     Priority: Medium     DKA (diabetic ketoacidoses) 09/22/2021     Priority: Medium     Replacing diagnoses that were inactivated after the 10/1/2021 regulatory import.       Lactic acidosis 09/22/2021     Priority: Medium     COPD (chronic obstructive pulmonary disease) (H) 09/22/2021     Priority: Medium     Hypernatremia 09/22/2021     Priority: Medium     Hypomagnesemia 09/22/2021     Priority: Medium     Small bowel obstruction (H) 09/18/2021     Priority: Medium     Pneumatosis intestinalis 09/18/2021     Priority: Medium     Benign essential hypertension      Priority: Medium     Created by Conversion  Replacement Utility updated for latest IMO load         Acute diverticulitis  12/06/2016     Priority: Medium     Hyponatremia 12/06/2016     Priority: Medium     Leukocytosis, unspecified type 12/06/2016     Priority: Medium     Acute cystitis without hematuria 12/06/2016     Priority: Medium     DM2 12/06/2016     Priority: Medium     IMO SNOMED LOAD SPRING 2020 [.6/.18/.2020 9:04 PM]  Saroj Haas:           Leukocytosis      Priority: Medium     Created by Conversion  Replacement Utility updated for latest IMO load         Hyperthyroidism      Priority: Medium     Created by Conversion  Replacement Utility updated for latest IMO load         Back Pain      Priority: Medium     Created by Conversion  Replacement Utility updated for latest IMO load         Malignant hypertensive urgency 05/15/2015     Priority: Medium     Headache 05/15/2015     Priority: Medium     N&V (nausea and vomiting) 05/15/2015     Priority: Medium     KP (obstructive sleep apnea) 05/15/2015     Priority: Medium     Hypertensive emergency 05/15/2015     Priority: Medium     Type 2 Diabetes Mellitus      Priority: Medium     Created by Conversion         Altered mental state 03/14/2015     Priority: Medium     Post-op pain 03/12/2015     Priority: Medium     Accelerated hypertension 03/12/2015     Priority: Medium     Spondylisthesis 01/31/2015     Priority: Medium     Acute blood loss anemia 11/25/2014     Priority: Medium     Knee osteoarthritis 11/24/2014     Priority: Medium     HTN 11/24/2014     Priority: Medium     KP on CPAP 11/24/2014     Priority: Medium     GERD (gastroesophageal reflux disease) 11/24/2014     Priority: Medium     Mood disorder (H) 11/24/2014     Priority: Medium     Status post total knee replacement 11/24/2014     Priority: Medium     Taking Female Hormones For Postmenopausal HRT      Priority: Medium     Created by Conversion         Intractable headache 06/11/2014     Priority: Medium     Nicotine Dependence      Priority: Medium     Created by Conversion         Medical  "History  History reviewed. No pertinent past medical history.     SUBJECTIVE     Patient seen in follow-up for Dr. Celis.  Patient has a history of hypertension, diabetes, atrial fibrillation, COPD, marijuana use, chronic pain who presented with an episode of confusion.  She was not responsive at home and was staring off.  This was around dinnertime.  Glucose was very high and the neighbor helped her give her some insulin.  Due to persistent confusion patient was brought to the emergency room.  MRI brain was negative.  Blood work was noncontributory.  Ammonia was normal.  Per nursing staff patient had a similar spell in the past.  EEG was positive for seizures with some PLEDs and it was recommended she get a lumbar puncture done.  Lumbar puncture is scheduled for later today.  She is currently on video EEG monitoring for evaluation of ongoing seizures.  Remains on Keppra.  Reports no side effects of the Keppra.  Her confusion has significantly improved per the nursing staff.    Addendum:-Discussed with the nursing staff later today.  After the lumbar puncture patient was extremely lethargic and was complaining of headaches.  The later on evaluation the headaches were improving.  She was also less lethargic.  Right now we will monitor and if she has ongoing headaches could consider a blood patch tomorrow.     OBJECTIVE     Vital signs in last 24 hours  Temp:  [98  F (36.7  C)-98.9  F (37.2  C)] 98.4  F (36.9  C)  Pulse:  [69-90] 78  Resp:  [13-28] 16  BP: (130-198)/() 188/88  SpO2:  [96 %-100 %] 99 %    Review of Systems   Comprehensive review of systems done with the patient and this is negative.  Pertinent positives noted in the HPI.    PHYSICAL EXAMINATION  VITALS: BP (!) 188/88   Pulse 78   Temp 98.4  F (36.9  C) (Oral)   Resp 16   Ht 1.549 m (5' 1\")   Wt 68 kg (149 lb 14.6 oz)   SpO2 99%   BMI 28.33 kg/m    GENERAL -Health appearing, No apparent distress  EYES- No scleral icterus, no eyelid droop, " Pupils - see Neuro section  HEENT - Normocephalic, atraumatic, Hearing grossly intact; Oral mucosa moist and pink in color. External Ears and nose intact.   Neck - supple with no obvious lymphadenopathy or thyromegaly  PULM - Good spontaneous respiratory effort; Normal palpation of chest.   CV- Pedal pulses present with no peripheral edema/ No significant varicosities.  MSK- Gait - see Neuro section; Strength and tone- see Neuro section; Range of motion grossly intact.  PSYCH -cooperative    Neurological  Mental status - Patient is awake and partially oriented to self, place and time. Attention span is normal. Language is fluent and follows commands appropriately.   Cranial nerves - CN II-XII intact. Pupils are reactive and symmetric; EOMI, VFIT, NLF symmetric  Motor - There is no pronator drift. Motor exam shows 5/5 strength in all extremities.  Tone - Tone is symmetric bilaterally in upper and lower extremities.  Reflexes - Reflexes are symmetric with toes downgoing  Sensation - Sensory exam is grossly intact to light touch, pain.  Coordination - Finger to nose and heel to shin is without dysmetria.  Gait and station --unable to safely ambulate.  Formal gait testing cannot be done due to safety concerns from ongoing medical issues.       DIAGNOSTIC STUDIES     Pertinent Radiology   MRI-images reviewed  IMPRESSION:  1. No acute intracranial abnormality.  2. Mild generalized volume loss and chronic microvascular ischemic change.     CT head   IMPRESSION:  1.  No CT evidence for acute intracranial process.  2.  Brain atrophy and presumed chronic microvascular ischemic changes similar to the prior exam.    EEG reportedly positive for seizures.  Report pending      Recent Results (from the past 24 hour(s))   Potassium    Collection Time: 01/22/22  2:54 AM   Result Value Ref Range    Potassium 3.2 (L) 3.5 - 5.0 mmol/L   Extra Purple Top Tube    Collection Time: 01/22/22  2:54 AM   Result Value Ref Range    Hold Specimen  [facility]    Comprehensive metabolic panel    Collection Time: 01/22/22  7:34 AM   Result Value Ref Range    Sodium 138 136 - 145 mmol/L    Potassium 4.1 3.5 - 5.0 mmol/L    Chloride 102 98 - 107 mmol/L    Carbon Dioxide (CO2) 20 (L) 22 - 31 mmol/L    Anion Gap 16 5 - 18 mmol/L    Urea Nitrogen 15 8 - 28 mg/dL    Creatinine 0.81 0.60 - 1.10 mg/dL    Calcium 8.9 8.5 - 10.5 mg/dL    Glucose 306 (H) 70 - 125 mg/dL    Alkaline Phosphatase 75 45 - 120 U/L    AST 22 0 - 40 U/L    ALT 21 0 - 45 U/L    Protein Total 6.8 6.0 - 8.0 g/dL    Albumin 3.6 3.5 - 5.0 g/dL    Bilirubin Total 0.9 0.0 - 1.0 mg/dL    GFR Estimate 77 >60 mL/min/1.73m2   CBC with platelets    Collection Time: 01/22/22  7:34 AM   Result Value Ref Range    WBC Count 16.9 (H) 4.0 - 11.0 10e3/uL    RBC Count 4.73 3.80 - 5.20 10e6/uL    Hemoglobin 15.3 11.7 - 15.7 g/dL    Hematocrit 43.9 35.0 - 47.0 %    MCV 93 78 - 100 fL    MCH 32.3 26.5 - 33.0 pg    MCHC 34.9 31.5 - 36.5 g/dL    RDW 13.2 10.0 - 15.0 %    Platelet Count 267 150 - 450 10e3/uL   Magnesium    Collection Time: 01/22/22  7:34 AM   Result Value Ref Range    Magnesium 1.6 (L) 1.8 - 2.6 mg/dL   CRP inflammation    Collection Time: 01/22/22  7:34 AM   Result Value Ref Range    CRP 3.1 (H) 0.0-<0.8 mg/dL   Erythrocyte sedimentation rate auto    Collection Time: 01/22/22  7:34 AM   Result Value Ref Range    Erythrocyte Sedimentation Rate 6 0 - 20 mm/hr   Glucose by meter    Collection Time: 01/22/22  7:43 AM   Result Value Ref Range    GLUCOSE BY METER POCT 306 (H) 70 - 99 mg/dL   Glucose by meter    Collection Time: 01/22/22 12:39 PM   Result Value Ref Range    GLUCOSE BY METER POCT 252 (H) 70 - 99 mg/dL   Procalcitonin    Collection Time: 01/22/22  2:22 PM   Result Value Ref Range    Procalcitonin 0.06 0.00 - 0.49 ng/mL   Glucose CSF:    Collection Time: 01/22/22  3:01 PM   Result Value Ref Range    Glucose  (H) 40 - 75 mg/dL   Protein total CSF:    Collection Time: 01/22/22  3:01 PM   Result Value  Ref Range    Protein total CSF 74 (H) 15 - 45 mg/dL   Cell Count CSF    Collection Time: 01/22/22  3:01 PM   Result Value Ref Range    Tube Number 4     Color Colorless Colorless    Clarity Clear Clear    Total Nucleated Cells 5 0 - 5 /uL    RBC Count 115 (H) 0 - 2 /uL   Cerebrospinal fluid Aerobic Bacterial Culture Routine    Collection Time: 01/22/22  3:02 PM    Specimen: Lumbar Puncture; Cerebrospinal fluid   Result Value Ref Range    Gram Stain Result No organisms seen     Gram Stain Result 2+ WBC seen    Glucose by meter    Collection Time: 01/22/22  4:28 PM   Result Value Ref Range    GLUCOSE BY METER POCT 221 (H) 70 - 99 mg/dL   Glucose by meter    Collection Time: 01/22/22 11:23 PM   Result Value Ref Range    GLUCOSE BY METER POCT 241 (H) 70 - 99 mg/dL         HOSPITAL MEDICATIONS       acyclovir (ZOVIRAX) IV  10 mg/kg (Adjusted) Intravenous Q8H     albuterol  1-2 puff Inhalation Q6H     ampicillin  2 g Intravenous Q4H     ARIPiprazole  2 mg Oral Daily     cefTRIAXone  2 g Intravenous Q12H     [START ON 1/23/2022] enoxaparin ANTICOAGULANT  40 mg Subcutaneous Q24H     estradiol  1 mg Oral Daily     [START ON 1/23/2022] insulin aspart   Subcutaneous Daily with breakfast     insulin aspart   Subcutaneous Daily with lunch     insulin aspart   Subcutaneous Daily with supper     insulin aspart  1-10 Units Subcutaneous TID AC     insulin glargine  20 Units Subcutaneous At Bedtime     levETIRAcetam  500 mg Intravenous Q12H     losartan  25 mg Oral Daily     magnesium oxide  400 mg Oral BID     metoprolol succinate ER  100 mg Oral BID     pantoprazole  40 mg Oral QAM     polyethylene glycol  17 g Oral Daily     Pregabalin  100 mg Oral TID     sodium chloride (PF)  10 mL Intracatheter Q8H     sodium chloride (PF)  10-40 mL Intracatheter Q7 Days     sodium chloride (PF)  3 mL Intracatheter Q8H     [Held by provider] tiZANidine  4 mg Oral At Bedtime     [Held by provider] traMADol  100 mg Oral BID     vancomycin  1,500  mg Intravenous Q12H        Total time spent for face to face visit, reviewing labs/imaging studies, counseling and coordination of care was: Over 75 min More than 50% of this time was spent on counseling and coordination of care.    Over 45 minutes E&M time and 30 minutes non-face-to-face prolonged care time reviewing chart since patient is new to me with complex history.    Patient new to me.  Reviewing chart.  Coordination of care with the EEG technician and nursing staff.  Coordination of care with the hospitalist.  Multiple phone calls with the hospitalist and nursing staff.  Getting lumbar puncture.    Vasiliy Cm MD  Neurologist  [facility]  Tel:- [phone]

## 2022-01-23 NOTE — PROGRESS NOTES
Care Management Follow Up    Length of Stay (days): 3    Expected Discharge Date: To be determined.      Concerns to be Addressed:   Currently receiving IV Acyclovir, IV Ampicillin, IV Rocephin, IV Vancomycin and IV Keppra. Alteration in mentation (improving). Neurology, psychiatry and ID following.   Patient plan of care discussed at interdisciplinary rounds: Yes  Follow up from rounds/notes:   Per MD, ID plans to stop antibiotics and continue to monitor. Would likely benefit from home care.     Anticipated Discharge Disposition:  Therapy recommends transitional care (TCU) vs home with home care.      Anticipated Discharge Services:  Continued therapy and skilled nursing.   Anticipated Discharge DME:  To be determined.     Patient/family educated on Medicare website which has current facility and service quality ratings:  NA  Education Provided on the Discharge Plan:  Per team  Patient/Family in Agreement with the Plan:  Yes    Referrals Placed by CM/SW:  None  Private pay costs discussed: Not applicable     Additional Information:  Patient lives at home with her spouse Liam. Liam is patient's primary HCA (but may be having memory issues), daughter Merline first alternate. Likely to need TCU but Merline thinks patient will refuse. Merline is worried that spouse would be unable to physically and safely care for patient at home. Therapy recommends transitional care (TCU) vs home with home care.   2:12 PM:  Met with patient and her daughter at the bedside. Patient does not want TCU but would consider home care. Writer provided a list of local home care agencies and transitional care units (which include the medicare.gov website) for patient and family to review.    Gaviota Cooley RN

## 2022-01-23 NOTE — PLAN OF CARE
Problem: Adult Inpatient Plan of Care  Goal: Plan of Care Review  Outcome: Improving     Problem: Adult Inpatient Plan of Care  Goal: Patient-Specific Goal (Individualized)  Outcome: Improving     Patient A/oX4, much improved since yesterday. Continue to monitor closely for safety and seizure.

## 2022-01-23 NOTE — PLAN OF CARE
Pt remained alert and mostly oriented throughout the shift, sometimes forgetting the time. Headache was intermittent and manageable with repositioning and tylenol. No anxiety was noted and was able to make pleasant conversation with staff.    Pa Maxwell RN

## 2022-01-23 NOTE — PROGRESS NOTES
[facility]    Medicine Progress Note - Hospitalist Service    Date of Admission:  1/20/2022    Assessment & Plan            71 year old female with a history of  DM 2, previous episodes encephalopathy/delirium, atrial fibrillation-not on anticoagulation, COPD, marijuana use, mood disorder, SBO, chronic pain who presents with acute encephalopathy.      1.Acute metabolic encephalopathy  -she has had this in the past  -complex issues with pain meds  -EEG abnl and did 24/hr continuous EEG per neuro--done  -s/p LP--neg except some RBC  -ID took her off all antibiotics and antivirals as of 1/23/22  -she is much improved  -at this point I am suspicious that she may have had side affects from Tramadol + Zanaflex--will stop these now and not restart     -CT and mri neg  -neuro  -psych  -ID all saw     -holding Tramadol with risk sz    -check SLUMS with OT     2.Leukocytosis  -with concern possible CNS infection  -s/p LP 1/23--so far cx NTD  -ID stopped all antibiotics and antivirals meds on 1/23  -ID does not think oral surg areas from a month ago are infected at all  -monitor off antibiotoics and antivirals     3.Mild acute kidney insufficiency  -watch Creat close, stable     4.Hypokalemia/hypomag  -replaced            5.Diabetes type 2 with hyperglycemia  -uncontrolled  -concern she was not filling meds  -for now start lantus 20 here-->increase to 25 uints  -sliding scale and carb count-->increase carb count to 1:8        6.Essential hypertension              BP improved since admission              Reportedly on clonidine patch, metoprolol, olmesartan              Holding clonidine patch.                Decreased dose of metoprolol ordered 100mg bid               losartan increase to 50 mg     7.History of atrial fibrillation  - Diagnosed during last admission with SBO.   - Was initiated on metoprolol  mg twice daily on last discharge note.  No anticoagulation was ordered.  -here on DVT  prevent with lovenox     8.Chronic pain syndrome  - Holding home tizanidine and Tramadol--and would not restart  -restarted Lyrica per neuro  -must hold Tramadol for risk Sz     9.GERD-continue home omeprazole     10.Mood disorder-continue Abilify     11.COPD-no evidence of acute exacerbation              Continue home inhaler     12.Overweight--bmi 28              Diet: Moderate Consistent Carb (60 g CHO per Meal) Diet    DVT Prophylaxis: Enoxaparin (Lovenox) SQ  Gomes Catheter: Not present  Central Lines: PRESENT  PICC Triple Lumen 01/22/22 Right Basilic Vascular access-Site Assessment: WDL  Cardiac Monitoring: None  Code Status: Full Code      Disposition Plan   Expected Discharge: 01/25/2022     Anticipated discharge location:  Awaiting care coordination huddle  Delays:     PT Consult  Administering IV medications  Complex Care            The patient's care was discussed with the Bedside Nurse, Patient and Patient's Family. I met with daughter in room this afternoon to update    Tamy Calvert MD  Hospitalist Service  [facility]  Securely message with the Vocera Web Console (learn more here)  Text page via [employer] Paging/Directory           ______________________________________________________________________    Interval History   She was more awake today  She feels much better  Able to talk more and answer more questions  No pain  Able to eat  No headache  Now up to chair  Daughter here now and sees improvement, but does worry about her mom and dad both on cognition      Data reviewed today: I reviewed all medications, new labs and imaging results over the last 24 hours. I personally reviewed no images or EKG's today.    Physical Exam   Vital Signs: Temp: 98  F (36.7  C) Temp src: Oral BP: (!) 161/74 Pulse: 75   Resp: 20 SpO2: 96 % O2 Device: None (Room air)    Weight: 162 lbs 7.66 oz  Constitutional: awake, fatigued, alert, cooperative and no apparent distress  ENT: normocepalic,  without obvious abnormality, oral area molar areas no sign of infection  Respiratory: No increased work of breathing, good air exchange, clear to auscultation bilaterally, no crackles or wheezing  Cardiovascular: Normal apical impulse, regular rate and rhythm, normal S1 and S2, no S3 or S4, and no murmur noted  GI: normal bowel sounds, soft, non-distended and non-tender  Skin: no bruising or bleeding  Musculoskeletal: no lower extremity pitting edema present  Neurologic: Mental Status Exam:  Level of Alertness:   awake  Fund of Knowledge:  normal  Attention/Concentration:  normal  Language:  normal  Neuropsychiatric: General: normal, calm and normal eye contact    Data   Recent Labs   Lab 01/23/22  1141 01/23/22  0829 01/23/22  0803 01/23/22  0419 01/23/22  0341 01/22/22  0743 01/22/22  0734 01/21/22  0801 01/21/22  0658   WBC  --   --   --  14.3*  --   --  16.9*  --  18.4*   HGB  --   --   --  14.7  --   --  15.3  --  14.7   MCV  --   --   --  93  --   --  93  --  95   PLT  --   --   --  241  --   --  267  --  260   NA  --   --   --   --  139  --  138  --  140   POTASSIUM  --  3.5  --   --  3.1*  --  4.1   < > 3.2*   CHLORIDE  --   --   --   --  105  --  102  --  105   CO2  --   --   --   --  24  --  20*  --  24   BUN  --   --   --   --  12  --  15  --  26   CR  --   --   --   --  0.74  --  0.81  --  0.76   ANIONGAP  --   --   --   --  10  --  16  --  11   BRUNO  --   --   --   --  8.3*  --  8.9  --  9.5   *  --  220*  --  204*   < > 306*   < > 161*   ALBUMIN  --   --   --   --  3.1*  --  3.6  --  3.4*   PROTTOTAL  --   --   --   --  6.1  --  6.8  --  6.9   BILITOTAL  --   --   --   --  0.8  --  0.9  --  1.2*   ALKPHOS  --   --   --   --  60  --  75  --  74   ALT  --   --   --   --  17  --  21  --  17   AST  --   --   --   --  20  --  22  --  21    < > = values in this interval not displayed.     Recent Results (from the past 24 hour(s))   IR Lumbar Puncture    Narrative    FLUOROSCOPICALLY-GUIDED LUMBAR  PUNCTURE  1/22/2022 2:42 PM    INDICATION: 71-year-old patient with confusion. Altered mental status. Lumbar puncture has been requested to obtain cerebrospinal fluid (CSF) for analysis.    CONSENT: The procedure and its indications, major risks, benefits, and alternatives were discussed with daughter. Risks include, but are not limited to, pain, headache, hemorrhage, infection, nerve damage, spinal cord damage, and allergic reaction.   Understanding was acknowledged and informed consent was obtained.    FLUOROSCOPIC TIME: 1.2 minutes     AIR KERMA: 78 mGy    ESTIMATED BLOOD LOSS: trace    PROCEDURE: The patient was placed in left lateral decubitus position upon the fluoroscopic table. The skin of the back was prepped and draped in sterile fashion.  Using fluoroscopic guidance, the L3-L4 level was localized. The skin was infiltrated with   1% lidocaine. Using direct fluoroscopic visualization, a 20 gauge spinal needle was advanced into the thecal sac at the L3-L4 level. 15 mL of clear CSF was passively obtained in total. This was divided between four labeled tubes. Tubes 1 through 3   contained 3 mL of CSF. Tube 4 contained 6 mL. The needle was then removed. A dressing was applied. The procedure appeared well-tolerated and was without apparent complication.      Impression    CONCLUSION:   Fluoroscopically-guided lumbar puncture yields 15 mL of clear cerebrospinal fluid.

## 2022-01-23 NOTE — PROGRESS NOTES
"[facility] Progress Note    SUBJECTIVE:  Doing good.  LP is clean.  With her daughter providing cell phone flashlight we looked at her upper mouth, she has four clean (not dry, not infected) sockets from molar pulling recent.  No abscesses noted.        REVIEW OF SYSTEMS:  Negative unless as listed above.  Social history, Family history, Medications: reviewed.    OBJECTIVE:  BP (!) 161/74 (BP Location: Left arm)   Pulse 75   Temp 98  F (36.7  C) (Oral)   Resp 20   Ht 1.549 m (5' 1\")   Wt 73.7 kg (162 lb 7.7 oz)   SpO2 96%   BMI 30.70 kg/m                PHYSICAL EXAM:  Alert, awake  Vitals tabulated above, reviewed  Neck supple without lymphadenopathy  Sclera normal color, not injected  CARDIOVASCULAR regular rate and rhythm, no murmur  Lungs CLEAR TO AUSCULTATION   Abdomen soft, NT/ND, absent HEPATOSPLENOMEGALY  Skin normal  Joints normal  Neurologic exam non focal    Antibiotics:many    Pertinent labs:  Recent Labs   Lab Test 01/23/22  0419 01/22/22  0734 01/21/22  0658   HGB 14.7 15.3 14.7    267 260   WBC 14.3* 16.9* 18.4*   MCV 93 93 95   MCHC 35.0 34.9 33.6   RDW 13.4 13.2 13.6     Recent Labs   Lab Test 01/23/22  1141 01/23/22  0803 01/23/22  0341 01/22/22  0743 01/22/22  0734 01/21/22  0801 01/21/22  0658   NA  --   --  139  --  138  --  140   CHLORIDE  --   --  105  --  102  --  105   CO2  --   --  24  --  20*  --  24   ANIONGAP  --   --  10  --  16  --  11   BUN  --   --  12  --  15  --  26   CR  --   --  0.74  --  0.81  --  0.76   BRUNO  --   --  8.3*  --  8.9  --  9.5   * 220* 204*   < > 306*   < > 161*   ALKPHOS  --   --  60  --  75  --  74   AST  --   --  20  --  22  --  21   ALT  --   --  17  --  21  --  17   PROTTOTAL  --   --  6.1  --  6.8  --  6.9   ALBUMIN  --   --  3.1*  --  3.6  --  3.4*   BILITOTAL  --   --  0.8  --  0.9  --  1.2*   GFRESTIMATED  --   --  85  --  77  --  83    < > = values in this interval not displayed.           5 wbc csf      MICROBIOLOGY " DATA:        IMAGING/RADIOLOGY:          ASSESSMENT:  Confusion  High white count  Recent dental work, no brain abscess  RECOMMENDATION:  Stop all abx, av  Observation    JANUARY Farmer MD  Office [phone] option 2 to desk staff

## 2022-01-23 NOTE — PROGRESS NOTES
Paged by nurse because pt complained of HA and neck pain and was lethargic post LP.     -  Recommended calling neurology service for recommendations.

## 2022-01-23 NOTE — PROGRESS NOTES
NEUROLOGY PROGRESS NOTE     ASSESSMENT & PLAN   Visit diagnosis: Confusion-rule out seizure    Confusion-rule out seizure      MRI brain negative for structural lesions    Initial EEG was reported to be positive for ongoing seizures    Video EEG evaluation shows generalized periodic discharges-?  Triphasic waves (prelim)    Stop video EEG monitoring    Continue Keppra    Keppra level therapeutic    Check Lyrica level as that could be as that could be the cause for the patient's triphasic waves\pending    Hold on tramadol use    Lumbar puncture results not suspicious for infection per ID.  Antibiotics/acyclovir now stopped.    B12/TSH/normal.  Ammonia normal.  Electrolytes normal.    Suspect confusion/encephalopathy related to polypharmacy    Neurology Discharge Planning : TBD; can move out of the ICU    Patient Active Problem List    Diagnosis Date Noted     Confusion 01/20/2022     Priority: Medium     Hypokalemia 09/22/2021     Priority: Medium     SIRS (systemic inflammatory response syndrome) (H) 09/22/2021     Priority: Medium     Metabolic encephalopathy 09/22/2021     Priority: Medium     Delirium 09/22/2021     Priority: Medium     Atrial fibrillation with RVR (H) 09/22/2021     Priority: Medium     JACKSON (acute kidney injury) (H) 09/22/2021     Priority: Medium     DKA (diabetic ketoacidoses) 09/22/2021     Priority: Medium     Replacing diagnoses that were inactivated after the 10/1/2021 regulatory import.       Lactic acidosis 09/22/2021     Priority: Medium     COPD (chronic obstructive pulmonary disease) (H) 09/22/2021     Priority: Medium     Hypernatremia 09/22/2021     Priority: Medium     Hypomagnesemia 09/22/2021     Priority: Medium     Small bowel obstruction (H) 09/18/2021     Priority: Medium     Pneumatosis intestinalis 09/18/2021     Priority: Medium     Benign essential hypertension      Priority: Medium     Created by Conversion  Replacement Utility updated for latest IMO load         Acute  diverticulitis 12/06/2016     Priority: Medium     Hyponatremia 12/06/2016     Priority: Medium     Leukocytosis, unspecified type 12/06/2016     Priority: Medium     Acute cystitis without hematuria 12/06/2016     Priority: Medium     DM2 12/06/2016     Priority: Medium     IMO SNOMED LOAD SPRING 2020 [.6/.18/.2020 9:04 PM]  Saroj Haas:           Leukocytosis      Priority: Medium     Created by Conversion  Replacement Utility updated for latest IMO load         Hyperthyroidism      Priority: Medium     Created by Conversion  Replacement Utility updated for latest IMO load         Back Pain      Priority: Medium     Created by Conversion  Replacement Utility updated for latest IMO load         Malignant hypertensive urgency 05/15/2015     Priority: Medium     Headache 05/15/2015     Priority: Medium     N&V (nausea and vomiting) 05/15/2015     Priority: Medium     KP (obstructive sleep apnea) 05/15/2015     Priority: Medium     Hypertensive emergency 05/15/2015     Priority: Medium     Type 2 Diabetes Mellitus      Priority: Medium     Created by Conversion         Altered mental state 03/14/2015     Priority: Medium     Post-op pain 03/12/2015     Priority: Medium     Accelerated hypertension 03/12/2015     Priority: Medium     Spondylisthesis 01/31/2015     Priority: Medium     Acute blood loss anemia 11/25/2014     Priority: Medium     Knee osteoarthritis 11/24/2014     Priority: Medium     HTN 11/24/2014     Priority: Medium     KP on CPAP 11/24/2014     Priority: Medium     GERD (gastroesophageal reflux disease) 11/24/2014     Priority: Medium     Mood disorder (H) 11/24/2014     Priority: Medium     Status post total knee replacement 11/24/2014     Priority: Medium     Taking Female Hormones For Postmenopausal HRT      Priority: Medium     Created by Conversion         Intractable headache 06/11/2014     Priority: Medium     Nicotine Dependence      Priority: Medium     Created by Conversion          Medical History  History reviewed. No pertinent past medical history.     SUBJECTIVE     Patient seen in follow-up for Dr. Celis.  Patient has a history of hypertension, diabetes, atrial fibrillation, COPD, marijuana use, chronic pain who presented with an episode of confusion.  She was not responsive at home and was staring off.  This was around dinnertime.  Glucose was very high and the neighbor helped her give her some insulin.  Due to persistent confusion patient was brought to the emergency room.  MRI brain was negative.  Blood work was noncontributory.  Ammonia was normal.  Per nursing staff patient had a similar spell in the past.  EEG was positive for seizures with some PLEDs and it was recommended she get a lumbar puncture done.  Lumbar puncture is scheduled for later today.  She is currently on video EEG monitoring for evaluation of ongoing seizures.  Remains on Keppra.  Reports no side effects of the Keppra.  Her confusion has significantly improved per the nursing staff.    Addendum:-Discussed with the nursing staff later today.  After the lumbar puncture patient was extremely lethargic and was complaining of headaches.  The later on evaluation the headaches were improving.  She was also less lethargic.  Right now we will monitor and if she has ongoing headaches could consider a blood patch tomorrow.    1/23/22  Patient seems much more clear on exam today.  Reports that she does have some short-term memory issues that this is baseline for her.  She has been having this for several years.  She will forget things that other people have told her.  She will misplace things.  Reports that confusion is not present at home.  She does not feel confused at this point.  Denies any other new symptoms.  Lumbar puncture was done.  Per infectious disease all antibiotics and acyclovir have now been stopped.     OBJECTIVE     Vital signs in last 24 hours  Temp:  [98  F (36.7  C)-98.7  F (37.1  C)] 98.1  F (36.7  " C)  Pulse:  [75-85] 79  Resp:  [13-32] 20  BP: (101-198)/() 166/80  SpO2:  [94 %-100 %] 96 %    Review of Systems   Comprehensive review of systems done with the patient and this is negative.  Pertinent positives noted in the HPI.    PHYSICAL EXAMINATION  VITALS: BP (!) 166/80   Pulse 79   Temp 98.1  F (36.7  C) (Oral)   Resp 20   Ht 1.549 m (5' 1\")   Wt 73.7 kg (162 lb 7.7 oz)   SpO2 96%   BMI 30.70 kg/m    GENERAL -Health appearing, No apparent distress  EYES- No scleral icterus, no eyelid droop, Pupils - see Neuro section  HEENT - Normocephalic, atraumatic, Hearing grossly intact; Oral mucosa moist and pink in color. External Ears and nose intact.   Neck - supple with no obvious lymphadenopathy or thyromegaly  PULM - Good spontaneous respiratory effort; Normal palpation of chest.   CV- Pedal pulses present with no peripheral edema/ No significant varicosities.  MSK- Gait - see Neuro section; Strength and tone- see Neuro section; Range of motion grossly intact.  PSYCH -cooperative    Neurological  Mental status - Patient is awake and more oriented to self, place and time. Attention span is normal. Language is fluent and follows commands appropriately.   Cranial nerves - CN II-XII intact. Pupils are reactive and symmetric; EOMI, VFIT, NLF symmetric  Motor - There is no pronator drift. Motor exam shows 5/5 strength in all extremities.  Tone - Tone is symmetric bilaterally in upper and lower extremities.  Reflexes - Reflexes are symmetric with toes downgoing  Sensation - Sensory exam is grossly intact to light touch, pain.  Coordination - Finger to nose and heel to shin is without dysmetria.  Gait and station --unable to safely ambulate.  Formal gait testing cannot be done due to safety concerns from ongoing medical issues.       DIAGNOSTIC STUDIES     Pertinent Radiology   MRI-images reviewed  IMPRESSION:  1. No acute intracranial abnormality.  2. Mild generalized volume loss and chronic microvascular " ischemic change.     CT head   IMPRESSION:  1.  No CT evidence for acute intracranial process.  2.  Brain atrophy and presumed chronic microvascular ischemic changes similar to the prior exam.    EEG reportedly positive for seizures.  Report pending    Component      Latest Ref Rng & Units 1/23/2022   Vitamin B12      213 - 816 pg/mL 902 (H)   TSH      0.30 - 5.00 uIU/mL 1.42   Levetiracetam      6.0 - 46.0 ug/mL 7.6     Component      Latest Ref Rng & Units 1/22/2022   Escherichia coli K1      Negative Negative   Haemophilus influenzae      Negative Negative   Listeria monocytogenes      Negative Negative   Neisseria meningitidis      Negative Negative   Streptococcus agalactiae (GBS)      Negative Negative   Streptococcus pneumoniae      Negative Negative   Cytomegalovirus      Negative Negative   Enterovirus by PCR      Negative Negative   Herpes Simplex Virus 1      Negative Negative   Herpes Simplex Virus 2      Negative Negative   Human Herpes Virus 6      Negative Negative   Human Parechovirus      Negative Negative   Varicella Zoster Virus      Negative Negative   Cryptococcus neoformans/gattii      Negative Negative   Tube Number       4   Color CSF      Colorless Colorless   Appearance CSF      Clear Clear   WBC CSF      0 - 5 /uL 5   RBC CSF      0 - 2 /uL 115 (H)   Herpes Simplex Type 1 CSF      Not Detected Not Detected   Herpes Simplex Type 2 CSF      Not Detected Not Detected   Glucose CSF      40 - 75 mg/dL 161 (H)   Protein Total CSF      15 - 45 mg/dL 74 (H)       Recent Results (from the past 24 hour(s))   Glucose by meter    Collection Time: 01/22/22 11:23 PM   Result Value Ref Range    GLUCOSE BY METER POCT 241 (H) 70 - 99 mg/dL   Vitamin B12    Collection Time: 01/23/22  3:40 AM   Result Value Ref Range    Vitamin B12 902 (H) 213 - 816 pg/mL   Comprehensive metabolic panel    Collection Time: 01/23/22  3:41 AM   Result Value Ref Range    Sodium 139 136 - 145 mmol/L    Potassium 3.1 (L) 3.5 -  5.0 mmol/L    Chloride 105 98 - 107 mmol/L    Carbon Dioxide (CO2) 24 22 - 31 mmol/L    Anion Gap 10 5 - 18 mmol/L    Urea Nitrogen 12 8 - 28 mg/dL    Creatinine 0.74 0.60 - 1.10 mg/dL    Calcium 8.3 (L) 8.5 - 10.5 mg/dL    Glucose 204 (H) 70 - 125 mg/dL    Alkaline Phosphatase 60 45 - 120 U/L    AST 20 0 - 40 U/L    ALT 17 0 - 45 U/L    Protein Total 6.1 6.0 - 8.0 g/dL    Albumin 3.1 (L) 3.5 - 5.0 g/dL    Bilirubin Total 0.8 0.0 - 1.0 mg/dL    GFR Estimate 85 >60 mL/min/1.73m2   TSH with free T4 reflex    Collection Time: 01/23/22  3:41 AM   Result Value Ref Range    TSH 1.42 0.30 - 5.00 uIU/mL   Keppra (Levetiracetam) Level    Collection Time: 01/23/22  3:41 AM   Result Value Ref Range    Levetiracetam 7.6 6.0 - 46.0 ug/mL   CBC with platelets    Collection Time: 01/23/22  4:19 AM   Result Value Ref Range    WBC Count 14.3 (H) 4.0 - 11.0 10e3/uL    RBC Count 4.53 3.80 - 5.20 10e6/uL    Hemoglobin 14.7 11.7 - 15.7 g/dL    Hematocrit 42.0 35.0 - 47.0 %    MCV 93 78 - 100 fL    MCH 32.5 26.5 - 33.0 pg    MCHC 35.0 31.5 - 36.5 g/dL    RDW 13.4 10.0 - 15.0 %    Platelet Count 241 150 - 450 10e3/uL   Glucose by meter    Collection Time: 01/23/22  8:03 AM   Result Value Ref Range    GLUCOSE BY METER POCT 220 (H) 70 - 99 mg/dL   Potassium    Collection Time: 01/23/22  8:29 AM   Result Value Ref Range    Potassium 3.5 3.5 - 5.0 mmol/L   Glucose by meter    Collection Time: 01/23/22 11:41 AM   Result Value Ref Range    GLUCOSE BY METER POCT 257 (H) 70 - 99 mg/dL   Glucose by meter    Collection Time: 01/23/22  3:57 PM   Result Value Ref Range    GLUCOSE BY METER POCT 286 (H) 70 - 99 mg/dL         HOSPITAL MEDICATIONS       albuterol  1-2 puff Inhalation Q6H     ARIPiprazole  2 mg Oral Daily     enoxaparin ANTICOAGULANT  40 mg Subcutaneous Q24H     estradiol  1 mg Oral Daily     insulin aspart   Subcutaneous Daily with breakfast     insulin aspart   Subcutaneous Daily with lunch     insulin aspart   Subcutaneous Daily with  supper     insulin aspart  1-10 Units Subcutaneous TID AC     insulin glargine  25 Units Subcutaneous At Bedtime     levETIRAcetam  500 mg Intravenous Q12H     [START ON 1/24/2022] losartan  50 mg Oral Daily     magnesium oxide  400 mg Oral BID     metoprolol succinate ER  100 mg Oral BID     pantoprazole  40 mg Oral QAM     polyethylene glycol  17 g Oral Daily     Pregabalin  100 mg Oral TID     sodium chloride (PF)  10 mL Intracatheter Q8H     sodium chloride (PF)  10-40 mL Intracatheter Q7 Days     sodium chloride (PF)  3 mL Intracatheter Q8H        Total time spent for face to face visit, reviewing labs/imaging studies, counseling and coordination of care was: Over 35 min More than 50% of this time was spent on counseling and coordination of care.    Reviewed lumbar puncture results.  Results and notes from multiple specialist.  Counseling patient.  Coordinating care with the nursing staff/primary team.    Vasiliy Cm MD  Neurologist  [facility]  Tel:- [phone]

## 2022-01-23 NOTE — PLAN OF CARE
Problem: Adult Inpatient Plan of Care  Goal: Plan of Care Review  Outcome: No Change     Problem: Adult Inpatient Plan of Care  Goal: Patient-Specific Goal (Individualized)  Outcome: No Change     Patient very confused and agitated, painful this morning, improved after restarted on lyrica, cleared up some in afternoon. Pleasantly confused and forgetful. Oriented to self, family, place. EEG stopped today, waiting for results.    LP today, continue to be flat due to headache, BP elevated, given hydralazine once with improvement. Bp decreased headache slightly improved (difficult to assess due to lethargy and confusion) with patient placed in reverse trendelenburg.

## 2022-01-23 NOTE — PROGRESS NOTES
Patient drowsy following LP, but has had intermittent lethargy prior to procedure. 3 hrs after laying flat, patient C/O headache back of neck and head. Notified hospitalist Dr. Calvert, then Dr. Briceño.      Patient is still flat, legs elevated.

## 2022-01-24 ENCOUNTER — APPOINTMENT (OUTPATIENT)
Dept: PHYSICAL THERAPY | Facility: HOSPITAL | Age: 72
DRG: 092 | End: 2022-01-24
Attending: INTERNAL MEDICINE
Payer: COMMERCIAL

## 2022-01-24 ENCOUNTER — APPOINTMENT (OUTPATIENT)
Dept: OCCUPATIONAL THERAPY | Facility: HOSPITAL | Age: 72
DRG: 092 | End: 2022-01-24
Attending: INTERNAL MEDICINE
Payer: COMMERCIAL

## 2022-01-24 LAB
ALBUMIN SERPL-MCNC: 3.2 G/DL (ref 3.5–5)
ALP SERPL-CCNC: 66 U/L (ref 45–120)
ALT SERPL W P-5'-P-CCNC: 17 U/L (ref 0–45)
ANION GAP SERPL CALCULATED.3IONS-SCNC: 8 MMOL/L (ref 5–18)
AST SERPL W P-5'-P-CCNC: 19 U/L (ref 0–40)
BACTERIA UR CULT: ABNORMAL
BACTERIA UR CULT: ABNORMAL
BILIRUB SERPL-MCNC: 0.9 MG/DL (ref 0–1)
BUN SERPL-MCNC: 10 MG/DL (ref 8–28)
CALCIUM SERPL-MCNC: 8.5 MG/DL (ref 8.5–10.5)
CHLORIDE BLD-SCNC: 105 MMOL/L (ref 98–107)
CO2 SERPL-SCNC: 26 MMOL/L (ref 22–31)
CREAT SERPL-MCNC: 0.72 MG/DL (ref 0.6–1.1)
ERYTHROCYTE [DISTWIDTH] IN BLOOD BY AUTOMATED COUNT: 13.3 % (ref 10–15)
GFR SERPL CREATININE-BSD FRML MDRD: 88 ML/MIN/1.73M2
GLUCOSE BLD-MCNC: 185 MG/DL (ref 70–125)
GLUCOSE BLD-MCNC: 185 MG/DL (ref 70–125)
GLUCOSE BLDC GLUCOMTR-MCNC: 168 MG/DL (ref 70–99)
GLUCOSE BLDC GLUCOMTR-MCNC: 247 MG/DL (ref 70–99)
GLUCOSE BLDC GLUCOMTR-MCNC: 272 MG/DL (ref 70–99)
GLUCOSE BLDC GLUCOMTR-MCNC: 370 MG/DL (ref 70–99)
HCT VFR BLD AUTO: 38.7 % (ref 35–47)
HGB BLD-MCNC: 13.1 G/DL (ref 11.7–15.7)
MAGNESIUM SERPL-MCNC: 1.6 MG/DL (ref 1.8–2.6)
MAGNESIUM SERPL-MCNC: 1.7 MG/DL (ref 1.8–2.6)
MCH RBC QN AUTO: 31.7 PG (ref 26.5–33)
MCHC RBC AUTO-ENTMCNC: 33.9 G/DL (ref 31.5–36.5)
MCV RBC AUTO: 94 FL (ref 78–100)
PLATELET # BLD AUTO: 222 10E3/UL (ref 150–450)
POTASSIUM BLD-SCNC: 3.1 MMOL/L (ref 3.5–5)
POTASSIUM BLD-SCNC: 3.7 MMOL/L (ref 3.5–5)
PROT SERPL-MCNC: 5.8 G/DL (ref 6–8)
RBC # BLD AUTO: 4.13 10E6/UL (ref 3.8–5.2)
SODIUM SERPL-SCNC: 139 MMOL/L (ref 136–145)
WBC # BLD AUTO: 10.1 10E3/UL (ref 4–11)

## 2022-01-24 PROCEDURE — 250N000013 HC RX MED GY IP 250 OP 250 PS 637: Performed by: INTERNAL MEDICINE

## 2022-01-24 PROCEDURE — 97116 GAIT TRAINING THERAPY: CPT | Mod: GP

## 2022-01-24 PROCEDURE — 99233 SBSQ HOSP IP/OBS HIGH 50: CPT | Performed by: STUDENT IN AN ORGANIZED HEALTH CARE EDUCATION/TRAINING PROGRAM

## 2022-01-24 PROCEDURE — 80053 COMPREHEN METABOLIC PANEL: CPT | Performed by: INTERNAL MEDICINE

## 2022-01-24 PROCEDURE — 97129 THER IVNTJ 1ST 15 MIN: CPT | Mod: GO

## 2022-01-24 PROCEDURE — 85027 COMPLETE CBC AUTOMATED: CPT | Performed by: INTERNAL MEDICINE

## 2022-01-24 PROCEDURE — 99233 SBSQ HOSP IP/OBS HIGH 50: CPT | Performed by: PSYCHIATRY & NEUROLOGY

## 2022-01-24 PROCEDURE — 120N000001 HC R&B MED SURG/OB

## 2022-01-24 PROCEDURE — 250N000011 HC RX IP 250 OP 636: Performed by: INTERNAL MEDICINE

## 2022-01-24 PROCEDURE — 82040 ASSAY OF SERUM ALBUMIN: CPT | Performed by: INTERNAL MEDICINE

## 2022-01-24 PROCEDURE — 82947 ASSAY GLUCOSE BLOOD QUANT: CPT | Performed by: INTERNAL MEDICINE

## 2022-01-24 PROCEDURE — 97535 SELF CARE MNGMENT TRAINING: CPT | Mod: GO

## 2022-01-24 PROCEDURE — 83735 ASSAY OF MAGNESIUM: CPT | Performed by: INTERNAL MEDICINE

## 2022-01-24 PROCEDURE — 99232 SBSQ HOSP IP/OBS MODERATE 35: CPT | Performed by: INTERNAL MEDICINE

## 2022-01-24 PROCEDURE — 258N000003 HC RX IP 258 OP 636: Performed by: INTERNAL MEDICINE

## 2022-01-24 PROCEDURE — 84132 ASSAY OF SERUM POTASSIUM: CPT | Performed by: INTERNAL MEDICINE

## 2022-01-24 RX ORDER — CLONIDINE 0.3 MG/24H
1 PATCH, EXTENDED RELEASE TRANSDERMAL WEEKLY
Status: DISCONTINUED | OUTPATIENT
Start: 2022-01-24 | End: 2022-01-26 | Stop reason: HOSPADM

## 2022-01-24 RX ORDER — MAGNESIUM OXIDE 400 MG/1
400 TABLET ORAL 2 TIMES DAILY
Status: DISCONTINUED | OUTPATIENT
Start: 2022-01-24 | End: 2022-01-25

## 2022-01-24 RX ORDER — MAGNESIUM OXIDE 400 MG/1
400 TABLET ORAL 2 TIMES DAILY
Status: DISCONTINUED | OUTPATIENT
Start: 2022-01-24 | End: 2022-01-24

## 2022-01-24 RX ADMIN — PANTOPRAZOLE SODIUM 40 MG: 20 TABLET, DELAYED RELEASE ORAL at 07:55

## 2022-01-24 RX ADMIN — LOSARTAN POTASSIUM 50 MG: 50 TABLET, FILM COATED ORAL at 07:54

## 2022-01-24 RX ADMIN — HYDRALAZINE HYDROCHLORIDE 10 MG: 20 INJECTION, SOLUTION INTRAMUSCULAR; INTRAVENOUS at 16:45

## 2022-01-24 RX ADMIN — ALBUTEROL SULFATE 2 PUFF: 90 AEROSOL, METERED RESPIRATORY (INHALATION) at 04:13

## 2022-01-24 RX ADMIN — MAGNESIUM OXIDE TAB 400 MG (241.3 MG ELEMENTAL MG) 400 MG: 400 (241.3 MG) TAB at 21:36

## 2022-01-24 RX ADMIN — POLYETHYLENE GLYCOL 3350 17 G: 17 POWDER, FOR SOLUTION ORAL at 08:15

## 2022-01-24 RX ADMIN — LEVETIRACETAM 500 MG: 100 INJECTION, SOLUTION INTRAVENOUS at 16:22

## 2022-01-24 RX ADMIN — ALBUTEROL SULFATE 2 PUFF: 90 AEROSOL, METERED RESPIRATORY (INHALATION) at 07:57

## 2022-01-24 RX ADMIN — PREGABALIN 100 MG: 100 CAPSULE ORAL at 07:55

## 2022-01-24 RX ADMIN — METOPROLOL SUCCINATE 100 MG: 25 TABLET, EXTENDED RELEASE ORAL at 07:54

## 2022-01-24 RX ADMIN — ESTRADIOL 1 MG: 1 TABLET ORAL at 07:55

## 2022-01-24 RX ADMIN — METOPROLOL SUCCINATE 100 MG: 25 TABLET, EXTENDED RELEASE ORAL at 21:36

## 2022-01-24 RX ADMIN — PREGABALIN 100 MG: 100 CAPSULE ORAL at 21:36

## 2022-01-24 RX ADMIN — MAGNESIUM OXIDE TAB 400 MG (241.3 MG ELEMENTAL MG) 400 MG: 400 (241.3 MG) TAB at 08:15

## 2022-01-24 RX ADMIN — ALBUTEROL SULFATE 2 PUFF: 90 AEROSOL, METERED RESPIRATORY (INHALATION) at 15:24

## 2022-01-24 RX ADMIN — ARIPIPRAZOLE 2 MG: 2 TABLET ORAL at 07:54

## 2022-01-24 RX ADMIN — LEVETIRACETAM 500 MG: 100 INJECTION, SOLUTION INTRAVENOUS at 04:13

## 2022-01-24 RX ADMIN — ENOXAPARIN SODIUM 40 MG: 40 INJECTION SUBCUTANEOUS at 06:01

## 2022-01-24 RX ADMIN — CLONIDINE 1 PATCH: 0.3 PATCH, EXTENDED RELEASE TRANSDERMAL at 11:11

## 2022-01-24 RX ADMIN — PREGABALIN 100 MG: 100 CAPSULE ORAL at 15:24

## 2022-01-24 ASSESSMENT — ACTIVITIES OF DAILY LIVING (ADL)
ADLS_ACUITY_SCORE: 12

## 2022-01-24 NOTE — PLAN OF CARE
Patient denies pain this shift.  NSR on tele.  Blood sugar high at lunch time 370, new orders.  A/O x 4.  SBA with walker, call light within reach.

## 2022-01-24 NOTE — PLAN OF CARE
Problem: Adult Inpatient Plan of Care  Goal: Optimal Comfort and Wellbeing  Outcome: Improving     Problem: Risk for Delirium  Goal: Optimal Coping  Outcome: Improving   Pt has been alert and oriented x4 this shift, denies pain and nausea. Up to bathroom with assist of 1, may need a walker to improve balance. Denies dizziness. NSR on tele monitor.

## 2022-01-24 NOTE — PLAN OF CARE
Problem: Adult Inpatient Plan of Care  Goal: Plan of Care Review  Outcome: No Change  BP has been consistently high, scheduled BP meds and PRN Hydralazie given for HTN. Pt remained alert and oriented with some forgetfulness. Denied any headache, no anxiety noted. Pt was transferred to R125 around 2040.  was notified. Belongs was sent with pt. Report given to Maira LIU RN in P1 floor.

## 2022-01-24 NOTE — PROGRESS NOTES
[facility]    Medicine Progress Note - Hospitalist Service    Date of Admission:  1/20/2022    Assessment & Plan            71 year old female with a history of  DM 2, previous episodes encephalopathy/delirium, atrial fibrillation-not on anticoagulation, COPD, marijuana use, mood disorder, SBO, chronic pain who presents with acute encephalopathy.       1.Acute metabolic encephalopathy  -she has had this in the past  -complex issues with pain meds, concern for polypharmacy here with all of her meds  -EEG abnl and did 24/hr continuous EEG per neuro--done  -s/p LP--neg except some RBC  -ID took her off all antibiotics and antivirals as of 1/23/22  -she is much improved each day now  -at this point I am suspicious that she may have had side affects from Tramadol + Zanaflex--will stop these now and not restart  Also stopped Cymbalta on admit     -CT and mri neg  -neuro  -psych  -ID all saw     -holding Tramadol with risk sz     -check SLUMS with OT     2.Leukocytosis--improving  -with concern possible CNS infection  -s/p LP 1/23--so far cx NTD  -ID stopped all antibiotics and antivirals meds on 1/23  -ID does not think oral surg areas from a month ago are infected at all  -monitor off antibiotoics and antivirals     3.Mild acute kidney insufficiency  -watch Creat close, stable     4.Hypokalemia/hypomag  -replaced            5.Diabetes type 2 with hyperglycemia  -uncontrolled  -concern she was not filling meds  -for now started lantus 20 here-->increase to 28 uints  -sliding scale and carb count-->increase carb count to 1:6        6.Essential hypertension              BP improved since admission              Reportedly on clonidine patch, metoprolol, olmesartan              Helding clonidine patch on admit--restart now, on 1/24/22.                Decreased dose of metoprolol ordered 100mg bid               losartan increased to 50 mg     7.History of atrial fibrillation  - Diagnosed during last  admission with SBO.   - Was initiated on metoprolol  mg twice daily on last discharge note.  No anticoagulation was ordered.  -here on DVT prevent with lovenox     8.Chronic pain syndrome  - Holding home tizanidine and Tramadol--and would not restart  -restarted Lyrica per neuro  -must hold Tramadol for risk Sz     9.GERD-continue home omeprazole     10.Mood disorder-continue Abilify  -stopped Cymbalta due to concerns over side affects     11.COPD-no evidence of acute exacerbation              Continue home inhaler     12.Overweight--bmi 28          Diet: Moderate Consistent Carb (60 g CHO per Meal) Diet    DVT Prophylaxis: Enoxaparin (Lovenox) SQ  Gomes Catheter: Not present  Central Lines: PRESENT  PICC Triple Lumen 01/22/22 Right Basilic Vascular access-Site Assessment: WDL  Cardiac Monitoring: None  Code Status: Full Code      Disposition Plan   Expected Discharge: 01/26/2022     Anticipated discharge location:  Awaiting care coordination huddle  Delays:     Administering IV medications  Complex Care            The patient's care was discussed with the Care Coordinator/ and Patient. I called daughter to update --left message at 1552    Tamy Calvert MD  Hospitalist Service  [facility]  Securely message with the [employer] Web Console (learn more here)  Text page via [employer] Paging/Directory           ______________________________________________________________________    Interval History   She feels ok   better today  No headache despite bp up  No pain      Data reviewed today: I reviewed all medications, new labs and imaging results over the last 24 hours. I personally reviewed no images or EKG's today.    Physical Exam   Vital Signs: Temp: 98  F (36.7  C) Temp src: Oral BP: (!) 168/85 Pulse: 69   Resp: 18 SpO2: 99 % O2 Device: None (Room air)    Weight: 163 lbs 4.8 oz  Constitutional: awake, alert, cooperative and no apparent distress  Respiratory: No increased work  of breathing, good air exchange, clear to auscultation bilaterally, no crackles or wheezing  Cardiovascular: Normal apical impulse, regular rate and rhythm, normal S1 and S2, no S3 or S4, and no murmur noted  GI: normal bowel sounds, soft, non-distended and non-tender  Skin: no bruising or bleeding  Musculoskeletal: no lower extremity pitting edema present  Neurologic: Mental Status Exam:  Level of Alertness:   awake  Neuropsychiatric: General: normal, calm and normal eye contact    Data   Recent Labs   Lab 01/24/22  1149 01/24/22  0734 01/24/22  0602 01/23/22  1141 01/23/22  0829 01/23/22  0803 01/23/22  0419 01/23/22  0341 01/22/22  0743 01/22/22  0734   WBC  --   --  10.1  --   --   --  14.3*  --   --  16.9*   HGB  --   --  13.1  --   --   --  14.7  --   --  15.3   MCV  --   --  94  --   --   --  93  --   --  93   PLT  --   --  222  --   --   --  241  --   --  267   NA  --   --  139  --   --   --   --  139  --  138   POTASSIUM  --   --  3.1*  --  3.5  --   --  3.1*  --  4.1   CHLORIDE  --   --  105  --   --   --   --  105  --  102   CO2  --   --  26  --   --   --   --  24  --  20*   BUN  --   --  10  --   --   --   --  12  --  15   CR  --   --  0.72  --   --   --   --  0.74  --  0.81   ANIONGAP  --   --  8  --   --   --   --  10  --  16   BRUNO  --   --  8.5  --   --   --   --  8.3*  --  8.9   * 168* 185*  185*   < >  --    < >  --  204*   < > 306*   ALBUMIN  --   --  3.2*  --   --   --   --  3.1*  --  3.6   PROTTOTAL  --   --  5.8*  --   --   --   --  6.1  --  6.8   BILITOTAL  --   --  0.9  --   --   --   --  0.8  --  0.9   ALKPHOS  --   --  66  --   --   --   --  60  --  75   ALT  --   --  17  --   --   --   --  17  --  21   AST  --   --  19  --   --   --   --  20  --  22    < > = values in this interval not displayed.

## 2022-01-24 NOTE — PLAN OF CARE
ICU tx at 2100. Denies pain. Alert and oriented. C/o rash on forearms, unsure on when rash started. Assist x1 with walker, pt eager to gain strength to walk independently. Pt called  to updated new room.

## 2022-01-24 NOTE — PROGRESS NOTES
[facility] ID Inpatient follow up       Patient:  Paige Mari  Date of birth 1950, Medical record number [id number]  Date of Visit:  01/24/2022  Attending Physician: Tamy Calvert MD         Assessment and Recommendations:   Assessment:  Paige Mari is a 71 year old female with   1. Type 2 diabetes mellitus, uncontrolled with last A1c of 8.6 on 1/11/2022.  2. History of recurrent UTI.  3. Acute encephalopathy we have significant leukocytosis at 21,000 predominance of neutrophils.  Possible symptoms of UTI prior to admission.  UA with possible UTI.  UC with pan susceptible E. coli.  Status post LP with findings are consistent with meningitis.  Received 72 hours of effective antibiotic for E. coli in the urine.  Leukocytosis resolved.  Mental status now back to normal.  Off antibiotics since 1/23/2022.    Recommendations:  1. Continue to monitor off antimicrobial.  2. Repeat white count tomorrow.  If rising, will treat as complicated UTI.  3. Monitor mental status.    Discussed with the patient, nursing staff.    ID will follow.    35 minutes of total care with greater than 50% coordinating care.    Kendra Lee MD.  [facility].   [facility]  Office Telephone [phone].  Fax [phone]  [facility] paging            Interval History:     HPI:  The interval history was reviewed.   New to me today.  Significant leukocytosis of 21,000 on admission.  History of recurrent UTI and poorly controlled type 2 diabetes mellitus.  Off antibiotics since yesterday.  Has received 72 hours of effective antibiotic for E. coli growing in the urine.    Pertinent cultures include:  No results found for: CULT    Recent Inflammatory Biomarkers:   Recent Labs   Lab Test 01/24/22  0602 01/23/22  0419 01/22/22  1422 01/22/22  0734 01/21/22  0658 01/20/22  1158 10/01/21  1125 09/21/21  0314 09/20/21  1543 09/17/21  2240 03/03/21  1206  "20  1721   CRP  --   --   --  3.1*  --   --   --   --   --   --  3.6* 0.5   PCAL  --   --  0.06  --   --   --   --   --  0.10  --   --   --    WBC 10.1 14.3*  --  16.9* 18.4* 21.0* 9.9   < >  --    < > 11.9* 11.5*    < > = values in this interval not displayed.            Review of Systems:   CONSTITUTIONAL:    Temp Max: Temp (24hrs), Av.2  F (36.8  C), Min:98  F (36.7  C), Max:98.6  F (37  C)   .  Negative except for findings in the HPI.           Current Medications (antimicrobials listed in bold):       albuterol  1-2 puff Inhalation Q6H     ARIPiprazole  2 mg Oral Daily     cloNIDine   Transdermal TID     cloNIDine  1 patch Transdermal Weekly     enoxaparin ANTICOAGULANT  40 mg Subcutaneous Q24H     estradiol  1 mg Oral Daily     insulin aspart   Subcutaneous Daily with breakfast     insulin aspart   Subcutaneous Daily with lunch     insulin aspart   Subcutaneous Daily with supper     insulin aspart  1-10 Units Subcutaneous TID AC     insulin glargine  28 Units Subcutaneous At Bedtime     levETIRAcetam  500 mg Intravenous Q12H     losartan  50 mg Oral Daily     magnesium oxide  400 mg Oral BID     metoprolol succinate ER  100 mg Oral BID     pantoprazole  40 mg Oral QAM     polyethylene glycol  17 g Oral Daily     Pregabalin  100 mg Oral TID     sodium chloride (PF)  10 mL Intracatheter Q8H     sodium chloride (PF)  10-40 mL Intracatheter Q7 Days     sodium chloride (PF)  3 mL Intracatheter Q8H              Allergies:     Allergies   Allergen Reactions     Morphine Other (See Comments)     \"make me delirious,\"  \"nightmares\"  Other reaction(s): delirium  \"make me delirious,\"  \"nightmares\"       Adhesive [Mecrylate] Other (See Comments)     Can only tolerate tape for short periods of time shelia in sensitive areas     Amlodipine Unknown and Other (See Comments)     Other reaction(s): delerium     Avalide Other (See Comments)     Other reaction(s): hyponatremia     Doxazosin Other (See Comments)     Other " reaction(s): feels foggy     Other Allergy (See Comments) [External Allergen Needs Reconciliation - See Comment] Unknown     Other reaction(s): skin rash     Prednisone Unknown     Caused extremely high blood sugars     Trazodone Unknown     Other reaction(s): hung over            Physical Exam:   Vitals were reviewed  Patient Vitals for the past 24 hrs:   BP Temp Temp src Pulse Resp SpO2 Weight   01/24/22 0930 (!) 168/85 -- -- -- -- -- --   01/24/22 0736 (!) 192/91 98  F (36.7  C) Oral 69 18 99 % --   01/24/22 0424 (!) 179/97 98  F (36.7  C) Oral 70 18 97 % --   01/24/22 0032 (!) 158/75 98.6  F (37  C) Oral 75 20 96 % --   01/23/22 2052 (!) 150/70 98.4  F (36.9  C) Oral 90 18 97 % --   01/23/22 2050 -- -- -- -- -- -- 74.1 kg (163 lb 4.8 oz)   01/23/22 2000 (!) 156/65 98.3  F (36.8  C) Oral 88 -- 97 % --   01/23/22 1801 (!) 175/85 -- -- -- -- -- --   01/23/22 1600 (!) 166/80 98.1  F (36.7  C) Oral 79 -- -- --       Physical Examination:  Gen: Pleasant in no acute distress.  HEENT: NCAT. EOMI. PERRL.  Neck: No bruit, JVD or thyromegaly.  Lungs: Clear to ascultation bilat with no crackles or wheezes.  Card: RRR. NSR. No RMG. Peripheral pulses present and symmetric. No edema.  Abd: Soft NT ND. No mass. Normal bowel sounds.  Skin: No rash.  Extr: No edema.  Neuro: Alert and oriented to place time and person. Cranial nerves II to XII intact. Motor and sensory intact. Normal gait.            Laboratory Data:   ID Labs:  Microbiology labs:    Urine Culture  Order: [id number]   Collected 1/20/2022  1:30 PM     Status: Final result     Visible to patient: Yes (not seen)    Specimen Information: Urine, Catheter         0 Result Notes    Culture <10,000 CFU/mL Escherichia coli Abnormal        <1,000 CFU/mL Urogenital estela            Resulting Agency: [facility]       Susceptibility     Escherichia coli     ZAID     Ampicillin 4.0 ug/mL Susceptible     Ampicillin/ Sulbactam <=2.0 ug/mL Susceptible     Cefazolin <=4.0 ug/mL Susceptible  1     Cefepime <=1.0 ug/mL Susceptible     Cefoxitin <=4.0 ug/mL Susceptible     Ceftazidime <=1.0 ug/mL Susceptible     Ceftriaxone <=1.0 ug/mL Susceptible     Ciprofloxacin <=0.25 ug/mL Susceptible     Gentamicin <=1.0 ug/mL Susceptible     Levofloxacin <=0.12 ug/mL Susceptible     Nitrofurantoin <=16.0 ug/mL Susceptible     Piperacillin/Tazobactam <=4.0 ug/mL Susceptible     Tobramycin <=1.0 ug/mL Susceptible     Trimethoprim/Sulfamethoxazole <=1/19 ug/mL Susceptible              1 Cefazolin ZAID breakpoints are for the treatment of uncomplicated urinary tract infections. For the treatment of systemic infections, please contact the laboratory for additional testing.            Specimen Collected: 01/20/22  1:30 PM Last Resulted: 01/24/22 10:35 AM                Recent Labs   Lab Test 01/22/22  0734 03/03/21  1206 02/24/20  1721   CRP 3.1* 3.6* 0.5     Recent Labs   Lab Test 01/24/22  0602 01/23/22  0419 01/22/22  0734 01/21/22  0658 01/20/22  1158 10/01/21  1125   WBC 10.1 14.3* 16.9* 18.4* 21.0* 9.9     Recent Labs   Lab Test 01/24/22  0602 01/23/22  0341 01/22/22  0734 01/21/22  0658   CR 0.72 0.74 0.81 0.76   GFRESTIMATED 88 85 77 83       Hematology Studies  Recent Labs   Lab Test 01/24/22  0602 01/23/22  0419 01/22/22  0734 01/21/22  0658 01/20/22  1158 10/01/21  1125   WBC 10.1 14.3* 16.9* 18.4* 21.0* 9.9   ANEU  --   --   --   --   --  4.8   AEOS  --   --   --   --   --  0.5   HCT 38.7 42.0 43.9 43.7 50.0* 36.5    241 267 260 347 315       Metabolic  Recent Labs   Lab Test 01/24/22  0602 01/23/22  0341 01/22/22  0734    139 138   BUN 10 12 15   CO2 26 24 20*   CR 0.72 0.74 0.81   GFRESTIMATED 88 85 77       Hepatic Studies  Recent Labs   Lab Test 01/24/22  0602 01/23/22  0341 01/22/22  0734   BILITOTAL 0.9 0.8 0.9   ALKPHOS 66 60 75   ALBUMIN 3.2* 3.1* 3.6   AST 19 20 22   ALT 17 17 21       Immunologlobulins  Recent Labs   Lab Test 01/22/22  0734 03/03/21  1206 02/24/20  1721   SED 6 10 5             Imaging Data:   Reviewed

## 2022-01-24 NOTE — PROGRESS NOTES
NEUROLOGY PROGRESS NOTE     ASSESSMENT & PLAN   Visit diagnosis: Confusion-rule out seizure    Confusion-rule out seizure  R/o encephalopathy      MRI brain negative for structural lesions    Initial EEG was reported to be positive for ongoing seizures    Video EEG evaluation shows generalized periodic discharges-?  Triphasic waves    Continue Keppra    Keppra level therapeutic    Check Lyrica level as that could be as that could be the cause for the patient's triphasic waves-pending    Hold on tramadol use    Patient reports she was not taking Lyrica at home and possibly she is unable to manage her own medications.    Lumbar puncture results not suspicious for infection per ID.  Antibiotics/acyclovir now stopped.    B12/TSH/normal.  Ammonia normal.  Electrolytes normal.    Suspect confusion/encephalopathy related to polypharmacy    Psychiatry consulted by primary team    Would like OT to do slums test    Consider repeat EEG tomorrow    Neurology Discharge Planning : TBD    Patient Active Problem List    Diagnosis Date Noted     Confusion 01/20/2022     Priority: Medium     Hypokalemia 09/22/2021     Priority: Medium     SIRS (systemic inflammatory response syndrome) (H) 09/22/2021     Priority: Medium     Metabolic encephalopathy 09/22/2021     Priority: Medium     Delirium 09/22/2021     Priority: Medium     Atrial fibrillation with RVR (H) 09/22/2021     Priority: Medium     JACKSON (acute kidney injury) (H) 09/22/2021     Priority: Medium     DKA (diabetic ketoacidoses) 09/22/2021     Priority: Medium     Replacing diagnoses that were inactivated after the 10/1/2021 regulatory import.       Lactic acidosis 09/22/2021     Priority: Medium     COPD (chronic obstructive pulmonary disease) (H) 09/22/2021     Priority: Medium     Hypernatremia 09/22/2021     Priority: Medium     Hypomagnesemia 09/22/2021     Priority: Medium     Small bowel obstruction (H) 09/18/2021     Priority: Medium     Pneumatosis intestinalis  09/18/2021     Priority: Medium     Benign essential hypertension      Priority: Medium     Created by Conversion  Replacement Utility updated for latest IMO load         Acute diverticulitis 12/06/2016     Priority: Medium     Hyponatremia 12/06/2016     Priority: Medium     Leukocytosis, unspecified type 12/06/2016     Priority: Medium     Acute cystitis without hematuria 12/06/2016     Priority: Medium     DM2 12/06/2016     Priority: Medium     IMO SNOMED LOAD SPRING 2020 [.6/.18/.2020 9:04 PM]  Anay Haasban:           Leukocytosis      Priority: Medium     Created by Conversion  Replacement Utility updated for latest IMO load         Hyperthyroidism      Priority: Medium     Created by Conversion  Replacement Utility updated for latest IMO load         Back Pain      Priority: Medium     Created by Conversion  Replacement Utility updated for latest IMO load         Malignant hypertensive urgency 05/15/2015     Priority: Medium     Headache 05/15/2015     Priority: Medium     N&V (nausea and vomiting) 05/15/2015     Priority: Medium     KP (obstructive sleep apnea) 05/15/2015     Priority: Medium     Hypertensive emergency 05/15/2015     Priority: Medium     Type 2 Diabetes Mellitus      Priority: Medium     Created by Conversion         Altered mental state 03/14/2015     Priority: Medium     Post-op pain 03/12/2015     Priority: Medium     Accelerated hypertension 03/12/2015     Priority: Medium     Spondylisthesis 01/31/2015     Priority: Medium     Acute blood loss anemia 11/25/2014     Priority: Medium     Knee osteoarthritis 11/24/2014     Priority: Medium     HTN 11/24/2014     Priority: Medium     KP on CPAP 11/24/2014     Priority: Medium     GERD (gastroesophageal reflux disease) 11/24/2014     Priority: Medium     Mood disorder (H) 11/24/2014     Priority: Medium     Status post total knee replacement 11/24/2014     Priority: Medium     Taking Female Hormones For Postmenopausal HRT      Priority:  Medium     Created by Conversion         Intractable headache 06/11/2014     Priority: Medium     Nicotine Dependence      Priority: Medium     Created by Conversion         Medical History  History reviewed. No pertinent past medical history.     SUBJECTIVE     Patient seen in follow-up for Dr. Celis.  Patient has a history of hypertension, diabetes, atrial fibrillation, COPD, marijuana use, chronic pain who presented with an episode of confusion.  She was not responsive at home and was staring off.  This was around dinnertime.  Glucose was very high and the neighbor helped her give her some insulin.  Due to persistent confusion patient was brought to the emergency room.  MRI brain was negative.  Blood work was noncontributory.  Ammonia was normal.  Per nursing staff patient had a similar spell in the past.  EEG was positive for seizures with some PLEDs and it was recommended she get a lumbar puncture done.  Lumbar puncture is scheduled for later today.  She is currently on video EEG monitoring for evaluation of ongoing seizures.  Remains on Keppra.  Reports no side effects of the Keppra.  Her confusion has significantly improved per the nursing staff.    Addendum:-Discussed with the nursing staff later today.  After the lumbar puncture patient was extremely lethargic and was complaining of headaches.  The later on evaluation the headaches were improving.  She was also less lethargic.  Right now we will monitor and if she has ongoing headaches could consider a blood patch tomorrow.    1/23/22  Patient seems much more clear on exam today.  Reports that she does have some short-term memory issues that this is baseline for her.  She has been having this for several years.  She will forget things that other people have told her.  She will misplace things.  Reports that confusion is not present at home.  She does not feel confused at this point.  Denies any other new symptoms.  Lumbar puncture was done.  Per infectious  "disease all antibiotics and acyclovir have now been stopped.    1/24/22  Patient seems much more clear on exam today.  Reports ongoing short-term memory issues of these are not worse than before.  Denies any new weakness or symptoms.  Infectious disease is keeping her off medications.  Psychiatry is not been consulted.  Patient is off tramadol.  I discussed about using Lyrica and it may be she was overdosing and she reports that she does not even take Lyrica at home.  Patient manages her medications on her own at home.  Reports that she does not overdose and is careful with her medications.     OBJECTIVE     Vital signs in last 24 hours  Temp:  [97.8  F (36.6  C)-98.6  F (37  C)] 97.8  F (36.6  C)  Pulse:  [69-90] 80  Resp:  [18-20] 18  BP: (150-192)/(65-97) 177/85  SpO2:  [96 %-100 %] 100 %    Review of Systems   Comprehensive review of systems done with the patient and this is negative.  Pertinent positives noted in the HPI.    PHYSICAL EXAMINATION  VITALS: BP (!) 177/85   Pulse 80   Temp 97.8  F (36.6  C) (Oral)   Resp 18   Ht 1.549 m (5' 1\")   Wt 74.1 kg (163 lb 4.8 oz)   SpO2 100%   BMI 30.86 kg/m    GENERAL -Health appearing, No apparent distress  EYES- No scleral icterus, no eyelid droop, Pupils - see Neuro section  HEENT - Normocephalic, atraumatic, Hearing grossly intact; Oral mucosa moist and pink in color. External Ears and nose intact.   Neck - supple with no obvious lymphadenopathy or thyromegaly  PULM - Good spontaneous respiratory effort; Normal palpation of chest.   CV- Pedal pulses present with no peripheral edema/ No significant varicosities.  MSK- Gait - see Neuro section; Strength and tone- see Neuro section; Range of motion grossly intact.  PSYCH -cooperative    Neurological  Mental status - Patient is awake and more oriented to self, place and time. Attention span is normal. Language is fluent and follows commands appropriately.   Cranial nerves - CN II-XII intact. Pupils are reactive " and symmetric; EOMI, VFIT, NLF symmetric  Motor - There is no pronator drift. Motor exam shows 5/5 strength in all extremities.  Tone - Tone is symmetric bilaterally in upper and lower extremities.  Reflexes - Reflexes are symmetric with toes downgoing  Sensation - Sensory exam is grossly intact to light touch, pain.  Coordination - Finger to nose and heel to shin is without dysmetria.  Gait and station --unable to safely ambulate.  Formal gait testing cannot be done due to safety concerns from ongoing medical issues.       DIAGNOSTIC STUDIES     Pertinent Radiology   MRI-images reviewed  IMPRESSION:  1. No acute intracranial abnormality.  2. Mild generalized volume loss and chronic microvascular ischemic change.     CT head   IMPRESSION:  1.  No CT evidence for acute intracranial process.  2.  Brain atrophy and presumed chronic microvascular ischemic changes similar to the prior exam.    EEG reportedly positive for seizures.  Report pending    IMPRESSION/REPORT/PLAN  This is an abnormal prolonged video EEG during wakefulness and drowsiness/somnolent state due to intermittent generalized periodic discharges with triphasic morphology.    These could be triphasic waves since these are more bifrontocentral prominent. The generalized slow background seen during the recording could suggest an encephalopathy or could be secondary to sedating medications. Further clinical correlation is needed.     Please note that the absence of epileptiform abnormalities does not exclude the possibility of epilepsy in any patient.     Component      Latest Ref Rng & Units 1/23/2022   Vitamin B12      213 - 816 pg/mL 902 (H)   TSH      0.30 - 5.00 uIU/mL 1.42   Levetiracetam      6.0 - 46.0 ug/mL 7.6     Component      Latest Ref Rng & Units 1/22/2022   Escherichia coli K1      Negative Negative   Haemophilus influenzae      Negative Negative   Listeria monocytogenes      Negative Negative   Neisseria meningitidis      Negative Negative    Streptococcus agalactiae (GBS)      Negative Negative   Streptococcus pneumoniae      Negative Negative   Cytomegalovirus      Negative Negative   Enterovirus by PCR      Negative Negative   Herpes Simplex Virus 1      Negative Negative   Herpes Simplex Virus 2      Negative Negative   Human Herpes Virus 6      Negative Negative   Human Parechovirus      Negative Negative   Varicella Zoster Virus      Negative Negative   Cryptococcus neoformans/gattii      Negative Negative   Tube Number       4   Color CSF      Colorless Colorless   Appearance CSF      Clear Clear   WBC CSF      0 - 5 /uL 5   RBC CSF      0 - 2 /uL 115 (H)   Herpes Simplex Type 1 CSF      Not Detected Not Detected   Herpes Simplex Type 2 CSF      Not Detected Not Detected   Glucose CSF      40 - 75 mg/dL 161 (H)   Protein Total CSF      15 - 45 mg/dL 74 (H)       Recent Results (from the past 24 hour(s))   Glucose by meter    Collection Time: 01/23/22  8:14 PM   Result Value Ref Range    GLUCOSE BY METER POCT 300 (H) 70 - 99 mg/dL   Glucose    Collection Time: 01/24/22  6:02 AM   Result Value Ref Range    Glucose 185 (H) 70 - 125 mg/dL   Magnesium    Collection Time: 01/24/22  6:02 AM   Result Value Ref Range    Magnesium 1.7 (L) 1.8 - 2.6 mg/dL   Comprehensive metabolic panel    Collection Time: 01/24/22  6:02 AM   Result Value Ref Range    Sodium 139 136 - 145 mmol/L    Potassium 3.1 (L) 3.5 - 5.0 mmol/L    Chloride 105 98 - 107 mmol/L    Carbon Dioxide (CO2) 26 22 - 31 mmol/L    Anion Gap 8 5 - 18 mmol/L    Urea Nitrogen 10 8 - 28 mg/dL    Creatinine 0.72 0.60 - 1.10 mg/dL    Calcium 8.5 8.5 - 10.5 mg/dL    Glucose 185 (H) 70 - 125 mg/dL    Alkaline Phosphatase 66 45 - 120 U/L    AST 19 0 - 40 U/L    ALT 17 0 - 45 U/L    Protein Total 5.8 (L) 6.0 - 8.0 g/dL    Albumin 3.2 (L) 3.5 - 5.0 g/dL    Bilirubin Total 0.9 0.0 - 1.0 mg/dL    GFR Estimate 88 >60 mL/min/1.73m2   CBC with platelets    Collection Time: 01/24/22  6:02 AM   Result Value Ref  Range    WBC Count 10.1 4.0 - 11.0 10e3/uL    RBC Count 4.13 3.80 - 5.20 10e6/uL    Hemoglobin 13.1 11.7 - 15.7 g/dL    Hematocrit 38.7 35.0 - 47.0 %    MCV 94 78 - 100 fL    MCH 31.7 26.5 - 33.0 pg    MCHC 33.9 31.5 - 36.5 g/dL    RDW 13.3 10.0 - 15.0 %    Platelet Count 222 150 - 450 10e3/uL   Glucose by meter    Collection Time: 01/24/22  7:34 AM   Result Value Ref Range    GLUCOSE BY METER POCT 168 (H) 70 - 99 mg/dL   Glucose by meter    Collection Time: 01/24/22 11:49 AM   Result Value Ref Range    GLUCOSE BY METER POCT 370 (H) 70 - 99 mg/dL   Magnesium    Collection Time: 01/24/22  4:22 PM   Result Value Ref Range    Magnesium 1.6 (L) 1.8 - 2.6 mg/dL   Potassium    Collection Time: 01/24/22  4:22 PM   Result Value Ref Range    Potassium 3.7 3.5 - 5.0 mmol/L   Glucose by meter    Collection Time: 01/24/22  4:35 PM   Result Value Ref Range    GLUCOSE BY METER POCT 247 (H) 70 - 99 mg/dL         HOSPITAL MEDICATIONS       albuterol  1-2 puff Inhalation Q6H     ARIPiprazole  2 mg Oral Daily     cloNIDine   Transdermal TID     cloNIDine  1 patch Transdermal Weekly     enoxaparin ANTICOAGULANT  40 mg Subcutaneous Q24H     estradiol  1 mg Oral Daily     insulin aspart   Subcutaneous Daily with breakfast     insulin aspart   Subcutaneous Daily with lunch     insulin aspart   Subcutaneous Daily with supper     insulin aspart  1-10 Units Subcutaneous TID AC     insulin glargine  28 Units Subcutaneous At Bedtime     levETIRAcetam  500 mg Intravenous Q12H     losartan  50 mg Oral Daily     magnesium oxide  400 mg Oral BID     metoprolol succinate ER  100 mg Oral BID     pantoprazole  40 mg Oral QAM     polyethylene glycol  17 g Oral Daily     Pregabalin  100 mg Oral TID     sodium chloride (PF)  10 mL Intracatheter Q8H     sodium chloride (PF)  10-40 mL Intracatheter Q7 Days     sodium chloride (PF)  3 mL Intracatheter Q8H        Total time spent for face to face visit, reviewing labs/imaging studies, counseling and  coordination of care was: Over 35 min More than 50% of this time was spent on counseling and coordination of care.    Counseling patient.  Coordination of care with the nursing staff.      Vasiliy Cm MD  Neurologist  [facility]  Tel:- [phone]

## 2022-01-25 ENCOUNTER — APPOINTMENT (OUTPATIENT)
Dept: NEUROLOGY | Facility: HOSPITAL | Age: 72
DRG: 092 | End: 2022-01-25
Attending: PSYCHIATRY & NEUROLOGY
Payer: COMMERCIAL

## 2022-01-25 ENCOUNTER — APPOINTMENT (OUTPATIENT)
Dept: OCCUPATIONAL THERAPY | Facility: HOSPITAL | Age: 72
DRG: 092 | End: 2022-01-25
Payer: COMMERCIAL

## 2022-01-25 LAB
ALBUMIN SERPL-MCNC: 3.4 G/DL (ref 3.5–5)
ALP SERPL-CCNC: 67 U/L (ref 45–120)
ALT SERPL W P-5'-P-CCNC: 19 U/L (ref 0–45)
ANION GAP SERPL CALCULATED.3IONS-SCNC: 11 MMOL/L (ref 5–18)
AST SERPL W P-5'-P-CCNC: 21 U/L (ref 0–40)
BACTERIA BLD CULT: NO GROWTH
BACTERIA BLD CULT: NO GROWTH
BILIRUB SERPL-MCNC: 0.7 MG/DL (ref 0–1)
BUN SERPL-MCNC: 13 MG/DL (ref 8–28)
CALCIUM SERPL-MCNC: 9.1 MG/DL (ref 8.5–10.5)
CHLORIDE BLD-SCNC: 104 MMOL/L (ref 98–107)
CO2 SERPL-SCNC: 25 MMOL/L (ref 22–31)
CREAT SERPL-MCNC: 0.67 MG/DL (ref 0.6–1.1)
ERYTHROCYTE [DISTWIDTH] IN BLOOD BY AUTOMATED COUNT: 13.2 % (ref 10–15)
GFR SERPL CREATININE-BSD FRML MDRD: >90 ML/MIN/1.73M2
GLUCOSE BLD-MCNC: 222 MG/DL (ref 70–125)
GLUCOSE BLDC GLUCOMTR-MCNC: 130 MG/DL (ref 70–99)
GLUCOSE BLDC GLUCOMTR-MCNC: 173 MG/DL (ref 70–99)
GLUCOSE BLDC GLUCOMTR-MCNC: 213 MG/DL (ref 70–99)
GLUCOSE BLDC GLUCOMTR-MCNC: 229 MG/DL (ref 70–99)
HCT VFR BLD AUTO: 39.1 % (ref 35–47)
HGB BLD-MCNC: 13.5 G/DL (ref 11.7–15.7)
MAGNESIUM SERPL-MCNC: 1.7 MG/DL (ref 1.8–2.6)
MCH RBC QN AUTO: 32.3 PG (ref 26.5–33)
MCHC RBC AUTO-ENTMCNC: 34.5 G/DL (ref 31.5–36.5)
MCV RBC AUTO: 94 FL (ref 78–100)
PLATELET # BLD AUTO: 224 10E3/UL (ref 150–450)
POTASSIUM BLD-SCNC: 3.2 MMOL/L (ref 3.5–5)
POTASSIUM BLD-SCNC: 4.2 MMOL/L (ref 3.5–5)
PREGABALIN SERPL-MCNC: 2.3 UG/ML
PROT SERPL-MCNC: 6.3 G/DL (ref 6–8)
RBC # BLD AUTO: 4.18 10E6/UL (ref 3.8–5.2)
SODIUM SERPL-SCNC: 140 MMOL/L (ref 136–145)
WBC # BLD AUTO: 9.3 10E3/UL (ref 4–11)
WNV IGG CSF IA-ACNC: 0.12 IV
WNV IGM CSF IA-ACNC: 0 IV

## 2022-01-25 PROCEDURE — 95816 EEG AWAKE AND DROWSY: CPT | Mod: 26 | Performed by: PSYCHIATRY & NEUROLOGY

## 2022-01-25 PROCEDURE — 95819 EEG AWAKE AND ASLEEP: CPT

## 2022-01-25 PROCEDURE — 97535 SELF CARE MNGMENT TRAINING: CPT | Mod: GO

## 2022-01-25 PROCEDURE — 258N000003 HC RX IP 258 OP 636: Performed by: INTERNAL MEDICINE

## 2022-01-25 PROCEDURE — 250N000011 HC RX IP 250 OP 636: Performed by: INTERNAL MEDICINE

## 2022-01-25 PROCEDURE — 82310 ASSAY OF CALCIUM: CPT | Performed by: INTERNAL MEDICINE

## 2022-01-25 PROCEDURE — 99232 SBSQ HOSP IP/OBS MODERATE 35: CPT | Performed by: STUDENT IN AN ORGANIZED HEALTH CARE EDUCATION/TRAINING PROGRAM

## 2022-01-25 PROCEDURE — 120N000001 HC R&B MED SURG/OB

## 2022-01-25 PROCEDURE — 99233 SBSQ HOSP IP/OBS HIGH 50: CPT | Performed by: INTERNAL MEDICINE

## 2022-01-25 PROCEDURE — 97129 THER IVNTJ 1ST 15 MIN: CPT | Mod: GO

## 2022-01-25 PROCEDURE — 99233 SBSQ HOSP IP/OBS HIGH 50: CPT | Mod: 25 | Performed by: PSYCHIATRY & NEUROLOGY

## 2022-01-25 PROCEDURE — 999N000157 HC STATISTIC RCP TIME EA 10 MIN

## 2022-01-25 PROCEDURE — 250N000013 HC RX MED GY IP 250 OP 250 PS 637: Performed by: INTERNAL MEDICINE

## 2022-01-25 PROCEDURE — 85014 HEMATOCRIT: CPT | Performed by: INTERNAL MEDICINE

## 2022-01-25 PROCEDURE — 84132 ASSAY OF SERUM POTASSIUM: CPT | Performed by: INTERNAL MEDICINE

## 2022-01-25 PROCEDURE — 83735 ASSAY OF MAGNESIUM: CPT | Performed by: INTERNAL MEDICINE

## 2022-01-25 RX ORDER — POTASSIUM CHLORIDE 1500 MG/1
40 TABLET, EXTENDED RELEASE ORAL ONCE
Status: COMPLETED | OUTPATIENT
Start: 2022-01-25 | End: 2022-01-25

## 2022-01-25 RX ORDER — MAGNESIUM OXIDE 400 MG/1
400 TABLET ORAL 2 TIMES DAILY
Status: DISCONTINUED | OUTPATIENT
Start: 2022-01-25 | End: 2022-01-26 | Stop reason: HOSPADM

## 2022-01-25 RX ORDER — METOPROLOL SUCCINATE 100 MG/1
200 TABLET, EXTENDED RELEASE ORAL DAILY
Status: DISCONTINUED | OUTPATIENT
Start: 2022-01-26 | End: 2022-01-26 | Stop reason: HOSPADM

## 2022-01-25 RX ORDER — METOPROLOL SUCCINATE 100 MG/1
100 TABLET, EXTENDED RELEASE ORAL ONCE
Status: COMPLETED | OUTPATIENT
Start: 2022-01-25 | End: 2022-01-25

## 2022-01-25 RX ADMIN — LEVETIRACETAM 500 MG: 100 INJECTION, SOLUTION INTRAVENOUS at 16:30

## 2022-01-25 RX ADMIN — ARIPIPRAZOLE 2 MG: 2 TABLET ORAL at 08:03

## 2022-01-25 RX ADMIN — ALBUTEROL SULFATE 2 PUFF: 90 AEROSOL, METERED RESPIRATORY (INHALATION) at 03:14

## 2022-01-25 RX ADMIN — ENOXAPARIN SODIUM 40 MG: 40 INJECTION SUBCUTANEOUS at 06:29

## 2022-01-25 RX ADMIN — PREGABALIN 100 MG: 100 CAPSULE ORAL at 14:40

## 2022-01-25 RX ADMIN — METOPROLOL SUCCINATE 100 MG: 25 TABLET, EXTENDED RELEASE ORAL at 08:03

## 2022-01-25 RX ADMIN — METOPROLOL SUCCINATE 150 MG: 25 TABLET, EXTENDED RELEASE ORAL at 21:44

## 2022-01-25 RX ADMIN — ALBUTEROL SULFATE 2 PUFF: 90 AEROSOL, METERED RESPIRATORY (INHALATION) at 10:06

## 2022-01-25 RX ADMIN — METOPROLOL SUCCINATE 100 MG: 100 TABLET, FILM COATED, EXTENDED RELEASE ORAL at 10:05

## 2022-01-25 RX ADMIN — POTASSIUM CHLORIDE 40 MEQ: 1500 TABLET, EXTENDED RELEASE ORAL at 08:03

## 2022-01-25 RX ADMIN — MAGNESIUM OXIDE TAB 400 MG (241.3 MG ELEMENTAL MG) 400 MG: 400 (241.3 MG) TAB at 21:45

## 2022-01-25 RX ADMIN — ESTRADIOL 1 MG: 1 TABLET ORAL at 08:03

## 2022-01-25 RX ADMIN — PREGABALIN 100 MG: 100 CAPSULE ORAL at 19:56

## 2022-01-25 RX ADMIN — PREGABALIN 100 MG: 100 CAPSULE ORAL at 08:03

## 2022-01-25 RX ADMIN — LOSARTAN POTASSIUM 50 MG: 50 TABLET, FILM COATED ORAL at 08:03

## 2022-01-25 RX ADMIN — ALBUTEROL SULFATE 2 PUFF: 90 AEROSOL, METERED RESPIRATORY (INHALATION) at 14:40

## 2022-01-25 RX ADMIN — PANTOPRAZOLE SODIUM 40 MG: 20 TABLET, DELAYED RELEASE ORAL at 08:02

## 2022-01-25 RX ADMIN — MAGNESIUM OXIDE TAB 400 MG (241.3 MG ELEMENTAL MG) 400 MG: 400 (241.3 MG) TAB at 08:03

## 2022-01-25 RX ADMIN — LEVETIRACETAM 500 MG: 100 INJECTION, SOLUTION INTRAVENOUS at 03:17

## 2022-01-25 RX ADMIN — HYDRALAZINE HYDROCHLORIDE 10 MG: 20 INJECTION, SOLUTION INTRAMUSCULAR; INTRAVENOUS at 00:38

## 2022-01-25 RX ADMIN — HYDRALAZINE HYDROCHLORIDE 10 MG: 20 INJECTION, SOLUTION INTRAMUSCULAR; INTRAVENOUS at 19:57

## 2022-01-25 RX ADMIN — ALBUTEROL SULFATE 1 PUFF: 90 AEROSOL, METERED RESPIRATORY (INHALATION) at 21:45

## 2022-01-25 RX ADMIN — ACETAMINOPHEN 650 MG: 325 TABLET ORAL at 08:14

## 2022-01-25 ASSESSMENT — ACTIVITIES OF DAILY LIVING (ADL)
ADLS_ACUITY_SCORE: 12

## 2022-01-25 NOTE — PROGRESS NOTES
[facility]    Medicine Progress Note - Hospitalist Service    Date of Admission:  1/20/2022    Assessment & Plan              71 year old female with a history of  DM 2, previous episodes encephalopathy/delirium, atrial fibrillation-not on anticoagulation, COPD, marijuana use, mood disorder, SBO, chronic pain who presents with acute encephalopathy.       1.Acute metabolic encephalopathy  -she has had this in the past  -complex issues with pain meds, concern for polypharmacy here with all of her meds  -EEG abnl and did 24/hr continuous EEG per neuro--done  -s/p LP--neg except some RBC  -ID took her off all antibiotics and antivirals as of 1/23/22  -she is much improved each day now  -at this point I am suspicious that she may have had side affects from Tramadol + Zanaflex--will stop these now and not restart  Also stopped Cymbalta on admit and not restarting     -CT and mri neg  -neuro--getting EEG today  -psych  -ID all saw        -checked SLUMS with OT--17--needs 24 hour supervision, does live with      2.Leukocytosis--improving  -with concern possible CNS infection  -s/p LP 1/23--so far cx NTD  -ID stopped all antibiotics and antivirals meds on 1/23  -ID does not think oral surg areas from a month ago are infected at all  -monitor off antibiotoics and antivirals     3.Mild acute kidney insufficiency  -watch Creat close, stable     4.Hypokalemia/hypomag  -replaced            5.Diabetes type 2 with hyperglycemia  -uncontrolled  -concern she was not filling meds  -for now started lantus 20 here-->increase to 28 uints-->increase again now 30 uints  -sliding scale and carb count-->increase carb count to 1:6        6.Essential hypertension--not controlled  -at home on high doses on clonidine patch, metoprolol, olmesartan  -on admit was put on lower doses or held meds  -clonidine patch 0.3 placed back on 1/24  -need to increase metoprolol back to home dose 200 mg  Am and 150 mg q  pm--today, 1/25/22  -on ARB  -hopefully will not need as much prn hydralazine    7.History of atrial fibrillation  - Diagnosed during last admission with SBO.   - Was initiated on metoprolol  mg twice daily on last discharge note.Howeve that dose was increased since last admit to 200 mg q am and 150 mg q pm  No anticoagulation was ordered.  -here on DVT prevent with lovenox     8.Chronic pain syndrome  - Holding home tizanidine and Tramadol--and would not restart  -restarted Lyrica per neuro  -must not go back on  Tramadol for risk Sz     9.GERD-continue home omeprazole     10.Mood disorder-continue Abilify  -stopped Cymbalta due to concerns over side affects     11.COPD-no evidence of acute exacerbation              Continue home inhaler     12.Overweight--bmi 28               Diet: Moderate Consistent Carb (60 g CHO per Meal) Diet    DVT Prophylaxis: Enoxaparin (Lovenox) SQ  Gomse Catheter: Not present  Central Lines: PRESENT  PICC Triple Lumen 01/22/22 Right Basilic Vascular access-Site Assessment: WDL  Cardiac Monitoring: None  Code Status: Full Code      Disposition Plan   Expected Discharge: 01/26/2022     Anticipated discharge location:  needs better control bp, home vs tcu       The patient's care was discussed with the Care Coordinator/ and Patient. I called daughter at 1515 to update and updated. Daughter noted home is not able to watch her 24/7 at this time,  has issues too    Tamy Calvert MD  Hospitalist Service  [facility]  Securely message with the Vocera Web Console (learn more here)  Text page via [employer] Paging/Directory         Clinically Significant Risk Factors Present on Admission                    ______________________________________________________________________    Interval History   She feels good  Had slight headache this am  bp was up  Otherwise moving better  Eating ok  She hopes to be able to go home instead of tcu    Data  reviewed today: I reviewed all medications, new labs and imaging results over the last 24 hours. I personally reviewed no images or EKG's today.    Physical Exam   Vital Signs: Temp: 98  F (36.7  C) Temp src: Oral BP: (!) 198/92 Pulse: 71   Resp: 18 SpO2: 100 % O2 Device: None (Room air)    Weight: 163 lbs 4.8 oz  Constitutional: awake, alert, cooperative and no apparent distress  Respiratory: no increased work of breathing, good air exchange, no retractions and clear to auscultation  Cardiovascular: Normal apical impulse, regular rate and rhythm, normal S1 and S2, no S3 or S4, and no murmur noted  GI: normal bowel sounds, soft, non-distended and non-tender  Skin: no bruising or bleeding  Musculoskeletal: no lower extremity pitting edema present  Neurologic: Mental Status Exam:  Level of Alertness:   awake  Neuropsychiatric: General: normal, calm and normal eye contact    Data   Recent Labs   Lab 01/25/22  0747 01/25/22  0628 01/24/22  2142 01/24/22  1635 01/24/22  1622 01/24/22  0734 01/24/22  0602 01/23/22  0803 01/23/22  0419 01/23/22  0341   WBC  --  9.3  --   --   --   --  10.1  --  14.3*  --    HGB  --  13.5  --   --   --   --  13.1  --  14.7  --    MCV  --  94  --   --   --   --  94  --  93  --    PLT  --  224  --   --   --   --  222  --  241  --    NA  --  140  --   --   --   --  139  --   --  139   POTASSIUM  --  3.2*  --   --  3.7  --  3.1*   < >  --  3.1*   CHLORIDE  --  104  --   --   --   --  105  --   --  105   CO2  --  25  --   --   --   --  26  --   --  24   BUN  --  13  --   --   --   --  10  --   --  12   CR  --  0.67  --   --   --   --  0.72  --   --  0.74   ANIONGAP  --  11  --   --   --   --  8  --   --  10   BRUNO  --  9.1  --   --   --   --  8.5  --   --  8.3*   * 222* 272*   < >  --    < > 185*  185*   < >  --  204*   ALBUMIN  --  3.4*  --   --   --   --  3.2*  --   --  3.1*   PROTTOTAL  --  6.3  --   --   --   --  5.8*  --   --  6.1   BILITOTAL  --  0.7  --   --   --   --  0.9  --   --   0.8   ALKPHOS  --  67  --   --   --   --  66  --   --  60   ALT  --  19  --   --   --   --  17  --   --  17   AST  --  21  --   --   --   --  19  --   --  20    < > = values in this interval not displayed.

## 2022-01-25 NOTE — PLAN OF CARE
Problem: Risk for Delirium  Goal: Improved Sleep  Outcome: Improving   Slept well, easy to arouse by voice. Remains alert and oriented x 4.     Problem: Hypertension Comorbidity  Goal: Blood Pressure in Desired Range  Outcome: Improving  /85, given prn iv hydralazine 10mg. Rechecked /65.   Intervention: Maintain Blood Pressure Management  Recent Flowsheet Documentation  Taken 1/25/2022 0021 by Chung Mak RN  Medication Review/Management: medications reviewed

## 2022-01-25 NOTE — PROGRESS NOTES
Spoke with patient regarding CPAP use at home, patient states that she has never been diagnosed with KP and has never used CPAP at home nor has it been recommended to her.. Will discontinue CPAP order for nocturnal use, RT available as needed.     .David Mcbride, RT

## 2022-01-25 NOTE — PROGRESS NOTES
[facility] Inpatient follow up       Patient:  Paige Mari  Date of birth 1950, Medical record number [id number]  Date of Visit:  01/25/2022  Attending Physician: Taym Calvert MD         Assessment and Recommendations:   Assessment:  Paige Mari is a 71 year old female with   1. Type 2 diabetes mellitus, uncontrolled with last A1c of 8.6 on 1/11/2022.  2. History of recurrent UTI.  3. Acute encephalopathy we have significant leukocytosis at 21,000 predominance of neutrophils.  Possible symptoms of UTI prior to admission.  UA with possible UTI.  UC with pan susceptible E. coli.  Status post LP with findings are consistent with meningitis.  Received 72 hours of effective antibiotic for E. coli in the urine.  Leukocytosis resolved.  Mental status now back to normal.  Off antibiotics since 1/23/2022.  No worsening after 48 hours.    Recommendations:  Monitor off antibiotic.  Can discharge from the ID standpoint.  Patient to come back if she has recurrent signs of infection such as confusion or dysuria or any other infectious symptoms    Discussed with the patient, nursing staff, and Dr. Calvert.    ID will sign off.    Kendra Lee MD.  [facility].   [facility]  Office Telephone [phone].  Fax [phone]  [facility] paging            Interval History:     HPI:  The interval history was reviewed.   Continues to do well.  Has no fever.  WBC remains normal.    Pertinent cultures include:  No results found for: CULT    Recent Inflammatory Biomarkers:   Recent Labs   Lab Test 01/25/22  0628 01/24/22  0602 01/23/22  0419 01/22/22  1422 01/22/22  0734 01/21/22  0658 01/20/22  1158 09/21/21  0314 09/20/21  1543 09/17/21  2240 03/03/21  1206 02/24/20  1721   CRP  --   --   --   --  3.1*  --   --   --   --   --  3.6* 0.5   PCAL  --   --   --  0.06  --   --   --   --  0.10  --   --   --    WBC 9.3 10.1 14.3*  --   "16.9* 18.4* 21.0*   < >  --    < > 11.9* 11.5*    < > = values in this interval not displayed.            Review of Systems:   CONSTITUTIONAL:    Temp Max: Temp (24hrs), Av.2  F (36.8  C), Min:98  F (36.7  C), Max:98.6  F (37  C)   .  Negative except for findings in the HPI.           Current Medications (antimicrobials listed in bold):       albuterol  1-2 puff Inhalation Q6H     ARIPiprazole  2 mg Oral Daily     cloNIDine   Transdermal TID     cloNIDine  1 patch Transdermal Weekly     enoxaparin ANTICOAGULANT  40 mg Subcutaneous Q24H     estradiol  1 mg Oral Daily     insulin aspart   Subcutaneous Daily with breakfast     insulin aspart   Subcutaneous Daily with lunch     insulin aspart   Subcutaneous Daily with supper     insulin aspart  1-10 Units Subcutaneous TID AC     insulin glargine  30 Units Subcutaneous At Bedtime     levETIRAcetam  500 mg Intravenous Q12H     losartan  50 mg Oral Daily     magnesium oxide  400 mg Oral BID     metoprolol succinate ER  150 mg Oral At Bedtime     [START ON 2022] metoprolol succinate ER  200 mg Oral Daily     pantoprazole  40 mg Oral QAM     polyethylene glycol  17 g Oral Daily     Pregabalin  100 mg Oral TID     sodium chloride (PF)  10 mL Intracatheter Q8H     sodium chloride (PF)  10-40 mL Intracatheter Q7 Days     sodium chloride (PF)  3 mL Intracatheter Q8H              Allergies:     Allergies   Allergen Reactions     Morphine Other (See Comments)     \"make me delirious,\"  \"nightmares\"  Other reaction(s): delirium  \"make me delirious,\"  \"nightmares\"       Adhesive [Mecrylate] Other (See Comments)     Can only tolerate tape for short periods of time shelia in sensitive areas     Amlodipine Unknown and Other (See Comments)     Other reaction(s): delerium     Avalide Other (See Comments)     Other reaction(s): hyponatremia     Doxazosin Other (See Comments)     Other reaction(s): feels foggy     Other Allergy (See Comments) [External Allergen Needs Reconciliation - " See Comment] Unknown     Other reaction(s): skin rash     Prednisone Unknown     Caused extremely high blood sugars     Trazodone Unknown     Other reaction(s): hung over            Physical Exam:   Vitals were reviewed  Patient Vitals for the past 24 hrs:   BP Temp Temp src Pulse Resp SpO2   01/25/22 1134 (!) 163/90 97.6  F (36.4  C) Oral 70 18 99 %   01/25/22 1004 (!) 153/74 -- -- -- -- --   01/25/22 0740 (!) 198/92 98  F (36.7  C) Oral 71 18 100 %   01/25/22 0325 (!) 142/65 98.1  F (36.7  C) Oral 84 18 97 %   01/25/22 0017 (!) 177/85 98.7  F (37.1  C) Oral 78 16 97 %   01/24/22 2211 (!) 143/79 -- -- -- -- --   01/24/22 1900 (!) 167/78 -- -- 78 -- --   01/24/22 1717 (!) 177/85 -- -- -- -- --   01/24/22 1614 (!) 188/90 97.8  F (36.6  C) Oral 80 18 100 %       Physical Examination:  Gen: Pleasant in no acute distress.  HEENT: NCAT. EOMI. PERRL.  Neck: No bruit, JVD or thyromegaly.  Lungs: Clear to ascultation bilat with no crackles or wheezes.  Card: RRR. NSR. No RMG. Peripheral pulses present and symmetric. No edema.  Abd: Soft NT ND. No mass. Normal bowel sounds.  Skin: No rash.  Extr: No edema.  Neuro: Alert and oriented to place time and person. Cranial nerves II to XII intact. Motor and sensory intact. Normal gait.            Laboratory Data:   ID Labs:  Microbiology labs:    Urine Culture  Order: [id number]   Collected 1/20/2022  1:30 PM     Status: Final result     Visible to patient: Yes (not seen)    Specimen Information: Urine, Catheter         0 Result Notes    Culture <10,000 CFU/mL Escherichia coli Abnormal        <1,000 CFU/mL Urogenital estela            Resulting Agency: [facility]       Susceptibility     Escherichia coli     ZAID     Ampicillin 4.0 ug/mL Susceptible     Ampicillin/ Sulbactam <=2.0 ug/mL Susceptible     Cefazolin <=4.0 ug/mL Susceptible 1     Cefepime <=1.0 ug/mL Susceptible     Cefoxitin <=4.0 ug/mL Susceptible     Ceftazidime <=1.0 ug/mL Susceptible     Ceftriaxone <=1.0 ug/mL Susceptible      Ciprofloxacin <=0.25 ug/mL Susceptible     Gentamicin <=1.0 ug/mL Susceptible     Levofloxacin <=0.12 ug/mL Susceptible     Nitrofurantoin <=16.0 ug/mL Susceptible     Piperacillin/Tazobactam <=4.0 ug/mL Susceptible     Tobramycin <=1.0 ug/mL Susceptible     Trimethoprim/Sulfamethoxazole <=1/19 ug/mL Susceptible              1 Cefazolin ZAID breakpoints are for the treatment of uncomplicated urinary tract infections. For the treatment of systemic infections, please contact the laboratory for additional testing.            Specimen Collected: 01/20/22  1:30 PM Last Resulted: 01/24/22 10:35 AM                Recent Labs   Lab Test 01/22/22  0734 03/03/21  1206 02/24/20  1721   CRP 3.1* 3.6* 0.5     Recent Labs   Lab Test 01/25/22  0628 01/24/22  0602 01/23/22  0419 01/22/22  0734 01/21/22  0658 01/20/22  1158   WBC 9.3 10.1 14.3* 16.9* 18.4* 21.0*     Recent Labs   Lab Test 01/25/22  0628 01/24/22  0602 01/23/22  0341 01/22/22  0734   CR 0.67 0.72 0.74 0.81   GFRESTIMATED >90 88 85 77       Hematology Studies  Recent Labs   Lab Test 01/25/22  0628 01/24/22  0602 01/23/22  0419 01/22/22  0734 01/21/22  0658 01/20/22  1158 10/01/21  1125   WBC 9.3 10.1 14.3* 16.9* 18.4* 21.0* 9.9   ANEU  --   --   --   --   --   --  4.8   AEOS  --   --   --   --   --   --  0.5   HCT 39.1 38.7 42.0 43.9 43.7 50.0* 36.5    222 241 267 260 347 315       Metabolic  Recent Labs   Lab Test 01/25/22  0628 01/24/22  0602 01/23/22  0341    139 139   BUN 13 10 12   CO2 25 26 24   CR 0.67 0.72 0.74   GFRESTIMATED >90 88 85       Hepatic Studies  Recent Labs   Lab Test 01/25/22  0628 01/24/22  0602 01/23/22  0341   BILITOTAL 0.7 0.9 0.8   ALKPHOS 67 66 60   ALBUMIN 3.4* 3.2* 3.1*   AST 21 19 20   ALT 19 17 17       Immunologlobulins  Recent Labs   Lab Test 01/22/22  0734 03/03/21  1206 02/24/20  1721   SED 6 10 5            Imaging Data:   Reviewed

## 2022-01-25 NOTE — PLAN OF CARE
Occupational Therapy Discharge Summary    Reason for therapy discharge:    All goals and outcomes met, no further needs identified.    Progress towards therapy goal(s). [facility] for goal details.  Goals met    Therapy recommendation(s):    No further therapy is recommended.Pt is safe to return home with family support, pt needs 24 hour supervision for her safety.

## 2022-01-25 NOTE — PROGRESS NOTES
"CLINICAL NUTRITION SERVICES -REASSESSMENT NOTE     Nutrition Prescription    RECOMMENDATIONS FOR MDs/PROVIDERS TO ORDER:  None at this time    Malnutrition Status:    Does not meet criteria with current information    Recommendations already ordered by Registered Dietitian (RD):  None at this time    Future/Additional Recommendations:  Monitor po intake - adequacy and quality, diet ed needs       CURRENT NUTRITION ORDERS  Diet: 60 gm cho per meal    Intake/Tolerance:   Refused 3 meals .  Ate 100% of 3 meals  at 1800 kcal, 91 g protein  Ate 75% of lunch yesterday (3 meals ordered, 2 intakes not documented- ordered 1188 kcal, 85 g protein for the day )    Pt reports she doesn't like much of our food. She states she is \"picky\". Suggested ordering breakfast foods throughout the day as she likes breakfast    LABS  Labs reviewed  K+ 3.2, decreased - being replaced  FSB-370 mg/dl past 24 hours - DM educator following- insulin being adjusted    MEDICATIONS  Medications reviewed  Novolog 1 unit(s): 6 g CHO, ssi, lantus daily pantoprazole, miralax    ANTHROPOMETRICS  Height: 5' 1\"  Most Recent Weight: 74.0 kg (163 lb 4.8 oz) - stable  IBW: 47.7 kg  BMI: 30.4  BMI: Obesity Grade I BMI 30-34.9  Weight History:   Wt Readings from Last 3 Encounters:   22 74.1 kg (163 lb 4.8 oz)   22 74.6 kg (164 lb 8 oz)   21 73.3 kg (161 lb 9.6 oz)       Dosing Weight: 54.4 kg    ASSESSED NUTRITION NEEDS  Estimated Energy Needs: 1360 - 1525 kcals/day (25-28 kcal/kg)  Justification: Obese  Estimated Protein Needs: 55 grams protein/day (1 gm/kg)  Justification: Maintenance  Estimated Fluid Needs: 8601-0589 mL/day (1 mL/kcal)   Justification: Maintenance    GI-LBM 1/23 x 2    MALNUTRITION:  % Weight Loss:  None noted  % Intake:  No decreased intake noted  Subcutaneous Fat Loss:  None noted  Muscle Loss:  None noted  Fluid Retention:  Mild to moderate    Malnutrition Diagnosis: Patient does not meet two of the " above criteria necessary for diagnosing malnutrition    NUTRITION DIAGNOSIS  Altered nutrition-related laboratory values related to DM as evidenced by elevated BG    INTERVENTIONS  None at this time    Goals  Meet estimated nutrition needs  >75% meals- progressing  BG >70 <180 mg/dL- not progressing- insulin being adjusted     Monitoring/Evaluation  Progress toward goals will be monitored and evaluated per protocol.

## 2022-01-25 NOTE — PROGRESS NOTES
Awake and Asleep EEG completed. Photic is completed. HV is not done. EEG #    [id number]- System used.

## 2022-01-25 NOTE — PLAN OF CARE
Problem: Diabetes Comorbidity  Goal: Blood Glucose Level Within Targeted Range  Outcome: No Change     Problem: Adult Inpatient Plan of Care  Goal: Plan of Care Review  Outcome: Improving   Patient alert and orientated.  No c/o pain this shift.  Up independently in the room.  Denies SOB.  PRN hydralazine gieven for 188 systolic and effective.  Tele NSR.

## 2022-01-25 NOTE — PLAN OF CARE
Patient complained of a headache today, Tylenol affective.  BP's running high, medication adjusted per MD. Ambulating in navarrete, independent in room, call light within reach.

## 2022-01-26 ENCOUNTER — APPOINTMENT (OUTPATIENT)
Dept: PHYSICAL THERAPY | Facility: HOSPITAL | Age: 72
DRG: 092 | End: 2022-01-26
Payer: COMMERCIAL

## 2022-01-26 VITALS
SYSTOLIC BLOOD PRESSURE: 134 MMHG | WEIGHT: 163.3 LBS | OXYGEN SATURATION: 99 % | RESPIRATION RATE: 16 BRPM | HEIGHT: 61 IN | BODY MASS INDEX: 30.83 KG/M2 | DIASTOLIC BLOOD PRESSURE: 65 MMHG | HEART RATE: 70 BPM | TEMPERATURE: 98.3 F

## 2022-01-26 LAB
ALBUMIN SERPL-MCNC: 3.2 G/DL (ref 3.5–5)
ALP SERPL-CCNC: 57 U/L (ref 45–120)
ALT SERPL W P-5'-P-CCNC: 17 U/L (ref 0–45)
ANION GAP SERPL CALCULATED.3IONS-SCNC: 9 MMOL/L (ref 5–18)
AST SERPL W P-5'-P-CCNC: 20 U/L (ref 0–40)
BILIRUB SERPL-MCNC: 0.7 MG/DL (ref 0–1)
BUN SERPL-MCNC: 15 MG/DL (ref 8–28)
CALCIUM SERPL-MCNC: 9.3 MG/DL (ref 8.5–10.5)
CHLORIDE BLD-SCNC: 106 MMOL/L (ref 98–107)
CO2 SERPL-SCNC: 26 MMOL/L (ref 22–31)
CREAT SERPL-MCNC: 0.71 MG/DL (ref 0.6–1.1)
ERYTHROCYTE [DISTWIDTH] IN BLOOD BY AUTOMATED COUNT: 13.4 % (ref 10–15)
GFR SERPL CREATININE-BSD FRML MDRD: 90 ML/MIN/1.73M2
GLUCOSE BLD-MCNC: 161 MG/DL (ref 70–125)
GLUCOSE BLDC GLUCOMTR-MCNC: 177 MG/DL (ref 70–99)
GLUCOSE BLDC GLUCOMTR-MCNC: 216 MG/DL (ref 70–99)
HCT VFR BLD AUTO: 39.1 % (ref 35–47)
HGB BLD-MCNC: 13.3 G/DL (ref 11.7–15.7)
MAGNESIUM SERPL-MCNC: 1.8 MG/DL (ref 1.8–2.6)
MCH RBC QN AUTO: 32.1 PG (ref 26.5–33)
MCHC RBC AUTO-ENTMCNC: 34 G/DL (ref 31.5–36.5)
MCV RBC AUTO: 94 FL (ref 78–100)
PLATELET # BLD AUTO: 227 10E3/UL (ref 150–450)
POTASSIUM BLD-SCNC: 3.9 MMOL/L (ref 3.5–5)
PROT SERPL-MCNC: 5.8 G/DL (ref 6–8)
RBC # BLD AUTO: 4.14 10E6/UL (ref 3.8–5.2)
SODIUM SERPL-SCNC: 141 MMOL/L (ref 136–145)
WBC # BLD AUTO: 9.4 10E3/UL (ref 4–11)

## 2022-01-26 PROCEDURE — 85014 HEMATOCRIT: CPT | Performed by: INTERNAL MEDICINE

## 2022-01-26 PROCEDURE — 82040 ASSAY OF SERUM ALBUMIN: CPT | Performed by: INTERNAL MEDICINE

## 2022-01-26 PROCEDURE — 80053 COMPREHEN METABOLIC PANEL: CPT | Performed by: INTERNAL MEDICINE

## 2022-01-26 PROCEDURE — 99232 SBSQ HOSP IP/OBS MODERATE 35: CPT | Performed by: PSYCHIATRY & NEUROLOGY

## 2022-01-26 PROCEDURE — 250N000013 HC RX MED GY IP 250 OP 250 PS 637: Performed by: INTERNAL MEDICINE

## 2022-01-26 PROCEDURE — 99239 HOSP IP/OBS DSCHRG MGMT >30: CPT | Performed by: HOSPITALIST

## 2022-01-26 PROCEDURE — 250N000011 HC RX IP 250 OP 636: Performed by: INTERNAL MEDICINE

## 2022-01-26 PROCEDURE — 999N000104 HC STATISTIC NO CHARGE

## 2022-01-26 PROCEDURE — 83735 ASSAY OF MAGNESIUM: CPT | Performed by: INTERNAL MEDICINE

## 2022-01-26 RX ORDER — PREGABALIN 100 MG/1
100 CAPSULE ORAL 3 TIMES DAILY
Qty: 90 CAPSULE | Refills: 0 | Status: SHIPPED | OUTPATIENT
Start: 2022-01-26

## 2022-01-26 RX ORDER — PREGABALIN 100 MG/1
100 CAPSULE ORAL 3 TIMES DAILY
Qty: 90 CAPSULE | Refills: 0 | Status: SHIPPED | OUTPATIENT
Start: 2022-01-26 | End: 2022-01-26

## 2022-01-26 RX ORDER — ALBUTEROL SULFATE 90 UG/1
1-2 AEROSOL, METERED RESPIRATORY (INHALATION) EVERY 6 HOURS PRN
Status: DISCONTINUED | OUTPATIENT
Start: 2022-01-26 | End: 2022-01-26 | Stop reason: HOSPADM

## 2022-01-26 RX ORDER — INSULIN DEGLUDEC 100 U/ML
30 INJECTION, SOLUTION SUBCUTANEOUS DAILY
Qty: 30 ML | Refills: 1 | COMMUNITY
Start: 2022-01-26 | End: 2022-02-06

## 2022-01-26 RX ADMIN — PREGABALIN 100 MG: 100 CAPSULE ORAL at 13:35

## 2022-01-26 RX ADMIN — ALBUTEROL SULFATE 2 PUFF: 90 AEROSOL, METERED RESPIRATORY (INHALATION) at 02:43

## 2022-01-26 RX ADMIN — ACETAMINOPHEN 650 MG: 325 TABLET ORAL at 10:53

## 2022-01-26 RX ADMIN — MAGNESIUM OXIDE TAB 400 MG (241.3 MG ELEMENTAL MG) 400 MG: 400 (241.3 MG) TAB at 08:30

## 2022-01-26 RX ADMIN — ARIPIPRAZOLE 2 MG: 2 TABLET ORAL at 08:30

## 2022-01-26 RX ADMIN — LOSARTAN POTASSIUM 50 MG: 50 TABLET, FILM COATED ORAL at 08:30

## 2022-01-26 RX ADMIN — METOPROLOL SUCCINATE 200 MG: 100 TABLET, FILM COATED, EXTENDED RELEASE ORAL at 08:29

## 2022-01-26 RX ADMIN — PREGABALIN 100 MG: 100 CAPSULE ORAL at 08:39

## 2022-01-26 RX ADMIN — PANTOPRAZOLE SODIUM 40 MG: 20 TABLET, DELAYED RELEASE ORAL at 08:30

## 2022-01-26 RX ADMIN — ESTRADIOL 1 MG: 1 TABLET ORAL at 08:30

## 2022-01-26 RX ADMIN — ENOXAPARIN SODIUM 40 MG: 40 INJECTION SUBCUTANEOUS at 05:35

## 2022-01-26 ASSESSMENT — ACTIVITIES OF DAILY LIVING (ADL)
ADLS_ACUITY_SCORE: 12
ADLS_ACUITY_SCORE: 12
ADLS_ACUITY_SCORE: 10
ADLS_ACUITY_SCORE: 12
ADLS_ACUITY_SCORE: 10
ADLS_ACUITY_SCORE: 12
ADLS_ACUITY_SCORE: 10
ADLS_ACUITY_SCORE: 12
ADLS_ACUITY_SCORE: 12

## 2022-01-26 NOTE — DISCHARGE SUMMARY
[facility] MEDICINE  DISCHARGE SUMMARY     Primary Care Physician: Scar Tan  Admission Date: 1/20/2022   Discharge Provider: Chikis Osuna Discharge Date: 1/26/2022   Diet:   Active Diet and Nourishment Order   Procedures     Moderate Consistent Carb (60 g CHO per Meal) Diet     Diet       Code Status: Full Code   Activity: DCACTIVITY: Activity as tolerated        Condition at Discharge: Stable     REASON FOR PRESENTATION(See Admission Note for Details)   Encephalopathy    PRINCIPAL & ACTIVE DISCHARGE DIAGNOSES     Active Problems:    Confusion      PENDING LABS     Unresulted Labs Ordered in the Past 30 Days of this Admission     Date and Time Order Name Status Description    1/21/2022  3:42 PM Cerebrospinal fluid Aerobic Bacterial Culture Routine Preliminary           PROCEDURES ( this hospitalization only)      LP    RECOMMENDATIONS TO OUTPATIENT PROVIDER FOR F/U VISIT     Follow-up Appointments     Follow-up and recommended labs and tests      Follow up with primary care provider, Scar Tan, within 7   days for hospital follow- up.  No follow up labs or test are needed.             DISPOSITION     Home with home care    SUMMARY OF HOSPITAL COURSE:      Paige Mari is a 71-year-old female with history of insulin-dependent diabetes, A. fib, COPD, chronic pain syndrome, previous episodes of acute encephalopathy presented for evaluation of altered mental status ultimately attributed most likely to medication side effects.  She is doing better and now discharged home.   has some dementia himself, expect patient will be high risk for readmission in the future and eventually likely going to need to move to at least assisted living if not higher level of care but at this time patient is doing better.    #Acute encephalopathy  #Seizure activity  Had rather extensive work-up and seen by neurology.  Had lumbar puncture was unremarkable.  She was  initially treated with antibiotics and antivirals but was then taken off.  Had abnormal EEG with seizure activity, followed by 24-hour continuous EEG.  24-hour EEG showed generalized periodic discharges with question of triphasic waves.  There was concern this could be side effect of her tramadol and pregabalin.  Tramadol was stopped and pregabalin dose was decreased as both of these can cause seizures/EEG abnormalities.  On follow-up EEG, she did not have any seizure activity.  Patient mental status seems much improved now.  Cleared by PT and OT to return home with home care evaluation.  Daughter was concerned that patient may not be taking her medications right at home, recommended daughter do a limited med rec with patient at home tonight and remove pill bottles for tramadol, Zanaflex and previous dose of Lyrica which were all stopped.  Then we will get a home care nurse, , PT, OT out for evaluation as well.  Patient to follow-up with primary doctor.  Neurology advised patient not to drive for the time being and patient expressed understanding.  She indicated to me she has not been driving for more than a year.    #Chronic pain syndrome  With significant polypharmacy noted during hospital stay  Patient was not sure of the name of any of the medications she is taking.  She did not know the word Lyrica but she did recognize the word pregabalin and is taking pregabalin.  Apparently she has not been taking her Cymbalta or codeine so we removed these from her med list.  Ultimately recommended stopping tramadol and Zanaflex.  As above pregabalin dose was decreased.  Patient denied any pain on day of discharge.  Seems to be well controlled.    #Insulin-dependent diabetes  She was hyperglycemic on admission it seemed she had not been taking her insulin.  However her A1c was not too bad at 8.6, suspect she was acutely not taking her insulin but that generally do think she is getting her doses in.  Apparently  she has not been taking her Victoza so we removed this from her med list.  Her Tresiba dose was increased slightly.  She should otherwise continue her normal insulin regimen which seems to be working well for her as long as she is not acutely confused.    One of her prescriptions was forgotten at the nursing unit the patient did notice it was missing and called us to get this medication.  I think she is cognitively doing a lot better    Discharge Medications with Med changes:     Current Discharge Medication List      CONTINUE these medications which have CHANGED    Details   insulin degludec (TRESIBA FLEXTOUCH) 100 UNIT/ML pen Inject 30 Units Subcutaneous daily  Qty: 30 mL, Refills: 1    Associated Diagnoses: Type 1 diabetes mellitus with microalbuminuria (H)      pregabalin (LYRICA) 100 MG capsule Take 1 capsule (100 mg) by mouth 3 times daily  Qty: 90 capsule, Refills: 0    Associated Diagnoses: Post-op pain         CONTINUE these medications which have NOT CHANGED    Details   acetaminophen (TYLENOL) 500 MG tablet Take 1,000 mg by mouth 2 times daily       albuterol (PROAIR HFA/PROVENTIL HFA/VENTOLIN HFA) 108 (90 Base) MCG/ACT inhaler Inhale 1-2 puffs into the lungs every 6 hours  Qty: 18 g, Refills: 4    Comments: Pharmacy may dispense brand covered by insurance (Proair, or proventil or ventolin or generic albuterol inhaler)  Associated Diagnoses: Dyspnea, unspecified type      amoxicillin (AMOXIL) 500 MG tablet Take 2,000 mg by mouth as needed Before dental appointments    Associated Diagnoses: Thyrotoxicosis; Uncontrolled type 2 diabetes mellitus without complication, with long-term current use of insulin      ARIPiprazole (ABILIFY) 2 MG tablet Take 2 mg by mouth daily       cetirizine (ZYRTEC) 10 MG tablet Take 10 mg by mouth daily       cholecalciferol (VITAMIN D3) 25 mcg (1000 units) capsule Take 1,000 Units by mouth daily       cloNIDine (CATAPRES-TTS) 0.3 mg/24 hr Place 1 patch onto the skin once a week        cyanocobalamin (VITAMIN B-12) 1000 MCG tablet Take 1,000 mcg by mouth daily      diclofenac sodium (VOLTAREN) 1 % Gel [DICLOFENAC SODIUM (VOLTAREN) 1 % GEL] Apply 1 g topically daily as needed.      docusate sodium (COLACE) 100 MG capsule Take 200 mg by mouth 2 times daily      estradiol (ESTRACE) 1 MG tablet Take 1 mg by mouth daily       insulin aspart (NOVOLOG) 100 unit/mL injection Inject 6-15 Units Subcutaneous 3 times daily (with meals)       magnesium oxide (MAG-OX) 400 MG tablet Take 400 mg by mouth daily       !! metoprolol succinate ER (TOPROL-XL) 100 MG 24 hr tablet Take 200 mg by mouth every morning      !! metoprolol succinate ER (TOPROL-XL) 100 MG 24 hr tablet Take 150 mg by mouth At Bedtime      multivitamin w/minerals (THERA-VIT-M) tablet Take 1 tablet by mouth daily      olmesartan (BENICAR) 40 MG tablet [OLMESARTAN (BENICAR) 40 MG TABLET] Take 40 mg by mouth daily.      omeprazole (PRILOSEC) 20 MG capsule [OMEPRAZOLE (PRILOSEC) 20 MG CAPSULE] Take 1 capsule by mouth daily before breakfast.      valACYclovir (VALTREX) 500 MG tablet Take 500 mg by mouth daily As needed      blood sugar diagnostic (CONTOUR TEST STRIPS) Strp [BLOOD SUGAR DIAGNOSTIC (CONTOUR TEST STRIPS) STRP] Use 1 each As Directed daily.       Continuous Blood Gluc  (DEXCOM G6 ) TORRIE Use to read blood sugars as per 's instructions.  Qty: 1 each, Refills: 0    Comments: Use per manufacturers instructions  Associated Diagnoses: Diabetes mellitus type 2, uncontrolled (H)      Continuous Blood Gluc Sensor (DEXCOM G6 SENSOR) MISC Change every 10 days.  Qty: 3 each, Refills: 6    Associated Diagnoses: Diabetes mellitus type 2, uncontrolled (H)      Continuous Blood Gluc Transmit (DEXCOM G6 TRANSMITTER) MISC Change every 3 months.  Qty: 1 each, Refills: 2    Associated Diagnoses: Diabetes mellitus type 2, uncontrolled (H)      Glucagon, rDNA, (GLUCAGON EMERGENCY) 1 MG KIT 1 mg IM one time, PRN severe  "hypoglycemia causing altered consciousness affecting ability to take food by mouth  Qty: 1 kit, Refills: 1    Associated Diagnoses: Type 1 diabetes mellitus with hyperglycemia (H)      glucose (BD GLUCOSE) 5 g chewable tablet Take 3 tablets (15 g) by mouth daily as needed (hypoglycemia)  Qty: 90 tablet, Refills: 3    Associated Diagnoses: Type 1 diabetes mellitus with microalbuminuria (H)      medical cannabis (Patient's own supply) 1 Dose by Other route See Admin Instructions [employer] [place] Vape- 1-4 puffs q6h PRN      NOVOFINE 32 32 gauge x 1/4\" Ndle [NOVOFINE 32 32 GAUGE X 1/4\" NDLE] USE TO TEST TWICE A DAY  Refills: 3       !! - Potential duplicate medications found. Please discuss with provider.      STOP taking these medications       acetaminophen-codeine (TYLENOL #3) 300-30 MG tablet Comments:   Reason for Stopping:         DULoxetine (CYMBALTA) 30 MG capsule Comments:   Reason for Stopping:         liraglutide (VICTOZA 2-TANNER) 0.6 mg/0.1 mL (18 mg/3 mL) PnIj injection Comments:   Reason for Stopping:         tiZANidine (ZANAFLEX) 2 MG tablet Comments:   Reason for Stopping:         traMADol (ULTRAM) 50 mg tablet Comments:   Reason for Stopping:                 Consults     PHYSICAL THERAPY ADULT IP CONSULT  OCCUPATIONAL THERAPY ADULT IP CONSULT  NEUROLOGY IP CONSULT  PSYCHIATRY IP CONSULT  CARE MANAGEMENT / SOCIAL WORK IP CONSULT  PHARMACY IP CONSULT  PHARMACY TO DOSE VANCO  VASCULAR ACCESS ADULT IP CONSULT  PHARMACY TO DOSE VANCO  INFECTIOUS DISEASES IP CONSULT  PHARMACY IP CONSULT  VASCULAR ACCESS ADULT IP CONSULT  OCCUPATIONAL THERAPY ADULT IP CONSULT  PHYSICAL THERAPY ADULT IP CONSULT  OCCUPATIONAL THERAPY ADULT IP CONSULT    SIGNIFICANT IMAGING FINDINGS     Results for orders placed or performed during the hospital encounter of 01/20/22   Head CT w/o contrast    Impression    IMPRESSION:  1.  No CT evidence for acute intracranial process.  2.  Brain atrophy and presumed chronic microvascular " ischemic changes similar to the prior exam.   CT Abdomen Pelvis w Contrast    Impression    IMPRESSION:   1.  No acute findings in the abdomen and pelvis.  2.  Incidental findings as detailed above.   MR Brain w/o Contrast    Impression    IMPRESSION:  1. No acute intracranial abnormality.  2. Mild generalized volume loss and chronic microvascular ischemic change.     XR Chest 1 View    Impression    IMPRESSION: Negative chest.   IR Lumbar Puncture    Impression    CONCLUSION:   Fluoroscopically-guided lumbar puncture yields 15 mL of clear cerebrospinal fluid.       SIGNIFICANT LABORATORY FINDINGS     See emr    Discharge Orders        Home Care PT Referral for Hospital Discharge      Home Care OT Referral for Hospital Discharge      Home Care Social Service Referral for Hospital Discharge      Home care nursing referral      Reason for your hospital stay    Altered mental status, seizure activity from medication side effect     Follow-up and recommended labs and tests    Follow up with primary care provider, Scar Tan, within 7 days for hospital follow- up.  No follow up labs or test are needed.     Activity    Your activity upon discharge: activity as tolerated     MD face to face encounter    Documentation of Face to Face and Certification for Home Health Services    I certify that patient: Paige Mari is under my care and that I, or a nurse practitioner or physician's assistant working with me, had a face-to-face encounter that meets the physician face-to-face encounter requirements with this patient on: 1/26/2022.    This encounter with the patient was in whole, or in part, for the following medical condition, which is the primary reason for home health care: altered mental status, medication side effects, hyperglycemia.    I certify that, based on my findings, the following services are medically necessary home health services: Nursing, Occupational Therapy, Physical Therapy, and Social  Work.    My clinical findings support the need for the above services because: Home care RN is required to provide assessment and oversight required in the home to assure adherence to the medical plan due to impaired cognition  Home care RN is required for close monitoring of vital signs and symptoms following medication adjustment  Home care PT is required to assess and treat functional impairment in gait and balance and decreased endurance  Home care OT is required to assess cognitive ability and address and treat ADL/safety due to impaired cognition, gait, and balance    Further, I certify that my clinical findings support that this patient is homebound (i.e. absences from home require considerable and taxing effort and are for medical reasons or [employer] services or infrequently or of short duration when for other reasons) because: Requires assistance of another person or specialized equipment to access medical services because patient: Is prone to wander/get lost without assistance.    Based on the above findings. I certify that this patient is confined to the home and needs intermittent skilled nursing care, physical therapy and/or speech therapy.  The patient is under my care, and I have initiated the establishment of the plan of care.  This patient will be followed by a physician who will periodically review the plan of care.  Physician/Provider to provide follow up care: Scar Tan    Attending [facility] physician (the Medicare certified [place] provider): Chikis Osuna MD  Physician Signature: See electronic signature associated with these discharge orders.  Date: 1/26/2022     Diet    Follow this diet upon discharge: Resume your regular diet       Examination   Physical Exam   Temp:  [97.6  F (36.4  C)-98.3  F (36.8  C)] 97.8  F (36.6  C)  Pulse:  [65-76] 65  Resp:  [16-20] 16  BP: (101-190)/(66-90) 101/78  SpO2:  [98 %-100 %] 99 %  Wt Readings from Last 1 Encounters:   01/23/22 74.1 kg (163 lb  4.8 oz)       General: in no apparent distress, non-toxic and alert female lying in hospital bed oriented x3  HEENT: Head normocephalic atraumatic, oral mucosa moist. Sclerae anicteric  CV: Regular rhythm, normal rate, no murmurs  Resp: No wheezes, no rales or rhonchi, no focal consolidations  GI: Belly soft, nondistended, nontender, bowel sounds present  Skin: No rashes or lesions  Extremities: No peripheral edema  Psych: Normal affect, mood euthymic  Neuro: CNII-XII grossly intact, moving all 4 extremities    Please see EMR for more detailed significant labs, imaging, consultant notes etc.    I, Chikis Osuna MD, personally saw the patient today and spent greater than 30 minutes discharging this patient.    Chikis Osuna MD  [facility]    CC:Scar Tan

## 2022-01-26 NOTE — PROGRESS NOTES
Care Management Discharge Note    Discharge Date: 01/26/2022       Discharge Disposition:  Home with home care    Discharge Services:  PT,OT,RN SW    Discharge DME:      Discharge Transportation: family or friend will provide    Private pay costs discussed: Not applicable    PAS Confirmation Code:  NA  Patient/family educated on Medicare website which has current facility and service quality ratings:  yes    Education Provided on the Discharge Plan:  yes  Persons Notified of Discharge Plans: pt, MD, bedside nurse  Patient/Family in Agreement with the Plan:yes      Handoff Referral Completed: Yes    Additional Information:  SW met with pt in pt room. Pt agreeable to go home with home care. Pt stated she is agreeable to any agency on list. Pt states her spouse will transport at discharge.        KWESI Light

## 2022-01-26 NOTE — PLAN OF CARE
Problem: Adult Inpatient Plan of Care  Goal: Plan of Care Review  Outcome: Adequate for Discharge     Problem: Adult Inpatient Plan of Care  Goal: Absence of Hospital-Acquired Illness or Injury  Intervention: Prevent Skin Injury  Recent Flowsheet Documentation  Taken 1/26/2022 0800 by Ara Verma RN  Body Position:   position changed independently   left  Goal: Optimal Comfort and Wellbeing  Intervention: Provide Person-Centered Care  Recent Flowsheet Documentation  Taken 1/26/2022 0800 by Ara Verma RN  Trust Relationship/Rapport:   care explained   choices provided   emotional support provided   questions answered   questions encouraged   thoughts/feelings acknowledged     Problem: Functional Ability Impaired COPD (Chronic Obstructive Pulmonary Disease)  Goal: Optimal Level of Functional [place]  Intervention: Optimize Functional Ability  Recent Flowsheet Documentation  Taken 1/26/2022 0800 by Ara Verma RN  Activity Management: ambulated to bathroom     Problem: Respiratory Compromise COPD (Chronic Obstructive Pulmonary Disease)  Goal: Effective Oxygenation and Ventilation  Intervention: Promote Airway Secretion Clearance  Recent Flowsheet Documentation  Taken 1/26/2022 0800 by Ara Verma RN  Cough And Deep Breathing: done with encouragement  Activity Management: ambulated to bathroom  Intervention: Optimize Oxygenation and Ventilation  Recent Flowsheet Documentation  Taken 1/26/2022 0800 by Ara Verma RN  Head of Bed (HOB) Positioning: HOB at 30-45 degrees

## 2022-01-26 NOTE — PROGRESS NOTES
NEUROLOGY PROGRESS NOTE     ASSESSMENT & PLAN   Visit diagnosis: Confusion-rule out seizure    Confusion-rule out seizure  R/o encephalopathy      MRI brain negative for structural lesions    Initial EEG was reported to be positive for ongoing seizures    Video EEG evaluation shows generalized periodic discharges-?  Triphasic waves    Repeat EEG does not show any epileptic activity.    Suspect abnormality on initial EEG more related to polypharmacy. Tramadol can cause seizures. Stop tramadol.    Will stop Keppra and monitor.    Lyrica level 2.3.    Patient reports that she was not taking Lyrica at home. Lyrica can sometimes cause a triphasic waves seen on the EEG.    Lumbar puncture results not suspicious for infection per ID.  Antibiotics/acyclovir now stopped.    B12/TSH/normal.  Ammonia normal.  Electrolytes normal.    Suspect confusion/encephalopathy related to polypharmacy    Neurology Discharge Planning : Per primary team. Would recommend driving restriction for at least 1 month.    Patient Active Problem List    Diagnosis Date Noted     Confusion 01/20/2022     Priority: Medium     Hypokalemia 09/22/2021     Priority: Medium     SIRS (systemic inflammatory response syndrome) (H) 09/22/2021     Priority: Medium     Metabolic encephalopathy 09/22/2021     Priority: Medium     Delirium 09/22/2021     Priority: Medium     Atrial fibrillation with RVR (H) 09/22/2021     Priority: Medium     JACKSON (acute kidney injury) (H) 09/22/2021     Priority: Medium     DKA (diabetic ketoacidoses) 09/22/2021     Priority: Medium     Replacing diagnoses that were inactivated after the 10/1/2021 regulatory import.       Lactic acidosis 09/22/2021     Priority: Medium     COPD (chronic obstructive pulmonary disease) (H) 09/22/2021     Priority: Medium     Hypernatremia 09/22/2021     Priority: Medium     Hypomagnesemia 09/22/2021     Priority: Medium     Small bowel obstruction (H) 09/18/2021     Priority: Medium     Pneumatosis  intestinalis 09/18/2021     Priority: Medium     Benign essential hypertension      Priority: Medium     Created by Conversion  Replacement Utility updated for latest IMO load         Acute diverticulitis 12/06/2016     Priority: Medium     Hyponatremia 12/06/2016     Priority: Medium     Leukocytosis, unspecified type 12/06/2016     Priority: Medium     Acute cystitis without hematuria 12/06/2016     Priority: Medium     DM2 12/06/2016     Priority: Medium     IMO SNOMED LOAD SPRING 2020 [.6/.18/.2020 9:04 PM]  Anay Haasban:           Leukocytosis      Priority: Medium     Created by Conversion  Replacement Utility updated for latest IMO load         Hyperthyroidism      Priority: Medium     Created by Conversion  Replacement Utility updated for latest IMO load         Back Pain      Priority: Medium     Created by Conversion  Replacement Utility updated for latest IMO load         Malignant hypertensive urgency 05/15/2015     Priority: Medium     Headache 05/15/2015     Priority: Medium     N&V (nausea and vomiting) 05/15/2015     Priority: Medium     KP (obstructive sleep apnea) 05/15/2015     Priority: Medium     Hypertensive emergency 05/15/2015     Priority: Medium     Type 2 Diabetes Mellitus      Priority: Medium     Created by Conversion         Altered mental state 03/14/2015     Priority: Medium     Post-op pain 03/12/2015     Priority: Medium     Accelerated hypertension 03/12/2015     Priority: Medium     Spondylisthesis 01/31/2015     Priority: Medium     Acute blood loss anemia 11/25/2014     Priority: Medium     Knee osteoarthritis 11/24/2014     Priority: Medium     HTN 11/24/2014     Priority: Medium     KP on CPAP 11/24/2014     Priority: Medium     GERD (gastroesophageal reflux disease) 11/24/2014     Priority: Medium     Mood disorder (H) 11/24/2014     Priority: Medium     Status post total knee replacement 11/24/2014     Priority: Medium     Taking Female Hormones For Postmenopausal HRT       Priority: Medium     Created by Conversion         Intractable headache 06/11/2014     Priority: Medium     Nicotine Dependence      Priority: Medium     Created by Conversion         Medical History  History reviewed. No pertinent past medical history.     SUBJECTIVE     Patient seen in follow-up for Dr. Celis.  Patient has a history of hypertension, diabetes, atrial fibrillation, COPD, marijuana use, chronic pain who presented with an episode of confusion.  She was not responsive at home and was staring off.  This was around dinnertime.  Glucose was very high and the neighbor helped her give her some insulin.  Due to persistent confusion patient was brought to the emergency room.  MRI brain was negative.  Blood work was noncontributory.  Ammonia was normal.  Per nursing staff patient had a similar spell in the past.  EEG was positive for seizures with some PLEDs and it was recommended she get a lumbar puncture done.  Lumbar puncture is scheduled for later today.  She is currently on video EEG monitoring for evaluation of ongoing seizures.  Remains on Keppra.  Reports no side effects of the Keppra.  Her confusion has significantly improved per the nursing staff.    Addendum:-Discussed with the nursing staff later today.  After the lumbar puncture patient was extremely lethargic and was complaining of headaches.  The later on evaluation the headaches were improving.  She was also less lethargic.  Right now we will monitor and if she has ongoing headaches could consider a blood patch tomorrow.    1/23/22  Patient seems much more clear on exam today.  Reports that she does have some short-term memory issues that this is baseline for her.  She has been having this for several years.  She will forget things that other people have told her.  She will misplace things.  Reports that confusion is not present at home.  She does not feel confused at this point.  Denies any other new symptoms.  Lumbar puncture was done.   "Per infectious disease all antibiotics and acyclovir have now been stopped.    1/24/22  Patient seems much more clear on exam today.  Reports ongoing short-term memory issues of these are not worse than before.  Denies any new weakness or symptoms.  Infectious disease is keeping her off medications.  Psychiatry is not been consulted.  Patient is off tramadol.  I discussed about using Lyrica and it may be she was overdosing and she reports that she does not even take Lyrica at home.  Patient manages her medications on her own at home.  Reports that she does not overdose and is careful with her medications.    1/25  Awaiting OT evaluation/labs. Per OT notes they do not feel patient needs any further therapy. Patient would need 24-hour supervision. EEG done today. Patient is interested in getting off the Keppra. Is now on Lyrica. Agrees not to be on tramadol. Cymbalta has also been stopped. Zanaflex has also been stopped.     OBJECTIVE     Vital signs in last 24 hours  Temp:  [97.6  F (36.4  C)-98.3  F (36.8  C)] 98.3  F (36.8  C)  Pulse:  [68-76] 68  Resp:  [18-20] 18  BP: (132-198)/(66-92) 145/66  SpO2:  [98 %-100 %] 98 %    Review of Systems   Comprehensive review of systems done with the patient and this is negative.  Pertinent positives noted in the HPI. No new symptoms today.    PHYSICAL EXAMINATION  VITALS: BP (!) 145/66 (BP Location: Left arm)   Pulse 68   Temp 98.3  F (36.8  C) (Oral)   Resp 18   Ht 1.549 m (5' 1\")   Wt 74.1 kg (163 lb 4.8 oz)   SpO2 98%   BMI 30.86 kg/m    GENERAL -Health appearing, No apparent distress  EYES- No scleral icterus, no eyelid droop, Pupils - see Neuro section  HEENT - Normocephalic, atraumatic, Hearing grossly intact; Oral mucosa moist and pink in color. External Ears and nose intact.   Neck - supple with no obvious lymphadenopathy or thyromegaly  PULM - Good spontaneous respiratory effort; Normal palpation of chest.   CV- Pedal pulses present with no peripheral edema/ No " significant varicosities.  MSK- Gait - see Neuro section; Strength and tone- see Neuro section; Range of motion grossly intact.  PSYCH -cooperative    Neurological  Mental status - Patient is awake and more oriented to self, place and time. Attention span is normal. Language is fluent and follows commands appropriately.   Cranial nerves - CN II-XII intact. Pupils are reactive and symmetric; EOMI, VFIT, NLF symmetric  Motor - There is no pronator drift. Motor exam shows 5/5 strength in all extremities.  Tone - Tone is symmetric bilaterally in upper and lower extremities.  Reflexes - Reflexes are symmetric with toes downgoing  Sensation - Sensory exam is grossly intact to light touch, pain.  Coordination - Finger to nose and heel to shin is without dysmetria.  Gait and station --unable to safely ambulate.  Formal gait testing cannot be done due to safety concerns from ongoing medical issues.       DIAGNOSTIC STUDIES     Pertinent Radiology   MRI-images reviewed  IMPRESSION:  1. No acute intracranial abnormality.  2. Mild generalized volume loss and chronic microvascular ischemic change.     CT head   IMPRESSION:  1.  No CT evidence for acute intracranial process.  2.  Brain atrophy and presumed chronic microvascular ischemic changes similar to the prior exam.    EEG reportedly positive for seizures.  Report pending    IMPRESSION/REPORT/PLAN  This is an abnormal prolonged video EEG during wakefulness and drowsiness/somnolent state due to intermittent generalized periodic discharges with triphasic morphology.    These could be triphasic waves since these are more bifrontocentral prominent. The generalized slow background seen during the recording could suggest an encephalopathy or could be secondary to sedating medications. Further clinical correlation is needed.     Please note that the absence of epileptiform abnormalities does not exclude the possibility of epilepsy in any patient.      EEG  IMPRESSION/REPORT/PLAN  This is a normal EEG during wakefulness and drowsiness except for mild generalized background dysrhythmia. Further clinical correlation is needed.     Please note that the absence of epileptiform abnormalities does not exclude the possibility of epilepsy in any patient.     Component      Latest Ref Rng & Units 1/23/2022   Vitamin B12      213 - 816 pg/mL 902 (H)   TSH      0.30 - 5.00 uIU/mL 1.42   Levetiracetam      6.0 - 46.0 ug/mL 7.6     Component      Latest Ref Rng & Units 1/22/2022   Escherichia coli K1      Negative Negative   Haemophilus influenzae      Negative Negative   Listeria monocytogenes      Negative Negative   Neisseria meningitidis      Negative Negative   Streptococcus agalactiae (GBS)      Negative Negative   Streptococcus pneumoniae      Negative Negative   Cytomegalovirus      Negative Negative   Enterovirus by PCR      Negative Negative   Herpes Simplex Virus 1      Negative Negative   Herpes Simplex Virus 2      Negative Negative   Human Herpes Virus 6      Negative Negative   Human Parechovirus      Negative Negative   Varicella Zoster Virus      Negative Negative   Cryptococcus neoformans/gattii      Negative Negative   Tube Number       4   Color CSF      Colorless Colorless   Appearance CSF      Clear Clear   WBC CSF      0 - 5 /uL 5   RBC CSF      0 - 2 /uL 115 (H)   Herpes Simplex Type 1 CSF      Not Detected Not Detected   Herpes Simplex Type 2 CSF      Not Detected Not Detected   Glucose CSF      40 - 75 mg/dL 161 (H)   Protein Total CSF      15 - 45 mg/dL 74 (H)       Recent Results (from the past 24 hour(s))   Glucose by meter    Collection Time: 01/25/22  7:47 AM   Result Value Ref Range    GLUCOSE BY METER POCT 213 (H) 70 - 99 mg/dL   Glucose by meter    Collection Time: 01/25/22 11:59 AM   Result Value Ref Range    GLUCOSE BY METER POCT 229 (H) 70 - 99 mg/dL   Potassium    Collection Time: 01/25/22 12:46 PM   Result Value Ref Range    Potassium  4.2 3.5 - 5.0 mmol/L   Glucose by meter    Collection Time: 01/25/22  4:59 PM   Result Value Ref Range    GLUCOSE BY METER POCT 173 (H) 70 - 99 mg/dL   Glucose by meter    Collection Time: 01/25/22  9:31 PM   Result Value Ref Range    GLUCOSE BY METER POCT 130 (H) 70 - 99 mg/dL   Comprehensive metabolic panel    Collection Time: 01/26/22  5:44 AM   Result Value Ref Range    Sodium 141 136 - 145 mmol/L    Potassium 3.9 3.5 - 5.0 mmol/L    Chloride 106 98 - 107 mmol/L    Carbon Dioxide (CO2) 26 22 - 31 mmol/L    Anion Gap 9 5 - 18 mmol/L    Urea Nitrogen 15 8 - 28 mg/dL    Creatinine 0.71 0.60 - 1.10 mg/dL    Calcium 9.3 8.5 - 10.5 mg/dL    Glucose 161 (H) 70 - 125 mg/dL    Alkaline Phosphatase 57 45 - 120 U/L    AST 20 0 - 40 U/L    ALT 17 0 - 45 U/L    Protein Total 5.8 (L) 6.0 - 8.0 g/dL    Albumin 3.2 (L) 3.5 - 5.0 g/dL    Bilirubin Total 0.7 0.0 - 1.0 mg/dL    GFR Estimate 90 >60 mL/min/1.73m2   CBC with platelets    Collection Time: 01/26/22  5:44 AM   Result Value Ref Range    WBC Count 9.4 4.0 - 11.0 10e3/uL    RBC Count 4.14 3.80 - 5.20 10e6/uL    Hemoglobin 13.3 11.7 - 15.7 g/dL    Hematocrit 39.1 35.0 - 47.0 %    MCV 94 78 - 100 fL    MCH 32.1 26.5 - 33.0 pg    MCHC 34.0 31.5 - 36.5 g/dL    RDW 13.4 10.0 - 15.0 %    Platelet Count 227 150 - 450 10e3/uL   Magnesium    Collection Time: 01/26/22  5:44 AM   Result Value Ref Range    Magnesium 1.8 1.8 - 2.6 mg/dL         HOSPITAL MEDICATIONS       albuterol  1-2 puff Inhalation Q6H     ARIPiprazole  2 mg Oral Daily     cloNIDine   Transdermal TID     cloNIDine  1 patch Transdermal Weekly     enoxaparin ANTICOAGULANT  40 mg Subcutaneous Q24H     estradiol  1 mg Oral Daily     insulin aspart   Subcutaneous Daily with breakfast     insulin aspart   Subcutaneous Daily with lunch     insulin aspart   Subcutaneous Daily with supper     insulin aspart  1-10 Units Subcutaneous TID AC     insulin glargine  30 Units Subcutaneous At Bedtime     losartan  50 mg Oral Daily      magnesium oxide  400 mg Oral BID     metoprolol succinate ER  150 mg Oral At Bedtime     metoprolol succinate ER  200 mg Oral Daily     pantoprazole  40 mg Oral QAM     polyethylene glycol  17 g Oral Daily     Pregabalin  100 mg Oral TID     sodium chloride (PF)  10 mL Intracatheter Q8H     sodium chloride (PF)  10-40 mL Intracatheter Q7 Days     sodium chloride (PF)  3 mL Intracatheter Q8H        Total time spent for face to face visit, reviewing labs/imaging studies, counseling and coordination of care was: Over 35 min More than 50% of this time was spent on counseling and coordination of care.    Counseling patient.  Coordination of care with the nursing staff. Getting EEG. Discussion of seizure medication with the patient. Coordination of care with the primary team.    Vasiliy Cm MD  Neurologist  [facility] Neurology [facility]  Tel:- [phone]

## 2022-01-26 NOTE — PROGRESS NOTES
"Patient notified the doctor that she wanted her meds escribed to [facility] pharmacy on [address]. After patient was discharged the [facility] noted a script sitting by the printer that had no signature. It was a script for this patients new dose of Lyrica. Dr. Osuna text paged asking if she could please send via escribe to [facility]. Patient also called regarding the above. Dr. Osuna called back stating she was unable to send via escribe thus why the script. Doctor agreed to come down and sign the script. Patient called again and was told that the signed script would be in a envelope with her name on it at the P1 desk. Patient stated she did not know if she could pick it up tonite. \"I will try, if nothing else we will pick it up tomorrow. My  is not home right now.\" Charge nurse notified of the above and that the patients family will try to  tonite or tomorrow.   "

## 2022-01-26 NOTE — PLAN OF CARE
Physical Therapy Discharge Summary    Reason for therapy discharge:    Discharged to home.    Progress towards therapy goal(s). See goals on Care Plan in [facility] electronic health record for goal details.  Goals met    Therapy recommendation(s):    No further therapy is recommended.

## 2022-01-26 NOTE — PROGRESS NOTES
"Patient was discharged to home with grandson at 1425. Patient belongings sent with as well as her discharge papers. Patient verbalized back to this nurse her understanding of all discharge instructions, medications, and need for follow up doctor appointment, \"I will call and schedule my appointment with Dr. Tan myself.\"   "

## 2022-01-26 NOTE — PLAN OF CARE
Problem: Adult Inpatient Plan of Care  Goal: Optimal Comfort and Wellbeing  Outcome: Improving   Pt denies pain. Up Independently. Tele SR with pacs      Problem: Hypertension Comorbidity  Goal: Blood Pressure in Desired Range  Outcome: Improving     Bp 190/88 Hydralazine 10 mg IV given. Bp 132/74. Received Metoprolol at hs.

## 2022-01-26 NOTE — PLAN OF CARE
Problem: Risk for Delirium  Goal: Improved Sleep  Outcome: Improving  Pt easily aroused by voice. Does not appear somnolent. Noted to be tired, answers questions appropriately.     Problem: Hypertension Comorbidity  Goal: Blood Pressure in Desired Range  Outcome: Improving   /70, will continue to monitor.     Problem: Respiratory Compromise COPD (Chronic Obstructive Pulmonary Disease)  Goal: Effective Oxygenation and Ventilation  Outcome: Improving   On scheduled albuterol inhaler. SpO2 97%.

## 2022-01-27 LAB
BACTERIA CSF CULT: NO GROWTH
GRAM STAIN RESULT: NORMAL
GRAM STAIN RESULT: NORMAL

## 2022-01-27 NOTE — PROGRESS NOTES
NEUROLOGY PROGRESS NOTE     ASSESSMENT & PLAN   Visit diagnosis: Confusion-rule out seizure    Confusion-rule out seizure  R/o encephalopathy      MRI brain negative for structural lesions    Initial EEG was reported to be positive for ongoing seizures    Video EEG evaluation shows generalized periodic discharges-?  Triphasic waves    Repeat EEG does not show any epileptic activity.    Suspect abnormality on initial EEG more related to polypharmacy. Tramadol can cause seizures. Stop tramadol.    Will stop Keppra and monitor.  No problems so far.    Lyrica level 2.3.    Patient reports that she was not taking Lyrica at home. Lyrica can sometimes cause a triphasic waves seen on the EEG.    Lumbar puncture results not suspicious for infection per ID.  Antibiotics/acyclovir now stopped.    B12/TSH/normal.  Ammonia normal.  Electrolytes normal.    Suspect confusion/encephalopathy related to polypharmacy    Neurology Discharge Planning : Per primary team. Would recommend driving restriction for at least 1 month.  Patient is currently not driving and is all right with this.    Patient Active Problem List    Diagnosis Date Noted     Confusion 01/20/2022     Priority: Medium     Hypokalemia 09/22/2021     Priority: Medium     SIRS (systemic inflammatory response syndrome) (H) 09/22/2021     Priority: Medium     Metabolic encephalopathy 09/22/2021     Priority: Medium     Delirium 09/22/2021     Priority: Medium     Atrial fibrillation with RVR (H) 09/22/2021     Priority: Medium     JACKSON (acute kidney injury) (H) 09/22/2021     Priority: Medium     DKA (diabetic ketoacidoses) 09/22/2021     Priority: Medium     Replacing diagnoses that were inactivated after the 10/1/2021 regulatory import.       Lactic acidosis 09/22/2021     Priority: Medium     COPD (chronic obstructive pulmonary disease) (H) 09/22/2021     Priority: Medium     Hypernatremia 09/22/2021     Priority: Medium     Hypomagnesemia 09/22/2021     Priority: Medium      Small bowel obstruction (H) 09/18/2021     Priority: Medium     Pneumatosis intestinalis 09/18/2021     Priority: Medium     Benign essential hypertension      Priority: Medium     Created by Conversion  Replacement Utility updated for latest IMO load         Acute diverticulitis 12/06/2016     Priority: Medium     Hyponatremia 12/06/2016     Priority: Medium     Leukocytosis, unspecified type 12/06/2016     Priority: Medium     Acute cystitis without hematuria 12/06/2016     Priority: Medium     DM2 12/06/2016     Priority: Medium     IMO SNOMED LOAD SPRING 2020 [.6/.18/.2020 9:04 PM]  Saroj Haas:           Leukocytosis      Priority: Medium     Created by Conversion  Replacement Utility updated for latest IMO load         Hyperthyroidism      Priority: Medium     Created by Conversion  Replacement Utility updated for latest IMO load         Back Pain      Priority: Medium     Created by Conversion  Replacement Utility updated for latest IMO load         Malignant hypertensive urgency 05/15/2015     Priority: Medium     Headache 05/15/2015     Priority: Medium     N&V (nausea and vomiting) 05/15/2015     Priority: Medium     KP (obstructive sleep apnea) 05/15/2015     Priority: Medium     Hypertensive emergency 05/15/2015     Priority: Medium     Type 2 Diabetes Mellitus      Priority: Medium     Created by Conversion         Altered mental state 03/14/2015     Priority: Medium     Post-op pain 03/12/2015     Priority: Medium     Accelerated hypertension 03/12/2015     Priority: Medium     Spondylisthesis 01/31/2015     Priority: Medium     Acute blood loss anemia 11/25/2014     Priority: Medium     Knee osteoarthritis 11/24/2014     Priority: Medium     HTN 11/24/2014     Priority: Medium     KP on CPAP 11/24/2014     Priority: Medium     GERD (gastroesophageal reflux disease) 11/24/2014     Priority: Medium     Mood disorder (H) 11/24/2014     Priority: Medium     Status post total knee replacement  11/24/2014     Priority: Medium     Taking Female Hormones For Postmenopausal HRT      Priority: Medium     Created by Conversion         Intractable headache 06/11/2014     Priority: Medium     Nicotine Dependence      Priority: Medium     Created by Conversion         Medical History  History reviewed. No pertinent past medical history.     SUBJECTIVE     Patient seen in follow-up for Dr. Celis.  Patient has a history of hypertension, diabetes, atrial fibrillation, COPD, marijuana use, chronic pain who presented with an episode of confusion.  She was not responsive at home and was staring off.  This was around dinnertime.  Glucose was very high and the neighbor helped her give her some insulin.  Due to persistent confusion patient was brought to the emergency room.  MRI brain was negative.  Blood work was noncontributory.  Ammonia was normal.  Per nursing staff patient had a similar spell in the past.  EEG was positive for seizures with some PLEDs and it was recommended she get a lumbar puncture done.  Lumbar puncture is scheduled for later today.  She is currently on video EEG monitoring for evaluation of ongoing seizures.  Remains on Keppra.  Reports no side effects of the Keppra.  Her confusion has significantly improved per the nursing staff.    Addendum:-Discussed with the nursing staff later today.  After the lumbar puncture patient was extremely lethargic and was complaining of headaches.  The later on evaluation the headaches were improving.  She was also less lethargic.  Right now we will monitor and if she has ongoing headaches could consider a blood patch tomorrow.    1/23/22  Patient seems much more clear on exam today.  Reports that she does have some short-term memory issues that this is baseline for her.  She has been having this for several years.  She will forget things that other people have told her.  She will misplace things.  Reports that confusion is not present at home.  She does not feel  "confused at this point.  Denies any other new symptoms.  Lumbar puncture was done.  Per infectious disease all antibiotics and acyclovir have now been stopped.    1/24/22  Patient seems much more clear on exam today.  Reports ongoing short-term memory issues of these are not worse than before.  Denies any new weakness or symptoms.  Infectious disease is keeping her off medications.  Psychiatry is not been consulted.  Patient is off tramadol.  I discussed about using Lyrica and it may be she was overdosing and she reports that she does not even take Lyrica at home.  Patient manages her medications on her own at home.  Reports that she does not overdose and is careful with her medications.    1/25  Awaiting OT evaluation/labs. Per OT notes they do not feel patient needs any further therapy. Patient would need 24-hour supervision. EEG done today. Patient is interested in getting off the Keppra. Is now on Lyrica. Agrees not to be on tramadol. Cymbalta has also been stopped. Zanaflex has also been stopped.    1/26  Seizure medication stopped this morning.  Patient reports that she does not notice any change.  Discussed with her not to go on tramadol.  Possible discharge later today.     OBJECTIVE     Vital signs in last 24 hours  Temp:  [97.8  F (36.6  C)-98.3  F (36.8  C)] 98.3  F (36.8  C)  Pulse:  [65-70] 70  Resp:  [16-18] 16  BP: (101-145)/(65-78) 134/65  SpO2:  [98 %-99 %] 99 %    Review of Systems   Comprehensive review of systems done with the patient and this is negative.  Pertinent positives noted in the HPI. No new symptoms today.    PHYSICAL EXAMINATION  VITALS: /65 (BP Location: Left arm, Patient Position: Semi-Blum's)   Pulse 70   Temp 98.3  F (36.8  C) (Oral)   Resp 16   Ht 1.549 m (5' 1\")   Wt 74.1 kg (163 lb 4.8 oz)   SpO2 99%   BMI 30.86 kg/m    GENERAL -Health appearing, No apparent distress  EYES- No scleral icterus, no eyelid droop, Pupils - see Neuro section  HEENT - Normocephalic, " atraumatic, Hearing grossly intact; Oral mucosa moist and pink in color. External Ears and nose intact.   Neck - supple with no obvious lymphadenopathy or thyromegaly  PULM - Good spontaneous respiratory effort; Normal palpation of chest.   CV- Pedal pulses present with no peripheral edema/ No significant varicosities.  MSK- Gait - see Neuro section; Strength and tone- see Neuro section; Range of motion grossly intact.  PSYCH -cooperative    Neurological  Mental status - Patient is awake and more oriented to self, place and time. Attention span is normal. Language is fluent and follows commands appropriately.   Cranial nerves - CN II-XII intact. Pupils are reactive and symmetric; EOMI, VFIT, NLF symmetric  Motor - There is no pronator drift. Motor exam shows 5/5 strength in all extremities.  Tone - Tone is symmetric bilaterally in upper and lower extremities.  Reflexes - Reflexes are symmetric with toes downgoing  Sensation - Sensory exam is grossly intact to light touch, pain.  Coordination - Finger to nose and heel to shin is without dysmetria.  Gait and station --unable to safely ambulate.  Formal gait testing cannot be done due to safety concerns from ongoing medical issues.       DIAGNOSTIC STUDIES     Pertinent Radiology   MRI-images reviewed  IMPRESSION:  1. No acute intracranial abnormality.  2. Mild generalized volume loss and chronic microvascular ischemic change.     CT head   IMPRESSION:  1.  No CT evidence for acute intracranial process.  2.  Brain atrophy and presumed chronic microvascular ischemic changes similar to the prior exam.    EEG reportedly positive for seizures.  Report pending    IMPRESSION/REPORT/PLAN  This is an abnormal prolonged video EEG during wakefulness and drowsiness/somnolent state due to intermittent generalized periodic discharges with triphasic morphology.    These could be triphasic waves since these are more bifrontocentral prominent. The generalized slow background seen  during the recording could suggest an encephalopathy or could be secondary to sedating medications. Further clinical correlation is needed.     Please note that the absence of epileptiform abnormalities does not exclude the possibility of epilepsy in any patient.     EEG  IMPRESSION/REPORT/PLAN  This is a normal EEG during wakefulness and drowsiness except for mild generalized background dysrhythmia. Further clinical correlation is needed.     Please note that the absence of epileptiform abnormalities does not exclude the possibility of epilepsy in any patient.     Component      Latest Ref Rng & Units 1/23/2022   Vitamin B12      213 - 816 pg/mL 902 (H)   TSH      0.30 - 5.00 uIU/mL 1.42   Levetiracetam      6.0 - 46.0 ug/mL 7.6     Component      Latest Ref Rng & Units 1/22/2022   Escherichia coli K1      Negative Negative   Haemophilus influenzae      Negative Negative   Listeria monocytogenes      Negative Negative   Neisseria meningitidis      Negative Negative   Streptococcus agalactiae (GBS)      Negative Negative   Streptococcus pneumoniae      Negative Negative   Cytomegalovirus      Negative Negative   Enterovirus by PCR      Negative Negative   Herpes Simplex Virus 1      Negative Negative   Herpes Simplex Virus 2      Negative Negative   Human Herpes Virus 6      Negative Negative   Human Parechovirus      Negative Negative   Varicella Zoster Virus      Negative Negative   Cryptococcus neoformans/gattii      Negative Negative   Tube Number       4   Color CSF      Colorless Colorless   Appearance CSF      Clear Clear   WBC CSF      0 - 5 /uL 5   RBC CSF      0 - 2 /uL 115 (H)   Herpes Simplex Type 1 CSF      Not Detected Not Detected   Herpes Simplex Type 2 CSF      Not Detected Not Detected   Glucose CSF      40 - 75 mg/dL 161 (H)   Protein Total CSF      15 - 45 mg/dL 74 (H)       Recent Results (from the past 24 hour(s))   Comprehensive metabolic panel    Collection Time: 01/26/22  5:44 AM   Result  Value Ref Range    Sodium 141 136 - 145 mmol/L    Potassium 3.9 3.5 - 5.0 mmol/L    Chloride 106 98 - 107 mmol/L    Carbon Dioxide (CO2) 26 22 - 31 mmol/L    Anion Gap 9 5 - 18 mmol/L    Urea Nitrogen 15 8 - 28 mg/dL    Creatinine 0.71 0.60 - 1.10 mg/dL    Calcium 9.3 8.5 - 10.5 mg/dL    Glucose 161 (H) 70 - 125 mg/dL    Alkaline Phosphatase 57 45 - 120 U/L    AST 20 0 - 40 U/L    ALT 17 0 - 45 U/L    Protein Total 5.8 (L) 6.0 - 8.0 g/dL    Albumin 3.2 (L) 3.5 - 5.0 g/dL    Bilirubin Total 0.7 0.0 - 1.0 mg/dL    GFR Estimate 90 >60 mL/min/1.73m2   CBC with platelets    Collection Time: 01/26/22  5:44 AM   Result Value Ref Range    WBC Count 9.4 4.0 - 11.0 10e3/uL    RBC Count 4.14 3.80 - 5.20 10e6/uL    Hemoglobin 13.3 11.7 - 15.7 g/dL    Hematocrit 39.1 35.0 - 47.0 %    MCV 94 78 - 100 fL    MCH 32.1 26.5 - 33.0 pg    MCHC 34.0 31.5 - 36.5 g/dL    RDW 13.4 10.0 - 15.0 %    Platelet Count 227 150 - 450 10e3/uL   Magnesium    Collection Time: 01/26/22  5:44 AM   Result Value Ref Range    Magnesium 1.8 1.8 - 2.6 mg/dL   Glucose by meter    Collection Time: 01/26/22  7:46 AM   Result Value Ref Range    GLUCOSE BY METER POCT 177 (H) 70 - 99 mg/dL   Glucose by meter    Collection Time: 01/26/22 11:38 AM   Result Value Ref Range    GLUCOSE BY METER POCT 216 (H) 70 - 99 mg/dL         HOSPITAL MEDICATIONS          Total time spent for face to face visit, reviewing labs/imaging studies, counseling and coordination of care was: Over 30 min More than 50% of this time was spent on counseling and coordination of care.    Counseling patient.  Discussion of risks with stopping seizure medication.  Discussing EEG results.  Discharge planning.  Discussing driving restrictions.    Vasiliy Cm MD  Neurologist  [facility]  Tel:- [phone]

## 2022-02-04 ENCOUNTER — OFFICE VISIT (OUTPATIENT)
Dept: ENDOCRINOLOGY | Facility: CLINIC | Age: 72
End: 2022-02-04
Payer: COMMERCIAL

## 2022-02-04 VITALS
WEIGHT: 167 LBS | DIASTOLIC BLOOD PRESSURE: 90 MMHG | BODY MASS INDEX: 31.55 KG/M2 | HEART RATE: 64 BPM | SYSTOLIC BLOOD PRESSURE: 142 MMHG

## 2022-02-04 DIAGNOSIS — E10.29 TYPE 1 DIABETES MELLITUS WITH MICROALBUMINURIA (H): ICD-10-CM

## 2022-02-04 DIAGNOSIS — E10.65 TYPE 1 DIABETES MELLITUS WITH HYPERGLYCEMIA (H): Primary | ICD-10-CM

## 2022-02-04 DIAGNOSIS — Z79.4 LONG TERM (CURRENT) USE OF INSULIN (H): ICD-10-CM

## 2022-02-04 DIAGNOSIS — I10 HYPERTENSION, UNSPECIFIED TYPE: ICD-10-CM

## 2022-02-04 DIAGNOSIS — K76.0 HEPATIC STEATOSIS: ICD-10-CM

## 2022-02-04 DIAGNOSIS — E10.42 TYPE 1 DIABETES MELLITUS WITH DIABETIC POLYNEUROPATHY (H): ICD-10-CM

## 2022-02-04 DIAGNOSIS — I65.23 CAROTID ATHEROSCLEROSIS, BILATERAL: ICD-10-CM

## 2022-02-04 DIAGNOSIS — E66.09 CLASS 1 OBESITY DUE TO EXCESS CALORIES WITH SERIOUS COMORBIDITY AND BODY MASS INDEX (BMI) OF 31.0 TO 31.9 IN ADULT: ICD-10-CM

## 2022-02-04 DIAGNOSIS — E83.52 HYPERCALCEMIA: ICD-10-CM

## 2022-02-04 DIAGNOSIS — E78.2 MIXED HYPERLIPIDEMIA: ICD-10-CM

## 2022-02-04 DIAGNOSIS — E21.3 HYPERPARATHYROIDISM (H): ICD-10-CM

## 2022-02-04 DIAGNOSIS — E66.811 CLASS 1 OBESITY DUE TO EXCESS CALORIES WITH SERIOUS COMORBIDITY AND BODY MASS INDEX (BMI) OF 31.0 TO 31.9 IN ADULT: ICD-10-CM

## 2022-02-04 DIAGNOSIS — R80.9 TYPE 1 DIABETES MELLITUS WITH MICROALBUMINURIA (H): ICD-10-CM

## 2022-02-04 PROCEDURE — 95251 CONT GLUC MNTR ANALYSIS I&R: CPT | Performed by: INTERNAL MEDICINE

## 2022-02-04 PROCEDURE — 99215 OFFICE O/P EST HI 40 MIN: CPT | Performed by: INTERNAL MEDICINE

## 2022-02-04 RX ORDER — ROSUVASTATIN CALCIUM 5 MG/1
5 TABLET, COATED ORAL DAILY
Qty: 90 TABLET | Refills: 3 | Status: SHIPPED | OUTPATIENT
Start: 2022-02-04 | End: 2023-02-06

## 2022-02-04 RX ORDER — METFORMIN HCL 500 MG
1000 TABLET, EXTENDED RELEASE 24 HR ORAL 2 TIMES DAILY WITH MEALS
Qty: 360 TABLET | Refills: 3 | Status: SHIPPED | OUTPATIENT
Start: 2022-02-04 | End: 2022-12-05

## 2022-02-04 NOTE — LETTER
2/4/2022         RE: Paige Mari  [address]  [place] [address]        Dear Colleague,    Thank you for referring your patient, Paige Mari, to the [facility]. Please see a copy of my visit note below.    Subjective:    Established patient    Paige Mari is a 71 year old female who presents to review multiple endocrinopathies.    She was admitted 1/2022 with encephalopathy thought to be possibly medication related (tramadol and pregabalin).     Current therapy:  -Levemir 0-0-0-20  -Aspart: 8 units before meals with a CS using a sensitivity of 30 mg/dL starting at 140 mg/dL   -Victoza 1.2 mg daily     She generally has 3 meals per day and bedtimes snacks     She has hypoglycemia unawareness.         Objective:    Today she was alert and conversational. Mental status normal.    12/2021: DM foot exam performed and she has absent sensation to monofilament testing bilaterally. Reduced pedal pulses. No ulcers/skinbreakdown.    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 (HARRIETT)  -12/7/2021: C-peptide 0.7 ng/mL with concurrent venous glucose 176 mg/dL, CEFERINO Ab >250 (ULN 5.0 IU/mL), insulin Ab 0.6 (ULN 0.4 U/mL), IA-2 Ab 44.1 (ULN 7.4 U/mL), Zn T8 Ab 64.3 (ref range 0.0 - 15.0 U/mL)  -CT A/P 9/2021: Mild diffuse pancreatic atrophy  -12/7/2021: fructosamine 365 umol/L (corresponds to HbA1c ~9.0%), HbA1c 9.3%   -1/2022: HbA1c 8.6%, CBC normal   # No prior DM retinopathy, she has an upcoming eye exam   # Hypertension, on clonidine, metoprolol, olmesartan, microalbuminuria     -1/2022: GFR 90  -12/2021: urine microalbumin/Cr 30.8 mg/g  # Painful peripheral neuropathy, no prior DM foot ulcerations, on neuropathic pharmacologic therapy  # No prior ASCVD event, mild carotid atherosclerosis on US 2015, not on ASA  # Mixed hyperlipidemia, not on a statin   -1/2022: , HDL 67, LDL 95,   -She has a prior statin intolerance (myalgias)   # Hepatic  steatosis    We reviewed her CGM in detail. Given her hypoglycemia unawareness we set alarms on her CGM and increased the alarm volume. Adjust her insulin as follows:  -Levemir 9-0-9-0 (she has many Levemir pens remianing and when running low she'll let me know and we'll likely switch to Tresiba)  -NovoLog 9 U before meals with a correction scale using a sensitivity of 40 mg/dL starting at 140 mg/dL   -I suspect she would benefit from metfomrin and we'll start this and gradually up titrate to 1000 mg BID     We reviewed hypoglycemia management and I rx glucose tabs, she has a glucagon kit at home.    Will start rosuvastatin at a low does of 5 mg daily given her her prior statin intolerance and I asked her to increase to 10 mg after a few weeks if tolerated.     She will also meet with a CDE and this is ordered.     # Thyroid function    In the past has been on both thyroid hormone and methimazole at various points in time. She has not taken a medication for her thyroid in years.     12/2021: TSH, T4, T3 all normal, TSI undetectable, TRAb undetectable, TPO Ab significantly elevated     1/2022: TSH 1.42    Should have her TSH checked yearly, or at any time if symptoms of thyrotoxicosis or hypothyroidism.    # Bone and mineral metabolism    Since 12/7/2021: albumin corrected serum calcium peak 10.6 mg/dL (ULN 10.5) and otherwise normal. Serum phosphorous and alkaine phosphatase have been normal. 1/2022: vitamin D 56 (ref range 20 - 75 mcg/L), 12/2021: UPEP and SPEP negative     Current therapy: vitamin D 1000 international unit(s) daily and MVI with minerals     No prior DEXA.    Will check for parathyroid autonomy, PTH with minerals ordered.     48 minutes spent on the date of the encounter doing chart review, history and exam, documentation and further activities as noted above.       Again, thank you for allowing me to participate in the care of your patient.        Sincerely,        Maynor Bardales MD

## 2022-02-06 RX ORDER — LIRAGLUTIDE 6 MG/ML
1.2 INJECTION SUBCUTANEOUS DAILY
COMMUNITY
Start: 2022-02-06 | End: 2023-02-17

## 2022-02-07 NOTE — PROGRESS NOTES
Subjective:    Established patient    Paige Mari is a 71 year old female who presents to review multiple endocrinopathies.    She was admitted 1/2022 with encephalopathy thought to be possibly medication related (tramadol and pregabalin).     Current therapy:  -Levemir 0-0-0-20  -Aspart: 8 units before meals with a CS using a sensitivity of 30 mg/dL starting at 140 mg/dL   -Victoza 1.2 mg daily     She generally has 3 meals per day and bedtimes snacks     She has hypoglycemia unawareness.         Objective:    Today she was alert and conversational. Mental status normal.    12/2021: DM foot exam performed and she has absent sensation to monofilament testing bilaterally. Reduced pedal pulses. No ulcers/skinbreakdown.    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 (HARRIETT)  -12/7/2021: C-peptide 0.7 ng/mL with concurrent venous glucose 176 mg/dL, CEFERINO Ab >250 (ULN 5.0 IU/mL), insulin Ab 0.6 (ULN 0.4 U/mL), IA-2 Ab 44.1 (ULN 7.4 U/mL), Zn T8 Ab 64.3 (ref range 0.0 - 15.0 U/mL)  -CT A/P 9/2021: Mild diffuse pancreatic atrophy  -12/7/2021: fructosamine 365 umol/L (corresponds to HbA1c ~9.0%), HbA1c 9.3%   -1/2022: HbA1c 8.6%, CBC normal   # No prior DM retinopathy, she has an upcoming eye exam   # Hypertension, on clonidine, metoprolol, olmesartan, microalbuminuria     -1/2022: GFR 90  -12/2021: urine microalbumin/Cr 30.8 mg/g  # Painful peripheral neuropathy, no prior DM foot ulcerations, on neuropathic pharmacologic therapy  # No prior ASCVD event, mild carotid atherosclerosis on US 2015, not on ASA  # Mixed hyperlipidemia, not on a statin   -1/2022: , HDL 67, LDL 95,   -She has a prior statin intolerance (myalgias)   # Hepatic steatosis    We reviewed her CGM in detail. Given her hypoglycemia unawareness we set alarms on her CGM and increased the alarm volume. Adjust her insulin as follows:  -Levemir 9-0-9-0 (she has many Levemir pens remianing and when running low she'll let me know and  we'll likely switch to Tresiba)  -NovoLog 9 U before meals with a correction scale using a sensitivity of 40 mg/dL starting at 140 mg/dL   -I suspect she would benefit from metfomrin and we'll start this and gradually up titrate to 1000 mg BID     We reviewed hypoglycemia management and I rx glucose tabs, she has a glucagon kit at home.    Will start rosuvastatin at a low does of 5 mg daily given her her prior statin intolerance and I asked her to increase to 10 mg after a few weeks if tolerated.     She will also meet with a CDE and this is ordered.     # Thyroid function    In the past has been on both thyroid hormone and methimazole at various points in time. She has not taken a medication for her thyroid in years.     12/2021: TSH, T4, T3 all normal, TSI undetectable, TRAb undetectable, TPO Ab significantly elevated     1/2022: TSH 1.42    Should have her TSH checked yearly, or at any time if symptoms of thyrotoxicosis or hypothyroidism.    # Bone and mineral metabolism    Since 12/7/2021: albumin corrected serum calcium peak 10.6 mg/dL (ULN 10.5) and otherwise normal. Serum phosphorous and alkaine phosphatase have been normal. 1/2022: vitamin D 56 (ref range 20 - 75 mcg/L), 12/2021: UPEP and SPEP negative     Current therapy: vitamin D 1000 international unit(s) daily and MVI with minerals     No prior DEXA.    Will check for parathyroid autonomy, PTH with minerals ordered.     48 minutes spent on the date of the encounter doing chart review, history and exam, documentation and further activities as noted above.

## 2022-02-14 ENCOUNTER — TELEPHONE (OUTPATIENT)
Dept: ENDOCRINOLOGY | Facility: CLINIC | Age: 72
End: 2022-02-14
Payer: COMMERCIAL

## 2022-02-14 DIAGNOSIS — E10.65 TYPE 1 DIABETES MELLITUS WITH HYPERGLYCEMIA (H): Primary | ICD-10-CM

## 2022-02-14 NOTE — TELEPHONE ENCOUNTER
[facility] Call Center    Phone Message    May a detailed message be left on voicemail: yes     Reason for Call: Medication Question or concern regarding medication   Prescription Clarification  Name of Medication: insulin aspart (NOVOLOG VIAL) 100 UNITS/ML vial  Prescribing Provider: Catia   Pharmacy: [id number] [address]   What on the order needs clarification?   Pt states that the change in medication from last appt has made her blood sugars go from really low to really high. Would like a call back to discuss numbers and dosage for Novolog and Levemir that were changed. Requesting call back at #[phone]          Action Taken: Other: Endo    Travel Screening: Not Applicable

## 2022-02-14 NOTE — CONFIDENTIAL NOTE
Previous therapy:  -Levemir 0-0-0-20  -Aspart: 8 units before meals with a CS using a sensitivity of 30 mg/dL starting at 140 mg/dL   -Victoza 1.2 mg daily       Switched to:  Levemir 9-0-9-0 (she has many Levemir pens remianing and when running low she'll let me know and we'll likely switch to Tresiba)  -NovoLog 9 U before meals with a correction scale using a sensitivity of 40 mg/dL starting at 140 mg/dL   -I suspect she would benefit from metfomrin and we'll start this and gradually up titrate to 1000 mg BID     Spoke to patient that she is having lows that the dexcom would not even register. Then she would eat and it would jump all the way up to 400's  Then take insulin and it would drop.     novolog- 9 units before meal   Pt stated 12 in the morning and 12 at dinner- informed pt it was supposed to be 9 and 9     Pt still taking Victoza 1.2mg daily and metformin BID.    When patient gets low she eats anything she can get her hands. Did inform patient that she should eat a little bit to bring it up and then recheck, and add protein or complex carbs. as she says that it goes from really low to really high and then gives herself insulin and drops again.     Pt stated she had sugar substances and mac an cheese and raisin bran. On the lowest sugar over the weekend that would not register.     Please advise if any adjustment to medication.

## 2022-02-14 NOTE — CONFIDENTIAL NOTE
Per Lemuel Marcnao, so she was taking Levemir 12 units twice daily instead of 9 units twice daily as we had recommended? That's likely the culprit here.     Recommend going with the dosing I recommended at our recent appointment. Can we check on her again over the next few days? And let's have her see a CDE.     Recommend treating hypoglycemia with 15 g of carb then recheck glucose in 15 minutes.       Pt informed. Verbalized understanding.

## 2022-02-15 NOTE — TELEPHONE ENCOUNTER
the low blood sugars at night are due to her basal insulin, the Levemir. Decrease Levemir to 8 units twice daily, since Levemir has a long half life, over the coming few days the level in her body will drop and this will help.     In the interim, she should have a bedtime snack and wake up at 2 AM and review her glucose and have a snack if trending down.     I recommend continuing metformin

## 2022-02-15 NOTE — TELEPHONE ENCOUNTER
I called the pt, she thinks he low BG levels have worsened since starting the metformin. The pt has had low BG levels the last two nights (around midnight). The pt states that last night for dinner she has a half of a sub sandwich with ham, cheese, lettuce, and tomato. The pt states she took 9 units of novolog before dinner and did not need a correction, however does not recall what her BG number was. The pt starting metformin after her 2/4 appt. The pt is taking 1000 mg BID. Per the OV note, the pt was to titrate up, however the RX instructions did not have this information. I did advise the pt have a high protein snack before bed and educated her on options. Please advise on possible medication changes and I will contact the pt back.

## 2022-02-15 NOTE — TELEPHONE ENCOUNTER
[facility] Call Center    Phone Message    May a detailed message be left on voicemail: yes     Reason for Call: Other: Pt called in stating that changes were made yesterday and she still had very low blood sugars that did not register. She wanted to know if this could be the metformin. States that it only happens at night. Requesting a call back to discuss at #[phone]     Action Taken: Other: Endo    Travel Screening: Not Applicable

## 2022-02-17 PROBLEM — E11.10 DKA (DIABETIC KETOACIDOSIS) (H): Status: ACTIVE | Noted: 2021-09-22

## 2022-02-26 PROCEDURE — 95822 EEG COMA OR SLEEP ONLY: CPT | Mod: 26 | Performed by: PSYCHIATRY & NEUROLOGY

## 2022-03-08 ENCOUNTER — ALLIED HEALTH/NURSE VISIT (OUTPATIENT)
Dept: EDUCATION SERVICES | Facility: CLINIC | Age: 72
End: 2022-03-08
Payer: COMMERCIAL

## 2022-03-08 VITALS — BODY MASS INDEX: 31.08 KG/M2 | WEIGHT: 164.5 LBS

## 2022-03-08 DIAGNOSIS — E10.65 TYPE 1 DIABETES MELLITUS WITH HYPERGLYCEMIA (H): ICD-10-CM

## 2022-03-08 PROCEDURE — G0108 DIAB MANAGE TRN  PER INDIV: HCPCS

## 2022-03-08 NOTE — PATIENT INSTRUCTIONS
1. Eat 3 balanced meals each day - Monitor carb intake and limit to 45-60 grams per meal  This would be equal to 4-5 choices ~  1 choice = 15 grams    Do not wait longer than 4-5 hours to eat something  Snacks limit to no more than 30 grams of carbohydrates or 2 choices  Make sure you include protein source with each meal and at bedtime - this has been shown to help with blood glucose elevations    2. Check blood sugars several  times each day   Fasting and before meal target is 80 - 130   2 hours after a meal target is < 180  remember to bring meter and log book to all appointments    3. Incorporate 30 minutes activity into each day - does not need to be all at one time & walking counts    4. Take diabetes medications as prescribed Continue Metformin 1000 mg twice daily, Change Levemir to 8 units in the morning and 9 units before bed and continue with 9 units of Novolog with meals plus the correction scale    Pump name - Omni pod     Let us know if you have any lows

## 2022-03-08 NOTE — PROGRESS NOTES
Diabetes Self-Management Education & Support    Presents for:      Type of Visit: In Person    How would patient like to obtain AVS? Piage Mari was provided a copy of AVS at conclusion of todays visit.    ASSESSMENT:    Paige is a very pleasant 71-year-old that returns to clinic today to review blood glucose and assess/assist her with the any of her diabetes self-management skills as needed.  She arrived today unaccompanied and had her Dexcom G6  with her which was downloaded and reviewed.  Per her report she has been monitoring her blood glucose throughout the day and taking all medications as prescribed with no missed or skipped doses.  Since my last meeting with Paige she has met with endocrinology/Dr. Bardales and had further adjustments made to her insulin regimen.  She has  not had any hypoglycemic episodes that have required intervention.  She did express interest again today in transitioning to an insulin pump.  Prior to today's visit her most recent lab results as well as office visit notes were reviewed.  Patient's most recent   Lab Results   Component Value Date    A1C 8.6 01/11/2022    is not meeting goal of <8.0    Diabetes knowledge and skills assessment:   Patient is knowledgeable in diabetes management concepts related to: Healthy Coping    Continue education with the following diabetes management concepts: Healthy Eating, Being Active, Taking Medication, Problem Solving and Reducing Risks    Based on learning assessment above, most appropriate setting for further diabetes education would be: Individual setting.    INTERVENTIONS:    Education provided today on:  AADE Self-Care Behaviors:  Monitoring: individual blood glucose targets and frequency of monitoring  Taking Medication: action of prescribed medication and when to take medications  Problem Solving: high blood glucose - causes, signs/symptoms, treatment and prevention and low blood glucose - causes, signs/symptoms, treatment  and prevention  Reducing Risks: prevention, early diagnostic measures and treatment of complications and A1C - goals, relating to blood glucose levels, how often to check    Opportunities for ongoing education and support in diabetes-self management were discussed. Pt verbalized understanding of concepts discussed and recommendations provided today.       Education Materials Provided:  No new materials provided today          PLAN  Monitor blood glucose several times daily.  Paige was reminded to bring her CGM reader/ to all follow-up appointments in clinic.  Nutrition: Eat 3 balanced meals daily, following parameters set by ADA guidelines for intake of carbohydrates for both meals and snacks.  Remember to include a protein with each meal and at bedtime.  Medications: Continue Metformin 1000 mg p.o. twice daily, Victoza 1.2 mg SC daily, Novolog 9 units with meals plus additional correction using scale of +1 for every 40 greater than 140 mg/dL, due to the elevations noted NOC up until noon we will have her increase Levemir p.m. dose to 9 units and remain at 8 units each morning.  Paige was instructed to call should she have any hypoglycemic episodes.    Will be following up with Dr. Bardales on 3/25/2022.  She did still express interest in getting an insulin pump.    Topics to cover at upcoming visits: Taking Medication, Problem Solving, Reducing Risks and Insulin Pump Concepts     Follow-up: Endo 3/25/22    See Goals Section for co-developed, patient-stated behavior change goals.  AVS provided to patient today.          SUBJECTIVE / OBJECTIVE:     Cultural Influences/Ethnic Background:  Not  or       Diabetes Symptoms & Complications:          Patient Problem List and Family Medical History reviewed for relevant medical history, current medical status, and diabetes risk factors.    Vitals:  Wt 74.6 kg (164 lb 8 oz)   BMI 31.08 kg/m    Estimated body mass index is 31.08 kg/m  as calculated  "from the following:    Height as of 1/22/22: 1.549 m (5' 1\").    Weight as of this encounter: 74.6 kg (164 lb 8 oz).   Last 3 BP:   BP Readings from Last 3 Encounters:   02/04/22 (!) 142/90   01/26/22 134/65   12/07/21 124/82       History   Smoking Status     Former Smoker     Quit date: 9/15/2021   Smokeless Tobacco     Never Used       Labs:  Lab Results   Component Value Date    A1C 8.6 01/11/2022     Lab Results   Component Value Date     01/26/2022     01/26/2022     Lab Results   Component Value Date    LDL 95 01/11/2022    LDL 99 07/06/2016     Direct Measure HDL   Date Value Ref Range Status   01/11/2022 67 >=50 mg/dL Final     Comment:     HDL Cholesterol Reference Range:     0-2 years:   No reference ranges established for patients under 2 years old  at [facility] for lipid analytes.    2-8 years:  Greater than 45 mg/dL     18 years and older:   Female: Greater than or equal to 50 mg/dL   Male:   Greater than or equal to 40 mg/dL   ]  GFR Estimate   Date Value Ref Range Status   01/26/2022 90 >60 mL/min/1.73m2 Final     Comment:     Effective December 21, 2021 eGFRcr in adults is calculated using the 2021 CKD-EPI creatinine equation which includes age and gender (Gerda trujillo al., NEJ, DOI: 10.1056/DSMBlx1023846)   03/03/2021 >60 >60 mL/min/1.73m2 Final     GFR Estimate If Black   Date Value Ref Range Status   03/03/2021 >60 >60 mL/min/1.73m2 Final     Lab Results   Component Value Date    CR 0.71 01/26/2022     No results found for: MICROALBUMIN    Healthy Eating:       Being Active:       Monitoring:       Glucose data:                Taking Medications:  Diabetes Medication(s)     Biguanides       metFORMIN (GLUCOPHAGE-XR) 500 MG 24 hr tablet    Take 2 tablets (1,000 mg) by mouth 2 times daily (with meals)    Diabetic Other       Glucagon, rDNA, (GLUCAGON EMERGENCY) 1 MG KIT    1 mg IM one time, PRN severe hypoglycemia causing altered consciousness affecting ability to take food " by mouth     Patient not taking: Reported on 1/17/2022     glucose (BD GLUCOSE) 5 g chewable tablet    Take 2 tablets (10 g) by mouth daily as needed (hypoglycemia)    Insulin       insulin aspart (NOVOLOG VIAL) 100 UNITS/ML vial    9 units with meals plus correction scale with sensitivity of 40 mg/dL starting at 140 mg/dL     insulin detemir (LEVEMIR PEN) 100 UNIT/ML pen    9 units twice daily    Incretin Mimetic Agents (GLP-1 Receptor Agonists)       liraglutide (VICTOZA) 18 MG/3ML solution    Inject 1.2 mg Subcutaneous daily               Problem Solving:                 Reducing Risks:       Healthy Coping:     Patient Activation Measure Survey Score:  No flowsheet data found.          Thank you,  Jil Pascual RN [facility]  Certified Diabetes Care and   Visit type : DSMT      Time Spent: 30 minutes  Encounter Type: Individual        Any diabetes medication dose changes were made via the Certified Diabetes Care & Education Protocol in collaboration with the patient's referring provider and endocrinology provider. A copy of this encounter was shared with the provider.    Much or all of the text in this note was generated through the use of the Dragon Dictate voice-to-text software.Errors in spelling or words which seem out of context are unintentional. Sound alike errors, in particular, may have escaped editing.

## 2022-03-08 NOTE — LETTER
3/8/2022         RE: Paige Mari  [address]  [place] [address]        Dear Colleague,    Thank you for referring your patient, Paige Mari, to the [facility]. Please see a copy of my visit note below.    Diabetes Self-Management Education & Support    Presents for:      Type of Visit: In Person    How would patient like to obtain AVS? Paige Mari was provided a copy of AVS at conclusion of todays visit.    ASSESSMENT:    Paige is a very pleasant 71-year-old that returns to clinic today to review blood glucose and assess/assist her with the any of her diabetes self-management skills as needed.  She arrived today unaccompanied and had her Dexcom G6  with her which was downloaded and reviewed.  Per her report she has been monitoring her blood glucose throughout the day and taking all medications as prescribed with no missed or skipped doses.  Since my last meeting with Paige she has met with endocrinology/Dr. Bardales and had further adjustments made to her insulin regimen.  She has  not had any hypoglycemic episodes that have required intervention.  She did express interest again today in transitioning to an insulin pump.  Prior to today's visit her most recent lab results as well as office visit notes were reviewed.  Patient's most recent   Lab Results   Component Value Date    A1C 8.6 01/11/2022    is not meeting goal of <8.0    Diabetes knowledge and skills assessment:   Patient is knowledgeable in diabetes management concepts related to: Healthy Coping    Continue education with the following diabetes management concepts: Healthy Eating, Being Active, Taking Medication, Problem Solving and Reducing Risks    Based on learning assessment above, most appropriate setting for further diabetes education would be: Individual setting.    INTERVENTIONS:    Education provided today on:  AADE Self-Care Behaviors:  Monitoring: individual blood  glucose targets and frequency of monitoring  Taking Medication: action of prescribed medication and when to take medications  Problem Solving: high blood glucose - causes, signs/symptoms, treatment and prevention and low blood glucose - causes, signs/symptoms, treatment and prevention  Reducing Risks: prevention, early diagnostic measures and treatment of complications and A1C - goals, relating to blood glucose levels, how often to check    Opportunities for ongoing education and support in diabetes-self management were discussed. Pt verbalized understanding of concepts discussed and recommendations provided today.       Education Materials Provided:  No new materials provided today          PLAN  Monitor blood glucose several times daily.  Paige was reminded to bring her CGM reader/ to all follow-up appointments in clinic.  Nutrition: Eat 3 balanced meals daily, following parameters set by ADA guidelines for intake of carbohydrates for both meals and snacks.  Remember to include a protein with each meal and at bedtime.  Medications: Continue Metformin 1000 mg p.o. twice daily, Victoza 1.2 mg SC daily, Novolog 9 units with meals plus additional correction using scale of +1 for every 40 greater than 140 mg/dL, due to the elevations noted NOC up until noon we will have her increase Levemir p.m. dose to 9 units and remain at 8 units each morning.  Paige was instructed to call should she have any hypoglycemic episodes.    Will be following up with Dr. Bardales on 3/25/2022.  She did still express interest in getting an insulin pump.    Topics to cover at upcoming visits: Taking Medication, Problem Solving, Reducing Risks and Insulin Pump Concepts     Follow-up: Endo 3/25/22    See Goals Section for co-developed, patient-stated behavior change goals.  AVS provided to patient today.          SUBJECTIVE / OBJECTIVE:     Cultural Influences/Ethnic Background:  Not  or       Diabetes Symptoms &  "Complications:          Patient Problem List and Family Medical History reviewed for relevant medical history, current medical status, and diabetes risk factors.    Vitals:  Wt 74.6 kg (164 lb 8 oz)   BMI 31.08 kg/m    Estimated body mass index is 31.08 kg/m  as calculated from the following:    Height as of 1/22/22: 1.549 m (5' 1\").    Weight as of this encounter: 74.6 kg (164 lb 8 oz).   Last 3 BP:   BP Readings from Last 3 Encounters:   02/04/22 (!) 142/90   01/26/22 134/65   12/07/21 124/82       History   Smoking Status     Former Smoker     Quit date: 9/15/2021   Smokeless Tobacco     Never Used       Labs:  Lab Results   Component Value Date    A1C 8.6 01/11/2022     Lab Results   Component Value Date     01/26/2022     01/26/2022     Lab Results   Component Value Date    LDL 95 01/11/2022    LDL 99 07/06/2016     Direct Measure HDL   Date Value Ref Range Status   01/11/2022 67 >=50 mg/dL Final     Comment:     HDL Cholesterol Reference Range:     0-2 years:   No reference ranges established for patients under 2 years old  at [facility] for lipid analytes.    2-8 years:  Greater than 45 mg/dL     18 years and older:   Female: Greater than or equal to 50 mg/dL   Male:   Greater than or equal to 40 mg/dL   ]  GFR Estimate   Date Value Ref Range Status   01/26/2022 90 >60 mL/min/1.73m2 Final     Comment:     Effective December 21, 2021 eGFRcr in adults is calculated using the 2021 CKD-EPI creatinine equation which includes age and gender (Gerda trujillo al., NEJM, DOI: 10.1056/KQOKtg9191854)   03/03/2021 >60 >60 mL/min/1.73m2 Final     GFR Estimate If Black   Date Value Ref Range Status   03/03/2021 >60 >60 mL/min/1.73m2 Final     Lab Results   Component Value Date    CR 0.71 01/26/2022     No results found for: MICROALBUMIN    Healthy Eating:       Being Active:       Monitoring:       Glucose data:                Taking Medications:  Diabetes Medication(s)     Biguanides       metFORMIN " (GLUCOPHAGE-XR) 500 MG 24 hr tablet    Take 2 tablets (1,000 mg) by mouth 2 times daily (with meals)    Diabetic Other       Glucagon, rDNA, (GLUCAGON EMERGENCY) 1 MG KIT    1 mg IM one time, PRN severe hypoglycemia causing altered consciousness affecting ability to take food by mouth     Patient not taking: Reported on 1/17/2022     glucose (BD GLUCOSE) 5 g chewable tablet    Take 2 tablets (10 g) by mouth daily as needed (hypoglycemia)    Insulin       insulin aspart (NOVOLOG VIAL) 100 UNITS/ML vial    9 units with meals plus correction scale with sensitivity of 40 mg/dL starting at 140 mg/dL     insulin detemir (LEVEMIR PEN) 100 UNIT/ML pen    9 units twice daily    Incretin Mimetic Agents (GLP-1 Receptor Agonists)       liraglutide (VICTOZA) 18 MG/3ML solution    Inject 1.2 mg Subcutaneous daily               Problem Solving:                 Reducing Risks:       Healthy Coping:     Patient Activation Measure Survey Score:  No flowsheet data found.          Thank you,  Jil Pascual RN [facility]  Certified Diabetes Care and   Visit type : DSMT      Time Spent: 30 minutes  Encounter Type: Individual        Any diabetes medication dose changes were made via the Certified Diabetes Care & Education Protocol in collaboration with the patient's referring provider and endocrinology provider. A copy of this encounter was shared with the provider.    Much or all of the text in this note was generated through the use of the Dragon Dictate voice-to-text software.Errors in spelling or words which seem out of context are unintentional. Sound alike errors, in particular, may have escaped editing.

## 2022-03-15 ENCOUNTER — LAB (OUTPATIENT)
Dept: LAB | Facility: CLINIC | Age: 72
End: 2022-03-15
Payer: COMMERCIAL

## 2022-03-15 DIAGNOSIS — E10.65 TYPE 1 DIABETES MELLITUS WITH HYPERGLYCEMIA (H): ICD-10-CM

## 2022-03-15 DIAGNOSIS — E83.52 HYPERCALCEMIA: ICD-10-CM

## 2022-03-15 DIAGNOSIS — E78.2 MIXED HYPERLIPIDEMIA: ICD-10-CM

## 2022-03-15 DIAGNOSIS — E21.3 HYPERPARATHYROIDISM (H): ICD-10-CM

## 2022-03-15 DIAGNOSIS — I10 HYPERTENSION, UNSPECIFIED TYPE: ICD-10-CM

## 2022-03-15 LAB
ALBUMIN SERPL-MCNC: 3.9 G/DL (ref 3.5–5)
CALCIUM SERPL-MCNC: 10.1 MG/DL (ref 8.5–10.5)
CHOLEST SERPL-MCNC: 151 MG/DL
CREAT SERPL-MCNC: 0.79 MG/DL (ref 0.6–1.1)
FASTING STATUS PATIENT QL REPORTED: NO
FASTING STATUS PATIENT QL REPORTED: NO
GFR SERPL CREATININE-BSD FRML MDRD: 79 ML/MIN/1.73M2
GLUCOSE BLD-MCNC: 99 MG/DL (ref 70–125)
HBA1C MFR BLD: 7.3 % (ref 0–5.6)
HDLC SERPL-MCNC: 67 MG/DL
LDLC SERPL CALC-MCNC: 35 MG/DL
PHOSPHATE SERPL-MCNC: 4.4 MG/DL (ref 2.5–4.5)
PTH-INTACT SERPL-MCNC: 25 PG/ML (ref 10–86)
TRIGL SERPL-MCNC: 246 MG/DL

## 2022-03-15 PROCEDURE — 84100 ASSAY OF PHOSPHORUS: CPT

## 2022-03-15 PROCEDURE — 82088 ASSAY OF ALDOSTERONE: CPT

## 2022-03-15 PROCEDURE — 99000 SPECIMEN HANDLING OFFICE-LAB: CPT

## 2022-03-15 PROCEDURE — 82565 ASSAY OF CREATININE: CPT

## 2022-03-15 PROCEDURE — 82040 ASSAY OF SERUM ALBUMIN: CPT

## 2022-03-15 PROCEDURE — 36415 COLL VENOUS BLD VENIPUNCTURE: CPT

## 2022-03-15 PROCEDURE — 82947 ASSAY GLUCOSE BLOOD QUANT: CPT

## 2022-03-15 PROCEDURE — 83036 HEMOGLOBIN GLYCOSYLATED A1C: CPT

## 2022-03-15 PROCEDURE — 83970 ASSAY OF PARATHORMONE: CPT

## 2022-03-15 PROCEDURE — 80061 LIPID PANEL: CPT

## 2022-03-15 PROCEDURE — 82310 ASSAY OF CALCIUM: CPT

## 2022-03-15 PROCEDURE — 84244 ASSAY OF RENIN: CPT | Mod: 90

## 2022-03-18 LAB
ALDOST SERPL-MCNC: 6.4 NG/DL (ref 0–31)
RENIN PLAS-CCNC: 0.1 NG/ML/HR

## 2022-03-25 ENCOUNTER — OFFICE VISIT (OUTPATIENT)
Dept: ENDOCRINOLOGY | Facility: CLINIC | Age: 72
End: 2022-03-25
Payer: COMMERCIAL

## 2022-03-25 VITALS
DIASTOLIC BLOOD PRESSURE: 58 MMHG | SYSTOLIC BLOOD PRESSURE: 100 MMHG | HEART RATE: 66 BPM | BODY MASS INDEX: 31.27 KG/M2 | WEIGHT: 165.5 LBS

## 2022-03-25 DIAGNOSIS — E10.29 TYPE 1 DIABETES MELLITUS WITH MICROALBUMINURIA (H): ICD-10-CM

## 2022-03-25 DIAGNOSIS — Z79.4 LONG TERM (CURRENT) USE OF INSULIN (H): ICD-10-CM

## 2022-03-25 DIAGNOSIS — K76.0 HEPATIC STEATOSIS: ICD-10-CM

## 2022-03-25 DIAGNOSIS — E83.52 HYPERCALCEMIA: ICD-10-CM

## 2022-03-25 DIAGNOSIS — R80.9 TYPE 1 DIABETES MELLITUS WITH MICROALBUMINURIA (H): ICD-10-CM

## 2022-03-25 DIAGNOSIS — I10 HYPERTENSION, UNSPECIFIED TYPE: ICD-10-CM

## 2022-03-25 DIAGNOSIS — E78.2 MIXED HYPERLIPIDEMIA: ICD-10-CM

## 2022-03-25 DIAGNOSIS — E66.09 CLASS 1 OBESITY DUE TO EXCESS CALORIES WITH SERIOUS COMORBIDITY AND BODY MASS INDEX (BMI) OF 31.0 TO 31.9 IN ADULT: ICD-10-CM

## 2022-03-25 DIAGNOSIS — E10.42 TYPE 1 DIABETES MELLITUS WITH DIABETIC POLYNEUROPATHY (H): ICD-10-CM

## 2022-03-25 DIAGNOSIS — I65.23 CAROTID ATHEROSCLEROSIS, BILATERAL: ICD-10-CM

## 2022-03-25 DIAGNOSIS — R94.6 ABNORMAL FINDING ON THYROID FUNCTION TEST: ICD-10-CM

## 2022-03-25 DIAGNOSIS — E10.65 TYPE 1 DIABETES MELLITUS WITH HYPERGLYCEMIA (H): Primary | ICD-10-CM

## 2022-03-25 DIAGNOSIS — E66.811 CLASS 1 OBESITY DUE TO EXCESS CALORIES WITH SERIOUS COMORBIDITY AND BODY MASS INDEX (BMI) OF 31.0 TO 31.9 IN ADULT: ICD-10-CM

## 2022-03-25 PROCEDURE — 95251 CONT GLUC MNTR ANALYSIS I&R: CPT | Performed by: INTERNAL MEDICINE

## 2022-03-25 PROCEDURE — 99215 OFFICE O/P EST HI 40 MIN: CPT | Performed by: INTERNAL MEDICINE

## 2022-03-25 NOTE — PROGRESS NOTES
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    Current therapy:  -Levemir 9-0-9-0   -NovoLog 9 U before meals with a correction scale using a sensitivity of 40 mg/dL starting at 140 mg/dL   -Metformin 1000 mg BID   -She ran out of Victoza a few weeks ago and had been on 1.2 mg daily         Objective:    BMI 31.27 kg/m2. /58. Appears well.    12/2021: DM foot exam performed and she has absent sensation to monofilament testing bilaterally. Reduced pedal pulses. No ulcers/skinbreakdown.    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 (HARRIETT)  -12/7/2021: C-peptide 0.7 ng/mL with concurrent venous glucose 176 mg/dL, CEFERINO Ab >250 (ULN 5.0 IU/mL), insulin Ab 0.6 (ULN 0.4 U/mL), IA-2 Ab 44.1 (ULN 7.4 U/mL), Zn T8 Ab 64.3 (ref range 0.0 - 15.0 U/mL)  -CT A/P 9/2021: Mild diffuse pancreatic atrophy  -12/7/2021: fructosamine 365 umol/L (corresponds to HbA1c ~9.0%), HbA1c 9.3%   -1/2022: HbA1c 8.6%, CBC normal   -3/2022: HbA1c 7.3%  # No prior DM retinopathy, DM eye exam 3/2022  # Hypertension, on clonidine, metoprolol, olmesartan, microalbuminuria     -72175: GFR 79  -12/2021: urine microalbumin/Cr 30.8 mg/g  -3/2022: paired aldosterone 6.4 ng/dL and PRA 0.1 ng/mL/hr  # Painful peripheral neuropathy, no prior DM foot ulcerations, on neuropathic pharmacologic therapy  # No prior ASCVD event, mild carotid atherosclerosis on US 2015, not on ASA  # Mixed hyperlipidemia, on rosuvastatin 5 mg daily   -3/2022: , HDL 67, LDL 35,   -She has a prior statin intolerance (myalgias)   # Hepatic steatosis     We reviewed her CGM (Dexcom G6) in detail. Adjust her insulin as follows:    -Levemir 9-0-7-0 (we had previously discussed transitioning to Tresiba and can do so if she doesn't start an insulin pump)  -NovoLog dosing based on pre-meal glucose as follows:     <70 mg/dL: hold     71 - 100 mg/dL: 4 units     101 - 120 mg/dL: 6 units     121 - 140 mg/dL:  10 units     then add 1 unit for every 40 mg/dL above 140 mg/dL   -Metformin 1000 mg BID   -She ran out of Victoza a few weeks ago and had been on 1.2 mg daily, she is supplied directly from the  and they are working on sending her a new supply, she will let me know before restarting this as we may reduce her prandial insulin first     She will skip her at bedtime snack (she had been doing this not because of hunger but to reduce the likelihood of overnight hypoglycemia).      We reviewed hypoglycemia management and she has a glucagon kit at home.     She will also meet with Ana (MARU) to review insulin pump therapy, and she is interested in potentially starting a pump. She has not previously used an insulin pump.     Orders placed for labs and return visit in 2 months.      # Thyroid function     In the past has been on both thyroid hormone and methimazole at various points in time. She has not taken a medication for her thyroid in years.      12/2021: TSH, T4, T3 all normal, TSI undetectable, TRAb undetectable, TPO Ab significantly elevated     1/2022: TSH 1.42     Should have her TSH checked yearly, or at any time if symptoms of thyrotoxicosis or hypothyroidism.     # Bone and mineral metabolism     Since 12/7/2021: albumin corrected serum calcium peak 10.6 mg/dL (ULN 10.5) and otherwise normal. Serum phosphorous and alkaine phosphatase have been normal. 1/2022: vitamin D 56 (ref range 20 - 75 mcg/L), 12/2021: UPEP and SPEP negative      Current therapy: vitamin D 1000 international unit(s) daily and MVI with minerals      No prior DEXA. No prior low impact fractures.     3/2022: PTH 25 pg/mL, Cr, calcium, albumin, phosphorous all normal     45 minutes spent on the date of the encounter doing chart review, history and exam, documentation and further activities as noted above.

## 2022-03-25 NOTE — LETTER
3/25/2022         RE: Paige Mari  [address]  [place] [address]        Dear Colleague,    Thank you for referring your patient, Paige Mari, to the [facility]. Please see a copy of my visit note below.    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    Current therapy:  -Levemir 9-0-9-0   -NovoLog 9 U before meals with a correction scale using a sensitivity of 40 mg/dL starting at 140 mg/dL   -Metformin 1000 mg BID   -She ran out of Victoza a few weeks ago and had been on 1.2 mg daily         Objective:    BMI 31.27 kg/m2. /58. Appears well.    12/2021: DM foot exam performed and she has absent sensation to monofilament testing bilaterally. Reduced pedal pulses. No ulcers/skinbreakdown.    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 (HARRIETT)  -12/7/2021: C-peptide 0.7 ng/mL with concurrent venous glucose 176 mg/dL, CEFERINO Ab >250 (ULN 5.0 IU/mL), insulin Ab 0.6 (ULN 0.4 U/mL), IA-2 Ab 44.1 (ULN 7.4 U/mL), Zn T8 Ab 64.3 (ref range 0.0 - 15.0 U/mL)  -CT A/P 9/2021: Mild diffuse pancreatic atrophy  -12/7/2021: fructosamine 365 umol/L (corresponds to HbA1c ~9.0%), HbA1c 9.3%   -1/2022: HbA1c 8.6%, CBC normal   -3/2022: HbA1c 7.3%  # No prior DM retinopathy, DM eye exam 3/2022  # Hypertension, on clonidine, metoprolol, olmesartan, microalbuminuria     -07369: GFR 79  -12/2021: urine microalbumin/Cr 30.8 mg/g  -3/2022: paired aldosterone 6.4 ng/dL and PRA 0.1 ng/mL/hr  # Painful peripheral neuropathy, no prior DM foot ulcerations, on neuropathic pharmacologic therapy  # No prior ASCVD event, mild carotid atherosclerosis on US 2015, not on ASA  # Mixed hyperlipidemia, on rosuvastatin 5 mg daily   -3/2022: , HDL 67, LDL 35,   -She has a prior statin intolerance (myalgias)   # Hepatic steatosis     We reviewed her CGM (Dexcom G6) in detail. Adjust her insulin as  follows:    -Levemir 9-0-7-0 (we had previously discussed transitioning to Tresiba and can do so if she doesn't start an insulin pump)  -NovoLog dosing based on pre-meal glucose as follows:     <70 mg/dL: hold     71 - 100 mg/dL: 4 units     101 - 120 mg/dL: 6 units     121 - 140 mg/dL: 10 units     then add 1 unit for every 40 mg/dL above 140 mg/dL   -Metformin 1000 mg BID   -She ran out of Victoza a few weeks ago and had been on 1.2 mg daily, she is supplied directly from the  and they are working on sending her a new supply, she will let me know before restarting this as we may reduce her prandial insulin first     She will skip her at bedtime snack (she had been doing this not because of hunger but to reduce the likelihood of overnight hypoglycemia).      We reviewed hypoglycemia management and she has a glucagon kit at home.     She will also meet with Ana (MARU) to review insulin pump therapy, and she is interested in potentially starting a pump. She has not previously used an insulin pump.     Orders placed for labs and return visit in 2 months.      # Thyroid function     In the past has been on both thyroid hormone and methimazole at various points in time. She has not taken a medication for her thyroid in years.      12/2021: TSH, T4, T3 all normal, TSI undetectable, TRAb undetectable, TPO Ab significantly elevated     1/2022: TSH 1.42     Should have her TSH checked yearly, or at any time if symptoms of thyrotoxicosis or hypothyroidism.     # Bone and mineral metabolism     Since 12/7/2021: albumin corrected serum calcium peak 10.6 mg/dL (ULN 10.5) and otherwise normal. Serum phosphorous and alkaine phosphatase have been normal. 1/2022: vitamin D 56 (ref range 20 - 75 mcg/L), 12/2021: UPEP and SPEP negative      Current therapy: vitamin D 1000 international unit(s) daily and MVI with minerals      No prior DEXA. No prior low impact fractures.     3/2022: PTH 25 pg/mL, Cr, calcium, albumin,  phosphorous all normal     45 minutes spent on the date of the encounter doing chart review, history and exam, documentation and further activities as noted above.            Again, thank you for allowing me to participate in the care of your patient.        Sincerely,        Maynor Bardales MD

## 2022-03-27 ENCOUNTER — HEALTH MAINTENANCE LETTER (OUTPATIENT)
Age: 72
End: 2022-03-27

## 2022-04-07 ENCOUNTER — TELEPHONE (OUTPATIENT)
Dept: LAB | Facility: CLINIC | Age: 72
End: 2022-04-07

## 2022-04-07 ENCOUNTER — MEDICAL CORRESPONDENCE (OUTPATIENT)
Dept: HEALTH INFORMATION MANAGEMENT | Facility: CLINIC | Age: 72
End: 2022-04-07

## 2022-04-07 ENCOUNTER — ALLIED HEALTH/NURSE VISIT (OUTPATIENT)
Dept: EDUCATION SERVICES | Facility: CLINIC | Age: 72
End: 2022-04-07
Payer: COMMERCIAL

## 2022-04-07 DIAGNOSIS — E10.65 TYPE 1 DIABETES MELLITUS WITH HYPERGLYCEMIA (H): ICD-10-CM

## 2022-04-07 PROCEDURE — G0108 DIAB MANAGE TRN  PER INDIV: HCPCS

## 2022-04-07 NOTE — LETTER
"    4/7/2022         RE: Paige Mari  [address]  [place] [address]        Dear Colleague,    Thank you for referring your patient, Paige Mari, to the [facility]. Please see a copy of my visit note below.    Diabetes Self-Management Education & Support    Presents for: Insulin Pump Pre-Start    SUBJECTIVE/OBJECTIVE:  Presents for: Insulin Pump Pre-Start  Accompanied by: Self  Diabetes education in the past 24mo: Yes  Focus of Visit: Monitoring, Taking Medication, Problem Solving, Assistance w/ making life changes, Self-care behavioral goal setting  Diabetes type: Type 1  Disease course: Stable  Transportation concerns: No  Difficulty affording diabetes medication?: No  Difficulty affording diabetes testing supplies?: No  Other concerns:: Other (forgetful)  Cultural Influences/Ethnic Background:  Not  or       Diabetes Symptoms & Complications:  Fatigue: Sometimes       Patient Problem List and Family Medical History reviewed for relevant medical history, current medical status, and diabetes risk factors.    Vitals:  There were no vitals taken for this visit.  Estimated body mass index is 31.27 kg/m  as calculated from the following:    Height as of 1/22/22: 1.549 m (5' 1\").    Weight as of 3/25/22: 75.1 kg (165 lb 8 oz).   Last 3 BP:   BP Readings from Last 3 Encounters:   03/25/22 100/58   02/04/22 (!) 142/90   01/26/22 134/65       History   Smoking Status     Former Smoker     Quit date: 9/15/2021   Smokeless Tobacco     Never Used       Labs:  Lab Results   Component Value Date    A1C 7.3 03/15/2022     Lab Results   Component Value Date    GLC 99 03/15/2022     Lab Results   Component Value Date    LDL 35 03/15/2022    LDL 99 07/06/2016     Direct Measure HDL   Date Value Ref Range Status   03/15/2022 67 >=50 mg/dL Final     Comment:     HDL Cholesterol Reference Range:     0-2 years:   No reference ranges established for patients under 2 " years old  at [facility] for lipid analytes.    2-8 years:  Greater than 45 mg/dL     18 years and older:   Female: Greater than or equal to 50 mg/dL   Male:   Greater than or equal to 40 mg/dL   ]  GFR Estimate   Date Value Ref Range Status   03/15/2022 79 >60 mL/min/1.73m2 Final     Comment:     Effective December 21, 2021 eGFRcr in adults is calculated using the 2021 CKD-EPI creatinine equation which includes age and gender (Gerda et al., NE, DOI: 10.1056/SPNDzt0031045)   03/03/2021 >60 >60 mL/min/1.73m2 Final     GFR Estimate If Black   Date Value Ref Range Status   03/03/2021 >60 >60 mL/min/1.73m2 Final     Lab Results   Component Value Date    CR 0.79 03/15/2022     No results found for: MICROALBUMIN    Healthy Eating:  Healthy Eating Assessed Today: Yes  Meals include: Evening Snack, Breakfast, Lunch, Dinner (PB or cheese sandwich)  Breakfast: wakes at 7 am - usually urinates and goes back to bed until 11  Lunch: noon  Dinner: 7 pm  Other: bed at midnight    Monitoring:  Did patient bring glucose meter to appointment? : Yes  Blood Glucose Meter: CGM      Taking Medications:  Diabetes Medication(s)     Biguanides       metFORMIN (GLUCOPHAGE-XR) 500 MG 24 hr tablet    Take 2 tablets (1,000 mg) by mouth 2 times daily (with meals)    Diabetic Other       Glucagon, rDNA, (GLUCAGON EMERGENCY) 1 MG KIT    1 mg IM one time, PRN severe hypoglycemia causing altered consciousness affecting ability to take food by mouth     glucose (BD GLUCOSE) 5 g chewable tablet    Take 2 tablets (10 g) by mouth daily as needed (hypoglycemia)    Insulin       insulin aspart (NOVOLOG VIAL) 100 UNITS/ML vial    9 units with meals plus correction scale with sensitivity of 40 mg/dL starting at 140 mg/dL     insulin detemir (LEVEMIR PEN) 100 UNIT/ML pen    9 units twice daily    Incretin Mimetic Agents (GLP-1 Receptor Agonists)       liraglutide (VICTOZA) 18 MG/3ML solution    Inject 1.2 mg Subcutaneous daily          Taking  Medication Assessed Today: Yes  Current Treatments: Insulin Injections, Non-insulin Injectables, Oral Medication (taken by mouth)  Dose schedule: Pre-breakfast, Pre-lunch, Pre-dinner, At bedtime  Given by: Patient  Injection/Infusion sites: Abdomen  Problems taking diabetes medications regularly?: No  Diabetes medication side effects?: No    Problem Solving:  Problem Solving Assessed Today: Yes  Is the patient at risk for hypoglycemia?: Yes  Hypoglycemia Frequency: Rarely  Hypoglycemia Treatment: Juice, Glucose (tablets or gel)  Does patient have glucagon emergency kit?: Yes    Hypoglycemia symptoms  Confusion: Yes  Sweats: Yes  Feeling shaky: Yes         Reducing Risks:  Reducing Risks Assessed Today: Yes  Diabetes Risks: Age over 45 years, Sedentary Lifestyle, Hyperlipidemia  CAD Risks: Diabetes Mellitus, Dyslipidemia, Hypertension, Obesity, Post-menopausal, Sedentary lifestyle    Healthy Coping:  Healthy Coping Assessed Today: Yes  Emotional response to diabetes: Ready to learn  Stage of change: ACTION (Actively working towards change)  Support resources: In-person Offerings  Patient Activation Measure Survey Score:  No flowsheet data found.      Insulin Pump Concepts:  Balancing glucose and insulin  Carbohydrate counting  Calculating boluses  Problem solving with insulin pump therapy (BG monitoring; hypoglycemia signs/symptoms, treatment (glucagon) and prevention; hyperglycemia signs/symptoms, treatment and prevention; ketones, DKA signs/symptoms and prevention)    Opportunities for ongoing education and support in diabetes-self management were discussed.    Pt verbalized understanding of concepts discussed and recommendations provided today.         ASSESSMENT:  Pt seen today for DM education. She notes she has been diabetic >20 years. She explains she is tired of injections and is interested in a pump.   She has positive MXAI antibodies.   Currently taking Levemir 9 units in the morning and 7 units in the  evening.   NovoLog dosing based on pre-meal glucose as follows:     <70 mg/dL: hold     71 - 100 mg/dL: 4 units     101 - 120 mg/dL: 6 units     121 - 140 mg/dL: 10 units     then add 1 unit for every 40 mg/dL above 140 mg/dL  (Pt reports she has been following this for meals)  Metformin 1g BID and is on Victoza 1.2 mg/day but has not been taking as she did not get her supply.     Reviewed diet, eating 3 meals/day and occasionally having a bedtime snack. Pt was previously having hs snack to help prevent noc lows but reports she is now only doing this occasionally.   She is aware of carb foods and is not counting right now but states she would be willing to do so if using a pump.     She is using dexcom  (does not have yany)  Upload today:                   We reviewed how insulin pumps work, different options etc. Pt is interested in Omnipod. Will get paperwork sent over.       PLAN  Will discuss w/ Dr. Bardales and send paperwork for Omnipod.   Pt will f/u with CDE 5/12/22 as scheduled, will plan to do pump training that day pending everything goes through.       Time Spent: 60 minutes  Encounter Type: Individual    Any diabetes medication dose changes were made via the CDE Protocol and Collaborative Practice Agreement with the patient's referring provider. A copy of this encounter was shared with the provider.

## 2022-04-07 NOTE — TELEPHONE ENCOUNTER
Patient information form and ins card faxed to [place].   Rx placed in bin for Dr. Bardales to sign.

## 2022-04-07 NOTE — PROGRESS NOTES
"Diabetes Self-Management Education & Support    Presents for: Insulin Pump Pre-Start    SUBJECTIVE/OBJECTIVE:  Presents for: Insulin Pump Pre-Start  Accompanied by: Self  Diabetes education in the past 24mo: Yes  Focus of Visit: Monitoring, Taking Medication, Problem Solving, Assistance w/ making life changes, Self-care behavioral goal setting  Diabetes type: Type 1  Disease course: Stable  Transportation concerns: No  Difficulty affording diabetes medication?: No  Difficulty affording diabetes testing supplies?: No  Other concerns:: Other (forgetful)  Cultural Influences/Ethnic Background:  Not  or       Diabetes Symptoms & Complications:  Fatigue: Sometimes       Patient Problem List and Family Medical History reviewed for relevant medical history, current medical status, and diabetes risk factors.    Vitals:  There were no vitals taken for this visit.  Estimated body mass index is 31.27 kg/m  as calculated from the following:    Height as of 1/22/22: 1.549 m (5' 1\").    Weight as of 3/25/22: 75.1 kg (165 lb 8 oz).   Last 3 BP:   BP Readings from Last 3 Encounters:   03/25/22 100/58   02/04/22 (!) 142/90   01/26/22 134/65       History   Smoking Status     Former Smoker     Quit date: 9/15/2021   Smokeless Tobacco     Never Used       Labs:  Lab Results   Component Value Date    A1C 7.3 03/15/2022     Lab Results   Component Value Date    GLC 99 03/15/2022     Lab Results   Component Value Date    LDL 35 03/15/2022    LDL 99 07/06/2016     Direct Measure HDL   Date Value Ref Range Status   03/15/2022 67 >=50 mg/dL Final     Comment:     HDL Cholesterol Reference Range:     0-2 years:   No reference ranges established for patients under 2 years old  at [facility] for lipid analytes.    2-8 years:  Greater than 45 mg/dL     18 years and older:   Female: Greater than or equal to 50 mg/dL   Male:   Greater than or equal to 40 mg/dL   ]  GFR Estimate   Date Value Ref Range Status   03/15/2022 " 79 >60 mL/min/1.73m2 Final     Comment:     Effective December 21, 2021 eGFRcr in adults is calculated using the 2021 CKD-EPI creatinine equation which includes age and gender (Gerda trujillo al., NE, DOI: 10.1056/ESIHgn1966035)   03/03/2021 >60 >60 mL/min/1.73m2 Final     GFR Estimate If Black   Date Value Ref Range Status   03/03/2021 >60 >60 mL/min/1.73m2 Final     Lab Results   Component Value Date    CR 0.79 03/15/2022     No results found for: MICROALBUMIN    Healthy Eating:  Healthy Eating Assessed Today: Yes  Meals include: Evening Snack, Breakfast, Lunch, Dinner (PB or cheese sandwich)  Breakfast: wakes at 7 am - usually urinates and goes back to bed until 11  Lunch: noon  Dinner: 7 pm  Other: bed at midnight    Monitoring:  Did patient bring glucose meter to appointment? : Yes  Blood Glucose Meter: CGM      Taking Medications:  Diabetes Medication(s)     Biguanides       metFORMIN (GLUCOPHAGE-XR) 500 MG 24 hr tablet    Take 2 tablets (1,000 mg) by mouth 2 times daily (with meals)    Diabetic Other       Glucagon, rDNA, (GLUCAGON EMERGENCY) 1 MG KIT    1 mg IM one time, PRN severe hypoglycemia causing altered consciousness affecting ability to take food by mouth     glucose (BD GLUCOSE) 5 g chewable tablet    Take 2 tablets (10 g) by mouth daily as needed (hypoglycemia)    Insulin       insulin aspart (NOVOLOG VIAL) 100 UNITS/ML vial    9 units with meals plus correction scale with sensitivity of 40 mg/dL starting at 140 mg/dL     insulin detemir (LEVEMIR PEN) 100 UNIT/ML pen    9 units twice daily    Incretin Mimetic Agents (GLP-1 Receptor Agonists)       liraglutide (VICTOZA) 18 MG/3ML solution    Inject 1.2 mg Subcutaneous daily          Taking Medication Assessed Today: Yes  Current Treatments: Insulin Injections, Non-insulin Injectables, Oral Medication (taken by mouth)  Dose schedule: Pre-breakfast, Pre-lunch, Pre-dinner, At bedtime  Given by: Patient  Injection/Infusion sites: Abdomen  Problems taking  diabetes medications regularly?: No  Diabetes medication side effects?: No    Problem Solving:  Problem Solving Assessed Today: Yes  Is the patient at risk for hypoglycemia?: Yes  Hypoglycemia Frequency: Rarely  Hypoglycemia Treatment: Juice, Glucose (tablets or gel)  Does patient have glucagon emergency kit?: Yes    Hypoglycemia symptoms  Confusion: Yes  Sweats: Yes  Feeling shaky: Yes         Reducing Risks:  Reducing Risks Assessed Today: Yes  Diabetes Risks: Age over 45 years, Sedentary Lifestyle, Hyperlipidemia  CAD Risks: Diabetes Mellitus, Dyslipidemia, Hypertension, Obesity, Post-menopausal, Sedentary lifestyle    Healthy Coping:  Healthy Coping Assessed Today: Yes  Emotional response to diabetes: Ready to learn  Stage of change: ACTION (Actively working towards change)  Support resources: In-person Offerings  Patient Activation Measure Survey Score:  No flowsheet data found.      Insulin Pump Concepts:  Balancing glucose and insulin  Carbohydrate counting  Calculating boluses  Problem solving with insulin pump therapy (BG monitoring; hypoglycemia signs/symptoms, treatment (glucagon) and prevention; hyperglycemia signs/symptoms, treatment and prevention; ketones, DKA signs/symptoms and prevention)    Opportunities for ongoing education and support in diabetes-self management were discussed.    Pt verbalized understanding of concepts discussed and recommendations provided today.         ASSESSMENT:  Pt seen today for DM education. She notes she has been diabetic >20 years. She explains she is tired of injections and is interested in a pump.   She has positive MAXI antibodies.   Currently taking Levemir 9 units in the morning and 7 units in the evening.   NovoLog dosing based on pre-meal glucose as follows:     <70 mg/dL: hold     71 - 100 mg/dL: 4 units     101 - 120 mg/dL: 6 units     121 - 140 mg/dL: 10 units     then add 1 unit for every 40 mg/dL above 140 mg/dL  (Pt reports she has been following this for  meals)  Metformin 1g BID and is on Victoza 1.2 mg/day but has not been taking as she did not get her supply.     Reviewed diet, eating 3 meals/day and occasionally having a bedtime snack. Pt was previously having hs snack to help prevent noc lows but reports she is now only doing this occasionally.   She is aware of carb foods and is not counting right now but states she would be willing to do so if using a pump.     She is using dexcom  (does not have yany)  Upload today:                   We reviewed how insulin pumps work, different options etc. Pt is interested in Omnipod. Will get paperwork sent over.       PLAN  Will discuss w/ Dr. Bardales and send paperwork for Omnipod.   Pt will f/u with CDE 5/12/22 as scheduled, will plan to do pump training that day pending everything goes through.       Time Spent: 60 minutes  Encounter Type: Individual    Any diabetes medication dose changes were made via the CDE Protocol and Collaborative Practice Agreement with the patient's referring provider. A copy of this encounter was shared with the provider.

## 2022-04-08 ENCOUNTER — MEDICAL CORRESPONDENCE (OUTPATIENT)
Dept: HEALTH INFORMATION MANAGEMENT | Facility: CLINIC | Age: 72
End: 2022-04-08
Payer: COMMERCIAL

## 2022-04-11 ENCOUNTER — LAB REQUISITION (OUTPATIENT)
Dept: LAB | Facility: CLINIC | Age: 72
End: 2022-04-11

## 2022-04-11 DIAGNOSIS — R30.0 DYSURIA: ICD-10-CM

## 2022-04-11 PROCEDURE — 87086 URINE CULTURE/COLONY COUNT: CPT | Performed by: FAMILY MEDICINE

## 2022-04-13 LAB — BACTERIA UR CULT: NORMAL

## 2022-04-14 ENCOUNTER — MEDICAL CORRESPONDENCE (OUTPATIENT)
Dept: HEALTH INFORMATION MANAGEMENT | Facility: CLINIC | Age: 72
End: 2022-04-14
Payer: COMMERCIAL

## 2022-04-14 ENCOUNTER — DOCUMENTATION ONLY (OUTPATIENT)
Dept: ENDOCRINOLOGY | Facility: CLINIC | Age: 72
End: 2022-04-14
Payer: COMMERCIAL

## 2022-05-05 ENCOUNTER — TRANSFERRED RECORDS (OUTPATIENT)
Dept: HEALTH INFORMATION MANAGEMENT | Facility: CLINIC | Age: 72
End: 2022-05-05

## 2022-05-12 ENCOUNTER — ALLIED HEALTH/NURSE VISIT (OUTPATIENT)
Dept: EDUCATION SERVICES | Facility: CLINIC | Age: 72
End: 2022-05-12
Payer: COMMERCIAL

## 2022-05-12 DIAGNOSIS — E11.9 DIABETES MELLITUS, TYPE 2 (H): Primary | ICD-10-CM

## 2022-05-12 PROCEDURE — G0108 DIAB MANAGE TRN  PER INDIV: HCPCS

## 2022-05-12 NOTE — LETTER
"    5/12/2022         RE: Paige Mari  [address]  [place] [address]        Dear Colleague,    Thank you for referring your patient, Paige Mari, to the [facility]. Please see a copy of my visit note below.    Diabetes Self-Management Education & Support    Presents for:      SUBJECTIVE/OBJECTIVE:     Cultural Influences/Ethnic Background:  Not  or       Diabetes Symptoms & Complications:       Patient Problem List and Family Medical History reviewed for relevant medical history, current medical status, and diabetes risk factors.    Vitals:  There were no vitals taken for this visit.  Estimated body mass index is 31.27 kg/m  as calculated from the following:    Height as of 1/22/22: 1.549 m (5' 1\").    Weight as of 3/25/22: 75.1 kg (165 lb 8 oz).   Last 3 BP:   BP Readings from Last 3 Encounters:   03/25/22 100/58   02/04/22 (!) 142/90   01/26/22 134/65       History   Smoking Status     Former Smoker     Quit date: 9/15/2021   Smokeless Tobacco     Never Used       Labs:  Lab Results   Component Value Date    A1C 7.3 03/15/2022     Lab Results   Component Value Date    GLC 99 03/15/2022     Lab Results   Component Value Date    LDL 35 03/15/2022    LDL 99 07/06/2016     Direct Measure HDL   Date Value Ref Range Status   03/15/2022 67 >=50 mg/dL Final     Comment:     HDL Cholesterol Reference Range:     0-2 years:   No reference ranges established for patients under 2 years old  at [facility] for lipid analytes.    2-8 years:  Greater than 45 mg/dL     18 years and older:   Female: Greater than or equal to 50 mg/dL   Male:   Greater than or equal to 40 mg/dL   ]  GFR Estimate   Date Value Ref Range Status   03/15/2022 79 >60 mL/min/1.73m2 Final     Comment:     Effective December 21, 2021 eGFRcr in adults is calculated using the 2021 CKD-EPI creatinine equation which includes age and gender (Gerda trujillo al., NEJM, DOI: " 10.1056/URIQzh1323287)   2021 >60 >60 mL/min/1.73m2 Final     GFR Estimate If Black   Date Value Ref Range Status   2021 >60 >60 mL/min/1.73m2 Final     Lab Results   Component Value Date    CR 0.79 03/15/2022     No results found for: MICROALBUMIN      Taking Medications:  Diabetes Medication(s)     Biguanides       metFORMIN (GLUCOPHAGE-XR) 500 MG 24 hr tablet    Take 2 tablets (1,000 mg) by mouth 2 times daily (with meals)    Diabetic Other       Glucagon, rDNA, (GLUCAGON EMERGENCY) 1 MG KIT    1 mg IM one time, PRN severe hypoglycemia causing altered consciousness affecting ability to take food by mouth     glucose (BD GLUCOSE) 5 g chewable tablet    Take 2 tablets (10 g) by mouth daily as needed (hypoglycemia)    Insulin       insulin aspart (NOVOLOG VIAL) 100 UNITS/ML vial    9 units with meals plus correction scale with sensitivity of 40 mg/dL starting at 140 mg/dL     insulin detemir (LEVEMIR PEN) 100 UNIT/ML pen    9 units twice daily    Incretin Mimetic Agents (GLP-1 Receptor Agonists)       liraglutide (VICTOZA) 18 MG/3ML solution    Inject 1.2 mg Subcutaneous daily           Patient Activation Measure Survey Score:  No flowsheet data found.        ASSESSMENT:  Pt seen today for Omnipod start. Crystal Cameron w/ Omnipod is present as well.   Pt reports she changed from levemir 9 units BID to Tresiba 24 units/day about 1 month ago. She reports an average of 10 units with breakfast, 12 with lunch and 12 with dinner + 8 units once a day for snack (on average). Pt reports since starting tresiba she has had more overnight lows. This was taken into account w/ pump settings today (reduced total from 24 units to 20 units).   Pump settings as follows:   Basal rates: 12am-12am: 0.80 units/hr  ICR: 12am-12am: 11.2  ISF: 12am-12am: 43  Correct above 140, goal 120.   Pt was able to set up Dash PDM, fill, insert and start new pod.   Temporary basal rate set until 1:30 am this mornin hours after last dose  elenita [facility].   Discussed what to do in case of pump failure, kinked canula, sick days etc.          Patient's most recent   Lab Results   Component Value Date    A1C 7.3 03/15/2022    is meeting goal of <8.0    PLAN  Crystal will call pt in 3 days to check in.   Pt will f/u 5/27/22 with Dr. Bardales and 7/7/22 with MARU.   Pt agrees to call sooner w/ any concerns.       Time Spent: 90 minutes  Encounter Type: Individual    Any diabetes medication dose changes were made via the CDE Protocol and Collaborative Practice Agreement with the patient's referring provider. A copy of this encounter was shared with the provider.

## 2022-05-12 NOTE — PROGRESS NOTES
"Diabetes Self-Management Education & Support    Presents for:      SUBJECTIVE/OBJECTIVE:     Cultural Influences/Ethnic Background:  Not  or       Diabetes Symptoms & Complications:       Patient Problem List and Family Medical History reviewed for relevant medical history, current medical status, and diabetes risk factors.    Vitals:  There were no vitals taken for this visit.  Estimated body mass index is 31.27 kg/m  as calculated from the following:    Height as of 1/22/22: 1.549 m (5' 1\").    Weight as of 3/25/22: 75.1 kg (165 lb 8 oz).   Last 3 BP:   BP Readings from Last 3 Encounters:   03/25/22 100/58   02/04/22 (!) 142/90   01/26/22 134/65       History   Smoking Status     Former Smoker     Quit date: 9/15/2021   Smokeless Tobacco     Never Used       Labs:  Lab Results   Component Value Date    A1C 7.3 03/15/2022     Lab Results   Component Value Date    GLC 99 03/15/2022     Lab Results   Component Value Date    LDL 35 03/15/2022    LDL 99 07/06/2016     Direct Measure HDL   Date Value Ref Range Status   03/15/2022 67 >=50 mg/dL Final     Comment:     HDL Cholesterol Reference Range:     0-2 years:   No reference ranges established for patients under 2 years old  at [facility] for lipid analytes.    2-8 years:  Greater than 45 mg/dL     18 years and older:   Female: Greater than or equal to 50 mg/dL   Male:   Greater than or equal to 40 mg/dL   ]  GFR Estimate   Date Value Ref Range Status   03/15/2022 79 >60 mL/min/1.73m2 Final     Comment:     Effective December 21, 2021 eGFRcr in adults is calculated using the 2021 CKD-EPI creatinine equation which includes age and gender (Gerda trujillo al., NEJM, DOI: 10.1056/XRSEob4798241)   03/03/2021 >60 >60 mL/min/1.73m2 Final     GFR Estimate If Black   Date Value Ref Range Status   03/03/2021 >60 >60 mL/min/1.73m2 Final     Lab Results   Component Value Date    CR 0.79 03/15/2022     No results found for: MICROALBUMIN      Taking " Medications:  Diabetes Medication(s)     Biguanides       metFORMIN (GLUCOPHAGE-XR) 500 MG 24 hr tablet    Take 2 tablets (1,000 mg) by mouth 2 times daily (with meals)    Diabetic Other       Glucagon, rDNA, (GLUCAGON EMERGENCY) 1 MG KIT    1 mg IM one time, PRN severe hypoglycemia causing altered consciousness affecting ability to take food by mouth     glucose (BD GLUCOSE) 5 g chewable tablet    Take 2 tablets (10 g) by mouth daily as needed (hypoglycemia)    Insulin       insulin aspart (NOVOLOG VIAL) 100 UNITS/ML vial    9 units with meals plus correction scale with sensitivity of 40 mg/dL starting at 140 mg/dL     insulin detemir (LEVEMIR PEN) 100 UNIT/ML pen    9 units twice daily    Incretin Mimetic Agents (GLP-1 Receptor Agonists)       liraglutide (VICTOZA) 18 MG/3ML solution    Inject 1.2 mg Subcutaneous daily           Patient Activation Measure Survey Score:  No flowsheet data found.        ASSESSMENT:  Pt seen today for Omnipod start. Crystal Rakesh w/ Omnipod is present as well.   Pt reports she changed from levemir 9 units BID to Tresiba 24 units/day about 1 month ago. She reports an average of 10 units with breakfast, 12 with lunch and 12 with dinner + 8 units once a day for snack (on average). Pt reports since starting tresiba she has had more overnight lows. This was taken into account w/ pump settings today (reduced total from 24 units to 20 units).   Pump settings as follows:   Basal rates: 12am-12am: 0.80 units/hr  ICR: 12am-12am: 11.2  ISF: 12am-12am: 43  Correct above 140, goal 120.   Pt was able to set up Dash PDM, fill, insert and start new pod.   Temporary basal rate set until 1:30 am this mornin hours after last dose of Tresiba.   Discussed what to do in case of pump failure, kinked canula, sick days etc.          Patient's most recent   Lab Results   Component Value Date    A1C 7.3 03/15/2022    is meeting goal of <8.0    PLAN  Crystal will call pt in 3 days to check in.   Pt will f/u  5/27/22 with Dr. Bardales and 7/7/22 with MARU.   Pt agrees to call sooner w/ any concerns.       Time Spent: 90 minutes  Encounter Type: Individual    Any diabetes medication dose changes were made via the CDE Protocol and Collaborative Practice Agreement with the patient's referring provider. A copy of this encounter was shared with the provider.

## 2022-05-13 ENCOUNTER — MEDICAL CORRESPONDENCE (OUTPATIENT)
Dept: HEALTH INFORMATION MANAGEMENT | Facility: CLINIC | Age: 72
End: 2022-05-13

## 2022-05-13 ENCOUNTER — TELEPHONE (OUTPATIENT)
Dept: EDUCATION SERVICES | Facility: CLINIC | Age: 72
End: 2022-05-13
Payer: COMMERCIAL

## 2022-05-13 NOTE — TELEPHONE ENCOUNTER
Patient is requesting a call back from Ana because they forgot to follow up on the Victoza yesterday.

## 2022-05-13 NOTE — TELEPHONE ENCOUNTER
Spoke w/ pt. Informed victoza is here for pickup.   Also faxed orders for novolog vial to [facility] pt assistance program.

## 2022-05-27 ENCOUNTER — OFFICE VISIT (OUTPATIENT)
Dept: ENDOCRINOLOGY | Facility: CLINIC | Age: 72
End: 2022-05-27

## 2022-05-27 ENCOUNTER — LAB (OUTPATIENT)
Dept: LAB | Facility: CLINIC | Age: 72
End: 2022-05-27
Payer: COMMERCIAL

## 2022-05-27 VITALS
DIASTOLIC BLOOD PRESSURE: 80 MMHG | WEIGHT: 165 LBS | SYSTOLIC BLOOD PRESSURE: 136 MMHG | HEART RATE: 60 BPM | BODY MASS INDEX: 31.18 KG/M2

## 2022-05-27 DIAGNOSIS — E10.65 TYPE 1 DIABETES MELLITUS WITH HYPERGLYCEMIA (H): ICD-10-CM

## 2022-05-27 DIAGNOSIS — E88.819 INSULIN RESISTANCE: ICD-10-CM

## 2022-05-27 DIAGNOSIS — E10.42 TYPE 1 DIABETES MELLITUS WITH DIABETIC POLYNEUROPATHY (H): ICD-10-CM

## 2022-05-27 DIAGNOSIS — E66.811 CLASS 1 OBESITY DUE TO EXCESS CALORIES WITH SERIOUS COMORBIDITY AND BODY MASS INDEX (BMI) OF 31.0 TO 31.9 IN ADULT: ICD-10-CM

## 2022-05-27 DIAGNOSIS — R80.9 TYPE 1 DIABETES MELLITUS WITH MICROALBUMINURIA (H): ICD-10-CM

## 2022-05-27 DIAGNOSIS — I10 HYPERTENSION, UNSPECIFIED TYPE: ICD-10-CM

## 2022-05-27 DIAGNOSIS — I65.23 CAROTID ATHEROSCLEROSIS, BILATERAL: ICD-10-CM

## 2022-05-27 DIAGNOSIS — E78.2 MIXED HYPERLIPIDEMIA: ICD-10-CM

## 2022-05-27 DIAGNOSIS — E83.52 HYPERCALCEMIA: ICD-10-CM

## 2022-05-27 DIAGNOSIS — E66.09 CLASS 1 OBESITY DUE TO EXCESS CALORIES WITH SERIOUS COMORBIDITY AND BODY MASS INDEX (BMI) OF 31.0 TO 31.9 IN ADULT: ICD-10-CM

## 2022-05-27 DIAGNOSIS — Z79.4 LONG TERM (CURRENT) USE OF INSULIN (H): ICD-10-CM

## 2022-05-27 DIAGNOSIS — E10.29 TYPE 1 DIABETES MELLITUS WITH MICROALBUMINURIA (H): ICD-10-CM

## 2022-05-27 DIAGNOSIS — Z78.0 MENOPAUSE PRESENT: ICD-10-CM

## 2022-05-27 DIAGNOSIS — Z46.81 INSULIN PUMP FITTING OR ADJUSTMENT: ICD-10-CM

## 2022-05-27 DIAGNOSIS — Z96.41 INSULIN PUMP STATUS: ICD-10-CM

## 2022-05-27 DIAGNOSIS — E10.65 TYPE 1 DIABETES MELLITUS WITH HYPERGLYCEMIA (H): Primary | ICD-10-CM

## 2022-05-27 DIAGNOSIS — K76.0 HEPATIC STEATOSIS: ICD-10-CM

## 2022-05-27 DIAGNOSIS — R94.6 ABNORMAL FINDING ON THYROID FUNCTION TEST: ICD-10-CM

## 2022-05-27 LAB
ALBUMIN SERPL-MCNC: 3.9 G/DL (ref 3.5–5)
FASTING STATUS PATIENT QL REPORTED: NORMAL
GLUCOSE BLD-MCNC: 95 MG/DL (ref 70–125)
HBA1C MFR BLD: 7.7 % (ref 0–5.6)

## 2022-05-27 PROCEDURE — 82040 ASSAY OF SERUM ALBUMIN: CPT

## 2022-05-27 PROCEDURE — 95251 CONT GLUC MNTR ANALYSIS I&R: CPT | Performed by: INTERNAL MEDICINE

## 2022-05-27 PROCEDURE — 99000 SPECIMEN HANDLING OFFICE-LAB: CPT

## 2022-05-27 PROCEDURE — 36415 COLL VENOUS BLD VENIPUNCTURE: CPT

## 2022-05-27 PROCEDURE — 2894A GLUCOSE: CPT

## 2022-05-27 PROCEDURE — 99215 OFFICE O/P EST HI 40 MIN: CPT | Mod: 25 | Performed by: INTERNAL MEDICINE

## 2022-05-27 PROCEDURE — 83036 HEMOGLOBIN GLYCOSYLATED A1C: CPT | Mod: 59

## 2022-05-27 PROCEDURE — 82985 ASSAY OF GLYCATED PROTEIN: CPT | Mod: 90

## 2022-05-27 NOTE — LETTER
5/27/2022         RE: Paige Mari  [address]  [place] [address]        Dear Colleague,    Thank you for referring your patient, Paige Mari, to the [facility]. Please see a copy of my visit note below.    Subjective:    Established patient    Paige Mari is a 71 year old female who presents for DM.    Current DM therapy:    -Metformin 1000 mg BID   -Victoza 1.2 mg daily     She started an Omnipod 5/12/2022 with NovoLog    Basal: 0.8 units/hour, total basal dose 19.2 units/day    Bolus calculator: target glucose 120 mg/dL, insulin to carbohydrate ratio of 1:11.2, correction factor 43 mg/dL; average total bolus dose 11.1 units/day     Active insulin time 4 hours          Objective:    BMI 31.18 kg/m2, /80    12/2021: DM foot exam performed and she has absent sensation to monofilament testing bilaterally. Reduced pedal pulses. No ulcers/skinbreakdown.    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 (HARRIETT)  -12/7/2021: C-peptide 0.7 ng/mL with concurrent venous glucose 176 mg/dL, CEFERINO Ab >250 (ULN 5.0 IU/mL), insulin Ab 0.6 (ULN 0.4 U/mL), IA-2 Ab 44.1 (ULN 7.4 U/mL), Zn T8 Ab 64.3 (ref range 0.0 - 15.0 U/mL)  -CT A/P 9/2021: Mild diffuse pancreatic atrophy  -12/7/2021: fructosamine 365 umol/L (corresponds to HbA1c ~9.0%), HbA1c 9.3%   -1/2022: HbA1c 8.6%, CBC normal   -3/2022: HbA1c 7.3%  -5/2022: HbA1c 7.7%  -She has glucagon at home   # No prior DM retinopathy, DM eye exam 3/2022  # Hypertension, on clonidine, metoprolol, olmesartan, microalbuminuria     -3/2022: GFR 79  -12/2021: urine microalbumin/Cr 30.8 mg/g  -3/2022: paired aldosterone 6.4 ng/dL and PRA 0.1 ng/mL/hr  # Painful peripheral neuropathy, no prior DM foot ulcerations, on neuropathic pharmacologic therapy  # No prior ASCVD event, mild carotid atherosclerosis on US 2015, not on ASA  # Mixed hyperlipidemia, on rosuvastatin 5 mg daily   -3/2022: , HDL 67, LDL 35,    -She has a prior statin intolerance (myalgias)   # Hepatic steatosis    We reviewed her CGM (Dexcom G6) in detail. Overall doing reasonably well with transition to insulin pump.    We changed her pump settings today to include:  -Increased overnight basal rate, from 0000 - 0600 increase to 0.85 units/hour, from 0600 - 1000 increase to 0.9 units/hour  -No other changes     Continue:  -Metformin 1000 mg BID   -Victoza 1.2 mg daily     Return in 3 months with labs ordered. Consider increasing Victoza at future visit and starting icosapent ethyl if TG remain elevated. She is seeing a CDE in July and Paige will let me now if any issues in the interim.     # Thyroid function     In the past has been on both thyroid hormone and methimazole at various points in time. She has not taken a medication for her thyroid in years.      12/2021: TSH, T4, T3 all normal, TSI undetectable, TRAb undetectable, TPO Ab significantly elevated     1/2022: TSH 1.42     Should have her TSH checked yearly, or at any time if symptoms of thyrotoxicosis or hypothyroidism.     # Bone and mineral metabolism     Since 12/7/2021: albumin corrected serum calcium peak 10.6 mg/dL (ULN 10.5) and otherwise normal. Serum phosphorous and alkaine phosphatase have been normal. 1/2022: vitamin D 56 (ref range 20 - 75 mcg/L), 12/2021: UPEP and SPEP negative      Current therapy: vitamin D 1000 international unit(s) daily and MVI with minerals      No prior DEXA. No prior low impact fractures.      3/2022: PTH 25 pg/mL, Cr, calcium, albumin, phosphorous all normal     Will trend calcium over time. Serum calcium and DEXA ordered for 3 months.     46 minutes spent on the date of the encounter doing chart review, history and exam, documentation and further activities as noted above.       Again, thank you for allowing me to participate in the care of your patient.        Sincerely,        Maynor Bardales MD

## 2022-05-27 NOTE — PROGRESS NOTES
Subjective:    Established patient    Paige Mari is a 71 year old female who presents for DM.    Current DM therapy:    -Metformin 1000 mg BID   -Victoza 1.2 mg daily     She started an Omnipod 5/12/2022 with NovoLog    Basal: 0.8 units/hour, total basal dose 19.2 units/day    Bolus calculator: target glucose 120 mg/dL, insulin to carbohydrate ratio of 1:11.2, correction factor 43 mg/dL; average total bolus dose 11.1 units/day     Active insulin time 4 hours          Objective:    BMI 31.18 kg/m2, /80    12/2021: DM foot exam performed and she has absent sensation to monofilament testing bilaterally. Reduced pedal pulses. No ulcers/skinbreakdown.    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 (HARRIETT)  -12/7/2021: C-peptide 0.7 ng/mL with concurrent venous glucose 176 mg/dL, CEFERINO Ab >250 (ULN 5.0 IU/mL), insulin Ab 0.6 (ULN 0.4 U/mL), IA-2 Ab 44.1 (ULN 7.4 U/mL), Zn T8 Ab 64.3 (ref range 0.0 - 15.0 U/mL)  -CT A/P 9/2021: Mild diffuse pancreatic atrophy  -12/7/2021: fructosamine 365 umol/L (corresponds to HbA1c ~9.0%), HbA1c 9.3%   -1/2022: HbA1c 8.6%, CBC normal   -3/2022: HbA1c 7.3%  -5/2022: HbA1c 7.7%  -She has glucagon at home   # No prior DM retinopathy, DM eye exam 3/2022  # Hypertension, on clonidine, metoprolol, olmesartan, microalbuminuria     -3/2022: GFR 79  -12/2021: urine microalbumin/Cr 30.8 mg/g  -3/2022: paired aldosterone 6.4 ng/dL and PRA 0.1 ng/mL/hr  # Painful peripheral neuropathy, no prior DM foot ulcerations, on neuropathic pharmacologic therapy  # No prior ASCVD event, mild carotid atherosclerosis on US 2015, not on ASA  # Mixed hyperlipidemia, on rosuvastatin 5 mg daily   -3/2022: , HDL 67, LDL 35,   -She has a prior statin intolerance (myalgias)   # Hepatic steatosis    We reviewed her CGM (Dexcom G6) in detail. Overall doing reasonably well with transition to insulin pump.    We changed her pump settings today to include:  -Increased overnight basal rate,  from 0000 - 0600 increase to 0.85 units/hour, from 0600 - 1000 increase to 0.9 units/hour  -No other changes     Continue:  -Metformin 1000 mg BID   -Victoza 1.2 mg daily     Return in 3 months with labs ordered. Consider increasing Victoza at future visit and starting icosapent ethyl if TG remain elevated. She is seeing a CDE in July and Paige will let me now if any issues in the interim.     # Thyroid function     In the past has been on both thyroid hormone and methimazole at various points in time. She has not taken a medication for her thyroid in years.      12/2021: TSH, T4, T3 all normal, TSI undetectable, TRAb undetectable, TPO Ab significantly elevated     1/2022: TSH 1.42     Should have her TSH checked yearly, or at any time if symptoms of thyrotoxicosis or hypothyroidism.     # Bone and mineral metabolism     Since 12/7/2021: albumin corrected serum calcium peak 10.6 mg/dL (ULN 10.5) and otherwise normal. Serum phosphorous and alkaine phosphatase have been normal. 1/2022: vitamin D 56 (ref range 20 - 75 mcg/L), 12/2021: UPEP and SPEP negative      Current therapy: vitamin D 1000 international unit(s) daily and MVI with minerals      No prior DEXA. No prior low impact fractures.      3/2022: PTH 25 pg/mL, Cr, calcium, albumin, phosphorous all normal     Will trend calcium over time. Serum calcium and DEXA ordered for 3 months.     46 minutes spent on the date of the encounter doing chart review, history and exam, documentation and further activities as noted above.

## 2022-05-28 LAB — FRUCTOSAMINE SERPL-SCNC: 317 UMOL/L

## 2022-06-09 ENCOUNTER — OFFICE VISIT (OUTPATIENT)
Dept: PULMONOLOGY | Facility: OTHER | Age: 72
End: 2022-06-09
Payer: COMMERCIAL

## 2022-06-09 VITALS
SYSTOLIC BLOOD PRESSURE: 130 MMHG | WEIGHT: 163.56 LBS | DIASTOLIC BLOOD PRESSURE: 88 MMHG | BODY MASS INDEX: 30.9 KG/M2 | OXYGEN SATURATION: 96 % | HEART RATE: 82 BPM

## 2022-06-09 DIAGNOSIS — R06.00 PND (PAROXYSMAL NOCTURNAL DYSPNEA): Primary | ICD-10-CM

## 2022-06-09 PROCEDURE — 99213 OFFICE O/P EST LOW 20 MIN: CPT | Performed by: INTERNAL MEDICINE

## 2022-06-09 RX ORDER — FLUTICASONE PROPIONATE 50 MCG
1 SPRAY, SUSPENSION (ML) NASAL DAILY
Qty: 16 G | Refills: 11 | Status: SHIPPED | OUTPATIENT
Start: 2022-06-09 | End: 2022-08-01

## 2022-06-09 NOTE — PROGRESS NOTES
PULMONARY CLINIC FOLLOW UP NOTE    History:     HPI: Paige Mari is a 71 year old female , retired nurse from [employer], who had been referred to us initially for sob that has been ongoing for months.  Patient is able to go up 2-3 flights of stairs.  She is able to walk 1 block with difficulty however.  She does not exercise.  She is a smoker and smokes a few cigarettes per day.  Patient has had PFTs that showed mildly decreased DLCO.  Her shortness of breath was felt to be secondary to deconditioning.    Interval History: Patient is here for her scheduled follow-up visit.  Last time, she had a virtual visit.  Pt notes that her sob has improved. She notes that currently she notes that she has a cough that is productive of clear or yellow phlegm. No hemoptysis. No fevers. She denies any nocturnal cough. She notes it is mainly during the day. No weight loss. In terms of inhalers, she is on a LAMAR. She endorses sinus drainage.  She notes that she has h/o sinusitis. In the past, she has been on spiriva but she stopped as there was no difference in her sob, which has since improved. She notes that she walks half a mile daily. She denies any GERD.    PMHx/PSHx:  Atrial fib  H/o SBO  DM  Mood disorder  Appendectomy  Back surgery  Knee arthroscopy  Total abdominal hysterectomy  Sigmoid colectomy     Social Hx:   Tobacco: smoked a total of 15 years, smoked occasional cigarettes. She quit 8 years, and started vaping 8 years  Occupational exposures: retired. RN at [employer], 10 years ago.   Travel: no recent travel  Pets: cat    ROS: 10 point review of system done. Pertinent findings are noted in the HPI.    Exam/Data:     /88 (BP Location: Right arm, Patient Position: Sitting, Cuff Size: Adult Large)   Pulse 82   Wt 74.2 kg (163 lb 9 oz)   SpO2 96%   BMI 30.90 kg/m    GEN: comfortable, NAD  HEENT: NCAT, EMOI  CVS: S1S2, RRR  Lung: good air entry bilaterally, no wheezing.   Abd: soft, nt, + BS. No masses  Ext:  no c/c/e  Neuro: nonfocal      Data:     Labs personally reviewed.        PFT DATA: normal except mildly decrease DLCO     Echocardiogram Complete  The left atrium is moderately dilated.  Hyperdynamic left ventricular function  Normal right ventricle size and systolic function.  No significant valve disease.      Assessment/Plan:     Paige Mari is a 71 year old female, smoker, currently vaping, retired nurse from [employer], who was referred here for evaluation of shortness of breath.  Chest x-ray with patient was hospitalized recently at [facility] on 9/2021 did not reveal any abnormalities.  Patient had PFTs that are normal with exception of mildly decreased DLCO.  Etiology for shortness of breath is likely multifactorial secondary to deconditioning.    Echo when she was hospitalized in unremarkable.    Pt's sob has improved and now has a productive cough with PND and sinus drainage.      Recommendations:  Continue albuterol inhaler  Start flonase  Consider adding PPI next visit  Asked to avoid eating 2 hours before bed  Vaccinated for COVID and has received her boosted.  Encouraged to remain active.     FOLLOW UP: 4 months    Theresa Brunson MD  Pulmonary and Critical Care Medicine  Electronically Signed on 06/09/2022    Current Outpatient Medications   Medication Sig Dispense Refill     acetaminophen (TYLENOL) 500 MG tablet Take 1,000 mg by mouth 2 times daily        albuterol (PROAIR HFA/PROVENTIL HFA/VENTOLIN HFA) 108 (90 Base) MCG/ACT inhaler Inhale 1-2 puffs into the lungs every 6 hours 18 g 4     amoxicillin (AMOXIL) 500 MG tablet Take 2,000 mg by mouth as needed Before dental appointments       ARIPiprazole (ABILIFY) 2 MG tablet Take 2 mg by mouth daily        cetirizine (ZYRTEC) 10 MG tablet Take 10 mg by mouth daily        cholecalciferol (VITAMIN D3) 25 mcg (1000 units) capsule Take 1,000 Units by mouth daily        cloNIDine (CATAPRES-TTS) 0.3 mg/24 hr Place 1 patch onto the skin  "once a week        Continuous Blood Gluc  (DEXCOM G6 ) TORRIE Use to read blood sugars as per 's instructions. 1 each 0     Continuous Blood Gluc Sensor (DEXCOM G6 SENSOR) MISC Change every 10 days. 3 each 6     Continuous Blood Gluc Transmit (DEXCOM G6 TRANSMITTER) MISC Change every 3 months. 1 each 2     cyanocobalamin (VITAMIN B-12) 1000 MCG tablet Take 1,000 mcg by mouth daily       diclofenac sodium (VOLTAREN) 1 % Gel [DICLOFENAC SODIUM (VOLTAREN) 1 % GEL] Apply 1 g topically daily as needed.       estradiol (ESTRACE) 1 MG tablet Take 1 mg by mouth daily        Glucagon, rDNA, (GLUCAGON EMERGENCY) 1 MG KIT 1 mg IM one time, PRN severe hypoglycemia causing altered consciousness affecting ability to take food by mouth 1 kit 1     glucose (BD GLUCOSE) 5 g chewable tablet Take 2 tablets (10 g) by mouth daily as needed (hypoglycemia) 300 tablet 3     insulin aspart (NOVOLOG VIAL) 100 UNITS/ML vial 9 units with meals plus correction scale with sensitivity of 40 mg/dL starting at 140 mg/dL       liraglutide (VICTOZA) 18 MG/3ML solution Inject 1.2 mg Subcutaneous daily       magnesium oxide (MAG-OX) 400 MG tablet Take 400 mg by mouth daily        medical cannabis (Patient's own supply) 1 Dose by Other route See Admin Instructions Malena Tangerine Vape- 1-4 puffs q6h PRN       metFORMIN (GLUCOPHAGE-XR) 500 MG 24 hr tablet Take 2 tablets (1,000 mg) by mouth 2 times daily (with meals) 360 tablet 3     metoprolol succinate ER (TOPROL-XL) 100 MG 24 hr tablet Take 200 mg by mouth every morning       metoprolol succinate ER (TOPROL-XL) 100 MG 24 hr tablet Take 150 mg by mouth At Bedtime       multivitamin w/minerals (THERA-VIT-M) tablet Take 1 tablet by mouth daily       NOVOFINE 32 32 gauge x 1/4\" Ndle 1 each by Other route 6 times daily   3     olmesartan (BENICAR) 40 MG tablet [OLMESARTAN (BENICAR) 40 MG TABLET] Take 40 mg by mouth daily.       omeprazole (PRILOSEC) 20 MG capsule [OMEPRAZOLE " "(PRILOSEC) 20 MG CAPSULE] Take 1 capsule by mouth daily before breakfast.       pregabalin (LYRICA) 100 MG capsule Take 1 capsule (100 mg) by mouth 3 times daily 90 capsule 0     rosuvastatin (CRESTOR) 5 MG tablet Take 1 tablet (5 mg) by mouth daily 90 tablet 3     valACYclovir (VALTREX) 500 MG tablet Take 500 mg by mouth daily As needed       insulin detemir (LEVEMIR PEN) 100 UNIT/ML pen 9 units twice daily (Patient not taking: No sig reported) 15 mL      Allergies   Allergen Reactions     Morphine Other (See Comments)     \"make me delirious,\"  \"nightmares\"  Other reaction(s): delirium  \"make me delirious,\"  \"nightmares\"       Adhesive [Mecrylate] Other (See Comments)     Can only tolerate tape for short periods of time shelia in sensitive areas     Amlodipine Unknown and Other (See Comments)     Other reaction(s): delerium     Avalide Other (See Comments)     Other reaction(s): hyponatremia     Doxazosin Other (See Comments)     Other reaction(s): feels foggy     Other Allergy (See Comments) [External Allergen Needs Reconciliation - See Comment] Unknown     Other reaction(s): skin rash     Prednisone Unknown     Caused extremely high blood sugars     Tramadol Other (See Comments)     Possible seizure activity     Trazodone Unknown     Other reaction(s): hung over       Meds and Allergies: See EHR for the updated medication list and Allergies. These were reviewed.     Much or all of the text in this note was generated through the use of the Dragon Dictate voice-to-text software. Errors in spelling or words which seem out of context are unintentional. Sound alike errors, in particular, may have escaped editing.    "

## 2022-06-19 NOTE — PROGRESS NOTES
Review of systems are positive for fatigue. All other review of systems are within normal limits.    Pt reports her b/p yesterday was 208/107. She took a hctz and a clonidine which brought it back down to normal.   Improved

## 2022-07-07 ENCOUNTER — ALLIED HEALTH/NURSE VISIT (OUTPATIENT)
Dept: EDUCATION SERVICES | Facility: CLINIC | Age: 72
End: 2022-07-07
Payer: COMMERCIAL

## 2022-07-07 DIAGNOSIS — E11.9 DIABETES MELLITUS, TYPE 2 (H): Primary | ICD-10-CM

## 2022-07-07 PROCEDURE — G0108 DIAB MANAGE TRN  PER INDIV: HCPCS

## 2022-07-07 NOTE — LETTER
"    7/7/2022         RE: Paige Mari  [address]  [place] [address]        Dear Colleague,    Thank you for referring your patient, Paige Mari, to the [facility]. Please see a copy of my visit note below.    Diabetes Self-Management Education & Support    Presents for: Follow-up    SUBJECTIVE/OBJECTIVE:  Presents for: Follow-up  Accompanied by: Self  Diabetes education in the past 24mo: Yes  Focus of Visit: Monitoring, Taking Medication, Problem Solving, Assistance w/ making life changes, Self-care behavioral goal setting  Type of Pump visit: Pump Review  Type of CGM visit: Personal CGM Follow-up  Diabetes type: Type 1  Date of diagnosis: 2004  Disease course: Stable  Transportation concerns: No  Difficulty affording diabetes medication?: No  Difficulty affording diabetes testing supplies?: No  Other concerns:: Other (forgetful)  Cultural Influences/Ethnic Background:  Not  or     Diabetes Symptoms & Complications:  Fatigue: Sometimes    Patient Problem List and Family Medical History reviewed for relevant medical history, current medical status, and diabetes risk factors.    Vitals:  There were no vitals taken for this visit.  Estimated body mass index is 30.9 kg/m  as calculated from the following:    Height as of 1/22/22: 1.549 m (5' 1\").    Weight as of 6/9/22: 74.2 kg (163 lb 9 oz).   Last 3 BP:   BP Readings from Last 3 Encounters:   06/09/22 130/88   05/27/22 136/80   03/25/22 100/58       History   Smoking Status     Current Some Day Smoker     Types: Cigarettes     Last attempt to quit: 9/15/2021   Smokeless Tobacco     Never Used       Labs:  Lab Results   Component Value Date    A1C 7.7 05/27/2022     Lab Results   Component Value Date    GLC 95 05/27/2022     Lab Results   Component Value Date    LDL 35 03/15/2022    LDL 99 07/06/2016     Direct Measure HDL   Date Value Ref Range Status   03/15/2022 67 >=50 mg/dL Final     Comment: "     HDL Cholesterol Reference Range:     0-2 years:   No reference ranges established for patients under 2 years old  at [facility] for lipid analytes.    2-8 years:  Greater than 45 mg/dL     18 years and older:   Female: Greater than or equal to 50 mg/dL   Male:   Greater than or equal to 40 mg/dL   ]  GFR Estimate   Date Value Ref Range Status   03/15/2022 79 >60 mL/min/1.73m2 Final     Comment:     Effective December 21, 2021 eGFRcr in adults is calculated using the 2021 CKD-EPI creatinine equation which includes age and gender (Gerda et al., NEJ, DOI: 10.1056/PMNYxg8578893)   03/03/2021 >60 >60 mL/min/1.73m2 Final     GFR Estimate If Black   Date Value Ref Range Status   03/03/2021 >60 >60 mL/min/1.73m2 Final     Lab Results   Component Value Date    CR 0.79 03/15/2022     No results found for: MICROALBUMIN    Healthy Eating:  Healthy Eating Assessed Today: Yes  Meal planning/habits: Carb counting  Meals include: Evening Snack, Breakfast, Lunch, Dinner (PB or cheese sandwich)  Breakfast: wakes at 7 am - to go to the bathroom and goes back to bed until 11  Lunch: noon  Dinner: 7 pm  Other: bed at midnight      Monitoring:  Monitoring Assessed Today: Yes  Did patient bring glucose meter to appointment? : Yes  Blood Glucose Meter: CGM      Taking Medications:  Diabetes Medication(s)     Biguanides       metFORMIN (GLUCOPHAGE-XR) 500 MG 24 hr tablet    Take 2 tablets (1,000 mg) by mouth 2 times daily (with meals)    Diabetic Other       Glucagon, rDNA, (GLUCAGON EMERGENCY) 1 MG KIT    1 mg IM one time, PRN severe hypoglycemia causing altered consciousness affecting ability to take food by mouth     glucose (BD GLUCOSE) 5 g chewable tablet    Take 2 tablets (10 g) by mouth daily as needed (hypoglycemia)    Insulin       insulin aspart (NOVOLOG VIAL) 100 UNITS/ML vial    9 units with meals plus correction scale with sensitivity of 40 mg/dL starting at 140 mg/dL     insulin detemir (LEVEMIR PEN) 100  UNIT/ML pen    9 units twice daily     Patient not taking: No sig reported    Incretin Mimetic Agents (GLP-1 Receptor Agonists)       liraglutide (VICTOZA) 18 MG/3ML solution    Inject 1.2 mg Subcutaneous daily          Taking Medication Assessed Today: Yes  Current Treatments: Insulin Injections, Non-insulin Injectables, Oral Medication (taken by mouth)  Dose schedule: Pre-breakfast, Pre-lunch, Pre-dinner, At bedtime  Given by: Patient  Injection/Infusion sites: Abdomen  Problems taking diabetes medications regularly?: No  Diabetes medication side effects?: No    Problem Solving:  Problem Solving Assessed Today: Yes  Is the patient at risk for hypoglycemia?: Yes  Hypoglycemia Frequency: Rarely  Hypoglycemia Treatment: Juice, Glucose (tablets or gel)  Does patient have glucagon emergency kit?: Yes    Hypoglycemia symptoms  Confusion: Yes  Sweats: Yes  Feeling shaky: Yes         Reducing Risks:  Reducing Risks Assessed Today: Yes  Diabetes Risks: Age over 45 years, Sedentary Lifestyle, Hyperlipidemia  CAD Risks: Diabetes Mellitus, Dyslipidemia, Hypertension, Obesity, Post-menopausal, Sedentary lifestyle    Healthy Coping:  Healthy Coping Assessed Today: Yes  Emotional response to diabetes: Ready to learn  Stage of change: ACTION (Actively working towards change)  Support resources: In-person Offerings  Patient Activation Measure Survey Score:  No flowsheet data found.    Diabetes knowledge and skills assessment:   Patient is knowledgeable in diabetes management concepts related to: Healthy Eating, Being Active, Monitoring, Taking Medication, Problem Solving, Reducing Risks and Healthy Coping    Patient needs further education on the following diabetes management concepts: Healthy Eating, Being Active, Monitoring, Taking Medication, Problem Solving, Reducing Risks, Healthy Coping   Based on learning assessment above, most appropriate setting for further diabetes education would be: Individual setting.        Education  provided today on:  PETRA Self-Care Behaviors:  Healthy Eating: carbohydrate counting and consistency in amount, composition, and timing of food intake  Monitoring: individual blood glucose targets  Taking Medication: drawing up, administering and storing injectable diabetes medications  Problem Solving: high blood glucose - causes, signs/symptoms, treatment and prevention, low blood glucose - causes, signs/symptoms, treatment and prevention and when to call health care provider      Opportunities for ongoing education and support in diabetes-self management were discussed.    Pt verbalized understanding of concepts discussed and recommendations provided today.         ASSESSMENT  Pt seen today for f/u. She started Omnipod Dash on 5/12/22. She did not bring in Dash PDM today so we were unable to upload. She did bring in dexcom which was uploaded.   Pt reports she has been counting carbs and doing ok with this. She feels things are going well, just having too many lows. Pt reports she usually does not get up until around 10-11am, reports she does not wake up from the lower readings in the morning, will sleep through it. She goes to sleep around 12am. Pt reports she does not feel lows until they are in the 40's. Will decrease basal rates slightly. Instructed pt how to do this and gave written instructions as she did not bring in PDM today. Pt feels she can do this at home, if not she will stop into clinic tomorrow and CDE can make the changes.     Current pump settings:   Basal rates:   12am: 0.85  6am: 0.90  ICR: 12am-12am: 11.2  ISF: 12am-12am: 43  Correct >140, goal 120         Glucose Patterns & Trends:                      Patient would benefit from decrease in basal settings from 6am-9am and 4pm-6pm.     Changes made to pump settings:  New basal rates:   12am: 0.85  6am: 0.80  9am: 0.90  4pm: 0.80  6pm: 0.90      PLAN  Decrease in basal rates today.   Will f/u 8/4/22 as scheduled, to call sooner w/ concerns.        Time Spent: 30 minutes  Encounter Type: Individual    Any diabetes medication dose changes were made via the CDE Protocol and Collaborative Practice Agreement with the patient's referring provider. A copy of this encounter was shared with the provider.

## 2022-07-07 NOTE — PROGRESS NOTES
"Diabetes Self-Management Education & Support    Presents for: Follow-up    SUBJECTIVE/OBJECTIVE:  Presents for: Follow-up  Accompanied by: Self  Diabetes education in the past 24mo: Yes  Focus of Visit: Monitoring, Taking Medication, Problem Solving, Assistance w/ making life changes, Self-care behavioral goal setting  Type of Pump visit: Pump Review  Type of CGM visit: Personal CGM Follow-up  Diabetes type: Type 1  Date of diagnosis: 2004  Disease course: Stable  Transportation concerns: No  Difficulty affording diabetes medication?: No  Difficulty affording diabetes testing supplies?: No  Other concerns:: Other (forgetful)  Cultural Influences/Ethnic Background:  Not  or     Diabetes Symptoms & Complications:  Fatigue: Sometimes    Patient Problem List and Family Medical History reviewed for relevant medical history, current medical status, and diabetes risk factors.    Vitals:  There were no vitals taken for this visit.  Estimated body mass index is 30.9 kg/m  as calculated from the following:    Height as of 1/22/22: 1.549 m (5' 1\").    Weight as of 6/9/22: 74.2 kg (163 lb 9 oz).   Last 3 BP:   BP Readings from Last 3 Encounters:   06/09/22 130/88   05/27/22 136/80   03/25/22 100/58       History   Smoking Status     Current Some Day Smoker     Types: Cigarettes     Last attempt to quit: 9/15/2021   Smokeless Tobacco     Never Used       Labs:  Lab Results   Component Value Date    A1C 7.7 05/27/2022     Lab Results   Component Value Date    GLC 95 05/27/2022     Lab Results   Component Value Date    LDL 35 03/15/2022    LDL 99 07/06/2016     Direct Measure HDL   Date Value Ref Range Status   03/15/2022 67 >=50 mg/dL Final     Comment:     HDL Cholesterol Reference Range:     0-2 years:   No reference ranges established for patients under 2 years old  at [facility] for lipid analytes.    2-8 years:  Greater than 45 mg/dL     18 years and older:   Female: Greater than or equal to 50 " mg/dL   Male:   Greater than or equal to 40 mg/dL   ]  GFR Estimate   Date Value Ref Range Status   03/15/2022 79 >60 mL/min/1.73m2 Final     Comment:     Effective December 21, 2021 eGFRcr in adults is calculated using the 2021 CKD-EPI creatinine equation which includes age and gender (Gerda trujillo al., NE, DOI: 10.1056/SNTDsr4553784)   03/03/2021 >60 >60 mL/min/1.73m2 Final     GFR Estimate If Black   Date Value Ref Range Status   03/03/2021 >60 >60 mL/min/1.73m2 Final     Lab Results   Component Value Date    CR 0.79 03/15/2022     No results found for: MICROALBUMIN    Healthy Eating:  Healthy Eating Assessed Today: Yes  Meal planning/habits: Carb counting  Meals include: Evening Snack, Breakfast, Lunch, Dinner (PB or cheese sandwich)  Breakfast: wakes at 7 am - to go to the bathroom and goes back to bed until 11  Lunch: noon  Dinner: 7 pm  Other: bed at midnight      Monitoring:  Monitoring Assessed Today: Yes  Did patient bring glucose meter to appointment? : Yes  Blood Glucose Meter: CGM      Taking Medications:  Diabetes Medication(s)     Biguanides       metFORMIN (GLUCOPHAGE-XR) 500 MG 24 hr tablet    Take 2 tablets (1,000 mg) by mouth 2 times daily (with meals)    Diabetic Other       Glucagon, rDNA, (GLUCAGON EMERGENCY) 1 MG KIT    1 mg IM one time, PRN severe hypoglycemia causing altered consciousness affecting ability to take food by mouth     glucose (BD GLUCOSE) 5 g chewable tablet    Take 2 tablets (10 g) by mouth daily as needed (hypoglycemia)    Insulin       insulin aspart (NOVOLOG VIAL) 100 UNITS/ML vial    9 units with meals plus correction scale with sensitivity of 40 mg/dL starting at 140 mg/dL     insulin detemir (LEVEMIR PEN) 100 UNIT/ML pen    9 units twice daily     Patient not taking: No sig reported    Incretin Mimetic Agents (GLP-1 Receptor Agonists)       liraglutide (VICTOZA) 18 MG/3ML solution    Inject 1.2 mg Subcutaneous daily          Taking Medication Assessed Today: Yes  Current  Treatments: Insulin Injections, Non-insulin Injectables, Oral Medication (taken by mouth)  Dose schedule: Pre-breakfast, Pre-lunch, Pre-dinner, At bedtime  Given by: Patient  Injection/Infusion sites: Abdomen  Problems taking diabetes medications regularly?: No  Diabetes medication side effects?: No    Problem Solving:  Problem Solving Assessed Today: Yes  Is the patient at risk for hypoglycemia?: Yes  Hypoglycemia Frequency: Rarely  Hypoglycemia Treatment: Juice, Glucose (tablets or gel)  Does patient have glucagon emergency kit?: Yes    Hypoglycemia symptoms  Confusion: Yes  Sweats: Yes  Feeling shaky: Yes         Reducing Risks:  Reducing Risks Assessed Today: Yes  Diabetes Risks: Age over 45 years, Sedentary Lifestyle, Hyperlipidemia  CAD Risks: Diabetes Mellitus, Dyslipidemia, Hypertension, Obesity, Post-menopausal, Sedentary lifestyle    Healthy Coping:  Healthy Coping Assessed Today: Yes  Emotional response to diabetes: Ready to learn  Stage of change: ACTION (Actively working towards change)  Support resources: In-person Offerings  Patient Activation Measure Survey Score:  No flowsheet data found.    Diabetes knowledge and skills assessment:   Patient is knowledgeable in diabetes management concepts related to: Healthy Eating, Being Active, Monitoring, Taking Medication, Problem Solving, Reducing Risks and Healthy Coping    Patient needs further education on the following diabetes management concepts: Healthy Eating, Being Active, Monitoring, Taking Medication, Problem Solving, Reducing Risks, Healthy Coping   Based on learning assessment above, most appropriate setting for further diabetes education would be: Individual setting.        Education provided today on:  AADE Self-Care Behaviors:  Healthy Eating: carbohydrate counting and consistency in amount, composition, and timing of food intake  Monitoring: individual blood glucose targets  Taking Medication: drawing up, administering and storing injectable  diabetes medications  Problem Solving: high blood glucose - causes, signs/symptoms, treatment and prevention, low blood glucose - causes, signs/symptoms, treatment and prevention and when to call health care provider      Opportunities for ongoing education and support in diabetes-self management were discussed.    Pt verbalized understanding of concepts discussed and recommendations provided today.         ASSESSMENT  Pt seen today for f/u. She started Omnipod Dash on 5/12/22. She did not bring in Dash PDM today so we were unable to upload. She did bring in dexcom which was uploaded.   Pt reports she has been counting carbs and doing ok with this. She feels things are going well, just having too many lows. Pt reports she usually does not get up until around 10-11am, reports she does not wake up from the lower readings in the morning, will sleep through it. She goes to sleep around 12am. Pt reports she does not feel lows until they are in the 40's. Will decrease basal rates slightly. Instructed pt how to do this and gave written instructions as she did not bring in PDM today. Pt feels she can do this at home, if not she will stop into clinic tomorrow and CDE can make the changes.     Current pump settings:   Basal rates:   12am: 0.85  6am: 0.90  ICR: 12am-12am: 11.2  ISF: 12am-12am: 43  Correct >140, goal 120         Glucose Patterns & Trends:                      Patient would benefit from decrease in basal settings from 6am-9am and 4pm-6pm.     Changes made to pump settings:  New basal rates:   12am: 0.85  6am: 0.80  9am: 0.90  4pm: 0.80  6pm: 0.90      PLAN  Decrease in basal rates today.   Will f/u 8/4/22 as scheduled, to call sooner w/ concerns.       Time Spent: 30 minutes  Encounter Type: Individual    Any diabetes medication dose changes were made via the CDE Protocol and Collaborative Practice Agreement with the patient's referring provider. A copy of this encounter was shared with the provider.

## 2022-07-17 ENCOUNTER — HEALTH MAINTENANCE LETTER (OUTPATIENT)
Age: 72
End: 2022-07-17

## 2022-08-01 DIAGNOSIS — R06.00 PND (PAROXYSMAL NOCTURNAL DYSPNEA): ICD-10-CM

## 2022-08-01 RX ORDER — FLUTICASONE PROPIONATE 50 MCG
1 SPRAY, SUSPENSION (ML) NASAL DAILY
Qty: 16 G | Refills: 11 | Status: SHIPPED | OUTPATIENT
Start: 2022-08-01 | End: 2024-03-13

## 2022-08-16 ENCOUNTER — LAB (OUTPATIENT)
Dept: LAB | Facility: CLINIC | Age: 72
End: 2022-08-16
Payer: COMMERCIAL

## 2022-08-16 DIAGNOSIS — E78.2 MIXED HYPERLIPIDEMIA: ICD-10-CM

## 2022-08-16 DIAGNOSIS — E83.52 HYPERCALCEMIA: ICD-10-CM

## 2022-08-16 DIAGNOSIS — E10.65 TYPE 1 DIABETES MELLITUS WITH HYPERGLYCEMIA (H): ICD-10-CM

## 2022-08-16 LAB
ALBUMIN SERPL BCG-MCNC: 4.4 G/DL (ref 3.5–5.2)
CALCIUM SERPL-MCNC: 9.6 MG/DL (ref 8.8–10.2)
CHOLEST SERPL-MCNC: 120 MG/DL
CREAT SERPL-MCNC: 0.83 MG/DL (ref 0.51–0.95)
FASTING STATUS PATIENT QL REPORTED: YES
GFR SERPL CREATININE-BSD FRML MDRD: 74 ML/MIN/1.73M2
GLUCOSE SERPL-MCNC: 114 MG/DL (ref 70–99)
HBA1C MFR BLD: 6.4 % (ref 0–5.6)
HDLC SERPL-MCNC: 55 MG/DL
LDLC SERPL CALC-MCNC: 45 MG/DL
NONHDLC SERPL-MCNC: 65 MG/DL
TRIGL SERPL-MCNC: 100 MG/DL

## 2022-08-16 PROCEDURE — 82947 ASSAY GLUCOSE BLOOD QUANT: CPT

## 2022-08-16 PROCEDURE — 36415 COLL VENOUS BLD VENIPUNCTURE: CPT

## 2022-08-16 PROCEDURE — 82565 ASSAY OF CREATININE: CPT

## 2022-08-16 PROCEDURE — 80061 LIPID PANEL: CPT

## 2022-08-16 PROCEDURE — 83036 HEMOGLOBIN GLYCOSYLATED A1C: CPT

## 2022-08-16 PROCEDURE — 82040 ASSAY OF SERUM ALBUMIN: CPT

## 2022-08-16 PROCEDURE — 82310 ASSAY OF CALCIUM: CPT

## 2022-08-22 ENCOUNTER — TELEPHONE (OUTPATIENT)
Dept: ENDOCRINOLOGY | Facility: CLINIC | Age: 72
End: 2022-08-22

## 2022-08-23 ENCOUNTER — OFFICE VISIT (OUTPATIENT)
Dept: ENDOCRINOLOGY | Facility: CLINIC | Age: 72
End: 2022-08-23
Payer: COMMERCIAL

## 2022-08-23 VITALS
WEIGHT: 158 LBS | HEART RATE: 73 BPM | BODY MASS INDEX: 29.85 KG/M2 | DIASTOLIC BLOOD PRESSURE: 78 MMHG | SYSTOLIC BLOOD PRESSURE: 116 MMHG

## 2022-08-23 DIAGNOSIS — E10.649 HYPOGLYCEMIA UNAWARENESS ASSOCIATED WITH TYPE 1 DIABETES MELLITUS (H): ICD-10-CM

## 2022-08-23 DIAGNOSIS — Z78.0 MENOPAUSE PRESENT: ICD-10-CM

## 2022-08-23 DIAGNOSIS — I10 HYPERTENSION, UNSPECIFIED TYPE: ICD-10-CM

## 2022-08-23 DIAGNOSIS — R25.1 TREMOR: ICD-10-CM

## 2022-08-23 DIAGNOSIS — K76.0 HEPATIC STEATOSIS: ICD-10-CM

## 2022-08-23 DIAGNOSIS — R80.9 TYPE 1 DIABETES MELLITUS WITH MICROALBUMINURIA (H): ICD-10-CM

## 2022-08-23 DIAGNOSIS — E88.819 INSULIN RESISTANCE: ICD-10-CM

## 2022-08-23 DIAGNOSIS — Z46.81 INSULIN PUMP FITTING OR ADJUSTMENT: ICD-10-CM

## 2022-08-23 DIAGNOSIS — I65.23 CAROTID ATHEROSCLEROSIS, BILATERAL: ICD-10-CM

## 2022-08-23 DIAGNOSIS — R94.6 ABNORMAL FINDING ON THYROID FUNCTION TEST: ICD-10-CM

## 2022-08-23 DIAGNOSIS — E66.3 OVERWEIGHT (BMI 25.0-29.9): ICD-10-CM

## 2022-08-23 DIAGNOSIS — E10.42 TYPE 1 DIABETES MELLITUS WITH DIABETIC POLYNEUROPATHY (H): ICD-10-CM

## 2022-08-23 DIAGNOSIS — E78.2 MIXED HYPERLIPIDEMIA: ICD-10-CM

## 2022-08-23 DIAGNOSIS — Z96.41 INSULIN PUMP STATUS: ICD-10-CM

## 2022-08-23 DIAGNOSIS — E10.29 TYPE 1 DIABETES MELLITUS WITH MICROALBUMINURIA (H): ICD-10-CM

## 2022-08-23 DIAGNOSIS — Z79.4 LONG TERM (CURRENT) USE OF INSULIN (H): ICD-10-CM

## 2022-08-23 PROCEDURE — 95251 CONT GLUC MNTR ANALYSIS I&R: CPT | Performed by: INTERNAL MEDICINE

## 2022-08-23 PROCEDURE — 99215 OFFICE O/P EST HI 40 MIN: CPT | Mod: 25 | Performed by: INTERNAL MEDICINE

## 2022-08-23 NOTE — LETTER
8/23/2022         RE: Paige Mari  [address]  [place] [address]        Dear Colleague,    Thank you for referring your patient, Paige Mari, to the [facility]. Please see a copy of my visit note below.    Subjective:    Established patient    Paige Mari is a 72 year old female who presents for DM.     Current DM therapy:  -Metformin 1000 mg BID   -Victoza 1.2 mg daily      She started an Omnipod 5/12/2022 with NovoLog    Basal rates:  12am: 0.85 units/hour  6am: 0.90     Bolus calculator: target glucose 120 mg/dL, insulin to carbohydrate ratio of 1:11.2, correction factor 43 mg/dL     Active insulin time 4 hours      Currently: TDD 25.7 units/day, 79% basal, carbohydrate intake per day ~70 grams        Objective:    Appears well today, BMI 29.85 kg/m2, /78    12/2021: DM foot exam performed and she has absent sensation to monofilament testing bilaterally. Reduced pedal pulses. No ulcers/skinbreakdown.    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 (HARRIETT)  -12/7/2021: C-peptide 0.7 ng/mL with concurrent venous glucose 176 mg/dL, CEFERINO Ab >250 (ULN 5.0 IU/mL), insulin Ab 0.6 (ULN 0.4 U/mL), IA-2 Ab 44.1 (ULN 7.4 U/mL), Zn T8 Ab 64.3 (ref range 0.0 - 15.0 U/mL)  -CT A/P 9/2021: Mild diffuse pancreatic atrophy  -12/7/2021: fructosamine 365 umol/L (corresponds to HbA1c ~9.0%), HbA1c 9.3%   -1/2022: HbA1c 8.6%, CBC normal   -3/2022: HbA1c 7.3%  -5/2022: HbA1c 7.7%, fructosamine 317 umol/L with normal albumin   -8/2022: HbA1c 6.4%  -She has glucagon at home   # No prior DM retinopathy, DM eye exam 3/2022  # Hypertension, on clonidine, metoprolol, olmesartan, microalbuminuria     -8/2022: GFR 74  -12/2021: urine microalbumin/Cr 30.8 mg/g  -3/2022: paired aldosterone 6.4 ng/dL and PRA 0.1 ng/mL/hr  # Painful peripheral neuropathy, no prior DM foot ulcerations, on neuropathic pharmacologic therapy  # No prior ASCVD event, mild carotid  atherosclerosis on US 2015, on ASA  # Mixed hyperlipidemia, on rosuvastatin 5 mg daily   -8/2022: , HDL 55, LDL 45,   -She has a prior statin intolerance (myalgias)   # Hepatic steatosis     We reviewed her CGM (Dexcom G6) in detail. She is having both fasting and post-prandial hypoglycemia and has hypoglycemia unawareness. She has glucose tabs and glucagon at home PRN.     We changed her pump settings today to include:    Basal rates:  12am: 0.85 units/hour --> 0.65  9am: 0.90 --> 0.75     Bolus calculator: insulin to carbohydrate ratio of 1:11.2 -->1:15, correction factor 43 mg/dL --> 60    She has insulin detemir at home and in case of pump malfunction will take 9 units BID.     Return to see me in 3 months with labs ordered. Will also have her review with a CDE in a few weeks. She also knows to contact me at any time to review her data and adjust pump settings between appointments.     # Thyroid function     In the past has been on both thyroid hormone and methimazole at various points in time. She has not taken a medication for her thyroid in years.      12/2021: TSH, T4, T3 all normal, TSI undetectable, TRAb undetectable, TPO Ab significantly elevated     1/2022: TSH 1.42     Should have her TSH checked yearly, or at any time if symptoms of thyrotoxicosis or hypothyroidism. TSH for next visit ordered.      # Bone and mineral metabolism     Since 12/7/2021: albumin corrected serum calcium peak 10.6 mg/dL (ULN 10.5) and otherwise normal. Serum phosphorous and alkaine phosphatase have been normal. 1/2022: vitamin D 56 (ref range 20 - 75 mcg/L), 12/2021: UPEP and SPEP negative      Current therapy: vitamin D 1000 international unit(s) daily and MVI with minerals      No prior DEXA. No prior low impact fractures.      3/2022: PTH 25 pg/mL, Cr, calcium, albumin, phosphorous all normal     8/2022: serum calcium and albumin normal      Will trend calcium over time. [ ] DEXA ordered and vitamin D    #  Tremor    Occurs both at rest and with movement, right hand more pronounced than left. She would like a neurology referral and I placed one.     46 minutes spent on the date of the encounter doing chart review, history and exam, documentation and further activities as noted above.       Again, thank you for allowing me to participate in the care of your patient.        Sincerely,        Maynor Bardales MD

## 2022-08-23 NOTE — PROGRESS NOTES
Subjective:    Established patient    Paige Mari is a 72 year old female who presents for DM.     Current DM therapy:  -Metformin 1000 mg BID   -Victoza 1.2 mg daily      She started an Omnipod 5/12/2022 with NovoLog    Basal rates:  12am: 0.85 units/hour  6am: 0.90     Bolus calculator: target glucose 120 mg/dL, insulin to carbohydrate ratio of 1:11.2, correction factor 43 mg/dL     Active insulin time 4 hours      Currently: TDD 25.7 units/day, 79% basal, carbohydrate intake per day ~70 grams        Objective:    Appears well today, BMI 29.85 kg/m2, /78    12/2021: DM foot exam performed and she has absent sensation to monofilament testing bilaterally. Reduced pedal pulses. No ulcers/skinbreakdown.    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 (HARRIETT)  -12/7/2021: C-peptide 0.7 ng/mL with concurrent venous glucose 176 mg/dL, CEFERINO Ab >250 (ULN 5.0 IU/mL), insulin Ab 0.6 (ULN 0.4 U/mL), IA-2 Ab 44.1 (ULN 7.4 U/mL), Zn T8 Ab 64.3 (ref range 0.0 - 15.0 U/mL)  -CT A/P 9/2021: Mild diffuse pancreatic atrophy  -12/7/2021: fructosamine 365 umol/L (corresponds to HbA1c ~9.0%), HbA1c 9.3%   -1/2022: HbA1c 8.6%, CBC normal   -3/2022: HbA1c 7.3%  -5/2022: HbA1c 7.7%, fructosamine 317 umol/L with normal albumin   -8/2022: HbA1c 6.4%  -She has glucagon at home   # No prior DM retinopathy, DM eye exam 3/2022  # Hypertension, on clonidine, metoprolol, olmesartan, microalbuminuria     -8/2022: GFR 74  -12/2021: urine microalbumin/Cr 30.8 mg/g  -3/2022: paired aldosterone 6.4 ng/dL and PRA 0.1 ng/mL/hr  # Painful peripheral neuropathy, no prior DM foot ulcerations, on neuropathic pharmacologic therapy  # No prior ASCVD event, mild carotid atherosclerosis on US 2015, on ASA  # Mixed hyperlipidemia, on rosuvastatin 5 mg daily   -8/2022: , HDL 55, LDL 45,   -She has a prior statin intolerance (myalgias)   # Hepatic steatosis     We reviewed her CGM (Dexcom G6) in detail. She is having both fasting  and post-prandial hypoglycemia and has hypoglycemia unawareness. She has glucose tabs and glucagon at home PRN.     We changed her pump settings today to include:    Basal rates:  12am: 0.85 units/hour --> 0.65  9am: 0.90 --> 0.75     Bolus calculator: insulin to carbohydrate ratio of 1:11.2 -->1:15, correction factor 43 mg/dL --> 60    She has insulin detemir at home and in case of pump malfunction will take 9 units BID.     Return to see me in 3 months with labs ordered. Will also have her review with a CDE in a few weeks. She also knows to contact me at any time to review her data and adjust pump settings between appointments.     # Thyroid function     In the past has been on both thyroid hormone and methimazole at various points in time. She has not taken a medication for her thyroid in years.      12/2021: TSH, T4, T3 all normal, TSI undetectable, TRAb undetectable, TPO Ab significantly elevated     1/2022: TSH 1.42     Should have her TSH checked yearly, or at any time if symptoms of thyrotoxicosis or hypothyroidism. TSH for next visit ordered.      # Bone and mineral metabolism     Since 12/7/2021: albumin corrected serum calcium peak 10.6 mg/dL (ULN 10.5) and otherwise normal. Serum phosphorous and alkaine phosphatase have been normal. 1/2022: vitamin D 56 (ref range 20 - 75 mcg/L), 12/2021: UPEP and SPEP negative      Current therapy: vitamin D 1000 international unit(s) daily and MVI with minerals      No prior DEXA. No prior low impact fractures.      3/2022: PTH 25 pg/mL, Cr, calcium, albumin, phosphorous all normal     8/2022: serum calcium and albumin normal      Will trend calcium over time. [ ] DEXA ordered and vitamin D    # Tremor    Occurs both at rest and with movement, right hand more pronounced than left. She would like a neurology referral and I placed one.     46 minutes spent on the date of the encounter doing chart review, history and exam, documentation and further activities as noted  above.

## 2022-08-25 ENCOUNTER — TELEPHONE (OUTPATIENT)
Dept: EDUCATION SERVICES | Facility: CLINIC | Age: 72
End: 2022-08-25

## 2022-09-01 ENCOUNTER — LAB REQUISITION (OUTPATIENT)
Dept: LAB | Facility: CLINIC | Age: 72
End: 2022-09-01

## 2022-09-01 DIAGNOSIS — N39.0 URINARY TRACT INFECTION, SITE NOT SPECIFIED: ICD-10-CM

## 2022-09-01 DIAGNOSIS — I10 ESSENTIAL (PRIMARY) HYPERTENSION: ICD-10-CM

## 2022-09-01 LAB
ALBUMIN SERPL BCG-MCNC: 4.3 G/DL (ref 3.5–5.2)
ALP SERPL-CCNC: 59 U/L (ref 35–104)
ALT SERPL W P-5'-P-CCNC: 17 U/L (ref 10–35)
ANION GAP SERPL CALCULATED.3IONS-SCNC: 13 MMOL/L (ref 7–15)
AST SERPL W P-5'-P-CCNC: 25 U/L (ref 10–35)
BILIRUB SERPL-MCNC: 0.5 MG/DL
BUN SERPL-MCNC: 15.4 MG/DL (ref 8–23)
CALCIUM SERPL-MCNC: 9.6 MG/DL (ref 8.8–10.2)
CHLORIDE SERPL-SCNC: 97 MMOL/L (ref 98–107)
CREAT SERPL-MCNC: 0.77 MG/DL (ref 0.51–0.95)
DEPRECATED HCO3 PLAS-SCNC: 25 MMOL/L (ref 22–29)
GFR SERPL CREATININE-BSD FRML MDRD: 81 ML/MIN/1.73M2
GLUCOSE SERPL-MCNC: 200 MG/DL (ref 70–99)
POTASSIUM SERPL-SCNC: 4.9 MMOL/L (ref 3.4–5.3)
PROT SERPL-MCNC: 6.4 G/DL (ref 6.4–8.3)
SODIUM SERPL-SCNC: 135 MMOL/L (ref 136–145)

## 2022-09-01 PROCEDURE — 87088 URINE BACTERIA CULTURE: CPT | Performed by: NURSE PRACTITIONER

## 2022-09-01 PROCEDURE — 80053 COMPREHEN METABOLIC PANEL: CPT | Performed by: NURSE PRACTITIONER

## 2022-09-04 LAB — BACTERIA UR CULT: ABNORMAL

## 2022-09-16 ENCOUNTER — LAB REQUISITION (OUTPATIENT)
Dept: LAB | Facility: CLINIC | Age: 72
End: 2022-09-16

## 2022-09-16 DIAGNOSIS — I10 ESSENTIAL (PRIMARY) HYPERTENSION: ICD-10-CM

## 2022-09-16 DIAGNOSIS — E78.5 HYPERLIPIDEMIA, UNSPECIFIED: ICD-10-CM

## 2022-09-16 DIAGNOSIS — E55.9 VITAMIN D DEFICIENCY, UNSPECIFIED: ICD-10-CM

## 2022-09-16 DIAGNOSIS — E03.9 HYPOTHYROIDISM, UNSPECIFIED: ICD-10-CM

## 2022-09-16 LAB
ALBUMIN SERPL BCG-MCNC: 4.4 G/DL (ref 3.5–5.2)
ALP SERPL-CCNC: 69 U/L (ref 35–104)
ALT SERPL W P-5'-P-CCNC: 23 U/L (ref 10–35)
ANION GAP SERPL CALCULATED.3IONS-SCNC: 11 MMOL/L (ref 7–15)
AST SERPL W P-5'-P-CCNC: 23 U/L (ref 10–35)
BASOPHILS # BLD AUTO: 0.1 10E3/UL (ref 0–0.2)
BASOPHILS NFR BLD AUTO: 1 %
BILIRUB SERPL-MCNC: 0.4 MG/DL
BUN SERPL-MCNC: 16.7 MG/DL (ref 8–23)
CALCIUM SERPL-MCNC: 9.5 MG/DL (ref 8.8–10.2)
CHLORIDE SERPL-SCNC: 95 MMOL/L (ref 98–107)
CHOLEST SERPL-MCNC: 128 MG/DL
CREAT SERPL-MCNC: 0.87 MG/DL (ref 0.51–0.95)
DEPRECATED HCO3 PLAS-SCNC: 25 MMOL/L (ref 22–29)
EOSINOPHIL # BLD AUTO: 0.1 10E3/UL (ref 0–0.7)
EOSINOPHIL NFR BLD AUTO: 1 %
ERYTHROCYTE [DISTWIDTH] IN BLOOD BY AUTOMATED COUNT: 14 % (ref 10–15)
GFR SERPL CREATININE-BSD FRML MDRD: 70 ML/MIN/1.73M2
GLUCOSE SERPL-MCNC: 176 MG/DL (ref 70–99)
HCT VFR BLD AUTO: 40.7 % (ref 35–47)
HDLC SERPL-MCNC: 60 MG/DL
HGB BLD-MCNC: 13.7 G/DL (ref 11.7–15.7)
IMM GRANULOCYTES # BLD: 0 10E3/UL
IMM GRANULOCYTES NFR BLD: 0 %
LDLC SERPL CALC-MCNC: 39 MG/DL
LYMPHOCYTES # BLD AUTO: 3.4 10E3/UL (ref 0.8–5.3)
LYMPHOCYTES NFR BLD AUTO: 36 %
MCH RBC QN AUTO: 31.6 PG (ref 26.5–33)
MCHC RBC AUTO-ENTMCNC: 33.7 G/DL (ref 31.5–36.5)
MCV RBC AUTO: 94 FL (ref 78–100)
MONOCYTES # BLD AUTO: 0.7 10E3/UL (ref 0–1.3)
MONOCYTES NFR BLD AUTO: 8 %
NEUTROPHILS # BLD AUTO: 5.1 10E3/UL (ref 1.6–8.3)
NEUTROPHILS NFR BLD AUTO: 54 %
NONHDLC SERPL-MCNC: 68 MG/DL
NRBC # BLD AUTO: 0 10E3/UL
NRBC BLD AUTO-RTO: 0 /100
PLATELET # BLD AUTO: 266 10E3/UL (ref 150–450)
POTASSIUM SERPL-SCNC: 4.7 MMOL/L (ref 3.4–5.3)
PROT SERPL-MCNC: 6.3 G/DL (ref 6.4–8.3)
RBC # BLD AUTO: 4.33 10E6/UL (ref 3.8–5.2)
SODIUM SERPL-SCNC: 131 MMOL/L (ref 136–145)
TRIGL SERPL-MCNC: 145 MG/DL
WBC # BLD AUTO: 9.4 10E3/UL (ref 4–11)

## 2022-09-16 PROCEDURE — 80061 LIPID PANEL: CPT | Performed by: FAMILY MEDICINE

## 2022-09-16 PROCEDURE — 85004 AUTOMATED DIFF WBC COUNT: CPT | Performed by: FAMILY MEDICINE

## 2022-09-16 PROCEDURE — 84443 ASSAY THYROID STIM HORMONE: CPT | Performed by: FAMILY MEDICINE

## 2022-09-16 PROCEDURE — 80053 COMPREHEN METABOLIC PANEL: CPT | Performed by: FAMILY MEDICINE

## 2022-09-16 PROCEDURE — 82306 VITAMIN D 25 HYDROXY: CPT | Performed by: FAMILY MEDICINE

## 2022-09-17 LAB
DEPRECATED CALCIDIOL+CALCIFEROL SERPL-MC: 54 UG/L (ref 20–75)
TSH SERPL DL<=0.005 MIU/L-ACNC: 2.45 UIU/ML (ref 0.3–4.2)

## 2022-09-19 ENCOUNTER — ALLIED HEALTH/NURSE VISIT (OUTPATIENT)
Dept: EDUCATION SERVICES | Facility: CLINIC | Age: 72
End: 2022-09-19
Payer: COMMERCIAL

## 2022-09-19 DIAGNOSIS — E10.9 DIABETES MELLITUS TYPE 1 (H): Primary | ICD-10-CM

## 2022-09-19 PROCEDURE — G0108 DIAB MANAGE TRN  PER INDIV: HCPCS

## 2022-09-19 NOTE — LETTER
9/19/2022         RE: Paige Mari  [address]  [place] [address]        Dear Colleague,    Thank you for referring your patient, Paige Mari, to the [facility]. Please see a copy of my visit note below.    Diabetes Self-Management Education & Support    Presents for: Insulin Pump and CGM Review    CDE VISIT MODE: In Person    Insulin Pump Information: Omnipod DASH     Opportunities for ongoing education and support in diabetes-self management were discussed.  Pt verbalized understanding of concepts discussed and recommendations provided today.     Continue education with the following diabetes management concepts: Healthy Eating, Being Active, Taking Medication, Problem Solving and Insulin Pump Concepts Balancing glucose and insulin      ASSESSMENT  Pt seen today for f/u. She is using Omnipod DASH and dexcom. She feels things have been going ok. Adjustments were made at last visit with Dr. Bardales due to low BG.   Pt notes she was sick for about the last 10 days, body aches etc. She just started feeling better today. She has not been eating/sleeping as she typically would due to this, so it is somewhat difficult to assess changes that might be needed today.   Overall her average is a little up, seeing more elevations. Difficult to know if this is from illness. Pt notes lows have not been severe. The last couple days look better.     PLAN  Will leave things as they are today. Pt will call with concerns prior to f/u with Dr. Bardales 12/2/22 if needed.     Follow-up: CDE 3 months, sooner if needed     See Care Plan for co-developed, patient-state behavior change goals.        PUMP SETTINGS:       GLOOKO REPORT: 30 DAYS             DEXCOM REPORT: 2 WEEKS               SUBJECTIVE/OBJECTIVE:  Presents for: Insulin Pump and CGM Review  Accompanied by: Self  Diabetes education in the past 24mo: Yes  Focus of Visit: Monitoring, Self-care behavioral goal  "setting, Healthy Eating, Taking Medication  Diabetes type: Type 1  Date of diagnosis: HARRIETT  How confident are you filling out medical forms by yourself:: Extremely  Transportation concerns: No  Difficulty affording diabetes medication?: Sometimes (using the [employer] assistance program)  Difficulty affording diabetes testing supplies?: Sometimes  Other concerns:: None  Cultural Influences/Ethnic Background:  Not  or       Diabetes Symptoms & Complications:     Complications assessed today?: No    Patient Problem List and Family Medical History reviewed for relevant medical history, current medical status, and diabetes risk factors.    Vitals:  There were no vitals taken for this visit.  Estimated body mass index is 29.85 kg/m  as calculated from the following:    Height as of 1/22/22: 1.549 m (5' 1\").    Weight as of 8/23/22: 71.7 kg (158 lb).   Last 3 BP:   BP Readings from Last 3 Encounters:   08/23/22 116/78   06/09/22 130/88   05/27/22 136/80       History   Smoking Status     Current Some Day Smoker     Types: Cigarettes     Last attempt to quit: 9/15/2021   Smokeless Tobacco     Never Used       Labs:  Lab Results   Component Value Date    A1C 6.4 08/16/2022     Lab Results   Component Value Date     09/16/2022    GLC 95 05/27/2022     Lab Results   Component Value Date    LDL 39 09/16/2022    LDL 99 07/06/2016     Direct Measure HDL   Date Value Ref Range Status   09/16/2022 60 >=50 mg/dL Final   ]  GFR Estimate   Date Value Ref Range Status   09/16/2022 70 >60 mL/min/1.73m2 Final     Comment:     Effective December 21, 2021 eGFRcr in adults is calculated using the 2021 CKD-EPI creatinine equation which includes age and gender (Gerda et al., NEJM, DOI: 10.1056/CXPFfm1926324)   03/03/2021 >60 >60 mL/min/1.73m2 Final     GFR Estimate If Black   Date Value Ref Range Status   03/03/2021 >60 >60 mL/min/1.73m2 Final     Lab Results   Component Value Date    CR 0.87 09/16/2022     No results found " for: MICROALBUMIN    Healthy Eating:  Healthy Eating Assessed Today: Yes  Cultural/Orthodoxy diet restrictions?: No  Meal planning/habits: Avoiding sweets  Meals include: Breakfast, Lunch, Dinner, Evening Snack  Has patient met with a dietitian in the past?: Yes    Being Active:  Being Active Assessed Today: Yes    Monitoring:  Monitoring Assessed Today: Yes  Did patient bring glucose meter to appointment? : Yes  Blood Glucose Meter: CGM (Dexcom G6)  Times checking blood sugar at home (number): 5+  Times checking blood sugar at home (per): Day  Blood glucose trend: Fluctuating      Taking Medications:  Diabetes Medication(s)     Biguanides       metFORMIN (GLUCOPHAGE-XR) 500 MG 24 hr tablet    Take 2 tablets (1,000 mg) by mouth 2 times daily (with meals)    Diabetic Other       Glucagon, rDNA, (GLUCAGON EMERGENCY) 1 MG KIT    1 mg IM one time, PRN severe hypoglycemia causing altered consciousness affecting ability to take food by mouth     glucose (BD GLUCOSE) 5 g chewable tablet    Take 2 tablets (10 g) by mouth daily as needed (hypoglycemia)    Insulin       insulin aspart (NOVOLOG VIAL) 100 UNITS/ML vial    9 units with meals plus correction scale with sensitivity of 40 mg/dL starting at 140 mg/dL     insulin detemir (LEVEMIR PEN) 100 UNIT/ML pen    9 units twice daily    Incretin Mimetic Agents (GLP-1 Receptor Agonists)       liraglutide (VICTOZA) 18 MG/3ML solution    Inject 1.2 mg Subcutaneous daily          Taking Medication Assessed Today: Yes  Current Treatments: Insulin Injections, Non-insulin Injectables  Dose schedule: Pre-breakfast, Pre-lunch, Pre-dinner, At bedtime  Given by: Patient  Injection/Infusion sites: Abdomen  Problems taking diabetes medications regularly?: No  Diabetes medication side effects?: No    Problem Solving:  Problem Solving Assessed Today: Yes  Is the patient at risk for hypoglycemia?: Yes  Hypoglycemia Frequency: Never  Is the patient at risk for DKA?: Yes  Does patient have DKA  prevention plan?:  (will assess at upcoming visit)     Reducing Risks:  Diabetes Risks: Age over 45 years, History of gestational diabetes, Sedentary Lifestyle, Family History  CAD Risks: Diabetes Mellitus, Dyslipidemia, Hypertension, Obesity, Post-menopausal, Sedentary lifestyle    Healthy Coping:  Healthy Coping Assessed Today: Yes  Emotional response to diabetes: Ready to learn  Informal Support system:: Family  Stage of change: PREPARATION (Decided to change - considering how)  Support resources: In-person Offerings  Patient Activation Measure Survey Score:  No flowsheet data found.      Care Plan and Education Provided:  Care Plan: Diabetes   Updates made by Ana Sanchez RN since 9/19/2022 12:00 AM      Problem: HbA1C Not In Goal       Goal: Establish Regular Follow-Ups with PCP    This Visit's Progress: 50%      Task: Discuss with PCP the recommended timing for patient's next follow up visit(s)    Responsible User: Ana Sanchez RN      Task: Discuss schedule for PCP visits with patient Completed 9/19/2022   Responsible User: Ana Sanchez RN      Goal: Get HbA1C Level in Goal    This Visit's Progress: 50%      Task: Educate patient on diabetes education self-management topics    Responsible User: Ana Sanchez RN      Task: Educate patient on benefits of regular glucose monitoring    Responsible User: Ana Sanchez RN      Task: Refer patient to appropriate extended care team member, as needed (Medication Therapy Management, Behavioral Health, Physical Therapy, etc.)    Responsible User: Ana Sanchez RN      Task: Discuss diabetes treatment plan with patient Completed 9/19/2022   Responsible User: Ana Sanchez RN      Problem: Diabetes Self-Management Education Needed to Optimize Self-Care Behaviors       Goal: Understand diabetes pathophysiology and disease progression       Task: Provide education on diabetes pathophysiology and disease progression specfic to patient's diabetes type     Responsible User: Ana Sanchez RN      Goal: Healthy Eating - follow a healthy eating pattern for diabetes    This Visit's Progress: 50%      Task: Provide education on portion control and consistency in amount, composition and timing of food intake    Responsible User: Ana Sanchez RN      Task: Provide education on managing carbohydrate intake (carbohydrate counting, plate planning method, etc.) Completed 9/19/2022   Responsible User: Ana Sanchez RN      Task: Provide education on weight management    Responsible User: Ana Sanchez RN      Task: Provide education on heart healthy eating    Responsible User: Ana Sanchez RN      Task: Provide education on eating out    Responsible User: Ana Sanchez RN      Task: Develop individualized healthy eating plan with patient    Responsible User: Ana Sanchez RN      Goal: Being Active - get regular physical activity, working up to at least 150 minutes per week    This Visit's Progress: 30%      Task: Provide education on relationship of activity to glucose and precautions to take if at risk for low glucose Completed 9/19/2022   Responsible User: Ana Sanchez RN      Task: Discuss barriers to physical activity with patient    Responsible User: Ana Sanchez RN      Task: Develop physical activity plan with patient    Responsible User: Ana Sanchez RN      Task: Explore community resources including walking groups, assistance programs, and home videos    Responsible User: Ana Sanchez RN      Goal: Monitoring - monitor glucose and ketones as directed    This Visit's Progress: 90%      Task: Provide education on blood glucose monitoring (purpose, proper technique, frequency, glucose targets, interpreting results, when to use glucose control solution, sharps disposal)    Responsible User: Ana Sanchez RN      Task: Provide education on continuous glucose monitoring (sensor placement, use of yany or /reader, understanding glucose  trends, alerts and alarms, differences between sensor glucose and blood glucose) Completed 9/19/2022   Responsible User: Ana Sanchez RN      Task: Provide education on ketone monitoring (when to monitor, frequency, etc.)    Responsible User: Ana Sanchez RN      Goal: Taking Medication - patient is consistently taking medications as directed    This Visit's Progress: 80%      Task: Provide education on action of prescribed medication, including when to take and possible side effects    Responsible User: Ana Sanchez RN      Task: Provide education on insulin and injectable diabetes medications, including administration, storage, site selection and rotation for injection sites Completed 9/19/2022   Responsible User: Ana Sanchez RN      Task: Discuss barriers to medication adherence with patient and provide management technique ideas as appropriate    Responsible User: Ana Sanchez RN      Task: Provide education on frequency and refill details of medications    Responsible User: Ana Sanchez RN      Goal: Problem Solving - know how to prevent and manage short-term diabetes complications    This Visit's Progress: 50%      Task: Provide education on high blood glucose - causes, signs/symptoms, prevention and treatment Completed 9/19/2022   Responsible User: Ana Sanchez RN      Task: Provide education on low blood glucose - causes, signs/symptoms, prevention, treatment, carrying a carbohydrate source at all times, and medical identification Completed 9/19/2022   Responsible User: Ana Sanchez RN      Task: Provide education on safe travel with diabetes    Responsible User: Ana Sanchez RN      Task: Provide education on how to care for diabetes on sick days Completed 9/19/2022   Responsible User: Ana Sanchez RN      Task: Provide education on when to call a health care provider Completed 9/19/2022   Responsible User: Ana Sanchez RN      Goal: Reducing Risks - know how to prevent and  treat long-term diabetes complications    This Visit's Progress: 50%      Task: Provide education on major complications of diabetes, prevention, early diagnostic measures and treatment of complications    Responsible User: Ana Sanchez RN      Task: Provide education on recommended care for dental, eye and foot health    Responsible User: Ana Sanchez RN      Task: Provide education on Hemoglobin A1c - goals and relationship to blood glucose levels Completed 9/19/2022   Responsible User: Ana Sanchez RN      Task: Provide education on recommendations for heart health - lipid levels and goals, blood pressure and goals, and aspirin therapy, if indicated    Responsible User: Ana Sanchez RN      Task: Provide education on tobacco cessation    Responsible User: Ana Sanchez RN      Goal: Healthy Coping - use available resources to cope with the challenges of managing diabetes    This Visit's Progress: 50%      Task: Discuss recognizing feelings about having diabetes Completed 9/19/2022   Responsible User: Ana Sanchez RN      Task: Provide education on the benefits of making appropriate lifestyle changes    Responsible User: Ana Sanchez RN      Task: Provide education on benefits of utilizing support systems    Responsible User: Ana Sanchez RN      Task: Discuss methods for coping with stress    Responsible User: Ana Sanchez RN      Task: Provide education on when to seek professional counseling    Responsible User: Ana Sanchez RN            Time Spent: 30 minutes  Encounter Type: Individual    Any diabetes medication dose changes were made via the CDE Protocol per the patient's referring provider. A copy of this encounter was shared with the provider.

## 2022-09-19 NOTE — PROGRESS NOTES
Diabetes Self-Management Education & Support    Presents for: Insulin Pump and CGM Review    CDE VISIT MODE: In Person    Insulin Pump Information: Omnipod DASH     Opportunities for ongoing education and support in diabetes-self management were discussed.  Pt verbalized understanding of concepts discussed and recommendations provided today.     Continue education with the following diabetes management concepts: Healthy Eating, Being Active, Taking Medication, Problem Solving and Insulin Pump Concepts Balancing glucose and insulin      ASSESSMENT  Pt seen today for f/u. She is using Omnipod DASH and dexcom. She feels things have been going ok. Adjustments were made at last visit with Dr. Bardales due to low BG.   Pt notes she was sick for about the last 10 days, body aches etc. She just started feeling better today. She has not been eating/sleeping as she typically would due to this, so it is somewhat difficult to assess changes that might be needed today.   Overall her average is a little up, seeing more elevations. Difficult to know if this is from illness. Pt notes lows have not been severe. The last couple days look better.     PLAN  Will leave things as they are today. Pt will call with concerns prior to f/u with Dr. Bardales 12/2/22 if needed.     Follow-up: CDE 3 months, sooner if needed     See Care Plan for co-developed, patient-state behavior change goals.        PUMP SETTINGS:       GLOOKO REPORT: 30 DAYS             DEXCOM REPORT: 2 WEEKS               SUBJECTIVE/OBJECTIVE:  Presents for: Insulin Pump and CGM Review  Accompanied by: Self  Diabetes education in the past 24mo: Yes  Focus of Visit: Monitoring, Self-care behavioral goal setting, Healthy Eating, Taking Medication  Diabetes type: Type 1  Date of diagnosis: HARRIETT  How confident are you filling out medical forms by yourself:: Extremely  Transportation concerns: No  Difficulty affording diabetes medication?: Sometimes (using the [facility] pt  "assistance program)  Difficulty affording diabetes testing supplies?: Sometimes  Other concerns:: None  Cultural Influences/Ethnic Background:  Not  or       Diabetes Symptoms & Complications:     Complications assessed today?: No    Patient Problem List and Family Medical History reviewed for relevant medical history, current medical status, and diabetes risk factors.    Vitals:  There were no vitals taken for this visit.  Estimated body mass index is 29.85 kg/m  as calculated from the following:    Height as of 1/22/22: 1.549 m (5' 1\").    Weight as of 8/23/22: 71.7 kg (158 lb).   Last 3 BP:   BP Readings from Last 3 Encounters:   08/23/22 116/78   06/09/22 130/88   05/27/22 136/80       History   Smoking Status     Current Some Day Smoker     Types: Cigarettes     Last attempt to quit: 9/15/2021   Smokeless Tobacco     Never Used       Labs:  Lab Results   Component Value Date    A1C 6.4 08/16/2022     Lab Results   Component Value Date     09/16/2022    GLC 95 05/27/2022     Lab Results   Component Value Date    LDL 39 09/16/2022    LDL 99 07/06/2016     Direct Measure HDL   Date Value Ref Range Status   09/16/2022 60 >=50 mg/dL Final   ]  GFR Estimate   Date Value Ref Range Status   09/16/2022 70 >60 mL/min/1.73m2 Final     Comment:     Effective December 21, 2021 eGFRcr in adults is calculated using the 2021 CKD-EPI creatinine equation which includes age and gender (Gerda trujillo al., [place], DOI: 10.1056/HOBOlm4498591)   03/03/2021 >60 >60 mL/min/1.73m2 Final     GFR Estimate If Black   Date Value Ref Range Status   03/03/2021 >60 >60 mL/min/1.73m2 Final     Lab Results   Component Value Date    CR 0.87 09/16/2022     No results found for: MICROALBUMIN    Healthy Eating:  Healthy Eating Assessed Today: Yes  Cultural/Yazdanism diet restrictions?: No  Meal planning/habits: Avoiding sweets  Meals include: Breakfast, Lunch, Dinner, Evening Snack  Has patient met with a dietitian in the past?: " Yes    Being Active:  Being Active Assessed Today: Yes    Monitoring:  Monitoring Assessed Today: Yes  Did patient bring glucose meter to appointment? : Yes  Blood Glucose Meter: CGM (Dexcom G6)  Times checking blood sugar at home (number): 5+  Times checking blood sugar at home (per): Day  Blood glucose trend: Fluctuating      Taking Medications:  Diabetes Medication(s)     Biguanides       metFORMIN (GLUCOPHAGE-XR) 500 MG 24 hr tablet    Take 2 tablets (1,000 mg) by mouth 2 times daily (with meals)    Diabetic Other       Glucagon, rDNA, (GLUCAGON EMERGENCY) 1 MG KIT    1 mg IM one time, PRN severe hypoglycemia causing altered consciousness affecting ability to take food by mouth     glucose (BD GLUCOSE) 5 g chewable tablet    Take 2 tablets (10 g) by mouth daily as needed (hypoglycemia)    Insulin       insulin aspart (NOVOLOG VIAL) 100 UNITS/ML vial    9 units with meals plus correction scale with sensitivity of 40 mg/dL starting at 140 mg/dL     insulin detemir (LEVEMIR PEN) 100 UNIT/ML pen    9 units twice daily    Incretin Mimetic Agents (GLP-1 Receptor Agonists)       liraglutide (VICTOZA) 18 MG/3ML solution    Inject 1.2 mg Subcutaneous daily          Taking Medication Assessed Today: Yes  Current Treatments: Insulin Injections, Non-insulin Injectables  Dose schedule: Pre-breakfast, Pre-lunch, Pre-dinner, At bedtime  Given by: Patient  Injection/Infusion sites: Abdomen  Problems taking diabetes medications regularly?: No  Diabetes medication side effects?: No    Problem Solving:  Problem Solving Assessed Today: Yes  Is the patient at risk for hypoglycemia?: Yes  Hypoglycemia Frequency: Never  Is the patient at risk for DKA?: Yes  Does patient have DKA prevention plan?:  (will assess at upcoming visit)     Reducing Risks:  Diabetes Risks: Age over 45 years, History of gestational diabetes, Sedentary Lifestyle, Family History  CAD Risks: Diabetes Mellitus, Dyslipidemia, Hypertension, Obesity, Post-menopausal,  Sedentary lifestyle    Healthy Coping:  Healthy Coping Assessed Today: Yes  Emotional response to diabetes: Ready to learn  Informal Support system:: Family  Stage of change: PREPARATION (Decided to change - considering how)  Support resources: In-person Offerings  Patient Activation Measure Survey Score:  No flowsheet data found.      Care Plan and Education Provided:  Care Plan: Diabetes   Updates made by Ana Sanchez RN since 9/19/2022 12:00 AM      Problem: HbA1C Not In Goal       Goal: Establish Regular Follow-Ups with PCP    This Visit's Progress: 50%      Task: Discuss with PCP the recommended timing for patient's next follow up visit(s)    Responsible User: Ana Sanchez RN      Task: Discuss schedule for PCP visits with patient Completed 9/19/2022   Responsible User: Ana Sanchez RN      Goal: Get HbA1C Level in Goal    This Visit's Progress: 50%      Task: Educate patient on diabetes education self-management topics    Responsible User: Ana Sanchez RN      Task: Educate patient on benefits of regular glucose monitoring    Responsible User: Ana Sanchez RN      Task: Refer patient to appropriate extended care team member, as needed (Medication Therapy Management, Behavioral Health, Physical Therapy, etc.)    Responsible User: Ana Sanchez RN      Task: Discuss diabetes treatment plan with patient Completed 9/19/2022   Responsible User: Ana Sanchez RN      Problem: Diabetes Self-Management Education Needed to Optimize Self-Care Behaviors       Goal: Understand diabetes pathophysiology and disease progression       Task: Provide education on diabetes pathophysiology and disease progression specfic to patient's diabetes type    Responsible User: Ana Sanchez RN      Goal: Healthy Eating - follow a healthy eating pattern for diabetes    This Visit's Progress: 50%      Task: Provide education on portion control and consistency in amount, composition and timing of food intake     Responsible User: Ana Sanchez RN      Task: Provide education on managing carbohydrate intake (carbohydrate counting, plate planning method, etc.) Completed 9/19/2022   Responsible User: Ana Sanchez RN      Task: Provide education on weight management    Responsible User: Ana Sanchez RN      Task: Provide education on heart healthy eating    Responsible User: Ana Sanchez RN      Task: Provide education on eating out    Responsible User: Ana Sanchez RN      Task: Develop individualized healthy eating plan with patient    Responsible User: Ana Sanchez RN      Goal: Being Active - get regular physical activity, working up to at least 150 minutes per week    This Visit's Progress: 30%      Task: Provide education on relationship of activity to glucose and precautions to take if at risk for low glucose Completed 9/19/2022   Responsible User: Ana Sanchez RN      Task: Discuss barriers to physical activity with patient    Responsible User: Ana Sanchez RN      Task: Develop physical activity plan with patient    Responsible User: Ana Sanchez RN      Task: Explore community resources including walking groups, assistance programs, and home videos    Responsible User: Ana Sanchez RN      Goal: Monitoring - monitor glucose and ketones as directed    This Visit's Progress: 90%      Task: Provide education on blood glucose monitoring (purpose, proper technique, frequency, glucose targets, interpreting results, when to use glucose control solution, sharps disposal)    Responsible User: Ana Sanchez RN      Task: Provide education on continuous glucose monitoring (sensor placement, use of yany or /reader, understanding glucose trends, alerts and alarms, differences between sensor glucose and blood glucose) Completed 9/19/2022   Responsible User: Ana Sanchez RN      Task: Provide education on ketone monitoring (when to monitor, frequency, etc.)    Responsible User: Laura  DEMETRIS Perez      Goal: Taking Medication - patient is consistently taking medications as directed    This Visit's Progress: 80%      Task: Provide education on action of prescribed medication, including when to take and possible side effects    Responsible User: Ana Sanchez RN      Task: Provide education on insulin and injectable diabetes medications, including administration, storage, site selection and rotation for injection sites Completed 9/19/2022   Responsible User: Ana Sanchez RN      Task: Discuss barriers to medication adherence with patient and provide management technique ideas as appropriate    Responsible User: Ana Sanchez RN      Task: Provide education on frequency and refill details of medications    Responsible User: Ana Sanchez RN      Goal: Problem Solving - know how to prevent and manage short-term diabetes complications    This Visit's Progress: 50%      Task: Provide education on high blood glucose - causes, signs/symptoms, prevention and treatment Completed 9/19/2022   Responsible User: Ana Sanchez RN      Task: Provide education on low blood glucose - causes, signs/symptoms, prevention, treatment, carrying a carbohydrate source at all times, and medical identification Completed 9/19/2022   Responsible User: Ana Sanchez RN      Task: Provide education on safe travel with diabetes    Responsible User: Ana Sanchez RN      Task: Provide education on how to care for diabetes on sick days Completed 9/19/2022   Responsible User: Ana Sanchez RN      Task: Provide education on when to call a health care provider Completed 9/19/2022   Responsible User: Aan Sanchez RN      Goal: Reducing Risks - know how to prevent and treat long-term diabetes complications    This Visit's Progress: 50%      Task: Provide education on major complications of diabetes, prevention, early diagnostic measures and treatment of complications    Responsible User: Ana Sanchez RN      Task:  Provide education on recommended care for dental, eye and foot health    Responsible User: Ana Sanchez RN      Task: Provide education on Hemoglobin A1c - goals and relationship to blood glucose levels Completed 9/19/2022   Responsible User: Ana Sanchez RN      Task: Provide education on recommendations for heart health - lipid levels and goals, blood pressure and goals, and aspirin therapy, if indicated    Responsible User: Ana Sanchez RN      Task: Provide education on tobacco cessation    Responsible User: Ana Sanchez RN      Goal: Healthy Coping - use available resources to cope with the challenges of managing diabetes    This Visit's Progress: 50%      Task: Discuss recognizing feelings about having diabetes Completed 9/19/2022   Responsible User: Ana Sanchez RN      Task: Provide education on the benefits of making appropriate lifestyle changes    Responsible User: Ana Sanchez RN      Task: Provide education on benefits of utilizing support systems    Responsible User: Ana Sanchez RN      Task: Discuss methods for coping with stress    Responsible User: Ana Sanchez RN      Task: Provide education on when to seek professional counseling    Responsible User: Ana Sanchez RN            Time Spent: 30 minutes  Encounter Type: Individual    Any diabetes medication dose changes were made via the CDE Protocol per the patient's referring provider. A copy of this encounter was shared with the provider.

## 2022-09-23 ENCOUNTER — HOSPITAL ENCOUNTER (OUTPATIENT)
Dept: CT IMAGING | Facility: HOSPITAL | Age: 72
Discharge: HOME | End: 2022-09-23
Attending: FAMILY MEDICINE | Admitting: FAMILY MEDICINE
Payer: COMMERCIAL

## 2022-09-23 DIAGNOSIS — S09.90XA HEAD INJURY: ICD-10-CM

## 2022-09-23 PROCEDURE — 70450 CT HEAD/BRAIN W/O DYE: CPT

## 2022-09-25 ENCOUNTER — HEALTH MAINTENANCE LETTER (OUTPATIENT)
Age: 72
End: 2022-09-25

## 2022-10-07 ENCOUNTER — TELEPHONE (OUTPATIENT)
Dept: ENDOCRINOLOGY | Facility: CLINIC | Age: 72
End: 2022-10-07

## 2022-10-07 NOTE — TELEPHONE ENCOUNTER
Pap form for Novolog, Victoza, and Levemir emailed to clinic for refills   Emailed to Jayda Faulkner

## 2022-10-11 ENCOUNTER — TELEPHONE (OUTPATIENT)
Dept: ENDOCRINOLOGY | Facility: CLINIC | Age: 72
End: 2022-10-11

## 2022-10-11 NOTE — TELEPHONE ENCOUNTER
[facility] Call Center    Phone Message    May a detailed message be left on voicemail: yes     Reason for Call: Other: Corrections need to made to the refill forms for Norvo Nordisk  Per caller Paula the max dose per day is missing for Victoza, and max dose and directions should equal each other in units for Leveimir pens Please call if further explanation is needed      [phone]    Action Taken: Other: ENDO    Travel Screening: Not Applicable

## 2022-10-13 NOTE — TELEPHONE ENCOUNTER
Paula called back and stated the Levemir pen was corrected but the 20 that was crossed out needs white out and the amount needs to be changed to 4 months or 120 days. The dose for the Victoza also needs to be corrected to 1.2mg. Please follow up.

## 2022-10-25 NOTE — TELEPHONE ENCOUNTER
Automated system for edmundo nordisc shows only good 11/30/22 long hold times gave thema  Time to call me back at 9:30am 10/26

## 2022-10-26 NOTE — TELEPHONE ENCOUNTER
I called LM for the pt to c/b to inform that her Victoza has arrived at the [facility].

## 2022-10-26 NOTE — TELEPHONE ENCOUNTER
Forms need to be dated after 10/15 the forms faxed would be invalid please refax forms with a new expiration date.    Noé Wong CPhT  Specialty Pharmacy Clinic Liaison Sandra  [facility] Nicolás [email or web address]   [email or web address]  Phone: # [phone]  Fax: [phone]

## 2022-10-27 NOTE — TELEPHONE ENCOUNTER
I called the pt and the phone went right to . I called the pt's daughter and asked if there was another number, which she states no. I asked Merline if she could inform the pt that she has medication to  here and provided our number if she has any questions.

## 2022-10-27 NOTE — TELEPHONE ENCOUNTER
Forms need to be dated after 10/15 for the renewal for 2023 per [facility].   The forms faxed would be invalid please refax forms with a new expiration date.     Noé Wong CP  Specialty Pharmacy Clinic Liaison Sandra  [facility] Nicolás [email or web address]   [email or web address]  Phone: # [phone]  Fax: [phone]

## 2022-10-29 ENCOUNTER — LAB REQUISITION (OUTPATIENT)
Dept: LAB | Facility: CLINIC | Age: 72
End: 2022-10-29

## 2022-10-29 DIAGNOSIS — R30.0 DYSURIA: ICD-10-CM

## 2022-10-29 PROCEDURE — 87086 URINE CULTURE/COLONY COUNT: CPT | Performed by: PHYSICIAN ASSISTANT

## 2022-10-31 NOTE — TELEPHONE ENCOUNTER
Patient is approved for refills for 2022 [facility] stated Victoza delivered to clinic and Levemir, needles and Novolog should be delivered to patient by 11/08/22    We will need to start a new application for 2023 for Victoza, Levemir, Novolog, Liguori, patient will need to re- enroll too

## 2022-11-01 LAB — BACTERIA UR CULT: ABNORMAL

## 2022-11-08 ENCOUNTER — LAB REQUISITION (OUTPATIENT)
Dept: LAB | Facility: CLINIC | Age: 72
End: 2022-11-08

## 2022-11-08 DIAGNOSIS — R82.998 OTHER ABNORMAL FINDINGS IN URINE: ICD-10-CM

## 2022-11-08 PROCEDURE — 87088 URINE BACTERIA CULTURE: CPT | Performed by: STUDENT IN AN ORGANIZED HEALTH CARE EDUCATION/TRAINING PROGRAM

## 2022-11-10 LAB — BACTERIA UR CULT: ABNORMAL

## 2022-11-14 ENCOUNTER — HOSPITAL ENCOUNTER (OUTPATIENT)
Dept: BONE DENSITY | Facility: HOSPITAL | Age: 72
Discharge: HOME | End: 2022-11-14
Attending: INTERNAL MEDICINE | Admitting: INTERNAL MEDICINE
Payer: COMMERCIAL

## 2022-11-14 DIAGNOSIS — Z78.0 MENOPAUSE PRESENT: ICD-10-CM

## 2022-11-14 PROCEDURE — 77080 DXA BONE DENSITY AXIAL: CPT

## 2022-11-14 PROCEDURE — 77081 DXA BONE DENSITY APPENDICULR: CPT

## 2022-11-20 DIAGNOSIS — E10.65 TYPE 1 DIABETES MELLITUS WITH HYPERGLYCEMIA (H): ICD-10-CM

## 2022-11-21 ENCOUNTER — LAB (OUTPATIENT)
Dept: LAB | Facility: CLINIC | Age: 72
End: 2022-11-21
Payer: COMMERCIAL

## 2022-11-21 ENCOUNTER — TELEPHONE (OUTPATIENT)
Dept: ENDOCRINOLOGY | Facility: CLINIC | Age: 72
End: 2022-11-21

## 2022-11-21 DIAGNOSIS — E83.52 HYPERCALCEMIA: ICD-10-CM

## 2022-11-21 DIAGNOSIS — E10.65 TYPE 1 DIABETES MELLITUS WITH HYPERGLYCEMIA (H): Primary | ICD-10-CM

## 2022-11-21 DIAGNOSIS — E10.65 TYPE 1 DIABETES MELLITUS WITH HYPERGLYCEMIA (H): ICD-10-CM

## 2022-11-21 LAB — HBA1C MFR BLD: 6.4 % (ref 0–5.6)

## 2022-11-21 PROCEDURE — 82947 ASSAY GLUCOSE BLOOD QUANT: CPT

## 2022-11-21 PROCEDURE — 84443 ASSAY THYROID STIM HORMONE: CPT

## 2022-11-21 PROCEDURE — 36415 COLL VENOUS BLD VENIPUNCTURE: CPT

## 2022-11-21 PROCEDURE — 83036 HEMOGLOBIN GLYCOSYLATED A1C: CPT

## 2022-11-21 PROCEDURE — 82306 VITAMIN D 25 HYDROXY: CPT

## 2022-11-21 RX ORDER — METFORMIN HCL 500 MG
TABLET, EXTENDED RELEASE 24 HR ORAL
Qty: 360 TABLET | Refills: 3 | OUTPATIENT
Start: 2022-11-21

## 2022-11-21 NOTE — TELEPHONE ENCOUNTER
[facility] Call Center    Phone Message    May a detailed message be left on voicemail: yes     Reason for Call: Medication Question or concern regarding medication   Prescription Clarification   Name of Medication:   metFORMIN HCl 500 MG  Prescribing Provider: Dr. Bardales   Pharmacy: [facility]/pharmacy #[id number] [address]   What on the order needs clarification? Patient calling stating she was called and told her refill request was denied until she made an appointment to see Dr. Bardales. Patient has had an appointment for 11/21/2022 for labs and 12/2/2022 for the provider already scheduled. Patient is concerned if provider wants to see her before 12/2/2022? Please call to discuss thank you.           Action Taken: Message routed to:  [facility]    Travel Screening: Not Applicable

## 2022-11-21 NOTE — TELEPHONE ENCOUNTER
11/21 spoke with patient she has the new application for 2023 [facility]  For Victoza, levemir and needles, she will complete her portion and send in to [employer], I will check with [facility] in the next few weeks to see if they have everything for 2023

## 2022-11-21 NOTE — TELEPHONE ENCOUNTER
RX denied because she has refill available at the pharmacy. I LM advising she contact her pharmacy.

## 2022-11-22 LAB
FASTING STATUS PATIENT QL REPORTED: ABNORMAL
GLUCOSE SERPL-MCNC: 135 MG/DL (ref 70–99)
TSH SERPL DL<=0.005 MIU/L-ACNC: 1.89 UIU/ML (ref 0.3–4.2)

## 2022-11-23 LAB
DEPRECATED CALCIDIOL+CALCIFEROL SERPL-MC: <57 UG/L (ref 20–75)
VITAMIN D2 SERPL-MCNC: <5 UG/L
VITAMIN D3 SERPL-MCNC: 52 UG/L

## 2022-11-25 DIAGNOSIS — E10.65 TYPE 1 DIABETES MELLITUS WITH HYPERGLYCEMIA (H): ICD-10-CM

## 2022-12-02 ENCOUNTER — OFFICE VISIT (OUTPATIENT)
Dept: ENDOCRINOLOGY | Facility: CLINIC | Age: 72
End: 2022-12-02
Payer: COMMERCIAL

## 2022-12-02 ENCOUNTER — LAB (OUTPATIENT)
Dept: LAB | Facility: CLINIC | Age: 72
End: 2022-12-02
Payer: COMMERCIAL

## 2022-12-02 VITALS
BODY MASS INDEX: 28.34 KG/M2 | DIASTOLIC BLOOD PRESSURE: 76 MMHG | SYSTOLIC BLOOD PRESSURE: 138 MMHG | HEART RATE: 60 BPM | WEIGHT: 150 LBS

## 2022-12-02 DIAGNOSIS — Z79.4 LONG TERM (CURRENT) USE OF INSULIN (H): ICD-10-CM

## 2022-12-02 DIAGNOSIS — I10 HYPERTENSION, UNSPECIFIED TYPE: ICD-10-CM

## 2022-12-02 DIAGNOSIS — K76.0 HEPATIC STEATOSIS: ICD-10-CM

## 2022-12-02 DIAGNOSIS — Z46.81 INSULIN PUMP FITTING OR ADJUSTMENT: ICD-10-CM

## 2022-12-02 DIAGNOSIS — Z96.41 INSULIN PUMP STATUS: ICD-10-CM

## 2022-12-02 DIAGNOSIS — E83.52 HYPERCALCEMIA: Primary | ICD-10-CM

## 2022-12-02 DIAGNOSIS — R94.6 ABNORMAL FINDING ON THYROID FUNCTION TEST: ICD-10-CM

## 2022-12-02 DIAGNOSIS — E10.29 TYPE 1 DIABETES MELLITUS WITH MICROALBUMINURIA (H): ICD-10-CM

## 2022-12-02 DIAGNOSIS — E83.52 HYPERCALCEMIA: ICD-10-CM

## 2022-12-02 DIAGNOSIS — Z78.0 MENOPAUSE PRESENT: ICD-10-CM

## 2022-12-02 DIAGNOSIS — E10.42 TYPE 1 DIABETES MELLITUS WITH DIABETIC POLYNEUROPATHY (H): ICD-10-CM

## 2022-12-02 DIAGNOSIS — R80.9 TYPE 1 DIABETES MELLITUS WITH MICROALBUMINURIA (H): ICD-10-CM

## 2022-12-02 DIAGNOSIS — E66.3 OVERWEIGHT (BMI 25.0-29.9): ICD-10-CM

## 2022-12-02 DIAGNOSIS — E88.819 INSULIN RESISTANCE: ICD-10-CM

## 2022-12-02 DIAGNOSIS — I65.23 CAROTID ATHEROSCLEROSIS, BILATERAL: ICD-10-CM

## 2022-12-02 DIAGNOSIS — E23.6 EMPTY SELLA (H): ICD-10-CM

## 2022-12-02 DIAGNOSIS — E78.2 MIXED HYPERLIPIDEMIA: ICD-10-CM

## 2022-12-02 DIAGNOSIS — E10.65 TYPE 1 DIABETES MELLITUS WITH HYPERGLYCEMIA (H): ICD-10-CM

## 2022-12-02 DIAGNOSIS — E10.649 HYPOGLYCEMIA UNAWARENESS ASSOCIATED WITH TYPE 1 DIABETES MELLITUS (H): ICD-10-CM

## 2022-12-02 PROCEDURE — 95251 CONT GLUC MNTR ANALYSIS I&R: CPT | Performed by: INTERNAL MEDICINE

## 2022-12-02 PROCEDURE — 82043 UR ALBUMIN QUANTITATIVE: CPT

## 2022-12-02 PROCEDURE — 99215 OFFICE O/P EST HI 40 MIN: CPT | Mod: 25 | Performed by: INTERNAL MEDICINE

## 2022-12-02 NOTE — PROGRESS NOTES
Subjective:    Established patient    Paige Mari is a 72 year old female who presents for DM.      Current DM therapy:  -Metformin 1000 mg BID   -Victoza 1.2 mg daily      She started an Omnipod 5/12/2022 with NovoLog      Active insulin time 4 hours      Basal rates:  12am: 0.65 units/hour  9am: 0.75 units/hour     Bolus calculator: target glucose 120 mg/dL, insulin to carbohydrate ratio of 1:15, correction factor 60, glucose correction threshold 150 mg/dL     Objective:    BMI 28.3 kg/m2, /76    DM foot exam today: no ulcerations, reduced DP pulses bilaterally.     12/2021: DM foot exam performed and she has absent sensation to monofilament testing bilaterally. Reduced pedal pulses. No ulcers/skinbreakdown.    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 (HARRIETT)  -12/7/2021: C-peptide 0.7 ng/mL with concurrent venous glucose 176 mg/dL, CEFERINO Ab >250 (ULN 5.0 IU/mL), insulin Ab 0.6 (ULN 0.4 U/mL), IA-2 Ab 44.1 (ULN 7.4 U/mL), Zn T8 Ab 64.3 (ref range 0.0 - 15.0 U/mL)  -CT A/P 9/2021: Mild diffuse pancreatic atrophy  -12/7/2021: fructosamine 365 umol/L (corresponds to HbA1c ~9.0%), HbA1c 9.3%   -1/2022: HbA1c 8.6%, CBC normal   -3/2022: HbA1c 7.3%  -5/2022: HbA1c 7.7%, fructosamine 317 umol/L with normal albumin   -8/2022: HbA1c 6.4%  -11/2022: HbA1c 6.4%, GMI 6.4%  -She has glucagon at home   # No prior DM retinopathy, DM eye exam 3/2022  # Hypertension, on clonidine, metoprolol, olmesartan, microalbuminuria     -9/2022: GFR 70  -12/2021: urine microalbumin/Cr 30.8 mg/g  -3/2022: paired aldosterone 6.4 ng/dL and PRA 0.1 ng/mL/hr  # Painful peripheral neuropathy, no prior DM foot ulcerations, on neuropathic pharmacologic therapy  # No prior ASCVD event, mild carotid atherosclerosis on US 2015, on ASA  # Mixed hyperlipidemia, on rosuvastatin 5 mg daily   -9/2022: , HDL 60, LDL 39,   -She has a prior statin intolerance (myalgias)   # Hepatic steatosis     We reviewed her CGM (Dexcom  G6) in detail, see Jayda's note for details.     Overall glycemic control is outstanding.     We changed her pump settings today to include:  -new basal rate from 4 AM to noon of 0.55 units/hour to mitigate mild hypoglycemia over that time frame     She has insulin detemir at home and in case of pump malfunction will take 8 units BID.     She met with Ana Sanchez (CDE) 9/2022.  We will see if we can upgrade her pump to OmniPod 5.      Return to see me in 3-4 months with labs ordered.     # Thyroid function     In the past has been on both thyroid hormone and methimazole at various points in time. She has not taken a medication for her thyroid in years.      12/2021: TSH, T4, T3 all normal, TSI undetectable, TRAb undetectable, TPO Ab significantly elevated     1/2022: TSH 1.42  11/2022: TSH 1.89     Should have her TSH checked yearly, or at any time if symptoms of thyrotoxicosis or hypothyroidism.      # Bone and mineral metabolism     Since 12/7/2021: albumin corrected serum calcium peak 10.6 mg/dL (ULN 10.5) and otherwise normal. Serum phosphorous and alkaine phosphatase have been normal. 11/2022: 25-OH vitamin D 52 (ref range 20 - 75 mcg/L), 12/2021: UPEP and SPEP negative      Current therapy: vitamin D 2000 international unit(s) daily and MVI with minerals      DEXA 11/14/2022:  -BMD at the spine, distal 1/3 radius, and both hips is normal     No prior low impact fractures.      3/2022: PTH 25 pg/mL, Cr, calcium, albumin, phosphorous all normal      Will trend calcium over time.    # Partially empty sella    I reviewed her CT head 9/2022 and agree with the radiology read of partially empty sella. No history of pituitary pathology. No recent GC injections. No recent oral GC therapy.  I did order baseline pituitary hormone testing.    40 minutes spent on the date of the encounter doing chart review, history and exam, documentation and further activities as noted above.  This did not include time spent on CGM  review.

## 2022-12-02 NOTE — LETTER
12/2/2022         RE: Paige Mari  [address]  [place] [address]        Dear Colleague,    Thank you for referring your patient, Paige Mari, to the [facility]. Please see a copy of my visit note below.    Subjective:    Established patient    Paige Mari is a 72 year old female who presents for DM.      Current DM therapy:  -Metformin 1000 mg BID   -Victoza 1.2 mg daily      She started an Omnipod 5/12/2022 with NovoLog      Active insulin time 4 hours      Basal rates:  12am: 0.65 units/hour  9am: 0.75 units/hour     Bolus calculator: target glucose 120 mg/dL, insulin to carbohydrate ratio of 1:15, correction factor 60, glucose correction threshold 150 mg/dL     Objective:    BMI 28.3 kg/m2, /76    DM foot exam today: no ulcerations, reduced DP pulses bilaterally.     12/2021: DM foot exam performed and she has absent sensation to monofilament testing bilaterally. Reduced pedal pulses. No ulcers/skinbreakdown.    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 (HARRIETT)  -12/7/2021: C-peptide 0.7 ng/mL with concurrent venous glucose 176 mg/dL, CEFERINO Ab >250 (ULN 5.0 IU/mL), insulin Ab 0.6 (ULN 0.4 U/mL), IA-2 Ab 44.1 (ULN 7.4 U/mL), Zn T8 Ab 64.3 (ref range 0.0 - 15.0 U/mL)  -CT A/P 9/2021: Mild diffuse pancreatic atrophy  -12/7/2021: fructosamine 365 umol/L (corresponds to HbA1c ~9.0%), HbA1c 9.3%   -1/2022: HbA1c 8.6%, CBC normal   -3/2022: HbA1c 7.3%  -5/2022: HbA1c 7.7%, fructosamine 317 umol/L with normal albumin   -8/2022: HbA1c 6.4%  -11/2022: HbA1c 6.4%, GMI 6.4%  -She has glucagon at home   # No prior DM retinopathy, DM eye exam 3/2022  # Hypertension, on clonidine, metoprolol, olmesartan, microalbuminuria     -9/2022: GFR 70  -12/2021: urine microalbumin/Cr 30.8 mg/g  -3/2022: paired aldosterone 6.4 ng/dL and PRA 0.1 ng/mL/hr  # Painful peripheral neuropathy, no prior DM foot ulcerations, on neuropathic pharmacologic therapy  #  No prior ASCVD event, mild carotid atherosclerosis on US 2015, on ASA  # Mixed hyperlipidemia, on rosuvastatin 5 mg daily   -9/2022: , HDL 60, LDL 39,   -She has a prior statin intolerance (myalgias)   # Hepatic steatosis     We reviewed her CGM (Dexcom G6) in detail, see Jayda's note for details.     Overall glycemic control is outstanding.     We changed her pump settings today to include:  -new basal rate from 4 AM to noon of 0.55 units/hour to mitigate mild hypoglycemia over that time frame     She has insulin detemir at home and in case of pump malfunction will take 8 units BID.     She met with Ana Sanchez ([facility]) 9/2022.  We will see if we can upgrade her pump to OmniPod 5.      Return to see me in 3-4 months with labs ordered.     # Thyroid function     In the past has been on both thyroid hormone and methimazole at various points in time. She has not taken a medication for her thyroid in years.      12/2021: TSH, T4, T3 all normal, TSI undetectable, TRAb undetectable, TPO Ab significantly elevated     1/2022: TSH 1.42  11/2022: TSH 1.89     Should have her TSH checked yearly, or at any time if symptoms of thyrotoxicosis or hypothyroidism.      # Bone and mineral metabolism     Since 12/7/2021: albumin corrected serum calcium peak 10.6 mg/dL (ULN 10.5) and otherwise normal. Serum phosphorous and alkaine phosphatase have been normal. 11/2022: 25-OH vitamin D 52 (ref range 20 - 75 mcg/L), 12/2021: UPEP and SPEP negative      Current therapy: vitamin D 2000 international unit(s) daily and MVI with minerals      DEXA 11/14/2022:  -BMD at the spine, distal 1/3 radius, and both hips is normal     No prior low impact fractures.      3/2022: PTH 25 pg/mL, Cr, calcium, albumin, phosphorous all normal      Will trend calcium over time.    # Partially empty sella    I reviewed her CT head 9/2022 and agree with the radiology read of partially empty sella. No history of pituitary pathology. No recent GC  injections. No recent oral GC therapy.  I did order baseline pituitary hormone testing.    40 minutes spent on the date of the encounter doing chart review, history and exam, documentation and further activities as noted above.  This did not include time spent on CGM review.      Again, thank you for allowing me to participate in the care of your patient.        Sincerely,        Maynor Bardales MD

## 2022-12-03 LAB
CREAT UR-MCNC: 80.7 MG/DL
MICROALBUMIN UR-MCNC: 50.1 MG/L
MICROALBUMIN/CREAT UR: 62.08 MG/G CR (ref 0–25)

## 2022-12-05 RX ORDER — METFORMIN HCL 500 MG
TABLET, EXTENDED RELEASE 24 HR ORAL
Qty: 360 TABLET | Refills: 1 | Status: SHIPPED | OUTPATIENT
Start: 2022-12-05 | End: 2023-08-07

## 2022-12-06 ENCOUNTER — TELEPHONE (OUTPATIENT)
Dept: EDUCATION SERVICES | Facility: CLINIC | Age: 72
End: 2022-12-06

## 2022-12-06 NOTE — TELEPHONE ENCOUNTER
Per Dr. Bardales would like to look into upgrading to Omnipod 5, currently using DASH.     Called pt. She states she would like to upgrade. However she still has about 25 pods left and paid about $400 for the pods so she does not want to waste them. She is changing pods about every 3 days. Pt would like to wait until she is closer to running out of pods. Advised pt to please let us know when she is down to about 10 pods left and then we can order the Omnipod 5.   Pt is using [facility] on [address].

## 2022-12-08 ENCOUNTER — LAB REQUISITION (OUTPATIENT)
Dept: LAB | Facility: CLINIC | Age: 72
End: 2022-12-08

## 2022-12-08 DIAGNOSIS — R30.0 DYSURIA: ICD-10-CM

## 2022-12-08 PROCEDURE — 87086 URINE CULTURE/COLONY COUNT: CPT | Performed by: FAMILY MEDICINE

## 2022-12-11 LAB — BACTERIA UR CULT: ABNORMAL

## 2022-12-18 DIAGNOSIS — E78.2 MIXED HYPERLIPIDEMIA: ICD-10-CM

## 2022-12-18 DIAGNOSIS — E10.65 TYPE 1 DIABETES MELLITUS WITH HYPERGLYCEMIA (H): ICD-10-CM

## 2022-12-19 RX ORDER — ROSUVASTATIN CALCIUM 5 MG/1
TABLET, COATED ORAL
Qty: 90 TABLET | Refills: 3 | OUTPATIENT
Start: 2022-12-19

## 2023-01-04 ENCOUNTER — TELEPHONE (OUTPATIENT)
Dept: ENDOCRINOLOGY | Facility: CLINIC | Age: 73
End: 2023-01-04

## 2023-01-04 NOTE — TELEPHONE ENCOUNTER
[employer] is mailing patient a new form, patient stated she sent one in, but [facility] can't locate it and they need a new form from Md, I emailed a new form to Jayda Faulkner to have Md fill out and sign for Farzana Dash. And needles

## 2023-01-18 ENCOUNTER — TELEPHONE (OUTPATIENT)
Dept: ENDOCRINOLOGY | Facility: CLINIC | Age: 73
End: 2023-01-18
Payer: COMMERCIAL

## 2023-01-18 NOTE — TELEPHONE ENCOUNTER
I called LM for the pt to c/b to discuss what medication she is on and to inform we need a new edmundo form filled out. The pt also needs to supply edmundo with her picture ID and insurance card.

## 2023-01-23 ENCOUNTER — OFFICE VISIT (OUTPATIENT)
Dept: PULMONOLOGY | Facility: CLINIC | Age: 73
End: 2023-01-23
Payer: COMMERCIAL

## 2023-01-23 ENCOUNTER — MYC MEDICAL ADVICE (OUTPATIENT)
Dept: ENDOCRINOLOGY | Facility: CLINIC | Age: 73
End: 2023-01-23

## 2023-01-23 VITALS
DIASTOLIC BLOOD PRESSURE: 70 MMHG | BODY MASS INDEX: 28.53 KG/M2 | SYSTOLIC BLOOD PRESSURE: 136 MMHG | HEART RATE: 70 BPM | WEIGHT: 151 LBS | OXYGEN SATURATION: 99 %

## 2023-01-23 DIAGNOSIS — Z72.0 TOBACCO USE: ICD-10-CM

## 2023-01-23 DIAGNOSIS — R42 DIZZINESS: ICD-10-CM

## 2023-01-23 DIAGNOSIS — R06.00 DYSPNEA, UNSPECIFIED TYPE: Primary | ICD-10-CM

## 2023-01-23 PROCEDURE — 99214 OFFICE O/P EST MOD 30 MIN: CPT | Mod: 25 | Performed by: INTERNAL MEDICINE

## 2023-01-23 PROCEDURE — 94760 N-INVAS EAR/PLS OXIMETRY 1: CPT | Performed by: INTERNAL MEDICINE

## 2023-01-23 NOTE — TELEPHONE ENCOUNTER
Called and LM for patient to return call. Need to know which medications she is on. Please see message from 1/18/23.

## 2023-01-23 NOTE — PROGRESS NOTES
Patient oxygen saturation on RA at rest is 100%.  Oxygen saturation after ambulating 250ft on RA is 98%.    Patient is ambulatory within his/her home.    Aaron Braxton on 1/23/2023 at 3:50 PM

## 2023-01-29 NOTE — PROGRESS NOTES
PULMONARY OUTPATIENT FOLLOW UP NOTE        Assessment:        1. Exertional dyspnea  Significant tobacco use. PFTs showed normal spirometry, mild decrease diffusion capacity. CXR showed no pulmonary infiltrates, lung cuts of abdomen CT scan showed normal lung parenchyma.   Trial of albuterol did not change respiratory symptoms.   No significant desaturation with activities.   Reports dizziness, increase tremors, with consequent decrease activity level .   Patient is scheduled to be seen by neurology.   Continue to increase activity as tolerated.   2. Dizziness / tremors / loss of short term memory  Brain MRI 1/20/2022 was negative for intracranial pathology   Patient was previously referred to neurology  3. Tobacco use  Smoking cessation couseling     Plan:      1. Follow up Neurology   2. Increase activity as tolerated  3. Smoking cessation couseling  4. Follow up in the pulmonary clinic as needed        Bobby Armendariz  Pulmonary / Critical Care  January 29, 2023        CC:     Chief Complaint   Patient presents with     Follow Up     (paroxysmal nocturnal dyspnea      HPI:       Paige Mari is a 72 year old female who presents for follow up appointment.  Patient has history of HTN, DM, diverticulosis, bowel obstruction s/p resection 2009, s/p back surgery.   Patient was previously seen by Dr. Brunson on 6/9/2022.  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 Still smoking 4 to 5 cig a day.   PFTs showed normal spirometry, mild reduction of diffusion capacity.   Tried albuterol HFA , no changes in exertional dyspnea.  Denies chest pain, wheezes, cough.   No orthopnea, PND, or swelling of LEs  Reports feeling dizzy with ambulation, wide base. Uses a cane / walker for better stability.  Reports having difficulty remember recent events.   Had a negative brain MRI done on 1/20/2022.   Worsening tremors, patient was referred to neurology clinic.       Past Medical History :     HTN,  "DM, diverticulosis, bowel obstruction s/p resection 2009     Medications:     Current Outpatient Medications   Medication     acetaminophen (TYLENOL) 500 MG tablet     albuterol (PROAIR HFA/PROVENTIL HFA/VENTOLIN HFA) 108 (90 Base) MCG/ACT inhaler     amoxicillin (AMOXIL) 500 MG tablet     ARIPiprazole (ABILIFY) 2 MG tablet     cetirizine (ZYRTEC) 10 MG tablet     cholecalciferol (VITAMIN D3) 25 mcg (1000 units) capsule     cloNIDine (CATAPRES-TTS) 0.3 mg/24 hr     Continuous Blood Gluc  (DEXCOM G6 ) TORRIE     Continuous Blood Gluc Sensor (DEXCOM G6 SENSOR) MISC     Continuous Blood Gluc Transmit (DEXCOM G6 TRANSMITTER) MISC     cyanocobalamin (VITAMIN B-12) 1000 MCG tablet     diclofenac sodium (VOLTAREN) 1 % Gel     estradiol (ESTRACE) 1 MG tablet     fluticasone (FLONASE) 50 MCG/ACT nasal spray     Glucagon, rDNA, (GLUCAGON EMERGENCY) 1 MG KIT     glucose (BD GLUCOSE) 5 g chewable tablet     insulin aspart (NOVOLOG VIAL) 100 UNITS/ML vial     insulin detemir (LEVEMIR PEN) 100 UNIT/ML pen     liraglutide (VICTOZA) 18 MG/3ML solution     magnesium oxide (MAG-OX) 400 MG tablet     medical cannabis (Patient's own supply)     metFORMIN (GLUCOPHAGE XR) 500 MG 24 hr tablet     metoprolol succinate ER (TOPROL-XL) 100 MG 24 hr tablet     metoprolol succinate ER (TOPROL-XL) 100 MG 24 hr tablet     multivitamin w/minerals (THERA-VIT-M) tablet     NOVOFINE 32 32 gauge x 1/4\" Ndle     olmesartan (BENICAR) 40 MG tablet     omeprazole (PRILOSEC) 20 MG capsule     pregabalin (LYRICA) 100 MG capsule     rosuvastatin (CRESTOR) 5 MG tablet     valACYclovir (VALTREX) 500 MG tablet     No current facility-administered medications for this visit.        Social History :     Social History     Socioeconomic History     Marital status:      Spouse name: Not on file     Number of children: Not on file     Years of education: Not on file     Highest education level: Not on file   Occupational History     Not on file "   Tobacco Use     Smoking status: Some Days     Types: Cigarettes     Last attempt to quit: 9/15/2021     Years since quittin.3     Smokeless tobacco: Never   Substance and Sexual Activity     Alcohol use: No     Drug use: No     Sexual activity: Not on file   Other Topics Concern     Not on file   Social History Narrative     Not on file     Social Determinants of Health     Financial Resource Strain: Not on file   Food Insecurity: Not on file   Transportation Needs: Not on file   Physical Activity: Not on file   Stress: Not on file   Social Connections: Not on file   Intimate Partner Violence: Not on file   Housing Stability: Not on file          Family History :     Family History   Problem Relation Age of Onset     Breast Cancer No family hx of        Review of Systems  A 12 point comprehensive review of systems was negative except as noted.        Objective:     /70   Pulse 70   Wt 68.5 kg (151 lb)   SpO2 99%   BMI 28.53 kg/m      Patient oxygen saturation on RA at rest is 100%.  Oxygen saturation after ambulating 250ft on RA is 98%.    Gen: awake, alert, no distress  HEENT: pink conjunctiva, moist mucosa, Mallampati II/IV  Neck: no thyromegaly, masses or JVD  Lungs: clear  CV: regular, no murmurs or gallops appreciated  Abdomen: soft, NT, BS wnl  Ext: no edema,   Neuro: CN II-XII intact, decrease strength upper and lower extremities, tremors, dizziness with ambulation.      Diagnostic tests:         PFTs:     FVC 2.06 L (79%)  FEV1 1.67 L  (82%)  FEV1/FVC 76  No significant post bronchodilator response   TLC 3.95 L  (85%)  RV 1.76 L (87%)  DLCO 70%     IMAGES:     XR CHEST 1 VIEW  LOCATION: [facility]  DATE/TIME: 2022 3:01 PM  INDICATION: luekocytosis  COMPARISON: 2021                IMPRESSION: Negative chest.    CT ABDOMEN PELVIS W CONTRAST  LOCATION: [facility]  DATE/TIME: 2022 3:02 PM  INDICATION: Confusion. Leukocytosis.  Hyperglycemia.  COMPARISON: CT AP 9/19/2021  FINDINGS:   LOWER CHEST: Visualized lungs are clear. No pleural effusion. Heart size normal with no pericardial effusion.   HEPATOBILIARY: Mild diffuse hepatic steatosis. Liver otherwise normal. No bile duct dilatation. No calcified gallstones.  PANCREAS: Normal.  SPLEEN: Spleen size normal.  ADRENAL GLANDS: Normal.  KIDNEY/BLADDER: Stable 1 cm cyst left kidney requires no follow-up. Kidneys, ureters and bladder are otherwise normal.  BOWEL: Appendectomy. Rectosigmoid anastomosis. Bowel is otherwise normal with no obstruction or inflammatory change. No free fluid. No free air.  LYMPH NODES: No lymphadenopathy.  VASCULATURE: Moderate calcified atheromatous plaque throughout the normal caliber abdominal aorta.    PELVIC ORGANS: Hysterectomy. Pelvis otherwise unremarkable.  MUSCULOSKELETAL: Bilateral diego and pedicle screw fixation L4-L5.  IMPRESSION:   1.  No acute findings in the abdomen and pelvis.  2.  Incidental findings as detailed above.

## 2023-02-02 ENCOUNTER — OFFICE VISIT (OUTPATIENT)
Dept: NEUROLOGY | Facility: CLINIC | Age: 73
End: 2023-02-02
Attending: INTERNAL MEDICINE
Payer: COMMERCIAL

## 2023-02-02 ENCOUNTER — LAB (OUTPATIENT)
Dept: LAB | Facility: HOSPITAL | Age: 73
End: 2023-02-02
Payer: COMMERCIAL

## 2023-02-02 VITALS
DIASTOLIC BLOOD PRESSURE: 65 MMHG | HEART RATE: 50 BPM | WEIGHT: 151 LBS | HEIGHT: 61 IN | BODY MASS INDEX: 28.51 KG/M2 | SYSTOLIC BLOOD PRESSURE: 99 MMHG

## 2023-02-02 DIAGNOSIS — R74.8 ABNORMAL LEVELS OF OTHER SERUM ENZYMES: ICD-10-CM

## 2023-02-02 DIAGNOSIS — R25.1 TREMOR: ICD-10-CM

## 2023-02-02 DIAGNOSIS — R25.1 TREMOR: Primary | ICD-10-CM

## 2023-02-02 PROBLEM — R65.10 SIRS (SYSTEMIC INFLAMMATORY RESPONSE SYNDROME) (H): Status: RESOLVED | Noted: 2021-09-22 | Resolved: 2023-02-02

## 2023-02-02 PROBLEM — E87.6 HYPOKALEMIA: Status: RESOLVED | Noted: 2021-09-22 | Resolved: 2023-02-02

## 2023-02-02 PROBLEM — R41.0 CONFUSION: Status: RESOLVED | Noted: 2022-01-20 | Resolved: 2023-02-02

## 2023-02-02 PROBLEM — E87.0 HYPERNATREMIA: Status: RESOLVED | Noted: 2021-09-22 | Resolved: 2023-02-02

## 2023-02-02 PROBLEM — E11.10 DKA (DIABETIC KETOACIDOSIS) (H): Status: RESOLVED | Noted: 2021-09-22 | Resolved: 2023-02-02

## 2023-02-02 PROBLEM — N17.9 AKI (ACUTE KIDNEY INJURY) (H): Status: RESOLVED | Noted: 2021-09-22 | Resolved: 2023-02-02

## 2023-02-02 PROBLEM — E87.20 LACTIC ACIDOSIS: Status: RESOLVED | Noted: 2021-09-22 | Resolved: 2023-02-02

## 2023-02-02 PROBLEM — K56.609 SMALL BOWEL OBSTRUCTION (H): Status: RESOLVED | Noted: 2021-09-18 | Resolved: 2023-02-02

## 2023-02-02 PROBLEM — E11.42 DIABETIC POLYNEUROPATHY (H): Status: ACTIVE | Noted: 2023-02-02

## 2023-02-02 PROBLEM — G93.41 METABOLIC ENCEPHALOPATHY: Status: RESOLVED | Noted: 2021-09-22 | Resolved: 2023-02-02

## 2023-02-02 PROBLEM — E83.42 HYPOMAGNESEMIA: Status: RESOLVED | Noted: 2021-09-22 | Resolved: 2023-02-02

## 2023-02-02 PROBLEM — R41.0 DELIRIUM: Status: RESOLVED | Noted: 2021-09-22 | Resolved: 2023-02-02

## 2023-02-02 PROBLEM — K63.89 PNEUMATOSIS INTESTINALIS: Status: RESOLVED | Noted: 2021-09-18 | Resolved: 2023-02-02

## 2023-02-02 PROCEDURE — 82390 ASSAY OF CERULOPLASMIN: CPT

## 2023-02-02 PROCEDURE — 36415 COLL VENOUS BLD VENIPUNCTURE: CPT

## 2023-02-02 PROCEDURE — 99205 OFFICE O/P NEW HI 60 MIN: CPT | Performed by: PSYCHIATRY & NEUROLOGY

## 2023-02-02 PROCEDURE — 82525 ASSAY OF COPPER: CPT

## 2023-02-02 ASSESSMENT — MONTREAL COGNITIVE ASSESSMENT (MOCA)
WHAT IS THE TOTAL SCORE (OUT OF 30): 19
9. REPEAT EACH SENTENCE: 2
4. NAME EACH OF THE THREE ANIMALS SHOWN: 3
WHAT LEVEL OF EDUCATION WAS ATTAINED: 0
10. [FLUENCY] NAME WORDS STARTING WITH DESIGNATED LETTER: 1
11. FOR EACH PAIR OF WORDS, WHAT CATEGORY DO THEY BELONG TO (OUT OF 2): 0
12. MEMORY INDEX SCORE: 0
6. READ LIST OF DIGITS [FORWARD/BACKWARD]: 2
VISUOSPATIAL/EXECUTIVE SUBSCORE: 2
13. ORIENTATION SUBSCORE: 5
7. [VIGILENCE] TAP WHEN HEARING DESIGNATED LETTER: 1
8. SERIAL SUBTRACTION OF 7S: 3

## 2023-02-02 NOTE — LETTER
2023         RE: Paige Mari  [address]  [place] [address]        Dear Colleague,    Thank you for referring your patient, Paige Mari, to the [facility] [place]. Please see a copy of my visit note below.    NEUROLOGY CONSULTATION NOTE       [facility] [place]  [address] [place] [address]  Tel: [phone]  Fax: [phone]  [email or web address]     Paige Mari,  1950, MRN [id number]  PCP: Scar Tan  Date: 2023     ASSESSMENT & PLAN     Visit Diagnosis  1. Tremor     Intention tremor likely due to polypharmacy  72-year-old female with history of HTN, HLD, DM2, diabetic polyneuropathy, COPD, A. fib, marijuana use and chronic pain who was referred for evaluation of bilateral intention tremor.  This likely is due to polypharmacy.  She was admitted to the hospital in 2022 with acute confusion and EEG showed rhythmic activity suggesting seizure.  She was briefly treated with Keppra and was discontinued as it was felt seizures were due to polypharmacy.  I have recommended:    1.  Repeat sleep deprived EEG  2.  Check copper and ceruloplasmin level  3.  I reassured patient she does not have Parkinson disease and these tremors likely are due to polypharmacy  4.  At present these are not debilitating and I would not recommend trial of any medication especially as it is suspected that this is related to polypharmacy    Thank you again for this referral, please feel free to contact me if you have any questions.    Jared Celis MD  [facility] NEUROLOG[facility], [place]  (Formerly, [facility]     REASON FOR CONSULTATION Tremors and Memory Loss        HISTORY OF PRESENT ILLNESS     We have been requested by Dr. Bardales to evaluate Paige Mari who is a 72 year old  female for tremor    Patient is a 72-year-old female with history of HTN,  HLD, DM2, diabetic polyneuropathy, COPD, A. fib, marijuana use, chronic pain on multiple medication who was referred for evaluation of tremors.  She was admitted to the hospital in January 2022 with altered mental status.   reported that patient was not acting herself around dinnertime and was staring off in space.  She was brought to the emergency room and had a MRI of the brain that was unremarkable.  EEG showed diffuse slowing with periodic sharp discharges that evolved into rhythmic activity suggesting EEG seizure and was started on Keppra.  Lumbar puncture was done for routine and HSV and was negative.  Her confusion was felt to be due to polypharmacy normal and she was taken off Keppra.  Since her discharge patient has noticed bilateral intention tremor.  She denies any resting tremor or any hypophonia.  She does have hypothyroidism but most recent TSH was normal.     PROBLEM LIST   Patient Active Problem List   Diagnosis Code     Benign essential hypertension I10     Taking Female Hormones For Postmenopausal HRT Z79.890     Type 2 Diabetes Mellitus E11.9     Nicotine Dependence F17.200     Hyperthyroidism E05.90     Intractable headache R51.9     Knee osteoarthritis M17.9     GERD (gastroesophageal reflux disease) K21.9     Mood disorder (H) F39     Spondylisthesis M43.10     KP (obstructive sleep apnea) G47.33     Atrial fibrillation with RVR (H) I48.91     COPD (chronic obstructive pulmonary disease) (H) J44.9     Diabetic polyneuropathy (H) E11.42         PAST MEDICAL & SURGICAL HISTORY     Past Medical History:   Patient  has a past medical history of Small bowel obstruction (H) (9/18/2021) and Status post total knee replacement (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    Surgical History:  She  has a past surgical history that includes TOTAL ABDOM HYSTERECTOMY; REMOVAL OF OVARY(S); appendectomy; Bowel Resection (12 years ago); Dilation and curettage; tonsillectomy & adenoidectomy; other surgical history (2005); Arthroscopy  "Knee With Meniscectomy (3/10/14); TOTAL KNEE ARTHROPLASTY (Right, 11/24/2014); TOTAL ABDOM HYSTERECTOMY; REMOVAL OF OVARY(S); joint replacement; Pr Arthrodesis Ant Interbody Min Discectomy,Lumbar (N/A, 1/30/2015); Oophorectomy; Hysterectomy; Colon surgery (2004); Blepharoplasty (Bilateral, 8/17/2015); back surgery; Toe Surgery; PICC/Midline Placement (9/19/2021); PICC/Midline Placement (1/22/2022); and IR Lumbar Puncture (1/22/2022).     SOCIAL HISTORY     Reviewed, and she  reports that she has been smoking cigarettes. She has never used smokeless tobacco. She reports that she does not drink alcohol and does not use drugs.     FAMILY HISTORY     Reviewed, and family history is not on file.     ALLERGIES     Allergies   Allergen Reactions     Morphine Other (See Comments)     \"make me delirious,\"  \"nightmares\"  Other reaction(s): delirium  \"make me delirious,\"  \"nightmares\"       Adhesive [Mecrylate] Other (See Comments)     Can only tolerate tape for short periods of time shelia in sensitive areas     Amlodipine Unknown and Other (See Comments)     Other reaction(s): delerium     Avalide Other (See Comments)     Other reaction(s): hyponatremia     Doxazosin Other (See Comments)     Other reaction(s): feels foggy     Other Allergy (See Comments) [External Allergen Needs Reconciliation - See Comment] Unknown     Other reaction(s): skin rash     Prednisone Unknown     Caused extremely high blood sugars     Tramadol Other (See Comments)     Possible seizure activity     Trazodone Unknown     Other reaction(s): hung over         REVIEW OF SYSTEMS     A 12 point review of system was performed and was negative except as outlined in the history of present illness.     HOME MEDICATIONS     Current Outpatient Rx   Medication Sig Dispense Refill     acetaminophen (TYLENOL) 500 MG tablet Take 1,000 mg by mouth 2 times daily        albuterol (PROAIR HFA/PROVENTIL HFA/VENTOLIN HFA) 108 (90 Base) MCG/ACT inhaler Inhale 1-2 puffs " into the lungs every 6 hours 18 g 4     amoxicillin (AMOXIL) 500 MG tablet Take 2,000 mg by mouth as needed Before dental appointments       ARIPiprazole (ABILIFY) 2 MG tablet Take 2 mg by mouth daily        cetirizine (ZYRTEC) 10 MG tablet Take 10 mg by mouth daily        cholecalciferol (VITAMIN D3) 25 mcg (1000 units) capsule Take 1,000 Units by mouth daily        cloNIDine (CATAPRES-TTS) 0.3 mg/24 hr Place 1 patch onto the skin once a week        Continuous Blood Gluc  (DEXCOM G6 ) TORRIE Use to read blood sugars as per 's instructions. 1 each 0     Continuous Blood Gluc Sensor (DEXCOM G6 SENSOR) MISC Change every 10 days. 3 each 6     Continuous Blood Gluc Transmit (DEXCOM G6 TRANSMITTER) MISC Change every 3 months. 1 each 2     cyanocobalamin (VITAMIN B-12) 1000 MCG tablet Take 1,000 mcg by mouth daily       diclofenac sodium (VOLTAREN) 1 % Gel [DICLOFENAC SODIUM (VOLTAREN) 1 % GEL] Apply 1 g topically daily as needed.       estradiol (ESTRACE) 1 MG tablet Take 1 mg by mouth daily        fluticasone (FLONASE) 50 MCG/ACT nasal spray Spray 1 spray into both nostrils daily 16 g 11     Glucagon, rDNA, (GLUCAGON EMERGENCY) 1 MG KIT 1 mg IM one time, PRN severe hypoglycemia causing altered consciousness affecting ability to take food by mouth 1 kit 1     glucose (BD GLUCOSE) 5 g chewable tablet Take 2 tablets (10 g) by mouth daily as needed (hypoglycemia) 300 tablet 3     insulin aspart (NOVOLOG VIAL) 100 UNITS/ML vial 9 units with meals plus correction scale with sensitivity of 40 mg/dL starting at 140 mg/dL       liraglutide (VICTOZA) 18 MG/3ML solution Inject 1.2 mg Subcutaneous daily       magnesium oxide (MAG-OX) 400 MG tablet Take 400 mg by mouth daily        medical cannabis (Patient's own supply) 1 Dose by Other route See Admin Instructions [employer] Vape- 1-4 puffs q6h PRN       metFORMIN (GLUCOPHAGE XR) 500 MG 24 hr tablet TAKE 2 TABS (1000MG) BY MOUTH TWICE DAILY WITH  "MEALS 360 tablet 1     metoprolol succinate ER (TOPROL-XL) 100 MG 24 hr tablet Take 200 mg by mouth 2 times daily       multivitamin w/minerals (THERA-VIT-M) tablet Take 1 tablet by mouth daily       NOVOFINE 32 32 gauge x 1/4\" Ndle 1 each by Other route 6 times daily   3     olmesartan (BENICAR) 40 MG tablet [OLMESARTAN (BENICAR) 40 MG TABLET] Take 40 mg by mouth daily.       omeprazole (PRILOSEC) 20 MG capsule [OMEPRAZOLE (PRILOSEC) 20 MG CAPSULE] Take 1 capsule by mouth daily before breakfast.       pregabalin (LYRICA) 100 MG capsule Take 1 capsule (100 mg) by mouth 3 times daily 90 capsule 0     rosuvastatin (CRESTOR) 5 MG tablet Take 1 tablet (5 mg) by mouth daily 90 tablet 3     valACYclovir (VALTREX) 500 MG tablet Take 500 mg by mouth daily As needed           PHYSICAL EXAM     Vital signs  BP 99/65 (BP Location: Left arm, Patient Position: Sitting)   Pulse 50   Ht 1.549 m (5' 1\")   Wt 68.5 kg (151 lb)   BMI 28.53 kg/m      Weight:   151 lbs 0 oz    Elderly female who is alert and oriented vital signs were reviewed and documented in electronic medical record.  Neck supple.  Neurologically speech was normal cranial nerves II through XII are intact there is no restriction of upgaze.  She has bilateral intention tremor.  No cogwheel rigidity or resting tremor.  She has a wide-based gait unable to tandem walk.  She has dysmetria on finger-nose testing and rapid alternating movement     PERTINENT DIAGNOSTIC STUDIES     Following studies were reviewed:     CT BRAIN 9/23/2022  1.  No CT evidence for acute intracranial process.     2.  Brain atrophy and presumed chronic microvascular ischemic changes as above.     3.  Stable appearance from previous head CT 01/20/2022.    MRI BRAIN 1/20/2022  1. No acute intracranial abnormality.  2. Mild generalized volume loss and chronic microvascular ischemic change.    MRI LUMBAR SPINE 3/12/2015  1.  Since the previous MRI, patient has undergone placement of an interbody " fusion device at L4-L5. There is some edema and enhancement within the adjacent L4 and L5 vertebral bodies as well as a fluid collection along the left ventral aspect of L4 and   L5. These changes are nonspecific. Most likely they reflect postoperative change in the seroma. Infection is not suspected, but not excluded. Correlate clinically.  2.  The degree of anterolisthesis of L4 with respect L5 may have increased subtly. The degree of spinal canal stenosis appears more pronounced, now at least moderate in degree.  3.  Some edema associated with the osseous L4-L5 facets has progressed in the interval.  4.  Some progression of the now moderate left and mild right foraminal narrowing at this level. There is probably some flattening of the exiting nerve on the left due to a small extrusion.  5.  Degenerative disc and facet changes at L5-S1 without significant stenosis appears similar to previous.  6.  Conus ends just above the L2-L3 interspace with fatty infiltration of the lower filum.    EEG 1/25/2022  This is a normal EEG during wakefulness and drowsiness except for mild generalized background dysrhythmia. Further clinical correlation is needed.    EEG 01/20/2022  This is an abnormal prolonged video EEG during wakefulness and drowsiness/somnolent state due to intermittent generalized periodic discharges with triphasic morphology.    These could be triphasic waves since these are more bifrontocentral prominent. The generalized slow background seen during the recording could suggest an encephalopathy or could be secondary to sedating medications. Further clinical correlation is needed.    EEG 1/20/2022  This is a severely abnormal EEG due to almost continuous generalized rhythmic activity interspersed with high amplitude sharp activity suggesting electrographic seizures.  Later in the tracing patient received Ativan with some attenuation of these discharges and background was replaced by theta delta admixture.      PERTINENT LABS  Following labs were reviewed:  Lab Requisition on 12/08/2022   Component Date Value     Culture 12/08/2022 >100,000 CFU/mL Raoultella ornithinolytica/planticola (A)    Lab on 12/02/2022   Component Date Value     Albumin Urine mg/L 12/02/2022 50.1      Albumin Urine mg/g Cr 12/02/2022 62.08 (H)      Creatinine Urine mg/dL 12/02/2022 80.7    Lab on 11/21/2022   Component Date Value     Glucose 11/21/2022 135 (H)      Patient Fasting > 8hrs? 11/21/2022 Unknown      25 OH Vitamin D2 11/21/2022 <5      25 OH Vitamin D3 11/21/2022 52      25 OH Vit D Total 11/21/2022 <57      TSH 11/21/2022 1.89      Hemoglobin A1C 11/21/2022 6.4 (H)    Lab Requisition on 11/08/2022   Component Date Value     Culture 11/08/2022 >100,000 CFU/mL Raoultella ornithinolytica/planticola (A)         Total time spent for face to face visit, reviewing labs/imaging studies, counseling and coordination of care was: 1 Hour spent on the date of the encounter doing chart review, review of outside records, review of test results, interpretation of tests, patient visit and documentation       This note was dictated using voice recognition software.  Any grammatical or context distortions are unintentional and inherent to the software.    Orders Placed This Encounter   Procedures     Copper level     Ceruloplasmin     EEG Sleep Deprived      New Prescriptions    No medications on file      Modified Medications    No medications on file              Again, thank you for allowing me to participate in the care of your patient.        Sincerely,        Jared Celis MD

## 2023-02-02 NOTE — NURSING NOTE
DAVID COGNITIVE ASSESSMENT ([facility])  Version 7.1 Original Version  VISUOSPATIAL/EXECUTIVE               COPY CUBE      [  0  ]                                [  1  ] DRAW CLOCK (Ten past eleven)  (3 points)    [ 1   ]                    [  0  ]               [  0  ]       Contour            Numbers     Hands POINTS             2      / 5   NAMING    [  1 ]                                                                        [ 1   ]                                             [  1  ]  Liforrest Huffman                                Camel                 3    / 3   MEMORY Read list of words, subject must repeat them. Do 2 trials, even if 1st trial is successful. Do a recall after 5 minutes  FACE VELVET Amish [place] RED No Points    1st x         2nd x x       ATTENTION Read list of digits (1 digit/sec) Subject has to repeat in the forward order       [ 1   ]   2  1  8  5  4                                [ 1   ] 7 4 2                      2    /2   Read list of letters. The subject must tap with his hand at each letter A. No points if > 2 errors.  [ 1   ] F B A C M N A A J K L B A F A K D E A A A J A M O F A A B         1     /1   Serial 7 subtraction starting at 100          [  1  ] 93         [  1  ] 86          [  0  ] 79          [  1  ] 72         [  1  ] 65   4 or 5 correct subtractions: 3 points,  2 or 3 correct: 2 points,  1correct: 1 point,   0 correct: 0 points        3    /3   LANGUAGE Repeat: I only know that Liam is the one to help today. [  1   ]                                      The cat always hid under the couch when dogs were in the room. [1   ]            2   /2   Fluency: Name maximum number of words in one minute that begin with the letter F                                                                                                                    [  1  ] _11__ (N > 11 words)       1 /1   ABSTRACTION  Similarity between e.g. banana-orange=fruit                                                                   [  0  ] train-bicycle                      [ 0   ] watch-ruler            0  /2   DELAYED  RECALL Has to recall words  WITH NO CUE FACE  [  0  ] VELVET  [  0  ] Confucianist  [ 0   ]  ISRAEL  [ 0   ] RED  [ 0   ] Points for UNCUED recall only      0      /5           OPTIONAL Category cue           Multiple choice cue          ORIENTATION  [  1  ] Date     [  1  ] Month       [  0  ] Year      [   1 ] Day      [  1  ] Place        [ 1   ] City   5       /6   TOTAL  Normal > 26/30 Add 1 point if < 12 years education      19 /30

## 2023-02-02 NOTE — PROGRESS NOTES
NEUROLOGY CONSULTATION NOTE       [facility] NEUROLOGY [place]  [address] [place] [address]  Tel: [phone]  Fax: [phone]  [email or web address]     Paige Mari,  1950, MRN [id number]  PCP: Scar Tan  Date: 2023     ASSESSMENT & PLAN     Visit Diagnosis  1. Tremor     Intention tremor likely due to polypharmacy  72-year-old female with history of HTN, HLD, DM2, diabetic polyneuropathy, COPD, A. fib, marijuana use and chronic pain who was referred for evaluation of bilateral intention tremor.  This likely is due to polypharmacy.  She was admitted to the hospital in 2022 with acute confusion and EEG showed rhythmic activity suggesting seizure.  She was briefly treated with Keppra and was discontinued as it was felt seizures were due to polypharmacy.  I have recommended:    1.  Repeat sleep deprived EEG  2.  Check copper and ceruloplasmin level  3.  I reassured patient she does not have Parkinson disease and these tremors likely are due to polypharmacy  4.  At present these are not debilitating and I would not recommend trial of any medication especially as it is suspected that this is related to polypharmacy    Thank you again for this referral, please feel free to contact me if you have any questions.    Jared Celis MD  [facility]  (Formerly, [facility]     REASON FOR CONSULTATION Tremors and Memory Loss        HISTORY OF PRESENT ILLNESS     We have been requested by Dr. Bardales to evaluate Paige Mari who is a 72 year old  female for tremor    Patient is a 72-year-old female with history of HTN, HLD, DM2, diabetic polyneuropathy, COPD, A. fib, marijuana use, chronic pain on multiple medication who was referred for evaluation of tremors.  She was admitted to the hospital in 2022 with altered mental status.   reported that patient was not acting herself  around dinnertime and was staring off in space.  She was brought to the emergency room and had a MRI of the brain that was unremarkable.  EEG showed diffuse slowing with periodic sharp discharges that evolved into rhythmic activity suggesting EEG seizure and was started on Keppra.  Lumbar puncture was done for routine and HSV and was negative.  Her confusion was felt to be due to polypharmacy normal and she was taken off Keppra.  Since her discharge patient has noticed bilateral intention tremor.  She denies any resting tremor or any hypophonia.  She does have hypothyroidism but most recent TSH was normal.     PROBLEM LIST   Patient Active Problem List   Diagnosis Code     Benign essential hypertension I10     Taking Female Hormones For Postmenopausal HRT Z79.890     Type 2 Diabetes Mellitus E11.9     Nicotine Dependence F17.200     Hyperthyroidism E05.90     Intractable headache R51.9     Knee osteoarthritis M17.9     GERD (gastroesophageal reflux disease) K21.9     Mood disorder (H) F39     Spondylisthesis M43.10     KP (obstructive sleep apnea) G47.33     Atrial fibrillation with RVR (H) I48.91     COPD (chronic obstructive pulmonary disease) (H) J44.9     Diabetic polyneuropathy (H) E11.42         PAST MEDICAL & SURGICAL HISTORY     Past Medical History:   Patient  has a past medical history of Small bowel obstruction (H) (9/18/2021) and Status post total knee replacement (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    Surgical History:  She  has a past surgical history that includes TOTAL ABDOM HYSTERECTOMY; REMOVAL OF OVARY(S); appendectomy; Bowel Resection (12 years ago); Dilation and curettage; tonsillectomy & adenoidectomy; other surgical history (2005); Arthroscopy Knee With Meniscectomy (3/10/14); TOTAL KNEE ARTHROPLASTY (Right, 11/24/2014); TOTAL ABDOM HYSTERECTOMY; REMOVAL OF OVARY(S); joint replacement; Pr Arthrodesis Ant Interbody Min Discectomy,Lumbar (N/A, 1/30/2015); Oophorectomy; Hysterectomy; Colon surgery (2004);  "Blepharoplasty (Bilateral, 8/17/2015); back surgery; Toe Surgery; PICC/Midline Placement (9/19/2021); PICC/Midline Placement (1/22/2022); and IR Lumbar Puncture (1/22/2022).     SOCIAL HISTORY     Reviewed, and she  reports that she has been smoking cigarettes. She has never used smokeless tobacco. She reports that she does not drink alcohol and does not use drugs.     FAMILY HISTORY     Reviewed, and family history is not on file.     ALLERGIES     Allergies   Allergen Reactions     Morphine Other (See Comments)     \"make me delirious,\"  \"nightmares\"  Other reaction(s): delirium  \"make me delirious,\"  \"nightmares\"       Adhesive [Mecrylate] Other (See Comments)     Can only tolerate tape for short periods of time shelia in sensitive areas     Amlodipine Unknown and Other (See Comments)     Other reaction(s): delerium     Avalide Other (See Comments)     Other reaction(s): hyponatremia     Doxazosin Other (See Comments)     Other reaction(s): feels foggy     Other Allergy (See Comments) [External Allergen Needs Reconciliation - See Comment] Unknown     Other reaction(s): skin rash     Prednisone Unknown     Caused extremely high blood sugars     Tramadol Other (See Comments)     Possible seizure activity     Trazodone Unknown     Other reaction(s): hung over         REVIEW OF SYSTEMS     A 12 point review of system was performed and was negative except as outlined in the history of present illness.     HOME MEDICATIONS     Current Outpatient Rx   Medication Sig Dispense Refill     acetaminophen (TYLENOL) 500 MG tablet Take 1,000 mg by mouth 2 times daily        albuterol (PROAIR HFA/PROVENTIL HFA/VENTOLIN HFA) 108 (90 Base) MCG/ACT inhaler Inhale 1-2 puffs into the lungs every 6 hours 18 g 4     amoxicillin (AMOXIL) 500 MG tablet Take 2,000 mg by mouth as needed Before dental appointments       ARIPiprazole (ABILIFY) 2 MG tablet Take 2 mg by mouth daily        cetirizine (ZYRTEC) 10 MG tablet Take 10 mg by mouth daily  " "      cholecalciferol (VITAMIN D3) 25 mcg (1000 units) capsule Take 1,000 Units by mouth daily        cloNIDine (CATAPRES-TTS) 0.3 mg/24 hr Place 1 patch onto the skin once a week        Continuous Blood Gluc  (DEXCOM G6 ) TORRIE Use to read blood sugars as per 's instructions. 1 each 0     Continuous Blood Gluc Sensor (DEXCOM G6 SENSOR) MISC Change every 10 days. 3 each 6     Continuous Blood Gluc Transmit (DEXCOM G6 TRANSMITTER) MISC Change every 3 months. 1 each 2     cyanocobalamin (VITAMIN B-12) 1000 MCG tablet Take 1,000 mcg by mouth daily       diclofenac sodium (VOLTAREN) 1 % Gel [DICLOFENAC SODIUM (VOLTAREN) 1 % GEL] Apply 1 g topically daily as needed.       estradiol (ESTRACE) 1 MG tablet Take 1 mg by mouth daily        fluticasone (FLONASE) 50 MCG/ACT nasal spray Spray 1 spray into both nostrils daily 16 g 11     Glucagon, rDNA, (GLUCAGON EMERGENCY) 1 MG KIT 1 mg IM one time, PRN severe hypoglycemia causing altered consciousness affecting ability to take food by mouth 1 kit 1     glucose (BD GLUCOSE) 5 g chewable tablet Take 2 tablets (10 g) by mouth daily as needed (hypoglycemia) 300 tablet 3     insulin aspart (NOVOLOG VIAL) 100 UNITS/ML vial 9 units with meals plus correction scale with sensitivity of 40 mg/dL starting at 140 mg/dL       liraglutide (VICTOZA) 18 MG/3ML solution Inject 1.2 mg Subcutaneous daily       magnesium oxide (MAG-OX) 400 MG tablet Take 400 mg by mouth daily        medical cannabis (Patient's own supply) 1 Dose by Other route See Admin Instructions [employer] [place]angerine Vape- 1-4 puffs q6h PRN       metFORMIN (GLUCOPHAGE XR) 500 MG 24 hr tablet TAKE 2 TABS (1000MG) BY MOUTH TWICE DAILY WITH MEALS 360 tablet 1     metoprolol succinate ER (TOPROL-XL) 100 MG 24 hr tablet Take 200 mg by mouth 2 times daily       multivitamin w/minerals (THERA-VIT-M) tablet Take 1 tablet by mouth daily       NOVOFINE 32 32 gauge x 1/4\" Ndle 1 each by Other route 6 times daily   " "3     olmesartan (BENICAR) 40 MG tablet [OLMESARTAN (BENICAR) 40 MG TABLET] Take 40 mg by mouth daily.       omeprazole (PRILOSEC) 20 MG capsule [OMEPRAZOLE (PRILOSEC) 20 MG CAPSULE] Take 1 capsule by mouth daily before breakfast.       pregabalin (LYRICA) 100 MG capsule Take 1 capsule (100 mg) by mouth 3 times daily 90 capsule 0     rosuvastatin (CRESTOR) 5 MG tablet Take 1 tablet (5 mg) by mouth daily 90 tablet 3     valACYclovir (VALTREX) 500 MG tablet Take 500 mg by mouth daily As needed           PHYSICAL EXAM     Vital signs  BP 99/65 (BP Location: Left arm, Patient Position: Sitting)   Pulse 50   Ht 1.549 m (5' 1\")   Wt 68.5 kg (151 lb)   BMI 28.53 kg/m      Weight:   151 lbs 0 oz    Elderly female who is alert and oriented vital signs were reviewed and documented in electronic medical record.  Neck supple.  Neurologically speech was normal cranial nerves II through XII are intact there is no restriction of upgaze.  She has bilateral intention tremor.  No cogwheel rigidity or resting tremor.  She has a wide-based gait unable to tandem walk.  She has dysmetria on finger-nose testing and rapid alternating movement     PERTINENT DIAGNOSTIC STUDIES     Following studies were reviewed:     CT BRAIN 9/23/2022  1.  No CT evidence for acute intracranial process.     2.  Brain atrophy and presumed chronic microvascular ischemic changes as above.     3.  Stable appearance from previous head CT 01/20/2022.    MRI BRAIN 1/20/2022  1. No acute intracranial abnormality.  2. Mild generalized volume loss and chronic microvascular ischemic change.    MRI LUMBAR SPINE 3/12/2015  1.  Since the previous MRI, patient has undergone placement of an interbody fusion device at L4-L5. There is some edema and enhancement within the adjacent L4 and L5 vertebral bodies as well as a fluid collection along the left ventral aspect of L4 and   L5. These changes are nonspecific. Most likely they reflect postoperative change in the seroma. " Infection is not suspected, but not excluded. Correlate clinically.  2.  The degree of anterolisthesis of L4 with respect L5 may have increased subtly. The degree of spinal canal stenosis appears more pronounced, now at least moderate in degree.  3.  Some edema associated with the osseous L4-L5 facets has progressed in the interval.  4.  Some progression of the now moderate left and mild right foraminal narrowing at this level. There is probably some flattening of the exiting nerve on the left due to a small extrusion.  5.  Degenerative disc and facet changes at L5-S1 without significant stenosis appears similar to previous.  6.  Conus ends just above the L2-L3 interspace with fatty infiltration of the lower filum.    EEG 1/25/2022  This is a normal EEG during wakefulness and drowsiness except for mild generalized background dysrhythmia. Further clinical correlation is needed.    EEG 01/20/2022  This is an abnormal prolonged video EEG during wakefulness and drowsiness/somnolent state due to intermittent generalized periodic discharges with triphasic morphology.    These could be triphasic waves since these are more bifrontocentral prominent. The generalized slow background seen during the recording could suggest an encephalopathy or could be secondary to sedating medications. Further clinical correlation is needed.    EEG 1/20/2022  This is a severely abnormal EEG due to almost continuous generalized rhythmic activity interspersed with high amplitude sharp activity suggesting electrographic seizures.  Later in the tracing patient received Ativan with some attenuation of these discharges and background was replaced by theta delta admixture.     PERTINENT LABS  Following labs were reviewed:  Lab Requisition on 12/08/2022   Component Date Value     Culture 12/08/2022 >100,000 CFU/mL Raoultella ornithinolytica/planticola (A)    Lab on 12/02/2022   Component Date Value     Albumin Urine mg/L 12/02/2022 50.1      Albumin  Urine mg/g Cr 12/02/2022 62.08 (H)      Creatinine Urine mg/dL 12/02/2022 80.7    Lab on 11/21/2022   Component Date Value     Glucose 11/21/2022 135 (H)      Patient Fasting > 8hrs? 11/21/2022 Unknown      25 OH Vitamin D2 11/21/2022 <5      25 OH Vitamin D3 11/21/2022 52      25 OH Vit D Total 11/21/2022 <57      TSH 11/21/2022 1.89      Hemoglobin A1C 11/21/2022 6.4 (H)    Lab Requisition on 11/08/2022   Component Date Value     Culture 11/08/2022 >100,000 CFU/mL Raoultella ornithinolytica/planticola (A)         Total time spent for face to face visit, reviewing labs/imaging studies, counseling and coordination of care was: 1 Hour spent on the date of the encounter doing chart review, review of outside records, review of test results, interpretation of tests, patient visit and documentation       This note was dictated using voice recognition software.  Any grammatical or context distortions are unintentional and inherent to the software.    Orders Placed This Encounter   Procedures     Copper level     Ceruloplasmin     EEG Sleep Deprived      New Prescriptions    No medications on file      Modified Medications    No medications on file

## 2023-02-02 NOTE — NURSING NOTE
Chief Complaint   Patient presents with     Tremors     BUE tremors     Memory Loss     MOCA 19/30     Shae Cardona CMA on 2/2/2023 at 2:29 PM

## 2023-02-05 DIAGNOSIS — E10.65 TYPE 1 DIABETES MELLITUS WITH HYPERGLYCEMIA (H): ICD-10-CM

## 2023-02-05 DIAGNOSIS — E78.2 MIXED HYPERLIPIDEMIA: ICD-10-CM

## 2023-02-05 LAB — COPPER SERPL-MCNC: 106.7 UG/DL

## 2023-02-06 LAB — CERULOPLASMIN SERPL-MCNC: 23 MG/DL (ref 20–60)

## 2023-02-06 RX ORDER — ROSUVASTATIN CALCIUM 5 MG/1
TABLET, COATED ORAL
Qty: 90 TABLET | Refills: 0 | Status: SHIPPED | OUTPATIENT
Start: 2023-02-06 | End: 2023-05-22

## 2023-02-07 ENCOUNTER — TELEPHONE (OUTPATIENT)
Dept: NEUROLOGY | Facility: CLINIC | Age: 73
End: 2023-02-07
Payer: COMMERCIAL

## 2023-02-07 NOTE — TELEPHONE ENCOUNTER
Patient was here to see Dr Celis 2/2/23 and left forms here for us to fill out for [employer]. She mentioned needing the forms for her Emgality  Upon further review, I do not see an rx for Emgality and wondering if this is for her insulin  I have asked patient to call us back to clarify  Shae Cardona, RADHA on 2/7/2023 at 3:13 PM

## 2023-02-09 NOTE — TELEPHONE ENCOUNTER
Spoke with Paige and she informed me that the form is for Emgality. I do not see any prescription for Emgality. This was prescribed by a physician from [facility] which we do not have the records. She will try to get the records from [facility] and have the sent to us. Once received, I let her know I could check with Dr. Celis but as they did not discuss migraines, I am not sure if he will be able to prescribe the medication  I will hang on to the forms until I receive the records for review  Paige understood with no further questions at this time  Shae Cardona CMA on 2/9/2023 at 4:08 PM

## 2023-02-14 ENCOUNTER — TELEPHONE (OUTPATIENT)
Dept: ENDOCRINOLOGY | Facility: CLINIC | Age: 73
End: 2023-02-14
Payer: COMMERCIAL

## 2023-02-14 NOTE — TELEPHONE ENCOUNTER
[facility] Call Center    Phone Message    May a detailed message be left on voicemail: yes     Reason for Call: Other: patient would like to be contact when forms are received from [employer] for her medication refill.  Patient is stating that there is one page on form that she needs to fill out.      insulin aspart (NOVOLOG VIAL) 100 UNITS/ML vial  liraglutide (VICTOZA) 18 MG/3ML solution      [facility]  Fax - [phone]    Action Taken: Other: endo    Travel Screening: Not Applicable

## 2023-02-15 ENCOUNTER — TELEPHONE (OUTPATIENT)
Dept: ENDOCRINOLOGY | Facility: CLINIC | Age: 73
End: 2023-02-15
Payer: COMMERCIAL

## 2023-02-15 DIAGNOSIS — E10.65 TYPE 1 DIABETES MELLITUS WITH HYPERGLYCEMIA (H): Primary | ICD-10-CM

## 2023-02-15 NOTE — TELEPHONE ENCOUNTER
[facility] Call Center    Phone Message    May a detailed message be left on voicemail: yes     Reason for Call: Medication Refill Request    Has the patient contacted the pharmacy for the refill? Yes   Name of medication being requested: liraglutide (VICTOZA) 18 MG/3ML solution  Provider who prescribed the medication: Dr Bardlaes  Pharmacy: [facility]  Date medication is needed: asap         Action Taken: Other: endo    Travel Screening: Not Applicable

## 2023-02-16 NOTE — TELEPHONE ENCOUNTER
I called LM for the pt to c/b to discuss the below. PLEASE CALL THE BACK LINE IF THE PT RETURN THE CALL

## 2023-02-17 RX ORDER — LIRAGLUTIDE 6 MG/ML
1.2 INJECTION SUBCUTANEOUS DAILY
Qty: 18 ML | Refills: 0 | Status: SHIPPED | OUTPATIENT
Start: 2023-02-17 | End: 2023-07-24

## 2023-02-17 NOTE — TELEPHONE ENCOUNTER
I called the pt, she states she is taking novolog in the vial and victoza 1.2 mg. The pt states that she needs to sign a form for edmundo ( we do not have the form). I informed that I will discuss with the pharmacy liaison and next steps.

## 2023-02-17 NOTE — TELEPHONE ENCOUNTER
Patient called .  She is waiting for clinic to receive, by fax, forms that she needs to sign for her Katherineisk.  Please reach out to patient.  Can be reached [phone].

## 2023-02-17 NOTE — TELEPHONE ENCOUNTER
This was like her 3rd form, so she should have one.   One of the issues is one form had Novolog on it and another had Levemir- those were from the doctor.

## 2023-02-17 NOTE — TELEPHONE ENCOUNTER
Starting new pap form emailed to clinic for MD to sign, patient is also going into clinic to sign her portion of the forms

## 2023-02-17 NOTE — TELEPHONE ENCOUNTER
Please reach out to patient she is requesting to speak to Bobbi. Tried Backline, unable to reach anyone. Thank you.

## 2023-02-24 DIAGNOSIS — E10.65 TYPE 1 DIABETES MELLITUS WITH HYPERGLYCEMIA (H): Primary | ICD-10-CM

## 2023-02-24 RX ORDER — PROCHLORPERAZINE 25 MG/1
SUPPOSITORY RECTAL
Qty: 1 EACH | Refills: 2 | Status: SHIPPED | OUTPATIENT
Start: 2023-02-24 | End: 2024-03-12

## 2023-02-24 RX ORDER — PROCHLORPERAZINE 25 MG/1
SUPPOSITORY RECTAL
Qty: 3 EACH | Refills: 6 | Status: SHIPPED | OUTPATIENT
Start: 2023-02-24 | End: 2023-09-25

## 2023-03-02 ENCOUNTER — TELEPHONE (OUTPATIENT)
Dept: NEUROLOGY | Facility: CLINIC | Age: 73
End: 2023-03-02
Payer: COMMERCIAL

## 2023-03-02 NOTE — TELEPHONE ENCOUNTER
I did not see her for migraine headaches.  If she wants us to take over her migraine care she needs to make an appointment with [facility] and then we should be able to take over her Emgality treatment

## 2023-03-02 NOTE — TELEPHONE ENCOUNTER
February 9, 2023  Me     TM     4:08 PM  Note  Spoke with Paige and she informed me that the form is for Emgality. I do not see any prescription for Emgality. This was prescribed by a physician from [facility] which we do not have the records. She will try to get the records from [facility] and have the sent to us. Once received, I let her know I could check with Dr. Celis but as they did not discuss migraines, I am not sure if he will be able to prescribe the medication  I will hang on to the forms until I receive the records for review  Paige understood with no further questions at this time  Shae Cardona CMA on 2/9/2023 at 4:08 PM

## 2023-03-02 NOTE — TELEPHONE ENCOUNTER
Received records from [facility]- attached to this encounter and scanned into media. Patient received Botox in 2020 from [facility] and was switched to Emgality in 2021 with success. She saw Dr. Celis Feb 2023 for tremors but migraines were not brought up. Patient presented a [employer] form to receive her Emgality at a discounted rate. Are you willing to take over her Emgality rx even though it was not discussed at her visit?  Shae Cardona CMA on 3/2/2023 at 3:03 PM

## 2023-03-02 NOTE — TELEPHONE ENCOUNTER
February 7, 2023  [facility]     3:13 PM  Note  Patient was here to see Dr Celis 2/2/23 and left forms here for us to fill out for Alka. She mentioned needing the forms for her Emgality  Upon further review, I do not see an rx for Emgality and wondering if this is for her insulin  I have asked patient to call us back to clarify  Shae Cardona, CMA on 2/7/2023 at 3:13 PM

## 2023-03-03 ENCOUNTER — TELEPHONE (OUTPATIENT)
Dept: ENDOCRINOLOGY | Facility: CLINIC | Age: 73
End: 2023-03-03
Payer: COMMERCIAL

## 2023-03-03 NOTE — TELEPHONE ENCOUNTER
03/03 spoke with [facility] stated Levemir and needles were delivered on 03/02  [facility] is still processing no ETA yet

## 2023-03-03 NOTE — TELEPHONE ENCOUNTER
[facility] Call Center    Phone Message    May a detailed message be left on voicemail: yes     Reason for Call: Medication Question or concern regarding medication   Prescription Clarification  Name of Medication: liraglutide (VICTOZA) 18 MG/3ML solution     Prescribing Provider: Dr. Bardales      What on the order needs clarification? Pt requesting call back. Pt is needing to know if the above medication was delivered to the clinic yet

## 2023-03-07 NOTE — TELEPHONE ENCOUNTER
Spoke to pt and informed have not received victoza yet and as of 3/3 we have not received an ETA.   Pt will call [employer] to find out more information.

## 2023-03-09 ENCOUNTER — TRANSFERRED RECORDS (OUTPATIENT)
Dept: HEALTH INFORMATION MANAGEMENT | Facility: CLINIC | Age: 73
End: 2023-03-09

## 2023-03-20 ENCOUNTER — TRANSFERRED RECORDS (OUTPATIENT)
Dept: HEALTH INFORMATION MANAGEMENT | Facility: CLINIC | Age: 73
End: 2023-03-20

## 2023-03-20 NOTE — TELEPHONE ENCOUNTER
Patient called in - She still has not received her medicaiton.  She will again reach out to [facility] and will let us know what she finds out.

## 2023-03-21 ENCOUNTER — TELEPHONE (OUTPATIENT)
Dept: ENDOCRINOLOGY | Facility: CLINIC | Age: 73
End: 2023-03-21

## 2023-03-21 NOTE — TELEPHONE ENCOUNTER
Called and informed patient that the Levemir flexpen, Victoza & needles prescriptions are ready for pick at the [facility].

## 2023-03-22 ENCOUNTER — LAB (OUTPATIENT)
Dept: LAB | Facility: CLINIC | Age: 73
End: 2023-03-22
Payer: COMMERCIAL

## 2023-03-22 DIAGNOSIS — E23.6 EMPTY SELLA (H): ICD-10-CM

## 2023-03-22 DIAGNOSIS — E10.29 TYPE 1 DIABETES MELLITUS WITH MICROALBUMINURIA (H): ICD-10-CM

## 2023-03-22 DIAGNOSIS — R80.9 TYPE 1 DIABETES MELLITUS WITH MICROALBUMINURIA (H): ICD-10-CM

## 2023-03-22 LAB
ALBUMIN SERPL BCG-MCNC: 4 G/DL (ref 3.5–5.2)
CALCIUM SERPL-MCNC: 9.7 MG/DL (ref 8.8–10.2)
CORTIS SERPL-MCNC: 10 UG/DL
FASTING STATUS PATIENT QL REPORTED: NO
GLUCOSE SERPL-MCNC: 142 MG/DL (ref 70–99)
HBA1C MFR BLD: 6.7 % (ref 0–5.6)
PROLACTIN SERPL 3RD IS-MCNC: 21 NG/ML (ref 5–23)
T4 FREE SERPL-MCNC: 1.22 NG/DL (ref 0.9–1.7)
TSH SERPL DL<=0.005 MIU/L-ACNC: 3.12 UIU/ML (ref 0.3–4.2)

## 2023-03-22 PROCEDURE — 84439 ASSAY OF FREE THYROXINE: CPT

## 2023-03-22 PROCEDURE — 82310 ASSAY OF CALCIUM: CPT

## 2023-03-22 PROCEDURE — 82533 TOTAL CORTISOL: CPT

## 2023-03-22 PROCEDURE — 36415 COLL VENOUS BLD VENIPUNCTURE: CPT

## 2023-03-22 PROCEDURE — 84146 ASSAY OF PROLACTIN: CPT

## 2023-03-22 PROCEDURE — 84443 ASSAY THYROID STIM HORMONE: CPT

## 2023-03-22 PROCEDURE — 82040 ASSAY OF SERUM ALBUMIN: CPT

## 2023-03-22 PROCEDURE — 82947 ASSAY GLUCOSE BLOOD QUANT: CPT

## 2023-03-22 PROCEDURE — 82627 DEHYDROEPIANDROSTERONE: CPT

## 2023-03-22 PROCEDURE — 84305 ASSAY OF SOMATOMEDIN: CPT

## 2023-03-22 PROCEDURE — 83036 HEMOGLOBIN GLYCOSYLATED A1C: CPT

## 2023-03-23 ENCOUNTER — ANCILLARY PROCEDURE (OUTPATIENT)
Dept: NEUROLOGY | Facility: CLINIC | Age: 73
End: 2023-03-23
Attending: PSYCHIATRY & NEUROLOGY
Payer: COMMERCIAL

## 2023-03-23 DIAGNOSIS — R25.1 TREMOR: ICD-10-CM

## 2023-03-23 LAB — DHEA-S SERPL-MCNC: <15 UG/DL (ref 35–430)

## 2023-03-24 LAB — IGF-I BLD-MCNC: 62 NG/ML (ref 23–221)

## 2023-03-24 PROCEDURE — 95816 EEG AWAKE AND DROWSY: CPT | Performed by: PSYCHIATRY & NEUROLOGY

## 2023-03-24 NOTE — RESULT ENCOUNTER NOTE
Carmen Gibson,   EEG shows nonspecific slowing of the brain waves at times.  This is a nonspecific finding and does not require any treatment.  No seizure activity recorded.    Jared Celis MD

## 2023-03-26 ENCOUNTER — LAB REQUISITION (OUTPATIENT)
Dept: LAB | Facility: CLINIC | Age: 73
End: 2023-03-26

## 2023-03-26 DIAGNOSIS — R30.0 DYSURIA: ICD-10-CM

## 2023-03-26 PROCEDURE — 87088 URINE BACTERIA CULTURE: CPT | Performed by: PHYSICIAN ASSISTANT

## 2023-03-27 ENCOUNTER — OFFICE VISIT (OUTPATIENT)
Dept: NEUROLOGY | Facility: CLINIC | Age: 73
End: 2023-03-27
Payer: COMMERCIAL

## 2023-03-27 VITALS
HEIGHT: 61 IN | HEART RATE: 47 BPM | BODY MASS INDEX: 27.38 KG/M2 | SYSTOLIC BLOOD PRESSURE: 131 MMHG | DIASTOLIC BLOOD PRESSURE: 69 MMHG | WEIGHT: 145 LBS

## 2023-03-27 DIAGNOSIS — G89.29 CHRONIC INTRACTABLE HEADACHE, UNSPECIFIED HEADACHE TYPE: Primary | ICD-10-CM

## 2023-03-27 DIAGNOSIS — R51.9 CHRONIC INTRACTABLE HEADACHE, UNSPECIFIED HEADACHE TYPE: Primary | ICD-10-CM

## 2023-03-27 PROCEDURE — 99214 OFFICE O/P EST MOD 30 MIN: CPT

## 2023-03-27 RX ORDER — CIPROFLOXACIN 250 MG/1
TABLET, FILM COATED ORAL
COMMUNITY
Start: 2023-03-26 | End: 2023-03-31

## 2023-03-27 NOTE — PATIENT INSTRUCTIONS
Plan:  --- Continue Preventive therapy: Emgality injections  --- Continue Abortive therapy:  Tylenol as needed  --- Try Nonpharmacological interventions like heat or cold, massage, or rest  --- Practice good headache hygiene: Avoid known triggers, get adequate sleep, manage stress levels, stay hydrated and eat nutritious meals  --- Plan on follow up in the Neurology Clinic on 08/03/23 with Dr. Celis. Arrival time 1:00 pm.  --- Please feel free to reach out if you have any further questions or concerns.  --- Seek immediate medical attention if an emergency arises or if your health becomes progressively worse.     It was a pleasure meeting you today!

## 2023-03-27 NOTE — LETTER
3/27/2023         RE: Paige Mari  [address]  [place] [address]        Dear Colleague,    Thank you for referring your patient, Paige Mari, to the [facility] NEUROLOGY [facility]INIC [place]. Please see a copy of my visit note below.      __________________________________  ESTABLISHED PATIENT NEUROLOGY NOTE    DATE OF VISIT: 3/27/2023  MRN: [id number]  PATIENT NAME: Paige Mari  YOB: 1950    Chief Complaint   Patient presents with     headaches     Patient reports Migraines everyday.     SUBJECTIVE:                                                      HISTORY OF PRESENT ILLNESS:  Paige is here for follow up regarding migraines    Paige Mari is a 72-year-old female with a history of migraines, diabetes, hypertension, hyperlipidemia, arthritis and diabetic neuropathy.  Per chart review, she has been seen in our clinic for tremors in the past by Dr. Celis.  She was followed by the [facility] for her migraines and diabetic polyneuropathy.  She has had Botox in the past and was switched to Emgality which was deemed successful.  She had 1 or 2 breakthrough migraines each month while on Emgality per chart review.     03/27/23  Paige arrives to the clinic to establish care for migraines.  She states that she was seen in the [facility] clinic previously and her provider moved to [place].  She describes her migraines as pain that starts at the base of her skull and spreads throughout her whole head, worsening behind her eyes.  The pain is a 8 out of 10 nagging, throbbing constant pain.  Associated symptoms include photophobia and nausea.  She denies phonophobia and vomiting.  She denies an aura prior to her migraine.  Migraines typically last the full day.  She uses Tylenol as an abortive therapy.  She states she takes Tylenol daily for neuropathy and joint pain as well.  She feels her migraines are well controlled on Emgality.  She has 0 to 1 migraine per  month while on Emgality.  Denies visual changes, speech changes or neck rigidity.  Tried Botox in the past.    Paige also has complaints of tension in her shoulders and neck.  She uses massage, heating/cold packs to help alleviate the pain/tension.  We discussed physical therapy and she was not interested at this time.    Last week she broke 3 toes on her left foot.  She will be in a boot for 3 months.  With diabetic neuropathy she did not feel the brake or its associated pain.     Current Medications:   acetaminophen (TYLENOL) 500 MG tablet, Take 1,000 mg by mouth 2 times daily   albuterol (PROAIR HFA/PROVENTIL HFA/VENTOLIN HFA) 108 (90 Base) MCG/ACT inhaler, Inhale 1-2 puffs into the lungs every 6 hours  amoxicillin (AMOXIL) 500 MG tablet, Take 2,000 mg by mouth as needed Before dental appointments  ARIPiprazole (ABILIFY) 2 MG tablet, Take 2 mg by mouth daily   cetirizine (ZYRTEC) 10 MG tablet, Take 10 mg by mouth daily   cholecalciferol (VITAMIN D3) 25 mcg (1000 units) capsule, Take 1,000 Units by mouth daily   ciprofloxacin (CIPRO) 250 MG tablet, 1 tablet Orally every 12 hrs for 5 days  cloNIDine (CATAPRES-TTS) 0.3 mg/24 hr, Place 1 patch onto the skin once a week   Continuous Blood Gluc  (DEXCOM G6 ) TORRIE, Use to read blood sugars as per 's instructions.  Continuous Blood Gluc Sensor (DEXCOM G6 SENSOR) MISC, Change every 10 days.  Continuous Blood Gluc Transmit (DEXCOM G6 TRANSMITTER) MISC, Change every 3 months.  cyanocobalamin (VITAMIN B-12) 1000 MCG tablet, Take 1,000 mcg by mouth daily  diclofenac sodium (VOLTAREN) 1 % Gel, [DICLOFENAC SODIUM (VOLTAREN) 1 % GEL] Apply 1 g topically daily as needed.  estradiol (ESTRACE) 1 MG tablet, Take 1 mg by mouth daily   fluticasone (FLONASE) 50 MCG/ACT nasal spray, Spray 1 spray into both nostrils daily  Glucagon, rDNA, (GLUCAGON EMERGENCY) 1 MG KIT, 1 mg IM one time, PRN severe hypoglycemia causing altered consciousness affecting ability to  "take food by mouth  glucose (BD GLUCOSE) 5 g chewable tablet, Take 2 tablets (10 g) by mouth daily as needed (hypoglycemia)  insulin aspart (NOVOLOG VIAL) 100 UNITS/ML vial, 9 units with meals plus correction scale with sensitivity of 40 mg/dL starting at 140 mg/dL  liraglutide (VICTOZA) 18 MG/3ML solution, Inject 1.2 mg Subcutaneous daily for 90 days  magnesium oxide (MAG-OX) 400 MG tablet, Take 400 mg by mouth daily   medical cannabis (Patient's own supply), 1 Dose by Other route See Admin Instructions [employer]anger[employer] Vape- 1-4 puffs q6h PRN  metFORMIN (GLUCOPHAGE XR) 500 MG 24 hr tablet, TAKE 2 TABS (1000MG) BY MOUTH TWICE DAILY WITH MEALS  metoprolol succinate ER (TOPROL-XL) 100 MG 24 hr tablet, Take 200 mg by mouth 2 times daily  multivitamin w/minerals (THERA-VIT-M) tablet, Take 1 tablet by mouth daily  NOVOFINE 32 32 gauge x 1/4\" Ndle, 1 each by Other route 6 times daily   olmesartan (BENICAR) 40 MG tablet, [OLMESARTAN (BENICAR) 40 MG TABLET] Take 40 mg by mouth daily.  omeprazole (PRILOSEC) 20 MG capsule, [OMEPRAZOLE (PRILOSEC) 20 MG CAPSULE] Take 1 capsule by mouth daily before breakfast.  pregabalin (LYRICA) 100 MG capsule, Take 1 capsule (100 mg) by mouth 3 times daily  rosuvastatin (CRESTOR) 5 MG tablet, TAKE 1 TABLET BY MOUTH EVERY DAY  valACYclovir (VALTREX) 500 MG tablet, Take 500 mg by mouth daily As needed    No current facility-administered medications on file prior to visit.    Past Medical History:   Patient  has a past medical history of Small bowel obstruction (H) (9/18/2021) and Status post total knee replacement (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  Surgical History:  She  has a past surgical history that includes TOTAL ABDOM HYSTERECTOMY; REMOVAL OF OVARY(S); appendectomy; Bowel Resection (12 years ago); Dilation and curettage; tonsillectomy & adenoidectomy; other surgical history (2005); Arthroscopy Knee With Meniscectomy (3/10/14); TOTAL KNEE ARTHROPLASTY (Right, 11/24/2014); TOTAL ABDOM HYSTERECTOMY; REMOVAL " "OF OVARY(S); joint replacement; Pr Arthrodesis Ant Interbody Min Discectomy,Lumbar (N/A, 1/30/2015); Oophorectomy; Hysterectomy; Colon surgery (2004); Blepharoplasty (Bilateral, 8/17/2015); back surgery; Toe Surgery; PICC/Midline Placement (9/19/2021); PICC/Midline Placement (1/22/2022); and IR Lumbar Puncture (1/22/2022).  Family and Social History:  Reviewed, and she  reports that she has been smoking cigarettes. She has never used smokeless tobacco. She reports that she does not drink alcohol and does not use drugs.  Reviewed, and family history is not on file.    RECENT DIAGNOSTIC STUDIES:     Imaging:   EXAM: CT HEAD WITHOUT CONTRAST  DATE/TIME: 09/23/2022, 1:53 PM  IMPRESSION:  1.  No CT evidence for acute intracranial process.  2.  Brain atrophy and presumed chronic microvascular ischemic changes as above.  3.  Stable appearance from previous head CT 01/20/2022.    EXAM: MR BRAIN W/O CONTRAST  DATE/TIME: 1/20/2022 3:14 PM                                                     IMPRESSION:  1. No acute intracranial abnormality.  2. Mild generalized volume loss and chronic microvascular ischemic change.    EEG 03/23/23  Impression: This is an abnormal EEG due to paroxysmal slowing of the background and theta range that suggests nonspecific generalized cerebral dysfunction.  Such slowing can be seen due to metabolic dysfunction or medication effect.  Clinical correlation is recommended.  No sharp activity was seen to suggest seizures.     Classification: Dysrhythmia grade II generalized     REVIEW OF SYSTEMS:                                                      10-point review of systems is negative except as mentioned above in HPI.    EXAM:                                                      Physical Exam:   Vitals: /69   Pulse (!) 47   Ht 1.549 m (5' 1\")   Wt 65.8 kg (145 lb)   BMI 27.40 kg/m    BMI= Body mass index is 27.4 kg/m .  GENERAL: NAD.  HEENT: NC/AT.  PULM: Non-labored breathing. "   Neurologic:  MENTAL STATUS: Alert, attentive. Speech is fluent. Normal comprehension. Normal concentration. Adequate fund of knowledge.   CRANIAL NERVES: Visual fields intact to confrontation. Pupils equally, round and reactive to light. Facial sensation and movement normal. EOM full. Hearing intact to conversation. Trapezius strength intact. Palate moves symmetrically. Tongue midline.  MOTOR: 5/5 in proximal and distal muscle groups of upper and lower extremities. Tone and bulk normal.    SENSATION: Normal light touch throughout.   COORDINATION: Normal finger nose finger. Finger tapping normal. Knee heel shin normal.  STATION AND GAIT: Romberg Negative. Good postural reflexes. Casual gait right side dominant to accommodate left boot.  right hand-dominant.      ASSESSMENT and PLAN:                                                      Assessment:    ICD-10-CM    1. Chronic intractable headache, unspecified headache type  R51.9 galcanezumab-gnlm (EMGALITY) 120 MG/ML injection    G89.29         Paige Mari is a 72-year-old female with a history of migraines, diabetes, hypertension, hyperlipidemia, arthritis and diabetic neuropathy.  Per chart review,  she was followed by the [facility] for her migraines and diabetic polyneuropathy.  She was recently seen in our clinic by Dr. Celis for tremor on 2/2/2023.  Paige presents to the clinic today to establish care for migraines.  She reports great control with Emgality.  She took her last injection a week ago and is here for refills.  She takes Tylenol for the occasional breakthrough migraine.  We discussed interventions outlined below and will plan to see the patient back in August with Dr Celis.  Paige understands and agrees with this plan.     Plan:  --- Continue Preventive therapy: Emgality injections  --- Continue Abortive therapy:  Tylenol as needed  --- Try Nonpharmacological interventions like heat or cold, massage, or rest  --- Practice good headache  hygiene: Avoid known triggers, get adequate sleep, manage stress levels, stay hydrated and eat nutritious meals  --- Plan on follow up in the Neurology Clinic on 08/03/23 with Dr. eClis. Arrival time 1:00 pm.  --- Please feel free to reach out if you have any further questions or concerns.  --- Seek immediate medical attention if an emergency arises or if your health becomes progressively worse.     It was a pleasure meeting you today!     Total Time: Total time spent for face to face visit, reviewing labs/imaging studies, counseling and coordination of care was: 30 Minutes spent on the date of the encounter doing chart review, review of outside records, review of test results, patient visit and documentation       This note was dictated using voice recognition software.  Any grammatical or context distortions are unintentional and inherent to the software.    Cindy Cook, PABLO, APRN, CNP  [facility]         Again, thank you for allowing me to participate in the care of your patient.        Sincerely,        JOLYNN Naidu CNP

## 2023-03-27 NOTE — NURSING NOTE
Chief Complaint   Patient presents with     headaches     Patient reports Migraines everyday.     Janett Phillips on 3/27/2023 at 2:49 PM

## 2023-03-27 NOTE — PROGRESS NOTES
__________________________________  ESTABLISHED PATIENT NEUROLOGY NOTE    DATE OF VISIT: 3/27/2023  MRN: [id number]  PATIENT NAME: Paige Mari  YOB: 1950    Chief Complaint   Patient presents with     headaches     Patient reports Migraines everyday.     SUBJECTIVE:                                                      HISTORY OF PRESENT ILLNESS:  Paige is here for follow up regarding migraines    Paige Mari is a 72-year-old female with a history of migraines, diabetes, hypertension, hyperlipidemia, arthritis and diabetic neuropathy.  Per chart review, she has been seen in our clinic for tremors in the past by Dr. Celis.  She was followed by the [facility] for her migraines and diabetic polyneuropathy.  She has had Botox in the past and was switched to Emgality which was deemed successful.  She had 1 or 2 breakthrough migraines each month while on Emgality per chart review.     03/27/23  Paige arrives to the clinic to establish care for migraines.  She states that she was seen in the [facility] clinic previously and her provider moved to [place].  She describes her migraines as pain that starts at the base of her skull and spreads throughout her whole head, worsening behind her eyes.  The pain is a 8 out of 10 nagging, throbbing constant pain.  Associated symptoms include photophobia and nausea.  She denies phonophobia and vomiting.  She denies an aura prior to her migraine.  Migraines typically last the full day.  She uses Tylenol as an abortive therapy.  She states she takes Tylenol daily for neuropathy and joint pain as well.  She feels her migraines are well controlled on Emgality.  She has 0 to 1 migraine per month while on Emgality.  Denies visual changes, speech changes or neck rigidity.  Tried Botox in the past.    Paige also has complaints of tension in her shoulders and neck.  She uses massage, heating/cold packs to help alleviate the pain/tension.  We discussed physical therapy  and she was not interested at this time.    Last week she broke 3 toes on her left foot.  She will be in a boot for 3 months.  With diabetic neuropathy she did not feel the brake or its associated pain.     Current Medications:   acetaminophen (TYLENOL) 500 MG tablet, Take 1,000 mg by mouth 2 times daily   albuterol (PROAIR HFA/PROVENTIL HFA/VENTOLIN HFA) 108 (90 Base) MCG/ACT inhaler, Inhale 1-2 puffs into the lungs every 6 hours  amoxicillin (AMOXIL) 500 MG tablet, Take 2,000 mg by mouth as needed Before dental appointments  ARIPiprazole (ABILIFY) 2 MG tablet, Take 2 mg by mouth daily   cetirizine (ZYRTEC) 10 MG tablet, Take 10 mg by mouth daily   cholecalciferol (VITAMIN D3) 25 mcg (1000 units) capsule, Take 1,000 Units by mouth daily   ciprofloxacin (CIPRO) 250 MG tablet, 1 tablet Orally every 12 hrs for 5 days  cloNIDine (CATAPRES-TTS) 0.3 mg/24 hr, Place 1 patch onto the skin once a week   Continuous Blood Gluc  (DEXCOM G6 ) TORRIE, Use to read blood sugars as per 's instructions.  Continuous Blood Gluc Sensor (DEXCOM G6 SENSOR) MISC, Change every 10 days.  Continuous Blood Gluc Transmit (DEXCOM G6 TRANSMITTER) MISC, Change every 3 months.  cyanocobalamin (VITAMIN B-12) 1000 MCG tablet, Take 1,000 mcg by mouth daily  diclofenac sodium (VOLTAREN) 1 % Gel, [DICLOFENAC SODIUM (VOLTAREN) 1 % GEL] Apply 1 g topically daily as needed.  estradiol (ESTRACE) 1 MG tablet, Take 1 mg by mouth daily   fluticasone (FLONASE) 50 MCG/ACT nasal spray, Spray 1 spray into both nostrils daily  Glucagon, rDNA, (GLUCAGON EMERGENCY) 1 MG KIT, 1 mg IM one time, PRN severe hypoglycemia causing altered consciousness affecting ability to take food by mouth  glucose (BD GLUCOSE) 5 g chewable tablet, Take 2 tablets (10 g) by mouth daily as needed (hypoglycemia)  insulin aspart (NOVOLOG VIAL) 100 UNITS/ML vial, 9 units with meals plus correction scale with sensitivity of 40 mg/dL starting at 140 mg/dL  liraglutide  "(VICTOZA) 18 MG/3ML solution, Inject 1.2 mg Subcutaneous daily for 90 days  magnesium oxide (MAG-OX) 400 MG tablet, Take 400 mg by mouth daily   medical cannabis (Patient's own supply), 1 Dose by Other route See Admin Instructions [employer] Tangerine Vape- 1-4 puffs q6h PRN  metFORMIN (GLUCOPHAGE XR) 500 MG 24 hr tablet, TAKE 2 TABS (1000MG) BY MOUTH TWICE DAILY WITH MEALS  metoprolol succinate ER (TOPROL-XL) 100 MG 24 hr tablet, Take 200 mg by mouth 2 times daily  multivitamin w/minerals (THERA-VIT-M) tablet, Take 1 tablet by mouth daily  NOVOFINE 32 32 gauge x 1/4\" Ndle, 1 each by Other route 6 times daily   olmesartan (BENICAR) 40 MG tablet, [OLMESARTAN (BENICAR) 40 MG TABLET] Take 40 mg by mouth daily.  omeprazole (PRILOSEC) 20 MG capsule, [OMEPRAZOLE (PRILOSEC) 20 MG CAPSULE] Take 1 capsule by mouth daily before breakfast.  pregabalin (LYRICA) 100 MG capsule, Take 1 capsule (100 mg) by mouth 3 times daily  rosuvastatin (CRESTOR) 5 MG tablet, TAKE 1 TABLET BY MOUTH EVERY DAY  valACYclovir (VALTREX) 500 MG tablet, Take 500 mg by mouth daily As needed    No current facility-administered medications on file prior to visit.    Past Medical History:   Patient  has a past medical history of Small bowel obstruction (H) (9/18/2021) and Status post total knee replacement (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  Surgical History:  She  has a past surgical history that includes TOTAL ABDOM HYSTERECTOMY; REMOVAL OF OVARY(S); appendectomy; Bowel Resection (12 years ago); Dilation and curettage; tonsillectomy & adenoidectomy; other surgical history (2005); Arthroscopy Knee With Meniscectomy (3/10/14); TOTAL KNEE ARTHROPLASTY (Right, 11/24/2014); TOTAL ABDOM HYSTERECTOMY; REMOVAL OF OVARY(S); joint replacement; Pr Arthrodesis Ant Interbody Min Discectomy,Lumbar (N/A, 1/30/2015); Oophorectomy; Hysterectomy; Colon surgery (2004); Blepharoplasty (Bilateral, 8/17/2015); back surgery; Toe Surgery; PICC/Midline Placement (9/19/2021); PICC/Midline Placement " "(1/22/2022); and IR Lumbar Puncture (1/22/2022).  Family and Social History:  Reviewed, and she  reports that she has been smoking cigarettes. She has never used smokeless tobacco. She reports that she does not drink alcohol and does not use drugs.  Reviewed, and family history is not on file.    RECENT DIAGNOSTIC STUDIES:     Imaging:   EXAM: CT HEAD WITHOUT CONTRAST  DATE/TIME: 09/23/2022, 1:53 PM  IMPRESSION:  1.  No CT evidence for acute intracranial process.  2.  Brain atrophy and presumed chronic microvascular ischemic changes as above.  3.  Stable appearance from previous head CT 01/20/2022.    EXAM: MR BRAIN W/O CONTRAST  DATE/TIME: 1/20/2022 3:14 PM                                                     IMPRESSION:  1. No acute intracranial abnormality.  2. Mild generalized volume loss and chronic microvascular ischemic change.    EEG 03/23/23  Impression: This is an abnormal EEG due to paroxysmal slowing of the background and theta range that suggests nonspecific generalized cerebral dysfunction.  Such slowing can be seen due to metabolic dysfunction or medication effect.  Clinical correlation is recommended.  No sharp activity was seen to suggest seizures.     Classification: Dysrhythmia grade II generalized     REVIEW OF SYSTEMS:                                                      10-point review of systems is negative except as mentioned above in HPI.    EXAM:                                                      Physical Exam:   Vitals: /69   Pulse (!) 47   Ht 1.549 m (5' 1\")   Wt 65.8 kg (145 lb)   BMI 27.40 kg/m    BMI= Body mass index is 27.4 kg/m .  GENERAL: NAD.  HEENT: NC/AT.  PULM: Non-labored breathing.   Neurologic:  MENTAL STATUS: Alert, attentive. Speech is fluent. Normal comprehension. Normal concentration. Adequate fund of knowledge.   CRANIAL NERVES: Visual fields intact to confrontation. Pupils equally, round and reactive to light. Facial sensation and movement normal. EOM full. " Hearing intact to conversation. Trapezius strength intact. Palate moves symmetrically. Tongue midline.  MOTOR: 5/5 in proximal and distal muscle groups of upper and lower extremities. Tone and bulk normal.    SENSATION: Normal light touch throughout.   COORDINATION: Normal finger nose finger. Finger tapping normal. Knee heel shin normal.  STATION AND GAIT: Romberg Negative. Good postural reflexes. Casual gait right side dominant to accommodate left boot.  right hand-dominant.      ASSESSMENT and PLAN:                                                      Assessment:    ICD-10-CM    1. Chronic intractable headache, unspecified headache type  R51.9 galcanezumab-gnlm (EMGALITY) 120 MG/ML injection    G89.29         Paige Mari is a 72-year-old female with a history of migraines, diabetes, hypertension, hyperlipidemia, arthritis and diabetic neuropathy.  Per chart review,  she was followed by the [facility] clinic for her migraines and diabetic polyneuropathy.  She was recently seen in our clinic by Dr. Celis for tremor on 2/2/2023.  Paige presents to the clinic today to establish care for migraines.  She reports great control with Emgality.  She took her last injection a week ago and is here for refills.  She takes Tylenol for the occasional breakthrough migraine.  We discussed interventions outlined below and will plan to see the patient back in August with Dr Celis.  Paige understands and agrees with this plan.     Plan:  --- Continue Preventive therapy: Emgality injections  --- Continue Abortive therapy:  Tylenol as needed  --- Try Nonpharmacological interventions like heat or cold, massage, or rest  --- Practice good headache hygiene: Avoid known triggers, get adequate sleep, manage stress levels, stay hydrated and eat nutritious meals  --- Plan on follow up in the Neurology Clinic on 08/03/23 with Dr. Celis. Arrival time 1:00 pm.  --- Please feel free to reach out if you have any further questions or concerns.  ---  Seek immediate medical attention if an emergency arises or if your health becomes progressively worse.     It was a pleasure meeting you today!     Total Time: Total time spent for face to face visit, reviewing labs/imaging studies, counseling and coordination of care was: 30 Minutes spent on the date of the encounter doing chart review, review of outside records, review of test results, patient visit and documentation       This note was dictated using voice recognition software.  Any grammatical or context distortions are unintentional and inherent to the software.    Cindy Cook, DNP, APRN, CNP  [facility] Neurology [facility]

## 2023-03-29 LAB
BACTERIA UR CULT: ABNORMAL
BACTERIA UR CULT: ABNORMAL

## 2023-04-18 ENCOUNTER — OFFICE VISIT (OUTPATIENT)
Dept: ENDOCRINOLOGY | Facility: CLINIC | Age: 73
End: 2023-04-18
Payer: COMMERCIAL

## 2023-04-18 VITALS
DIASTOLIC BLOOD PRESSURE: 80 MMHG | BODY MASS INDEX: 27.78 KG/M2 | SYSTOLIC BLOOD PRESSURE: 126 MMHG | HEART RATE: 64 BPM | WEIGHT: 147 LBS

## 2023-04-18 DIAGNOSIS — K76.0 HEPATIC STEATOSIS: ICD-10-CM

## 2023-04-18 DIAGNOSIS — I10 HYPERTENSION, UNSPECIFIED TYPE: ICD-10-CM

## 2023-04-18 DIAGNOSIS — E10.42 TYPE 1 DIABETES MELLITUS WITH DIABETIC POLYNEUROPATHY (H): ICD-10-CM

## 2023-04-18 DIAGNOSIS — Z46.81 INSULIN PUMP FITTING OR ADJUSTMENT: ICD-10-CM

## 2023-04-18 DIAGNOSIS — E83.52 HYPERCALCEMIA: ICD-10-CM

## 2023-04-18 DIAGNOSIS — R80.9 TYPE 1 DIABETES MELLITUS WITH MICROALBUMINURIA (H): ICD-10-CM

## 2023-04-18 DIAGNOSIS — E88.819 INSULIN RESISTANCE: ICD-10-CM

## 2023-04-18 DIAGNOSIS — E78.2 MIXED HYPERLIPIDEMIA: ICD-10-CM

## 2023-04-18 DIAGNOSIS — E23.6 EMPTY SELLA (H): ICD-10-CM

## 2023-04-18 DIAGNOSIS — R94.6 ABNORMAL FINDING ON THYROID FUNCTION TEST: ICD-10-CM

## 2023-04-18 DIAGNOSIS — I65.23 CAROTID ATHEROSCLEROSIS, BILATERAL: ICD-10-CM

## 2023-04-18 DIAGNOSIS — Z96.41 INSULIN PUMP STATUS: ICD-10-CM

## 2023-04-18 DIAGNOSIS — E66.3 OVERWEIGHT (BMI 25.0-29.9): ICD-10-CM

## 2023-04-18 DIAGNOSIS — E10.29 TYPE 1 DIABETES MELLITUS WITH MICROALBUMINURIA (H): ICD-10-CM

## 2023-04-18 DIAGNOSIS — Z79.4 LONG TERM (CURRENT) USE OF INSULIN (H): ICD-10-CM

## 2023-04-18 PROCEDURE — 99215 OFFICE O/P EST HI 40 MIN: CPT | Mod: 25 | Performed by: INTERNAL MEDICINE

## 2023-04-18 PROCEDURE — 95251 CONT GLUC MNTR ANALYSIS I&R: CPT | Performed by: INTERNAL MEDICINE

## 2023-04-18 NOTE — LETTER
4/18/2023         RE: Paige Mari  [address]  [place] [address]        Dear Colleague,    Thank you for referring your patient, Paige Mari, to the [facility]. Please see a copy of my visit note below.    Subjective:    Established patient    Paige Mari is a 72 year old female who presents for DM.      Current DM therapy:  -Metformin 1000 mg BID   -Victoza 1.2 mg daily   -Omnipod DASH started 5/12/2022 with NovoLog    Objective:    Pleasant and conversational.  BMI 27.8 kg/m2, blood pressure 126/80    12/2022: BMI 28.3 kg/m2, /76. DM foot exam today: no ulcerations, reduced DP pulses bilaterally.     12/2021: DM foot exam performed and she has absent sensation to monofilament testing bilaterally. Reduced pedal pulses. No ulcers/skinbreakdown.    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 (HARRIETT)  -12/7/2021: C-peptide 0.7 ng/mL with concurrent venous glucose 176 mg/dL, CEFERINO Ab >250 (ULN 5.0 IU/mL), insulin Ab 0.6 (ULN 0.4 U/mL), IA-2 Ab 44.1 (ULN 7.4 U/mL), Zn T8 Ab 64.3 (ref range 0.0 - 15.0 U/mL)  -CT A/P 9/2021: Mild diffuse pancreatic atrophy  -12/7/2021: fructosamine 365 umol/L (corresponds to HbA1c ~9.0%), HbA1c 9.3%   -1/2022: HbA1c 8.6%, CBC normal   -3/2022: HbA1c 7.3%  -5/2022: HbA1c 7.7%, fructosamine 317 umol/L with normal albumin   -8/2022: HbA1c 6.4%  -11/2022: HbA1c 6.4%, GMI 6.4%  -3/2023: HbA1c 6.7%  -She has glucagon at home   # No prior DM retinopathy, DM eye exam 3/2022  -She has an upcoming DM eye exam   # Hypertension, on clonidine, metoprolol, olmesartan, microalbuminuria     -9/2022: GFR 70  -12/2022: urine microalbumin 52 mg/g Cr  -3/2022: paired aldosterone 6.4 ng/dL and PRA 0.1 ng/mL/hr; I did order a repeat PRA/aldosterone   # Painful peripheral neuropathy, no prior DM foot ulcerations, on neuropathic pharmacologic therapy  # No prior ASCVD event, mild carotid atherosclerosis on US 2015, on ASA  #  Mixed hyperlipidemia, on rosuvastatin 5 mg daily   -9/2022: , HDL 60, LDL 39,   -She has a prior statin intolerance (myalgias)   # Hepatic steatosis     We reviewed her CGM (Dexcom G6) in detail. See Jayda's note for details for her CGM data and insulin pump settings.  GMI 6.6%, 74% time in range, 20% high, 1% very high, 4% low, less than 1% very low, 100% days with CGM usage.  Overall she uses 19.3 units of insulin per day on average with 16 units being basal.    Because of occasional mild overnight hypoglycemia we reduced her basal rate by about 10% between 3 and 9 AM.  We also liberalized her insulin to carbohydrate from 1:15 to 1:18 given occasional mild postprandial hypoglycemia.    She has insulin detemir at home and in case of pump malfunction will take 7 units BID.     She met with Ana Sanchez (CDE) 9/2022.  Per Ana's note from 12/6/2022 the plan was to upgrade to Omnipod 5 when she is running low on pods.  She has about 6 weeks of her current pods remaining and I let Ana know that we can get her started soon on the OmniPod 5.    Because of her hypoglycemia we will have her follow-up with one of my colleagues in a few months with labs ordered.  I will see her back in about 6-9 months.     # Thyroid function     In the past has been on both thyroid hormone and methimazole at various points in time. She has not taken a medication for her thyroid in years.      12/2021: TSH, T4, T3 all normal, TSI undetectable, TRAb undetectable, TPO Ab significantly elevated     4/2023: TSH and free T4 normal      Should have her TSH checked yearly, or at any time if symptoms of thyrotoxicosis or hypothyroidism.      # Bone and mineral metabolism    Many normal calcium values over the years, with 2 high serum calcium values (both remote). She had a remote mildly elevated PTH drawn shortly after a mildly elevated calcium value (10.6 with ULN 10.5 mg/dL), but on the day that PTH was drawn serum calcium was  normal and serum phosphorous was mildly elevated. Serum calcium value of 12.7 mg/dL when she had an JACKSON in 2014.      Since 12/7/2021: albumin corrected serum calcium peak 10.6 mg/dL (ULN 10.5) and otherwise normal. Serum phosphorous and alkaine phosphatase have been normal. 11/2022: 25-OH vitamin D 52 (ref range 20 - 75 mcg/L), 12/2021: UPEP and SPEP negative      Current therapy: vitamin D 1000 international unit(s) daily and MVI with minerals      DEXA 11/14/2022:  -BMD at the spine, distal 1/3 radius, and both hips is normal     No prior low impact fractures.      3/2022: PTH 25 pg/mL, Cr, calcium, albumin, phosphorous all normal     3/2023: serum calcium normal, albumin normal      Will trend calcium over time.    # Partially empty sella    I reviewed her CT head 9/2022 and agree with the radiology read of partially empty sella. No history of pituitary pathology.    3/22/2023: afternoon serum cortisol 10.0 mcg/dL, DHEA-S undetectable, TSH and free T4 normal, prolactin normal, IGF-1 normal      40 minutes spent on the date of the encounter doing chart review, history and exam, documentation and further activities as noted above.  This did not include time spent on CGM review          Again, thank you for allowing me to participate in the care of your patient.        Sincerely,        Maynor Bardales MD

## 2023-04-18 NOTE — PROGRESS NOTES
Subjective:    Established patient    Paige Mari is a 72 year old female who presents for DM.      Current DM therapy:  -Metformin 1000 mg BID   -Victoza 1.2 mg daily   -Omnipod DASH started 5/12/2022 with NovoLog    Objective:    Pleasant and conversational.  BMI 27.8 kg/m2, blood pressure 126/80    12/2022: BMI 28.3 kg/m2, /76. DM foot exam today: no ulcerations, reduced DP pulses bilaterally.     12/2021: DM foot exam performed and she has absent sensation to monofilament testing bilaterally. Reduced pedal pulses. No ulcers/skinbreakdown.    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 (HARRIETT)  -12/7/2021: C-peptide 0.7 ng/mL with concurrent venous glucose 176 mg/dL, CEFERINO Ab >250 (ULN 5.0 IU/mL), insulin Ab 0.6 (ULN 0.4 U/mL), IA-2 Ab 44.1 (ULN 7.4 U/mL), Zn T8 Ab 64.3 (ref range 0.0 - 15.0 U/mL)  -CT A/P 9/2021: Mild diffuse pancreatic atrophy  -12/7/2021: fructosamine 365 umol/L (corresponds to HbA1c ~9.0%), HbA1c 9.3%   -1/2022: HbA1c 8.6%, CBC normal   -3/2022: HbA1c 7.3%  -5/2022: HbA1c 7.7%, fructosamine 317 umol/L with normal albumin   -8/2022: HbA1c 6.4%  -11/2022: HbA1c 6.4%, GMI 6.4%  -3/2023: HbA1c 6.7%  -She has glucagon at home   # No prior DM retinopathy, DM eye exam 3/2022  -She has an upcoming DM eye exam   # Hypertension, on clonidine, metoprolol, olmesartan, microalbuminuria     -9/2022: GFR 70  -12/2022: urine microalbumin 52 mg/g Cr  -3/2022: paired aldosterone 6.4 ng/dL and PRA 0.1 ng/mL/hr; I did order a repeat PRA/aldosterone   # Painful peripheral neuropathy, no prior DM foot ulcerations, on neuropathic pharmacologic therapy  # No prior ASCVD event, mild carotid atherosclerosis on US 2015, on ASA  # Mixed hyperlipidemia, on rosuvastatin 5 mg daily   -9/2022: , HDL 60, LDL 39,   -She has a prior statin intolerance (myalgias)   # Hepatic steatosis     We reviewed her CGM (Dexcom G6) in detail. See Jayda's note for details for her CGM data and insulin pump  settings.  GMI 6.6%, 74% time in range, 20% high, 1% very high, 4% low, less than 1% very low, 100% days with CGM usage.  Overall she uses 19.3 units of insulin per day on average with 16 units being basal.    Because of occasional mild overnight hypoglycemia we reduced her basal rate by about 10% between 3 and 9 AM.  We also liberalized her insulin to carbohydrate from 1:15 to 1:18 given occasional mild postprandial hypoglycemia.    She has insulin detemir at home and in case of pump malfunction will take 7 units BID.     She met with Ana Sanchez (MARU) 9/2022.  Per Ana's note from 12/6/2022 the plan was to upgrade to Omnipod 5 when she is running low on pods.  She has about 6 weeks of her current pods remaining and I let Ana know that we can get her started soon on the OmniPod 5.    Because of her hypoglycemia we will have her follow-up with one of my colleagues in a few months with labs ordered.  I will see her back in about 6-9 months.     # Thyroid function     In the past has been on both thyroid hormone and methimazole at various points in time. She has not taken a medication for her thyroid in years.      12/2021: TSH, T4, T3 all normal, TSI undetectable, TRAb undetectable, TPO Ab significantly elevated     4/2023: TSH and free T4 normal      Should have her TSH checked yearly, or at any time if symptoms of thyrotoxicosis or hypothyroidism.      # Bone and mineral metabolism    Many normal calcium values over the years, with 2 high serum calcium values (both remote). She had a remote mildly elevated PTH drawn shortly after a mildly elevated calcium value (10.6 with ULN 10.5 mg/dL), but on the day that PTH was drawn serum calcium was normal and serum phosphorous was mildly elevated. Serum calcium value of 12.7 mg/dL when she had an JACKSON in 2014.      Since 12/7/2021: albumin corrected serum calcium peak 10.6 mg/dL (ULN 10.5) and otherwise normal. Serum phosphorous and alkaine phosphatase have been  normal. 11/2022: 25-OH vitamin D 52 (ref range 20 - 75 mcg/L), 12/2021: UPEP and SPEP negative      Current therapy: vitamin D 1000 international unit(s) daily and MVI with minerals      DEXA 11/14/2022:  -BMD at the spine, distal 1/3 radius, and both hips is normal     No prior low impact fractures.      3/2022: PTH 25 pg/mL, Cr, calcium, albumin, phosphorous all normal     3/2023: serum calcium normal, albumin normal      Will trend calcium over time.    # Partially empty sella    I reviewed her CT head 9/2022 and agree with the radiology read of partially empty sella. No history of pituitary pathology.    3/22/2023: afternoon serum cortisol 10.0 mcg/dL, DHEA-S undetectable, TSH and free T4 normal, prolactin normal, IGF-1 normal      40 minutes spent on the date of the encounter doing chart review, history and exam, documentation and further activities as noted above.  This did not include time spent on CGM review

## 2023-04-19 ENCOUNTER — TELEPHONE (OUTPATIENT)
Dept: NEUROLOGY | Facility: CLINIC | Age: 73
End: 2023-04-19
Payer: COMMERCIAL

## 2023-04-19 ENCOUNTER — TELEPHONE (OUTPATIENT)
Dept: EDUCATION SERVICES | Facility: CLINIC | Age: 73
End: 2023-04-19

## 2023-04-19 DIAGNOSIS — E11.9 DIABETES MELLITUS (H): Primary | ICD-10-CM

## 2023-04-19 NOTE — TELEPHONE ENCOUNTER
[facility] Call Center    Phone Message    May a detailed message be left on voicemail: yes     Reason for Call: Other: Patient requests a call back to discuss status of [facility] ([facility] ) fax to determine elgibility .  [facility] indicates they have not received all the required information needed.   1.  [facility] care needs provider number  2.  Also need to know  if Em[employer]ality by pin or vial.  Patient uses the pin    [facility] fax    Phone  [phone]    Action Taken:     Travel Screening: Not Applicable

## 2023-04-19 NOTE — TELEPHONE ENCOUNTER
Spoke w/ pt. She has about 5-6 weeks left of dash pods, getting ready to upgrade. She is getting her pods through [facility].     Will send for Omnipod 5 kit and pods to [facility]. Pt will check on cost/coverage and will let us know if any issues.     Will send training orders to Crystal Cameron and will need to get pt set up for training.

## 2023-04-20 RX ORDER — INSULIN PMP CART,AUT,G6/7,CNTR
1 EACH SUBCUTANEOUS DAILY
Qty: 1 KIT | Refills: 1 | Status: SHIPPED | OUTPATIENT
Start: 2023-04-20 | End: 2023-05-25

## 2023-04-20 RX ORDER — INSULIN PMP CART,AUT,G6/7,CNTR
1 EACH SUBCUTANEOUS DAILY
Qty: 30 EACH | Refills: 3 | Status: SHIPPED | OUTPATIENT
Start: 2023-04-20 | End: 2023-05-25

## 2023-05-10 ENCOUNTER — LAB REQUISITION (OUTPATIENT)
Dept: LAB | Facility: CLINIC | Age: 73
End: 2023-05-10

## 2023-05-10 ENCOUNTER — MEDICAL CORRESPONDENCE (OUTPATIENT)
Dept: HEALTH INFORMATION MANAGEMENT | Facility: CLINIC | Age: 73
End: 2023-05-10

## 2023-05-10 ENCOUNTER — TELEPHONE (OUTPATIENT)
Dept: EDUCATION SERVICES | Facility: CLINIC | Age: 73
End: 2023-05-10
Payer: COMMERCIAL

## 2023-05-10 DIAGNOSIS — R30.0 DYSURIA: ICD-10-CM

## 2023-05-10 DIAGNOSIS — R55 SYNCOPE AND COLLAPSE: ICD-10-CM

## 2023-05-10 PROCEDURE — 80053 COMPREHEN METABOLIC PANEL: CPT | Performed by: FAMILY MEDICINE

## 2023-05-10 PROCEDURE — 87088 URINE BACTERIA CULTURE: CPT | Performed by: FAMILY MEDICINE

## 2023-05-10 PROCEDURE — 84443 ASSAY THYROID STIM HORMONE: CPT | Performed by: FAMILY MEDICINE

## 2023-05-11 LAB
ALBUMIN SERPL BCG-MCNC: 4.2 G/DL (ref 3.5–5.2)
ALP SERPL-CCNC: 61 U/L (ref 35–104)
ALT SERPL W P-5'-P-CCNC: 15 U/L (ref 10–35)
ANION GAP SERPL CALCULATED.3IONS-SCNC: 14 MMOL/L (ref 7–15)
AST SERPL W P-5'-P-CCNC: 22 U/L (ref 10–35)
BILIRUB SERPL-MCNC: 0.5 MG/DL
BUN SERPL-MCNC: 14.6 MG/DL (ref 8–23)
CALCIUM SERPL-MCNC: 9.3 MG/DL (ref 8.8–10.2)
CHLORIDE SERPL-SCNC: 95 MMOL/L (ref 98–107)
CREAT SERPL-MCNC: 0.81 MG/DL (ref 0.51–0.95)
DEPRECATED HCO3 PLAS-SCNC: 25 MMOL/L (ref 22–29)
GFR SERPL CREATININE-BSD FRML MDRD: 76 ML/MIN/1.73M2
GLUCOSE SERPL-MCNC: 86 MG/DL (ref 70–99)
POTASSIUM SERPL-SCNC: 4.8 MMOL/L (ref 3.4–5.3)
PROT SERPL-MCNC: 6.1 G/DL (ref 6.4–8.3)
SODIUM SERPL-SCNC: 134 MMOL/L (ref 136–145)
TSH SERPL DL<=0.005 MIU/L-ACNC: 3.32 UIU/ML (ref 0.3–4.2)

## 2023-05-12 LAB — BACTERIA UR CULT: ABNORMAL

## 2023-05-15 NOTE — TELEPHONE ENCOUNTER
Per Crystal Cameron - planning on training pt on Omnipod 5 on 5/22/23.     Please call pt to schedule f/u with CDE sometime in June.

## 2023-05-22 DIAGNOSIS — E78.2 MIXED HYPERLIPIDEMIA: ICD-10-CM

## 2023-05-22 DIAGNOSIS — E10.65 TYPE 1 DIABETES MELLITUS WITH HYPERGLYCEMIA (H): ICD-10-CM

## 2023-05-22 RX ORDER — ROSUVASTATIN CALCIUM 5 MG/1
TABLET, COATED ORAL
Qty: 90 TABLET | Refills: 0 | Status: SHIPPED | OUTPATIENT
Start: 2023-05-22 | End: 2023-08-14

## 2023-05-22 NOTE — TELEPHONE ENCOUNTER
"Requested Prescriptions   Pending Prescriptions Disp Refills     rosuvastatin (CRESTOR) 5 MG tablet [Pharmacy Med Name: ROSUVASTATIN CALCIUM 5 MG TAB] 90 tablet 0     Sig: TAKE 1 TABLET BY MOUTH EVERY DAY       Statins Protocol Passed - 5/22/2023  3:27 PM        Passed - LDL on file in past 12 months     Recent Labs   Lab Test 09/16/22  1541   LDL 39             Passed - No abnormal creatine kinase in past 12 months     Recent Labs   Lab Test 03/03/21  1206   CKT 57                Passed - Recent (12 mo) or future (30 days) visit within the authorizing provider's specialty     Patient has had an office visit with the authorizing provider or a provider within the authorizing providers department within the previous 12 mos or has a future within next 30 days. See \"Patient Info\" tab in inbasket, or \"Choose Columns\" in Meds & Orders section of the refill encounter.              Passed - Medication is active on med list        Passed - Patient is age 18 or older        Passed - No active pregnancy on record        Passed - No positive pregnancy test in past 12 months             "

## 2023-05-23 ENCOUNTER — TELEPHONE (OUTPATIENT)
Dept: ENDOCRINOLOGY | Facility: CLINIC | Age: 73
End: 2023-05-23
Payer: COMMERCIAL

## 2023-05-23 DIAGNOSIS — E10.65 TYPE 1 DIABETES MELLITUS WITH HYPERGLYCEMIA (H): Primary | ICD-10-CM

## 2023-05-23 NOTE — TELEPHONE ENCOUNTER
[facility] Call Center    Phone Message    May a detailed message be left on voicemail: yes     Reason for Call: Medication Question or concern regarding medication   Prescription Clarification  Name of Medication: OmniPod 5   Prescribing Provider: Dr Bardales   Pharmacy: [facility]   What on the order needs clarification? $600 copay patient cannot afford, please assist with other options          Action Taken: Other: endo    Travel Screening: Not Applicable

## 2023-05-25 RX ORDER — INSULIN PUMP CONTROLLER
1 EACH MISCELLANEOUS
Qty: 30 EACH | Refills: 1 | Status: SHIPPED | OUTPATIENT
Start: 2023-05-25 | End: 2023-11-16

## 2023-05-25 NOTE — TELEPHONE ENCOUNTER
Per Patient is calling back and would like to get a call back. Patient is wanting to know if she can get a prescription put in from Dr. Bardales of her previous prescription, Omnipod 4 she was using. Patient states she cannot afford to get the prescription, Omnipod 5. Patient states she would like to have the previous prescription Omnipod 4 be sent to the pharmacy, [id number] [address] . Please advise.

## 2023-06-13 ENCOUNTER — TRANSFERRED RECORDS (OUTPATIENT)
Dept: HEALTH INFORMATION MANAGEMENT | Facility: CLINIC | Age: 73
End: 2023-06-13

## 2023-06-30 ENCOUNTER — TRANSFERRED RECORDS (OUTPATIENT)
Dept: HEALTH INFORMATION MANAGEMENT | Facility: CLINIC | Age: 73
End: 2023-06-30
Payer: COMMERCIAL

## 2023-07-03 ENCOUNTER — TELEPHONE (OUTPATIENT)
Dept: ENDOCRINOLOGY | Facility: CLINIC | Age: 73
End: 2023-07-03

## 2023-07-03 NOTE — TELEPHONE ENCOUNTER
[facility] is requesting a refill form for patients Victoza, Novolog and needles, If you could fill in the information and fax to [facility]  I scanned the form into the media tab that Storie sent if not a good copy I am adding the link for the refill form  Quin fax [phone]    Refill form  [email or web address]

## 2023-07-06 ENCOUNTER — TELEPHONE (OUTPATIENT)
Dept: ENDOCRINOLOGY | Facility: CLINIC | Age: 73
End: 2023-07-06
Payer: COMMERCIAL

## 2023-07-06 NOTE — TELEPHONE ENCOUNTER
Form received, Form signed by colleague Yaima Castaneda NP as provider is not in office.     Form was faxed.

## 2023-07-06 NOTE — TELEPHONE ENCOUNTER
[facility] Call Center    Phone Message    May a detailed message be left on voicemail: yes     Reason for Call: Other: Per patient, she gets her insulin from [facility] and they had faxed over paperwork and patient states this is the second time it was faxed and now she is out of [facility]. Patient would like a call when this has been faxed.   Thank you.     Action Taken: Other: Endo     Travel Screening: Not Applicable

## 2023-07-20 ENCOUNTER — MEDICAL CORRESPONDENCE (OUTPATIENT)
Dept: HEALTH INFORMATION MANAGEMENT | Facility: CLINIC | Age: 73
End: 2023-07-20
Payer: COMMERCIAL

## 2023-07-20 ENCOUNTER — TRANSFERRED RECORDS (OUTPATIENT)
Dept: HEALTH INFORMATION MANAGEMENT | Facility: CLINIC | Age: 73
End: 2023-07-20
Payer: COMMERCIAL

## 2023-07-20 ENCOUNTER — LAB REQUISITION (OUTPATIENT)
Dept: LAB | Facility: CLINIC | Age: 73
End: 2023-07-20

## 2023-07-20 DIAGNOSIS — R30.0 DYSURIA: ICD-10-CM

## 2023-07-20 PROCEDURE — 87086 URINE CULTURE/COLONY COUNT: CPT | Performed by: FAMILY MEDICINE

## 2023-07-21 ENCOUNTER — TRANSCRIBE ORDERS (OUTPATIENT)
Dept: OTHER | Age: 73
End: 2023-07-21

## 2023-07-21 DIAGNOSIS — R55 SYNCOPE, UNSPECIFIED SYNCOPE TYPE: Primary | ICD-10-CM

## 2023-07-22 LAB — BACTERIA UR CULT: NO GROWTH

## 2023-07-23 ENCOUNTER — APPOINTMENT (OUTPATIENT)
Dept: CT IMAGING | Facility: HOSPITAL | Age: 73
DRG: 638 | End: 2023-07-23
Attending: EMERGENCY MEDICINE
Payer: COMMERCIAL

## 2023-07-23 ENCOUNTER — APPOINTMENT (OUTPATIENT)
Dept: RADIOLOGY | Facility: HOSPITAL | Age: 73
DRG: 638 | End: 2023-07-23
Attending: EMERGENCY MEDICINE
Payer: COMMERCIAL

## 2023-07-23 ENCOUNTER — HOSPITAL ENCOUNTER (INPATIENT)
Facility: HOSPITAL | Age: 73
LOS: 1 days | Discharge: HOME | DRG: 638 | End: 2023-07-24
Attending: EMERGENCY MEDICINE | Admitting: EMERGENCY MEDICINE
Payer: COMMERCIAL

## 2023-07-23 DIAGNOSIS — A41.9 ACUTE SEPSIS (H): ICD-10-CM

## 2023-07-23 LAB
ALBUMIN SERPL BCG-MCNC: 4.3 G/DL (ref 3.5–5.2)
ALBUMIN UR-MCNC: NEGATIVE MG/DL
ALP SERPL-CCNC: 70 U/L (ref 35–104)
ALT SERPL W P-5'-P-CCNC: 20 U/L (ref 0–50)
ANION GAP SERPL CALCULATED.3IONS-SCNC: 17 MMOL/L (ref 7–15)
APPEARANCE UR: CLEAR
AST SERPL W P-5'-P-CCNC: 31 U/L (ref 0–45)
BASOPHILS # BLD AUTO: 0.1 10E3/UL (ref 0–0.2)
BASOPHILS NFR BLD AUTO: 0 %
BILIRUB SERPL-MCNC: 0.8 MG/DL
BILIRUB UR QL STRIP: NEGATIVE
BUN SERPL-MCNC: 21 MG/DL (ref 8–23)
CALCIUM SERPL-MCNC: 10.5 MG/DL (ref 8.8–10.2)
CHLORIDE SERPL-SCNC: 98 MMOL/L (ref 98–107)
COLOR UR AUTO: ABNORMAL
CREAT SERPL-MCNC: 0.89 MG/DL (ref 0.51–0.95)
DEPRECATED HCO3 PLAS-SCNC: 25 MMOL/L (ref 22–29)
EOSINOPHIL # BLD AUTO: 0.3 10E3/UL (ref 0–0.7)
EOSINOPHIL NFR BLD AUTO: 1 %
ERYTHROCYTE [DISTWIDTH] IN BLOOD BY AUTOMATED COUNT: 13.8 % (ref 10–15)
GFR SERPL CREATININE-BSD FRML MDRD: 68 ML/MIN/1.73M2
GLUCOSE BLDC GLUCOMTR-MCNC: 139 MG/DL (ref 70–99)
GLUCOSE BLDC GLUCOMTR-MCNC: 198 MG/DL (ref 70–99)
GLUCOSE BLDC GLUCOMTR-MCNC: 84 MG/DL (ref 70–99)
GLUCOSE SERPL-MCNC: 90 MG/DL (ref 70–99)
GLUCOSE UR STRIP-MCNC: 30 MG/DL
HCT VFR BLD AUTO: 45.1 % (ref 35–47)
HGB BLD-MCNC: 15.5 G/DL (ref 11.7–15.7)
HGB UR QL STRIP: NEGATIVE
HOLD SPECIMEN: NORMAL
HYALINE CASTS: 5 /LPF
IMM GRANULOCYTES # BLD: 0.1 10E3/UL
IMM GRANULOCYTES NFR BLD: 1 %
KETONES UR STRIP-MCNC: NEGATIVE MG/DL
LACTATE SERPL-SCNC: 2.1 MMOL/L (ref 0.7–2)
LACTATE SERPL-SCNC: 3.2 MMOL/L (ref 0.7–2)
LEUKOCYTE ESTERASE UR QL STRIP: NEGATIVE
LIPASE SERPL-CCNC: 18 U/L (ref 13–60)
LYMPHOCYTES # BLD AUTO: 2 10E3/UL (ref 0.8–5.3)
LYMPHOCYTES NFR BLD AUTO: 9 %
MAGNESIUM SERPL-MCNC: 1.7 MG/DL (ref 1.7–2.3)
MCH RBC QN AUTO: 31.8 PG (ref 26.5–33)
MCHC RBC AUTO-ENTMCNC: 34.4 G/DL (ref 31.5–36.5)
MCV RBC AUTO: 92 FL (ref 78–100)
MONOCYTES # BLD AUTO: 1.2 10E3/UL (ref 0–1.3)
MONOCYTES NFR BLD AUTO: 6 %
MUCOUS THREADS #/AREA URNS LPF: PRESENT /LPF
NEUTROPHILS # BLD AUTO: 18 10E3/UL (ref 1.6–8.3)
NEUTROPHILS NFR BLD AUTO: 83 %
NITRATE UR QL: NEGATIVE
NRBC # BLD AUTO: 0 10E3/UL
NRBC BLD AUTO-RTO: 0 /100
PH UR STRIP: 6 [PH] (ref 5–7)
PLATELET # BLD AUTO: 299 10E3/UL (ref 150–450)
POTASSIUM SERPL-SCNC: 3.9 MMOL/L (ref 3.4–5.3)
PROCALCITONIN SERPL IA-MCNC: 0.08 NG/ML
PROT SERPL-MCNC: 7 G/DL (ref 6.4–8.3)
RBC # BLD AUTO: 4.88 10E6/UL (ref 3.8–5.2)
RBC URINE: 1 /HPF
SODIUM SERPL-SCNC: 140 MMOL/L (ref 136–145)
SP GR UR STRIP: 1.03 (ref 1–1.03)
SQUAMOUS EPITHELIAL: <1 /HPF
UROBILINOGEN UR STRIP-MCNC: <2 MG/DL
WBC # BLD AUTO: 21.6 10E3/UL (ref 4–11)
WBC URINE: 0 /HPF

## 2023-07-23 PROCEDURE — 87040 BLOOD CULTURE FOR BACTERIA: CPT | Performed by: EMERGENCY MEDICINE

## 2023-07-23 PROCEDURE — 83690 ASSAY OF LIPASE: CPT | Performed by: EMERGENCY MEDICINE

## 2023-07-23 PROCEDURE — C9803 HOPD COVID-19 SPEC COLLECT: HCPCS

## 2023-07-23 PROCEDURE — 99285 EMERGENCY DEPT VISIT HI MDM: CPT | Mod: 25

## 2023-07-23 PROCEDURE — 71046 X-RAY EXAM CHEST 2 VIEWS: CPT

## 2023-07-23 PROCEDURE — 81003 URINALYSIS AUTO W/O SCOPE: CPT | Performed by: EMERGENCY MEDICINE

## 2023-07-23 PROCEDURE — 83735 ASSAY OF MAGNESIUM: CPT | Performed by: EMERGENCY MEDICINE

## 2023-07-23 PROCEDURE — 93005 ELECTROCARDIOGRAM TRACING: CPT | Performed by: EMERGENCY MEDICINE

## 2023-07-23 PROCEDURE — 258N000003 HC RX IP 258 OP 636: Performed by: EMERGENCY MEDICINE

## 2023-07-23 PROCEDURE — 82962 GLUCOSE BLOOD TEST: CPT

## 2023-07-23 PROCEDURE — 36415 COLL VENOUS BLD VENIPUNCTURE: CPT | Performed by: STUDENT IN AN ORGANIZED HEALTH CARE EDUCATION/TRAINING PROGRAM

## 2023-07-23 PROCEDURE — 84145 PROCALCITONIN (PCT): CPT | Performed by: EMERGENCY MEDICINE

## 2023-07-23 PROCEDURE — 120N000001 HC R&B MED SURG/OB

## 2023-07-23 PROCEDURE — 96365 THER/PROPH/DIAG IV INF INIT: CPT | Mod: 59

## 2023-07-23 PROCEDURE — 83605 ASSAY OF LACTIC ACID: CPT | Performed by: EMERGENCY MEDICINE

## 2023-07-23 PROCEDURE — 85025 COMPLETE CBC W/AUTO DIFF WBC: CPT | Performed by: STUDENT IN AN ORGANIZED HEALTH CARE EDUCATION/TRAINING PROGRAM

## 2023-07-23 PROCEDURE — 80053 COMPREHEN METABOLIC PANEL: CPT | Performed by: STUDENT IN AN ORGANIZED HEALTH CARE EDUCATION/TRAINING PROGRAM

## 2023-07-23 PROCEDURE — 250N000011 HC RX IP 250 OP 636: Mod: JZ | Performed by: EMERGENCY MEDICINE

## 2023-07-23 PROCEDURE — 36415 COLL VENOUS BLD VENIPUNCTURE: CPT | Performed by: EMERGENCY MEDICINE

## 2023-07-23 PROCEDURE — 96361 HYDRATE IV INFUSION ADD-ON: CPT

## 2023-07-23 PROCEDURE — 74174 CTA ABD&PLVS W/CONTRAST: CPT

## 2023-07-23 RX ORDER — PIPERACILLIN SODIUM, TAZOBACTAM SODIUM 3; .375 G/15ML; G/15ML
3.38 INJECTION, POWDER, LYOPHILIZED, FOR SOLUTION INTRAVENOUS ONCE
Status: COMPLETED | OUTPATIENT
Start: 2023-07-23 | End: 2023-07-23

## 2023-07-23 RX ORDER — IOPAMIDOL 755 MG/ML
100 INJECTION, SOLUTION INTRAVASCULAR ONCE
Status: COMPLETED | OUTPATIENT
Start: 2023-07-23 | End: 2023-07-23

## 2023-07-23 RX ADMIN — IOPAMIDOL 90 ML: 755 INJECTION, SOLUTION INTRAVENOUS at 20:13

## 2023-07-23 RX ADMIN — SODIUM CHLORIDE 1000 ML: 9 INJECTION, SOLUTION INTRAVENOUS at 19:35

## 2023-07-23 RX ADMIN — PIPERACILLIN AND TAZOBACTAM 3.38 G: 3; .375 INJECTION, POWDER, LYOPHILIZED, FOR SOLUTION INTRAVENOUS at 22:13

## 2023-07-23 ASSESSMENT — ENCOUNTER SYMPTOMS
DIAPHORESIS: 1
VOMITING: 1
WEAKNESS: 1
ABDOMINAL PAIN: 0
FEVER: 0
FATIGUE: 1

## 2023-07-23 ASSESSMENT — ACTIVITIES OF DAILY LIVING (ADL)
ADLS_ACUITY_SCORE: 35

## 2023-07-23 NOTE — ED PROVIDER NOTES
EMERGENCY DEPARTMENT ENCOUNTER      NAME: Paige Mari  AGE: 73 year old female  YOB: 1950  MRN: [id number]  EVALUATION DATE & TIME: 7/23/2023  6:28 PM    PCP: Scar Tan    ED PROVIDER: FRANCISCO JAVIER Simons      Chief Complaint   Patient presents with     Nausea & Vomiting     Hypoglycemia     FINAL IMPRESSION:  1. Acute sepsis (H)      ED COURSE & MEDICAL DECISION MAKING:    Pertinent Labs & Imaging studies reviewed. (See chart for details)    73 year old female presents to the Emergency Department for evaluation of nausea, vomiting, hyperglycemia, weakness.  Patient with longstanding history of diabetes.  Utilizes an insulin pump.  Nausea vomiting today with increased weakness.  Family also reporting patient has intermittent episodes of passing out which is currently being worked up by cardiology.  Just had a Holter monitor completed.  No current chest pain or shortness of breath.  She had some mild vague abdominal pain to palpation.  Noted on laboratory testing to have a significantly elevated lactic acid.  Cultures obtained.  Fluids being administered.  Receiving IV antibiotics.  Sugars trending in the positive direction.  Patient had a insulin pump that the basal rate was continuing to go at arrival so we suspended the infusion.  Sugars trended upward.  Lactic acid trending downward with intravenous fluids and antibiotics.  CT scan without acute source of infection.  Urinalysis negative.  Chest x-ray without pneumonia.  Overall given patient's laboratory profile consistent with likely sepsis syndrome with her leukocytosis and elevated lactic acid I think admitting to the hospital on empiric antibiotics for continued care and management as indicated.  We have continued the suspension on her blood glucose monitoring device.  Case discussed with hospitalist service.      Medical Decision Making    History:    Supplemental history from: Documented in chart, if applicable    External  Record(s) reviewed: Documented in chart, if applicable.    Work Up:    Chart documentation includes differential considered and any EKGs or imaging independently interpreted by provider, where specified.    In additional to work up documented, I considered the following work up: Documented in chart, if applicable.    External consultation:    Discussion of management with another provider: Documented in chart, if applicable    Complicating factors:    Care impacted by chronic illness: Chronic Lung Disease, Diabetes, Hypertension, Mental Health and Other: GERD and hyperthyroidism    Care affected by social determinants of health: N/A    Disposition considerations: Admit.        MEDICATIONS GIVEN IN THE EMERGENCY:  New Prescriptions    No medications on file       NEW PRESCRIPTIONS STARTED AT TODAY'S ER VISIT  Patient's Medications   New Prescriptions    No medications on file   Previous Medications    ACETAMINOPHEN (TYLENOL) 500 MG TABLET    Take 1,000 mg by mouth 2 times daily     ALBUTEROL (PROAIR HFA/PROVENTIL HFA/VENTOLIN HFA) 108 (90 BASE) MCG/ACT INHALER    Inhale 1-2 puffs into the lungs every 6 hours    AMOXICILLIN (AMOXIL) 500 MG TABLET    Take 2,000 mg by mouth as needed Before dental appointments    ARIPIPRAZOLE (ABILIFY) 2 MG TABLET    Take 2 mg by mouth daily     CETIRIZINE (ZYRTEC) 10 MG TABLET    Take 10 mg by mouth daily     CHOLECALCIFEROL (VITAMIN D3) 25 MCG (1000 UNITS) CAPSULE    Take 1,000 Units by mouth daily     CLONIDINE (CATAPRES-TTS) 0.3 MG/24 HR    Place 1 patch onto the skin once a week     CONTINUOUS BLOOD GLUC  (DEXCOM G6 ) TORRIE    Use to read blood sugars as per 's instructions.    CONTINUOUS BLOOD GLUC SENSOR (DEXCOM G6 SENSOR) MISC    Change every 10 days.    CONTINUOUS BLOOD GLUC TRANSMIT (DEXCOM G6 TRANSMITTER) MISC    Change every 3 months.    CYANOCOBALAMIN (VITAMIN B-12) 1000 MCG TABLET    Take 1,000 mcg by mouth daily    DICLOFENAC SODIUM (VOLTAREN) 1  "% GEL    [DICLOFENAC SODIUM (VOLTAREN) 1 % GEL] Apply 1 g topically daily as needed.    ESTRADIOL (ESTRACE) 1 MG TABLET    Take 1 mg by mouth daily     FLUTICASONE (FLONASE) 50 MCG/ACT NASAL SPRAY    Spray 1 spray into both nostrils daily    GALCANEZUMAB-GNLM (EMGALITY) 120 MG/ML INJECTION    Inject 1 mL (120 mg) Subcutaneous every 28 days    GLUCAGON, RDNA, (GLUCAGON EMERGENCY) 1 MG KIT    1 mg IM one time, PRN severe hypoglycemia causing altered consciousness affecting ability to take food by mouth    GLUCOSE (BD GLUCOSE) 5 G CHEWABLE TABLET    Take 2 tablets (10 g) by mouth daily as needed (hypoglycemia)    INSULIN ASPART (NOVOLOG VIAL) 100 UNITS/ML VIAL    9 units with meals plus correction scale with sensitivity of 40 mg/dL starting at 140 mg/dL    INSULIN DISPOSABLE PUMP (OMNIPOD DASH PODS, GEN 4,) MISC    1 each every 3 days    LIRAGLUTIDE (VICTOZA) 18 MG/3ML SOLUTION    Inject 1.2 mg Subcutaneous daily for 90 days    MAGNESIUM OXIDE (MAG-OX) 400 MG TABLET    Take 400 mg by mouth daily     MEDICAL CANNABIS (PATIENT'S OWN SUPPLY)    1 Dose by Other route See Admin Instructions [employer] Tangerine Vape- 1-4 puffs q6h PRN    METFORMIN (GLUCOPHAGE XR) 500 MG 24 HR TABLET    TAKE 2 TABS (1000MG) BY MOUTH TWICE DAILY WITH MEALS    METOPROLOL SUCCINATE ER (TOPROL-XL) 100 MG 24 HR TABLET    Take 100 mg by mouth 2 times daily    MULTIVITAMIN W/MINERALS (THERA-VIT-M) TABLET    Take 1 tablet by mouth daily    NOVOFINE 32 32 GAUGE X 1/4\" NDLE    1 each by Other route 6 times daily     OLMESARTAN (BENICAR) 40 MG TABLET    [OLMESARTAN (BENICAR) 40 MG TABLET] Take 40 mg by mouth daily.    OMEPRAZOLE (PRILOSEC) 20 MG CAPSULE    [OMEPRAZOLE (PRILOSEC) 20 MG CAPSULE] Take 1 capsule by mouth daily before breakfast.    PREGABALIN (LYRICA) 100 MG CAPSULE    Take 1 capsule (100 mg) by mouth 3 times daily    ROSUVASTATIN (CRESTOR) 5 MG TABLET    TAKE 1 TABLET BY MOUTH EVERY DAY    VALACYCLOVIR (VALTREX) 500 MG TABLET    Take 500 mg " by mouth daily As needed   Modified Medications    No medications on file   Discontinued Medications    No medications on file          =================================================================    HPI    Patient information was obtained from: Patient and Patient's Daughter     Use of Intrepreter: N/A       Paige Mari is a 73 year old female with a pertinent medical history of T2DM, diabetic polyneuropathy, atrial fibrillation, hyperthyroidism, HTN, osteoarthritis, GERD, who presents to the ED via EMS for evaluation of vomiting.     Patient reports vomiting yesterday. She notes that this morning when she woke up she checked her blood sugar and found it to be approximately 136, however, she then began to vomit again after trying to eat which caused her blood sugar to drop. She endorses then continuously vomiting, which then also caused her blood sugar to continuously drop (lowest blood sugar measured was approximately 47). She notes that she then developed generalized shakiness, diaphoresis, fatigue, and generalized weakness, which prompted her to visit the ED. She endorses a PMH of T2DM on a Novolong pump and on Victoza. She endorses taking Victoza in the mornings, including today. She denies any known recent sick/ill contacts. She denies any recent abdominal pain, fever, or any other complications at this time.     Patient's daughter mentions that the patient is currently being worked-up on an outpatient basis for frequent falls. She endorses the patient falling approximately 3-4 times this month secondary to becoming significantly tremulous. She also endorses recent patient syncopal episodes. She notes the patient recently saw Cardiology who placed her on a Holter monitor, however, they recently just turned this back in and were told that besides having a few episodes of tachycardia it was unremarkable.       REVIEW OF SYSTEMS   Review of Systems   Constitutional: Positive for diaphoresis and fatigue.  Negative for fever.        Positive for generalized shakiness.   Gastrointestinal: Positive for vomiting. Negative for abdominal pain.   Neurological: Positive for weakness (generalized).   All other systems reviewed and are negative.       PAST MEDICAL HISTORY:  Past Medical History:   Diagnosis Date     Small bowel obstruction (H) 9/18/2021     Status post total knee replacement 11/24/2014       PAST SURGICAL HISTORY:  Past Surgical History:   Procedure Laterality Date     APPENDECTOMY      at age 3     ARTHROSCOPY KNEE WITH MENISCECTOMY  3/10/14    partial medial and lateral meniscectomies, subpatellar chondroplasty     BACK SURGERY       BLEPHAROPLASTY Bilateral 8/17/2015    Procedure: BLEPHAROPLASTY BILATERAL UPPER LIDS;  Surgeon: Chasidy Bryant MD;  Location: [facility] OR;  Service:      BOWEL RESECTION  12 years ago     COLON SURGERY  2004    sigmoid colectomy     DILATION AND CURETTAGE      x3     HYSTERECTOMY      age 39     IR LUMBAR PUNCTURE  1/22/2022     JOINT REPLACEMENT       OOPHORECTOMY       OTHER SURGICAL HISTORY  2005    bowel obstruction     PICC TRIPLE LUMEN PLACEMENT  9/19/2021          PICC TRIPLE LUMEN PLACEMENT  1/22/2022          MT ARTHRODESIS ANT INTERBODY MIN DISCECTOMY,LUMBAR N/A 1/30/2015    Procedure: ANTERIOR LUMBAR INTERBODY FUSION L4-5, INTERBODY GRAFT, BMP INSTRUMENTATION;  Surgeon: Zeeshan Guillaume MD;  Location: [facility] OR;  Service: Spine     TOE SURGERY       TONSILLECTOMY & ADENOIDECTOMY      age 10     [facility] REMOVAL OF OVARY(S)      Description: Oophorectomy;  Recorded: 06/03/2010;     [facility] REMOVAL OF OVARY(S)      Description: Oophorectomy;  Recorded: 06/03/2010;     [facility] TOTAL ABDOM HYSTERECTOMY      Description: Hysterectomy;  Recorded: 06/03/2010;     [facility] TOTAL ABDOM HYSTERECTOMY      Description: Hysterectomy;  Recorded: 06/03/2010;     [facility] TOTAL KNEE ARTHROPLASTY Right 11/24/2014    Procedure: RIGHT TOTAL KNEE ARTHROPLASTY;  Surgeon: Jules Harris MD;   "Location: [facility] OR;  Service: Orthopedics       ALLERGIES:  Allergies   Allergen Reactions     Morphine Other (See Comments)     \"make me delirious,\"  \"nightmares\"  Other reaction(s): delirium  \"make me delirious,\"  \"nightmares\"       Adhesive [Cyanoacrylate] Other (See Comments)     Can only tolerate tape for short periods of time shelia in sensitive areas     Amlodipine Unknown and Other (See Comments)     Other reaction(s): delerium     Doxazosin Other (See Comments)     Other reaction(s): feels foggy     Irbesartan-Hydrochlorothiazide Other (See Comments)     Other reaction(s): hyponatremia     Other Allergy (See Comments) [External Allergen Needs Reconciliation - See Comment] Unknown     Other reaction(s): skin rash     Prednisone Unknown     Caused extremely high blood sugars     Tramadol Other (See Comments)     Possible seizure activity     Trazodone Unknown     Other reaction(s): hung over       FAMILY HISTORY:  Family History   Problem Relation Age of Onset     Breast Cancer No family hx of        SOCIAL HISTORY:   Social History     Socioeconomic History     Marital status:    Tobacco Use     Smoking status: Some Days     Types: Cigarettes     Last attempt to quit: 9/15/2021     Years since quittin.8     Smokeless tobacco: Never   Substance and Sexual Activity     Alcohol use: No     Drug use: No       VITALS:  Patient Vitals for the past 24 hrs:   BP Temp Temp src Pulse Resp SpO2 Height   23 0000 (!) 151/71 -- -- 74 14 97 % --   23 2245 -- -- -- 71 23 -- --   23 2220 131/70 -- -- 77 17 96 % --   23 2150 (!) 144/80 -- -- 77 18 98 % --   23 (!) 160/72 -- -- 78 16 99 % --   23 211 (!) 160/76 -- -- 79 21 99 % --   23 (!) 162/90 -- -- 79 14 98 % --   23 193 (!) 149/88 -- -- 85 -- 98 % --   23 1903 (!) 137/90 -- -- 91 -- 97 % --   23 1843 117/88 -- -- 97 -- 98 % --   23 -- -- -- -- 19 -- --   23 " "(!) 141/95 98.4  F (36.9  C) Oral 71 16 98 % 1.575 m (5' 2\")   07/23/23 1828 (!) 141/95 -- -- 78 -- 97 % --       PHYSICAL EXAM    Constitutional: Well developed, Well nourished, peers to be in acute distress, appears fatigued.  HENT: Normocephalic, Atraumatic, Bilateral external ears normal, Oropharynx normal, mucous membranes moist, Nose normal. Neck-  Normal range of motion, No tenderness, Supple, No stridor.   Eyes: PERRL, EOMI, Conjunctiva normal, No discharge.   Respiratory: Normal breath sounds, No respiratory distress, No wheezing, Speaks full sentences easily. No cough.   Cardiovascular: Normal heart rate, Regular rhythm, No murmurs Chest wall nontender.    GI:  Soft, No tenderness, No masses, No flank tenderness. No rebound or guarding.  There is an insulin dispensing device in the left anterior abdomen  : No cva tenderness    Musculoskeletal: 2+ DP pulses. No edema. No cyanosis. Good range of motion in all major joints. No tenderness to palpation. No tenderness of the CTLS spine.   Integument: Warm, Dry, No erythema, No rash. No petechiae.   Neurologic: Alert & oriented x 3, Normal motor function, Normal sensory function, No focal deficits noted.   Psychiatric: Affect normal, Judgment normal, Mood normal. Cooperative.       LAB:  All pertinent labs reviewed and interpreted.  Results for orders placed or performed during the hospital encounter of 07/23/23   CTA Abdomen Pelvis with Contrast    Impression    IMPRESSION:  1. No acute findings in the abdomen or pelvis. No evidence of acute mesenteric ischemia.  2. 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   Chest XR,  PA & LAT    Impression    IMPRESSION: Negative chest.   Comprehensive metabolic panel   Result Value Ref Range    Sodium 140 136 - 145 mmol/L    Potassium 3.9 3.4 - 5.3 mmol/L    Chloride 98 98 - 107 mmol/L    Carbon Dioxide (CO2) 25 22 - 29 mmol/L    Anion Gap 17 (H) 7 - 15 mmol/L    Urea Nitrogen " 21.0 8.0 - 23.0 mg/dL    Creatinine 0.89 0.51 - 0.95 mg/dL    Calcium 10.5 (H) 8.8 - 10.2 mg/dL    Glucose 90 70 - 99 mg/dL    Alkaline Phosphatase 70 35 - 104 U/L    AST 31 0 - 45 U/L    ALT 20 0 - 50 U/L    Protein Total 7.0 6.4 - 8.3 g/dL    Albumin 4.3 3.5 - 5.2 g/dL    Bilirubin Total 0.8 <=1.2 mg/dL    GFR Estimate 68 >60 mL/min/1.73m2   CBC with platelets and differential   Result Value Ref Range    WBC Count 21.6 (H) 4.0 - 11.0 10e3/uL    RBC Count 4.88 3.80 - 5.20 10e6/uL    Hemoglobin 15.5 11.7 - 15.7 g/dL    Hematocrit 45.1 35.0 - 47.0 %    MCV 92 78 - 100 fL    MCH 31.8 26.5 - 33.0 pg    MCHC 34.4 31.5 - 36.5 g/dL    RDW 13.8 10.0 - 15.0 %    Platelet Count 299 150 - 450 10e3/uL    % Neutrophils 83 %    % Lymphocytes 9 %    % Monocytes 6 %    % Eosinophils 1 %    % Basophils 0 %    % Immature Granulocytes 1 %    NRBCs per 100 WBC 0 <1 /100    Absolute Neutrophils 18.0 (H) 1.6 - 8.3 10e3/uL    Absolute Lymphocytes 2.0 0.8 - 5.3 10e3/uL    Absolute Monocytes 1.2 0.0 - 1.3 10e3/uL    Absolute Eosinophils 0.3 0.0 - 0.7 10e3/uL    Absolute Basophils 0.1 0.0 - 0.2 10e3/uL    Absolute Immature Granulocytes 0.1 <=0.4 10e3/uL    Absolute NRBCs 0.0 10e3/uL   Extra Red Top Tube   Result Value Ref Range    Hold Specimen JIC    Extra Green Top (Lithium Heparin) Tube   Result Value Ref Range    Hold Specimen JIC    Extra Purple Top Tube   Result Value Ref Range    Hold Specimen JIC    Glucose by meter   Result Value Ref Range    GLUCOSE BY METER POCT 84 70 - 99 mg/dL   Extra Blue Top Tube   Result Value Ref Range    Hold Specimen JIC    Result Value Ref Range    Magnesium 1.7 1.7 - 2.3 mg/dL   Result Value Ref Range    Lipase 18 13 - 60 U/L   UA with Microscopic reflex to Culture    Specimen: Urine, Midstream   Result Value Ref Range    Color Urine Light Yellow Colorless, Straw, Light Yellow, Yellow    Appearance Urine Clear Clear    Glucose Urine 30 (A) Negative mg/dL    Bilirubin Urine Negative Negative    Ketones  Urine Negative Negative mg/dL    Specific Gravity Urine 1.027 1.001 - 1.030    Blood Urine Negative Negative    pH Urine 6.0 5.0 - 7.0    Protein Albumin Urine Negative Negative mg/dL    Urobilinogen Urine <2.0 <2.0 mg/dL    Nitrite Urine Negative Negative    Leukocyte Esterase Urine Negative Negative    Mucus Urine Present (A) None Seen /LPF    RBC Urine 1 <=2 /HPF    WBC Urine 0 <=5 /HPF    Squamous Epithelials Urine <1 <=1 /HPF    Hyaline Casts Urine 5 (H) <=2 /LPF   Lactic acid whole blood   Result Value Ref Range    Lactic Acid 3.2 (H) 0.7 - 2.0 mmol/L   Lactic acid whole blood   Result Value Ref Range    Lactic Acid 2.1 (H) 0.7 - 2.0 mmol/L   Result Value Ref Range    Procalcitonin 0.08 (H) <0.05 ng/mL   Glucose by meter   Result Value Ref Range    GLUCOSE BY METER POCT 139 (H) 70 - 99 mg/dL   Glucose by meter   Result Value Ref Range    GLUCOSE BY METER POCT 198 (H) 70 - 99 mg/dL       RADIOLOGY:  Reviewed all pertinent imaging. Please see official radiology report.  Chest XR,  PA & LAT   Final Result   IMPRESSION: Negative chest.      CTA Abdomen Pelvis with Contrast   Final Result   IMPRESSION:   1. No acute findings in the abdomen or pelvis. No evidence of acute mesenteric ischemia.   2. 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          EKG:    Performed at: 23-JUL-2023, 19:01    Impression: Sinus rhythm with Premature supraventricular complexes and Premature ventricular complexes or Fusion complexes. Left bundle branch block. Abnormal ECG.    Rate: 95 BPM  Rhythm: Sinus rhythm with Premature supraventricular complexes and Premature ventricular complexes or Fusion complexes.  Axis: -4  TN Interval: 172 ms  QRS Interval: 128 ms  QTc Interval: 510 ms  Comparison: When compared with ECG of 20-JAN-2022 10:45, Fusion complexes are now Present. Premature ventricular complexes are now Present. Left bundle branch block is now Present.    I have independently reviewed  and interpreted the EKG(s) documented above.    PROCEDURES:     Critical Care     Performed by:   Total critical care time: 30 minutes  Critical care was necessary to treat or prevent imminent or life-threatening deterioration of the following conditions: sepsis  Critical care was time spent personally by me on the following activities: development of treatment plan with patient or surrogate, discussions with consultants, examination of patient, evaluation of patient's response to treatment, obtaining history from patient or surrogate, ordering and performing treatments and interventions, ordering and review of laboratory studies, ordering and review of radiographic studies, re-evaluation of patient's condition and monitoring for potential decompensation.  Critical care time was exclusive of separately billable procedures and treating other patients.        I, Junior Yi, am serving as a scribe to document services personally performed by Dr. Simons based on my observation and the provider's statements to me. I, King Simons MD attest that Junior Yi is acting in a scribe capacity, has observed my performance of the services and has documented them in accordance with my direction.    King Simons M.D.  Emergency Medicine  [facility] EMERGENCY DEPARTMENT  [facility] [facility] [address]  [phone]  Dept: [phone]         King Simons MD  07/24/23 0105

## 2023-07-23 NOTE — ED TRIAGE NOTES
Patient presents to ED via ambulance for nausea/vomiting and hypoglycemia that started yesterday.  Patient states that her blood sugars have been low since yesterday (as low as 47).  Currently 84 right now.  Denies nausea currently.     Triage Assessment     Row Name 07/23/23 [id number]       Triage Assessment (Adult)    Airway WDL WDL       Respiratory WDL    Respiratory WDL WDL       Skin Circulation/Temperature WDL    Skin Circulation/Temperature WDL WDL       Cardiac WDL    Cardiac WDL WDL       Peripheral/Neurovascular WDL    Peripheral Neurovascular WDL WDL       Cognitive/Neuro/Behavioral WDL    Cognitive/Neuro/Behavioral WDL WDL

## 2023-07-23 NOTE — ED NOTES
Bed: [facility]ED[facility]-D  Expected date: 7/23/23  Expected time: 6:20 PM  Means of arrival: Ambulance  Comments:  Magdiel  72 yo F N/V

## 2023-07-24 VITALS
OXYGEN SATURATION: 99 % | BODY MASS INDEX: 26.89 KG/M2 | DIASTOLIC BLOOD PRESSURE: 73 MMHG | RESPIRATION RATE: 13 BRPM | HEART RATE: 79 BPM | HEIGHT: 62 IN | TEMPERATURE: 98.4 F | SYSTOLIC BLOOD PRESSURE: 117 MMHG

## 2023-07-24 LAB
ALBUMIN SERPL BCG-MCNC: 3.6 G/DL (ref 3.5–5.2)
ALP SERPL-CCNC: 58 U/L (ref 35–104)
ALT SERPL W P-5'-P-CCNC: 16 U/L (ref 0–50)
AMPHETAMINES UR QL SCN: ABNORMAL
ANION GAP SERPL CALCULATED.3IONS-SCNC: 12 MMOL/L (ref 7–15)
AST SERPL W P-5'-P-CCNC: 21 U/L (ref 0–45)
BARBITURATES UR QL SCN: ABNORMAL
BENZODIAZ UR QL SCN: ABNORMAL
BILIRUB SERPL-MCNC: 1 MG/DL
BUN SERPL-MCNC: 19 MG/DL (ref 8–23)
BZE UR QL SCN: ABNORMAL
CALCIUM SERPL-MCNC: 8.9 MG/DL (ref 8.8–10.2)
CALCIUM, IONIZED MEASURED: 1.12 MMOL/L (ref 1.11–1.3)
CANNABINOIDS UR QL SCN: ABNORMAL
CHLORIDE SERPL-SCNC: 99 MMOL/L (ref 98–107)
CREAT SERPL-MCNC: 0.74 MG/DL (ref 0.51–0.95)
DEPRECATED HCO3 PLAS-SCNC: 26 MMOL/L (ref 22–29)
ERYTHROCYTE [DISTWIDTH] IN BLOOD BY AUTOMATED COUNT: 13.7 % (ref 10–15)
GFR SERPL CREATININE-BSD FRML MDRD: 84 ML/MIN/1.73M2
GLUCOSE BLDC GLUCOMTR-MCNC: 166 MG/DL (ref 70–99)
GLUCOSE BLDC GLUCOMTR-MCNC: 228 MG/DL (ref 70–99)
GLUCOSE BLDC GLUCOMTR-MCNC: 343 MG/DL (ref 70–99)
GLUCOSE SERPL-MCNC: 228 MG/DL (ref 70–99)
HCT VFR BLD AUTO: 37.4 % (ref 35–47)
HGB BLD-MCNC: 12.8 G/DL (ref 11.7–15.7)
HOLD SPECIMEN: NORMAL
ION CA PH 7.4: 1.13 MMOL/L (ref 1.11–1.3)
LACTATE SERPL-SCNC: 1.6 MMOL/L (ref 0.7–2)
MCH RBC QN AUTO: 31.8 PG (ref 26.5–33)
MCHC RBC AUTO-ENTMCNC: 34.2 G/DL (ref 31.5–36.5)
MCV RBC AUTO: 93 FL (ref 78–100)
OPIATES UR QL SCN: ABNORMAL
PCP QUAL URINE (ROCHE): ABNORMAL
PH: 7.42 (ref 7.35–7.45)
PLATELET # BLD AUTO: 235 10E3/UL (ref 150–450)
POTASSIUM SERPL-SCNC: 4.1 MMOL/L (ref 3.4–5.3)
PROT SERPL-MCNC: 5.8 G/DL (ref 6.4–8.3)
RBC # BLD AUTO: 4.02 10E6/UL (ref 3.8–5.2)
SARS-COV-2 RNA RESP QL NAA+PROBE: NEGATIVE
SODIUM SERPL-SCNC: 137 MMOL/L (ref 136–145)
WBC # BLD AUTO: 12 10E3/UL (ref 4–11)

## 2023-07-24 PROCEDURE — 82330 ASSAY OF CALCIUM: CPT | Performed by: HOSPITALIST

## 2023-07-24 PROCEDURE — 85027 COMPLETE CBC AUTOMATED: CPT | Performed by: HOSPITALIST

## 2023-07-24 PROCEDURE — 250N000013 HC RX MED GY IP 250 OP 250 PS 637: Performed by: HOSPITALIST

## 2023-07-24 PROCEDURE — 83605 ASSAY OF LACTIC ACID: CPT | Performed by: HOSPITALIST

## 2023-07-24 PROCEDURE — 82962 GLUCOSE BLOOD TEST: CPT

## 2023-07-24 PROCEDURE — 99236 HOSP IP/OBS SAME DATE HI 85: CPT | Mod: AI | Performed by: HOSPITALIST

## 2023-07-24 PROCEDURE — 99207 PR APP CREDIT; MD BILLING SHARED VISIT: CPT | Performed by: HOSPITALIST

## 2023-07-24 PROCEDURE — 80307 DRUG TEST PRSMV CHEM ANLYZR: CPT | Performed by: HOSPITALIST

## 2023-07-24 PROCEDURE — 80053 COMPREHEN METABOLIC PANEL: CPT | Performed by: HOSPITALIST

## 2023-07-24 PROCEDURE — 87635 SARS-COV-2 COVID-19 AMP PRB: CPT | Performed by: HOSPITALIST

## 2023-07-24 PROCEDURE — 250N000012 HC RX MED GY IP 250 OP 636 PS 637: Performed by: HOSPITALIST

## 2023-07-24 PROCEDURE — 258N000003 HC RX IP 258 OP 636: Performed by: HOSPITALIST

## 2023-07-24 PROCEDURE — 36415 COLL VENOUS BLD VENIPUNCTURE: CPT | Performed by: HOSPITALIST

## 2023-07-24 PROCEDURE — G0378 HOSPITAL OBSERVATION PER HR: HCPCS

## 2023-07-24 RX ORDER — LIDOCAINE 40 MG/G
CREAM TOPICAL
Status: DISCONTINUED | OUTPATIENT
Start: 2023-07-24 | End: 2023-07-24 | Stop reason: HOSPADM

## 2023-07-24 RX ORDER — DEXTROSE MONOHYDRATE 25 G/50ML
25-50 INJECTION, SOLUTION INTRAVENOUS
Status: DISCONTINUED | OUTPATIENT
Start: 2023-07-24 | End: 2023-07-24

## 2023-07-24 RX ORDER — NICOTINE POLACRILEX 4 MG
15-30 LOZENGE BUCCAL
Status: DISCONTINUED | OUTPATIENT
Start: 2023-07-24 | End: 2023-07-24 | Stop reason: HOSPADM

## 2023-07-24 RX ORDER — ALBUTEROL SULFATE 90 UG/1
1-2 AEROSOL, METERED RESPIRATORY (INHALATION) EVERY 6 HOURS
Status: DISCONTINUED | OUTPATIENT
Start: 2023-07-24 | End: 2023-07-24 | Stop reason: HOSPADM

## 2023-07-24 RX ORDER — LANOLIN ALCOHOL/MO/W.PET/CERES
1000 CREAM (GRAM) TOPICAL DAILY
Status: DISCONTINUED | OUTPATIENT
Start: 2023-07-24 | End: 2023-07-24 | Stop reason: HOSPADM

## 2023-07-24 RX ORDER — PREGABALIN 100 MG/1
100 CAPSULE ORAL 3 TIMES DAILY
Status: DISCONTINUED | OUTPATIENT
Start: 2023-07-24 | End: 2023-07-24 | Stop reason: HOSPADM

## 2023-07-24 RX ORDER — ROSUVASTATIN CALCIUM 5 MG/1
5 TABLET, COATED ORAL DAILY
Status: DISCONTINUED | OUTPATIENT
Start: 2023-07-24 | End: 2023-07-24 | Stop reason: HOSPADM

## 2023-07-24 RX ORDER — ACETAMINOPHEN 325 MG/1
650 TABLET ORAL EVERY 6 HOURS PRN
Status: DISCONTINUED | OUTPATIENT
Start: 2023-07-24 | End: 2023-07-24 | Stop reason: HOSPADM

## 2023-07-24 RX ORDER — ONDANSETRON 4 MG/1
4 TABLET, ORALLY DISINTEGRATING ORAL EVERY 6 HOURS PRN
Status: DISCONTINUED | OUTPATIENT
Start: 2023-07-24 | End: 2023-07-24 | Stop reason: HOSPADM

## 2023-07-24 RX ORDER — METOPROLOL SUCCINATE 25 MG/1
25 TABLET, EXTENDED RELEASE ORAL DAILY
Status: DISCONTINUED | OUTPATIENT
Start: 2023-07-24 | End: 2023-07-24 | Stop reason: HOSPADM

## 2023-07-24 RX ORDER — VITAMIN B COMPLEX
25 TABLET ORAL DAILY
Status: DISCONTINUED | OUTPATIENT
Start: 2023-07-24 | End: 2023-07-24 | Stop reason: HOSPADM

## 2023-07-24 RX ORDER — LOSARTAN POTASSIUM 50 MG/1
50 TABLET ORAL 2 TIMES DAILY
Status: DISCONTINUED | OUTPATIENT
Start: 2023-07-24 | End: 2023-07-24 | Stop reason: HOSPADM

## 2023-07-24 RX ORDER — MAGNESIUM OXIDE 400 MG/1
400 TABLET ORAL DAILY
Status: DISCONTINUED | OUTPATIENT
Start: 2023-07-24 | End: 2023-07-24 | Stop reason: HOSPADM

## 2023-07-24 RX ORDER — LOSARTAN POTASSIUM 50 MG/1
100 TABLET ORAL DAILY
Status: DISCONTINUED | OUTPATIENT
Start: 2023-07-24 | End: 2023-07-24

## 2023-07-24 RX ORDER — METOPROLOL SUCCINATE 50 MG/1
100 TABLET, EXTENDED RELEASE ORAL 2 TIMES DAILY
Status: DISCONTINUED | OUTPATIENT
Start: 2023-07-24 | End: 2023-07-24

## 2023-07-24 RX ORDER — PANTOPRAZOLE SODIUM 40 MG/1
40 TABLET, DELAYED RELEASE ORAL
Status: DISCONTINUED | OUTPATIENT
Start: 2023-07-24 | End: 2023-07-24 | Stop reason: HOSPADM

## 2023-07-24 RX ORDER — DEXTROSE MONOHYDRATE 25 G/50ML
25-50 INJECTION, SOLUTION INTRAVENOUS
Status: DISCONTINUED | OUTPATIENT
Start: 2023-07-24 | End: 2023-07-24 | Stop reason: HOSPADM

## 2023-07-24 RX ORDER — ARIPIPRAZOLE 2 MG/1
2 TABLET ORAL DAILY
Status: DISCONTINUED | OUTPATIENT
Start: 2023-07-24 | End: 2023-07-24 | Stop reason: HOSPADM

## 2023-07-24 RX ORDER — NICOTINE POLACRILEX 4 MG
15-30 LOZENGE BUCCAL
Status: DISCONTINUED | OUTPATIENT
Start: 2023-07-24 | End: 2023-07-24

## 2023-07-24 RX ORDER — ONDANSETRON 2 MG/ML
4 INJECTION INTRAMUSCULAR; INTRAVENOUS EVERY 6 HOURS PRN
Status: DISCONTINUED | OUTPATIENT
Start: 2023-07-24 | End: 2023-07-24 | Stop reason: HOSPADM

## 2023-07-24 RX ORDER — ACETAMINOPHEN 650 MG/1
650 SUPPOSITORY RECTAL EVERY 6 HOURS PRN
Status: DISCONTINUED | OUTPATIENT
Start: 2023-07-24 | End: 2023-07-24 | Stop reason: HOSPADM

## 2023-07-24 RX ADMIN — SODIUM CHLORIDE 1000 ML: 9 INJECTION, SOLUTION INTRAVENOUS at 06:45

## 2023-07-24 RX ADMIN — METOPROLOL SUCCINATE 25 MG: 25 TABLET, EXTENDED RELEASE ORAL at 08:43

## 2023-07-24 RX ADMIN — Medication 25 MCG: at 08:43

## 2023-07-24 RX ADMIN — PREGABALIN 100 MG: 100 CAPSULE ORAL at 08:43

## 2023-07-24 RX ADMIN — CYANOCOBALAMIN TAB 1000 MCG 1000 MCG: 1000 TAB at 08:43

## 2023-07-24 RX ADMIN — PANTOPRAZOLE SODIUM 40 MG: 40 TABLET, DELAYED RELEASE ORAL at 08:43

## 2023-07-24 RX ADMIN — PREGABALIN 100 MG: 100 CAPSULE ORAL at 15:32

## 2023-07-24 RX ADMIN — INSULIN ASPART 1 UNITS: 100 INJECTION, SOLUTION INTRAVENOUS; SUBCUTANEOUS at 08:17

## 2023-07-24 RX ADMIN — MAGNESIUM OXIDE TAB 400 MG (241.3 MG ELEMENTAL MG) 400 MG: 400 (241.3 MG) TAB at 08:43

## 2023-07-24 RX ADMIN — ARIPIPRAZOLE 2 MG: 2 TABLET ORAL at 08:44

## 2023-07-24 RX ADMIN — ROSUVASTATIN CALCIUM 5 MG: 5 TABLET, FILM COATED ORAL at 08:43

## 2023-07-24 RX ADMIN — LOSARTAN POTASSIUM 50 MG: 50 TABLET, FILM COATED ORAL at 08:43

## 2023-07-24 ASSESSMENT — ACTIVITIES OF DAILY LIVING (ADL)
ADLS_ACUITY_SCORE: 35

## 2023-07-24 NOTE — ED NOTES
Pt ambulated to the BR with writer's assistance with a steady gait.   Daughter leaving for the night, would like to be called with any updates, verified number in the chart.

## 2023-07-24 NOTE — PROGRESS NOTES
Met with patient  to review role of care management, progression of care and possible need for services at discharge, including OP services, home care, or skilled nursing care. Patient alert, oriented and engaged in the conversation.     Assessed. Discussed CABELLO. Lives w/spouse and independent at baseline, no svcs and no CM needs.

## 2023-07-24 NOTE — H&P
[facility]    History and Physical - Hospitalist Service       Date of Admission:  7/23/2023    Assessment & Plan      Paige Mari is a 73 year old female admitted on 7/23/2023.        Nausea and vomiting  -CTA of abdomen and pelvis unrevealing  -UDS pending    Hypercalcemia  -recheck after IVF bolus    Lactic acidosis  -recheck after IVF bolus  -ER gave one dose of iv zosyn, hold off further antibiotics until blood cultures result    DM with hypoglycemia  -low sliding scale insulin while on clear liquid diet  -hold home metformin, victoza  -insulin pump on hold( the hold has to be renewed every 2 hours per pump instruction)    KP  -home CPAP    PAF  HTN  -home metoprolol(lower dose due to labile BP)  -home cozaar    Hyperlipidemia  -home crestor    COPD  -proair prn      Diet: Clear Liquid Diet    DVT Prophylaxis: Pneumatic Compression Devices  Gomes Catheter: Not present  Lines: None     Cardiac Monitoring: None  Code Status:  full    Clinically Significant Risk Factors Present on Admission           # Hypercalcemia: Highest Ca = 10.5 mg/dL in last 2 days, will monitor as appropriate        # Hypertension: Noted on problem list     # DMII: A1C = N/A within past 6 months             Disposition Plan      Expected Discharge Date: 07/25/2023                  Yany Gooden MD  Hospitalist Service  [facility]  Securely message with [employer] (more info)  Text page via [employer] Paging/Directory     ______________________________________________________________________    Chief Complaint   Nausea and vomiting    History is obtained from the patient    History of Present Illness   Paige Mari is a 73 year old female who presents to ED via ambulance for nausea/vomiting and hypoglycemia that started yesterday. Patient states that her blood sugar has been as low as 47 since yesterday.     Past Medical History    Past Medical History:   Diagnosis Date    Small bowel  obstruction (H) 9/18/2021    Status post total knee replacement 11/24/2014       Past Surgical History   Past Surgical History:   Procedure Laterality Date    APPENDECTOMY      at age 3    ARTHROSCOPY KNEE WITH MENISCECTOMY  3/10/14    partial medial and lateral meniscectomies, subpatellar chondroplasty    BACK SURGERY      BLEPHAROPLASTY Bilateral 8/17/2015    Procedure: BLEPHAROPLASTY BILATERAL UPPER LIDS;  Surgeon: Chasidy Bryant MD;  Location: [facility];  Service:     BOWEL RESECTION  12 years ago    COLON SURGERY  2004    sigmoid colectomy    DILATION AND CURETTAGE      x3    HYSTERECTOMY      age 39    IR LUMBAR PUNCTURE  1/22/2022    JOINT REPLACEMENT      OOPHORECTOMY      OTHER SURGICAL HISTORY  2005    bowel obstruction    PICC TRIPLE LUMEN PLACEMENT  9/19/2021         PICC TRIPLE LUMEN PLACEMENT  1/22/2022         ND ARTHRODESIS ANT INTERBODY MIN DISCECTOMY,LUMBAR N/A 1/30/2015    Procedure: ANTERIOR LUMBAR INTERBODY FUSION L4-5, INTERBODY GRAFT, BMP INSTRUMENTATION;  Surgeon: Zeeshan Guillaume MD;  Location: [facility];  Service: Spine    TOE SURGERY      TONSILLECTOMY & ADENOIDECTOMY      age 10    [facility] REMOVAL OF OVARY(S)      Description: Oophorectomy;  Recorded: 06/03/2010;    [facility] REMOVAL OF OVARY(S)      Description: Oophorectomy;  Recorded: 06/03/2010;    [facility] TOTAL ABDOM HYSTERECTOMY      Description: Hysterectomy;  Recorded: 06/03/2010;    [facility] TOTAL ABDOM HYSTERECTOMY      Description: Hysterectomy;  Recorded: 06/03/2010;    [facility] TOTAL KNEE ARTHROPLASTY Right 11/24/2014    Procedure: RIGHT TOTAL KNEE ARTHROPLASTY;  Surgeon: Jules Harris MD;  Location: [facility];  Service: Orthopedics       Prior to Admission Medications   Prior to Admission Medications   Prescriptions Last Dose Informant Patient Reported? Taking?   ARIPiprazole (ABILIFY) 2 MG tablet 7/23/2023 at am Self, Daughter Yes Yes   Sig: Take 2 mg by mouth daily    Continuous Blood Gluc  (DEXCOM G6  ") TORRIE  Self, Daughter No No   Sig: Use to read blood sugars as per 's instructions.   Continuous Blood Gluc Sensor (DEXCOM G6 SENSOR) MISC  Self, Daughter No No   Sig: Change every 10 days.   Continuous Blood Gluc Transmit (DEXCOM G6 TRANSMITTER) MISC  Self, Daughter No No   Sig: Change every 3 months.   Glucagon, rDNA, (GLUCAGON EMERGENCY) 1 MG KIT 2023 at NEEDED Self, Daughter No Yes   Si mg IM one time, PRN severe hypoglycemia causing altered consciousness affecting ability to take food by mouth   Insulin Disposable Pump (OMNIPOD DASH PODS, GEN 4,) MISC  Self, Daughter No No   Si each every 3 days   NOVOFINE 32 32 gauge x \" Ndle  Self, Daughter Yes No   Si each by Other route 6 times daily    acetaminophen (TYLENOL) 500 MG tablet 2023 at 1700 Self, Daughter Yes Yes   Sig: Take 1,000 mg by mouth 2 times daily    albuterol (PROAIR HFA/PROVENTIL HFA/VENTOLIN HFA) 108 (90 Base) MCG/ACT inhaler Past Week at couple days Self, Daughter No Yes   Sig: Inhale 1-2 puffs into the lungs every 6 hours   amoxicillin (AMOXIL) 500 MG tablet More than a month at prn Self, Daughter Yes Yes   Sig: Take 2,000 mg by mouth as needed Before dental appointments   cetirizine (ZYRTEC) 10 MG tablet 2023 at am Self, Daughter Yes Yes   Sig: Take 10 mg by mouth daily    cholecalciferol (VITAMIN D3) 25 mcg (1000 units) capsule 2023 at am Self, Daughter Yes Yes   Sig: Take 1,000 Units by mouth daily    cloNIDine (CATAPRES-TTS) 0.3 mg/24 hr Past Week at last sun Self, Daughter Yes Yes   Sig: Place 1 patch onto the skin once a week    cyanocobalamin (VITAMIN B-12) 1000 MCG tablet 2023 at am Self, Daughter Yes Yes   Sig: Take 1,000 mcg by mouth daily   diclofenac sodium (VOLTAREN) 1 % Gel Past Week at prn Self, Daughter Yes Yes   Sig: [DICLOFENAC SODIUM (VOLTAREN) 1 % GEL] Apply 1 g topically daily as needed.   estradiol (ESTRACE) 1 MG tablet 2023 at am Self, Daughter Yes Yes   Sig: " Take 1 mg by mouth daily    fluticasone (FLONASE) 50 MCG/ACT nasal spray Past Month at prn Self, Daughter No Yes   Sig: Spray 1 spray into both nostrils daily   galcanezumab-gnlm (EMGALITY) 120 MG/ML injection Past Week at wed Self, Daughter No Yes   Sig: Inject 1 mL (120 mg) Subcutaneous every 28 days   glucose (BD GLUCOSE) 5 g chewable tablet 2023 at NEEDED Self, Daughter No Yes   Sig: Take 2 tablets (10 g) by mouth daily as needed (hypoglycemia)   insulin aspart (NOVOLOG VIAL) 100 UNITS/ML vial 2023 Self, Daughter Yes Yes   Si units with meals plus correction scale with sensitivity of 40 mg/dL starting at 140 mg/dL   liraglutide (VICTOZA) 18 MG/3ML solution   No No   Sig: Inject 1.2 mg Subcutaneous daily for 90 days   magnesium oxide (MAG-OX) 400 MG tablet 2023 at AM Self, Daughter Yes Yes   Sig: Take 400 mg by mouth daily    medical cannabis (Patient's own supply) 2023 at am Self, Daughter Yes Yes   Si Dose by Other route See Admin Instructions [employer] Tangerine Vape- 1-4 puffs q6h PRN   metFORMIN (GLUCOPHAGE XR) 500 MG 24 hr tablet 2023 at AM Self, Daughter No Yes   Sig: TAKE 2 TABS (1000MG) BY MOUTH TWICE DAILY WITH MEALS   metoprolol succinate ER (TOPROL-XL) 100 MG 24 hr tablet 2023 at am Self, Daughter Yes Yes   Sig: Take 100 mg by mouth 2 times daily   multivitamin w/minerals (THERA-VIT-M) tablet 2023 at am Self, Daughter Yes Yes   Sig: Take 1 tablet by mouth daily   olmesartan (BENICAR) 40 MG tablet 2023 at noon Self, Daughter Yes Yes   Sig: [OLMESARTAN (BENICAR) 40 MG TABLET] Take 40 mg by mouth daily.   omeprazole (PRILOSEC) 20 MG capsule 2023 at am Self, Daughter Yes Yes   Sig: [OMEPRAZOLE (PRILOSEC) 20 MG CAPSULE] Take 1 capsule by mouth daily before breakfast.   pregabalin (LYRICA) 100 MG capsule 2023 at x2 Self, Daughter No Yes   Sig: Take 1 capsule (100 mg) by mouth 3 times daily   rosuvastatin (CRESTOR) 5 MG tablet 2023 at am Self,  Daughter No Yes   Sig: TAKE 1 TABLET BY MOUTH EVERY DAY   valACYclovir (VALTREX) 500 MG tablet 7/23/2023 at am Self, Daughter Yes Yes   Sig: Take 500 mg by mouth daily As needed      Facility-Administered Medications: None        Review of Systems    The 10 point Review of Systems is negative other than noted in the HPI or here.      Physical Exam   Vital Signs: Temp: 98.4  F (36.9  C) Temp src: Oral BP: (!) 151/71 Pulse: 74   Resp: 14 SpO2: 97 %      Weight: 0 lbs 0 oz    Constitutional: Awake, alert, cooperative, no apparent distress.  Eyes: Conjunctiva and pupils examined and normal.  HEENT: Moist mucous membranes, normal dentition.  Respiratory: Clear to auscultation bilaterally, no crackles or wheezing.  Cardiovascular: Regular rate and rhythm, normal S1 and S2, and no murmur noted.  GI: Soft, non-distended, non-tender, normal bowel sounds.  Lymph/Hematologic: No anterior cervical or supraclavicular adenopathy.  Skin: No rashes, no cyanosis, no edema.  Musculoskeletal: No joint swelling, erythema or tenderness.  Neurologic: Cranial nerves 2-12 intact, normal strength and sensation.  Psychiatric: Alert, oriented to person, place and time, no obvious anxiety or depression.     Medical Decision Making       75 MINUTES SPENT BY ME on the date of service doing chart review, history, exam, documentation & further activities per the note.      Data     I have personally reviewed the following data over the past 24 hrs:    21.6 (H)  \   15.5   / 299     140 98 21.0 /  198 (H)   3.9 25 0.89 \     ALT: 20 AST: 31 AP: 70 TBILI: 0.8   ALB: 4.3 TOT PROTEIN: 7.0 LIPASE: 18     Procal: 0.08 (H) CRP: N/A Lactic Acid: 2.1 (H)         Imaging results reviewed over the past 24 hrs:   Recent Results (from the past 24 hour(s))   CTA Abdomen Pelvis with Contrast    Narrative    EXAM: CTA ABDOMEN PELVIS WITH CONTRAST  LOCATION: [facility]  DATE: 7/23/2023    INDICATION: Diabetic. Repetitive vomiting. Elevated  lactic acid. Nausea.  COMPARISON: CT abdomen pelvis 01/20/2022 reviewed.  TECHNIQUE: CT angiogram abdomen pelvis during arterial phase of injection of IV contrast. 2D and 3D MIP reconstructions were performed by the CT technologist. Dose reduction techniques were used.  CONTRAST: 90 mL Isovue 370    FINDINGS:  ANGIOGRAM ABDOMEN/PELVIS: Normal caliber patent abdominal aorta. Mild calcified and noncalcified atheromatous changes. Celiac artery is patent with mild proximal stenosis (series 7, image 90). SMA is patent with high-grade proximal stenosis (series 7,   image 88). KEREN is patent with no stenosis. Three right-sided renal arteries and two left-sided renal arteries are patent with no stenosis. Normal caliber patent bilateral iliac arterial system.    LOWER CHEST: Normal.    HEPATOBILIARY: Normal.    PANCREAS: Normal.    SPLEEN: Normal.    ADRENAL GLANDS: Normal.    KIDNEYS/BLADDER: Numerous small benign simple bilateral renal cysts, no follow-up needed. No urinary stone or hydronephrosis.    BOWEL: Sigmoidectomy with posterior pelvic anastomosis. No evidence of diverticulitis or colitis. No evidence of bowel obstruction. Appendectomy. Moderate to large volume stool scattered in the colon. Large amount of ingested material in the stomach. No   bowel pneumatosis, mesenteric venous gas or portal venous gas. No free air, free fluid or abscess.    LYMPH NODES: Normal.    PELVIC ORGANS: Hysterectomy.    MUSCULOSKELETAL: Postoperative changes lower lumbar spine with hardware.      Impression    IMPRESSION:  1. No acute findings in the abdomen or pelvis. No evidence of acute mesenteric ischemia.  2. 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   Chest XR,  PA & LAT    Narrative    EXAM: XR CHEST 2 VIEWS  LOCATION: [facility]  DATE: 7/23/2023    INDICATION: sepsis, unclear source, vomiting  COMPARISON: None.      Impression    IMPRESSION: Negative chest.

## 2023-07-24 NOTE — ED NOTES
Discharge paperwork, changes in medication and follow up care discussed with pt. Pt verbalized understanding and has no questions. Pt is a/ox4 abc's intact and ambulatory with steady gait when leaving the ED

## 2023-07-24 NOTE — CONSULTS
DIABETES CARE    Situation:  Consulted by Provider for Diabetes Education. Patient wears and Omnipod Dash insulin pump and a Dexcom G6 CGMS  73 year old female with type 1 Diabetes. Patient was admitted for nausea, vomiting, weakness.   hypoglycemia  Background  PCP: Scar Tan  Social: Lives with     Diabetes History:   Last saw endocrinologist, Dr. Bardales at [facility] in April, has seen the CDEs there as well    Meds for BG Management PTA:  Basal Rates:  Rate 1: 8180-7823  0.65 unit(s)/hr  Rate 2: 6719-6464  0.45 unit(s)/hr  Rate 3: 7313-7867  0.55 units/hr  Rate 4: 5770-4835  0.75 units/hr    Total 24 hour basal dose:   15.3   units      Carbohydrate Ratio:  0000 to  0000;  18  Grams/unit    Correction/Sensitivity:  0000 to  0000  ;  60  mg/dL/unit over 150      Blood Glucose Target Range:  0000 to  0000; 120      Active Insulin: _4__ hours  Brand of insulin pump: Omnipod Dash        Current Inpatient Meds for BG Management:  Pump running now at above rates  Pump was suspended but BG rising, restarted now    Labs:  Hemoglobin A1C: 6.7%  in March   GFR: 84 mL/min/1.73m^2    Blood Glucose POC: 84 on admission, was low at home, hard to get up  late morning, glucose tabs, glucagon was used, 911 called. Pump was suspended last evening per patient. BG this am 228, rising now per CGMS. Pump unsuspended now.    Diet Order: clear liquid    Weight: 66.7 kg    BMI: 26.9 kg/m^2    DM EDUCATION/COUNSELING:  Barriers to Learning and/or DM Self-Management: None, concerned about hypoglycemia  Previous DM Education: has had recently  Completed pump assessment. Patient verbalized understanding of the following:  Sign/symptoms/causes/complications of hyperglycemia, DKA and DKA prevention guidelines, alternative subcutaneous injection regimen if pump malfunctions/discontinued, signs/symptoms/causes/treatment of low blood sugar. Will need glucagon reordered.    Reviewed the following: Patient will  "inform RN when giving an insulin dose using the hospital meter, not adjusting pump rates without consulting hospital provider, disconnection of insulin pump for radiology procedures (CGMS removed) and that if for safety reasons, the insulin pump would be removed and subcutaneous injections would be ordered based on assessed pump rates.    Assessment:  Pump assessment completed, orders entered, and patient safe to use insulin pump in the hospital independently.    Recommendations:  1. Restart insulin pump now and assess BG control  2. She will Follow-up with endocrine, thinks she has appt in August  3. Will be upgrading her pump soon, cost issue presently    Refer to \"Guidelines for Insulin Initiation and Care in Hospitalized Adults\" link in Diabetes Management Order set for dosing guidelines.    Hospital goals for blood glucose levels are < 180 mg/dL for improved health outcomes.    DISCHARGE NEEDS:  RX needed at DC (preferred by patient's insurance):  Glucagon emergency kit    Thank you,   Georgie Goncalves RN, Certified Diabetes Care and     [address] [address]  [email or web address]   Office: [phone]  Pager: [phone]                                    "

## 2023-07-24 NOTE — UTILIZATION REVIEW
"Admission Status; Secondary Review Determination     Under the authority of the Utilization Management Committee, the utilization review process indicated a secondary review on Paige Mari.  The review outcome is based on review of the medical records, discussions with staff, and applying clinical experience noted on the date of the review.     (x) Observation Status Appropriate - This patient does not meet hospital inpatient criteria and is placed in observation status. If this patient's primary payer is Medicare and was admitted as an inpatient, Condition Code 44 should be used and patient status changed to \"observation\".     RATIONALE FOR DETERMINATION   Paige Mari is a 73 yr old female who presented with N/V.  Noted mild lactic acid elevation and hypoglycemia on her insulin pump.  Currently reports feeling improved.  Anticipate discharge soon.  Lactic acid normalized after IVF.  Evaluation for N/V negative.  Glucoses now high.    The severity of illness, intensity of service provided, expected LOS and risk for adverse outcome make the care appropriate for further observation; however, doesn't meet criteria for hospital inpatient admission. Dr Oneal notified of this determination and agrees with downgrade of status.      The information on this document is developed by the utilization review team in order for the business office to ensure compliance.  This only denotes the appropriateness of proper admission status and does not reflect the quality of care rendered.         The definitions of Inpatient Status and Observation Status used in making the determination above are those provided in the CMS Coverage Manual, Chapter 1 and Chapter 6, section 70.4.      Sincerely,  Toshia Ocampo MD  Utilization Review  Physician Advisor  [facility]    "

## 2023-07-24 NOTE — MEDICATION SCRIBE - ADMISSION MEDICATION HISTORY
Medication Scribe Admission Medication History    Admission medication history is complete. The information provided in this note is only as accurate as the sources available at the time of the update.    Medication reconciliation/reorder completed by provider prior to medication history? No    Information Source(s): Patient via in-person    Pertinent Information: Insulin was suspended for 4 hrs today. Pt provided a active medication list.    Changes made to PTA medication list:    Added: None    Deleted: None    Changed: Metoprolol dose changed a month ago    Medication Affordability:  Not including over the counter (OTC) medications, was there a time in the past 3 months when you did not take your medications as prescribed because of cost?: No    Allergies reviewed with patient and updates made in EHR: yes    Medication History Completed By: Jose Juan Huerta 7/23/2023 11:36 PM    Prior to Admission medications    Medication Sig Last Dose Taking? Auth Provider Long Term End Date   acetaminophen (TYLENOL) 500 MG tablet Take 1,000 mg by mouth 2 times daily  7/23/2023 at 1700 Yes Provider, Historical     albuterol (PROAIR HFA/PROVENTIL HFA/VENTOLIN HFA) 108 (90 Base) MCG/ACT inhaler Inhale 1-2 puffs into the lungs every 6 hours Past Week at couple days Yes Theresa Brunson MD Yes    amoxicillin (AMOXIL) 500 MG tablet Take 2,000 mg by mouth as needed Before dental appointments More than a month at prn Yes Provider, Historical     ARIPiprazole (ABILIFY) 2 MG tablet Take 2 mg by mouth daily  7/23/2023 at am Yes Provider, Historical     cetirizine (ZYRTEC) 10 MG tablet Take 10 mg by mouth daily  7/23/2023 at am Yes Provider, Historical     cholecalciferol (VITAMIN D3) 25 mcg (1000 units) capsule Take 1,000 Units by mouth daily  7/23/2023 at am Yes Provider, Historical     cloNIDine (CATAPRES-TTS) 0.3 mg/24 hr Place 1 patch onto the skin once a week  Past Week at last sun Yes Provider, Historical Yes    cyanocobalamin (VITAMIN  B-12) 1000 MCG tablet Take 1,000 mcg by mouth daily 7/23/2023 at am Yes Unknown, Entered By History     diclofenac sodium (VOLTAREN) 1 % Gel [DICLOFENAC SODIUM (VOLTAREN) 1 % GEL] Apply 1 g topically daily as needed. Past Week at prn Yes Provider, Historical     estradiol (ESTRACE) 1 MG tablet Take 1 mg by mouth daily  7/23/2023 at am Yes Provider, Historical     fluticasone (FLONASE) 50 MCG/ACT nasal spray Spray 1 spray into both nostrils daily Past Month at prn Yes Kristan Moreno MD     galcanezumab-gnlm (EMGALITY) 120 MG/ML injection Inject 1 mL (120 mg) Subcutaneous every 28 days Past Week at wed Yes Cindy Cook APRN CNP     Glucagon, rDNA, (GLUCAGON EMERGENCY) 1 MG KIT 1 mg IM one time, PRN severe hypoglycemia causing altered consciousness affecting ability to take food by mouth 7/23/2023 at NEEDED Yes Oscar Lujan MD Yes    glucose (BD GLUCOSE) 5 g chewable tablet Take 2 tablets (10 g) by mouth daily as needed (hypoglycemia) 7/23/2023 at NEEDED Yes Maynor Bardales MD     insulin aspart (NOVOLOG VIAL) 100 UNITS/ML vial 9 units with meals plus correction scale with sensitivity of 40 mg/dL starting at 140 mg/dL 7/23/2023 Yes Maynor Bardales MD Yes    magnesium oxide (MAG-OX) 400 MG tablet Take 400 mg by mouth daily  7/23/2023 at AM Yes Reported, Patient     medical cannabis (Patient's own supply) 1 Dose by Other route See Admin Instructions [employer] Tangerine Vape- 1-4 puffs q6h PRN 7/22/2023 at am Yes Provider, Historical     metFORMIN (GLUCOPHAGE XR) 500 MG 24 hr tablet TAKE 2 TABS (1000MG) BY MOUTH TWICE DAILY WITH MEALS 7/23/2023 at AM Yes Maynor Bardales MD Yes    metoprolol succinate ER (TOPROL-XL) 100 MG 24 hr tablet Take 100 mg by mouth 2 times daily 7/23/2023 at am Yes Unknown, Entered By History No    multivitamin w/minerals (THERA-VIT-M) tablet Take 1 tablet by mouth daily 7/23/2023 at am Yes Unknown, Entered By History     olmesartan (BENICAR) 40 MG tablet [OLMESARTAN (BENICAR)  "40 MG TABLET] Take 40 mg by mouth daily. 7/23/2023 at noon Yes Provider, Historical     omeprazole (PRILOSEC) 20 MG capsule [OMEPRAZOLE (PRILOSEC) 20 MG CAPSULE] Take 1 capsule by mouth daily before breakfast. 7/23/2023 at am Yes Provider, Historical     pregabalin (LYRICA) 100 MG capsule Take 1 capsule (100 mg) by mouth 3 times daily 7/23/2023 at x2 Yes Chikis Osuna MD Yes    rosuvastatin (CRESTOR) 5 MG tablet TAKE 1 TABLET BY MOUTH EVERY DAY 7/23/2023 at am Yes Maynor Bardales MD Yes    valACYclovir (VALTREX) 500 MG tablet Take 500 mg by mouth daily As needed 7/23/2023 at am Yes Provider, Historical     Continuous Blood Gluc  (DEXCOM G6 ) TORRIE Use to read blood sugars as per 's instructions.   Scar Tan MD     Continuous Blood Gluc Sensor (DEXCOM G6 SENSOR) MISC Change every 10 days.   Maynor Bardales MD     Continuous Blood Gluc Transmit (DEXCOM G6 TRANSMITTER) MISC Change every 3 months.   Maynor Bardales MD     Insulin Disposable Pump (OMNIPOD DASH PODS, GEN 4,) MISC 1 each every 3 days   Maynor Bardales MD     liraglutide (VICTOZA) 18 MG/3ML solution Inject 1.2 mg Subcutaneous daily for 90 days   Maynor Bardales MD Yes 5/18/23   NOVOFINE 32 32 gauge x 1/4\" Ndle 1 each by Other route 6 times daily    Provider, Historical         "

## 2023-07-24 NOTE — DISCHARGE SUMMARY
[facility]  Hospitalist Discharge Summary      Date of Admission:  7/23/2023  Date of Discharge:  7/24/2023  4:42 PM  Discharging Provider: See Oneal MD  Discharge Service: Hospitalist Service    Discharge Diagnoses   Nausea and vomiting, resolved  Hypercalcemia  Lactic acidosis, resolved  DM with hypoglycemia,resolved  KP  PAF  HTN  Hyperlipidemia  COPD    Clinically Significant Risk Factors     # DMII: A1C = N/A within past 6 months       Follow-ups Needed After Discharge   Follow-up Appointments     Follow-up and recommended labs and tests       Follow up with primary care provider, Scar Tan, within 7   days for hospital follow- up.  No follow up labs or test are needed.            Unresulted Labs Ordered in the Past 30 Days of this Admission       Date and Time Order Name Status Description    7/23/2023  7:13 PM Blood Culture Peripheral Blood Preliminary     7/23/2023  7:13 PM Blood Culture Peripheral Blood Preliminary         These results will be followed up by PCP    Discharge Disposition   Discharged to home  Condition at discharge: Stable    Hospital Course   73 year old female with type 2 diabetes on long-term insulin presented with confusion. She was found to be hypoglycemic. She was treated with dextrose and her mental status improved. Patient reported she's had nausea and vomiting for a few days prior to admission. Otherwise, infectious workup was negative. She was discharged in improved and stable condition. Victoza was discontinued. She was given follow up with PCP. Case was discussed in detail with daughter. Daughter's questions and concerns were addressed to her satisfaction.    Consultations This Hospital Stay   DIABETES EDUCATION IP CONSULT    Code Status   Full Code    Time Spent on this Encounter   I, See Oneal MD, personally saw the patient today and spent less than or equal to 30 minutes discharging this patient.       See SIN  MD Jm  [facility] EMERGENCY DEPARTMENT  [address] [address]  Phone: [phone]  ______________________________________________________________________    Physical Exam   Vital Signs: Temp: 98.4  F (36.9  C) Temp src: Oral BP: 117/73 Pulse: 79   Resp: 13 SpO2: 99 % O2 Device: None (Room air)    Weight: 0 lbs 0 oz  Gen:  no acute distress  Resp:  breathing normally on room air. No rales, wheezing, or stridor  Card:  normal rate, normal rhythm. No murmurs appreciated  Abd:  Soft, non-tender, non-distended, normoactive bowel sounds are present  Psych:  Alert  Extr:  no edema         Primary Care Physician   Scar Tan    Discharge Orders      Reason for your hospital stay    Encephalopathy     Follow-up and recommended labs and tests     Follow up with primary care provider, Scar Tan, within 7 days for hospital follow- up.  No follow up labs or test are needed.     Activity    Your activity upon discharge: activity as tolerated     Diet    Follow this diet upon discharge: Orders Placed This Encounter      Clear Liquid Diet       Significant Results and Procedures   Results for orders placed or performed during the hospital encounter of 07/23/23   CTA Abdomen Pelvis with Contrast    Narrative    EXAM: CTA ABDOMEN PELVIS WITH CONTRAST  LOCATION: [facility]  DATE: 7/23/2023    INDICATION: Diabetic. Repetitive vomiting. Elevated lactic acid. Nausea.  COMPARISON: CT abdomen pelvis 01/20/2022 reviewed.  TECHNIQUE: CT angiogram abdomen pelvis during arterial phase of injection of IV contrast. 2D and 3D MIP reconstructions were performed by the CT technologist. Dose reduction techniques were used.  CONTRAST: 90 mL Isovue 370    FINDINGS:  ANGIOGRAM ABDOMEN/PELVIS: Normal caliber patent abdominal aorta. Mild calcified and noncalcified atheromatous changes. Celiac artery is patent with mild proximal stenosis (series 7,  image 90). SMA is patent with high-grade proximal stenosis (series 7,   image 88). KEREN is patent with no stenosis. Three right-sided renal arteries and two left-sided renal arteries are patent with no stenosis. Normal caliber patent bilateral iliac arterial system.    LOWER CHEST: Normal.    HEPATOBILIARY: Normal.    PANCREAS: Normal.    SPLEEN: Normal.    ADRENAL GLANDS: Normal.    KIDNEYS/BLADDER: Numerous small benign simple bilateral renal cysts, no follow-up needed. No urinary stone or hydronephrosis.    BOWEL: Sigmoidectomy with posterior pelvic anastomosis. No evidence of diverticulitis or colitis. No evidence of bowel obstruction. Appendectomy. Moderate to large volume stool scattered in the colon. Large amount of ingested material in the stomach. No   bowel pneumatosis, mesenteric venous gas or portal venous gas. No free air, free fluid or abscess.    LYMPH NODES: Normal.    PELVIC ORGANS: Hysterectomy.    MUSCULOSKELETAL: Postoperative changes lower lumbar spine with hardware.      Impression    IMPRESSION:  1. No acute findings in the abdomen or pelvis. No evidence of acute mesenteric ischemia.  2. 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   Chest XR,  PA & LAT    Narrative    EXAM: XR CHEST 2 VIEWS  LOCATION: [facility]  DATE: 7/23/2023    INDICATION: sepsis, unclear source, vomiting  COMPARISON: None.      Impression    IMPRESSION: Negative chest.       Discharge Medications   Discharge Medication List as of 7/24/2023  4:15 PM        CONTINUE these medications which have NOT CHANGED    Details   acetaminophen (TYLENOL) 500 MG tablet Take 1,000 mg by mouth 2 times daily , Historical      albuterol (PROAIR HFA/PROVENTIL HFA/VENTOLIN HFA) 108 (90 Base) MCG/ACT inhaler Inhale 1-2 puffs into the lungs every 6 hours, Disp-18 g, R-4, E-PrescribePharmacy may dispense brand covered by insurance (Proair, or proventil or ventolin or generic  albuterol inhaler)      amoxicillin (AMOXIL) 500 MG tablet Take 2,000 mg by mouth as needed Before dental appointments, Historical      ARIPiprazole (ABILIFY) 2 MG tablet Take 2 mg by mouth daily , Historical      cetirizine (ZYRTEC) 10 MG tablet Take 10 mg by mouth daily , Historical      cholecalciferol (VITAMIN D3) 25 mcg (1000 units) capsule Take 1,000 Units by mouth daily , Historical      cloNIDine (CATAPRES-TTS) 0.3 mg/24 hr Place 1 patch onto the skin once a week , Historical      cyanocobalamin (VITAMIN B-12) 1000 MCG tablet Take 1,000 mcg by mouth daily, Historical      diclofenac sodium (VOLTAREN) 1 % Gel [DICLOFENAC SODIUM (VOLTAREN) 1 % GEL] Apply 1 g topically daily as needed., Historical      estradiol (ESTRACE) 1 MG tablet Take 1 mg by mouth daily , Historical      fluticasone (FLONASE) 50 MCG/ACT nasal spray Spray 1 spray into both nostrils daily, Disp-16 g, R-11, E-Prescribe      galcanezumab-gnlm (EMGALITY) 120 MG/ML injection Inject 1 mL (120 mg) Subcutaneous every 28 days, Disp-1 mL, R-11, E-Prescribe      Glucagon, rDNA, (GLUCAGON EMERGENCY) 1 MG KIT 1 mg IM one time, PRN severe hypoglycemia causing altered consciousness affecting ability to take food by mouth, Disp-1 kit, R-1, E-Prescribe      glucose (BD GLUCOSE) 5 g chewable tablet Take 2 tablets (10 g) by mouth daily as needed (hypoglycemia), Disp-300 tablet, R-3, E-Prescribe      insulin aspart (NOVOLOG VIAL) 100 UNITS/ML vial 9 units with meals plus correction scale with sensitivity of 40 mg/dL starting at 140 mg/dL, Historical      magnesium oxide (MAG-OX) 400 MG tablet Take 400 mg by mouth daily , Historical      medical cannabis (Patient's own supply) 1 Dose by Other route See Admin Instructions [employer] Vape- 1-4 puffs q6h PRN, Historical      metFORMIN (GLUCOPHAGE XR) 500 MG 24 hr tablet TAKE 2 TABS (1000MG) BY MOUTH TWICE DAILY WITH MEALS, Disp-360 tablet, R-1, E-PrescribePT REQUEST MORE REFILLS      metoprolol succinate  "ER (TOPROL-XL) 100 MG 24 hr tablet Take 100 mg by mouth 2 times daily, Historical      multivitamin w/minerals (THERA-VIT-M) tablet Take 1 tablet by mouth daily, Historical      olmesartan (BENICAR) 40 MG tablet [OLMESARTAN (BENICAR) 40 MG TABLET] Take 40 mg by mouth daily., Historical      omeprazole (PRILOSEC) 20 MG capsule [OMEPRAZOLE (PRILOSEC) 20 MG CAPSULE] Take 1 capsule by mouth daily before breakfast., Historical      pregabalin (LYRICA) 100 MG capsule Take 1 capsule (100 mg) by mouth 3 times daily, Disp-90 capsule, R-0, E-Prescribe      rosuvastatin (CRESTOR) 5 MG tablet TAKE 1 TABLET BY MOUTH EVERY DAY, Disp-90 tablet, R-0, E-PrescribeDX Code Needed  PT REQUESTED REFILLS.      valACYclovir (VALTREX) 500 MG tablet Take 500 mg by mouth daily As needed, Historical      Continuous Blood Gluc  (DEXCOM G6 ) TORRIE Use to read blood sugars as per 's instructions., Disp-1 each, R-0, Local PrintUse per manufacturers instructions      Continuous Blood Gluc Sensor (DEXCOM G6 SENSOR) MISC Change every 10 days., Disp-3 each, R-6, E-Prescribe      Continuous Blood Gluc Transmit (DEXCOM G6 TRANSMITTER) MISC Change every 3 months., Disp-1 each, R-2, E-Prescribe      Insulin Disposable Pump (OMNIPOD DASH PODS, GEN 4,) MISC 1 each every 3 days, Disp-30 each, R-1, E-Prescribe      NOVOFINE 32 32 gauge x 1/4\" Ndle 1 each by Other route 6 times daily R-3, DAWHistorical           STOP taking these medications       liraglutide (VICTOZA) 18 MG/3ML solution Comments:   Reason for Stopping:             Allergies   Allergies   Allergen Reactions    Morphine Other (See Comments)     \"make me delirious,\"  \"nightmares\"  Other reaction(s): delirium  \"make me delirious,\"  \"nightmares\"      Adhesive [Cyanoacrylate] Other (See Comments)     Can only tolerate tape for short periods of time shelia in sensitive areas    Amlodipine Unknown and Other (See Comments)     Other reaction(s): delerium    Doxazosin Other (See " Comments)     Other reaction(s): feels foggy    Irbesartan-Hydrochlorothiazide Other (See Comments)     Other reaction(s): hyponatremia    Other Allergy (See Comments) [External Allergen Needs Reconciliation - See Comment] Unknown     Other reaction(s): skin rash    Prednisone Unknown     Caused extremely high blood sugars    Tramadol Other (See Comments)     Possible seizure activity    Trazodone Unknown     Other reaction(s): hung over

## 2023-07-25 ENCOUNTER — TELEPHONE (OUTPATIENT)
Dept: ENDOCRINOLOGY | Facility: CLINIC | Age: 73
End: 2023-07-25

## 2023-07-25 ENCOUNTER — LAB (OUTPATIENT)
Dept: LAB | Facility: CLINIC | Age: 73
End: 2023-07-25
Payer: COMMERCIAL

## 2023-07-25 DIAGNOSIS — E83.52 HYPERCALCEMIA: ICD-10-CM

## 2023-07-25 DIAGNOSIS — E10.65 TYPE 1 DIABETES MELLITUS WITH HYPERGLYCEMIA (H): Primary | ICD-10-CM

## 2023-07-25 DIAGNOSIS — E78.2 MIXED HYPERLIPIDEMIA: ICD-10-CM

## 2023-07-25 DIAGNOSIS — E10.65 TYPE 1 DIABETES MELLITUS WITH HYPERGLYCEMIA (H): ICD-10-CM

## 2023-07-25 LAB
ALBUMIN SERPL BCG-MCNC: 4.2 G/DL (ref 3.5–5.2)
CALCIUM SERPL-MCNC: 9.5 MG/DL (ref 8.8–10.2)
CHOLEST SERPL-MCNC: 127 MG/DL
CREAT SERPL-MCNC: 0.76 MG/DL (ref 0.51–0.95)
FASTING STATUS PATIENT QL REPORTED: ABNORMAL
GFR SERPL CREATININE-BSD FRML MDRD: 82 ML/MIN/1.73M2
GLUCOSE SERPL-MCNC: 283 MG/DL (ref 70–99)
HBA1C MFR BLD: 7.4 % (ref 0–5.6)
HDLC SERPL-MCNC: 59 MG/DL
LDLC SERPL CALC-MCNC: 43 MG/DL
NONHDLC SERPL-MCNC: 68 MG/DL
SODIUM SERPL-SCNC: 137 MMOL/L (ref 136–145)
TRIGL SERPL-MCNC: 126 MG/DL

## 2023-07-25 PROCEDURE — 36415 COLL VENOUS BLD VENIPUNCTURE: CPT

## 2023-07-25 PROCEDURE — 82947 ASSAY GLUCOSE BLOOD QUANT: CPT

## 2023-07-25 PROCEDURE — 82088 ASSAY OF ALDOSTERONE: CPT

## 2023-07-25 PROCEDURE — 80061 LIPID PANEL: CPT

## 2023-07-25 PROCEDURE — 99000 SPECIMEN HANDLING OFFICE-LAB: CPT

## 2023-07-25 PROCEDURE — 84295 ASSAY OF SERUM SODIUM: CPT

## 2023-07-25 PROCEDURE — 82565 ASSAY OF CREATININE: CPT

## 2023-07-25 PROCEDURE — 83036 HEMOGLOBIN GLYCOSYLATED A1C: CPT

## 2023-07-25 PROCEDURE — 82040 ASSAY OF SERUM ALBUMIN: CPT

## 2023-07-25 PROCEDURE — 84244 ASSAY OF RENIN: CPT | Mod: 90

## 2023-07-25 PROCEDURE — 82306 VITAMIN D 25 HYDROXY: CPT

## 2023-07-25 PROCEDURE — 82310 ASSAY OF CALCIUM: CPT

## 2023-07-25 NOTE — TELEPHONE ENCOUNTER
Spoke to pt and informed that Victoza arrived in clinic. Pt states at the hospital her Victoza was discontinued. Pt was in the ER due to low of 47. Pt states ER stated it was the combination of metformin and victoza that cause the low..    Will inform provider. Pt may need to see CDE in between now and follow up with provider in October.

## 2023-07-26 NOTE — TELEPHONE ENCOUNTER
Lm for pt informing of below.     Per provider okay to stay of Victoza until follow up. Monitor blood sugars. Any questions or concerns to contact the clinic.

## 2023-07-27 LAB
ATRIAL RATE - MUSE: 95 BPM
DIASTOLIC BLOOD PRESSURE - MUSE: 90 MMHG
INTERPRETATION ECG - MUSE: NORMAL
P AXIS - MUSE: 67 DEGREES
PR INTERVAL - MUSE: 172 MS
QRS DURATION - MUSE: 128 MS
QT - MUSE: 406 MS
QTC - MUSE: 510 MS
R AXIS - MUSE: -4 DEGREES
SYSTOLIC BLOOD PRESSURE - MUSE: 137 MMHG
T AXIS - MUSE: 125 DEGREES
VENTRICULAR RATE- MUSE: 95 BPM

## 2023-07-28 LAB
ALDOST SERPL-MCNC: 4.3 NG/DL (ref 0–31)
BACTERIA BLD CULT: NO GROWTH

## 2023-07-28 NOTE — TELEPHONE ENCOUNTER
I called the pt and informed of the below, pt understands. Pt will still come to pick her Victoza up.

## 2023-07-29 LAB — BACTERIA BLD CULT: NO GROWTH

## 2023-07-30 PROBLEM — A41.9 ACUTE SEPSIS (H): Status: RESOLVED | Noted: 2023-07-23 | Resolved: 2023-07-30

## 2023-07-30 LAB
DEPRECATED CALCIDIOL+CALCIFEROL SERPL-MC: <52 UG/L (ref 20–75)
VITAMIN D2 SERPL-MCNC: <5 UG/L
VITAMIN D3 SERPL-MCNC: 47 UG/L

## 2023-07-31 DIAGNOSIS — E10.65 TYPE 1 DIABETES MELLITUS WITH HYPERGLYCEMIA (H): Primary | ICD-10-CM

## 2023-07-31 RX ORDER — PROCHLORPERAZINE 25 MG/1
SUPPOSITORY RECTAL
Qty: 1 EACH | Refills: 0 | Status: SHIPPED | OUTPATIENT
Start: 2023-07-31

## 2023-08-01 LAB — RENIN PLAS-CCNC: <0.1 NG/ML/HR

## 2023-08-05 ENCOUNTER — HEALTH MAINTENANCE LETTER (OUTPATIENT)
Age: 73
End: 2023-08-05

## 2023-08-05 DIAGNOSIS — E10.65 TYPE 1 DIABETES MELLITUS WITH HYPERGLYCEMIA (H): ICD-10-CM

## 2023-08-07 RX ORDER — METFORMIN HCL 500 MG
TABLET, EXTENDED RELEASE 24 HR ORAL
Qty: 360 TABLET | Refills: 0 | Status: SHIPPED | OUTPATIENT
Start: 2023-08-07 | End: 2023-11-03

## 2023-08-07 NOTE — TELEPHONE ENCOUNTER
"Requested Prescriptions   Pending Prescriptions Disp Refills    metFORMIN (GLUCOPHAGE XR) 500 MG 24 hr tablet [Pharmacy Med Name: METFORMIN HCL  MG TABLET] 360 tablet 1     Sig: TAKE 2 TABS (1000MG) BY MOUTH TWICE DAILY WITH MEALS       Biguanide Agents Passed - 8/5/2023  7:39 AM        Passed - Patient is age 10 or older        Passed - Patient has documented A1c within the specified period of time.     If HgbA1C is 8 or greater, it needs to be on file within the past 3 months.  If less than 8, must be on file within the past 6 months.     Recent Labs   Lab Test 07/25/23  1400   A1C 7.4*             Passed - Patient's CR is NOT>1.4 OR Patient's EGFR is NOT<45 within past 12 mos.     Recent Labs   Lab Test 07/25/23  1400 09/17/21  2240 03/03/21  1206   GFRESTIMATED 82   < > >60   GFRESTBLACK  --   --  >60    < > = values in this interval not displayed.       Recent Labs   Lab Test 07/25/23  1400   CR 0.76             Passed - Patient does NOT have a diagnosis of CHF.        Passed - Medication is active on med list        Passed - Patient is not pregnant        Passed - Patient has not had a positive pregnancy test within the past 12 mos.         Passed - Recent (6 mo) or future (30 days) visit within the authorizing provider's specialty     Patient had office visit in the last 6 months or has a visit in the next 30 days with authorizing provider or within the authorizing provider's specialty.  See \"Patient Info\" tab in inbasket, or \"Choose Columns\" in Meds & Orders section of the refill encounter.                 "

## 2023-08-10 ENCOUNTER — ALLIED HEALTH/NURSE VISIT (OUTPATIENT)
Dept: EDUCATION SERVICES | Facility: CLINIC | Age: 73
End: 2023-08-10
Payer: COMMERCIAL

## 2023-08-10 DIAGNOSIS — E13.9 LADA (LATENT AUTOIMMUNE DIABETES IN ADULTS), MANAGED AS TYPE 1 (H): Primary | ICD-10-CM

## 2023-08-10 PROCEDURE — 99207 PR NO CHARGE NURSE ONLY: CPT

## 2023-08-10 PROCEDURE — G0108 DIAB MANAGE TRN  PER INDIV: HCPCS

## 2023-08-10 NOTE — PROGRESS NOTES
Diabetes Self-Management Education & Support    Presents for: Insulin Pump and CGM Review    Type of Service: In Person Visit    ASSESSMENT:    Patient seen today for follow-up.  She was supposed to transition from OmniPod Dash to OmniPod 5 however she states it was too expensive.  Patient thinks she may try again once she is out of the donut hole.  Patient was in the hospital end of July for acute sepsis.  She is doing much better.    OmniPod Dash uploaded as well as Dexcom .  Reviewed both reports in detail with patient.  Patient feels frustrated that her morning blood sugars have been higher.  Patient is usually waking between 9 to 10 AM..  She seems to be bolusing appropriately, may sometimes be missing a bolus but not frequently.    Patient does not have any concerns today and states she is not sure why she is even here.  Per protocol will increase 3 a.m. basal rate from 0.45 units/h to 0.50 units/h. This brings up the new TDD of basal from 15.3 to 15.6 units per day.     Patient is also taking Victoza and metformin.      REPORTS:             SETTINGS: (prior to changes today)        Patient's most recent   Lab Results   Component Value Date    A1C 7.4 07/25/2023     is meeting goal of <8.0    Diabetes knowledge and skills assessment:   Patient is knowledgeable in diabetes management concepts related to: Healthy Eating, Being Active, Monitoring, Taking Medication, Problem Solving, Reducing Risks, Healthy Coping, and Insulin Pump Concepts Balancing glucose and insulin  Calculating boluses    Continue education with the following diabetes management concepts: Healthy Eating, Being Active, Monitoring, Taking Medication, Problem Solving, Reducing Risks, Healthy Coping, and Insulin Pump Concepts Balancing glucose and insulin  Carbohydrate counting  Calculating boluses    Based on learning assessment above, most appropriate setting for further diabetes education would be: Individual setting.   "    PLAN    Increased 3 AM basal rate from 0.45 units/h to 0.50 units/h.  Patient would like to follow-up with CDE as needed.  She has follow-up with Dr. Bardales in October.  Patient agrees to call with any questions or concerns or if blood sugars are not well-managed.      See Care Plan for co-developed, patient-state behavior change goals.  AVS provided for patient today.      SUBJECTIVE/OBJECTIVE:  Presents for: Insulin Pump and CGM Review  Accompanied by: Self  Diabetes education in the past 24mo: Yes  Focus of Visit: Monitoring, Self-care behavioral goal setting, Healthy Eating, Taking Medication  Diabetes type: Type 1  Date of diagnosis: HARRIETT  How confident are you filling out medical forms by yourself:: Extremely  Transportation concerns: No  Difficulty affording diabetes medication?: Sometimes (using the [employer] assistance program)  Difficulty affording diabetes testing supplies?: Sometimes  Other concerns:: None  Cultural Influences/Ethnic Background:  Not  or     Diabetes Symptoms & Complications:     Complications assessed today?: No    Patient Problem List and Family Medical History reviewed for relevant medical history, current medical status, and diabetes risk factors.    Vitals:  There were no vitals taken for this visit.  Estimated body mass index is 26.89 kg/m  as calculated from the following:    Height as of 7/23/23: 1.575 m (5' 2\").    Weight as of 4/18/23: 66.7 kg (147 lb).   Last 3 BP:   BP Readings from Last 3 Encounters:   07/24/23 117/73   04/18/23 126/80   03/27/23 131/69       History   Smoking Status    Some Days    Types: Cigarettes    Last attempt to quit: 9/15/2021   Smokeless Tobacco    Never       Labs:  Lab Results   Component Value Date    A1C 7.4 07/25/2023     Lab Results   Component Value Date     07/25/2023     07/24/2023    GLC 95 05/27/2022     Lab Results   Component Value Date    LDL 43 07/25/2023    LDL 99 07/06/2016     Direct Measure HDL "   Date Value Ref Range Status   07/25/2023 59 >=50 mg/dL Final   ]  GFR Estimate   Date Value Ref Range Status   07/25/2023 82 >60 mL/min/1.73m2 Final   03/03/2021 >60 >60 mL/min/1.73m2 Final     GFR Estimate If Black   Date Value Ref Range Status   03/03/2021 >60 >60 mL/min/1.73m2 Final     Lab Results   Component Value Date    CR 0.76 07/25/2023     No results found for: MICROALBUMIN    Healthy Eating:  Healthy Eating Assessed Today: Yes  Cultural/Religion diet restrictions?: No  Meal planning/habits: Avoiding sweets  Meals include: Breakfast, Lunch, Dinner, Evening Snack  Has patient met with a dietitian in the past?: Yes    Being Active:  Being Active Assessed Today: Yes    Monitoring:  Monitoring Assessed Today: Yes  Did patient bring glucose meter to appointment? : Yes  Blood Glucose Meter: CGM (Dexcom G6)  Times checking blood sugar at home (number): 5+  Times checking blood sugar at home (per): Day  Blood glucose trend: Fluctuating    Taking Medications:  Diabetes Medication(s)       Biguanides       metFORMIN (GLUCOPHAGE XR) 500 MG 24 hr tablet    TAKE 2 TABS (1000MG) BY MOUTH TWICE DAILY WITH MEALS      Diabetic Other       Glucagon, rDNA, (GLUCAGON EMERGENCY) 1 MG KIT    1 mg IM one time, PRN severe hypoglycemia causing altered consciousness affecting ability to take food by mouth     glucose (BD GLUCOSE) 5 g chewable tablet    Take 2 tablets (10 g) by mouth daily as needed (hypoglycemia)      Insulin       insulin aspart (NOVOLOG VIAL) 100 UNITS/ML vial    9 units with meals plus correction scale with sensitivity of 40 mg/dL starting at 140 mg/dL            Taking Medication Assessed Today: Yes  Current Treatments: Insulin Injections, Non-insulin Injectables  Dose schedule: Pre-breakfast, Pre-lunch, Pre-dinner, At bedtime  Given by: Patient  Injection/Infusion sites: Abdomen  Problems taking diabetes medications regularly?: No  Diabetes medication side effects?: No    Problem Solving:  Problem Solving  Assessed Today: Yes  Is the patient at risk for hypoglycemia?: Yes  Hypoglycemia Frequency: Never  Is the patient at risk for DKA?: Yes  Does patient have DKA prevention plan?:  (will assess at upcoming visit)              Reducing Risks:  Diabetes Risks: Age over 45 years, History of gestational diabetes, Sedentary Lifestyle, Family History  CAD Risks: Diabetes Mellitus, Dyslipidemia, Hypertension, Obesity, Post-menopausal, Sedentary lifestyle    Healthy Coping:  Healthy Coping Assessed Today: Yes  Emotional response to diabetes: Ready to learn  Informal Support system:: Family  Stage of change: PREPARATION (Decided to change - considering how)  Support resources: In-person Offerings  Patient Activation Measure Survey Score:       No data to display                  Care Plan and Education Provided:  Care Plan: Diabetes   Updates made by Ana Sanchez RN since 8/10/2023 12:00 AM        Problem: Diabetes Self-Management Education Needed to Optimize Self-Care Behaviors         Goal: Healthy Eating - follow a healthy eating pattern for diabetes    This Visit's Progress: 60%   Recent Progress: 50%        Goal: Monitoring - monitor glucose and ketones as directed    This Visit's Progress: 100%   Recent Progress: 90%   Note:    Use dexcom to monitor BG        Goal: Taking Medication - patient is consistently taking medications as directed    This Visit's Progress: 80%   Recent Progress: 80%   Note:    Bolus before eating              Time Spent: 60 minutes  Encounter Type: Individual    Any diabetes medication dose changes were made via the CDE Protocol per the patient's referring provider. A copy of this encounter was shared with the provider.

## 2023-08-10 NOTE — LETTER
8/10/2023         RE: Paige Mari  [address]  [place] [address]        Dear Colleague,    Thank you for referring your patient, Paige Mari, to the [facility]. Please see a copy of my visit note below.    Diabetes Self-Management Education & Support    Presents for: Insulin Pump and CGM Review    Type of Service: In Person Visit    ASSESSMENT:    Patient seen today for follow-up.  She was supposed to transition from OmniPod Dash to OmniPod 5 however she states it was too expensive.  Patient thinks she may try again once she is out of the donut hole.  Patient was in the hospital end of July for acute sepsis.  She is doing much better.    OmniPod Dash uploaded as well as Dexcom .  Reviewed both reports in detail with patient.  Patient feels frustrated that her morning blood sugars have been higher.  Patient is usually waking between 9 to 10 AM..  She seems to be bolusing appropriately, may sometimes be missing a bolus but not frequently.    Patient does not have any concerns today and states she is not sure why she is even here.  Per protocol will increase 3 a.m. basal rate from 0.45 units/h to 0.50 units/h. This brings up the new TDD of basal from 15.3 to 15.6 units per day.     Patient is also taking Victoza and metformin.      REPORTS:             SETTINGS: (prior to changes today)        Patient's most recent   Lab Results   Component Value Date    A1C 7.4 07/25/2023     is meeting goal of <8.0    Diabetes knowledge and skills assessment:   Patient is knowledgeable in diabetes management concepts related to: Healthy Eating, Being Active, Monitoring, Taking Medication, Problem Solving, Reducing Risks, Healthy Coping, and Insulin Pump Concepts Balancing glucose and insulin  Calculating boluses    Continue education with the following diabetes management concepts: Healthy Eating, Being Active, Monitoring, Taking Medication, Problem Solving,  "Reducing Risks, Healthy Coping, and Insulin Pump Concepts Balancing glucose and insulin  Carbohydrate counting  Calculating boluses    Based on learning assessment above, most appropriate setting for further diabetes education would be: Individual setting.      PLAN    Increased 3 AM basal rate from 0.45 units/h to 0.50 units/h.  Patient would like to follow-up with CDE as needed.  She has follow-up with Dr. Bardales in October.  Patient agrees to call with any questions or concerns or if blood sugars are not well-managed.      See Care Plan for co-developed, patient-state behavior change goals.  AVS provided for patient today.      SUBJECTIVE/OBJECTIVE:  Presents for: Insulin Pump and CGM Review  Accompanied by: Self  Diabetes education in the past 24mo: Yes  Focus of Visit: Monitoring, Self-care behavioral goal setting, Healthy Eating, Taking Medication  Diabetes type: Type 1  Date of diagnosis: HARRIETT  How confident are you filling out medical forms by yourself:: Extremely  Transportation concerns: No  Difficulty affording diabetes medication?: Sometimes (using the [employer] assistance program)  Difficulty affording diabetes testing supplies?: Sometimes  Other concerns:: None  Cultural Influences/Ethnic Background:  Not  or     Diabetes Symptoms & Complications:     Complications assessed today?: No    Patient Problem List and Family Medical History reviewed for relevant medical history, current medical status, and diabetes risk factors.    Vitals:  There were no vitals taken for this visit.  Estimated body mass index is 26.89 kg/m  as calculated from the following:    Height as of 7/23/23: 1.575 m (5' 2\").    Weight as of 4/18/23: 66.7 kg (147 lb).   Last 3 BP:   BP Readings from Last 3 Encounters:   07/24/23 117/73   04/18/23 126/80   03/27/23 131/69       History   Smoking Status     Some Days     Types: Cigarettes     Last attempt to quit: 9/15/2021   Smokeless Tobacco     Never       Labs:  Lab " Results   Component Value Date    A1C 7.4 07/25/2023     Lab Results   Component Value Date     07/25/2023     07/24/2023    GLC 95 05/27/2022     Lab Results   Component Value Date    LDL 43 07/25/2023    LDL 99 07/06/2016     Direct Measure HDL   Date Value Ref Range Status   07/25/2023 59 >=50 mg/dL Final   ]  GFR Estimate   Date Value Ref Range Status   07/25/2023 82 >60 mL/min/1.73m2 Final   03/03/2021 >60 >60 mL/min/1.73m2 Final     GFR Estimate If Black   Date Value Ref Range Status   03/03/2021 >60 >60 mL/min/1.73m2 Final     Lab Results   Component Value Date    CR 0.76 07/25/2023     No results found for: MICROALBUMIN    Healthy Eating:  Healthy Eating Assessed Today: Yes  Cultural/Moravian diet restrictions?: No  Meal planning/habits: Avoiding sweets  Meals include: Breakfast, Lunch, Dinner, Evening Snack  Has patient met with a dietitian in the past?: Yes    Being Active:  Being Active Assessed Today: Yes    Monitoring:  Monitoring Assessed Today: Yes  Did patient bring glucose meter to appointment? : Yes  Blood Glucose Meter: CGM (Dexcom G6)  Times checking blood sugar at home (number): 5+  Times checking blood sugar at home (per): Day  Blood glucose trend: Fluctuating    Taking Medications:  Diabetes Medication(s)       Biguanides       metFORMIN (GLUCOPHAGE XR) 500 MG 24 hr tablet    TAKE 2 TABS (1000MG) BY MOUTH TWICE DAILY WITH MEALS      Diabetic Other       Glucagon, rDNA, (GLUCAGON EMERGENCY) 1 MG KIT    1 mg IM one time, PRN severe hypoglycemia causing altered consciousness affecting ability to take food by mouth     glucose (BD GLUCOSE) 5 g chewable tablet    Take 2 tablets (10 g) by mouth daily as needed (hypoglycemia)      Insulin       insulin aspart (NOVOLOG VIAL) 100 UNITS/ML vial    9 units with meals plus correction scale with sensitivity of 40 mg/dL starting at 140 mg/dL            Taking Medication Assessed Today: Yes  Current Treatments: Insulin Injections, Non-insulin  Injectables  Dose schedule: Pre-breakfast, Pre-lunch, Pre-dinner, At bedtime  Given by: Patient  Injection/Infusion sites: Abdomen  Problems taking diabetes medications regularly?: No  Diabetes medication side effects?: No    Problem Solving:  Problem Solving Assessed Today: Yes  Is the patient at risk for hypoglycemia?: Yes  Hypoglycemia Frequency: Never  Is the patient at risk for DKA?: Yes  Does patient have DKA prevention plan?:  (will assess at upcoming visit)              Reducing Risks:  Diabetes Risks: Age over 45 years, History of gestational diabetes, Sedentary Lifestyle, Family History  CAD Risks: Diabetes Mellitus, Dyslipidemia, Hypertension, Obesity, Post-menopausal, Sedentary lifestyle    Healthy Coping:  Healthy Coping Assessed Today: Yes  Emotional response to diabetes: Ready to learn  Informal Support system:: Family  Stage of change: PREPARATION (Decided to change - considering how)  Support resources: In-person Offerings  Patient Activation Measure Survey Score:       No data to display                  Care Plan and Education Provided:  Care Plan: Diabetes   Updates made by Ana Sancehz RN since 8/10/2023 12:00 AM        Problem: Diabetes Self-Management Education Needed to Optimize Self-Care Behaviors         Goal: Healthy Eating - follow a healthy eating pattern for diabetes    This Visit's Progress: 60%   Recent Progress: 50%        Goal: Monitoring - monitor glucose and ketones as directed    This Visit's Progress: 100%   Recent Progress: 90%   Note:    Use dexcom to monitor BG        Goal: Taking Medication - patient is consistently taking medications as directed    This Visit's Progress: 80%   Recent Progress: 80%   Note:    Bolus before eating              Time Spent: 60 minutes  Encounter Type: Individual    Any diabetes medication dose changes were made via the CDE Protocol per the patient's referring provider. A copy of this encounter was shared with the provider.

## 2023-08-13 DIAGNOSIS — E10.65 TYPE 1 DIABETES MELLITUS WITH HYPERGLYCEMIA (H): ICD-10-CM

## 2023-08-13 DIAGNOSIS — E78.2 MIXED HYPERLIPIDEMIA: ICD-10-CM

## 2023-08-14 RX ORDER — ROSUVASTATIN CALCIUM 5 MG/1
TABLET, COATED ORAL
Qty: 90 TABLET | Refills: 0 | Status: SHIPPED | OUTPATIENT
Start: 2023-08-14 | End: 2023-11-09

## 2023-08-14 NOTE — TELEPHONE ENCOUNTER
"Requested Prescriptions   Pending Prescriptions Disp Refills    rosuvastatin (CRESTOR) 5 MG tablet [Pharmacy Med Name: ROSUVASTATIN CALCIUM 5 MG TAB] 90 tablet 0     Sig: TAKE 1 TABLET BY MOUTH EVERY DAY       Statins Protocol Passed - 8/13/2023  8:51 PM        Passed - LDL on file in past 12 months     Recent Labs   Lab Test 07/25/23  1400   LDL 43             Passed - No abnormal creatine kinase in past 12 months     Recent Labs   Lab Test 03/03/21  1206   CKT 57                Passed - Recent (12 mo) or future (30 days) visit within the authorizing provider's specialty     Patient has had an office visit with the authorizing provider or a provider within the authorizing providers department within the previous 12 mos or has a future within next 30 days. See \"Patient Info\" tab in inbasket, or \"Choose Columns\" in Meds & Orders section of the refill encounter.              Passed - Medication is active on med list        Passed - Patient is age 18 or older        Passed - No active pregnancy on record        Passed - No positive pregnancy test in past 12 months             "

## 2023-08-17 ENCOUNTER — APPOINTMENT (OUTPATIENT)
Dept: CT IMAGING | Facility: HOSPITAL | Age: 73
DRG: 261 | End: 2023-08-17
Attending: EMERGENCY MEDICINE
Payer: COMMERCIAL

## 2023-08-17 ENCOUNTER — HOSPITAL ENCOUNTER (INPATIENT)
Facility: HOSPITAL | Age: 73
LOS: 4 days | Discharge: HOME | DRG: 261 | End: 2023-08-22
Attending: EMERGENCY MEDICINE | Admitting: INTERNAL MEDICINE
Payer: COMMERCIAL

## 2023-08-17 ENCOUNTER — APPOINTMENT (OUTPATIENT)
Dept: NEUROLOGY | Facility: HOSPITAL | Age: 73
DRG: 261 | End: 2023-08-17
Attending: INTERNAL MEDICINE
Payer: COMMERCIAL

## 2023-08-17 DIAGNOSIS — E13.9: Primary | ICD-10-CM

## 2023-08-17 DIAGNOSIS — R55 SYNCOPE, UNSPECIFIED SYNCOPE TYPE: ICD-10-CM

## 2023-08-17 DIAGNOSIS — S02.92XA MULTIPLE CLOSED FRACTURES OF FACIAL BONE, INITIAL ENCOUNTER (H): ICD-10-CM

## 2023-08-17 DIAGNOSIS — I10 BENIGN ESSENTIAL HYPERTENSION: ICD-10-CM

## 2023-08-17 DIAGNOSIS — S09.90XA CLOSED HEAD INJURY, INITIAL ENCOUNTER: ICD-10-CM

## 2023-08-17 LAB
ABO/RH(D): NORMAL
ALBUMIN SERPL BCG-MCNC: 4 G/DL (ref 3.5–5.2)
ALBUMIN UR-MCNC: NEGATIVE MG/DL
ALP SERPL-CCNC: 61 U/L (ref 35–104)
ALT SERPL W P-5'-P-CCNC: 15 U/L (ref 0–50)
ANION GAP SERPL CALCULATED.3IONS-SCNC: 11 MMOL/L (ref 7–15)
ANTIBODY SCREEN: NEGATIVE
APPEARANCE UR: CLEAR
APTT PPP: 34 SECONDS (ref 22–38)
AST SERPL W P-5'-P-CCNC: 33 U/L (ref 0–45)
BASOPHILS # BLD AUTO: 0.1 10E3/UL (ref 0–0.2)
BASOPHILS NFR BLD AUTO: 1 %
BILIRUB DIRECT SERPL-MCNC: <0.2 MG/DL (ref 0–0.3)
BILIRUB SERPL-MCNC: 0.6 MG/DL
BILIRUB UR QL STRIP: NEGATIVE
BUN SERPL-MCNC: 13.6 MG/DL (ref 8–23)
CALCIUM SERPL-MCNC: 9.3 MG/DL (ref 8.8–10.2)
CHLORIDE SERPL-SCNC: 97 MMOL/L (ref 98–107)
COLOR UR AUTO: ABNORMAL
CREAT SERPL-MCNC: 0.75 MG/DL (ref 0.51–0.95)
DEPRECATED HCO3 PLAS-SCNC: 25 MMOL/L (ref 22–29)
EOSINOPHIL # BLD AUTO: 0.1 10E3/UL (ref 0–0.7)
EOSINOPHIL NFR BLD AUTO: 1 %
ERYTHROCYTE [DISTWIDTH] IN BLOOD BY AUTOMATED COUNT: 13.5 % (ref 10–15)
GFR SERPL CREATININE-BSD FRML MDRD: 83 ML/MIN/1.73M2
GLUCOSE BLDC GLUCOMTR-MCNC: 183 MG/DL (ref 70–99)
GLUCOSE BLDC GLUCOMTR-MCNC: 56 MG/DL (ref 70–99)
GLUCOSE BLDC GLUCOMTR-MCNC: 73 MG/DL (ref 70–99)
GLUCOSE SERPL-MCNC: 239 MG/DL (ref 70–99)
GLUCOSE UR STRIP-MCNC: 30 MG/DL
HCT VFR BLD AUTO: 38.6 % (ref 35–47)
HGB BLD-MCNC: 13 G/DL (ref 11.7–15.7)
HGB UR QL STRIP: NEGATIVE
HOLD SPECIMEN: NORMAL
HOLD SPECIMEN: NORMAL
HYALINE CASTS: 1 /LPF
IMM GRANULOCYTES # BLD: 0.1 10E3/UL
IMM GRANULOCYTES NFR BLD: 0 %
INR PPP: 1.06 (ref 0.85–1.15)
KETONES UR STRIP-MCNC: NEGATIVE MG/DL
LEUKOCYTE ESTERASE UR QL STRIP: NEGATIVE
LYMPHOCYTES # BLD AUTO: 2.8 10E3/UL (ref 0.8–5.3)
LYMPHOCYTES NFR BLD AUTO: 18 %
MAGNESIUM SERPL-MCNC: 1.7 MG/DL (ref 1.7–2.3)
MCH RBC QN AUTO: 32 PG (ref 26.5–33)
MCHC RBC AUTO-ENTMCNC: 33.7 G/DL (ref 31.5–36.5)
MCV RBC AUTO: 95 FL (ref 78–100)
MONOCYTES # BLD AUTO: 0.8 10E3/UL (ref 0–1.3)
MONOCYTES NFR BLD AUTO: 5 %
NEUTROPHILS # BLD AUTO: 11.5 10E3/UL (ref 1.6–8.3)
NEUTROPHILS NFR BLD AUTO: 75 %
NITRATE UR QL: NEGATIVE
NRBC # BLD AUTO: 0 10E3/UL
NRBC BLD AUTO-RTO: 0 /100
PH UR STRIP: 6.5 [PH] (ref 5–7)
PLATELET # BLD AUTO: 283 10E3/UL (ref 150–450)
POTASSIUM SERPL-SCNC: 5.2 MMOL/L (ref 3.4–5.3)
PROT SERPL-MCNC: 6.4 G/DL (ref 6.4–8.3)
RBC # BLD AUTO: 4.06 10E6/UL (ref 3.8–5.2)
RBC URINE: 0 /HPF
SODIUM SERPL-SCNC: 133 MMOL/L (ref 136–145)
SP GR UR STRIP: 1.01 (ref 1–1.03)
SPECIMEN EXPIRATION DATE: NORMAL
TROPONIN T SERPL HS-MCNC: 12 NG/L
TROPONIN T SERPL HS-MCNC: 19 NG/L
TSH SERPL DL<=0.005 MIU/L-ACNC: 0.98 UIU/ML (ref 0.3–4.2)
UROBILINOGEN UR STRIP-MCNC: <2 MG/DL
WBC # BLD AUTO: 15.3 10E3/UL (ref 4–11)
WBC URINE: <1 /HPF

## 2023-08-17 PROCEDURE — 250N000013 HC RX MED GY IP 250 OP 250 PS 637: Performed by: INTERNAL MEDICINE

## 2023-08-17 PROCEDURE — 99223 1ST HOSP IP/OBS HIGH 75: CPT | Performed by: INTERNAL MEDICINE

## 2023-08-17 PROCEDURE — 80051 ELECTROLYTE PANEL: CPT | Performed by: EMERGENCY MEDICINE

## 2023-08-17 PROCEDURE — 95819 EEG AWAKE AND ASLEEP: CPT

## 2023-08-17 PROCEDURE — 82962 GLUCOSE BLOOD TEST: CPT

## 2023-08-17 PROCEDURE — 258N000003 HC RX IP 258 OP 636: Performed by: EMERGENCY MEDICINE

## 2023-08-17 PROCEDURE — 84484 ASSAY OF TROPONIN QUANT: CPT | Performed by: EMERGENCY MEDICINE

## 2023-08-17 PROCEDURE — 70486 CT MAXILLOFACIAL W/O DYE: CPT

## 2023-08-17 PROCEDURE — 83735 ASSAY OF MAGNESIUM: CPT | Performed by: EMERGENCY MEDICINE

## 2023-08-17 PROCEDURE — 250N000013 HC RX MED GY IP 250 OP 250 PS 637: Performed by: EMERGENCY MEDICINE

## 2023-08-17 PROCEDURE — G0378 HOSPITAL OBSERVATION PER HR: HCPCS

## 2023-08-17 PROCEDURE — 84443 ASSAY THYROID STIM HORMONE: CPT | Performed by: INTERNAL MEDICINE

## 2023-08-17 PROCEDURE — 93005 ELECTROCARDIOGRAM TRACING: CPT | Performed by: EMERGENCY MEDICINE

## 2023-08-17 PROCEDURE — 36415 COLL VENOUS BLD VENIPUNCTURE: CPT | Performed by: EMERGENCY MEDICINE

## 2023-08-17 PROCEDURE — 86850 RBC ANTIBODY SCREEN: CPT | Performed by: EMERGENCY MEDICINE

## 2023-08-17 PROCEDURE — 96372 THER/PROPH/DIAG INJ SC/IM: CPT | Performed by: INTERNAL MEDICINE

## 2023-08-17 PROCEDURE — 99285 EMERGENCY DEPT VISIT HI MDM: CPT | Mod: 25

## 2023-08-17 PROCEDURE — 70450 CT HEAD/BRAIN W/O DYE: CPT

## 2023-08-17 PROCEDURE — 96360 HYDRATION IV INFUSION INIT: CPT

## 2023-08-17 PROCEDURE — 72125 CT NECK SPINE W/O DYE: CPT

## 2023-08-17 PROCEDURE — 81001 URINALYSIS AUTO W/SCOPE: CPT | Performed by: EMERGENCY MEDICINE

## 2023-08-17 PROCEDURE — 85610 PROTHROMBIN TIME: CPT | Performed by: EMERGENCY MEDICINE

## 2023-08-17 PROCEDURE — 250N000011 HC RX IP 250 OP 636: Mod: JZ | Performed by: INTERNAL MEDICINE

## 2023-08-17 PROCEDURE — 86901 BLOOD TYPING SEROLOGIC RH(D): CPT | Performed by: EMERGENCY MEDICINE

## 2023-08-17 PROCEDURE — 96361 HYDRATE IV INFUSION ADD-ON: CPT

## 2023-08-17 PROCEDURE — 85014 HEMATOCRIT: CPT | Performed by: EMERGENCY MEDICINE

## 2023-08-17 PROCEDURE — 85730 THROMBOPLASTIN TIME PARTIAL: CPT | Performed by: EMERGENCY MEDICINE

## 2023-08-17 PROCEDURE — 82248 BILIRUBIN DIRECT: CPT | Performed by: EMERGENCY MEDICINE

## 2023-08-17 RX ORDER — DULOXETIN HYDROCHLORIDE 60 MG/1
60 CAPSULE, DELAYED RELEASE ORAL DAILY
Status: CANCELLED | OUTPATIENT
Start: 2023-08-18

## 2023-08-17 RX ORDER — LOSARTAN POTASSIUM 50 MG/1
100 TABLET ORAL DAILY
Status: CANCELLED | OUTPATIENT
Start: 2023-08-18

## 2023-08-17 RX ORDER — MAGNESIUM OXIDE 400 MG/1
400 TABLET ORAL DAILY
Status: CANCELLED | OUTPATIENT
Start: 2023-08-17

## 2023-08-17 RX ORDER — PANTOPRAZOLE SODIUM 40 MG/1
40 TABLET, DELAYED RELEASE ORAL
Status: DISCONTINUED | OUTPATIENT
Start: 2023-08-18 | End: 2023-08-22 | Stop reason: HOSPADM

## 2023-08-17 RX ORDER — METOPROLOL SUCCINATE 100 MG/1
100 TABLET, EXTENDED RELEASE ORAL 2 TIMES DAILY
Status: DISCONTINUED | OUTPATIENT
Start: 2023-08-17 | End: 2023-08-22 | Stop reason: HOSPADM

## 2023-08-17 RX ORDER — ASPIRIN 81 MG/1
81 TABLET ORAL DAILY
Status: CANCELLED | OUTPATIENT
Start: 2023-08-18

## 2023-08-17 RX ORDER — LIDOCAINE 40 MG/G
CREAM TOPICAL
Status: DISCONTINUED | OUTPATIENT
Start: 2023-08-17 | End: 2023-08-22 | Stop reason: HOSPADM

## 2023-08-17 RX ORDER — ESCITALOPRAM OXALATE 20 MG/1
20 TABLET ORAL DAILY
COMMUNITY
Start: 2023-06-25

## 2023-08-17 RX ORDER — NALOXONE HYDROCHLORIDE 0.4 MG/ML
0.2 INJECTION, SOLUTION INTRAMUSCULAR; INTRAVENOUS; SUBCUTANEOUS
Status: DISCONTINUED | OUTPATIENT
Start: 2023-08-17 | End: 2023-08-22 | Stop reason: HOSPADM

## 2023-08-17 RX ORDER — ENOXAPARIN SODIUM 100 MG/ML
40 INJECTION SUBCUTANEOUS AT BEDTIME
Status: DISCONTINUED | OUTPATIENT
Start: 2023-08-17 | End: 2023-08-18

## 2023-08-17 RX ORDER — OXYCODONE HYDROCHLORIDE 5 MG/1
5 TABLET ORAL ONCE
Status: COMPLETED | OUTPATIENT
Start: 2023-08-17 | End: 2023-08-17

## 2023-08-17 RX ORDER — CETIRIZINE HYDROCHLORIDE 10 MG/1
10 TABLET ORAL DAILY
Status: DISCONTINUED | OUTPATIENT
Start: 2023-08-18 | End: 2023-08-22 | Stop reason: HOSPADM

## 2023-08-17 RX ORDER — ALBUTEROL SULFATE 90 UG/1
1-2 AEROSOL, METERED RESPIRATORY (INHALATION) EVERY 6 HOURS
Status: CANCELLED | OUTPATIENT
Start: 2023-08-17

## 2023-08-17 RX ORDER — TIZANIDINE 2 MG/1
4 TABLET ORAL
COMMUNITY

## 2023-08-17 RX ORDER — ESTRADIOL 1 MG/1
1 TABLET ORAL DAILY
Status: CANCELLED | OUTPATIENT
Start: 2023-08-18

## 2023-08-17 RX ORDER — ROSUVASTATIN CALCIUM 5 MG/1
5 TABLET, COATED ORAL DAILY
Status: DISCONTINUED | OUTPATIENT
Start: 2023-08-18 | End: 2023-08-22 | Stop reason: HOSPADM

## 2023-08-17 RX ORDER — ARIPIPRAZOLE 2 MG/1
2 TABLET ORAL DAILY
Status: DISCONTINUED | OUTPATIENT
Start: 2023-08-18 | End: 2023-08-22 | Stop reason: HOSPADM

## 2023-08-17 RX ORDER — ONDANSETRON 2 MG/ML
4 INJECTION INTRAMUSCULAR; INTRAVENOUS EVERY 6 HOURS PRN
Status: DISCONTINUED | OUTPATIENT
Start: 2023-08-17 | End: 2023-08-22 | Stop reason: HOSPADM

## 2023-08-17 RX ORDER — HYDRALAZINE HYDROCHLORIDE 20 MG/ML
10 INJECTION INTRAMUSCULAR; INTRAVENOUS EVERY 6 HOURS PRN
Status: DISCONTINUED | OUTPATIENT
Start: 2023-08-17 | End: 2023-08-22 | Stop reason: HOSPADM

## 2023-08-17 RX ORDER — ALBUTEROL SULFATE 90 UG/1
1-2 AEROSOL, METERED RESPIRATORY (INHALATION) EVERY 6 HOURS
Status: DISCONTINUED | OUTPATIENT
Start: 2023-08-17 | End: 2023-08-22 | Stop reason: HOSPADM

## 2023-08-17 RX ORDER — HYDRALAZINE HYDROCHLORIDE 20 MG/ML
10 INJECTION INTRAMUSCULAR; INTRAVENOUS EVERY 6 HOURS PRN
Status: DISCONTINUED | OUTPATIENT
Start: 2023-08-17 | End: 2023-08-17 | Stop reason: ALTCHOICE

## 2023-08-17 RX ORDER — CETIRIZINE HYDROCHLORIDE 10 MG/1
10 TABLET ORAL DAILY
Status: CANCELLED | OUTPATIENT
Start: 2023-08-18

## 2023-08-17 RX ORDER — PREGABALIN 100 MG/1
100 CAPSULE ORAL 3 TIMES DAILY
Status: DISCONTINUED | OUTPATIENT
Start: 2023-08-17 | End: 2023-08-22 | Stop reason: HOSPADM

## 2023-08-17 RX ORDER — METOPROLOL SUCCINATE 50 MG/1
100 TABLET, EXTENDED RELEASE ORAL 2 TIMES DAILY
Status: CANCELLED | OUTPATIENT
Start: 2023-08-17

## 2023-08-17 RX ORDER — ESCITALOPRAM OXALATE 10 MG/1
10 TABLET ORAL DAILY
Status: CANCELLED | OUTPATIENT
Start: 2023-08-18

## 2023-08-17 RX ORDER — MAGNESIUM OXIDE 400 MG/1
400 TABLET ORAL DAILY
Status: DISCONTINUED | OUTPATIENT
Start: 2023-08-18 | End: 2023-08-22 | Stop reason: HOSPADM

## 2023-08-17 RX ORDER — METFORMIN HCL 500 MG
1000 TABLET, EXTENDED RELEASE 24 HR ORAL 2 TIMES DAILY WITH MEALS
Status: DISCONTINUED | OUTPATIENT
Start: 2023-08-18 | End: 2023-08-22 | Stop reason: HOSPADM

## 2023-08-17 RX ORDER — ARIPIPRAZOLE 2 MG/1
2 TABLET ORAL DAILY
Status: CANCELLED | OUTPATIENT
Start: 2023-08-18

## 2023-08-17 RX ORDER — ACETAMINOPHEN 500 MG
1000 TABLET ORAL 2 TIMES DAILY
Status: CANCELLED | OUTPATIENT
Start: 2023-08-17

## 2023-08-17 RX ORDER — NALOXONE HYDROCHLORIDE 0.4 MG/ML
0.4 INJECTION, SOLUTION INTRAMUSCULAR; INTRAVENOUS; SUBCUTANEOUS
Status: DISCONTINUED | OUTPATIENT
Start: 2023-08-17 | End: 2023-08-22 | Stop reason: HOSPADM

## 2023-08-17 RX ORDER — ESTRADIOL 1 MG/1
1 TABLET ORAL DAILY
Status: DISCONTINUED | OUTPATIENT
Start: 2023-08-18 | End: 2023-08-22 | Stop reason: HOSPADM

## 2023-08-17 RX ORDER — METFORMIN HCL 500 MG
1000 TABLET, EXTENDED RELEASE 24 HR ORAL 2 TIMES DAILY WITH MEALS
Status: CANCELLED | OUTPATIENT
Start: 2023-08-17

## 2023-08-17 RX ORDER — LOSARTAN POTASSIUM 50 MG/1
100 TABLET ORAL EVERY EVENING
Status: DISCONTINUED | OUTPATIENT
Start: 2023-08-17 | End: 2023-08-22 | Stop reason: HOSPADM

## 2023-08-17 RX ORDER — NICOTINE POLACRILEX 4 MG
15-30 LOZENGE BUCCAL
Status: DISCONTINUED | OUTPATIENT
Start: 2023-08-17 | End: 2023-08-18

## 2023-08-17 RX ORDER — CLONIDINE 0.3 MG/24H
1 PATCH, EXTENDED RELEASE TRANSDERMAL WEEKLY
Status: DISCONTINUED | OUTPATIENT
Start: 2023-08-20 | End: 2023-08-22 | Stop reason: HOSPADM

## 2023-08-17 RX ORDER — NYSTATIN 100000 [USP'U]/G
POWDER TOPICAL 2 TIMES DAILY PRN
COMMUNITY
Start: 2023-07-31

## 2023-08-17 RX ORDER — ESCITALOPRAM OXALATE 10 MG/1
10 TABLET ORAL DAILY
Status: DISCONTINUED | OUTPATIENT
Start: 2023-08-18 | End: 2023-08-22 | Stop reason: HOSPADM

## 2023-08-17 RX ORDER — DEXTROSE MONOHYDRATE 25 G/50ML
25-50 INJECTION, SOLUTION INTRAVENOUS
Status: DISCONTINUED | OUTPATIENT
Start: 2023-08-17 | End: 2023-08-18

## 2023-08-17 RX ORDER — ACETAMINOPHEN 650 MG/1
650 SUPPOSITORY RECTAL EVERY 6 HOURS PRN
Status: DISCONTINUED | OUTPATIENT
Start: 2023-08-17 | End: 2023-08-22 | Stop reason: HOSPADM

## 2023-08-17 RX ORDER — ROSUVASTATIN CALCIUM 5 MG/1
5 TABLET, COATED ORAL DAILY
Status: CANCELLED | OUTPATIENT
Start: 2023-08-17

## 2023-08-17 RX ORDER — ONDANSETRON 4 MG/1
4 TABLET, ORALLY DISINTEGRATING ORAL EVERY 6 HOURS PRN
Status: DISCONTINUED | OUTPATIENT
Start: 2023-08-17 | End: 2023-08-22 | Stop reason: HOSPADM

## 2023-08-17 RX ORDER — PREGABALIN 100 MG/1
100 CAPSULE ORAL 3 TIMES DAILY
Status: CANCELLED | OUTPATIENT
Start: 2023-08-17

## 2023-08-17 RX ORDER — HYDROMORPHONE HCL IN WATER/PF 6 MG/30 ML
0.2 PATIENT CONTROLLED ANALGESIA SYRINGE INTRAVENOUS
Status: DISCONTINUED | OUTPATIENT
Start: 2023-08-17 | End: 2023-08-19

## 2023-08-17 RX ORDER — ASPIRIN 81 MG/1
324 TABLET, CHEWABLE ORAL ONCE
Status: DISCONTINUED | OUTPATIENT
Start: 2023-08-17 | End: 2023-08-17

## 2023-08-17 RX ORDER — CEPHALEXIN 500 MG/1
500 CAPSULE ORAL DAILY
COMMUNITY
End: 2023-10-04

## 2023-08-17 RX ORDER — ACETAMINOPHEN 325 MG/1
650 TABLET ORAL EVERY 6 HOURS PRN
Status: DISCONTINUED | OUTPATIENT
Start: 2023-08-17 | End: 2023-08-22 | Stop reason: HOSPADM

## 2023-08-17 RX ORDER — DULOXETIN HYDROCHLORIDE 60 MG/1
60 CAPSULE, DELAYED RELEASE ORAL DAILY
COMMUNITY

## 2023-08-17 RX ORDER — DULOXETIN HYDROCHLORIDE 60 MG/1
60 CAPSULE, DELAYED RELEASE ORAL DAILY
Status: DISCONTINUED | OUTPATIENT
Start: 2023-08-18 | End: 2023-08-22 | Stop reason: HOSPADM

## 2023-08-17 RX ORDER — ASPIRIN 81 MG/1
81 TABLET ORAL DAILY
Status: DISCONTINUED | OUTPATIENT
Start: 2023-08-18 | End: 2023-08-22 | Stop reason: HOSPADM

## 2023-08-17 RX ORDER — ASPIRIN 81 MG/1
81 TABLET ORAL DAILY
COMMUNITY

## 2023-08-17 RX ORDER — ACETAMINOPHEN 500 MG
1000 TABLET ORAL 2 TIMES DAILY
Status: DISCONTINUED | OUTPATIENT
Start: 2023-08-17 | End: 2023-08-22 | Stop reason: HOSPADM

## 2023-08-17 RX ADMIN — LOSARTAN POTASSIUM 100 MG: 50 TABLET, FILM COATED ORAL at 21:49

## 2023-08-17 RX ADMIN — PREGABALIN 100 MG: 100 CAPSULE ORAL at 21:48

## 2023-08-17 RX ADMIN — OXYCODONE HYDROCHLORIDE 2.5 MG: 5 TABLET ORAL at 22:09

## 2023-08-17 RX ADMIN — AMOXICILLIN AND CLAVULANATE POTASSIUM 1 TABLET: 875; 125 TABLET, FILM COATED ORAL at 14:38

## 2023-08-17 RX ADMIN — ENOXAPARIN SODIUM 40 MG: 40 INJECTION SUBCUTANEOUS at 23:40

## 2023-08-17 RX ADMIN — METOPROLOL SUCCINATE 100 MG: 100 TABLET, EXTENDED RELEASE ORAL at 21:48

## 2023-08-17 RX ADMIN — ACETAMINOPHEN 1000 MG: 500 TABLET ORAL at 21:48

## 2023-08-17 RX ADMIN — SODIUM CHLORIDE 1000 ML: 9 INJECTION, SOLUTION INTRAVENOUS at 12:16

## 2023-08-17 RX ADMIN — OXYCODONE HYDROCHLORIDE 5 MG: 5 TABLET ORAL at 14:53

## 2023-08-17 ASSESSMENT — ACTIVITIES OF DAILY LIVING (ADL)
ADLS_ACUITY_SCORE: 35
ADLS_ACUITY_SCORE: 39
ADLS_ACUITY_SCORE: 35

## 2023-08-17 NOTE — ED TRIAGE NOTES
"Patient to triage via . Patient states she has fainted 5-6 times this week. Has not been evaluated. Today she was at sink in bathroom and awoke on floor, she has multiple ecchymosis on face in different ages of healing. \"I am diabetic, I took my blood sugar it was 136\". Denies neck pain \"my nose is sore\" no blood thinners        "

## 2023-08-17 NOTE — ED PROVIDER NOTES
"  Emergency Department Encounter     Evaluation Date & Time:   No admission date for patient encounter.    CHIEF COMPLAINT:  Syncope      Triage Note:Patient to triage via wc. Patient states she has fainted 5-6 times this week. Has not been evaluated. Today she was at sink in bathroom and awoke on floor, she has multiple ecchymosis on face in different ages of healing. \"I am diabetic, I took my blood sugar it was 136\". Denies neck pain \"my nose is sore\" no blood thinners                 ED COURSE & MEDICAL DECISION MAKING:     ED Course as of 08/17/23 1755   Thu Aug 17, 2023   1358 Hgb 13.  WBC incidentally elevated to 15.  Rest of labs unremarkable.  Initial hsTrop 19, will get 2 hour delta.   1359 CT head and Cspine neg for acute pathology.  CT facial bones show multiple facial fractures. Will speak with Oral/facial surgery to discuss treatment. Pt will need hospitalization either way for further cardiac evaluation given syncopal episodes.   1429 I spoke with [facility] regarding facial fractures and exam findings. They stated pt could be followed up with them in 1 week - they will reach out to them.  Does not require antibx, but could start if concerned about underlying diabetes. Will hospitalize here for further syncope workup.  Pt to be updated.   1435 Pt does not want to transfer hospitals. Will board here.       Pt here with multiple syncopal episodes at home over the past week. States she feels no prodromal, but has been more dyspneic and occasionally lightheaded.  Denies chest pain, abd pain, bloody/black stools, but appears rather pale.  Takes baby aspirin, no anticoagulation.  She has older appearing bruising primarily to b/l periorbital region with some lesser bruising to left axillary region and right knee.  Neuro intact, no cspine tenderness/pain, but given age, warrants imaging. Will get CT head/cspine/facial bones, labs and reassess.    12:08 PM I met with the patient, obtained history, " performed an initial exam, and discussed options and plan for diagnostics and treatment here in the ED.   2:20 PM I spoke with the facial trauma resident, Dr. Heather Hester.  2:31 PM I rechecked and updated the patient   2:45 PM I spoke with hospitalist, who accepts for admission and further care.      Medical Decision Making    History:  Supplemental history from: Documented in chart, if applicable  External Record(s) reviewed: Documented in chart, if applicable.    Work Up:  Chart documentation includes differential considered and any EKGs or imaging independently interpreted by provider, where specified.  In additional to work up documented, I considered the following work up: Documented in chart, if applicable.    External consultation:  Discussion of management with another provider: Documented in chart, if applicable    Complicating factors:  Care impacted by chronic illness: Chronic Lung Disease and Diabetes  Care affected by social determinants of health: N/A    Disposition considerations: Admit.      At the conclusion of the encounter I discussed the results of all the tests and the disposition. The questions were answered. The patient or family acknowledged understanding and was agreeable with the care plan.      MEDICATIONS GIVEN IN THE EMERGENCY DEPARTMENT:  Medications   0.9% sodium chloride BOLUS (0 mLs Intravenous Stopped 8/17/23 1411)   amoxicillin-clavulanate (AUGMENTIN) 875-125 MG per tablet 1 tablet (1 tablet Oral $Given 8/17/23 1438)   oxyCODONE (ROXICODONE) tablet 5 mg (5 mg Oral $Given 8/17/23 1453)       NEW PRESCRIPTIONS STARTED AT TODAY'S ED VISIT:  New Prescriptions    No medications on file       HPI   The history is provided by the patient. No  was used.        Paige Mari is a 73 year old female with a pertinent history of T1DM, T2DM, COPD, diabetic polyneuropathy, HTN, a-fib with RVR, who presents to this ED via wheelchair for evaluation of syncope.    Patient  reports she has had x6 syncopal episodes in the last 5 days. She denies lightheadedness prior to these episodes, she just drops. Most recent episode was this morning while at the bathroom sink. She woke up on the floor. Patient reports recently she has had shortness of breath, heart fluttering, and diaphoresis. She reports a pounding headache and bilateral shoulder pain. No chest pain, abdominal pain, black or bloody stools. She has bruising around bother of her eyes, and reports bruising to right knee and left armpit. Patient has a history of both type 1 and type 2 diabetes, with diabetic neuropathy. Her blood sugar today was 136. No alcohol use. No history of anemia or bleeding. Notable tremors in bilateral upper extremities that the patient reports started only 3-4 months ago and it affects her walking. Patient takes a baby aspirin. No other current complaints.    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), managed as type 1 (H)     Mood disorder (H) 11/24/2014    KP (obstructive sleep apnea) 05/15/2015    Paroxysmal atrial fibrillation (H) 09/22/2021    Small bowel obstruction (H) 09/18/2021    Status post total knee replacement 11/24/2014       Past Surgical History:   Procedure Laterality Date    APPENDECTOMY      at age 3    ARTHROSCOPY KNEE WITH MENISCECTOMY  3/10/14    partial medial and lateral meniscectomies, subpatellar chondroplasty    BACK SURGERY      BLEPHAROPLASTY Bilateral 8/17/2015    Procedure: BLEPHAROPLASTY BILATERAL UPPER LIDS;  Surgeon: Chasidy Bryant MD;  Location: [facility];  Service:     BOWEL RESECTION  12 years ago    COLON SURGERY  2004    sigmoid colectomy    DILATION AND CURETTAGE      x3     HYSTERECTOMY      age 39    IR LUMBAR PUNCTURE  2022    JOINT REPLACEMENT      OOPHORECTOMY      OTHER SURGICAL HISTORY  2005    bowel obstruction    PICC TRIPLE LUMEN PLACEMENT  2021         PICC TRIPLE LUMEN PLACEMENT  2022         WA ARTHRODESIS ANT INTERBODY MIN DISCECTOMY,LUMBAR N/A 2015    Procedure: ANTERIOR LUMBAR INTERBODY FUSION L4-5, INTERBODY GRAFT, BMP INSTRUMENTATION;  Surgeon: Zeeshan Guillaume MD;  Location: [facility];  Service: Spine    TOE SURGERY      TONSILLECTOMY & ADENOIDECTOMY      age 10    ZZC REMOVAL OF OVARY(S)      Description: Oophorectomy;  Recorded: 2010;    ZZC REMOVAL OF OVARY(S)      Description: Oophorectomy;  Recorded: 2010;    Z TOTAL ABDOM HYSTERECTOMY      Description: Hysterectomy;  Recorded: 2010;    ZZC TOTAL ABDOM HYSTERECTOMY      Description: Hysterectomy;  Recorded: 2010;    Z TOTAL KNEE ARTHROPLASTY Right 2014    Procedure: RIGHT TOTAL KNEE ARTHROPLASTY;  Surgeon: Jules Harris MD;  Location: [facility];  Service: Orthopedics       Family History   Problem Relation Age of Onset    Breast Cancer No family hx of        Social History     Tobacco Use    Smoking status: Some Days     Types: Cigarettes     Last attempt to quit: 9/15/2021     Years since quittin.9    Smokeless tobacco: Never   Substance Use Topics    Alcohol use: No    Drug use: No       acetaminophen (TYLENOL) 500 MG tablet  albuterol (PROAIR HFA/PROVENTIL HFA/VENTOLIN HFA) 108 (90 Base) MCG/ACT inhaler  amoxicillin (AMOXIL) 500 MG tablet  ARIPiprazole (ABILIFY) 2 MG tablet  aspirin 81 MG EC tablet  cephALEXin (KEFLEX) 500 MG capsule  cetirizine (ZYRTEC) 10 MG tablet  cholecalciferol (VITAMIN D3) 25 mcg (1000 units) capsule  cloNIDine (CATAPRES-TTS) 0.3 mg/24 hr  diclofenac sodium (VOLTAREN) 1 % Gel  DULoxetine (CYMBALTA) 60 MG capsule  escitalopram (LEXAPRO) 10 MG tablet  estradiol (ESTRACE) 1 MG tablet  fluticasone  "(FLONASE) 50 MCG/ACT nasal spray  galcanezumab-gnlm (EMGALITY) 120 MG/ML injection  Glucagon, rDNA, (GLUCAGON EMERGENCY) 1 MG KIT  glucose (BD GLUCOSE) 5 g chewable tablet  insulin aspart (NOVOLOG VIAL) 100 UNITS/ML vial  magnesium oxide (MAG-OX) 400 MG tablet  medical cannabis (Patient's own supply)  metFORMIN (GLUCOPHAGE XR) 500 MG 24 hr tablet  metoprolol succinate ER (TOPROL-XL) 100 MG 24 hr tablet  multivitamin w/minerals (THERA-VIT-M) tablet  nystatin (MYCOSTATIN) 077487 UNIT/GM external powder  olmesartan (BENICAR) 40 MG tablet  omeprazole (PRILOSEC) 20 MG capsule  pregabalin (LYRICA) 100 MG capsule  rosuvastatin (CRESTOR) 5 MG tablet  tiZANidine (ZANAFLEX) 2 MG tablet  valACYclovir (VALTREX) 500 MG tablet  Continuous Blood Gluc  (DEXCOM G6 ) TORRIE  Continuous Blood Gluc Sensor (DEXCOM G6 SENSOR) MISC  Continuous Blood Gluc Transmit (DEXCOM G6 TRANSMITTER) MISC  Insulin Disposable Pump (OMNIPOD DASH PODS, GEN 4,) MISC        Physical Exam     Vitals:  BP (!) 158/108   Pulse 72   Temp 98.1  F (36.7  C)   Resp 24   Ht 1.575 m (5' 2\")   Wt 65.3 kg (144 lb)   SpO2 99%   BMI 26.34 kg/m      PHYSICAL EXAM:   Physical Exam  Vitals and nursing note reviewed.   Constitutional:       General: She is not in acute distress.     Appearance: Normal appearance.      Comments: Pale appearing.   HENT:      Head: Normocephalic and atraumatic.      Comments: Bilateral periorbital ecchymosis appears older. Tenderness to palpation of bilateral orbits.     Nose: Nose normal.      Mouth/Throat:      Mouth: Mucous membranes are moist. Mucous membranes are pale.   Eyes:      Pupils: Pupils are equal, round, and reactive to light.   Neck:      Comments: No c-spine tenderness palpation.  Cardiovascular:      Rate and Rhythm: Regular rhythm. Tachycardia present.      Pulses: Normal pulses.           Radial pulses are 2+ on the right side and 2+ on the left side.        Dorsalis pedis pulses are 2+ on the right side " and 2+ on the left side.   Pulmonary:      Effort: Pulmonary effort is normal. No respiratory distress.      Breath sounds: Normal breath sounds.   Abdominal:      Palpations: Abdomen is soft.      Tenderness: There is no abdominal tenderness.   Musculoskeletal:      Cervical back: Full passive range of motion without pain and neck supple. No tenderness.      Comments: No calf tenderness or swelling b/l. Minor appearing bruising to right knee and left axilla. BUE fine tremor.   Skin:     General: Skin is warm.      Coloration: Skin is pale.      Findings: No rash.   Neurological:      General: No focal deficit present.      Mental Status: She is alert. Mental status is at baseline.      Comments: Fluent speech, no acute lateralizing deficits   Psychiatric:         Mood and Affect: Mood normal.         Behavior: Behavior normal.           Results     LAB:  All pertinent labs reviewed and interpreted  Labs Ordered and Resulted from Time of ED Arrival to Time of ED Departure   BASIC METABOLIC PANEL - Abnormal       Result Value    Sodium 133 (*)     Potassium 5.2      Chloride 97 (*)     Carbon Dioxide (CO2) 25      Anion Gap 11      Urea Nitrogen 13.6      Creatinine 0.75      Calcium 9.3      Glucose 239 (*)     GFR Estimate 83     TROPONIN T, HIGH SENSITIVITY - Abnormal    Troponin T, High Sensitivity 19 (*)    CBC WITH PLATELETS AND DIFFERENTIAL - Abnormal    WBC Count 15.3 (*)     RBC Count 4.06      Hemoglobin 13.0      Hematocrit 38.6      MCV 95      MCH 32.0      MCHC 33.7      RDW 13.5      Platelet Count 283      % Neutrophils 75      % Lymphocytes 18      % Monocytes 5      % Eosinophils 1      % Basophils 1      % Immature Granulocytes 0      NRBCs per 100 WBC 0      Absolute Neutrophils 11.5 (*)     Absolute Lymphocytes 2.8      Absolute Monocytes 0.8      Absolute Eosinophils 0.1      Absolute Basophils 0.1      Absolute Immature Granulocytes 0.1      Absolute NRBCs 0.0     ROUTINE UA WITH MICROSCOPIC  REFLEX TO CULTURE - Abnormal    Color Urine Light Yellow      Appearance Urine Clear      Glucose Urine 30 (*)     Bilirubin Urine Negative      Ketones Urine Negative      Specific Gravity Urine 1.012      Blood Urine Negative      pH Urine 6.5      Protein Albumin Urine Negative      Urobilinogen Urine <2.0      Nitrite Urine Negative      Leukocyte Esterase Urine Negative      RBC Urine 0      WBC Urine <1      Hyaline Casts Urine 1     INR - Normal    INR 1.06     PARTIAL THROMBOPLASTIN TIME - Normal    aPTT 34     HEPATIC FUNCTION PANEL - Normal    Protein Total 6.4      Albumin 4.0      Bilirubin Total 0.6      Alkaline Phosphatase 61      AST 33      ALT 15      Bilirubin Direct <0.20     MAGNESIUM - Normal    Magnesium 1.7     TROPONIN T, HIGH SENSITIVITY - Normal    Troponin T, High Sensitivity 12     TSH WITH FREE T4 REFLEX - Normal    TSH 0.98     TYPE AND SCREEN, ADULT    ABO/RH(D) O NEG      Antibody Screen Negative      SPECIMEN EXPIRATION DATE [id number]     ABO/RH TYPE AND SCREEN       RADIOLOGY:  CT Cervical Spine w/o Contrast   Final Result   IMPRESSION:   1.  No acute cervical fracture or posttraumatic subluxation.      2.  No high-grade spinal canal stenosis. Moderate foraminal compromise upper cervical levels due to facet joint arthropathy/endplate osteophytes on a chronic degenerative basis.      3.  High density fluid/blood products layering in the left maxillary sinus presumed posttraumatic as described on facial bone CT/head CT.      4.  Low-grade degenerative subluxations anteriorly throughout the cervical spine with no evidence for interspace widening or facet joint disruption. Facet joint degenerative changes are noted.      5.  Please see above for details and full description.                            CT Facial Bones without Contrast   Final Result   IMPRESSION:    1.  Bilateral orbital floor defects, definitely acute on the left with combination and associated stranding/blood  products; possibly nonacute on the right. No extraocular muscular entrapment. Extraconal fat extends at the level of the floor defect, more    prominent on the left. Comminuted fragment left orbital floor extends into the left maxillary sinus as well. High-density layering fluid left maxillary sinus may resent posttraumatic blood/fluid within the left maxillary sinus.      2.  Soft tissue swelling left preseptal periorbital soft tissue and frontal and supra orbital scalp.      3.  No additional displaced facial bone fractures with remainder of the maxilla and mandible appear intact.       4.  See above for description and details.         CT Head w/o Contrast   Final Result   IMPRESSION:   1.  No acute process intracranially. No intracranial mass, mass effect or hemorrhage. Nothing for parenchymal contusion or acute infarction.      2.  Mild chronic ischemic changes deep white matter both cerebral hemispheres. Partially empty sella morphology as on previous MRI.      3.  No fracture of the visualized calvarium/skull base.      4.  Soft tissue swelling/hematoma left frontal scalp with left preseptal periorbital soft tissue swelling. Left orbital floor fracture described separately. High density air-fluid level left maxillary sinus presumed posttraumatic.      5.  Please see above and facial bone CT for details and description.                         Echocardiogram Complete    (Results Pending)                ECG:  NSR, rate 79, LBBB, no acute ischemia    I have independently reviewed and interpreted the EKG(s) documented above     PROCEDURES:  Procedures:  none      FINAL IMPRESSION:    ICD-10-CM    1. Syncope, unspecified syncope type  R55       2. Multiple closed fractures of facial bone, initial encounter (H)  S02.92XA       3. Closed head injury, initial encounter  S09.90XA           0 minutes of critical care time      IAinta, am serving as a scribe to document services personally performed by   Noé King, based on my observations and the provider's statements to me. I, Noé King, DO attest that Anita Hicks is acting in a scribe capacity, has observed my performance of the services and has documented them in accordance with my direction.      Noé King, DO  Emergency Medicine  [facility] EMERGENCY DEPARTMENT  8/17/2023  12:13 PM          Noé King MD  08/17/23 1467

## 2023-08-17 NOTE — MEDICATION SCRIBE - ADMISSION MEDICATION HISTORY
Medication Scribe Admission Medication History    Admission medication history is complete. The information provided in this note is only as accurate as the sources available at the time of the update.    Medication reconciliation/reorder completed by provider prior to medication history? No    Information Source(s): Patient via in-person. Called daughterMerline [phone] to discuss medication history.     Pertinent Information: Patient reports setting and administering her own medications.     Patient reports taking Aripiprazole daily. No Rx fill history in the past 12 months.     Patient reports taking Cymbalta daily. 90 day supply dispensed 3/10/23    Patient reports taking Emgality every 28 days. Tx fill history = 1x on 3/28/23    Patient reports taking Metoprolol 100 mg BID.     Patient reports stopping Liraglutide per recommendation of most recent hospital discharge. Daughter states that patient continues to take medication.    Changes made to PTA medication list:  Added: Cephalexin, Duloxetine, Escitalopram, Nystatin powder, Tizanidine  Deleted: Vitamin B12  Changed: None    Medication Affordability:  Not including over the counter (OTC) medications, was there a time in the past 3 months when you did not take your medications as prescribed because of cost?: No    Allergies reviewed with patient and updates made in EHR: yes    Medication History Completed By: Haroldo Leonard 8/17/2023 4:07 PM    Prior to Admission medications    Medication Sig Last Dose Taking? Auth Provider Long Term End Date   acetaminophen (TYLENOL) 500 MG tablet Take 1,000 mg by mouth 2 times daily  8/17/2023 at am Yes Provider, Historical     albuterol (PROAIR HFA/PROVENTIL HFA/VENTOLIN HFA) 108 (90 Base) MCG/ACT inhaler Inhale 1-2 puffs into the lungs every 6 hours 8/17/2023 at am Yes Theresa Brunson MD Yes    amoxicillin (AMOXIL) 500 MG tablet Take 2,000 mg by mouth as needed Before dental appointments More than a month at prn Yes  Provider, Historical     ARIPiprazole (ABILIFY) 2 MG tablet Take 2 mg by mouth daily  8/17/2023 at am Yes Provider, Historical     aspirin 81 MG EC tablet Take 81 mg by mouth daily 8/17/2023 at am Yes Reported, Patient No    cephALEXin (KEFLEX) 500 MG capsule Take 500 mg by mouth daily 8/17/2023 at am Yes Reported, Patient     cetirizine (ZYRTEC) 10 MG tablet Take 10 mg by mouth daily  Past Week at prn Yes Provider, Historical     cholecalciferol (VITAMIN D3) 25 mcg (1000 units) capsule Take 1,000 Units by mouth daily  8/17/2023 at am Yes Provider, Historical     cloNIDine (CATAPRES-TTS) 0.3 mg/24 hr Place 1 patch onto the skin once a week  8/13/2023 at am Yes Provider, Historical Yes    diclofenac sodium (VOLTAREN) 1 % Gel [DICLOFENAC SODIUM (VOLTAREN) 1 % GEL] Apply 1 g topically daily as needed. Past Month at prn Yes Provider, Historical     DULoxetine (CYMBALTA) 60 MG capsule Take 60 mg by mouth daily 8/17/2023 at am Yes Reported, Patient No    escitalopram (LEXAPRO) 10 MG tablet Take 10 mg by mouth daily 8/17/2023 at am Yes Reported, Patient Yes    estradiol (ESTRACE) 1 MG tablet Take 1 mg by mouth daily  8/17/2023 at am Yes Provider, Historical     fluticasone (FLONASE) 50 MCG/ACT nasal spray Spray 1 spray into both nostrils daily More than a month at prn Yes Kristan Moreno MD     galcanezumab-gnlm (EMGALITY) 120 MG/ML injection Inject 1 mL (120 mg) Subcutaneous every 28 days 8/14/2023 at am Yes Cindy Cook, JOLYNN CNP     Glucagon, rDNA, (GLUCAGON EMERGENCY) 1 MG KIT 1 mg IM one time, PRN severe hypoglycemia causing altered consciousness affecting ability to take food by mouth Unknown at n/a Yes Oscar Lujan MD Yes    glucose (BD GLUCOSE) 5 g chewable tablet Take 2 tablets (10 g) by mouth daily as needed (hypoglycemia) Unknown at n/a Yes Maynor Bardales MD     insulin aspart (NOVOLOG VIAL) 100 UNITS/ML vial 9 units with meals plus correction scale with sensitivity of 40 mg/dL starting at 140 mg/dL  8/16/2023 at pm Yes Maynor Bardales MD Yes    magnesium oxide (MAG-OX) 400 MG tablet Take 400 mg by mouth daily  8/17/2023 at am Yes Reported, Patient     medical cannabis (Patient's own supply) 1 Dose by Other route See Admin Instructions [employer] Vape- 1-4 puffs HS PRN 8/16/2023 at hs Yes Provider, Historical     metFORMIN (GLUCOPHAGE XR) 500 MG 24 hr tablet TAKE 2 TABS (1000MG) BY MOUTH TWICE DAILY WITH MEALS 8/17/2023 at am Yes Maynor Bardales MD Yes    metoprolol succinate ER (TOPROL-XL) 100 MG 24 hr tablet Take 100 mg by mouth 2 times daily 8/17/2023 at am Yes Unknown, Entered By History No    multivitamin w/minerals (THERA-VIT-M) tablet Take 1 tablet by mouth daily 8/17/2023 at am Yes Unknown, Entered By History     nystatin (MYCOSTATIN) 765340 UNIT/GM external powder Apply topically 2 times daily Past Week at prn Yes Reported, Patient     olmesartan (BENICAR) 40 MG tablet [OLMESARTAN (BENICAR) 40 MG TABLET] Take 40 mg by mouth daily. 8/16/2023 at pm Yes Provider, Historical     omeprazole (PRILOSEC) 20 MG capsule [OMEPRAZOLE (PRILOSEC) 20 MG CAPSULE] Take 1 capsule by mouth daily before breakfast. 8/17/2023 at am Yes Provider, Historical     pregabalin (LYRICA) 100 MG capsule Take 1 capsule (100 mg) by mouth 3 times daily 8/17/2023 at am Yes Chikis Osuna MD Yes    rosuvastatin (CRESTOR) 5 MG tablet TAKE 1 TABLET BY MOUTH EVERY DAY 8/17/2023 at am Yes Maynor Bardales MD Yes    tiZANidine (ZANAFLEX) 2 MG tablet Take 4 mg by mouth nightly as needed 8/16/2023 at pm Yes Reported, Patient No    valACYclovir (VALTREX) 500 MG tablet Take 500 mg by mouth daily As needed 8/17/2023 at am Yes Provider, Historical     Continuous Blood Gluc  (DEXCOM G6 ) TORRIE Use to read blood sugars as per 's instructions.   Maynor Bardales MD     Continuous Blood Gluc Sensor (DEXCOM G6 SENSOR) MISC Change every 10 days.   Maynor Bardales MD     Continuous Blood Gluc  Transmit (DEXCOM G6 TRANSMITTER) MISC Change every 3 months.   Maynor Bardales MD     Insulin Disposable Pump (OMNIPOD DASH PODS, GEN 4,) MISC 1 each every 3 days   Maynor Bardales MD

## 2023-08-17 NOTE — PROGRESS NOTES
Bedside EEG was performed that included photic, auditory, and sensory stimulation. Hyperventilation was not possible given the patient's clinical state.   Skin intact.  EEG #   [id number] EEG system used.    Neurology dictation to follow up.

## 2023-08-17 NOTE — H&P
"[facility]    History and Physical - Hospitalist Service       Date of Admission:  8/17/2023    Assessment & Plan      72 yo F with h/o HTN, HARRIETT, GERD, mood disorder, remote Etoh and drug use currently on medical marijuana, KP not on CPAP, pAfib, COPD, and neuropathy who presents with recurrent syncope and multiple facial fractures.  Likely this is cardiac in nature but she does have a history of having an abnormal EEG in January of 2022.  It wasn't clear at that time if it was a true seizure disorder so anti-seizure meds were stopped.  She has continued to have episodes since then 1-3 times a month of loss of consciousness but in the past week she has had 5-6 episodes which caused multiple facial fractures. Some aspects sound a little like seizure but not classic.  She does report palpitations prior to the event so this may be cardiac in nature.  Will check EEG and echo and follow telemetry and have cardiology and neurology assess.  OMS did not feel the patient required any antibiotics for her facial/orbital fractures per discussion with ED provider.  I cannot totally exclude that these episodes could be related to hypoglycemia but she states she always checks her glucose immediately after an episode and most of the time her glucoses are ok immediately after but \"sometimes they are low\".      Principal Problem:    Closed head injury/Multiple closed fractures of facial bone - follow up with [facility] outpatient    Recurrent Syncope - unclear etiology as above, arrhythmia vs seizure vs less likely hypoglycemia, workup as above, seizure precautions. Will hold on any antiseizure meds for now until neurology evaluates her and reads the EEG.        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 Used to be on levemir and victoza. Did " "get hypoglycemic in the ED to 56 but did not lose consciousness.  This was treated and she ate a sandwich and insulin pump put on hold for 2 hours and will restart insulin pump for now. If she develops hypoglycemia again then will need to stop the pump and give her some levemir and carb counting novolog with meals and sliding scale insulin.     GERD (gastroesophageal reflux disease) - PPI    Mood disorder - continue Abilify, lexapro, and cymbalta as at home    KP (obstructive sleep apnea) - not on CPAP    Paroxysmal atrial fibrillation - continue toprol and ASA as at home.  Not on anticoagulation.    COPD (chronic obstructive pulmonary disease) - uses albuterol MDI, continue for now.    Diabetic polyneuropathy - on lyrica             Diet:  diabetic  DVT Prophylaxis: Enoxaparin (Lovenox) SQ  Gomes Catheter: Not present  Lines: None     Cardiac Monitoring: None  Code Status:  full    Clinically Significant Risk Factors Present on Admission                # Drug Induced Platelet Defect: home medication list includes an antiplatelet medication   # Hypertension: Noted on problem list     # DMII: A1C = 7.4 % (Ref range: 0.0 - 5.6 %) within past 6 months    # Overweight: Estimated body mass index is 26.34 kg/m  as calculated from the following:    Height as of this encounter: 1.575 m (5' 2\").    Weight as of this encounter: 65.3 kg (144 lb).              Disposition Plan      Expected Discharge Date: 08/18/2023                  Jeet Cooper MD  Hospitalist Service  [facility]  Securely message with Qiro (more info)  Text page via [employer] Paging/Directory     ______________________________________________________________________    Chief Complaint   syncope    History is obtained from the patient and electronic health record    History of Present Illness   72 yo F with h/o HTN, HARRIETT, GERD, mood disorder, remote Etoh and drug use currently on medical marijuana, KP not on CPAP, pAfib, COPD, and " neuropathy who presents with recurrent syncope and multiple facial fractures.  The patient has a somewhat complex history that started a couple years ago when she had some episodes that initially were thought possibly due to seizures and EEG was abnormal but eventual decision was that these were likely seizures so antiseizure meds that had been started during that hospitalization were then stopped on discharge however, since then the patient states that she has had 1-3 episodes a month of loss of consciousness.  She states they usually start with some palpitations and then she loses consciousness.  She is a little confused when she wakes up but she thinks it only lasts for less than 5 minutes.  She thinks there is some jerky type movements associated with them but she is not really 100% sure.  She does not remember the episodes or the first few minutes after the episodes.  She denies any chest pain with these episodes.  She sometimes feels short of breath.  In the last week she has had about 5 or 6 episodes and a couple of these have been associated with facial injury.  She is not sure what triggered this recent escalation.  She states she does check her sugars immediately after every episode and most of the episodes her sugars are okay but she has had a few where the sugars are little bit low.  She cannot think of anything that would any given individual episode.  She denies any recent cold or flu symptoms.  No cough or runny nose or fevers or chills or nausea vomiting or diarrhea.  No melena or hematochezia or dysuria or hematuria or frequency that is new.      Past Medical History    Past Medical History:   Diagnosis Date    Benign essential hypertension     Created by Conversion  Replacement Utility updated for latest IMO load       COPD (chronic obstructive pulmonary disease) (H) 09/22/2021    Diabetic polyneuropathy (H) 02/02/2023    GERD (gastroesophageal reflux disease) 11/24/2014    Latent autoimmune  diabetes mellitus in adult (HARRIETT), managed as type 1 (H)     Created by Conversion       Mood disorder (H) 11/24/2014    KP (obstructive sleep apnea) 05/15/2015    Paroxysmal atrial fibrillation (H) 09/22/2021    Small bowel obstruction (H) 09/18/2021    Status post total knee replacement 11/24/2014       Past Surgical History   Past Surgical History:   Procedure Laterality Date    APPENDECTOMY      at age 3    ARTHROSCOPY KNEE WITH MENISCECTOMY  3/10/14    partial medial and lateral meniscectomies, subpatellar chondroplasty    BACK SURGERY      BLEPHAROPLASTY Bilateral 8/17/2015    Procedure: BLEPHAROPLASTY BILATERAL UPPER LIDS;  Surgeon: Chasidy Bryant MD;  Location: [facility];  Service:     BOWEL RESECTION  12 years ago    COLON SURGERY  2004    sigmoid colectomy    DILATION AND CURETTAGE      x3    HYSTERECTOMY      age 39    IR LUMBAR PUNCTURE  1/22/2022    JOINT REPLACEMENT      OOPHORECTOMY      OTHER SURGICAL HISTORY  2005    bowel obstruction    PICC TRIPLE LUMEN PLACEMENT  9/19/2021         PICC TRIPLE LUMEN PLACEMENT  1/22/2022         ME ARTHRODESIS ANT INTERBODY MIN DISCECTOMY,LUMBAR N/A 1/30/2015    Procedure: ANTERIOR LUMBAR INTERBODY FUSION L4-5, INTERBODY GRAFT, BMP INSTRUMENTATION;  Surgeon: Zeeshan Guillaume MD;  Location: [facility];  Service: Spine    TOE SURGERY      TONSILLECTOMY & ADENOIDECTOMY      age 10    ZZC REMOVAL OF OVARY(S)      Description: Oophorectomy;  Recorded: 06/03/2010;    C REMOVAL OF OVARY(S)      Description: Oophorectomy;  Recorded: 06/03/2010;    [facility] TOTAL ABDOM HYSTERECTOMY      Description: Hysterectomy;  Recorded: 06/03/2010;    ZC TOTAL ABDOM HYSTERECTOMY      Description: Hysterectomy;  Recorded: 06/03/2010;    ZC TOTAL KNEE ARTHROPLASTY Right 11/24/2014    Procedure: RIGHT TOTAL KNEE ARTHROPLASTY;  Surgeon: Jules Harris MD;  Location: [facility];  Service: Orthopedics       Prior to Admission Medications   Prior to Admission  Medications   Prescriptions Last Dose Informant Patient Reported? Taking?   ARIPiprazole (ABILIFY) 2 MG tablet 2023 at am Self, Daughter Yes Yes   Sig: Take 2 mg by mouth daily    Continuous Blood Gluc  (DEXCOM G6 ) TORRIE   No No   Sig: Use to read blood sugars as per 's instructions.   Continuous Blood Gluc Sensor (DEXCOM G6 SENSOR) MISC  Self, Daughter No No   Sig: Change every 10 days.   Continuous Blood Gluc Transmit (DEXCOM G6 TRANSMITTER) MISC  Self, Daughter No No   Sig: Change every 3 months.   DULoxetine (CYMBALTA) 60 MG capsule 2023 at am  Yes Yes   Sig: Take 60 mg by mouth daily   Glucagon, rDNA, (GLUCAGON EMERGENCY) 1 MG KIT Unknown at n/a Self, Daughter No Yes   Si mg IM one time, PRN severe hypoglycemia causing altered consciousness affecting ability to take food by mouth   Insulin Disposable Pump (OMNIPOD DASH PODS, GEN 4,) MISC  Self, Daughter No No   Si each every 3 days   acetaminophen (TYLENOL) 500 MG tablet 2023 at am Self, Daughter Yes Yes   Sig: Take 1,000 mg by mouth 2 times daily    albuterol (PROAIR HFA/PROVENTIL HFA/VENTOLIN HFA) 108 (90 Base) MCG/ACT inhaler 2023 at am Self, Daughter No Yes   Sig: Inhale 1-2 puffs into the lungs every 6 hours   amoxicillin (AMOXIL) 500 MG tablet More than a month at prn Self, Daughter Yes Yes   Sig: Take 2,000 mg by mouth as needed Before dental appointments   aspirin 81 MG EC tablet 2023 at am  Yes Yes   Sig: Take 81 mg by mouth daily   cephALEXin (KEFLEX) 500 MG capsule 2023 at am  Yes Yes   Sig: Take 500 mg by mouth daily   cetirizine (ZYRTEC) 10 MG tablet Past Week at prn Self, Daughter Yes Yes   Sig: Take 10 mg by mouth daily    cholecalciferol (VITAMIN D3) 25 mcg (1000 units) capsule 2023 at am Self, Daughter Yes Yes   Sig: Take 1,000 Units by mouth daily    cloNIDine (CATAPRES-TTS) 0.3 mg/24 hr 2023 at am Self, Daughter Yes Yes   Sig: Place 1 patch onto the skin once a week     diclofenac sodium (VOLTAREN) 1 % Gel Past Month at prn Self, Daughter Yes Yes   Sig: [DICLOFENAC SODIUM (VOLTAREN) 1 % GEL] Apply 1 g topically daily as needed.   escitalopram (LEXAPRO) 10 MG tablet 2023 at am  Yes Yes   Sig: Take 10 mg by mouth daily   estradiol (ESTRACE) 1 MG tablet 2023 at am Self, Daughter Yes Yes   Sig: Take 1 mg by mouth daily    fluticasone (FLONASE) 50 MCG/ACT nasal spray More than a month at prn Self, Daughter No Yes   Sig: Spray 1 spray into both nostrils daily   galcanezumab-gnlm (EMGALITY) 120 MG/ML injection 2023 at am Self, Daughter No Yes   Sig: Inject 1 mL (120 mg) Subcutaneous every 28 days   glucose (BD GLUCOSE) 5 g chewable tablet Unknown at n/a Self, Daughter No Yes   Sig: Take 2 tablets (10 g) by mouth daily as needed (hypoglycemia)   insulin aspart (NOVOLOG VIAL) 100 UNITS/ML vial 2023 at pm Self, Daughter Yes Yes   Si units with meals plus correction scale with sensitivity of 40 mg/dL starting at 140 mg/dL   magnesium oxide (MAG-OX) 400 MG tablet 2023 at am Self, Daughter Yes Yes   Sig: Take 400 mg by mouth daily    medical cannabis (Patient's own supply) 2023 at hs Self, Daughter Yes Yes   Si Dose by Other route See Admin Instructions Malena Tangerine Vape- 1-4 puffs HS PRN   metFORMIN (GLUCOPHAGE XR) 500 MG 24 hr tablet 2023 at am  No Yes   Sig: TAKE 2 TABS (1000MG) BY MOUTH TWICE DAILY WITH MEALS   metoprolol succinate ER (TOPROL-XL) 100 MG 24 hr tablet 2023 at am Self, Daughter Yes Yes   Sig: Take 100 mg by mouth 2 times daily   multivitamin w/minerals (THERA-VIT-M) tablet 2023 at am Self, Daughter Yes Yes   Sig: Take 1 tablet by mouth daily   nystatin (MYCOSTATIN) 121488 UNIT/GM external powder Past Week at prn  Yes Yes   Sig: Apply topically 2 times daily   olmesartan (BENICAR) 40 MG tablet 2023 at pm Self, Daughter Yes Yes   Sig: [OLMESARTAN (BENICAR) 40 MG TABLET] Take 40 mg by mouth daily.   omeprazole  (PRILOSEC) 20 MG capsule 2023 at am Self, Daughter Yes Yes   Sig: [OMEPRAZOLE (PRILOSEC) 20 MG CAPSULE] Take 1 capsule by mouth daily before breakfast.   pregabalin (LYRICA) 100 MG capsule 2023 at am Self, Daughter No Yes   Sig: Take 1 capsule (100 mg) by mouth 3 times daily   rosuvastatin (CRESTOR) 5 MG tablet 2023 at am  No Yes   Sig: TAKE 1 TABLET BY MOUTH EVERY DAY   tiZANidine (ZANAFLEX) 2 MG tablet 2023 at pm  Yes Yes   Sig: Take 4 mg by mouth nightly as needed   valACYclovir (VALTREX) 500 MG tablet 2023 at am Self, Daughter Yes Yes   Sig: Take 500 mg by mouth daily As needed      Facility-Administered Medications: None        Review of Systems    The 10 point Review of Systems is negative other than noted in the HPI    Social History   I have reviewed this patient's social history and updated it with pertinent information if needed.  Social History     Tobacco Use    Smoking status: Some Days     Types: Cigarettes     Last attempt to quit: 9/15/2021     Years since quittin.9    Smokeless tobacco: Never   Substance Use Topics    Alcohol use: No    Drug use: No         Family History     She not aware of any significant issues that run in her family     Physical Exam   Vital Signs: Temp: 98.1  F (36.7  C)   BP: (!) 209/102 Pulse: 75   Resp: 18 SpO2: 94 % O2 Device: None (Room air)    Weight: 144 lbs 0 oz    General Appearance:  Older female with obvious trauma to face   Head:    Normocephalic, with ecchymoses and swelling around eyes and scalp   Eyes:    PERRL, conjunctiva/corneas clear, EOM's intact,both eyes    Ears:    Normal external ear canals no drainage or erythema bilat.   Nose:   Nares normal by gross inspection,  mucosa normal, no drainage or sinus tenderness   Throat:   Lips, mucosa, and tongue normal; teeth and gums normal   Neck:   Supple, symmetrical, trachea midline, no adenopathy;        thyroid:  No enlargement/tenderness/nodules   Back:     Symmetric, no curvature,  ROM normal, no CVA tenderness   Lungs:   Clear to auscultation   Chest wall:    No tenderness or deformity   Heart:    Regular rate and rhythm, S1 and S2 normal, I/VI systolic murmur, no rubs, no JVD, no edema   Abdomen:     Soft, non-tender, bowel sounds active all four quadrants,     no masses, no hepatosplenomegaly   Musculoskeletal:   Extremities are warm and non-tender, atraumatic, no joint swelling or tenderness   Pulses:   2+ and symmetric all extremities   Skin:   Skin color, texture, turgor normal, no rashes or lesions on exposed areas, please see nursing assessment for full skin assessment   Neurologic:      Psychiatric: Alert, oriented, motor 5/5 upper and lower extremities symmetric, reflexes 1+ symmetric, downgoing toes, sensory grossly intact to light touch    Affect is calm and normal         Medical Decision Making       100 MINUTES SPENT BY ME on the date of service doing chart review, history, exam, documentation & further activities per the note.      Data     I have personally reviewed the following data over the past 24 hrs:    15.3 (H)  \   13.0   / 283     133 (L) 97 (L) 13.6 /  183 (H)   5.2 25 0.75 \     ALT: 15 AST: 33 AP: 61 TBILI: 0.6   ALB: 4.0 TOT PROTEIN: 6.4 LIPASE: N/A     Trop: 12 BNP: N/A     TSH: 0.98 T4: N/A A1C: N/A     INR:  1.06 PTT:  34   D-dimer:  N/A Fibrinogen:  N/A       Imaging results reviewed over the past 24 hrs:   Recent Results (from the past 24 hour(s))   CT Head w/o Contrast    Narrative    EXAM: CT HEAD W/O CONTRAST  LOCATION: [facility]  DATE: 8/17/2023    INDICATION: Multiple falls, head injury.  COMPARISON: 08/12/2019 brain MRI.  TECHNIQUE: Routine CT Head without IV contrast. Multiplanar reformats. Dose reduction techniques were used.    FINDINGS:  INTRACRANIAL CONTENTS: No intracranial hemorrhage, extraaxial collection, or mass effect.  No CT evidence of acute infarct. Mild presumed chronic small vessel ischemic changes. Mild  generalized volume loss. No hydrocephalus. Corpus callosum is normal.   Position of the cerebellar tonsils is satisfactory. Partially empty sella morphology with some thinning of the floor of the sella turcica. This is stable from previous MR. Vascular calcification.     VISUALIZED ORBITS/SINUSES/MASTOIDS: Soft tissue swelling left preseptal periorbital soft tissues extends laterally and superiorly with left frontal scalp hematoma. Osteoma left mid ethmoid air cell level. Small high density air-fluid level present   posteriorly in the left maxillary sinus presumed posttraumatic; air-fluid level new from 08/12/2019 brain MRI. Paranasal sinuses otherwise clear. No middle ear or mastoid effusion.    BONES/SOFT TISSUES: No fracture of the calvarium or skull base. Left frontal scalp and periorbital preseptal hematoma. See facial bone CT for full description and details.      Impression    IMPRESSION:  1.  No acute process intracranially. No intracranial mass, mass effect or hemorrhage. Nothing for parenchymal contusion or acute infarction.    2.  Mild chronic ischemic changes deep white matter both cerebral hemispheres. Partially empty sella morphology as on previous MRI.    3.  No fracture of the visualized calvarium/skull base.    4.  Soft tissue swelling/hematoma left frontal scalp with left preseptal periorbital soft tissue swelling. Left orbital floor fracture described separately. High density air-fluid level left maxillary sinus presumed posttraumatic.    5.  Please see above and facial bone CT for details and description.                  CT Facial Bones without Contrast    Narrative    EXAM: CT FACIAL BONES WITHOUT CONTRAST  LOCATION: [facility]  DATE: 8/17/2023    INDICATION: Multiple falls. Bilateral periorbital bruising.  COMPARISON: Head CT 08/17/2023.  TECHNIQUE: Routine CT Maxillofacial without IV contrast. Multiplanar reformats. Dose reduction techniques were used.      FINDINGS:  OSSEOUS STRUCTURES/SOFT TISSUES: Left orbital floor fracture as below. No additional displaced facial bone fractures. Soft tissue swelling left facial level at the preseptal periorbital and left frontal and supraorbital scalp. No facial bone   malalignment. No evidence for dental trauma or periapical abscess. Mandible and maxilla are intact.    ORBITAL CONTENTS: There is an acute left orbital floor blowout type comminuted fracture; fracture fragment extends about 6 mm into the superior aspect of the left superior maxillary sinus (series 4, image 36). Small amount of extraconal fat extends at   the level of the orbital floor defect on the left (series 6, image 18). No evidence for left-sided extraocular muscular entrapment. Mild extraconal edema/stranding inferiorly. No left-sided retrobulbar hematoma. There is deformity of the floor of the   right bony orbit present (series 4, image 33), acuity indeterminate, but may be recent as well. Small amount of extraconal fat at the level of the orbital floor defect. Orbital floor extends into the right superior maxillary sinus about 3 mm. No   right-sided extraocular muscular entrapment. Procedural changes both globes. Soft tissue swelling left preseptal periorbital level extends left supraorbital and frontal scalp. No post septal edema or hematoma noted. Optic nerves are intact. Extraocular   muscles are symmetric.    SINUSES: Air-fluid level left maxillary sinus with some increased density is presumed posttraumatic. Normal variation pneumatization left middle turbinate. Benign osteoma left mid ethmoid air cells noted. Paranasal sinuses are otherwise clear. Mastoid   air cells are clear.    VISUALIZED INTRACRANIAL CONTENTS: No acute process intracranially, described separately. No hyperdense hemorrhage at the anterior cranial fossa/left frontal level noted, and no evidence for parenchymal contusion. Chronic ischemic changes deep white   matter both cerebral  hemispheres noted.       Impression    IMPRESSION:   1.  Bilateral orbital floor defects, definitely acute on the left with combination and associated stranding/blood products; possibly nonacute on the right. No extraocular muscular entrapment. Extraconal fat extends at the level of the floor defect, more   prominent on the left. Comminuted fragment left orbital floor extends into the left maxillary sinus as well. High-density layering fluid left maxillary sinus may resent posttraumatic blood/fluid within the left maxillary sinus.    2.  Soft tissue swelling left preseptal periorbital soft tissue and frontal and supra orbital scalp.    3.  No additional displaced facial bone fractures with remainder of the maxilla and mandible appear intact.     4.  See above for description and details.     CT Cervical Spine w/o Contrast    Narrative    EXAM: CT CERVICAL SPINE W/O CONTRAST  LOCATION: [facility]  DATE: 8/17/2023    INDICATION: Multiple falls, head injury, elderly. Neck pain.  COMPARISON: None.  TECHNIQUE: Routine CT Cervical Spine without IV contrast. Multiplanar reformats. Dose reduction techniques were used.    FINDINGS:  VERTEBRA: Satisfactory cervical height. Low-grade degenerative anterior subluxations throughout the cervical spine measures 2-4 mm, most marked C4-C5. No high-grade subluxations. Articular pillars show degenerative changes but are otherwise intact   without facet joint space widening or disruption. No interspace widening. Patent airway. Appropriate cervical prevertebral soft tissues. Foramen magnum is normal. C1 ring is intact. Occipital condyles are intact. Hyoid bone and neck cartilage structures   are normal. No displaced upper rib fractures are present. Posterior elements throughout the cervical and upper thoracic spine are intact. No acute cervical fracture or posttraumatic subluxation.     CANAL/FORAMINA: No severe cervical spinal stenosis is noted. Moderate right  C3-C4 foraminal compromise and moderate left C4-C5 foraminal compromise due to facet joint arthropathy/endplate osteophytes. Nothing for epidural hemorrhage.    PARASPINAL: Minimal scarring in the lung apices. Superior mediastinum is satisfactory. Vascular calcification. Thyroid is satisfactory. No focal fluid collections, mass or adenopathy are evident within the neck soft tissues. Mastoid air cells are clear.   Layering high density fluid/blood products within the visualized portion of the left maxillary sinus presumed posttraumatic series 5 image 1.      Impression    IMPRESSION:  1.  No acute cervical fracture or posttraumatic subluxation.    2.  No high-grade spinal canal stenosis. Moderate foraminal compromise upper cervical levels due to facet joint arthropathy/endplate osteophytes on a chronic degenerative basis.    3.  High density fluid/blood products layering in the left maxillary sinus presumed posttraumatic as described on facial bone CT/head CT.    4.  Low-grade degenerative subluxations anteriorly throughout the cervical spine with no evidence for interspace widening or facet joint disruption. Facet joint degenerative changes are noted.    5.  Please see above for details and full description.

## 2023-08-18 ENCOUNTER — APPOINTMENT (OUTPATIENT)
Dept: CARDIOLOGY | Facility: HOSPITAL | Age: 73
DRG: 261 | End: 2023-08-18
Attending: INTERNAL MEDICINE
Payer: COMMERCIAL

## 2023-08-18 LAB
ANION GAP SERPL CALCULATED.3IONS-SCNC: 8 MMOL/L (ref 7–15)
BUN SERPL-MCNC: 12 MG/DL (ref 8–23)
CALCIUM SERPL-MCNC: 8.9 MG/DL (ref 8.8–10.2)
CHLORIDE SERPL-SCNC: 101 MMOL/L (ref 98–107)
CREAT SERPL-MCNC: 0.71 MG/DL (ref 0.51–0.95)
DEPRECATED HCO3 PLAS-SCNC: 27 MMOL/L (ref 22–29)
ERYTHROCYTE [DISTWIDTH] IN BLOOD BY AUTOMATED COUNT: 13.3 % (ref 10–15)
GFR SERPL CREATININE-BSD FRML MDRD: 89 ML/MIN/1.73M2
GLUCOSE BLDC GLUCOMTR-MCNC: 125 MG/DL (ref 70–99)
GLUCOSE BLDC GLUCOMTR-MCNC: 129 MG/DL (ref 70–99)
GLUCOSE BLDC GLUCOMTR-MCNC: 222 MG/DL (ref 70–99)
GLUCOSE BLDC GLUCOMTR-MCNC: 92 MG/DL (ref 70–99)
GLUCOSE SERPL-MCNC: 126 MG/DL (ref 70–99)
HCT VFR BLD AUTO: 37.9 % (ref 35–47)
HGB BLD-MCNC: 12.8 G/DL (ref 11.7–15.7)
LVEF ECHO: NORMAL
MAGNESIUM SERPL-MCNC: 1.8 MG/DL (ref 1.7–2.3)
MCH RBC QN AUTO: 32 PG (ref 26.5–33)
MCHC RBC AUTO-ENTMCNC: 33.8 G/DL (ref 31.5–36.5)
MCV RBC AUTO: 95 FL (ref 78–100)
PLATELET # BLD AUTO: 242 10E3/UL (ref 150–450)
POTASSIUM SERPL-SCNC: 4 MMOL/L (ref 3.4–5.3)
RBC # BLD AUTO: 4 10E6/UL (ref 3.8–5.2)
SODIUM SERPL-SCNC: 136 MMOL/L (ref 136–145)
WBC # BLD AUTO: 11.5 10E3/UL (ref 4–11)

## 2023-08-18 PROCEDURE — 99223 1ST HOSP IP/OBS HIGH 75: CPT | Mod: 25 | Performed by: INTERNAL MEDICINE

## 2023-08-18 PROCEDURE — 210N000001 HC R&B IMCU HEART CARE

## 2023-08-18 PROCEDURE — 250N000013 HC RX MED GY IP 250 OP 250 PS 637: Performed by: INTERNAL MEDICINE

## 2023-08-18 PROCEDURE — 93306 TTE W/DOPPLER COMPLETE: CPT

## 2023-08-18 PROCEDURE — 99222 1ST HOSP IP/OBS MODERATE 55: CPT | Performed by: NURSE PRACTITIONER

## 2023-08-18 PROCEDURE — 95819 EEG AWAKE AND ASLEEP: CPT | Mod: 26 | Performed by: PSYCHIATRY & NEUROLOGY

## 2023-08-18 PROCEDURE — 82962 GLUCOSE BLOOD TEST: CPT

## 2023-08-18 PROCEDURE — G0378 HOSPITAL OBSERVATION PER HR: HCPCS

## 2023-08-18 PROCEDURE — 82310 ASSAY OF CALCIUM: CPT | Performed by: INTERNAL MEDICINE

## 2023-08-18 PROCEDURE — 250N000013 HC RX MED GY IP 250 OP 250 PS 637: Performed by: HOSPITALIST

## 2023-08-18 PROCEDURE — 36415 COLL VENOUS BLD VENIPUNCTURE: CPT | Performed by: INTERNAL MEDICINE

## 2023-08-18 PROCEDURE — 93306 TTE W/DOPPLER COMPLETE: CPT | Mod: 26 | Performed by: INTERNAL MEDICINE

## 2023-08-18 PROCEDURE — 96374 THER/PROPH/DIAG INJ IV PUSH: CPT

## 2023-08-18 PROCEDURE — 250N000011 HC RX IP 250 OP 636: Mod: JZ | Performed by: INTERNAL MEDICINE

## 2023-08-18 PROCEDURE — 99232 SBSQ HOSP IP/OBS MODERATE 35: CPT | Performed by: HOSPITALIST

## 2023-08-18 PROCEDURE — 85027 COMPLETE CBC AUTOMATED: CPT | Performed by: INTERNAL MEDICINE

## 2023-08-18 PROCEDURE — 83735 ASSAY OF MAGNESIUM: CPT | Performed by: INTERNAL MEDICINE

## 2023-08-18 RX ORDER — CEPHALEXIN 500 MG/1
500 CAPSULE ORAL DAILY
Status: DISCONTINUED | OUTPATIENT
Start: 2023-08-18 | End: 2023-08-22 | Stop reason: HOSPADM

## 2023-08-18 RX ORDER — DEXTROSE MONOHYDRATE 25 G/50ML
25-50 INJECTION, SOLUTION INTRAVENOUS
Status: DISCONTINUED | OUTPATIENT
Start: 2023-08-18 | End: 2023-08-18

## 2023-08-18 RX ORDER — NICOTINE POLACRILEX 4 MG
15-30 LOZENGE BUCCAL
Status: DISCONTINUED | OUTPATIENT
Start: 2023-08-18 | End: 2023-08-18

## 2023-08-18 RX ORDER — NICOTINE POLACRILEX 4 MG
15-30 LOZENGE BUCCAL
Status: DISCONTINUED | OUTPATIENT
Start: 2023-08-18 | End: 2023-08-22 | Stop reason: HOSPADM

## 2023-08-18 RX ORDER — DEXTROSE MONOHYDRATE 25 G/50ML
25-50 INJECTION, SOLUTION INTRAVENOUS
Status: DISCONTINUED | OUTPATIENT
Start: 2023-08-18 | End: 2023-08-22 | Stop reason: HOSPADM

## 2023-08-18 RX ADMIN — ROSUVASTATIN CALCIUM 5 MG: 5 TABLET, FILM COATED ORAL at 09:52

## 2023-08-18 RX ADMIN — PREGABALIN 100 MG: 100 CAPSULE ORAL at 09:52

## 2023-08-18 RX ADMIN — ACETAMINOPHEN 1000 MG: 500 TABLET ORAL at 21:21

## 2023-08-18 RX ADMIN — OXYCODONE HYDROCHLORIDE 2.5 MG: 5 TABLET ORAL at 04:57

## 2023-08-18 RX ADMIN — METOPROLOL SUCCINATE 100 MG: 100 TABLET, EXTENDED RELEASE ORAL at 21:21

## 2023-08-18 RX ADMIN — PREGABALIN 100 MG: 100 CAPSULE ORAL at 21:21

## 2023-08-18 RX ADMIN — METFORMIN ER 500 MG 1000 MG: 500 TABLET ORAL at 09:48

## 2023-08-18 RX ADMIN — CEPHALEXIN 500 MG: 500 CAPSULE ORAL at 11:58

## 2023-08-18 RX ADMIN — PREGABALIN 100 MG: 100 CAPSULE ORAL at 13:36

## 2023-08-18 RX ADMIN — ARIPIPRAZOLE 2 MG: 2 TABLET ORAL at 09:49

## 2023-08-18 RX ADMIN — MAGNESIUM OXIDE TAB 400 MG (241.3 MG ELEMENTAL MG) 400 MG: 400 (241.3 MG) TAB at 09:51

## 2023-08-18 RX ADMIN — PANTOPRAZOLE SODIUM 40 MG: 40 TABLET, DELAYED RELEASE ORAL at 09:48

## 2023-08-18 RX ADMIN — Medication 81 MG: at 09:49

## 2023-08-18 RX ADMIN — ACETAMINOPHEN 650 MG: 325 TABLET ORAL at 04:57

## 2023-08-18 RX ADMIN — LOSARTAN POTASSIUM 100 MG: 50 TABLET, FILM COATED ORAL at 21:21

## 2023-08-18 RX ADMIN — METFORMIN ER 500 MG 1000 MG: 500 TABLET ORAL at 17:40

## 2023-08-18 RX ADMIN — DULOXETINE HYDROCHLORIDE 60 MG: 60 CAPSULE, DELAYED RELEASE ORAL at 09:50

## 2023-08-18 RX ADMIN — METOPROLOL SUCCINATE 100 MG: 100 TABLET, EXTENDED RELEASE ORAL at 09:51

## 2023-08-18 RX ADMIN — HYDROMORPHONE HYDROCHLORIDE 0.2 MG: 0.2 INJECTION, SOLUTION INTRAMUSCULAR; INTRAVENOUS; SUBCUTANEOUS at 13:36

## 2023-08-18 RX ADMIN — ACETAMINOPHEN 1000 MG: 500 TABLET ORAL at 09:48

## 2023-08-18 RX ADMIN — HYDROMORPHONE HYDROCHLORIDE 0.2 MG: 0.2 INJECTION, SOLUTION INTRAMUSCULAR; INTRAVENOUS; SUBCUTANEOUS at 19:55

## 2023-08-18 RX ADMIN — OXYCODONE HYDROCHLORIDE 2.5 MG: 5 TABLET ORAL at 11:53

## 2023-08-18 RX ADMIN — HYDROMORPHONE HYDROCHLORIDE 0.2 MG: 0.2 INJECTION, SOLUTION INTRAMUSCULAR; INTRAVENOUS; SUBCUTANEOUS at 23:34

## 2023-08-18 RX ADMIN — ESTRADIOL 1 MG: 1 TABLET ORAL at 09:51

## 2023-08-18 RX ADMIN — ESCITALOPRAM OXALATE 10 MG: 10 TABLET ORAL at 09:50

## 2023-08-18 ASSESSMENT — ACTIVITIES OF DAILY LIVING (ADL)
DIFFICULTY_EATING/SWALLOWING: NO
WALKING_OR_CLIMBING_STAIRS: AMBULATION DIFFICULTY, REQUIRES EQUIPMENT
NUMBER_OF_TIMES_PATIENT_HAS_FALLEN_WITHIN_LAST_SIX_MONTHS: 10
DRESSING/BATHING: BATHING DIFFICULTY, REQUIRES EQUIPMENT
ADLS_ACUITY_SCORE: 33
CHANGE_IN_FUNCTIONAL_STATUS_SINCE_ONSET_OF_CURRENT_ILLNESS/INJURY: YES
ADLS_ACUITY_SCORE: 31
ADLS_ACUITY_SCORE: 33
WALKING_OR_CLIMBING_STAIRS_DIFFICULTY: YES
ADLS_ACUITY_SCORE: 31
DIFFICULTY_COMMUNICATING: NO
BATHING: 1-->ASSISTANCE NEEDED
WEAR_GLASSES_OR_BLIND: YES
TRANSFERRING: 0-->ASSISTANCE NEEDED (DEVELOPMENTALLY APPROPRIATE)
ADLS_ACUITY_SCORE: 31
DRESS: 0-->ASSISTANCE NEEDED (DEVELOPMENTALLY APPROPRIATE)
DRESS: 1-->ASSISTANCE (EQUIPMENT/PERSON) NEEDED
ADLS_ACUITY_SCORE: 33
HEARING_DIFFICULTY_OR_DEAF: NO
FALL_HISTORY_WITHIN_LAST_SIX_MONTHS: YES
ADLS_ACUITY_SCORE: 33
ADLS_ACUITY_SCORE: 33
VISION_MANAGEMENT: GLASSES
ADLS_ACUITY_SCORE: 31
TRANSFERRING: 1-->ASSISTANCE (EQUIPMENT/PERSON) NEEDED
ADLS_ACUITY_SCORE: 31
ADLS_ACUITY_SCORE: 31
TOILETING_ISSUES: NO
DOING_ERRANDS_INDEPENDENTLY_DIFFICULTY: YES
ADLS_ACUITY_SCORE: 31
DRESSING/BATHING_DIFFICULTY: YES
CONCENTRATING,_REMEMBERING_OR_MAKING_DECISIONS_DIFFICULTY: NO
DRESSING/BATHING_MANAGEMENT: SPONGE BATH
DEPENDENT_IADLS:: TRANSPORTATION

## 2023-08-18 NOTE — CONSULTS
DIABETES CARE    Situation:  Consulted by Provider for DM assessment with insulin pump.  Paige is a 73 year old female with HARRIETT / Type 1 DM. Patient was admitted for syncope and multiple facial fractures.     Background:  Related comorbidities include: HTN, KP, GERD, neuropathy, remote alcohol and drug use  PCP: Scar Tan  Endo: Dr. Bardales due to be seen October 2023  CDCES: Last seen on 8/10/23  Social: lives at home with     Meds for BG Management PTA:  1000 mg Metformin XR BID    PUMP SETTINGS:  OmniPod Dash with Dexcom G6 - Novolog (PDM pass code [id number])    Basal rates:   MN - 3AM: 0.65 units/hr  3AM - 9AM: 0.50 units/hr  9AM - 12PM: 0.55 units/hr  12PM - MN: 0.75 units/hr  TDD: 15.6 units    Insulin to carbohydrate ratio: 1:18 grams.     Sensitivity: 1 unit will drop her 60 mg/dL.    Blood glucose targets:  mg/dL target to 120 mg/dL with correction >150 mg/dL.     Active insulin time: 4 hours    Current Inpatient Meds for BG Management:  Previously infusing as above -- Nothing on board right now as patient just had to remove her pod (out of insulin) ~1110    Labs:  Hemoglobin A1C: 7.4% July 2023 (estimated average  mg/dL)   Hgb: 12.8 g/dL  SCr: 0.71 mg/dL   GFR: 89 mL/min/1.73m^2    Blood Glucose POC:    08/17/23 19:30 08/17/23 19:52 08/17/23 22:51   GLUCOSE BY METER POCT 56 (L) 73 183 (H)     Diet Order: None    Weight: 65.5 kg    BMI: 26.4 kg/m^2    DM EDUCATION/COUNSELING:   Patient just had to remover her pod ~1110 as it was out of insulin. Does not have replacement pod at this time.  not able to bring until ~1600 today. Patient is opting to use subcutaneous injections until resuming pump tomorrow. Patient's daughter had questions for me so gave her a call with patient approval.   Daughter Merline was called (soon to be Medical POA) and she expressed concern about not being on the same page with regards to patient's other DM medications (Victoza and Metformin). She suspects  "patient may be forgetting to bolus sometimes based on when she looks at patient's pump history. She would like to meet with DM Ed tomorrow when patient (tentatively) resumes pump.     Recommendations:  1. Needing to order subcutaneous insulin injections now based on pump history above. Lantus 15 units to be given NOW since pump has been off since ~1110. Novolog I:C ratio with meals 1:18 grams CHO. Medium correction scale.   2. DM Ed to return when pump is resumed to complete full pump evaluation.     Refer to \"Guidelines for Insulin Initiation and Care in Hospitalized Adults\" link in Diabetes Management Order set for dosing guidelines.  Hospital goals for blood glucose levels are < 180 mg/dL for improved health outcomes.    F/U Plans:  Seeing Endo Dr. Bardales October 24, 2023.    Thank you,   Elvie Benson, MFMEGHAN, RDN, CSPCC, LD, [facility]  [address] [place] [address]  [email or web address]   Office: [phone]  Pager: [phone]    "

## 2023-08-18 NOTE — PLAN OF CARE
PRIMARY DIAGNOSIS: SYNCOPE/TIA  OUTPATIENT/OBSERVATION GOALS TO BE MET BEFORE DISCHARGE:  1. Orthostatic performed: No    2. Diagnostic testing complete & at baseline neurologic testing: Yes    3. Cleared by consultants (if involved): No    4. Interpretation of cardiac rhythm per telemetry tech: SR with frequent PVCs    5. Tolerating adequate PO diet and medications: Yes    6. Return to near baseline physical activity or neurologic status: Yes    Discharge Planner Nurse   Safe discharge environment identified: No  Barriers to discharge: Yes,Neuro and Cards consult, pending echo.         Entered by: Fabiola Reyes RN 08/18/2023 3:20 AM     Please review provider order for any additional goals.   Nurse to notify provider when observation goals have been met and patient is ready for discharge.

## 2023-08-18 NOTE — CONSULTS
Thank you, Dr. Covington ref. provider found, for asking the [facility] Care team to see Paige Mari to evaluate syncope.      Assessment/Recommendations   Assessment:    Recurrent traumatic falls possibly syncope  Intermittent rate dependent left bundle branch block  History of frequent PVCs and nonsustained VT  Diabetes mellitus  COPD  Sleep apnea  Hypertension, controlled    Plan:  Telemetry  Echo    Presence of left bundle branch block and frequent PVCs is concerning for arrhythmogenic etiology of falls and possible syncope.  We will ask EP to evaluate and decide about need for EP study, insertable loop recorder etc.       History of Present Illness/Subjective    Ms. Paige Mari is a 73 year old female who came into the emergency room after falling down at home and injuring herself.  She states that within the last few days she had 6 episodes of falls.  Each time she was in upright position she did not have any warning before she found herself lying on the floor.  She did sustain facial trauma on 2 separate occasions.  She does not have a clear-cut sensation of heart racing before falls.  She has been mildly confused after regaining consciousness.  She states that over the years she has had frequent episodes of syncope that would happen once or twice a year.  She has had evaluation for this by Dr. Mg.  She had normal stress test and normal echo.  She has had event monitor that showed frequent PVCs and short runs of nonsustained VT and SVT with aberrancy.    She has lost weight lately.  She denies any new medication.  She has no history of orthostatic hypotension.    ECG: Personally reviewed.  Normal sinus rhythm with frequent PACs, left bundle branch block morphology.    Stress test: 2014 normal perfusion scan    ECHO (personnaly Reviewed): Pending  2021 no significant abnormalities    Event monitor: June 2023 frequent PVCs and short runs of nonsustained VT and SVT     Physical Examination Review of  "Systems   /65 (BP Location: Left arm)   Pulse 77   Temp 97.9  F (36.6  C) (Oral)   Resp 18   Ht 1.575 m (5' 2\")   Wt 65.5 kg (144 lb 6.4 oz)   SpO2 100%   BMI 26.41 kg/m    Body mass index is 26.41 kg/m .  Wt Readings from Last 3 Encounters:   08/18/23 65.5 kg (144 lb 6.4 oz)   04/18/23 66.7 kg (147 lb)   03/27/23 65.8 kg (145 lb)       Intake/Output Summary (Last 24 hours) at 8/18/2023 1153  Last data filed at 8/18/2023 1100  Gross per 24 hour   Intake 1480 ml   Output 950 ml   Net 530 ml     General Appearance:   Alert, cooperative, no distress, appears stated age   Head/ENT: : Bilateral periorbital ecchymosis   EYES:  No scleral icterus   Neck: Supple, symmetrical, trachea midline, no adenopathy, thyroid: not enlarged, symmetric, no carotid bruit or JVD   Chest/Lungs:   lungs are clear to auscultation, respirations unlabored. No tenderness or deformity   Cardiovascular:   Regular rhythm, S1, S2 normal, no murmur, rub or gallop.   Abdomen:  Soft, non-tender, bowel sounds active all four quadrants,  no masses, no organomegaly   Extremities: no cyanosis or clubbing. No edema   Skin: Skin color, texture, turgor normal, no rashes or lesions.    Neurologic: Alert and oriented x 3, moving all four extremities.    Psychiatric: Normal affect.      10 system review of systems completed see history of present illness and inpatient H&P (reviewed) for further details.          Lab Results    Chemistry/lipid CBC Cardiac Enzymes/BNP/TSH/INR   Recent Labs   Lab Test 07/25/23  1400   CHOL 127   HDL 59   LDL 43   TRIG 126     Recent Labs   Lab Test 07/25/23  1400 09/16/22  1541 08/16/22  1609   LDL 43 39 45     Recent Labs   Lab Test 08/18/23  1006 08/18/23  0514   NA  --  136   POTASSIUM  --  4.0   CHLORIDE  --  101   CO2  --  27   GLC 92 126*   BUN  --  12.0   CR  --  0.71   GFRESTIMATED  --  89   BRUNO  --  8.9     Recent Labs   Lab Test 08/18/23  0514 08/17/23  1208 07/25/23  1400   CR 0.71 0.75 0.76     Recent Labs "   Lab Test 07/25/23  1400 03/22/23  1557 11/21/22  1606   A1C 7.4* 6.7* 6.4*          Recent Labs   Lab Test 08/18/23  0514   WBC 11.5*   HGB 12.8   HCT 37.9   MCV 95        Recent Labs   Lab Test 08/18/23  0514 08/17/23  1208 07/24/23  0715   HGB 12.8 13.0 12.8    Recent Labs   Lab Test 01/20/22  1158 09/19/21  0211 09/17/21  2240   TROPONINI <0.01 0.06 0.01     No results for input(s): BNP, NTBNPI, NTBNP in the last 76640 hours.  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), managed as type 1 (H)     Created by Conversion       Mood disorder (H) 11/24/2014    KP (obstructive sleep apnea) 05/15/2015    Paroxysmal atrial fibrillation (H) 09/22/2021    Small bowel obstruction (H) 09/18/2021    Status post total knee replacement 11/24/2014     Past Surgical History:   Procedure Laterality Date    APPENDECTOMY      at age 3    ARTHROSCOPY KNEE WITH MENISCECTOMY  3/10/14    partial medial and lateral meniscectomies, subpatellar chondroplasty    BACK SURGERY      BLEPHAROPLASTY Bilateral 8/17/2015    Procedure: BLEPHAROPLASTY BILATERAL UPPER LIDS;  Surgeon: Chasidy Bryant MD;  Location: [facility];  Service:     BOWEL RESECTION  12 years ago    COLON SURGERY  2004    sigmoid colectomy    DILATION AND CURETTAGE      x3    HYSTERECTOMY      age 39    IR LUMBAR PUNCTURE  1/22/2022    JOINT REPLACEMENT      OOPHORECTOMY      OTHER SURGICAL HISTORY  2005    bowel obstruction    PICC TRIPLE LUMEN PLACEMENT  9/19/2021         PICC  TRIPLE LUMEN PLACEMENT  2022         MI ARTHRODESIS ANT INTERBODY MIN DISCECTOMY,LUMBAR N/A 2015    Procedure: ANTERIOR LUMBAR INTERBODY FUSION L4-5, INTERBODY GRAFT, BMP INSTRUMENTATION;  Surgeon: Zeeshan Guillaume MD;  Location: [facility];  Service: Spine    TOE SURGERY      TONSILLECTOMY & ADENOIDECTOMY      age 10    ZZC REMOVAL OF OVARY(S)      Description: Oophorectomy;  Recorded: 2010;    ZZC REMOVAL OF OVARY(S)      Description: Oophorectomy;  Recorded: 2010;    Z TOTAL ABDOM HYSTERECTOMY      Description: Hysterectomy;  Recorded: 2010;    [facility] TOTAL ABDOM HYSTERECTOMY      Description: Hysterectomy;  Recorded: 2010;    [facility] TOTAL KNEE ARTHROPLASTY Right 2014    Procedure: RIGHT TOTAL KNEE ARTHROPLASTY;  Surgeon: Jules Harris MD;  Location: [facility];  Service: Orthopedics     No premature CAD, SCD,cardiomyopathy   Social History     Socioeconomic History    Marital status:      Spouse name: Not on file    Number of children: Not on file    Years of education: Not on file    Highest education level: Not on file   Occupational History    Not on file   Tobacco Use    Smoking status: Some Days     Types: Cigarettes     Last attempt to quit: 9/15/2021     Years since quittin.9    Smokeless tobacco: Never   Substance and Sexual Activity    Alcohol use: No    Drug use: No    Sexual activity: Not on file   Other Topics Concern    Not on file   Social History Narrative    Not on file     Social Determinants of Health     Financial Resource Strain: Not on file   Food Insecurity: Not on file   Transportation Needs: Not on file   Physical Activity: Not on file   Stress: Not on file   Social Connections: Not on file   Intimate Partner Violence: Not on file   Housing Stability: Not on file         Medications  Allergies   Current Facility-Administered Medications Ordered in Epic   Medication Dose Route Frequency Provider Last Rate Last Admin     acetaminophen (TYLENOL) tablet 650 mg  650 mg Oral Q6H PRN Jeet Copoer MD   650 mg at 08/18/23 0457    Or    acetaminophen (TYLENOL) Suppository 650 mg  650 mg Rectal Q6H PRN Jeet Cooper MD        acetaminophen (TYLENOL) tablet 1,000 mg  1,000 mg Oral BID Jeet Cooper MD   1,000 mg at 08/18/23 0948    albuterol (PROVENTIL HFA/VENTOLIN HFA) inhaler  1-2 puff Inhalation Q6H Jeet Cooper MD        ARIPiprazole (ABILIFY) tablet 2 mg  2 mg Oral Daily Jeet Cooper MD   2 mg at 08/18/23 0949    aspirin EC tablet 81 mg  81 mg Oral Daily Jeet Cooper MD   81 mg at 08/18/23 0949    cephALEXin (KEFLEX) capsule 500 mg  500 mg Oral Daily Evan Castro DO        cetirizine (zyrTEC) tablet 10 mg  10 mg Oral Daily Jeet Cooper MD        cloNIDine (CATAPRES-TTS) Patch in Place   Transdermal Q8H Jeet Cooper MD        [START ON 8/20/2023] cloNIDine (CATAPRES-TTS3) 0.3 MG/24HR WK patch 1 patch  1 patch Transdermal Weekly Jeet Cooper MD        glucose gel 15-30 g  15-30 g Oral Q15 Min PRN Kaykay Paul MD        Or    dextrose 50 % injection 25-50 mL  25-50 mL Intravenous Q15 Min PRN Kaykay Paul MD        Or    glucagon injection 1 mg  1 mg Subcutaneous Q15 Min PRN Kaykay Paul MD        DULoxetine (CYMBALTA) DR capsule 60 mg  60 mg Oral Daily Jeet Cooper MD   60 mg at 08/18/23 0950    enoxaparin ANTICOAGULANT (LOVENOX) injection 40 mg  40 mg Subcutaneous At Bedtime Jeet Cooper MD   40 mg at 08/17/23 2340    escitalopram (LEXAPRO) tablet 10 mg  10 mg Oral Daily Jeet Cooper MD   10 mg at 08/18/23 0950    estradiol (ESTRACE) tablet 1 mg  1 mg Oral Daily Jeet Cooper MD   1 mg at 08/18/23 0951    hydrALAZINE (APRESOLINE) injection 10 mg  10 mg Intravenous Q6H PRN Brendan Ba MD        HYDROmorphone (DILAUDID) injection 0.2 mg  0.2 mg Intravenous Q2H PRN Jeet Cooper MD        insulin aspart (NovoLOG/FIASP) 100 UNIT/ML VIAL FOR FILLING PUMP RESERVOIR   Device See  Admin Instructions Jeet Cooper MD        lidocaine (LMX4) cream   Topical Q1H PRN Jeet Cooper MD        lidocaine 1 % 0.1-1 mL  0.1-1 mL Other Q1H PRN Jeet Cooper MD        losartan (COZAAR) tablet 100 mg  100 mg Oral QPM Jeet Cooper MD   100 mg at 08/17/23 2149    magnesium oxide (MAG-OX) tablet 400 mg  400 mg Oral Daily Jeet Cooper MD   400 mg at 08/18/23 0951    melatonin tablet 1 mg  1 mg Oral At Bedtime PRN Jeet Cooper MD        metFORMIN (GLUCOPHAGE XR) 24 hr tablet 1,000 mg  1,000 mg Oral BID w/meals Jeet Cooper MD   1,000 mg at 08/18/23 0948    metoprolol succinate ER (TOPROL XL) 24 hr tablet 100 mg  100 mg Oral BID Jeet Cooper MD   100 mg at 08/18/23 0951    naloxone (NARCAN) injection 0.2 mg  0.2 mg Intravenous Q2 Min PRN Jeet Cooper MD        Or    naloxone (NARCAN) injection 0.4 mg  0.4 mg Intravenous Q2 Min PRN Jeet Cooper MD        Or    naloxone (NARCAN) injection 0.2 mg  0.2 mg Intramuscular Q2 Min PRN Jeet Cooper MD        Or    naloxone (NARCAN) injection 0.4 mg  0.4 mg Intramuscular Q2 Min PRN Jeet Cooper MD        ondansetron (ZOFRAN ODT) ODT tab 4 mg  4 mg Oral Q6H PRN Jeet Cooper MD        Or    ondansetron (ZOFRAN) injection 4 mg  4 mg Intravenous Q6H PRN Jeet Cooper MD        oxyCODONE IR (ROXICODONE) half-tab 2.5 mg  2.5 mg Oral Q4H PRN Jeet Cooper MD   2.5 mg at 08/18/23 0457    pantoprazole (PROTONIX) EC tablet 40 mg  40 mg Oral QAM AC Jeet Cooper MD   40 mg at 08/18/23 0948    pregabalin (LYRICA) capsule 100 mg  100 mg Oral TID Jeet Cooper MD   100 mg at 08/18/23 0952    rosuvastatin (CRESTOR) tablet 5 mg  5 mg Oral Daily Jeet Cooper MD   5 mg at 08/18/23 0952    sodium chloride (PF) 0.9% PF flush 3 mL  3 mL Intracatheter Q8H Jeet Cooper MD   3 mL at 08/18/23 0331    sodium chloride (PF) 0.9% PF flush 3 mL  3 mL Intracatheter q1 min prn Jeet Cooper MD        tiZANidine (ZANAFLEX) tablet 4 mg  4 mg Oral At Bedtime PRN Kenneth  "MD Jeet         No current [facility]-ordered outpatient medications on file.       Allergies   Allergen Reactions    Morphine Other (See Comments)     \"make me delirious,\"  \"nightmares\"  Other reaction(s): delirium  \"make me delirious,\"  \"nightmares\"      Nitrofurantoin     Adhesive [Cyanoacrylate] Other (See Comments)     Can only tolerate tape for short periods of time shelia in sensitive areas    Amlodipine Unknown and Other (See Comments)     Other reaction(s): delerium    Doxazosin Other (See Comments)     Other reaction(s): feels foggy    Irbesartan-Hydrochlorothiazide Other (See Comments)     Other reaction(s): hyponatremia    Other Allergy (See Comments) [External Allergen Needs Reconciliation - See Comment] Unknown     Other reaction(s): skin rash    Prednisone Unknown     Caused extremely high blood sugars    Tramadol Other (See Comments)     Possible seizure activity    Trazodone Unknown     Other reaction(s): hung over                                                "

## 2023-08-18 NOTE — PLAN OF CARE
PRIMARY DIAGNOSIS: SYNCOPE/TIA  OUTPATIENT/OBSERVATION GOALS TO BE MET BEFORE DISCHARGE:  1. Orthostatic performed: No    2. Diagnostic testing complete & at baseline neurologic testing: No    3. Cleared by consultants (if involved): No    4. Interpretation of cardiac rhythm per telemetry tech: SR with frequent PVCs and short bursts of what appears to be SVT, but telemetry alarming Vtach. Pt asymptomatic, sleeping.    5. Tolerating adequate PO diet and medications: Yes    6. Return to near baseline physical activity or neurologic status: Yes    Discharge Planner Nurse   Safe discharge environment identified: No  Barriers to discharge: Yes, Neuro and Cards consult, pending echo.         Entered by: Fabiola Reyes RN 08/18/2023 5:18 AM     Please review provider order for any additional goals.   Nurse to notify provider when observation goals have been met and patient is ready for discharge.

## 2023-08-18 NOTE — CONSULTS
Care Management Initial Consult    General Information  Assessment completed with: Patient, patient  Type of CM/SW Visit: Initial Assessment    Primary Care Provider verified and updated as needed: Yes   Readmission within the last 30 days: no previous admission in last 30 days      Reason for Consult: discharge planning  Advance Care Planning:            Communication Assessment  Patient's communication style: spoken language (English or Bilingual)    Hearing Difficulty or Deaf: no        Cognitive  Cognitive/Neuro/Behavioral: WDL                      Living Environment:   People in home: spouse     Current living Arrangements: apartment      Able to return to prior arrangements: yes  Living Arrangement Comments: lives with spouse who drives, apartment is a walk up apartment-no elevator    Family/Social Support:  Care provided by: self  Provides care for: no one  Marital Status:     Liam       Description of Support System: Supportive, Involved    Support Assessment: Adequate family and caregiver support, Adequate social supports    Current Resources:   Patient receiving home care services: No     Community Resources: None  Equipment currently used at home:    Supplies currently used at home: Other (walker, manual wheel chair)    Employment/Financial:  Employment Status:          Financial Concerns:             Does the patient's insurance plan have a 3 day qualifying hospital stay waiver?  Yes   Will the waiver be used for post-acute placement? No    Lifestyle & Psychosocial Needs:  Social Determinants of Health     Tobacco Use: High Risk (8/17/2023)    Patient History     Smoking Tobacco Use: Some Days     Smokeless Tobacco Use: Never     Passive Exposure: Not on file   Alcohol Use: Not on file   Financial Resource Strain: Not on file   Food Insecurity: Not on file   Transportation Needs: Not on file   Physical Activity: Not on file   Stress: Not on file   Social Connections: Not on file   Intimate  Partner Violence: Not on file   Depression: Not at risk (2/2/2023)    PHQ-2     PHQ-2 Score: 0   Housing Stability: Not on file       Functional Status:  Prior to admission patient needed assistance:   Dependent ADLs:: Independent  Dependent IADLs:: Transportation       Mental Health Status:  Mental Health Status: No Current Concerns       Chemical Dependency Status:  Chemical Dependency Status: No Current Concerns             Values/Beliefs:  Spiritual, Cultural Beliefs, Christian Practices, Values that affect care: no               Additional Information:  Writer met with patient who denies hx of seizures but does report 5 or 6 falls in as many days.  Patient plans to return home to walk up apartment where lives with spouse who drives and able to grocery shop for patient/cook.    Patient to follow up OP with [facility] for multiple facial fx's.      Janett Szymanski CM

## 2023-08-18 NOTE — PLAN OF CARE
Problem: Pain Acute  Goal: Optimal Pain Control and Function  Outcome: Progressing  Intervention: Develop Pain Management Plan  Recent Flowsheet Documentation  Taken 8/18/2023 0900 by Hazel Brar RN  Pain Management Interventions: medication (see MAR)  Intervention: Prevent or Manage Pain  Recent Flowsheet Documentation  Taken 8/18/2023 1500 by Hazel Brar RN  Medication Review/Management: medications reviewed  Taken 8/18/2023 0900 by Hazel Brar RN  Medication Review/Management: medications reviewed     Problem: Fall Injury Risk  Goal: Absence of Fall and Fall-Related Injury  Outcome: Progressing  Intervention: Identify and Manage Contributors  Recent Flowsheet Documentation  Taken 8/18/2023 1500 by Hazel Brar RN  Medication Review/Management: medications reviewed  Taken 8/18/2023 0900 by Hazel Brar RN  Medication Review/Management: medications reviewed  Intervention: Promote Injury-Free Environment  Recent Flowsheet Documentation  Taken 8/18/2023 1500 by Hazel Brar RN  Safety Promotion/Fall Prevention:   activity supervised   assistive device/personal items within reach   clutter free environment maintained   increased rounding and observation   lighting adjusted   mobility aid in reach   nonskid shoes/slippers when out of bed   patient and family education   room door open   room near nurse's station   room organization consistent   safety round/check completed   supervised activity   treat reversible contributory factors   treat underlying cause  Taken 8/18/2023 0900 by Hazel Brar RN  Safety Promotion/Fall Prevention:   activity supervised   assistive device/personal items within reach   clutter free environment maintained   increased rounding and observation   lighting adjusted   mobility aid in reach   nonskid shoes/slippers when out of bed   patient and family education   room door open   room near nurse's station   room organization consistent    safety round/check completed   supervised activity   treat reversible contributory factors   treat underlying cause     Problem: Risk for Delirium  Goal: Optimal Coping  Outcome: Progressing  Goal: Improved Behavioral Control  Outcome: Progressing  Intervention: Minimize Safety Risk  Recent Flowsheet Documentation  Taken 8/18/2023 1500 by Hazel Brar, RN  Enhanced Safety Measures: room near unit station  Taken 8/18/2023 0900 by Hazel Brar RN  Enhanced Safety Measures: room near unit station  Goal: Improved Attention and Thought Clarity  Outcome: Progressing  Goal: Improved Sleep  Outcome: Progressing       Goal Outcome Evaluation:    Alert and oriented. VSS. Prn oxycodone and dilaudid given for facial pain, with relief. Tele NSR. Purewick in place. Pt refused to get up in chair, states that she would like to get up in chair tomorrow. Bed alarm on, seizure precautions in place. Makes needs known.

## 2023-08-18 NOTE — UTILIZATION REVIEW
Admission Status; Secondary Review Determination       Under the authority of the Utilization Management Committee, the utilization review process indicated a secondary review on the above patient. The review outcome is based on review of the medical records, discussions with staff, and applying clinical experience noted on the date of the review.     (x) Inpatient Status Appropriate - This patient's medical care is consistent with medical management for inpatient care and reasonable inpatient medical practice.     RATIONALE FOR DETERMINATION:  73-year-old female with history of diabetes (HARRIETT), A-fib and COPD who presents with recurrent falls/syncope.  She was found to have a LBBB with potential NSVT as cause for the falls/syncope. She also has significant facial fractures from the falls.  Given ongoing concern for acute cardiac cause for the fall cardiology is obtaining further EP evaluation and she needs ongoing hospital care.    At the time of admission with the information available to the attending physician more than 2 nights Hospital complex care was anticipated, based on patient risk of adverse outcome if treated as outpatient and complex care required. Inpatient admission is appropriate based on the Medicare guidelines.   The information on this document is developed by the utilization review team in order for the business office to ensure compliance. This only denotes the appropriateness of proper admission status and does not reflect the quality of care rendered.   The definitions of Inpatient Status and Observation Status used in making the determination above are those provided in the CMS Coverage Manual, Chapter 1 and Chapter 6, section 70.4.     Sincerely,     Peewee Carreon DO, [facility]  Utilization Review  Physician Advisor

## 2023-08-18 NOTE — PROGRESS NOTES
"[facility]    Medicine Progress Note - Hospitalist Service    Date of Admission:  8/17/2023    Assessment & Plan   Patient is a 73-year-old female with history of diabetes (HARRIETT), A-fib and COPD who presents with recurrent falls/syncope.    #Syncope  #Left bundle branch block  #Nonsustained V. tach  Consulted cardiology, who will ask EP to evaluate, appreciate their assistance  Echo  EEG    #Paroxysmal atrial fibrillation  Continue metoprolol and aspirin  Not on anticoagulation    #Facial bone fractures  ED spoke with [facility]- no rec for abx, follow up as outpatient in 1 week    #Hypertension  Resumed clonidine patch    #Latent autoimmune diabetes in adult  Insulin pump at home, placing on hold as patient does not have supplies with her to refill  Diabetic education  Lantus, mealtime insulin, Ssi  Resumed metformin - hold if any contrasted studies are planned     #History of COPD  Albuterol inhaler as needed          VTE ppx: PCDs       Diet: Regular Diet Adult    Gomes Catheter: Not present  Lines: None     Cardiac Monitoring: None  Code Status: Full Code      Clinically Significant Risk Factors Present on Admission                # Drug Induced Platelet Defect: home medication list includes an antiplatelet medication   # Hypertension: Noted on problem list     # DMII: A1C = 7.4 % (Ref range: 0.0 - 5.6 %) within past 6 months    # Overweight: Estimated body mass index is 26.41 kg/m  as calculated from the following:    Height as of this encounter: 1.575 m (5' 2\").    Weight as of this encounter: 65.5 kg (144 lb 6.4 oz).              Disposition Plan           Evan Castro DO  Hospitalist Service  [facility]  Securely message with [employer] (more info)  Text page via [employer] Paging/Directory   ______________________________________________________________________    Interval History   No acute events overnight    Physical Exam   Vital Signs: Temp: 97.9  F " (36.6  C) Temp src: Oral BP: 115/65 Pulse: 77   Resp: 18 SpO2: 100 % O2 Device: None (Room air)    Weight: 144 lbs 6.42 oz    General Appearance:  No acute distress  Deep bruising around both eyes  Respiratory: Clear to auscultation bilaterally  Cardiovascular: Irregular rhythm, normal rate  Extremities: No peripheral edema or cyanosis      Medical Decision Making             Data     I have personally reviewed the following data over the past 24 hrs:    11.5 (H)  \   12.8   / 242     136 101 12.0 /  92   4.0 27 0.71 \     ALT: N/A AST: N/A AP: N/A TBILI: N/A   ALB: N/A TOT PROTEIN: N/A LIPASE: N/A     Trop: 12 BNP: N/A     TSH: 0.98 T4: N/A A1C: N/A     INR:  N/A PTT:  N/A   D-dimer:  N/A Fibrinogen:  N/A       Imaging results reviewed over the past 24 hrs:   Recent Results (from the past 24 hour(s))   CT Head w/o Contrast    Narrative    EXAM: CT HEAD W/O CONTRAST  LOCATION: [facility]  DATE: 8/17/2023    INDICATION: Multiple falls, head injury.  COMPARISON: 08/12/2019 brain MRI.  TECHNIQUE: Routine CT Head without IV contrast. Multiplanar reformats. Dose reduction techniques were used.    FINDINGS:  INTRACRANIAL CONTENTS: No intracranial hemorrhage, extraaxial collection, or mass effect.  No CT evidence of acute infarct. Mild presumed chronic small vessel ischemic changes. Mild generalized volume loss. No hydrocephalus. Corpus callosum is normal.   Position of the cerebellar tonsils is satisfactory. Partially empty sella morphology with some thinning of the floor of the sella turcica. This is stable from previous MR. Vascular calcification.     VISUALIZED ORBITS/SINUSES/MASTOIDS: Soft tissue swelling left preseptal periorbital soft tissues extends laterally and superiorly with left frontal scalp hematoma. Osteoma left mid ethmoid air cell level. Small high density air-fluid level present   posteriorly in the left maxillary sinus presumed posttraumatic; air-fluid level new from 08/12/2019  brain MRI. Paranasal sinuses otherwise clear. No middle ear or mastoid effusion.    BONES/SOFT TISSUES: No fracture of the calvarium or skull base. Left frontal scalp and periorbital preseptal hematoma. See facial bone CT for full description and details.      Impression    IMPRESSION:  1.  No acute process intracranially. No intracranial mass, mass effect or hemorrhage. Nothing for parenchymal contusion or acute infarction.    2.  Mild chronic ischemic changes deep white matter both cerebral hemispheres. Partially empty sella morphology as on previous MRI.    3.  No fracture of the visualized calvarium/skull base.    4.  Soft tissue swelling/hematoma left frontal scalp with left preseptal periorbital soft tissue swelling. Left orbital floor fracture described separately. High density air-fluid level left maxillary sinus presumed posttraumatic.    5.  Please see above and facial bone CT for details and description.                  CT Facial Bones without Contrast    Narrative    EXAM: CT FACIAL BONES WITHOUT CONTRAST  LOCATION: [facility]  DATE: 8/17/2023    INDICATION: Multiple falls. Bilateral periorbital bruising.  COMPARISON: Head CT 08/17/2023.  TECHNIQUE: Routine CT Maxillofacial without IV contrast. Multiplanar reformats. Dose reduction techniques were used.     FINDINGS:  OSSEOUS STRUCTURES/SOFT TISSUES: Left orbital floor fracture as below. No additional displaced facial bone fractures. Soft tissue swelling left facial level at the preseptal periorbital and left frontal and supraorbital scalp. No facial bone   malalignment. No evidence for dental trauma or periapical abscess. Mandible and maxilla are intact.    ORBITAL CONTENTS: There is an acute left orbital floor blowout type comminuted fracture; fracture fragment extends about 6 mm into the superior aspect of the left superior maxillary sinus (series 4, image 36). Small amount of extraconal fat extends at   the level of the  orbital floor defect on the left (series 6, image 18). No evidence for left-sided extraocular muscular entrapment. Mild extraconal edema/stranding inferiorly. No left-sided retrobulbar hematoma. There is deformity of the floor of the   right bony orbit present (series 4, image 33), acuity indeterminate, but may be recent as well. Small amount of extraconal fat at the level of the orbital floor defect. Orbital floor extends into the right superior maxillary sinus about 3 mm. No   right-sided extraocular muscular entrapment. Procedural changes both globes. Soft tissue swelling left preseptal periorbital level extends left supraorbital and frontal scalp. No post septal edema or hematoma noted. Optic nerves are intact. Extraocular   muscles are symmetric.    SINUSES: Air-fluid level left maxillary sinus with some increased density is presumed posttraumatic. Normal variation pneumatization left middle turbinate. Benign osteoma left mid ethmoid air cells noted. Paranasal sinuses are otherwise clear. Mastoid   air cells are clear.    VISUALIZED INTRACRANIAL CONTENTS: No acute process intracranially, described separately. No hyperdense hemorrhage at the anterior cranial fossa/left frontal level noted, and no evidence for parenchymal contusion. Chronic ischemic changes deep white   matter both cerebral hemispheres noted.       Impression    IMPRESSION:   1.  Bilateral orbital floor defects, definitely acute on the left with combination and associated stranding/blood products; possibly nonacute on the right. No extraocular muscular entrapment. Extraconal fat extends at the level of the floor defect, more   prominent on the left. Comminuted fragment left orbital floor extends into the left maxillary sinus as well. High-density layering fluid left maxillary sinus may resent posttraumatic blood/fluid within the left maxillary sinus.    2.  Soft tissue swelling left preseptal periorbital soft tissue and frontal and supra orbital  scalp.    3.  No additional displaced facial bone fractures with remainder of the maxilla and mandible appear intact.     4.  See above for description and details.     CT Cervical Spine w/o Contrast    Narrative    EXAM: CT CERVICAL SPINE W/O CONTRAST  LOCATION: [facility]  DATE: 8/17/2023    INDICATION: Multiple falls, head injury, elderly. Neck pain.  COMPARISON: None.  TECHNIQUE: Routine CT Cervical Spine without IV contrast. Multiplanar reformats. Dose reduction techniques were used.    FINDINGS:  VERTEBRA: Satisfactory cervical height. Low-grade degenerative anterior subluxations throughout the cervical spine measures 2-4 mm, most marked C4-C5. No high-grade subluxations. Articular pillars show degenerative changes but are otherwise intact   without facet joint space widening or disruption. No interspace widening. Patent airway. Appropriate cervical prevertebral soft tissues. Foramen magnum is normal. C1 ring is intact. Occipital condyles are intact. Hyoid bone and neck cartilage structures   are normal. No displaced upper rib fractures are present. Posterior elements throughout the cervical and upper thoracic spine are intact. No acute cervical fracture or posttraumatic subluxation.     CANAL/FORAMINA: No severe cervical spinal stenosis is noted. Moderate right C3-C4 foraminal compromise and moderate left C4-C5 foraminal compromise due to facet joint arthropathy/endplate osteophytes. Nothing for epidural hemorrhage.    PARASPINAL: Minimal scarring in the lung apices. Superior mediastinum is satisfactory. Vascular calcification. Thyroid is satisfactory. No focal fluid collections, mass or adenopathy are evident within the neck soft tissues. Mastoid air cells are clear.   Layering high density fluid/blood products within the visualized portion of the left maxillary sinus presumed posttraumatic series 5 image 1.      Impression    IMPRESSION:  1.  No acute cervical fracture or  posttraumatic subluxation.    2.  No high-grade spinal canal stenosis. Moderate foraminal compromise upper cervical levels due to facet joint arthropathy/endplate osteophytes on a chronic degenerative basis.    3.  High density fluid/blood products layering in the left maxillary sinus presumed posttraumatic as described on facial bone CT/head CT.    4.  Low-grade degenerative subluxations anteriorly throughout the cervical spine with no evidence for interspace widening or facet joint disruption. Facet joint degenerative changes are noted.    5.  Please see above for details and full description.

## 2023-08-18 NOTE — PLAN OF CARE
A&O x :4, PERRLA present. Seizure precautions in place- padding in place; side rails x 4.   Both eyes are bruised, with swelling to the L-eye.    Activity: Assist of 1 for safety     Cardiac Rhythm: SR with frequent PAC's and some PVC's- denies any sx.  BP have been quite elevated ( 180-204/100 range)- PTA BP management meds given around 2200.     Pain:Facial pain rated 8/10, received PRN oxycodone along with scheduled Tylenol. Pain has decreased to 6/10 within 45 min of admin.     Plan:Cardiology consult, diabetic education.

## 2023-08-18 NOTE — CONSULTS
[facility]  Cardiac Electrophysiology  [address]  [place] [address]   Office: [phone]  Fax: [phone]     Cardiac Electrophysiology Consultation    Patient: Paige Mari   : 1950     Referring Provider: Dr. Reynolds    CHIEF COMPLAINT/REASON FOR CONSULTATION  Recurrent syncope, left bundle branch block    Assessment/Recommendations   Recurrent syncope: Frequent episodes of syncope in the last week resulting in facial fracture.  Possible cardiac etiology, though nothing significant seen on telemetry monitoring thus far.  Discussed with Dr. Ruiz.  Longer-term monitoring indicated, possible ILR, to be determined.    Paroxysmal atrial fibrillation: No known sustained episodes of atrial fibrillation, though she does appear to have periodic brief episodes, often with left bundle branch aberrancy.  Continue metoprolol succinate 100 mg twice daily.    She has a [id number] score of 4 for age 65-74, female gender, hypertension, DM.  Ideally, she should be anticoagulated for stroke prophylaxis, but not a good candidate at present due to recurrent syncope.  Continue aspirin 81 mg daily.    Hypertension: Blood pressure controlled on Catapres patch, losartan, metoprolol.  No evidence of orthostatic hypotension or lightheadedness.         History of Present Illness   Paige Mari is a 73 year old female with a history of recurrent syncope, paroxysmal atrial fibrillation, intermittent left bundle branch block, hypertension, hyperlipidemia, hypothyroidism, autoimmune diabetes mellitus, DM 1 (HARRIETT), untreated KP, GERD, diabetic polyneuropathy, COPD, remote history of alcohol and drug use, current medical marijuana.    She reports syncopal episodes occurring 1-2 times a month over the last couple of years and 6 episodes over the past 5 days.  She states she has no prior warning or prodromal symptoms and feels very tired afterwards. Though she later adds that  "she was nauseous and vomiting prior to episodes.  Episodes occur shortly after starting to walk, no LOC while lying down.  However, she also has episodes where the room is spinning while lying occurring 2-3 times a week.  She reports blood sugars are neither high nor low after episodes.  She has episodes of brief fluttering in her chest lasting a few seconds occurring about once a week associated with being tired, but do not appear to be associated with syncopal episodes.  She denies chest discomfort, shortness of breath, abdominal fullness/bloating or peripheral edema, or orthostatic lightheadedness.  She endorses frequent issues with constipation and diarrhea.  She has no known family history of heart disease or sudden death.  Her maternal aunt  of an aneurysm of some sort.       Physical Examination  Review of Systems   VITALS: /65 (BP Location: Left arm)   Pulse 77   Temp 97.9  F (36.6  C) (Oral)   Resp 18   Ht 1.575 m (5' 2\")   Wt 65.5 kg (144 lb 6.4 oz)   SpO2 100%   BMI 26.41 kg/m    Wt Readings from Last 3 Encounters:   23 65.5 kg (144 lb 6.4 oz)   23 66.7 kg (147 lb)   23 65.8 kg (145 lb)       Intake/Output Summary (Last 24 hours) at 2023 1453  Last data filed at 2023 1400  Gross per 24 hour   Intake 840 ml   Output 950 ml   Net -110 ml     General Appearance:   Drowsy, cooperative, in no acute distress.   HEENT: Normocephalic.  Bilateral orbital ecchymosis.  No scleral icterus, normal conjunctivae, EOMs intact.  Mucous membranes pink and moist.     Chest/Lungs:   Chest symmetric, spine straight.  Respirations unlabored.  Lungs are clear to auscultation.   Cardiovascular:   Regular rate and rhythm.  Normal first and second heart sounds with no murmurs; radial and posterior tibial pulses are intact, No edema.   Abdomen:  Soft, nontender, nondistended, bowel sounds present.   Extremities: No cyanosis or clubbing.   Musculoskeletal: Moves all extremities.     Skin: " Warm, dry, intact.    Neurologic: Mood and affect are appropriate.  Alert and oriented to person, place, time, and situation.      Constitutional:  No sudden weight loss or loss of appetite    Eyes:  No difficulty with vision, no double vision, no dry eyes  ENT:  No sore throat, difficulty swallowing; changes in hearing or tinnitus  Cardiovascular: As detailed above  Respiratory:  No cough  Musculoskeletal  No joint pain, muscle aches  Neurologic:  + syncope, +lightheadedness, + dizziness  Hematologic: +easy bruising, no excessive bleeding tendency   Gastrointestinal:  No jaundice, abdominal pain or abdominal bloating + constipation and diarrhea, + nausea/vomiting  Genitourinary: No changes in urinary habits, no trouble urinating    Psychiatric: No anxiety or depression      Medical History  Surgical History   Past Medical History:   Diagnosis Date    Benign essential hypertension     Created by Conversion  Replacement Utility updated for latest IMO load       COPD (chronic obstructive pulmonary disease) (H) 09/22/2021    Diabetic polyneuropathy (H) 02/02/2023    GERD (gastroesophageal reflux disease) 11/24/2014    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), managed as type 1 (H)     Created by Conversion       Mood disorder (H) 11/24/2014    KP (obstructive sleep apnea) 05/15/2015    Paroxysmal atrial fibrillation (H) 09/22/2021    Small bowel obstruction (H) 09/18/2021    Status post total knee replacement 11/24/2014    Past Surgical History:   Procedure Laterality Date    APPENDECTOMY      at age 3    ARTHROSCOPY KNEE WITH MENISCECTOMY  3/10/14    partial medial and lateral meniscectomies, subpatellar chondroplasty    BACK SURGERY      BLEPHAROPLASTY Bilateral 8/17/2015    Procedure: BLEPHAROPLASTY BILATERAL UPPER LIDS;  Surgeon: Chasidy Bryant MD;  Location: [facility] OR;  Service:     BOWEL RESECTION  12 years ago    COLON SURGERY  2004    sigmoid colectomy    DILATION AND CURETTAGE      x3     HYSTERECTOMY      age 39    IR LUMBAR PUNCTURE  2022    JOINT REPLACEMENT      OOPHORECTOMY      OTHER SURGICAL HISTORY      bowel obstruction    PICC TRIPLE LUMEN PLACEMENT  2021         PICC TRIPLE LUMEN PLACEMENT  2022         OH ARTHRODESIS ANT INTERBODY MIN DISCECTOMY,LUMBAR N/A 2015    Procedure: ANTERIOR LUMBAR INTERBODY FUSION L4-5, INTERBODY GRAFT, BMP INSTRUMENTATION;  Surgeon: Zeeshan Guillaume MD;  Location: [facility];  Service: Spine    TOE SURGERY      TONSILLECTOMY & ADENOIDECTOMY      age 10    ZZC REMOVAL OF OVARY(S)      Description: Oophorectomy;  Recorded: 2010;    ZZC REMOVAL OF OVARY(S)      Description: Oophorectomy;  Recorded: 2010;    Z TOTAL ABDOM HYSTERECTOMY      Description: Hysterectomy;  Recorded: 2010;    ZZC TOTAL ABDOM HYSTERECTOMY      Description: Hysterectomy;  Recorded: 2010;    Z TOTAL KNEE ARTHROPLASTY Right 2014    Procedure: RIGHT TOTAL KNEE ARTHROPLASTY;  Surgeon: Jules Harris MD;  Location: [facility];  Service: Orthopedics         Family History Social History   Family History   Problem Relation Age of Onset    Breast Cancer No family hx of         Social History     Tobacco Use    Smoking status: Some Days     Types: Cigarettes     Last attempt to quit: 9/15/2021     Years since quittin.9    Smokeless tobacco: Never   Substance Use Topics    Alcohol use: No    Drug use: No         Medications  Allergies     Current Facility-Administered Medications:     acetaminophen (TYLENOL) tablet 650 mg, 650 mg, Oral, Q6H PRN, 650 mg at 23 0457 **OR** acetaminophen (TYLENOL) Suppository 650 mg, 650 mg, Rectal, Q6H PRN, Jeet Cooper MD    acetaminophen (TYLENOL) tablet 1,000 mg, 1,000 mg, Oral, BID, Jeet Cooper MD, 1,000 mg at 23 0948    albuterol (PROVENTIL HFA/VENTOLIN HFA) inhaler, 1-2 puff, Inhalation, Q6H, Jeet Cooper MD    ARIPiprazole (ABILIFY) tablet 2 mg, 2 mg, Oral,  Daily, Jeet Cooper MD, 2 mg at 08/18/23 0949    aspirin EC tablet 81 mg, 81 mg, Oral, Daily, Jeet Cooper MD, 81 mg at 08/18/23 0949    cephALEXin (KEFLEX) capsule 500 mg, 500 mg, Oral, Daily, Evan Castro DO, 500 mg at 08/18/23 1158    cetirizine (zyrTEC) tablet 10 mg, 10 mg, Oral, Daily, Jeet Cooper MD    cloNIDine (CATAPRES-TTS) Patch in Place, , Transdermal, Q8H, Jeet Cooper MD    [START ON 8/20/2023] cloNIDine (CATAPRES-TTS3) 0.3 MG/24HR WK patch 1 patch, 1 patch, Transdermal, Weekly, Jeet Cooper MD    glucose gel 15-30 g, 15-30 g, Oral, Q15 Min PRN **OR** dextrose 50 % injection 25-50 mL, 25-50 mL, Intravenous, Q15 Min PRN **OR** glucagon injection 1 mg, 1 mg, Subcutaneous, Q15 Min PRN, Kaykay Paul MD    glucose gel 15-30 g, 15-30 g, Oral, Q15 Min PRN **OR** dextrose 50 % injection 25-50 mL, 25-50 mL, Intravenous, Q15 Min PRN **OR** glucagon injection 1 mg, 1 mg, Subcutaneous, Q15 Min PRN, Evan Castro DO    DULoxetine (CYMBALTA) DR capsule 60 mg, 60 mg, Oral, Daily, Jeet Cooper MD, 60 mg at 08/18/23 0950    escitalopram (LEXAPRO) tablet 10 mg, 10 mg, Oral, Daily, Jeet Cooper MD, 10 mg at 08/18/23 0950    estradiol (ESTRACE) tablet 1 mg, 1 mg, Oral, Daily, Jeet Cooper MD, 1 mg at 08/18/23 0951    hydrALAZINE (APRESOLINE) injection 10 mg, 10 mg, Intravenous, Q6H PRN, Brendan Ba MD    HYDROmorphone (DILAUDID) injection 0.2 mg, 0.2 mg, Intravenous, Q2H PRN, Jeet Cooper MD, 0.2 mg at 08/18/23 1336    insulin aspart (NovoLOG) injection (RAPID ACTING), , Subcutaneous, TID w/meals, Evan Castro DO, Given at 08/18/23 1446    insulin aspart (NovoLOG) injection (RAPID ACTING), 1-7 Units, Subcutaneous, TID AC, Evan Castro DO, 2 Units at 08/18/23 1335    insulin aspart (NovoLOG) injection (RAPID ACTING), 1-5 Units, Subcutaneous, At Bedtime, Evan Castro DO    [START ON 8/19/2023] insulin glargine (LANTUS PEN) injection 15 Units, 15 Units, Subcutaneous, QAM AC,  "Evan Castro R, DO    lidocaine (LMX4) cream, , Topical, Q1H PRN, Jeet Cooper MD    lidocaine 1 % 0.1-1 mL, 0.1-1 mL, Other, Q1H PRN, Jeet Cooper MD    losartan (COZAAR) tablet 100 mg, 100 mg, Oral, QPM, Jeet Cooper MD, 100 mg at 08/17/23 2149    magnesium oxide (MAG-OX) tablet 400 mg, 400 mg, Oral, Daily, Jeet Cooper MD, 400 mg at 08/18/23 0951    melatonin tablet 1 mg, 1 mg, Oral, At Bedtime PRN, Jeet Cooper MD    metFORMIN (GLUCOPHAGE XR) 24 hr tablet 1,000 mg, 1,000 mg, Oral, BID w/meals, Jeet Cooper MD, 1,000 mg at 08/18/23 0948    metoprolol succinate ER (TOPROL XL) 24 hr tablet 100 mg, 100 mg, Oral, BID, Jeet Cooper MD, 100 mg at 08/18/23 0951    naloxone (NARCAN) injection 0.2 mg, 0.2 mg, Intravenous, Q2 Min PRN **OR** naloxone (NARCAN) injection 0.4 mg, 0.4 mg, Intravenous, Q2 Min PRN **OR** naloxone (NARCAN) injection 0.2 mg, 0.2 mg, Intramuscular, Q2 Min PRN **OR** naloxone (NARCAN) injection 0.4 mg, 0.4 mg, Intramuscular, Q2 Min PRN, Jeet Cooper MD    ondansetron (ZOFRAN ODT) ODT tab 4 mg, 4 mg, Oral, Q6H PRN **OR** ondansetron (ZOFRAN) injection 4 mg, 4 mg, Intravenous, Q6H PRN, Jeet Cooper MD    oxyCODONE IR (ROXICODONE) half-tab 2.5 mg, 2.5 mg, Oral, Q4H PRN, Jeet Cooper MD, 2.5 mg at 08/18/23 1153    pantoprazole (PROTONIX) EC tablet 40 mg, 40 mg, Oral, QAM AC, Jeet Cooper MD, 40 mg at 08/18/23 0948    pregabalin (LYRICA) capsule 100 mg, 100 mg, Oral, TID, Jeet Cooper MD, 100 mg at 08/18/23 1336    rosuvastatin (CRESTOR) tablet 5 mg, 5 mg, Oral, Daily, Jeet Cooper MD, 5 mg at 08/18/23 0952    sodium chloride (PF) 0.9% PF flush 3 mL, 3 mL, Intracatheter, Q8H, Jeet Cooper MD, 3 mL at 08/18/23 1353    sodium chloride (PF) 0.9% PF flush 3 mL, 3 mL, Intracatheter, q1 min prn, Jeet Cooper MD    tiZANidine (ZANAFLEX) tablet 4 mg, 4 mg, Oral, At Bedtime PRN, Jeet Cooper MD     Allergies   Allergen Reactions    Morphine Other (See Comments)     \"make me " "delirious,\"  \"nightmares\"  Other reaction(s): delirium  \"make me delirious,\"  \"nightmares\"      Nitrofurantoin     Adhesive [Cyanoacrylate] Other (See Comments)     Can only tolerate tape for short periods of time shelia in sensitive areas    Amlodipine Unknown and Other (See Comments)     Other reaction(s): delerium    Doxazosin Other (See Comments)     Other reaction(s): feels foggy    Irbesartan-Hydrochlorothiazide Other (See Comments)     Other reaction(s): hyponatremia    Other Allergy (See Comments) [External Allergen Needs Reconciliation - See Comment] Unknown     Other reaction(s): skin rash    Prednisone Unknown     Caused extremely high blood sugars    Tramadol Other (See Comments)     Possible seizure activity    Trazodone Unknown     Other reaction(s): hung over          Lab Results    Chemistry CBC Cardiac Enzymes/BNP/TSH/INR   Recent Labs   Lab Test 08/18/23  1333 08/18/23  1006 08/18/23  0514   NA  --   --  136   POTASSIUM  --   --  4.0   CHLORIDE  --   --  101   CO2  --   --  27   *   < > 126*   BUN  --   --  12.0   CR  --   --  0.71   GFRESTIMATED  --   --  89   BRUNO  --   --  8.9    < > = values in this interval not displayed.     Recent Labs   Lab Test 08/18/23  0514 08/17/23  1208 07/25/23  1400   CR 0.71 0.75 0.76          Recent Labs   Lab Test 08/18/23  0514   WBC 11.5*   HGB 12.8   HCT 37.9   MCV 95        Recent Labs   Lab Test 08/18/23  0514 08/17/23  1208 07/24/23  0715   HGB 12.8 13.0 12.8    Recent Labs   Lab Test 01/20/22  1158 09/19/21  0211 09/17/21  2240   TROPONINI <0.01 0.06 0.01     No results for input(s): BNP, NTBNPI, NTBNP in the last 70535 hours.  Recent Labs   Lab Test 08/17/23  1410   TSH 0.98     Recent Labs   Lab Test 08/17/23  1208   INR 1.06         Data Review    ECGs (all tracings independently reviewed)  7/23/2023 sinus rhythm at 95 bpm, PACs, PVCs, left bundle branch block,  ms, QT/QTc 406/510 ms  5/14/2023 sinus rhythm at 78 bpm, QRS 74 ms, QT/QTc " 416/474 ms  9/19/2021 atrial fibrillation with rapid ventricular response at 134 bpm, left bundle branch block  9/17/2021 sinus rhythm at 79 bpm left bundle branch block,  ms, QT/QTc 400/464 ms    Telemetry shows sinus rhythm in the 70s, occasional PVCs.  She appears to have brief episodes of AF-RVR with left bundle branch block aberrancy    Zio patch worn 6/13/2023 to 6/21/2023 (personally reviewed) shows predominantly sinus rhythm with an average heart rate of 73 bpm and a range of 59 to 174 bpm.  39 brief episodes of SVT, longest lasting 12 seconds.  No significant bradycardia or pauses.  No significant ventricular ectopy.  Symptomatic recordings for lightheadedness, dizziness, shortness of breath correlated to both SVT and sinus rhythm.    Echo done 8/18/2023:  1. The left ventricle is normal in size. Left ventricular function is  decreased. The ejection fraction is 50-55% (borderline). There is mild  concentric left ventricular hypertrophy. Left ventricular diastolic function  is abnormal. There is borderline global hypokinesia of the left ventricle.  2. Normal right ventricle size and systolic function.  3. The left atrium is mildly dilated.  4. No hemodynamically significant valvular abnormalities on 2D or color flow  imaging.    NM stress test done 9/3/2014 was negative for inducible myocardial ischemia or infarction.

## 2023-08-18 NOTE — ED NOTES
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), managed as type 1 (H)     Created by Conversion       Mood disorder (H) 11/24/2014    KP (obstructive sleep apnea) 05/15/2015    Paroxysmal atrial fibrillation (H) 09/22/2021    Small bowel obstruction (H) 09/18/2021    Status post total knee replacement 11/24/2014        Code Status:  Prior     Falls Risk: Yes Band: Applied    Current Living Situation/Residence: lives with a significant other     Elimination Status: Continent: Yes     Activity Level: SBA    Patients Preferred Language:  English     Needed: No    Vital Signs:  BP (!) 158/108   Pulse 72   Temp 98.1  F (36.7  C)   Resp 24   Ht 1.575 m (5' 2\")   Wt 65.3 kg (144 lb)   SpO2 99%   BMI 26.34 kg/m       Cardiac Rhythm:     Pain Score: prn oxycodone given and somewhat effective.    Is the Patient Confused:  No    Last Food or Drink:     Focused Assessment:  pt come in for evaluation of syncopal episode, pt alert and pleasant has multiple bruises on the face, mainly around eyes. C/o of pain in the fracture area of the face. Had eeg done    Tests Performed: Done: Labs and Imaging    Treatments Provided:  see mar    Family Dynamics/Concerns: No    Family Updated On Visitor Policy: Yes    Plan of Care Communicated to Family: Yes    Who Was Updated about Plan of Care: daughter at bedside.    Belongings Checklist Done and " Signed by Patient: No    Medications sent with patient: none        Additional Information:

## 2023-08-19 LAB
GLUCOSE BLDC GLUCOMTR-MCNC: 122 MG/DL (ref 70–99)
GLUCOSE BLDC GLUCOMTR-MCNC: 138 MG/DL (ref 70–99)
GLUCOSE BLDC GLUCOMTR-MCNC: 191 MG/DL (ref 70–99)
GLUCOSE BLDC GLUCOMTR-MCNC: 193 MG/DL (ref 70–99)
GLUCOSE BLDC GLUCOMTR-MCNC: 217 MG/DL (ref 70–99)
GLUCOSE BLDC GLUCOMTR-MCNC: 225 MG/DL (ref 70–99)
GLUCOSE BLDC GLUCOMTR-MCNC: 258 MG/DL (ref 70–99)
GLUCOSE BLDC GLUCOMTR-MCNC: 342 MG/DL (ref 70–99)
GLUCOSE BLDC GLUCOMTR-MCNC: 53 MG/DL (ref 70–99)
GLUCOSE BLDC GLUCOMTR-MCNC: 79 MG/DL (ref 70–99)
GLUCOSE BLDC GLUCOMTR-MCNC: 88 MG/DL (ref 70–99)

## 2023-08-19 PROCEDURE — 210N000001 HC R&B IMCU HEART CARE

## 2023-08-19 PROCEDURE — 99232 SBSQ HOSP IP/OBS MODERATE 35: CPT | Performed by: INTERNAL MEDICINE

## 2023-08-19 PROCEDURE — 250N000013 HC RX MED GY IP 250 OP 250 PS 637: Performed by: INTERNAL MEDICINE

## 2023-08-19 PROCEDURE — 250N000012 HC RX MED GY IP 250 OP 636 PS 637: Performed by: HOSPITALIST

## 2023-08-19 PROCEDURE — 250N000013 HC RX MED GY IP 250 OP 250 PS 637: Performed by: HOSPITALIST

## 2023-08-19 PROCEDURE — 250N000011 HC RX IP 250 OP 636: Performed by: HOSPITALIST

## 2023-08-19 PROCEDURE — 99232 SBSQ HOSP IP/OBS MODERATE 35: CPT | Performed by: HOSPITALIST

## 2023-08-19 PROCEDURE — 999N000248 HC STATISTIC IV INSERT WITH US BY RN

## 2023-08-19 PROCEDURE — 250N000011 HC RX IP 250 OP 636: Mod: JZ | Performed by: INTERNAL MEDICINE

## 2023-08-19 RX ORDER — HYDROMORPHONE HYDROCHLORIDE 1 MG/ML
0.5 INJECTION, SOLUTION INTRAMUSCULAR; INTRAVENOUS; SUBCUTANEOUS
Status: DISCONTINUED | OUTPATIENT
Start: 2023-08-19 | End: 2023-08-20

## 2023-08-19 RX ORDER — DEXTROSE MONOHYDRATE 25 G/50ML
25-50 INJECTION, SOLUTION INTRAVENOUS
Status: DISCONTINUED | OUTPATIENT
Start: 2023-08-19 | End: 2023-08-22 | Stop reason: HOSPADM

## 2023-08-19 RX ORDER — NICOTINE POLACRILEX 4 MG
15-30 LOZENGE BUCCAL
Status: DISCONTINUED | OUTPATIENT
Start: 2023-08-19 | End: 2023-08-22 | Stop reason: HOSPADM

## 2023-08-19 RX ADMIN — PREGABALIN 100 MG: 100 CAPSULE ORAL at 08:42

## 2023-08-19 RX ADMIN — HYDROMORPHONE HYDROCHLORIDE 0.2 MG: 0.2 INJECTION, SOLUTION INTRAMUSCULAR; INTRAVENOUS; SUBCUTANEOUS at 08:28

## 2023-08-19 RX ADMIN — OXYCODONE HYDROCHLORIDE 2.5 MG: 5 TABLET ORAL at 23:24

## 2023-08-19 RX ADMIN — ALBUTEROL SULFATE 1 PUFF: 90 AEROSOL, METERED RESPIRATORY (INHALATION) at 08:39

## 2023-08-19 RX ADMIN — ROSUVASTATIN CALCIUM 5 MG: 5 TABLET, FILM COATED ORAL at 08:37

## 2023-08-19 RX ADMIN — CEPHALEXIN 500 MG: 500 CAPSULE ORAL at 08:34

## 2023-08-19 RX ADMIN — ACETAMINOPHEN 1000 MG: 500 TABLET ORAL at 20:32

## 2023-08-19 RX ADMIN — MAGNESIUM OXIDE TAB 400 MG (241.3 MG ELEMENTAL MG) 400 MG: 400 (241.3 MG) TAB at 08:35

## 2023-08-19 RX ADMIN — HYDROMORPHONE HYDROCHLORIDE 0.5 MG: 1 INJECTION, SOLUTION INTRAMUSCULAR; INTRAVENOUS; SUBCUTANEOUS at 10:51

## 2023-08-19 RX ADMIN — ESCITALOPRAM OXALATE 10 MG: 10 TABLET ORAL at 08:35

## 2023-08-19 RX ADMIN — PREGABALIN 100 MG: 100 CAPSULE ORAL at 20:33

## 2023-08-19 RX ADMIN — ESTRADIOL 1 MG: 1 TABLET ORAL at 08:35

## 2023-08-19 RX ADMIN — METFORMIN ER 500 MG 1000 MG: 500 TABLET ORAL at 08:32

## 2023-08-19 RX ADMIN — METFORMIN ER 500 MG 1000 MG: 500 TABLET ORAL at 17:32

## 2023-08-19 RX ADMIN — LOSARTAN POTASSIUM 100 MG: 50 TABLET, FILM COATED ORAL at 20:33

## 2023-08-19 RX ADMIN — PREGABALIN 100 MG: 100 CAPSULE ORAL at 13:00

## 2023-08-19 RX ADMIN — INSULIN GLARGINE 15 UNITS: 100 INJECTION, SOLUTION SUBCUTANEOUS at 12:58

## 2023-08-19 RX ADMIN — ALBUTEROL SULFATE 1 PUFF: 90 AEROSOL, METERED RESPIRATORY (INHALATION) at 16:46

## 2023-08-19 RX ADMIN — HYDROMORPHONE HYDROCHLORIDE 0.2 MG: 0.2 INJECTION, SOLUTION INTRAMUSCULAR; INTRAVENOUS; SUBCUTANEOUS at 04:33

## 2023-08-19 RX ADMIN — INSULIN ASPART 2 UNITS: 100 INJECTION, SOLUTION INTRAVENOUS; SUBCUTANEOUS at 12:58

## 2023-08-19 RX ADMIN — PANTOPRAZOLE SODIUM 40 MG: 40 TABLET, DELAYED RELEASE ORAL at 06:10

## 2023-08-19 RX ADMIN — Medication 81 MG: at 08:34

## 2023-08-19 RX ADMIN — DULOXETINE HYDROCHLORIDE 60 MG: 60 CAPSULE, DELAYED RELEASE ORAL at 08:34

## 2023-08-19 RX ADMIN — ARIPIPRAZOLE 2 MG: 2 TABLET ORAL at 08:33

## 2023-08-19 RX ADMIN — HYDRALAZINE HYDROCHLORIDE 10 MG: 20 INJECTION INTRAMUSCULAR; INTRAVENOUS at 03:44

## 2023-08-19 RX ADMIN — METOPROLOL SUCCINATE 100 MG: 100 TABLET, EXTENDED RELEASE ORAL at 20:32

## 2023-08-19 RX ADMIN — ACETAMINOPHEN 1000 MG: 500 TABLET ORAL at 08:32

## 2023-08-19 RX ADMIN — METOPROLOL SUCCINATE 100 MG: 100 TABLET, EXTENDED RELEASE ORAL at 08:36

## 2023-08-19 ASSESSMENT — ACTIVITIES OF DAILY LIVING (ADL)
ADLS_ACUITY_SCORE: 33

## 2023-08-19 NOTE — PLAN OF CARE
Problem: Syncope  Goal: Absence of Syncopal Symptoms  Outcome: Progressing     Problem: Pain Acute  Goal: Optimal Pain Control and Function  Outcome: Progressing  Intervention: Develop Pain Management Plan  Recent Flowsheet Documentation  Taken 8/19/2023 0828 by Hazel Brar RN  Pain Management Interventions: medication (see MAR)  Intervention: Prevent or Manage Pain  Recent Flowsheet Documentation  Taken 8/19/2023 1500 by Hazel Brar RN  Medication Review/Management: medications reviewed  Taken 8/19/2023 0800 by Hazel Brar RN  Medication Review/Management: medications reviewed     Problem: Fall Injury Risk  Goal: Absence of Fall and Fall-Related Injury  Outcome: Progressing  Intervention: Identify and Manage Contributors  Recent Flowsheet Documentation  Taken 8/19/2023 1500 by Hazel Brar RN  Medication Review/Management: medications reviewed  Taken 8/19/2023 0800 by Hazel Brar RN  Medication Review/Management: medications reviewed  Intervention: Promote Injury-Free Environment  Recent Flowsheet Documentation  Taken 8/19/2023 1500 by Hazel Brar RN  Safety Promotion/Fall Prevention:   activity supervised   assistive device/personal items within reach   clutter free environment maintained   increased rounding and observation   lighting adjusted   mobility aid in reach   nonskid shoes/slippers when out of bed   patient and family education   room door open   room near nurse's station   room organization consistent   safety round/check completed   supervised activity   treat reversible contributory factors   treat underlying cause  Taken 8/19/2023 0800 by Hazel Brar RN  Safety Promotion/Fall Prevention:   activity supervised   assistive device/personal items within reach   clutter free environment maintained   increased rounding and observation   lighting adjusted   mobility aid in reach   nonskid shoes/slippers when out of bed   patient and family  education   room door open   room near nurse's station   room organization consistent   safety round/check completed   supervised activity   treat reversible contributory factors   treat underlying cause       Goal Outcome Evaluation:    Alert and oriented, forgetful this afternoon. VSS. Tele NSR with episodes of bigeminal PACs. 13 beats VT at 1636, pt asymptomatic. Dr. Reynolds notified. Prn dilaudid given for facial pain with relief. Insulin pump discontinued because pt confused about how to use pump this afternoon. BG 53 this afternoon, up to 138 after treating. Pt up in chair this morning, assist x1. Purewick in place. Bed alarm on. Makes needs known.

## 2023-08-19 NOTE — PLAN OF CARE
Problem: Hypertension Acute  Goal: Blood Pressure Within Desired Range  Intervention: Normalize Blood Pressure  Recent Flowsheet Documentation  Taken 8/19/2023 0345 by Elizabeth Biggs RN  Medication Review/Management: medications reviewed  Taken 8/18/2023 2334 by Elizabeth Biggs RN  Medication Review/Management: medications reviewed  Taken 8/18/2023 1955 by Elizabeth Biggs RN  Medication Review/Management: medications reviewed     Problem: Syncope  Goal: Absence of Syncopal Symptoms  Outcome: Progressing     Problem: Pain Acute  Goal: Optimal Pain Control and Function  Outcome: Progressing  Intervention: Prevent or Manage Pain  Recent Flowsheet Documentation  Taken 8/19/2023 0345 by Elizabeth Biggs RN  Medication Review/Management: medications reviewed  Taken 8/18/2023 2334 by Elizabeth Biggs RN  Medication Review/Management: medications reviewed  Taken 8/18/2023 1955 by Elizabeth Biggs RN  Medication Review/Management: medications reviewed     Goal Outcome Evaluation:         Patient complaint of top of head and facial pain. PRN dilaudid given and helpful per patient. /91, PRN IV hydralazine given for SBP >180. BP went down to 142/69 after. Seizure pad in place. Purewick in place.

## 2023-08-19 NOTE — PROGRESS NOTES
Diabetes Care;  Call from nurse regarding patient didn't know how to cancel her temp basal rate.  I called her and she admitted that her brain is not working, she is confused.   Discussed the safety issue with her of self managing her insulin pump if she is feeling confused, brain not working. She agreed that it would be best if she removed her pump and we restarted subcutaneous insulin regimen based on her pump rates:  15 units of Lantus now, pump removed after given  1:18 I:C ratio at meals, Novolog to carb grams  Medium Novolog correction scale.  Pump orders would be discontinued and injections going forward until patient feels clear and able to self manage insulin pump once again.    I talked with the nurse who will talk with the provider.    Thanks    Georgie Goncalves RN, Certified Diabetes Care and     [address] [address]  [email or web address]   Office: [phone]  Pager: [phone]

## 2023-08-19 NOTE — PROGRESS NOTES
"[facility]    Medicine Progress Note - Hospitalist Service    Date of Admission:  8/17/2023    Assessment & Plan   Patient is a 73-year-old female with history of diabetes (HARRIETT), A-fib and COPD who presents with recurrent falls/syncope.    #Syncope  #Left bundle branch block  #Nonsustained V. tach  Consulted cardiology  EP planning for Loop recorder insertion Monday    #Paroxysmal atrial fibrillation  Continue metoprolol and aspirin  Not on anticoagulation - high risk with recurrent syncope    #Facial bone fractures  ED spoke with [facility]- no rec for abx, follow up as outpatient in 1 week    #Hypertension  PTA clonidine patch, losartan    #Latent autoimmune diabetes in adult, uncontrolled with hyperglycemia  Insulin pump to be removed while in hospital  Metformin, Lantus, mealtime insulin, Ssi     #History of COPD  Albuterol inhaler as needed          VTE ppx: PCDs       Diet: Regular Diet Adult    DVT Prophylaxis:   Gomes Catheter: Not present  Lines: None     Cardiac Monitoring: None  Code Status: Full Code      Clinically Significant Risk Factors Present on Admission                # Drug Induced Platelet Defect: home medication list includes an antiplatelet medication   # Hypertension: Noted on problem list     # DMII: A1C = 7.4 % (Ref range: 0.0 - 5.6 %) within past 6 months    # Overweight: Estimated body mass index is 25.61 kg/m  as calculated from the following:    Height as of this encounter: 1.575 m (5' 2\").    Weight as of this encounter: 63.5 kg (140 lb).              Disposition Plan     Expected Discharge Date: 08/20/2023      Destination: home with family            Eavn CARRION DO Matthew  Hospitalist Service  [facility]  Securely message with [employer] (more info)  Text page via [employer] Paging/Directory   ______________________________________________________________________    Interval History   Patient has pain on her face.  No further syncopal " episodes    Physical Exam   Vital Signs: Temp: 97.7  F (36.5  C) Temp src: Oral BP: 134/64 Pulse: 73   Resp: 18 SpO2: 97 % O2 Device: None (Room air)    Weight: 140 lbs 0 oz    General Appearance:  No acute distress  Face: Deep bruising around both eyes  Respiratory: Clear to auscultation bilaterally  Cardiovascular: Irregular rhythm, normal rate      Medical Decision Making             Data         Imaging results reviewed over the past 24 hrs:   No results found for this or any previous visit (from the past 24 hour(s)).

## 2023-08-19 NOTE — PROGRESS NOTES
"    Cardiology Progress Note    Assessment:  Recurrent traumatic falls possibly syncope-thus far unable to correlate with significant arrhythmias  Intermittent rate dependent left bundle branch block  History of frequent PVCs and nonsustained VT, unchange  Diabetes mellitus  COPD  Sleep apnea  Hypertension      Plan:  Heart rhythm monitoring  Possible ILR before discharge as per EP    Subjective:   Denies chest discomfort or shortness of breath.  Continues to complain of facial pain and    Objective:   BP (!) 145/95   Pulse 82   Temp 97.5  F (36.4  C) (Oral)   Resp 18   Ht 1.575 m (5' 2\")   Wt 63.5 kg (140 lb)   SpO2 98%   BMI 25.61 kg/m      Intake/Output Summary (Last 24 hours) at 8/19/2023 0949  Last data filed at 8/19/2023 0800  Gross per 24 hour   Intake 1800 ml   Output 3200 ml   Net -1400 ml         Physical Exam:  GENERAL: no distress  NECK: No JVD  LUNGS: Clear to auscultation.  CARDIAC: regular  rhythm, S1 & S2 normal.  No heaves, thrills, gallops or murmurs.  ABDOMEN: flat, negative hepatosplenomegaly, soft and non-tender.  EXTREMITIES: No evidence of cyanosis, clubbing or edema.    Current Facility-Administered Medications Ordered in Epic   Medication Dose Route Frequency Provider Last Rate Last Admin    acetaminophen (TYLENOL) tablet 650 mg  650 mg Oral Q6H PRN Jeet Cooper MD   650 mg at 08/18/23 0457    Or    acetaminophen (TYLENOL) Suppository 650 mg  650 mg Rectal Q6H PRN Jeet Cooper MD        acetaminophen (TYLENOL) tablet 1,000 mg  1,000 mg Oral BID Jeet Cooper MD   1,000 mg at 08/19/23 0832    albuterol (PROVENTIL HFA/VENTOLIN HFA) inhaler  1-2 puff Inhalation Q6H Jeet Cooper MD   1 puff at 08/19/23 0839    ARIPiprazole (ABILIFY) tablet 2 mg  2 mg Oral Daily Jeet Cooper MD   2 mg at 08/19/23 0833    aspirin EC tablet 81 mg  81 mg Oral Daily Jeet Cooper MD   81 mg at 08/19/23 0834    cephALEXin (KEFLEX) capsule 500 mg  500 mg Oral Daily Evan Castro DO   500 mg at " 08/19/23 0834    cetirizine (zyrTEC) tablet 10 mg  10 mg Oral Daily Jeet Cooper MD        cloNIDine (CATAPRES-TTS) Patch in Place   Transdermal Q8H Jeet Cooper MD        [START ON 8/20/2023] cloNIDine (CATAPRES-TTS3) 0.3 MG/24HR WK patch 1 patch  1 patch Transdermal Weekly Jeet Cooper MD        glucose gel 15-30 g  15-30 g Oral Q15 Min PRN Kaykay Paul MD        Or    dextrose 50 % injection 25-50 mL  25-50 mL Intravenous Q15 Min PRN Kaykay Paul MD        Or    glucagon injection 1 mg  1 mg Subcutaneous Q15 Min PRN Kaykay Paul MD        DULoxetine (CYMBALTA) DR capsule 60 mg  60 mg Oral Daily Jeet Cooper MD   60 mg at 08/19/23 0834    escitalopram (LEXAPRO) tablet 10 mg  10 mg Oral Daily Jeet Cooper MD   10 mg at 08/19/23 0835    estradiol (ESTRACE) tablet 1 mg  1 mg Oral Daily Jeet Cooper MD   1 mg at 08/19/23 0835    hydrALAZINE (APRESOLINE) injection 10 mg  10 mg Intravenous Q6H PRN Brendan Ba MD   10 mg at 08/19/23 0344    HYDROmorphone (DILAUDID) injection 0.2 mg  0.2 mg Intravenous Q2H PRN Jeet Cooper MD   0.2 mg at 08/19/23 0828    insulin aspart (NovoLOG/FIASP) 100 UNIT/ML VIAL FOR FILLING PUMP RESERVOIR   Device See Admin Instructions Jeremy Price MD        insulin basal rate from AMBULATORY PUMP   Subcutaneous Continuous Jeermy Price MD   Held at 08/18/23 2000    insulin bolus from AMBULATORY PUMP   Subcutaneous 4 times per day Jeremy Price MD   1 Units at 08/19/23 0826    lidocaine (LMX4) cream   Topical Q1H PRN Jeet Cooper MD        lidocaine 1 % 0.1-1 mL  0.1-1 mL Other Q1H PRN Jeet Cooper MD        losartan (COZAAR) tablet 100 mg  100 mg Oral QPM Jeet Cooper MD   100 mg at 08/18/23 2121    magnesium oxide (MAG-OX) tablet 400 mg  400 mg Oral Daily Jeet Cooper MD   400 mg at 08/19/23 0835    melatonin tablet 1 mg  1 mg Oral At Bedtime PRN Jeet Cooper MD        metFORMIN (GLUCOPHAGE XR) 24 hr tablet  1,000 mg  1,000 mg Oral BID w/meals Jeet Cooper MD   1,000 mg at 08/19/23 0832    metoprolol succinate ER (TOPROL XL) 24 hr tablet 100 mg  100 mg Oral BID Jeet Cooper MD   100 mg at 08/19/23 0836    naloxone (NARCAN) injection 0.2 mg  0.2 mg Intravenous Q2 Min PRN Jeet Cooper MD        Or    naloxone (NARCAN) injection 0.4 mg  0.4 mg Intravenous Q2 Min PRN Jeet Cooper MD        Or    naloxone (NARCAN) injection 0.2 mg  0.2 mg Intramuscular Q2 Min PRN Jeet Cooper MD        Or    naloxone (NARCAN) injection 0.4 mg  0.4 mg Intramuscular Q2 Min PRN Jeet Cooper MD        ondansetron (ZOFRAN ODT) ODT tab 4 mg  4 mg Oral Q6H PRN Jeet Cooper MD        Or    ondansetron (ZOFRAN) injection 4 mg  4 mg Intravenous Q6H PRN Jeet Cooper MD        oxyCODONE IR (ROXICODONE) half-tab 2.5 mg  2.5 mg Oral Q4H PRN Jeet Cooper MD   2.5 mg at 08/18/23 1153    pantoprazole (PROTONIX) EC tablet 40 mg  40 mg Oral QAM AC Jeet Cooper MD   40 mg at 08/19/23 0610    pregabalin (LYRICA) capsule 100 mg  100 mg Oral TID Jeet Cooper MD   100 mg at 08/19/23 0842    rosuvastatin (CRESTOR) tablet 5 mg  5 mg Oral Daily Jeet Cooper MD   5 mg at 08/19/23 0837    sodium chloride (PF) 0.9% PF flush 3 mL  3 mL Intracatheter Q8H Jeet Cooper MD   3 mL at 08/19/23 0345    sodium chloride (PF) 0.9% PF flush 3 mL  3 mL Intracatheter q1 min prn Jeet Cooper MD        tiZANidine (ZANAFLEX) tablet 4 mg  4 mg Oral At Bedtime PRN Jeet Cooper MD         No current Epic-ordered outpatient medications on file.       Cardiographics:    Telemetry: Normal sinus rhythm with short runs of 3-4 beats of VT, no bradycardia  ECG: Normal sinus rhythm IVCD left bundle branch block type  Echocardiogram:   1. The left ventricle is normal in size. Left ventricular function is  decreased. The ejection fraction is 50-55% (borderline). There is mild  concentric left ventricular hypertrophy. Left ventricular diastolic function  is abnormal.  There is borderline global hypokinesia of the left ventricle.  2. Normal right ventricle size and systolic function.  3. The left atrium is mildly dilated.  4. No hemodynamically significant valvular abnormalities on 2D or color flow  imaging.       Lab Results    Chemistry/lipid CBC Cardiac Enzymes/BNP/TSH/INR   Recent Labs   Lab Test 07/25/23  1400   CHOL 127   HDL 59   LDL 43   TRIG 126     Recent Labs   Lab Test 07/25/23  1400 09/16/22  1541 08/16/22  1609   LDL 43 39 45     Recent Labs   Lab Test 08/19/23  0727 08/18/23  1006 08/18/23  0514   NA  --   --  136   POTASSIUM  --   --  4.0   CHLORIDE  --   --  101   CO2  --   --  27   *   < > 126*   BUN  --   --  12.0   CR  --   --  0.71   GFRESTIMATED  --   --  89   BRUNO  --   --  8.9    < > = values in this interval not displayed.     Recent Labs   Lab Test 08/18/23  0514 08/17/23  1208 07/25/23  1400   CR 0.71 0.75 0.76     Recent Labs   Lab Test 07/25/23  1400 03/22/23  1557 11/21/22  1606   A1C 7.4* 6.7* 6.4*          Recent Labs   Lab Test 08/18/23  0514   WBC 11.5*   HGB 12.8   HCT 37.9   MCV 95        Recent Labs   Lab Test 08/18/23  0514 08/17/23  1208 07/24/23  0715   HGB 12.8 13.0 12.8    Recent Labs   Lab Test 01/20/22  1158 09/19/21  0211 09/17/21  2240   TROPONINI <0.01 0.06 0.01     No results for input(s): BNP, NTBNPI, NTBNP in the last 10441 hours.  Recent Labs   Lab Test 08/17/23  1410   TSH 0.98     Recent Labs   Lab Test 08/17/23  1208   INR 1.06

## 2023-08-19 NOTE — PROGRESS NOTES
Diabetes Care;  TC from nursing about patient's rising BG.  Last reading 1.5 hours post meal, over 300.   Reviewed pump recommendations for elevated BG with nurse.  BG will be checked now and patient will bolus per pump. If it doesn't starting decreasing in one hour, she should remove her Pod and put a new on one in a different site. If she does not have  Pod with her, subcutaneous basal/bolus insulin would need to be ordered once again based on her insulin pump dosing for basal, meals and correction.  She will wait with eating until 1 hour after correction bolus.  She will also cancel out of temporary basal rate now to resume pump basal rate.  Nurse to call with further questions.       Georgie Goncalves RN, Certified Diabetes Care and     [address] [address]  [email or web address]   Office: [phone]  Pager: [phone]

## 2023-08-19 NOTE — PROGRESS NOTES
Diabetes Care;  Patient was to restart pump tomorrow after getting supplies but when I called this evening, she already had the pump on and no temp basal rate programmed in to account for the Lantus insulin, basal insulin, already given at 1335.   I came in to be sure she had the temp basal rate of zero set for 12 hours (max) and then she will set it for another 6 hours until Lantus wears off. In the meantime, she can give bolus doses if needed for correction or nutrition with her pump.     See CDE consult note at 11:40 today.     PUMP SETTINGS:  OmniPod Dash with Dexcom G6 - Novolog (PDM pass code [id number])     Basal rates:   [place] - 3AM: 0.65 units/hr  3AM - 9AM: 0.50 units/hr  9AM - 12PM: 0.55 units/hr  12PM - [place]: 0.75 units/hr  TDD: 15.6 units    Temp basal rate of zero for 12 hours (7p-7a) and then 7a-1 pm tomorrow til Lantus wears off      Insulin to carbohydrate ratio: 1:18 grams.      Sensitivity: 1 unit will drop her 60 mg/dL.     Blood glucose targets:  mg/dL target to 120 mg/dL with correction >150 mg/dL.      Active insulin time: 4 hours    Dexcom CGMS is on her left arm and her new Omnipod insulin pump is on left mid-abdomen.     Per note about daughter's concern about her other meds, Victoza and Metformin, she should talk to patient's endocrinologist.     I saw patient when here with her pump 7/24 as well.    Reviewed the following: Patient will inform RN when giving an insulin dose using the hospital meter, not adjusting pump rates without consulting hospital provider, disconnection of insulin pump for radiology procedures (CGMS removed) and that if for safety reasons, the insulin pump would be removed and subcutaneous injections would be ordered based on assessed pump rates.    Assessment:  Pump assessment completed, orders entered, and patient safe to use insulin pump in the hospital independently.      Georgie Goncalves RN, Certified Diabetes Care and     [facility]  [address] [facility] [place] [address]  [email or web address]   Office: [phone]  Pager: [phone]

## 2023-08-20 LAB
ANION GAP SERPL CALCULATED.3IONS-SCNC: 10 MMOL/L (ref 7–15)
BUN SERPL-MCNC: 13.4 MG/DL (ref 8–23)
CALCIUM SERPL-MCNC: 9.9 MG/DL (ref 8.8–10.2)
CHLORIDE SERPL-SCNC: 99 MMOL/L (ref 98–107)
CREAT SERPL-MCNC: 0.69 MG/DL (ref 0.51–0.95)
DEPRECATED HCO3 PLAS-SCNC: 26 MMOL/L (ref 22–29)
GFR SERPL CREATININE-BSD FRML MDRD: >90 ML/MIN/1.73M2
GLUCOSE BLDC GLUCOMTR-MCNC: 147 MG/DL (ref 70–99)
GLUCOSE BLDC GLUCOMTR-MCNC: 151 MG/DL (ref 70–99)
GLUCOSE BLDC GLUCOMTR-MCNC: 218 MG/DL (ref 70–99)
GLUCOSE BLDC GLUCOMTR-MCNC: 89 MG/DL (ref 70–99)
GLUCOSE BLDC GLUCOMTR-MCNC: 92 MG/DL (ref 70–99)
GLUCOSE SERPL-MCNC: 114 MG/DL (ref 70–99)
MAGNESIUM SERPL-MCNC: 1.9 MG/DL (ref 1.7–2.3)
PLATELET # BLD AUTO: 261 10E3/UL (ref 150–450)
POTASSIUM SERPL-SCNC: 5.1 MMOL/L (ref 3.4–5.3)
SODIUM SERPL-SCNC: 135 MMOL/L (ref 136–145)

## 2023-08-20 PROCEDURE — 210N000001 HC R&B IMCU HEART CARE

## 2023-08-20 PROCEDURE — 82310 ASSAY OF CALCIUM: CPT | Performed by: HOSPITALIST

## 2023-08-20 PROCEDURE — 36415 COLL VENOUS BLD VENIPUNCTURE: CPT | Performed by: INTERNAL MEDICINE

## 2023-08-20 PROCEDURE — 250N000013 HC RX MED GY IP 250 OP 250 PS 637: Performed by: INTERNAL MEDICINE

## 2023-08-20 PROCEDURE — 99232 SBSQ HOSP IP/OBS MODERATE 35: CPT | Performed by: INTERNAL MEDICINE

## 2023-08-20 PROCEDURE — 85049 AUTOMATED PLATELET COUNT: CPT | Performed by: INTERNAL MEDICINE

## 2023-08-20 PROCEDURE — 99232 SBSQ HOSP IP/OBS MODERATE 35: CPT | Performed by: HOSPITALIST

## 2023-08-20 PROCEDURE — 83735 ASSAY OF MAGNESIUM: CPT | Performed by: HOSPITALIST

## 2023-08-20 PROCEDURE — 250N000013 HC RX MED GY IP 250 OP 250 PS 637: Performed by: HOSPITALIST

## 2023-08-20 PROCEDURE — 250N000011 HC RX IP 250 OP 636: Mod: JZ | Performed by: HOSPITALIST

## 2023-08-20 RX ORDER — DOCUSATE SODIUM 100 MG/1
200 CAPSULE, LIQUID FILLED ORAL 2 TIMES DAILY
Status: DISCONTINUED | OUTPATIENT
Start: 2023-08-20 | End: 2023-08-22 | Stop reason: HOSPADM

## 2023-08-20 RX ORDER — POLYETHYLENE GLYCOL 3350 17 G/17G
17 POWDER, FOR SOLUTION ORAL AT BEDTIME
Status: DISCONTINUED | OUTPATIENT
Start: 2023-08-20 | End: 2023-08-22 | Stop reason: HOSPADM

## 2023-08-20 RX ORDER — HYDROMORPHONE HCL IN WATER/PF 6 MG/30 ML
0.2 PATIENT CONTROLLED ANALGESIA SYRINGE INTRAVENOUS EVERY 6 HOURS PRN
Status: DISCONTINUED | OUTPATIENT
Start: 2023-08-20 | End: 2023-08-22 | Stop reason: HOSPADM

## 2023-08-20 RX ADMIN — HYDROMORPHONE HYDROCHLORIDE 0.2 MG: 0.2 INJECTION, SOLUTION INTRAMUSCULAR; INTRAVENOUS; SUBCUTANEOUS at 18:18

## 2023-08-20 RX ADMIN — LOSARTAN POTASSIUM 100 MG: 50 TABLET, FILM COATED ORAL at 20:44

## 2023-08-20 RX ADMIN — CEPHALEXIN 500 MG: 500 CAPSULE ORAL at 08:07

## 2023-08-20 RX ADMIN — HYDROMORPHONE HYDROCHLORIDE 0.5 MG: 1 INJECTION, SOLUTION INTRAMUSCULAR; INTRAVENOUS; SUBCUTANEOUS at 08:18

## 2023-08-20 RX ADMIN — ALBUTEROL SULFATE 2 PUFF: 90 AEROSOL, METERED RESPIRATORY (INHALATION) at 15:31

## 2023-08-20 RX ADMIN — HYDROMORPHONE HYDROCHLORIDE 0.5 MG: 1 INJECTION, SOLUTION INTRAMUSCULAR; INTRAVENOUS; SUBCUTANEOUS at 01:59

## 2023-08-20 RX ADMIN — ROSUVASTATIN CALCIUM 5 MG: 5 TABLET, FILM COATED ORAL at 08:10

## 2023-08-20 RX ADMIN — ACETAMINOPHEN 650 MG: 325 TABLET ORAL at 17:17

## 2023-08-20 RX ADMIN — PREGABALIN 100 MG: 100 CAPSULE ORAL at 20:45

## 2023-08-20 RX ADMIN — ESTRADIOL 1 MG: 1 TABLET ORAL at 08:08

## 2023-08-20 RX ADMIN — PREGABALIN 100 MG: 100 CAPSULE ORAL at 14:05

## 2023-08-20 RX ADMIN — CLONIDINE 1 PATCH: 0.3 PATCH TRANSDERMAL at 08:13

## 2023-08-20 RX ADMIN — ARIPIPRAZOLE 2 MG: 2 TABLET ORAL at 08:06

## 2023-08-20 RX ADMIN — PREGABALIN 100 MG: 100 CAPSULE ORAL at 08:09

## 2023-08-20 RX ADMIN — INSULIN ASPART 1 UNITS: 100 INJECTION, SOLUTION INTRAVENOUS; SUBCUTANEOUS at 13:56

## 2023-08-20 RX ADMIN — METFORMIN ER 500 MG 1000 MG: 500 TABLET ORAL at 17:38

## 2023-08-20 RX ADMIN — ACETAMINOPHEN 1000 MG: 500 TABLET ORAL at 08:06

## 2023-08-20 RX ADMIN — ESCITALOPRAM OXALATE 10 MG: 10 TABLET ORAL at 08:08

## 2023-08-20 RX ADMIN — DULOXETINE HYDROCHLORIDE 60 MG: 60 CAPSULE, DELAYED RELEASE ORAL at 08:08

## 2023-08-20 RX ADMIN — INSULIN ASPART 8 UNITS: 100 INJECTION, SOLUTION INTRAVENOUS; SUBCUTANEOUS at 09:45

## 2023-08-20 RX ADMIN — METOPROLOL SUCCINATE 100 MG: 100 TABLET, EXTENDED RELEASE ORAL at 20:45

## 2023-08-20 RX ADMIN — CETIRIZINE HYDROCHLORIDE 10 MG: 10 TABLET, FILM COATED ORAL at 08:07

## 2023-08-20 RX ADMIN — METOPROLOL SUCCINATE 100 MG: 100 TABLET, EXTENDED RELEASE ORAL at 08:09

## 2023-08-20 RX ADMIN — DOCUSATE SODIUM 200 MG: 100 CAPSULE, LIQUID FILLED ORAL at 15:31

## 2023-08-20 RX ADMIN — ACETAMINOPHEN 1000 MG: 500 TABLET ORAL at 20:44

## 2023-08-20 RX ADMIN — Medication 81 MG: at 08:07

## 2023-08-20 RX ADMIN — MAGNESIUM OXIDE TAB 400 MG (241.3 MG ELEMENTAL MG) 400 MG: 400 (241.3 MG) TAB at 08:09

## 2023-08-20 RX ADMIN — METFORMIN ER 500 MG 1000 MG: 500 TABLET ORAL at 08:05

## 2023-08-20 RX ADMIN — PANTOPRAZOLE SODIUM 40 MG: 40 TABLET, DELAYED RELEASE ORAL at 06:33

## 2023-08-20 RX ADMIN — INSULIN ASPART 2 UNITS: 100 INJECTION, SOLUTION INTRAVENOUS; SUBCUTANEOUS at 18:24

## 2023-08-20 RX ADMIN — HYDROMORPHONE HYDROCHLORIDE 0.2 MG: 0.2 INJECTION, SOLUTION INTRAMUSCULAR; INTRAVENOUS; SUBCUTANEOUS at 23:53

## 2023-08-20 RX ADMIN — INSULIN GLARGINE 15 UNITS: 100 INJECTION, SOLUTION SUBCUTANEOUS at 08:18

## 2023-08-20 ASSESSMENT — ACTIVITIES OF DAILY LIVING (ADL)
ADLS_ACUITY_SCORE: 33

## 2023-08-20 NOTE — PROGRESS NOTES
"    Cardiology Progress Note    Assessment:  Recurrent traumatic falls possibly syncope-thus far unable to correlate with significant arrhythmias  Intermittent rate dependent left bundle branch block  History of frequent PVCs and nonsustained VT(12 beats run yesterday)  Diabetes mellitus  COPD  Sleep apnea  Hypertension      Plan:  Heart rhythm monitoring  Possible ILR before discharge as per EP    Subjective:   Denies chest discomfort or shortness of breath.  Continues to complain of facial pain and    Objective:   BP (!) 143/74 (BP Location: Left arm)   Pulse 66   Temp 97.8  F (36.6  C) (Oral)   Resp 18   Ht 1.575 m (5' 2\")   Wt 63.6 kg (140 lb 3.2 oz)   SpO2 96%   BMI 25.64 kg/m      Intake/Output Summary (Last 24 hours) at 8/19/2023 0949  Last data filed at 8/19/2023 0800  Gross per 24 hour   Intake 1800 ml   Output 3200 ml   Net -1400 ml         Physical Exam:  GENERAL: no distress  NECK: No JVD  LUNGS: Clear to auscultation.  CARDIAC: regular  rhythm, S1 & S2 normal.  No heaves, thrills, gallops or murmurs.  ABDOMEN: flat, negative hepatosplenomegaly, soft and non-tender.  EXTREMITIES: No evidence of cyanosis, clubbing or edema.    Current Facility-Administered Medications Ordered in Epic   Medication Dose Route Frequency Provider Last Rate Last Admin    acetaminophen (TYLENOL) tablet 650 mg  650 mg Oral Q6H PRN Jeet Cooper MD   650 mg at 08/18/23 0457    Or    acetaminophen (TYLENOL) Suppository 650 mg  650 mg Rectal Q6H PRN Jeet Cooper MD        acetaminophen (TYLENOL) tablet 1,000 mg  1,000 mg Oral BID Jeet Cooper MD   1,000 mg at 08/20/23 0806    albuterol (PROVENTIL HFA/VENTOLIN HFA) inhaler  1-2 puff Inhalation Q6H Jeet Cooper MD   1 puff at 08/19/23 1646    ARIPiprazole (ABILIFY) tablet 2 mg  2 mg Oral Daily Jeet Cooper MD   2 mg at 08/20/23 0806    aspirin EC tablet 81 mg  81 mg Oral Daily Jeet Cooper MD   81 mg at 08/20/23 0807    cephALEXin (KEFLEX) capsule 500 mg  500 mg " Oral Daily Evan Castro DO   500 mg at 08/20/23 0807    cetirizine (zyrTEC) tablet 10 mg  10 mg Oral Daily Jeet Cooper MD   10 mg at 08/20/23 0807    cloNIDine (CATAPRES-TTS) Patch in Place   Transdermal Q8H Jeet Cooper MD        cloNIDine (CATAPRES-TTS3) 0.3 MG/24HR WK patch 1 patch  1 patch Transdermal Weekly Jeet Cooper MD   1 patch at 08/20/23 0813    glucose gel 15-30 g  15-30 g Oral Q15 Min PRN Evan Castro DO        Or    dextrose 50 % injection 25-50 mL  25-50 mL Intravenous Q15 Min PRN Evan Castro DO        Or    glucagon injection 1 mg  1 mg Subcutaneous Q15 Min PRN Evan Castro DO        glucose gel 15-30 g  15-30 g Oral Q15 Min PRN Kaykay Paul MD        Or    dextrose 50 % injection 25-50 mL  25-50 mL Intravenous Q15 Min PRN Kaykay Paul MD        Or    glucagon injection 1 mg  1 mg Subcutaneous Q15 Min PRN Kaykay Paul MD        DULoxetine (CYMBALTA) DR capsule 60 mg  60 mg Oral Daily Jeet Cooper MD   60 mg at 08/20/23 0808    escitalopram (LEXAPRO) tablet 10 mg  10 mg Oral Daily Jeet Cooper MD   10 mg at 08/20/23 0808    estradiol (ESTRACE) tablet 1 mg  1 mg Oral Daily Jeet Cooper MD   1 mg at 08/20/23 0808    hydrALAZINE (APRESOLINE) injection 10 mg  10 mg Intravenous Q6H PRN Brendan Ba MD   10 mg at 08/19/23 0344    HYDROmorphone (PF) (DILAUDID) injection 0.5 mg  0.5 mg Intravenous Q3H PRN Evan Castro DO   0.5 mg at 08/20/23 0818    insulin aspart (NovoLOG) injection (RAPID ACTING)   Subcutaneous TID w/meals Evan Castro DO   8 Units at 08/20/23 0945    insulin aspart (NovoLOG) injection (RAPID ACTING)  1-7 Units Subcutaneous TID AC Evan Castro DO        insulin aspart (NovoLOG) injection (RAPID ACTING)  1-5 Units Subcutaneous At Bedtime Evan Castro DO   1 Units at 08/19/23 2030    insulin basal rate from AMBULATORY PUMP   Subcutaneous Continuous Evan Castro DO   Held at 08/18/23 2000    insulin glargine  (LANTUS PEN) injection 15 Units  15 Units Subcutaneous QAM AC Evan Castro R, DO   15 Units at 08/20/23 0818    lidocaine (LMX4) cream   Topical Q1H PRN Jeet Cooper MD        lidocaine 1 % 0.1-1 mL  0.1-1 mL Other Q1H PRN Jeet Cooper MD        losartan (COZAAR) tablet 100 mg  100 mg Oral QPM Jeet Cooper MD   100 mg at 08/19/23 2033    magnesium oxide (MAG-OX) tablet 400 mg  400 mg Oral Daily Jeet Cooper MD   400 mg at 08/20/23 0809    melatonin tablet 1 mg  1 mg Oral At Bedtime PRN Jeet Cooper MD        metFORMIN (GLUCOPHAGE XR) 24 hr tablet 1,000 mg  1,000 mg Oral BID w/meals Jeet Cooper MD   1,000 mg at 08/20/23 0805    metoprolol succinate ER (TOPROL XL) 24 hr tablet 100 mg  100 mg Oral BID Jeet Cooper MD   100 mg at 08/20/23 0809    naloxone (NARCAN) injection 0.2 mg  0.2 mg Intravenous Q2 Min PRN Jeet Cooper MD        Or    naloxone (NARCAN) injection 0.4 mg  0.4 mg Intravenous Q2 Min PRN Jeet Cooper MD        Or    naloxone (NARCAN) injection 0.2 mg  0.2 mg Intramuscular Q2 Min PRN Jeet Cooper MD        Or    naloxone (NARCAN) injection 0.4 mg  0.4 mg Intramuscular Q2 Min PRN Jeet Cooper MD        ondansetron (ZOFRAN ODT) ODT tab 4 mg  4 mg Oral Q6H PRN Jeet Cooper MD        Or    ondansetron (ZOFRAN) injection 4 mg  4 mg Intravenous Q6H PRN Jeet Cooper MD        oxyCODONE IR (ROXICODONE) half-tab 2.5 mg  2.5 mg Oral Q4H PRN Jeet Cooper MD   2.5 mg at 08/19/23 2324    pantoprazole (PROTONIX) EC tablet 40 mg  40 mg Oral QAM AC Jeet Cooper MD   40 mg at 08/20/23 0633    pregabalin (LYRICA) capsule 100 mg  100 mg Oral TID Jeet Cooper MD   100 mg at 08/20/23 0809    rosuvastatin (CRESTOR) tablet 5 mg  5 mg Oral Daily Jeet Cooper MD   5 mg at 08/20/23 0810    sodium chloride (PF) 0.9% PF flush 3 mL  3 mL Intracatheter Q8H Jeet Cooper MD   3 mL at 08/20/23 0206    sodium chloride (PF) 0.9% PF flush 3 mL  3 mL Intracatheter q1 min prn Jeet Cooper MD         tiZANidine (ZANAFLEX) tablet 4 mg  4 mg Oral At Bedtime Jeet Austin MD         No current Epic-ordered outpatient medications on file.       Cardiographics:    Telemetry: Normal sinus rhythm with short runs of 3-4 beats of VT, no bradycardia  ECG: Normal sinus rhythm IVCD left bundle branch block type  Echocardiogram:   1. The left ventricle is normal in size. Left ventricular function is  decreased. The ejection fraction is 50-55% (borderline). There is mild  concentric left ventricular hypertrophy. Left ventricular diastolic function  is abnormal. There is borderline global hypokinesia of the left ventricle.  2. Normal right ventricle size and systolic function.  3. The left atrium is mildly dilated.  4. No hemodynamically significant valvular abnormalities on 2D or color flow  imaging.       Lab Results    Chemistry/lipid CBC Cardiac Enzymes/BNP/TSH/INR   Recent Labs   Lab Test 07/25/23  1400   CHOL 127   HDL 59   LDL 43   TRIG 126     Recent Labs   Lab Test 07/25/23  1400 09/16/22  1541 08/16/22  1609   LDL 43 39 45     Recent Labs   Lab Test 08/19/23  0727 08/18/23  1006 08/18/23  0514   NA  --   --  136   POTASSIUM  --   --  4.0   CHLORIDE  --   --  101   CO2  --   --  27   *   < > 126*   BUN  --   --  12.0   CR  --   --  0.71   GFRESTIMATED  --   --  89   BRUNO  --   --  8.9    < > = values in this interval not displayed.     Recent Labs   Lab Test 08/18/23  0514 08/17/23  1208 07/25/23  1400   CR 0.71 0.75 0.76     Recent Labs   Lab Test 07/25/23  1400 03/22/23  1557 11/21/22  1606   A1C 7.4* 6.7* 6.4*          Recent Labs   Lab Test 08/18/23  0514   WBC 11.5*   HGB 12.8   HCT 37.9   MCV 95        Recent Labs   Lab Test 08/18/23  0514 08/17/23  1208 07/24/23  0715   HGB 12.8 13.0 12.8    Recent Labs   Lab Test 01/20/22  1158 09/19/21  0211 09/17/21  2240   TROPONINI <0.01 0.06 0.01     No results for input(s): BNP, NTBNPI, NTBNP in the last 45706 hours.  Recent Labs   Lab Test  08/17/23  1410   TSH 0.98     Recent Labs   Lab Test 08/17/23  1208   INR 1.06

## 2023-08-20 NOTE — PLAN OF CARE
Problem: Pain Acute  Goal: Optimal Pain Control and Function  Outcome: Progressing  Intervention: Prevent or Manage Pain  Recent Flowsheet Documentation  Taken 8/20/2023 0521 by Elizabeth Biggs RN  Medication Review/Management: medications reviewed  Taken 8/19/2023 2324 by Elizabeth Biggs, RN  Medication Review/Management: medications reviewed  Taken 8/19/2023 2030 by Elizabeth Biggs, RN  Medication Review/Management: medications reviewed     Problem: Hypertension Acute  Goal: Blood Pressure Within Desired Range  Outcome: Not Progressing  Intervention: Normalize Blood Pressure  Recent Flowsheet Documentation  Taken 8/20/2023 0521 by Elizabeth Biggs, RN  Medication Review/Management: medications reviewed  Taken 8/19/2023 2324 by Elizabeth Biggs, RN  Medication Review/Management: medications reviewed  Taken 8/19/2023 2030 by Elizabeth Biggs, RN  Medication Review/Management: medications reviewed     Goal Outcome Evaluation:         Facial pain, gave PRN oxycodone, didn't help much per patient. IV dilaudid helped some. Systolic BP in 170's. PRN hydralazine for SBP>180.

## 2023-08-20 NOTE — PLAN OF CARE
Problem: Syncope  Goal: Absence of Syncopal Symptoms  Outcome: Progressing     Problem: Pain Acute  Goal: Optimal Pain Control and Function  Outcome: Progressing  Intervention: Develop Pain Management Plan  Recent Flowsheet Documentation  Taken 8/20/2023 0818 by Hazel Brar RN  Pain Management Interventions: medication (see MAR)  Intervention: Prevent or Manage Pain  Recent Flowsheet Documentation  Taken 8/20/2023 0800 by Hazel Brar RN  Sensory Stimulation Regulation:   television on   care clustered   lighting decreased  Medication Review/Management: medications reviewed     Problem: Risk for Delirium  Goal: Optimal Coping  Outcome: Progressing  Goal: Improved Behavioral Control  Outcome: Progressing  Intervention: Prevent and Manage Agitation  Recent Flowsheet Documentation  Taken 8/20/2023 0800 by Hazel Brar RN  Environment Familiarity/Consistency:   daily routine followed   familiar objects from home provided  Intervention: Minimize Safety Risk  Recent Flowsheet Documentation  Taken 8/20/2023 0800 by Hazel Brar RN  Enhanced Safety Measures: room near unit station  Goal: Improved Attention and Thought Clarity  Outcome: Progressing  Intervention: Maximize Cognitive Function  Recent Flowsheet Documentation  Taken 8/20/2023 0800 by Hazel Brar RN  Sensory Stimulation Regulation:   television on   care clustered   lighting decreased  Reorientation Measures:   calendar in view   clock in view  Goal: Improved Sleep  Outcome: Progressing     Goal Outcome Evaluation:    Alert & oriented, but forgetful. VSS. Reports dizziness when changing from sitting to lying position that resolves on its own. Facial pain controlled with IV dilaudid and tylenol. Pt up in chair for meals, assist x1. BM x1 this evening. Purewick in place. Chair alarm on, makes needs known.

## 2023-08-20 NOTE — PROGRESS NOTES
"[facility]    Medicine Progress Note - Hospitalist Service    Date of Admission:  8/17/2023    Assessment & Plan   Patient is a 73-year-old female with history of diabetes (HARRIETT), A-fib and COPD who presents with recurrent falls/syncope.    #Syncope  #Left bundle branch block  #Nonsustained V. 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 Indication: Tachyarrhythmias, acute (48 hours)  Code Status: Full Code      Clinically Significant Risk Factors                  # Hypertension: Noted on problem list       # DMII: A1C = 7.4 % (Ref range: 0.0 - 5.6 %) within past 6 months, PRESENT ON ADMISSION  # Overweight: Estimated body mass index is 25.64 kg/m  as calculated from the following:    Height as of this encounter: 1.575 m (5' 2\").    Weight as of this encounter: 63.6 kg (140 lb 3.2 oz)., PRESENT ON ADMISSION            Disposition Plan     Expected Discharge Date: 08/21/2023      Destination: home with family            Evan Castro DO  Hospitalist Service  [facility]  Securely message with [employer] (more info)  Text page via [employer] Paging/Directory   ______________________________________________________________________    Interval History   No acute events " overnight    Physical Exam   Vital Signs: Temp: 97.7  F (36.5  C) Temp src: Oral BP: (!) 163/78 Pulse: 62   Resp: 18 SpO2: 95 % O2 Device: None (Room air)    Weight: 140 lbs 3.2 oz    General Appearance:  No acute distress  Respiratory: Clear to auscultation bilaterally  Cardiovascular: Regular rate and rhythm      Medical Decision Making             Data     I have personally reviewed the following data over the past 24 hrs:    N/A  \   N/A   / 261     135 (L) 99 13.4 /  92   5.1 26 0.69 \       Imaging results reviewed over the past 24 hrs:   No results found for this or any previous visit (from the past 24 hour(s)).

## 2023-08-21 ENCOUNTER — TRANSFERRED RECORDS (OUTPATIENT)
Dept: HEALTH INFORMATION MANAGEMENT | Facility: CLINIC | Age: 73
End: 2023-08-21

## 2023-08-21 ENCOUNTER — TRANSCRIBE ORDERS (OUTPATIENT)
Dept: CARDIOLOGY | Facility: CLINIC | Age: 73
End: 2023-08-21
Payer: COMMERCIAL

## 2023-08-21 DIAGNOSIS — Z95.818 IMPLANTABLE LOOP RECORDER PRESENT: Primary | ICD-10-CM

## 2023-08-21 LAB
ANION GAP SERPL CALCULATED.3IONS-SCNC: 12 MMOL/L (ref 7–15)
ATRIAL RATE - MUSE: 79 BPM
BUN SERPL-MCNC: 15.4 MG/DL (ref 8–23)
CALCIUM SERPL-MCNC: 9.6 MG/DL (ref 8.8–10.2)
CHLORIDE SERPL-SCNC: 100 MMOL/L (ref 98–107)
CREAT SERPL-MCNC: 0.62 MG/DL (ref 0.51–0.95)
DEPRECATED HCO3 PLAS-SCNC: 25 MMOL/L (ref 22–29)
DIASTOLIC BLOOD PRESSURE - MUSE: NORMAL MMHG
ERYTHROCYTE [DISTWIDTH] IN BLOOD BY AUTOMATED COUNT: 13.2 % (ref 10–15)
GFR SERPL CREATININE-BSD FRML MDRD: >90 ML/MIN/1.73M2
GLUCOSE BLDC GLUCOMTR-MCNC: 111 MG/DL (ref 70–99)
GLUCOSE BLDC GLUCOMTR-MCNC: 113 MG/DL (ref 70–99)
GLUCOSE BLDC GLUCOMTR-MCNC: 137 MG/DL (ref 70–99)
GLUCOSE BLDC GLUCOMTR-MCNC: 169 MG/DL (ref 70–99)
GLUCOSE BLDC GLUCOMTR-MCNC: 89 MG/DL (ref 70–99)
GLUCOSE SERPL-MCNC: 142 MG/DL (ref 70–99)
HCT VFR BLD AUTO: 40.3 % (ref 35–47)
HGB BLD-MCNC: 13.8 G/DL (ref 11.7–15.7)
INTERPRETATION ECG - MUSE: NORMAL
MCH RBC QN AUTO: 31.9 PG (ref 26.5–33)
MCHC RBC AUTO-ENTMCNC: 34.2 G/DL (ref 31.5–36.5)
MCV RBC AUTO: 93 FL (ref 78–100)
P AXIS - MUSE: 65 DEGREES
PLATELET # BLD AUTO: 248 10E3/UL (ref 150–450)
POTASSIUM SERPL-SCNC: 4.6 MMOL/L (ref 3.4–5.3)
PR INTERVAL - MUSE: 176 MS
QRS DURATION - MUSE: 132 MS
QT - MUSE: 406 MS
QTC - MUSE: 465 MS
R AXIS - MUSE: -39 DEGREES
RBC # BLD AUTO: 4.33 10E6/UL (ref 3.8–5.2)
SODIUM SERPL-SCNC: 137 MMOL/L (ref 136–145)
SYSTOLIC BLOOD PRESSURE - MUSE: NORMAL MMHG
T AXIS - MUSE: 104 DEGREES
VENTRICULAR RATE- MUSE: 79 BPM
WBC # BLD AUTO: 11.8 10E3/UL (ref 4–11)

## 2023-08-21 PROCEDURE — 33285 INSJ SUBQ CAR RHYTHM MNTR: CPT | Performed by: INTERNAL MEDICINE

## 2023-08-21 PROCEDURE — 999N000032 HC STATISTIC CHRONIC DISEASE SPECIALIST RT CONSULT

## 2023-08-21 PROCEDURE — 0JH632Z INSERTION OF MONITORING DEVICE INTO CHEST SUBCUTANEOUS TISSUE AND FASCIA, PERCUTANEOUS APPROACH: ICD-10-PCS | Performed by: INTERNAL MEDICINE

## 2023-08-21 PROCEDURE — 250N000013 HC RX MED GY IP 250 OP 250 PS 637: Performed by: INTERNAL MEDICINE

## 2023-08-21 PROCEDURE — 36415 COLL VENOUS BLD VENIPUNCTURE: CPT | Performed by: NURSE PRACTITIONER

## 2023-08-21 PROCEDURE — 250N000011 HC RX IP 250 OP 636: Mod: JZ | Performed by: HOSPITALIST

## 2023-08-21 PROCEDURE — 250N000011 HC RX IP 250 OP 636: Mod: JZ | Performed by: INTERNAL MEDICINE

## 2023-08-21 PROCEDURE — 80048 BASIC METABOLIC PNL TOTAL CA: CPT | Performed by: HOSPITALIST

## 2023-08-21 PROCEDURE — 36415 COLL VENOUS BLD VENIPUNCTURE: CPT | Performed by: HOSPITALIST

## 2023-08-21 PROCEDURE — 85027 COMPLETE CBC AUTOMATED: CPT | Performed by: NURSE PRACTITIONER

## 2023-08-21 PROCEDURE — 210N000001 HC R&B IMCU HEART CARE

## 2023-08-21 PROCEDURE — C1764 EVENT RECORDER, CARDIAC: HCPCS | Performed by: INTERNAL MEDICINE

## 2023-08-21 PROCEDURE — 250N000013 HC RX MED GY IP 250 OP 250 PS 637: Performed by: HOSPITALIST

## 2023-08-21 PROCEDURE — 99232 SBSQ HOSP IP/OBS MODERATE 35: CPT | Mod: 25 | Performed by: INTERNAL MEDICINE

## 2023-08-21 PROCEDURE — 250N000009 HC RX 250: Performed by: INTERNAL MEDICINE

## 2023-08-21 PROCEDURE — 99232 SBSQ HOSP IP/OBS MODERATE 35: CPT | Performed by: HOSPITALIST

## 2023-08-21 DEVICE — MONITOR CARDIAC LUX DX INSERTABLE M301: Type: IMPLANTABLE DEVICE | Status: FUNCTIONAL

## 2023-08-21 RX ORDER — NICOTINE POLACRILEX 4 MG
15-30 LOZENGE BUCCAL
Status: DISCONTINUED | OUTPATIENT
Start: 2023-08-21 | End: 2023-08-21

## 2023-08-21 RX ORDER — DEXTROSE MONOHYDRATE 25 G/50ML
25-50 INJECTION, SOLUTION INTRAVENOUS
Status: DISCONTINUED | OUTPATIENT
Start: 2023-08-21 | End: 2023-08-21

## 2023-08-21 RX ORDER — ACETAMINOPHEN 325 MG/1
650 TABLET ORAL EVERY 4 HOURS PRN
Status: DISCONTINUED | OUTPATIENT
Start: 2023-08-21 | End: 2023-08-22 | Stop reason: HOSPADM

## 2023-08-21 RX ORDER — FENTANYL CITRATE 50 UG/ML
25 INJECTION, SOLUTION INTRAMUSCULAR; INTRAVENOUS
Status: DISCONTINUED | OUTPATIENT
Start: 2023-08-21 | End: 2023-08-21 | Stop reason: HOSPADM

## 2023-08-21 RX ORDER — DEXMEDETOMIDINE HYDROCHLORIDE 4 UG/ML
.1-1.5 INJECTION, SOLUTION INTRAVENOUS CONTINUOUS
Status: DISCONTINUED | OUTPATIENT
Start: 2023-08-21 | End: 2023-08-21 | Stop reason: HOSPADM

## 2023-08-21 RX ORDER — SODIUM CHLORIDE 9 MG/ML
100 INJECTION, SOLUTION INTRAVENOUS CONTINUOUS
Status: DISCONTINUED | OUTPATIENT
Start: 2023-08-21 | End: 2023-08-21 | Stop reason: HOSPADM

## 2023-08-21 RX ORDER — LIDOCAINE 40 MG/G
CREAM TOPICAL
Status: DISCONTINUED | OUTPATIENT
Start: 2023-08-21 | End: 2023-08-21 | Stop reason: HOSPADM

## 2023-08-21 RX ADMIN — ESTRADIOL 1 MG: 1 TABLET ORAL at 08:44

## 2023-08-21 RX ADMIN — METFORMIN ER 500 MG 1000 MG: 500 TABLET ORAL at 16:32

## 2023-08-21 RX ADMIN — PREGABALIN 100 MG: 100 CAPSULE ORAL at 15:00

## 2023-08-21 RX ADMIN — METOPROLOL SUCCINATE 100 MG: 100 TABLET, EXTENDED RELEASE ORAL at 08:41

## 2023-08-21 RX ADMIN — ALBUTEROL SULFATE 2 PUFF: 90 AEROSOL, METERED RESPIRATORY (INHALATION) at 08:46

## 2023-08-21 RX ADMIN — HYDRALAZINE HYDROCHLORIDE 10 MG: 20 INJECTION INTRAMUSCULAR; INTRAVENOUS at 00:30

## 2023-08-21 RX ADMIN — INSULIN GLARGINE 15 UNITS: 100 INJECTION, SOLUTION SUBCUTANEOUS at 08:51

## 2023-08-21 RX ADMIN — CETIRIZINE HYDROCHLORIDE 10 MG: 10 TABLET, FILM COATED ORAL at 08:41

## 2023-08-21 RX ADMIN — ESCITALOPRAM OXALATE 10 MG: 10 TABLET ORAL at 08:42

## 2023-08-21 RX ADMIN — LOSARTAN POTASSIUM 100 MG: 50 TABLET, FILM COATED ORAL at 21:45

## 2023-08-21 RX ADMIN — ACETAMINOPHEN 1000 MG: 500 TABLET ORAL at 21:45

## 2023-08-21 RX ADMIN — PANTOPRAZOLE SODIUM 40 MG: 40 TABLET, DELAYED RELEASE ORAL at 06:52

## 2023-08-21 RX ADMIN — INSULIN ASPART 2 UNITS: 100 INJECTION, SOLUTION INTRAVENOUS; SUBCUTANEOUS at 16:44

## 2023-08-21 RX ADMIN — PREGABALIN 100 MG: 100 CAPSULE ORAL at 08:42

## 2023-08-21 RX ADMIN — ACETAMINOPHEN 1000 MG: 500 TABLET ORAL at 08:42

## 2023-08-21 RX ADMIN — DULOXETINE HYDROCHLORIDE 60 MG: 60 CAPSULE, DELAYED RELEASE ORAL at 08:41

## 2023-08-21 RX ADMIN — ACETAMINOPHEN 650 MG: 325 TABLET ORAL at 15:00

## 2023-08-21 RX ADMIN — PREGABALIN 100 MG: 100 CAPSULE ORAL at 21:45

## 2023-08-21 RX ADMIN — Medication 81 MG: at 08:43

## 2023-08-21 RX ADMIN — CEPHALEXIN 500 MG: 500 CAPSULE ORAL at 08:43

## 2023-08-21 RX ADMIN — ALBUTEROL SULFATE 2 PUFF: 90 AEROSOL, METERED RESPIRATORY (INHALATION) at 05:43

## 2023-08-21 RX ADMIN — ROSUVASTATIN CALCIUM 5 MG: 5 TABLET, FILM COATED ORAL at 08:43

## 2023-08-21 RX ADMIN — MAGNESIUM OXIDE TAB 400 MG (241.3 MG ELEMENTAL MG) 400 MG: 400 (241.3 MG) TAB at 08:41

## 2023-08-21 RX ADMIN — HYDROMORPHONE HYDROCHLORIDE 0.2 MG: 0.2 INJECTION, SOLUTION INTRAMUSCULAR; INTRAVENOUS; SUBCUTANEOUS at 16:32

## 2023-08-21 RX ADMIN — HYDROMORPHONE HYDROCHLORIDE 0.2 MG: 0.2 INJECTION, SOLUTION INTRAMUSCULAR; INTRAVENOUS; SUBCUTANEOUS at 05:21

## 2023-08-21 RX ADMIN — ARIPIPRAZOLE 2 MG: 2 TABLET ORAL at 08:43

## 2023-08-21 RX ADMIN — METOPROLOL SUCCINATE 100 MG: 100 TABLET, EXTENDED RELEASE ORAL at 21:44

## 2023-08-21 ASSESSMENT — ACTIVITIES OF DAILY LIVING (ADL)
ADLS_ACUITY_SCORE: 33

## 2023-08-21 NOTE — SIGNIFICANT EVENT
Patient started going in & out of a-fib rvr HR='s and asymptomatic. Episodes started at 0456 34 seconds to 15 min apart. Patient currently in NSR.

## 2023-08-21 NOTE — CONSULTS
Tobacco Treatment Consult  8/21/2023, 3:09 PM    Patient admitted on: 8/17/2023   Patient admitted for: Closed head injury, initial encounter [S09.90XA]  Syncope, unspecified syncope type [R55]  Multiple closed fractures of facial bone, initial encounter (H) [S02.92XA]   Patient seen on: 8/21/2023    Paige declined tobacco treatment counseling at this time. They were not interested in taking materials on tobacco cessation. As an inpatient they have no NRT ordered and it is currently states she is fine as she only smokes 1 cigarette a day. Reviewed patient's smoking history with patient and updated her smoking status in patient history. Recommend encouragement to be tobacco free at this time.    Total 2 minutes spent in smoking cessation, and 15 minutes spent in chart review, care coordination, and documentation.    Yaima Garcia, RT, Chronic Pulmonary Disease Specialist & Certified Tobacco Treatment Specialist  Phone [phone]

## 2023-08-21 NOTE — DISCHARGE INSTRUCTIONS
Cardiac Electrophysiology - Loop Recorder Implantation Discharge Instructions      PROCEDURE   ILR (implantable loop recorder) placement         MEDICATION INSTRUCTIONS   Continue taking ASA daily  Continue taking your blood thinner N/A.          WOUND CARE   Leave the large overlying dressing in place until 2 days after discharge - this dressing can thereafter be removed by gently pulling off.  Underneath the large dressing will be steri-strips. DO NOT REMOVE these strips; please leave these in place until you are seen in clinic.  DO NOT COVER the site with any bandages or dressings. The site should be kept dry for 3-5 days - please avoid traditional showers or baths during this time.  Keep the site clean and dry.  No swimming, sauna, or hot tub use until incision is completely healed.         ACTIVITY   It takes approximately 1-2 weeks for your incision to heal.  During this time use extra precautions - please avoid the following:  Raising your arm over your head or stretching behind your back (no hyperflexion)  Pushing or pulling (mowing the lawn, vacuuming)  Lifting anything heavier than 10 pounds or a gallon of milk  Sudden or extreme motions  Be physically active every day.  Moving your arm is important         REMOTE MONITORING   You will receive a remote transmission station to monitor your device from home  Please set the transmitter up as soon as you get home from the hospital.  Directions are included in the box, and you may call our office or the company technical support line if you need assistance connecting your transmitter.  Please send a transmission the day after you go home  Automated transmissions will be sent every 3 months or sooner if device issues are detected.  You may manually send in transmissions if you are having arrhythmia symptoms or think there may be a problem with your device.  Please call our office at [phone] if you send in a transmission off-schedule         WHAT TO  WATCH OUT FOR   Contact our office or seek emergency care for any of the following:  Drainage, bleeding or oozing from the site  Redness, increased swelling, or warmth around the device site  Pain not treated with prescribed pain medication  Temperature greater than 100.4F  Chest pain, shortness of breath, dizziness/fainting         FOLLOW-UP   Our office will coordinate a follow-up visit visit in clinic for a device interrogation and an incision check 7-14 days after your procedure.   Please contact us at [phone] any issues during your recovery.

## 2023-08-21 NOTE — PROGRESS NOTES
"[facility]    Medicine Progress Note - Hospitalist Service    Date of Admission:  8/17/2023    Assessment & Plan   Patient is a 73-year-old female with history of diabetes (HARRIETT), A-fib and COPD who presented with recurrent falls/syncope. Telemetry revealed nonsustained Vtach.    #Syncope  #Left bundle branch block  #Nonsustained V. Tach  Echo showed borderline EF 50 to 55%, mild LVH and borderline global hypokinesia of the left ventricle.  Left atrium mildly dilated.  Normal RV function and size.  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 Indication: Tachyarrhythmias, acute (48 hours)  Code Status: Full Code      Clinically Significant Risk Factors                  # Hypertension: Noted on problem list       # DMII: A1C = 7.4 % (Ref range: 0.0 - 5.6 %) within past 6 months, PRESENT ON ADMISSION  # Overweight: Estimated body mass index is 25.42 kg/m  as calculated from the following:    Height as of this encounter: 1.575 m (5' 2\").    Weight as of this encounter: 63 kg (139 lb)., PRESENT ON ADMISSION            Disposition Plan      Expected Discharge Date: 08/22/2023      Destination: home with " family  Discharge Comments: Loop recorder 8/21 at 1500          Evan Castro DO  Hospitalist Service  [facility]  Securely message with [employer] (more info)  Text page via [employer] Paging/Directory   ______________________________________________________________________    Interval History   Afib in 130s transiently overnight. Patient reports no palpitations, chest pain or sob.    Physical Exam   Vital Signs: Temp: 97.6  F (36.4  C) Temp src: Oral BP: 129/70 Pulse: 66   Resp: 18 SpO2: 98 % O2 Device: None (Room air)    Weight: 139 lbs 0 oz    General Appearance:  No acute distress  Face: Bruising around both eyes  Respiratory: Clear to auscultation bilaterally  Cardiovascular: Regular rate and rhythm      Medical Decision Making             Data     I have personally reviewed the following data over the past 24 hrs:    N/A  \   N/A   / N/A     137 100 15.4 /  113 (H)   4.6 25 0.62 \       Imaging results reviewed over the past 24 hrs:   No results found for this or any previous visit (from the past 24 hour(s)).

## 2023-08-21 NOTE — PROGRESS NOTES
Cardiology Progress Note  New to me.  Chart reviewed.  Assessment/Plan:    Recurrent falls, syncope, with facial fracture.  Multiple possible etiologies including untreated obstructive sleep apnea, paroxysmal atrial fibrillation but no documented pauses.  For loop recorder today to evaluate for possible tachybradycardia syndrome contributing to frequent falls.  Paroxysmal atrial fibrillation for less than 5 minutes last night.  Asymptomatic.  Possibly triggered by untreated sleep apnea.  Risk-benefit ratio at this time does not favor long-term anticoagulation.  May be a candidate for Watchman device.  [id number] score 4  Left bundle branch block morphology  Labile hypertension again potentially related to untreated sleep apnea.  Remissive hypertension at this time with reasonable control on present medical regimen.    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  Was unable to tolerate CPAP when prescribed it previously.  Naps daily, awakens poorly refreshed.  Denies nocturnal dyspnea.      Objective:   Vital signs in last 24 hours:  Vitals:    08/21/23 0134 08/21/23 0153 08/21/23 0308 08/21/23 0717   BP: (!) 179/84 139/65 136/67 129/70   BP Location: Left arm Left arm Left arm Left arm   Patient Position: Supine Supine     Cuff Size: Adult Regular Adult Regular     Pulse:   69 66   Resp:   18 18   Temp:   97.6  F (36.4  C) 97.6  F (36.4  C)   TempSrc:   Oral Oral   SpO2:   97% 98%   Weight:    63 kg (139 lb)    Height:         Weight:   Wt Readings from Last 3 Encounters:   08/21/23 63 kg (139 lb)   04/18/23 66.7 kg (147 lb)   03/27/23 65.8 kg (145 lb)           PHYSICAL EXAM      Respiratory:  Normal breath sounds, No respiratory distress, No wheezing, No chest tenderness.   Cardiovascular:   Normal heart rate, Normal rhythm, No murmurs, No rubs, No gallops.   GI:  Bowel sounds normal, Soft, No tenderness, No masses  Extremities: no edema       Cardiographics:   Telemetry sinus rhythm, 60-80 with frequent PACs, some of which with aberrant conduction.  5-minute episode of rapid atrial fibrillation at 120 to 130 bpm.  No associated pause.    ECG (personally reviewed) 8/17/2023: Sinus rhythm left axis deviation left bundle branch block.    Imaging:   Echocardiogram 8/18/2023:  1. The left ventricle is normal in size. Left ventricular function is  decreased. The ejection fraction is 50-55% (borderline). There is mild  concentric left ventricular hypertrophy. Left ventricular diastolic function  is abnormal. There is borderline global hypokinesia of the left ventricle.  2. Normal right ventricle size and systolic function.  3. The left atrium is mildly dilated.  4. No hemodynamically significant valvular abnormalities on 2D or color flow  imaging.      Lab Results:   Lab Results   Component Value Date    WBC 11.8 (H) 08/21/2023    HGB 13.8 08/21/2023    HCT 40.3 08/21/2023     08/21/2023    CHOL 127 07/25/2023    TRIG 126 07/25/2023    HDL 59 07/25/2023    ALT 15 08/17/2023    AST 33 08/17/2023     08/21/2023    BUN 15.4 08/21/2023    CO2 25 08/21/2023    TSH 0.98 08/17/2023    INR 1.06 08/17/2023    MICROALBUR 2.22 (H) 12/14/2021     Lab Results   Component Value Date    TROPONINI <0.01 01/20/2022         Kevin Pruitt MD [facility]  8/21/2023

## 2023-08-21 NOTE — PLAN OF CARE
Problem: Plan of Care - These are the overarching goals to be used throughout the patient stay.    Goal: Plan of Care Review  Description: The Plan of Care Review/Shift note should be completed every shift.  The Outcome Evaluation is a brief statement about your assessment that the patient is improving, declining, or no change.  This information will be displayed automatically on your shift note.  Outcome: Progressing     Problem: Plan of Care - These are the overarching goals to be used throughout the patient stay.    Goal: Absence of Hospital-Acquired Illness or Injury  Intervention: Prevent and Manage VTE (Venous Thromboembolism) Risk  Recent Flowsheet Documentation  Taken 8/21/2023 0900 by Madiha Chen RN  VTE Prevention/Management: SCDs (sequential compression devices) off     Problem: Pain Acute  Goal: Optimal Pain Control and Function  Intervention: Prevent or Manage Pain  Recent Flowsheet Documentation  Taken 8/21/2023 0900 by Madiha Chen RN  Sensory Stimulation Regulation: music on     Problem: Risk for Delirium  Goal: Improved Behavioral Control  Intervention: Prevent and Manage Agitation  Recent Flowsheet Documentation  Taken 8/21/2023 0900 by Madiha Chen RN  Environment Familiarity/Consistency: daily routine followed  Goal: Improved Attention and Thought Clarity  Intervention: Maximize Cognitive Function  Recent Flowsheet Documentation  Taken 8/21/2023 0900 by Madiha Chen RN  Sensory Stimulation Regulation: music on  Reorientation Measures: clock in view     Problem: Hypertension Acute  Goal: Blood Pressure Within Desired Range  Intervention: Normalize Blood Pressure  Recent Flowsheet Documentation  Taken 8/21/2023 0900 by Madiha Chen RN  Sensory Stimulation Regulation: music on   Goal Outcome Evaluation:         Patient alert and oriented and able to let her needs be known. Denies pain. NSR on the monitor. NPO for loop recorder procedure. Transferred down  for procedure around 1000, report given to RN.       Returned from [facility] at 1500. Transferred to chair without difficulty. Complains of headache, PRN tylenol given. VS: 138/85, p:65, R:18, t: 98.0. Denies pain to site. . Called [facility] to see if able to eat- gave okay to eat. Awaiting post-procedure orders.

## 2023-08-22 ENCOUNTER — APPOINTMENT (OUTPATIENT)
Dept: PHYSICAL THERAPY | Facility: HOSPITAL | Age: 73
DRG: 261 | End: 2023-08-22
Attending: HOSPITALIST
Payer: COMMERCIAL

## 2023-08-22 VITALS
HEIGHT: 62 IN | HEART RATE: 64 BPM | TEMPERATURE: 98 F | OXYGEN SATURATION: 98 % | BODY MASS INDEX: 25.8 KG/M2 | RESPIRATION RATE: 16 BRPM | SYSTOLIC BLOOD PRESSURE: 161 MMHG | DIASTOLIC BLOOD PRESSURE: 78 MMHG | WEIGHT: 140.2 LBS

## 2023-08-22 LAB
ANION GAP SERPL CALCULATED.3IONS-SCNC: 9 MMOL/L (ref 7–15)
BUN SERPL-MCNC: 12.7 MG/DL (ref 8–23)
CALCIUM SERPL-MCNC: 9.2 MG/DL (ref 8.8–10.2)
CHLORIDE SERPL-SCNC: 99 MMOL/L (ref 98–107)
CREAT SERPL-MCNC: 0.71 MG/DL (ref 0.51–0.95)
DEPRECATED HCO3 PLAS-SCNC: 28 MMOL/L (ref 22–29)
ERYTHROCYTE [DISTWIDTH] IN BLOOD BY AUTOMATED COUNT: 13.2 % (ref 10–15)
GFR SERPL CREATININE-BSD FRML MDRD: 89 ML/MIN/1.73M2
GLUCOSE BLDC GLUCOMTR-MCNC: 101 MG/DL (ref 70–99)
GLUCOSE BLDC GLUCOMTR-MCNC: 208 MG/DL (ref 70–99)
GLUCOSE SERPL-MCNC: 107 MG/DL (ref 70–99)
HCT VFR BLD AUTO: 38.6 % (ref 35–47)
HGB BLD-MCNC: 13.1 G/DL (ref 11.7–15.7)
MAGNESIUM SERPL-MCNC: 1.5 MG/DL (ref 1.7–2.3)
MCH RBC QN AUTO: 31.7 PG (ref 26.5–33)
MCHC RBC AUTO-ENTMCNC: 33.9 G/DL (ref 31.5–36.5)
MCV RBC AUTO: 94 FL (ref 78–100)
PLATELET # BLD AUTO: 242 10E3/UL (ref 150–450)
POTASSIUM SERPL-SCNC: 4.4 MMOL/L (ref 3.4–5.3)
RBC # BLD AUTO: 4.13 10E6/UL (ref 3.8–5.2)
SODIUM SERPL-SCNC: 136 MMOL/L (ref 136–145)
WBC # BLD AUTO: 10.7 10E3/UL (ref 4–11)

## 2023-08-22 PROCEDURE — 99232 SBSQ HOSP IP/OBS MODERATE 35: CPT | Performed by: NURSE PRACTITIONER

## 2023-08-22 PROCEDURE — 80048 BASIC METABOLIC PNL TOTAL CA: CPT | Performed by: HOSPITALIST

## 2023-08-22 PROCEDURE — 250N000013 HC RX MED GY IP 250 OP 250 PS 637: Performed by: INTERNAL MEDICINE

## 2023-08-22 PROCEDURE — 97162 PT EVAL MOD COMPLEX 30 MIN: CPT | Mod: GP | Performed by: PHYSICAL THERAPIST

## 2023-08-22 PROCEDURE — 250N000011 HC RX IP 250 OP 636: Mod: JZ | Performed by: HOSPITALIST

## 2023-08-22 PROCEDURE — 85027 COMPLETE CBC AUTOMATED: CPT | Performed by: HOSPITALIST

## 2023-08-22 PROCEDURE — 99239 HOSP IP/OBS DSCHRG MGMT >30: CPT | Performed by: INTERNAL MEDICINE

## 2023-08-22 PROCEDURE — 97116 GAIT TRAINING THERAPY: CPT | Mod: GP | Performed by: PHYSICAL THERAPIST

## 2023-08-22 PROCEDURE — 250N000013 HC RX MED GY IP 250 OP 250 PS 637: Performed by: HOSPITALIST

## 2023-08-22 PROCEDURE — 250N000011 HC RX IP 250 OP 636: Mod: JZ | Performed by: INTERNAL MEDICINE

## 2023-08-22 PROCEDURE — 99232 SBSQ HOSP IP/OBS MODERATE 35: CPT | Performed by: INTERNAL MEDICINE

## 2023-08-22 PROCEDURE — 36415 COLL VENOUS BLD VENIPUNCTURE: CPT | Performed by: HOSPITALIST

## 2023-08-22 PROCEDURE — 83735 ASSAY OF MAGNESIUM: CPT | Performed by: HOSPITALIST

## 2023-08-22 RX ORDER — ACETAMINOPHEN 325 MG/1
650 TABLET ORAL EVERY 4 HOURS PRN
COMMUNITY
Start: 2023-08-22 | End: 2024-03-13

## 2023-08-22 RX ORDER — FLASH GLUCOSE SENSOR
KIT MISCELLANEOUS
Qty: 2 EACH | Refills: 1 | Status: SHIPPED | OUTPATIENT
Start: 2023-08-22 | End: 2023-08-22

## 2023-08-22 RX ORDER — HYDRALAZINE HYDROCHLORIDE 25 MG/1
25 TABLET, FILM COATED ORAL 3 TIMES DAILY
Qty: 90 TABLET | Refills: 0 | Status: SHIPPED | OUTPATIENT
Start: 2023-08-22 | End: 2023-10-04

## 2023-08-22 RX ORDER — MAGNESIUM SULFATE HEPTAHYDRATE 40 MG/ML
2 INJECTION, SOLUTION INTRAVENOUS ONCE
Status: COMPLETED | OUTPATIENT
Start: 2023-08-22 | End: 2023-08-22

## 2023-08-22 RX ORDER — HYDRALAZINE HYDROCHLORIDE 25 MG/1
25 TABLET, FILM COATED ORAL EVERY 8 HOURS SCHEDULED
Status: DISCONTINUED | OUTPATIENT
Start: 2023-08-22 | End: 2023-08-22 | Stop reason: HOSPADM

## 2023-08-22 RX ORDER — OXYCODONE HYDROCHLORIDE 5 MG/1
2.5 TABLET ORAL EVERY 4 HOURS PRN
Qty: 10 TABLET | Refills: 0 | Status: SHIPPED | OUTPATIENT
Start: 2023-08-22 | End: 2023-08-26

## 2023-08-22 RX ORDER — POLYETHYLENE GLYCOL 3350 17 G/17G
17 POWDER, FOR SOLUTION ORAL AT BEDTIME
COMMUNITY
Start: 2023-08-22 | End: 2024-02-07

## 2023-08-22 RX ADMIN — INSULIN GLARGINE 15 UNITS: 100 INJECTION, SOLUTION SUBCUTANEOUS at 08:35

## 2023-08-22 RX ADMIN — ARIPIPRAZOLE 2 MG: 2 TABLET ORAL at 08:46

## 2023-08-22 RX ADMIN — MAGNESIUM SULFATE HEPTAHYDRATE 2 G: 40 INJECTION, SOLUTION INTRAVENOUS at 08:55

## 2023-08-22 RX ADMIN — MAGNESIUM OXIDE TAB 400 MG (241.3 MG ELEMENTAL MG) 400 MG: 400 (241.3 MG) TAB at 08:31

## 2023-08-22 RX ADMIN — ESTRADIOL 1 MG: 1 TABLET ORAL at 08:31

## 2023-08-22 RX ADMIN — CEPHALEXIN 500 MG: 500 CAPSULE ORAL at 08:36

## 2023-08-22 RX ADMIN — METFORMIN ER 500 MG 1000 MG: 500 TABLET ORAL at 08:30

## 2023-08-22 RX ADMIN — PANTOPRAZOLE SODIUM 40 MG: 40 TABLET, DELAYED RELEASE ORAL at 05:49

## 2023-08-22 RX ADMIN — INSULIN ASPART 5 UNITS: 100 INJECTION, SOLUTION INTRAVENOUS; SUBCUTANEOUS at 08:35

## 2023-08-22 RX ADMIN — HYDROMORPHONE HYDROCHLORIDE 0.2 MG: 0.2 INJECTION, SOLUTION INTRAMUSCULAR; INTRAVENOUS; SUBCUTANEOUS at 08:55

## 2023-08-22 RX ADMIN — CETIRIZINE HYDROCHLORIDE 10 MG: 10 TABLET, FILM COATED ORAL at 08:31

## 2023-08-22 RX ADMIN — ESCITALOPRAM OXALATE 10 MG: 10 TABLET ORAL at 08:31

## 2023-08-22 RX ADMIN — ALBUTEROL SULFATE 2 PUFF: 90 AEROSOL, METERED RESPIRATORY (INHALATION) at 12:39

## 2023-08-22 RX ADMIN — ACETAMINOPHEN 1000 MG: 500 TABLET ORAL at 08:31

## 2023-08-22 RX ADMIN — OXYCODONE HYDROCHLORIDE 2.5 MG: 5 TABLET ORAL at 05:49

## 2023-08-22 RX ADMIN — HYDRALAZINE HYDROCHLORIDE 25 MG: 25 TABLET, FILM COATED ORAL at 12:44

## 2023-08-22 RX ADMIN — DULOXETINE HYDROCHLORIDE 60 MG: 60 CAPSULE, DELAYED RELEASE ORAL at 08:32

## 2023-08-22 RX ADMIN — PREGABALIN 100 MG: 100 CAPSULE ORAL at 08:30

## 2023-08-22 RX ADMIN — ROSUVASTATIN CALCIUM 5 MG: 5 TABLET, FILM COATED ORAL at 08:37

## 2023-08-22 RX ADMIN — METOPROLOL SUCCINATE 100 MG: 100 TABLET, EXTENDED RELEASE ORAL at 08:31

## 2023-08-22 RX ADMIN — Medication 81 MG: at 08:31

## 2023-08-22 ASSESSMENT — ACTIVITIES OF DAILY LIVING (ADL)
ADLS_ACUITY_SCORE: 33
ADLS_ACUITY_SCORE: 38

## 2023-08-22 NOTE — PROGRESS NOTES
"SPIRITUAL HEALTH SERVICES NOTE  [facility]; P3    SPIRITUAL ASSESSMENT  Adjusting to multiple life changes (health, mobility, identity, relationships)  Growing in her ability to ask for help  Processing some painful life experiences  Describes herself as spiritual - enjoys spending time in nature  Hopeful for discharge today    FULL SPIRITUAL CARE NOTE:   Paige shares that she has been experiencing falls for the last 2 years but that last week she had 5 or 6 falls, which is more than normal. Because of her health, Paige has wisely chosen not to drive. 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 She is grieving her loss of independence and her changing identity. Paige identifies her 80 year old  as a source of support, but she notes that he is becoming more forgetful. She also identifies support from one of her daughters and  a grandson, who live 2 blocks away. She plans to move in with daughter at some point in the near future. She is grateful for this, but also struggles with the idea of needing others for help.     Paige reflected a bit of on her life, including a difficult relationship with her birth mother. She says \"I haven't had a bad life - there have been definitely been some good things in it. But I haven't had an easy life.\" I validated her responses to some of her experiences and the hurt that they have caused.  Paige was raised Zoroastrian. Today she describes herself as spiritual. She finds socorro in spending time with her cat, her grandchildren, and in nature. I encouraged her to be gentle with herself as she adjusts to many changes. Prayer shared.    Time Spent with Patient/Family: 35 minutes    PLAN OF CARE: No plans for follow-up at this time due to patient's anticipated discharge today/tomorrow.  available by patient or staff request.     Rachel Christian M.Div.      Office: [phone] (for non-urgent requests)  Please Vocera " or page through [facility] for time-sensitive requests

## 2023-08-22 NOTE — DISCHARGE SUMMARY
"[facility]  Hospitalist Discharge Summary      Date of Admission:  8/17/2023  Date of Discharge:  8/22/2023  Discharging Provider: Irina Dupont MD  Discharge Service: Hospitalist Service    Discharge Diagnoses   Recurrent syncopal episodes with falls  Numerous facial bone fractures  Paroxysmal atrial fibrillation  Left bundle branch block  Accelerated hypertension    Clinically Significant Risk Factors     # DMII: A1C = 7.4 % (Ref range: 0.0 - 5.6 %) within past 6 months  # Overweight: Estimated body mass index is 25.64 kg/m  as calculated from the following:    Height as of this encounter: 1.575 m (5' 2\").    Weight as of this encounter: 63.6 kg (140 lb 3.2 oz).       Follow-ups Needed After Discharge   Follow-up Appointments    Follow-up and recommended labs and tests      Follow up with primary care provider, Scar Tan, within 7   days to evaluate treatment change and for hospital follow- up.  The   following labs/tests are recommended: cbc, bmp.  Follow up with [facility] oral surgery in 1-2 weeks.  Refrain from driving till seen in follow up with cardiology in 3-4 weeks..     Discharge Disposition   Discharged to home  Condition at discharge: Stable    Hospital Course   Patient is a 73-year-old female with history of diabetes (HARRIETT), A-fib and COPD who presented with recurrent falls/syncope. Telemetry revealed nonsustained Vtach.     #Syncope, recurrent episodes, with falls, numerous facial bone fractures.  #Left bundle branch block  #Nonsustained V. Tach  Echo showed borderline EF 50 to 55%, mild LVH and borderline global hypokinesia of the left ventricle.  Left atrium mildly dilated.  Normal RV function and size.  S/p loop recorder placement on 8/21.     #Paroxysmal atrial fibrillation, [id number] score equal 4.  Cardiology recommending evaluation for Watchman device.  Patient poor candidate for anticoagulation, given recurrent syncopal episodes with falls and " traumatic injuries.  #Transient RVR, asymptomatic.  On metoprolol and aspirin.     #Facial bone fractures  ED spoke with [facility]- no rec for abx, follow up as outpatient in 1 week  Pain control.     #Hypertension on losartan, metoprolol, added low-dose hydralazine by cardiology.    #Latent autoimmune diabetes in adult, uncontrolled with hyperglycemia.  A1c 7.4.  PTA insulin pump.  Insulin pump removed while in hospital.  Metformin, Lantus, mealtime insulin, Ssi      #Chronic COPD without exacerbation.  Continued home inhalers.    Consultations This Hospital Stay   DIABETES EDUCATION IP CONSULT  CARDIOLOGY IP CONSULT  CARE MANAGEMENT / SOCIAL WORK IP CONSULT  ELECTROPHYSIOLOGY IP CONSULT  PHYSICAL THERAPY ADULT IP CONSULT  SMOKING CESSATION PROGRAM IP CONSULT    Code Status   Full Code    Time Spent on this Encounter   I, Irina Dupont MD, personally saw the patient today and spent greater than 30 minutes discharging this patient.       Irina Dupont MD  [facility] HEART CARE  [address] [place] [id number]  Phone: [phone]  Fax: [phone]  ______________________________________________________________________    Physical Exam   Vital Signs: Temp: 98  F (36.7  C) Temp src: Oral BP: (!) 161/78 (RN NOTIFIED) Pulse: 64   Resp: 16 SpO2: 98 % O2 Device: None (Room air)    Weight: 140 lbs 3.2 oz  General Appearance:  No acute distress  Face: Bruising around both eyes  Respiratory: Clear to auscultation bilaterally  Cardiovascular: Regular rate and rhythm       Primary Care Physician   Scar Tan    Discharge Orders      Reason for your hospital stay    Recurrent syncope     Activity    Your activity upon discharge: activity as tolerated.     Follow-up and recommended labs and tests     Follow up with primary care provider, Scar Tan, within 7 days to evaluate treatment change and for hospital follow- up.  The following labs/tests are  recommended: cbc, bmp.  Follow up with [facility] in 1-2 weeks.  Refrain from driving till seen in follow up with cardiology in 3-4 weeks..     Diet    Follow this diet upon discharge: Orders Placed This Encounter      Combination Diet Regular Diet Adult     Significant Results and Procedures   Results for orders placed or performed during the hospital encounter of 08/17/23   CT Head w/o Contrast    Narrative    EXAM: CT HEAD W/O CONTRAST  LOCATION: [facility]  DATE: 8/17/2023  INDICATION: Multiple falls, head injury.  COMPARISON: 08/12/2019 brain MRI.  TECHNIQUE: Routine CT Head without IV contrast. Multiplanar reformats. Dose reduction techniques were used.  FINDINGS:  INTRACRANIAL CONTENTS: No intracranial hemorrhage, extraaxial collection, or mass effect.  No CT evidence of acute infarct. Mild presumed chronic small vessel ischemic changes. Mild generalized volume loss. No hydrocephalus. Corpus callosum is normal.   Position of the cerebellar tonsils is satisfactory. Partially empty sella morphology with some thinning of the floor of the sella turcica. This is stable from previous MR. Vascular calcification.   VISUALIZED ORBITS/SINUSES/MASTOIDS: Soft tissue swelling left preseptal periorbital soft tissues extends laterally and superiorly with left frontal scalp hematoma. Osteoma left mid ethmoid air cell level. Small high density air-fluid level present   posteriorly in the left maxillary sinus presumed posttraumatic; air-fluid level new from 08/12/2019 brain MRI. Paranasal sinuses otherwise clear. No middle ear or mastoid effusion.  BONES/SOFT TISSUES: No fracture of the calvarium or skull base. Left frontal scalp and periorbital preseptal hematoma. See facial bone CT for full description and details.    Impression    IMPRESSION:  1.  No acute process intracranially. No intracranial mass, mass effect or hemorrhage. Nothing for parenchymal contusion or acute infarction.  2.   Mild chronic ischemic changes deep white matter both cerebral hemispheres. Partially empty sella morphology as on previous MRI.  3.  No fracture of the visualized calvarium/skull base.  4.  Soft tissue swelling/hematoma left frontal scalp with left preseptal periorbital soft tissue swelling. Left orbital floor fracture described separately. High density air-fluid level left maxillary sinus presumed posttraumatic.  5.  Please see above and facial bone CT for details and description.          CT Cervical Spine w/o Contrast    Narrative    EXAM: CT CERVICAL SPINE W/O CONTRAST  LOCATION: [facility]  DATE: 8/17/2023  INDICATION: Multiple falls, head injury, elderly. Neck pain.  COMPARISON: None.  TECHNIQUE: Routine CT Cervical Spine without IV contrast. Multiplanar reformats. Dose reduction techniques were used.  FINDINGS:  VERTEBRA: Satisfactory cervical height. Low-grade degenerative anterior subluxations throughout the cervical spine measures 2-4 mm, most marked C4-C5. No high-grade subluxations. Articular pillars show degenerative changes but are otherwise intact   without facet joint space widening or disruption. No interspace widening. Patent airway. Appropriate cervical prevertebral soft tissues. Foramen magnum is normal. C1 ring is intact. Occipital condyles are intact. Hyoid bone and neck cartilage structures   are normal. No displaced upper rib fractures are present. Posterior elements throughout the cervical and upper thoracic spine are intact. No acute cervical fracture or posttraumatic subluxation.   CANAL/FORAMINA: No severe cervical spinal stenosis is noted. Moderate right C3-C4 foraminal compromise and moderate left C4-C5 foraminal compromise due to facet joint arthropathy/endplate osteophytes. Nothing for epidural hemorrhage.  PARASPINAL: Minimal scarring in the lung apices. Superior mediastinum is satisfactory. Vascular calcification. Thyroid is satisfactory. No focal fluid  collections, mass or adenopathy are evident within the neck soft tissues. Mastoid air cells are clear.   Layering high density fluid/blood products within the visualized portion of the left maxillary sinus presumed posttraumatic series 5 image 1.      Impression    IMPRESSION:  1.  No acute cervical fracture or posttraumatic subluxation.  2.  No high-grade spinal canal stenosis. Moderate foraminal compromise upper cervical levels due to facet joint arthropathy/endplate osteophytes on a chronic degenerative basis.  3.  High density fluid/blood products layering in the left maxillary sinus presumed posttraumatic as described on facial bone CT/head CT.  4.  Low-grade degenerative subluxations anteriorly throughout the cervical spine with no evidence for interspace widening or facet joint disruption. Facet joint degenerative changes are noted.  5.  Please see above for details and full description.            CT Facial Bones without Contrast    Narrative    EXAM: CT FACIAL BONES WITHOUT CONTRAST  LOCATION: [facility]  DATE: 8/17/2023  INDICATION: Multiple falls. Bilateral periorbital bruising.  COMPARISON: Head CT 08/17/2023.  TECHNIQUE: Routine CT Maxillofacial without IV contrast. Multiplanar reformats. Dose reduction techniques were used.   FINDINGS:  OSSEOUS STRUCTURES/SOFT TISSUES: Left orbital floor fracture as below. No additional displaced facial bone fractures. Soft tissue swelling left facial level at the preseptal periorbital and left frontal and supraorbital scalp. No facial bone   malalignment. No evidence for dental trauma or periapical abscess. Mandible and maxilla are intact.  ORBITAL CONTENTS: There is an acute left orbital floor blowout type comminuted fracture; fracture fragment extends about 6 mm into the superior aspect of the left superior maxillary sinus (series 4, image 36). Small amount of extraconal fat extends at   the level of the orbital floor defect on the left  (series 6, image 18). No evidence for left-sided extraocular muscular entrapment. Mild extraconal edema/stranding inferiorly. No left-sided retrobulbar hematoma. There is deformity of the floor of the   right bony orbit present (series 4, image 33), acuity indeterminate, but may be recent as well. Small amount of extraconal fat at the level of the orbital floor defect. Orbital floor extends into the right superior maxillary sinus about 3 mm. No   right-sided extraocular muscular entrapment. Procedural changes both globes. Soft tissue swelling left preseptal periorbital level extends left supraorbital and frontal scalp. No post septal edema or hematoma noted. Optic nerves are intact. Extraocular   muscles are symmetric.  SINUSES: Air-fluid level left maxillary sinus with some increased density is presumed posttraumatic. Normal variation pneumatization left middle turbinate. Benign osteoma left mid ethmoid air cells noted. Paranasal sinuses are otherwise clear. Mastoid   air cells are clear.  VISUALIZED INTRACRANIAL CONTENTS: No acute process intracranially, described separately. No hyperdense hemorrhage at the anterior cranial fossa/left frontal level noted, and no evidence for parenchymal contusion. Chronic ischemic changes deep white   matter both cerebral hemispheres noted.       Impression    IMPRESSION:   1.  Bilateral orbital floor defects, definitely acute on the left with combination and associated stranding/blood products; possibly nonacute on the right. No extraocular muscular entrapment. Extraconal fat extends at the level of the floor defect, more   prominent on the left. Comminuted fragment left orbital floor extends into the left maxillary sinus as well. High-density layering fluid left maxillary sinus may resent posttraumatic blood/fluid within the left maxillary sinus.  2.  Soft tissue swelling left preseptal periorbital soft tissue and frontal and supra orbital scalp.  3.  No additional displaced  facial bone fractures with remainder of the maxilla and mandible appear intact.   4.  See above for description and details.   EP Device    Narrative    Table formatting from the original result was not included.  Images from the original result were not included.  [facility]  Cardiac Electrophysiology  [address]  [place] [address]   Office: [phone]  Fax: [phone]   [facility]  Cardiac Electrophysiology  Cardiac Electrophysiology - Procedure note: Loop recorder implantation  Patient: Paige Mari   : 1950   : Nolvia Ruiz MD  Date: 2023  Procedures performed:  1. ILR implantation  Recommendations:  1. Transfer to telemetry unit for post-procedure care  2. Device clinic visit in approximately 7-14 days  Indications:  Recurrent syncope  Patient profile:  Mrs. Paige Mari is a 73 year old female with paroxysmal atrial   fibrillation, LBBB, recurrent syncope, HTN, T1DM, COPD admitted with   clustered syncopal events associated with traumatic facial fractures.  She   presents for ILR insertion.  Procedure note:  Mrs. Mari presented in a post absorptive state to the EP lab in sinus   rhythm.  After verification of informed consent and time-out procedure,   the anterior chest was prepped and draped in the typical sterile manner.   The left upper parasternal region was anesthetized with lidocaine.  The   incision tool was utilized to creat a 1cm horizontal incision, and the ILR   implanted using the included implantation tool.  The overlying skin was   closed in 2 layers and overlying steri-strips applied after hemostasis   ensured.  The procedure was well tolerated and there were no   complications.    Device implant details:  Nolvia Ruiz MD  2023  2:37 PM   Echocardiogram Complete     Value    LVEF  50-55% (borderline)    Narrative    [id number]  [id number]  [id number]  [id number]^LAUREL^CHASITY  [place] [address]  Name: KRYSTEN LONDON  MRN: [id number]  : 1950  Study Date: 2023 08:37 AM  Age: 73 yrs  Gender: Female  Patient Location: [facility]  Reason For Study: Syncope and Collapse  Ordering Physician: CHASITY BARRAGAN  Performed By: NORMA  BSA: 1.7 m2  Height: 62 in  Weight: 144 lb  HR: 70  BP: 126/72 mmHg  ______________________________________________________________________________  Procedure  Complete Portable Echo Adult.  ______________________________________________________________________________  Interpretation Summary  1. The left ventricle is normal in size. Left ventricular function is  decreased. The ejection fraction is 50-55% (borderline). There is mild  concentric left ventricular hypertrophy. Left ventricular diastolic function  is abnormal. There is borderline global hypokinesia of the left ventricle.  2. Normal right ventricle size and systolic function.  3. The left atrium is mildly dilated.  4. No hemodynamically significant valvular abnormalities on 2D or color flow  imaging.  ______________________________________________________________________________  Left Ventricle  The left ventricle is normal in size. Left ventricular function is decreased.  The ejection fraction is 50-55% (borderline). There is mild concentric left  ventricular hypertrophy. Left ventricular diastolic function is abnormal.  There is borderline global hypokinesia of the left ventricle.  Right Ventricle  Normal right ventricle size and systolic function.  Atria  The left atrium is mildly dilated. Right atrial size is normal. There is no  color Doppler evidence of an atrial shunt.  Mitral Valve  Mitral valve leaflets appear normal. There is no evidence of mitral stenosis  or clinically significant mitral regurgitation.  Tricuspid Valve  Tricuspid valve leaflets appear normal. There is no evidence of tricuspid  stenosis or clinically significant tricuspid regurgitation. Right  ventricle  systolic pressure estimate normal. The right ventricular systolic pressure is  approximated at 24.4 mmHg plus the right atrial pressure.  Aortic Valve  The aortic valve is trileaflet. Aortic valve leaflets appear normal. There is  no evidence of aortic stenosis or clinically significant aortic regurgitation.  Pulmonic Valve  The pulmonic valve is not well seen, but is grossly normal. This degree of  valvular regurgitation is within normal limits. There is trace pulmonic  valvular regurgitation.  Vessels  The aorta root is normal. Normal size ascending aorta. IVC diameter <2.1 cm  collapsing >50% with sniff suggests a normal RA pressure of 3 mmHg.  Pericardium  There is no pericardial effusion.  ____________________________________________________________________________  MMode/2D Measurements & Calculations  IVSd: 1.2 cm  LVIDd: 4.1 cm  LVIDs: 2.9 cm  LVPWd: 1.2 cm  FS: 27.6 %  LV mass(C)d: 171.3 grams  LV mass(C)dI: 103.0 grams/m2  Ao root diam: 2.8 cm  LA dimension: 3.1 cm  asc Aorta Diam: 2.9 cm  LA/Ao: 1.1  LVOT diam: 1.8 cm  LVOT area: 2.6 cm2  LA Volume Indexed (AL/bp): 24.3 ml/m2  RV Base: 3.2 cm  RWT: 0.60  TAPSE: 1.8 cm  Time Measurements  Aortic HR: 76.0 BPM  MM HR: 64.0 BPM  Doppler Measurements & Calculations  MV E max agustín: 102.0 cm/sec  MV A max agustín: 105.0 cm/sec  MV E/A: 0.97  MV dec time: 0.19 sec  Ao V2 max: 118.5 cm/sec  Ao max P.0 mmHg  Ao V2 mean: 91.6 cm/sec  Ao mean P.0 mmHg  Ao V2 VTI: 31.6 cm  CHELSEA(I,D): 1.4 cm2  CHELSEA(V,D): 1.6 cm2  LV V1 max P.2 mmHg  LV V1 max: 73.3 cm/sec  LV V1 VTI: 16.9 cm  CO(LVOT): 3.3 l/min  CI(LVOT): 2.0 l/min/m2  SV(LVOT): 43.9 ml  SI(LVOT): 26.4 ml/m2  PA acc time: 0.17 sec  TR max agustín: 247.0 cm/sec  TR max P.4 mmHg  AV Agustín Ratio (DI): 0.62  CHELSEA Index (cm2/m2): 0.84  E/E': 14.0  E/E' av.9  Lateral E/e': 14.1  Medial E/e': 13.7  Peak E' Agustín: 7.3 cm/sec  RV S Agustín: 11.4 cm/sec    _____________________________________________________________________________  Report approved by: Larry Casey 08/18/2023 12:22 PM        Discharge Medications   Current Discharge Medication List        START taking these medications    Details   hydrALAZINE (APRESOLINE) 25 MG tablet Take 1 tablet (25 mg) by mouth 3 times daily for 30 days Hold for sbp < 120  Qty: 90 tablet, Refills: 0    Associated Diagnoses: Benign essential hypertension      oxyCODONE (ROXICODONE) 5 MG tablet Take 0.5 tablets (2.5 mg) by mouth every 4 hours as needed for breakthrough pain  Qty: 10 tablet, Refills: 0    Associated Diagnoses: Multiple closed fractures of facial bone, initial encounter (H)      polyethylene glycol (MIRALAX) 17 g packet Take 17 g by mouth At Bedtime Take as long as taking oxycodone    Associated Diagnoses: Multiple closed fractures of facial bone, initial encounter (H)           CONTINUE these medications which have CHANGED    Details   acetaminophen (TYLENOL) 325 MG tablet Take 2 tablets (650 mg) by mouth every 4 hours as needed for other (mild pain (1-3))    Associated Diagnoses: Multiple closed fractures of facial bone, initial encounter (H)           CONTINUE these medications which have NOT CHANGED    Details   albuterol (PROAIR HFA/PROVENTIL HFA/VENTOLIN HFA) 108 (90 Base) MCG/ACT inhaler Inhale 1-2 puffs into the lungs every 6 hours  Qty: 18 g, Refills: 4    Comments: Pharmacy may dispense brand covered by insurance (Proair, or proventil or ventolin or generic albuterol inhaler)  Associated Diagnoses: Dyspnea, unspecified type      amoxicillin (AMOXIL) 500 MG tablet Take 2,000 mg by mouth as needed Before dental appointments    Associated Diagnoses: Thyrotoxicosis; Uncontrolled type 2 diabetes mellitus without complication, with long-term current use of insulin      ARIPiprazole (ABILIFY) 2 MG tablet Take 2 mg by mouth daily       aspirin 81 MG EC tablet Take 81 mg by mouth daily      cephALEXin  (KEFLEX) 500 MG capsule Take 500 mg by mouth daily      cetirizine (ZYRTEC) 10 MG tablet Take 10 mg by mouth daily       cholecalciferol (VITAMIN D3) 25 mcg (1000 units) capsule Take 1,000 Units by mouth daily       cloNIDine (CATAPRES-TTS) 0.3 mg/24 hr Place 1 patch onto the skin once a week       diclofenac sodium (VOLTAREN) 1 % Gel [DICLOFENAC SODIUM (VOLTAREN) 1 % GEL] Apply 1 g topically daily as needed.      DULoxetine (CYMBALTA) 60 MG capsule Take 60 mg by mouth daily      escitalopram (LEXAPRO) 10 MG tablet Take 10 mg by mouth daily      estradiol (ESTRACE) 1 MG tablet Take 1 mg by mouth daily       fluticasone (FLONASE) 50 MCG/ACT nasal spray Spray 1 spray into both nostrils daily  Qty: 16 g, Refills: 11    Associated Diagnoses: PND (paroxysmal nocturnal dyspnea)      galcanezumab-gnlm (EMGALITY) 120 MG/ML injection Inject 1 mL (120 mg) Subcutaneous every 28 days  Qty: 1 mL, Refills: 11    Associated Diagnoses: Chronic intractable headache, unspecified headache type      Glucagon, rDNA, (GLUCAGON EMERGENCY) 1 MG KIT 1 mg IM one time, PRN severe hypoglycemia causing altered consciousness affecting ability to take food by mouth  Qty: 1 kit, Refills: 1    Associated Diagnoses: Type 1 diabetes mellitus with hyperglycemia (H)      glucose (BD GLUCOSE) 5 g chewable tablet Take 2 tablets (10 g) by mouth daily as needed (hypoglycemia)  Qty: 300 tablet, Refills: 3    Associated Diagnoses: Type 1 diabetes mellitus with hyperglycemia (H)      insulin aspart (NOVOLOG VIAL) 100 UNITS/ML vial 9 units with meals plus correction scale with sensitivity of 40 mg/dL starting at 140 mg/dL      magnesium oxide (MAG-OX) 400 MG tablet Take 400 mg by mouth daily       medical cannabis (Patient's own supply) 1 Dose by Other route See Admin Instructions [employer] Vape- 1-4 puffs HS PRN      metFORMIN (GLUCOPHAGE XR) 500 MG 24 hr tablet TAKE 2 TABS (1000MG) BY MOUTH TWICE DAILY WITH MEALS  Qty: 360 tablet, Refills: 0     "Associated Diagnoses: Type 1 diabetes mellitus with hyperglycemia (H)      metoprolol succinate ER (TOPROL-XL) 100 MG 24 hr tablet Take 100 mg by mouth 2 times daily      multivitamin w/minerals (THERA-VIT-M) tablet Take 1 tablet by mouth daily      nystatin (MYCOSTATIN) 790668 UNIT/GM external powder Apply topically 2 times daily      olmesartan (BENICAR) 40 MG tablet [OLMESARTAN (BENICAR) 40 MG TABLET] Take 40 mg by mouth daily.      omeprazole (PRILOSEC) 20 MG capsule [OMEPRAZOLE (PRILOSEC) 20 MG CAPSULE] Take 1 capsule by mouth daily before breakfast.      pregabalin (LYRICA) 100 MG capsule Take 1 capsule (100 mg) by mouth 3 times daily  Qty: 90 capsule, Refills: 0    Associated Diagnoses: Post-op pain      rosuvastatin (CRESTOR) 5 MG tablet TAKE 1 TABLET BY MOUTH EVERY DAY  Qty: 90 tablet, Refills: 0    Associated Diagnoses: Type 1 diabetes mellitus with hyperglycemia (H); Mixed hyperlipidemia      tiZANidine (ZANAFLEX) 2 MG tablet Take 4 mg by mouth nightly as needed      valACYclovir (VALTREX) 500 MG tablet Take 500 mg by mouth daily As needed      Continuous Blood Gluc  (DEXCOM G6 ) TORRIE Use to read blood sugars as per 's instructions.  Qty: 1 each, Refills: 0    Associated Diagnoses: Type 1 diabetes mellitus with hyperglycemia (H)      Continuous Blood Gluc Sensor (DEXCOM G6 SENSOR) MISC Change every 10 days.  Qty: 3 each, Refills: 6    Associated Diagnoses: Type 1 diabetes mellitus with hyperglycemia (H)      Continuous Blood Gluc Transmit (DEXCOM G6 TRANSMITTER) MISC Change every 3 months.  Qty: 1 each, Refills: 2    Associated Diagnoses: Type 1 diabetes mellitus with hyperglycemia (H)      Insulin Disposable Pump (OMNIPOD DASH PODS, GEN 4,) MISC 1 each every 3 days  Qty: 30 each, Refills: 1    Associated Diagnoses: Type 1 diabetes mellitus with hyperglycemia (H)           Allergies   Allergies   Allergen Reactions    Morphine Other (See Comments)     \"make me delirious,\"  " "\"nightmares\"  Other reaction(s): delirium  \"make me delirious,\"  \"nightmares\"      Nitrofurantoin     Adhesive [Cyanoacrylate] Other (See Comments)     Can only tolerate tape for short periods of time shelia in sensitive areas    Amlodipine Unknown and Other (See Comments)     Other reaction(s): delerium    Doxazosin Other (See Comments)     Other reaction(s): feels foggy    Irbesartan-Hydrochlorothiazide Other (See Comments)     Other reaction(s): hyponatremia    Other Allergy (See Comments) [External Allergen Needs Reconciliation - See Comment] Unknown     Other reaction(s): skin rash    Prednisone Unknown     Caused extremely high blood sugars    Tramadol Other (See Comments)     Possible seizure activity    Trazodone Unknown     Other reaction(s): hung over     "

## 2023-08-22 NOTE — PROGRESS NOTES
Diabetes Care;     Stopped by to see patient. Her BG readings have been decent with the insulin regimen ordered, based on her pump rates.  She states she will start the pump when she gets home. We talked about how she would do that. Since the Lantus is being given in the am here, around 9 am, she could start the pump then. She has syringes if needed or pen to given Novolog for meals before the pump is on.  She feels her head has cleared mostly during the day but still a little forgetful at night.  I asked her if she was concerned about this and using her pump. She states she will be OK. She states her  is there and is helpful. She also has the Dexcom CGMS on which will alert her when she is high and low.   She will follow-up with her endocrinologist, Dr. Bardales,  and VELIA Perez after discharge.    She also was incontinent this am and is feeling down about her health. She states she has had depression for years, is on medication for it.       Georgie Goncalves RN, Certified Diabetes Care and     [address] [address]  [email or web address]   Office: [phone]  Pager: [phone]

## 2023-08-22 NOTE — PROGRESS NOTES
"   08/22/23 0917   Appointment Info   Signing Clinician's Name / Credentials (PT) Alyson Briceño, PT, DPT   Living Environment   People in Home spouse   Current Living Arrangements apartment   Home Accessibility stairs to enter home   Number of Stairs, Main Entrance 8   Stair Railings, Main Entrance railings safe and in good condition   Self-Care   Equipment Currently Used at Home none  (has a walker, cane, just ordered a wheelchair but does not use)   Fall history within last six months yes   Number of times patient has fallen within last six months 6   Activity/Exercise/Self-Care Comment Pt independent with ADLs at baseline. Spouse assists with IADLS.   General Information   Onset of Illness/Injury or Date of Surgery 08/17/23   Referring Physician Evan Castro, DO   Patient/Family Therapy Goals Statement (PT) None stated.   Pertinent History of Current Problem (include personal factors and/or comorbidities that impact the POC) Per H&P: \"72 yo F with h/o HTN, HARRIETT, GERD, mood disorder, remote Etoh and drug use currently on medical marijuana, KP not on CPAP, pAfib, COPD, and neuropathy who presents with recurrent syncope and multiple facial fractures.  Likely this is cardiac in nature but she does have a history of having an abnormal EEG in January of 2022.  It wasn't clear at that time if it was a true seizure disorder so anti-seizure meds were stopped.  She has continued to have episodes since then 1-3 times a month of loss of consciousness but in the past week she has had 5-6 episodes which caused multiple facial fractures. Some aspects sound a little like seizure but not classic.  She does report palpitations prior to the event so this may be cardiac in nature.  Will check EEG and echo and follow telemetry and have cardiology and neurology assess.  OMS did not feel the patient required any antibiotics for her facial/orbital fractures per discussion with ED provider.  I cannot totally exclude that these " "episodes could be related to hypoglycemia but she states she always checks her glucose immediately after an episode and most of the time her glucoses are ok immediately after but \"sometimes they are low\".  \"   Existing Precautions/Restrictions fall  (Day #1 s/p loop recorder implant. Per discharge summary, no pushing/pulling, lifting > 10#, raising arm overhead)   Pain Assessment   Patient Currently in Pain Yes, see Vital Sign flowsheet  (soreness at loop recorder implant site)   Range of Motion (ROM)   ROM Comment B LE ROM WFL   Strength (Manual Muscle Testing)   Strength (Manual Muscle Testing) Deficits observed during functional mobility   Bed Mobility   Comment, (Bed Mobility) Pt seated in chair at start of session. Unable to formally assess bed mobility. Based on functional mobility observed, pt likely to need verbal cues only for precautions after loop recorder implant.   Transfers   Transfers sit-stand transfer   Transfer Safety Concerns Noted decreased weight-shifting ability   Impairments Contributing to Impaired Transfers impaired balance;decreased strength   Sit-Stand Transfer   Sit-Stand Nicholas (Transfers) contact guard;verbal cues   Assistive Device (Sit-Stand Transfers) walker, front-wheeled   Gait/Stairs (Locomotion)   Nicholas Level (Gait) contact guard;verbal cues   Assistive Device (Gait) walker, front-wheeled   Distance in Feet 45'   Distance in Feet (Gait) additional 190'   Pattern (Gait) step-to   Deviations/Abnormal Patterns (Gait) rosa decreased;gait speed decreased   Clinical Impression   Criteria for Skilled Therapeutic Intervention Yes, treatment indicated   PT Diagnosis (PT) impaired functional mobility   Influenced by the following impairments precautions, impaired balance, decreased activity tolerance   Functional limitations due to impairments transfers, ambulation   Clinical Presentation (PT Evaluation Complexity) Evolving/Changing   Clinical Presentation Rationale Pt " presents as medically diagnosed.   Clinical Decision Making (Complexity) moderate complexity   Planned Therapy Interventions (PT) balance training;bed mobility training;gait training;home exercise program;neuromuscular re-education;patient/family education;strengthening;stair training;transfer training   Anticipated Equipment Needs at Discharge (PT) walker, rolling  (pt states she has one at home)   Risk & Benefits of therapy have been explained evaluation/treatment results reviewed;participants voiced agreement with care plan;participants included;patient   PT Total Evaluation Time   PT Eval, Moderate Complexity Minutes (92759) 10   Physical Therapy Goals   PT Frequency Daily   PT Predicted Duration/Target Date for Goal Attainment 08/29/23   PT Goals Transfers;Gait;Bed Mobility;Stairs   PT: Bed Mobility Independent;Supine to/from sit;Within precautions   PT: Transfers Supervision/stand-by assist;Sit to/from stand;Assistive device   PT: Gait Supervision/stand-by assist;Assistive device;Rolling walker;Greater than 200 feet   PT: Stairs Supervision/stand-by assist;8 stairs;Rail on right   Interventions   Interventions Quick Adds Gait Training   Gait Training   Gait Training Minutes (62945) 10   Symptoms Noted During/After Treatment (Gait Training) none   Treatment Detail/Skilled Intervention With FWW. Pt ambulates additional 190'. Steady with walker, prefers to use at this time. Recommended continued use as pt has not trialed walk without walker.   [place] Level (Gait Training) contact guard   Physical Assistance Level (Gait Training) verbal cues;1 person assist   Assistive Device (Gait Training) rolling walker   Gait Analysis Deviations decreased rosa;decreased step length   Impairments (Gait Analysis/Training) balance impaired;strength decreased   PT Discharge Planning   PT Plan gait with FWW, stairs, assess bed mobility   PT Discharge Recommendation (DC Rec) home with assist   PT Rationale for DC Rec  Patient is moving well with stand-by to contact guard assist. Lives with spouse who can assist with IADLs. Anticipate pt will be safe to discharge home with assist from spouse when medically ready.   PT Brief overview of current status Sit <> stand, CGA. Ambulates 190' [facility] FWW, CGA.   Total Session Time   Timed Code Treatment Minutes 10   Total Session Time (sum of timed and untimed services) 20

## 2023-08-22 NOTE — PROGRESS NOTES
Cardiology Progress Note    Assessment/Plan:    Recurrent falls, syncope, with facial fracture.  Multiple possible etiologies including untreated obstructive sleep apnea, paroxysmal atrial fibrillation but no documented pauses.  Status post loop recorder implant to evaluate for possible tachybradycardia syndrome contributing to frequent falls.  Paroxysmal atrial fibrillation.  No episodes last 24 hours, 5-minute bout the night before.  Possibly triggered by untreated sleep apnea.  Risk-benefit ratio at this time does not favor long-term anticoagulation.  May be a candidate for Watchman device.  [id number] score 4  Left bundle branch block morphology, loop recorder in place to look for heart block  Labile hypertension again potentially related to untreated sleep apnea.  Wish to avoid treating too aggressively, will add low-dose hydralazine.    Stable for discharge today.  Follow-up Dr. 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  Sleep remains poor.      Objective:   Vital signs in last 24 hours:  Vitals:    08/22/23 0038 08/22/23 0421 08/22/23 0622 08/22/23 0715   BP: (!) 147/68 128/62  (!) 141/69   BP Location:  Right arm  Left arm   Pulse: 68 62  65   Resp:  18  18   Temp:  97.6  F (36.4  C)  97.8  F (36.6  C)   TempSrc:  Oral  Oral   SpO2:  97%  95%   Weight:   63.6 kg (140 lb 3.2 oz)    Height:         Weight:   Wt Readings from Last 3 Encounters:   08/22/23  63.6 kg (140 lb 3.2 oz)   04/18/23 66.7 kg (147 lb)   03/27/23 65.8 kg (145 lb)           PHYSICAL EXAM      Respiratory:  Normal breath sounds, No respiratory distress, No wheezing, No chest tenderness.   Cardiovascular:   Normal heart rate, Normal rhythm, No murmurs, No rubs, No gallops.   GI: Obese.  Bowel sounds normal, Soft, No tenderness, No masses  Extremities: no edema       Cardiographics:   Telemetry sinus rhythm, 60 to 70 bpm with bigeminal PACs.  No atrial fibrillation last 24 hours    ECG (personally reviewed) 8/17/2023: Sinus rhythm left axis deviation left bundle branch block.    Imaging:   Echocardiogram 8/18/2023:  1. The left ventricle is normal in size. Left ventricular function is  decreased. The ejection fraction is 50-55% (borderline). There is mild  concentric left ventricular hypertrophy. Left ventricular diastolic function  is abnormal. There is borderline global hypokinesia of the left ventricle.  2. Normal right ventricle size and systolic function.  3. The left atrium is mildly dilated.  4. No hemodynamically significant valvular abnormalities on 2D or color flow  imaging.      Lab Results:   Lab Results   Component Value Date    WBC 10.7 08/22/2023    HGB 13.1 08/22/2023    HCT 38.6 08/22/2023     08/22/2023    CHOL 127 07/25/2023    TRIG 126 07/25/2023    HDL 59 07/25/2023    ALT 15 08/17/2023    AST 33 08/17/2023     08/22/2023    BUN 12.7 08/22/2023    CO2 28 08/22/2023    TSH 0.98 08/17/2023    INR 1.06 08/17/2023    MICROALBUR 2.22 (H) 12/14/2021     Lab Results   Component Value Date    TROPONINI <0.01 01/20/2022         Kevin Pruitt MD [facility]  8/21/2023

## 2023-08-22 NOTE — PLAN OF CARE
"  Problem: Plan of Care - These are the overarching goals to be used throughout the patient stay.    Goal: Plan of Care Review  Description: The Plan of Care Review/Shift note should be completed every shift.  The Outcome Evaluation is a brief statement about your assessment that the patient is improving, declining, or no change.  This information will be displayed automatically on your shift note.  Outcome: Progressing  Goal: Patient-Specific Goal (Individualized)  Description: You can add care plan individualizations to a care plan. Examples of Individualization might be:  \"Parent requests to be called daily at 9am for status\", \"I have a hard time hearing out of my right ear\", or \"Do not touch me to wake me up as it startles me\".  Outcome: Progressing  Goal: Absence of Hospital-Acquired Illness or Injury  Outcome: Progressing  Intervention: Identify and Manage Fall Risk  Recent Flowsheet Documentation  Taken 8/22/2023 0737 by Naomy Mcginnis RN  Safety Promotion/Fall Prevention:   activity supervised   supervised activity   toileting scheduled  Intervention: Prevent Skin Injury  Recent Flowsheet Documentation  Taken 8/22/2023 0737 by Naomy Mcginnis RN  Body Position: position changed independently  Goal: Optimal Comfort and Wellbeing  Outcome: Progressing  Intervention: Monitor Pain and Promote Comfort  Recent Flowsheet Documentation  Taken 8/22/2023 0855 by Naomy Mcginnis RN  Pain Management Interventions: medication (see MAR)  Goal: Readiness for Transition of Care  Outcome: Progressing     Problem: Pain Acute  Goal: Optimal Pain Control and Function  Outcome: Progressing  Intervention: Develop Pain Management Plan  Recent Flowsheet Documentation  Taken 8/22/2023 0855 by Naomy Mcginnis RN  Pain Management Interventions: medication (see MAR)  Intervention: Prevent or Manage Pain  Recent Flowsheet Documentation  Taken 8/22/2023 0737 by Naomy Mcginnis RN  Medication Review/Management: medications " reviewed     Problem: Fall Injury Risk  Goal: Absence of Fall and Fall-Related Injury  Outcome: Progressing  Intervention: Identify and Manage Contributors  Recent Flowsheet Documentation  Taken 8/22/2023 0737 by Naomy Mcginnis RN  Medication Review/Management: medications reviewed  Intervention: Promote Injury-Free Environment  Recent Flowsheet Documentation  Taken 8/22/2023 0737 by Naomy Mcginnis RN  Safety Promotion/Fall Prevention:   activity supervised   supervised activity   toileting scheduled     Problem: Risk for Delirium  Goal: Optimal Coping  Outcome: Progressing  Goal: Improved Behavioral Control  Outcome: Progressing  Intervention: Minimize Safety Risk  Recent Flowsheet Documentation  Taken 8/22/2023 0737 by Naomy Mcginnis RN  Enhanced Safety Measures: family to remain at bedside  Goal: Improved Attention and Thought Clarity  Outcome: Progressing  Intervention: Maximize Cognitive Function  Recent Flowsheet Documentation  Taken 8/22/2023 0737 by Naomy Mcginnis RN  Reorientation Measures:   calendar in view   clock in view  Goal: Improved Sleep  Outcome: Progressing     Problem: Risk for Delirium  Goal: Improved Attention and Thought Clarity  Outcome: Progressing  Intervention: Maximize Cognitive Function  Recent Flowsheet Documentation  Taken 8/22/2023 0737 by Naomy Mcginnis RN  Reorientation Measures:   calendar in view   clock in view     Problem: Hypertension Acute  Goal: Blood Pressure Within Desired Range  Outcome: Progressing  Intervention: Normalize Blood Pressure  Recent Flowsheet Documentation  Taken 8/22/2023 0737 by Naomy Mcginnis RN  Medication Review/Management: medications reviewed     Problem: Syncope  Goal: Absence of Syncopal Symptoms  Outcome: Progressing   Goal Outcome Evaluation:  Discharge to home  Daughter to come pick her up IV and Telemetry  removed patient instructed to make Primary care out patient appointment   Prescription for pain medication given to  patient follow up appointment and medications schedules discussed and verbalized understanding.

## 2023-08-22 NOTE — PROGRESS NOTES
[facility]  Cardiac Electrophysiology  [address]  [place] [address]   Office: [phone]  Fax: [phone]     Cardiac Electrophysiology Progress Note    Patient: Paige Mari   : 1950       Assessment/Recommendations   -Post ILR implant, mild discomfort noted  today, site with dime sized amount of old blood, no new bleeding today noted  -Patient did well overnight, reports ongoing H/A related to previous syncopal episode prior to admission resulting in fall/facial trauma, encouraged her to ice affected  area and discuss pain management plan with hospital MD today.   -Okay to discharge from EP standpoint, will sign off   -See post ILR discharge instructions, discussed  with patient at bedside  -Remains in NSR today, rates 60-70, no significant arrhythmias  noted overnight, AF noted night before, she did note some palpitations with this for about 5 minutes. Continue with metoprolol succinate at current dose  -BP remains slightly elevated, defer to hospital MD for management             Interval Events   Arrhythmia hx  Dx/date: 2021, diagnosed by telemetry, ventricular rates 170's at the time  Sx: palpitations  Prior AAD, AV carrie blocking agents: metoprolol succinate orally, previously treated with Diltiazem gtt during AF with RVR episodes  [id number]: 4, for age, gender, DM, HTN on ASA 81 mg daily  Procedures  DCCV: no  Ablation: no    Patient reports headache today due to facial pain from previous syncopal episode resulting in fall/facial trauma. ILR sight dry and intact with small amount of old, dried blood, she reports mild tenderness around incision site today. Denies any recent SOBOE or at rest, no palpitations over the last 24 hours. Afib noted night before last on monitor for about 10 minutes overnight, suspected from untreated KP, patient does not tolerate CPAP mask. Denies recent CP but reports some dizziness with laying flat today,  "magnesium level 1.5 today. Remains in NSR with controlled  ventricular rates between 60-70 bpm. Has not been up walking yet this morning. Ate full breakfast, tolerated.          Physical Examination  Review of Systems   VITALS: BP (!) 141/69 (BP Location: Left arm)   Pulse 65   Temp 97.8  F (36.6  C) (Oral)   Resp 18   Ht 1.575 m (5' 2\")   Wt 63.6 kg (140 lb 3.2 oz)   SpO2 95%   BMI 25.64 kg/m    Wt Readings from Last 3 Encounters:   08/22/23 63.6 kg (140 lb 3.2 oz)   04/18/23 66.7 kg (147 lb)   03/27/23 65.8 kg (145 lb)       Intake/Output Summary (Last 24 hours) at 8/22/2023 0856  Last data filed at 8/22/2023 0400  Gross per 24 hour   Intake 360 ml   Output 1800 ml   Net -1440 ml     CONSTITUTIONAL: no distress  EYES:  Conjunctivae pink, sclerae clear.  Bruising severe around eyes.   E/N/T:  Oral mucosa pink, moist mucous membranes  RESPIRATORY:  Respiratory effort is normal  CARDIOVASCULAR:  normal S1 and S2  GASTROINTESTINAL:  Abdomen without masses or tenderness  EXTREMITIES:  No clubbing or cyanosis.    MUSCULOSKELETAL:  Overall grossly normal muscle strength  SKIN:  Overall, skin warm and dry, no lesions.  NEURO/PSYCH:  Oriented x 3 with normal affect.   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), managed as type 1 (H)     Created by Conversion       Mood disorder (H) 11/24/2014    KP (obstructive sleep apnea) 05/15/2015    Paroxysmal atrial fibrillation (H) 09/22/2021    Small bowel obstruction " (H) 09/18/2021    Status post total knee replacement 11/24/2014    Past Surgical History:   Procedure Laterality Date    APPENDECTOMY      at age 3    ARTHROSCOPY KNEE WITH MENISCECTOMY  3/10/14    partial medial and lateral meniscectomies, subpatellar chondroplasty    BACK SURGERY      BLEPHAROPLASTY Bilateral 8/17/2015    Procedure: BLEPHAROPLASTY BILATERAL UPPER LIDS;  Surgeon: Chasidy Bryant MD;  Location: [facility];  Service:     BOWEL RESECTION  12 years ago    COLON SURGERY  2004    sigmoid colectomy    DILATION AND CURETTAGE      x3    HYSTERECTOMY      age 39    IR LUMBAR PUNCTURE  1/22/2022    JOINT REPLACEMENT      OOPHORECTOMY      OTHER SURGICAL HISTORY  2005    bowel obstruction    PICC TRIPLE LUMEN PLACEMENT  9/19/2021         PICC TRIPLE LUMEN PLACEMENT  1/22/2022         WA ARTHRODESIS ANT INTERBODY MIN DISCECTOMY,LUMBAR N/A 1/30/2015    Procedure: ANTERIOR LUMBAR INTERBODY FUSION L4-5, INTERBODY GRAFT, BMP INSTRUMENTATION;  Surgeon: Zeeshan Guillaume MD;  Location: [facility];  Service: Spine    TOE SURGERY      TONSILLECTOMY & ADENOIDECTOMY      age 10    ZZC REMOVAL OF OVARY(S)      Description: Oophorectomy;  Recorded: 06/03/2010;    ZZC REMOVAL OF OVARY(S)      Description: Oophorectomy;  Recorded: 06/03/2010;    ZC TOTAL ABDOM HYSTERECTOMY      Description: Hysterectomy;  Recorded: 06/03/2010;    ZZC TOTAL ABDOM HYSTERECTOMY      Description: Hysterectomy;  Recorded: 06/03/2010;    ZZC TOTAL KNEE ARTHROPLASTY Right 11/24/2014    Procedure: RIGHT TOTAL KNEE ARTHROPLASTY;  Surgeon: Jules Harris MD;  Location: [facility];  Service: Orthopedics         Family History Social History   Family History   Problem Relation Age of Onset    Breast Cancer No family hx of         Social History     Tobacco Use    Smoking status: Every Day     Packs/day: 0.50     Years: 19.00     Pack years: 9.50     Types: Cigarettes     Last attempt to quit: 9/15/2021     Years since quitting:  1.9    Smokeless tobacco: Never    Tobacco comments:     Seen as Inpatient by Tobacco Treatment and history updated on 8/21/2023, at time smoking 1 cigarette per day   Substance Use Topics    Alcohol use: No    Drug use: No         Medications  Allergies     Current Facility-Administered Medications:     acetaminophen (TYLENOL) tablet 650 mg, 650 mg, Oral, Q6H PRN, 650 mg at 08/21/23 1500 **OR** acetaminophen (TYLENOL) Suppository 650 mg, 650 mg, Rectal, Q6H PRN, Jeet Cooper MD    acetaminophen (TYLENOL) tablet 1,000 mg, 1,000 mg, Oral, BID, Jeet Cooper MD, 1,000 mg at 08/22/23 0831    acetaminophen (TYLENOL) tablet 650 mg, 650 mg, Oral, Q4H PRN, Elizbaet Blanchard NP    albuterol (PROVENTIL HFA/VENTOLIN HFA) inhaler, 1-2 puff, Inhalation, Q6H, Jeet Cooper MD, 2 puff at 08/21/23 0846    ARIPiprazole (ABILIFY) tablet 2 mg, 2 mg, Oral, Daily, Jeet Cooper MD, 2 mg at 08/22/23 0846    aspirin EC tablet 81 mg, 81 mg, Oral, Daily, Jeet Cooper MD, 81 mg at 08/22/23 0831    cephALEXin (KEFLEX) capsule 500 mg, 500 mg, Oral, Daily, Evan Castro DO, 500 mg at 08/22/23 0836    cetirizine (zyrTEC) tablet 10 mg, 10 mg, Oral, Daily, Jeet Cooper MD, 10 mg at 08/22/23 0831    cloNIDine (CATAPRES-TTS) Patch in Place, , Transdermal, Q8H, Jeet Cooper MD    cloNIDine (CATAPRES-TTS3) 0.3 MG/24HR WK patch 1 patch, 1 patch, Transdermal, Weekly, Jeet Cooper MD, 1 patch at 08/20/23 0813    glucose gel 15-30 g, 15-30 g, Oral, Q15 Min PRN **OR** dextrose 50 % injection 25-50 mL, 25-50 mL, Intravenous, Q15 Min PRN **OR** glucagon injection 1 mg, 1 mg, Subcutaneous, Q15 Min PRN, Evan Castro DO    glucose gel 15-30 g, 15-30 g, Oral, Q15 Min PRN **OR** dextrose 50 % injection 25-50 mL, 25-50 mL, Intravenous, Q15 Min PRN **OR** glucagon injection 1 mg, 1 mg, Subcutaneous, Q15 Min PRN, Kaykay Paul MD    docusate sodium (COLACE) capsule 200 mg, 200 mg, Oral, BID, Evan Castro DO, 200 mg at 08/20/23 1532     DULoxetine (CYMBALTA) DR capsule 60 mg, 60 mg, Oral, Daily, Jeet Cooper MD, 60 mg at 08/22/23 0832    escitalopram (LEXAPRO) tablet 10 mg, 10 mg, Oral, Daily, Jeet Cooper MD, 10 mg at 08/22/23 0831    estradiol (ESTRACE) tablet 1 mg, 1 mg, Oral, Daily, Jeet Cooper MD, 1 mg at 08/22/23 0831    HOLD: Heparin (IV or Subcutaneous) Post Procedure, , Does not apply, HOLD, Elizabet Blanchard, NP    hydrALAZINE (APRESOLINE) injection 10 mg, 10 mg, Intravenous, Q6H PRN, Brendan Ba MD, 10 mg at 08/21/23 0030    HYDROmorphone (DILAUDID) injection 0.2 mg, 0.2 mg, Intravenous, Q6H PRN, Evan Castro DO, 0.2 mg at 08/22/23 0855    insulin aspart (NovoLOG) injection (RAPID ACTING), , Subcutaneous, TID w/meals, Evan Castro DO, 5 Units at 08/22/23 0835    insulin aspart (NovoLOG) injection (RAPID ACTING), 1-7 Units, Subcutaneous, TID AC, Evan Castro DO, 1 Units at 08/20/23 1732    insulin aspart (NovoLOG) injection (RAPID ACTING), 1-5 Units, Subcutaneous, At Bedtime, Evan Castro DO, 1 Units at 08/19/23 2030    insulin basal rate from AMBULATORY PUMP, , Subcutaneous, Continuous, Evan Castro DO, Held at 08/18/23 2000    insulin glargine (LANTUS PEN) injection 15 Units, 15 Units, Subcutaneous, QAM AC, Evan Castro DO, 15 Units at 08/22/23 0835    lidocaine (LMX4) cream, , Topical, Q1H PRN, Jeet Cooper MD    lidocaine 1 % 0.1-1 mL, 0.1-1 mL, Other, Q1H PRN, Jeet Cooper MD    losartan (COZAAR) tablet 100 mg, 100 mg, Oral, QPM, Jeet Cooper MD, 100 mg at 08/21/23 2145    magnesium oxide (MAG-OX) tablet 400 mg, 400 mg, Oral, Daily, Jeet Cooper MD, 400 mg at 08/22/23 0831    magnesium sulfate 2 g in 50 mL sterile water intermittent infusion, 2 g, Intravenous, Once, Irina Dupont MD, Last Rate: 50 mL/hr at 08/22/23 0855, 2 g at 08/22/23 0855    melatonin tablet 1 mg, 1 mg, Oral, At Bedtime PRN, Jeet Cooper MD    metFORMIN (GLUCOPHAGE XR) 24 hr tablet 1,000 mg, 1,000 mg, Oral, BID  "w/meals, Jeet Cooper MD, 1,000 mg at 08/22/23 0830    metoprolol succinate ER (TOPROL XL) 24 hr tablet 100 mg, 100 mg, Oral, BID, Jeet Cooper MD, 100 mg at 08/22/23 0831    naloxone (NARCAN) injection 0.2 mg, 0.2 mg, Intravenous, Q2 Min PRN **OR** naloxone (NARCAN) injection 0.4 mg, 0.4 mg, Intravenous, Q2 Min PRN **OR** naloxone (NARCAN) injection 0.2 mg, 0.2 mg, Intramuscular, Q2 Min PRN **OR** naloxone (NARCAN) injection 0.4 mg, 0.4 mg, Intramuscular, Q2 Min PRN, Jeet Cooper MD    No heparin products for 72 hours after device implantation unless approved by the implanting physician, , Does not apply, Continuous PRN, Elizabet Blanchard, NP    ondansetron (ZOFRAN ODT) ODT tab 4 mg, 4 mg, Oral, Q6H PRN **OR** ondansetron (ZOFRAN) injection 4 mg, 4 mg, Intravenous, Q6H PRN, Jeet Cooper MD    oxyCODONE IR (ROXICODONE) half-tab 2.5 mg, 2.5 mg, Oral, Q4H PRN, Jeet Cooper MD, 2.5 mg at 08/22/23 0549    pantoprazole (PROTONIX) EC tablet 40 mg, 40 mg, Oral, QAM AC, Jeet Cooper MD, 40 mg at 08/22/23 0549    polyethylene glycol (MIRALAX) Packet 17 g, 17 g, Oral, At Bedtime, Evan Castro,     pregabalin (LYRICA) capsule 100 mg, 100 mg, Oral, TID, Jeet Cooper MD, 100 mg at 08/22/23 0830    rosuvastatin (CRESTOR) tablet 5 mg, 5 mg, Oral, Daily, Jeet Cooper MD, 5 mg at 08/22/23 0837    sodium chloride (PF) 0.9% PF flush 2.5 mL, 2.5 mL, Intravenous, q1 min prn, Elizabet Blanchard, NP    sodium chloride (PF) 0.9% PF flush 3 mL, 3 mL, Intracatheter, Q8H, Jeet Cooper MD, 3 mL at 08/22/23 0548    sodium chloride (PF) 0.9% PF flush 3 mL, 3 mL, Intracatheter, q1 min prn, Jeet Cooper MD    tiZANidine (ZANAFLEX) tablet 4 mg, 4 mg, Oral, Q6H PRN, Irina Dupont MD     Allergies   Allergen Reactions    Morphine Other (See Comments)     \"make me delirious,\"  \"nightmares\"  Other reaction(s): delirium  \"make me delirious,\"  \"nightmares\"      Nitrofurantoin     Adhesive [Cyanoacrylate] Other (See Comments) "     Can only tolerate tape for short periods of time shelia in sensitive areas    Amlodipine Unknown and Other (See Comments)     Other reaction(s): delerium    Doxazosin Other (See Comments)     Other reaction(s): feels foggy    Irbesartan-Hydrochlorothiazide Other (See Comments)     Other reaction(s): hyponatremia    Other Allergy (See Comments) [External Allergen Needs Reconciliation - See Comment] Unknown     Other reaction(s): skin rash    Prednisone Unknown     Caused extremely high blood sugars    Tramadol Other (See Comments)     Possible seizure activity    Trazodone Unknown     Other reaction(s): hung over          Lab Results    Chemistry CBC Cardiac Enzymes/BNP/TSH/INR   Recent Labs   Lab Test 08/22/23  0727 08/22/23  0453   NA  --  136   POTASSIUM  --  4.4   CHLORIDE  --  99   CO2  --  28   * 107*   BUN  --  12.7   CR  --  0.71   GFRESTIMATED  --  89   BRUNO  --  9.2     Recent Labs   Lab Test 08/22/23  0453 08/21/23  0455 08/20/23  0454   CR 0.71 0.62 0.69          Recent Labs   Lab Test 08/22/23  0453   WBC 10.7   HGB 13.1   HCT 38.6   MCV 94        Recent Labs   Lab Test 08/22/23  0453 08/21/23  1000 08/18/23  0514   HGB 13.1 13.8 12.8    Recent Labs   Lab Test 01/20/22  1158 09/19/21  0211 09/17/21  2240   TROPONINI <0.01 0.06 0.01     No results for input(s): BNP, NTBNPI, NTBNP in the last 10089 hours.  Recent Labs   Lab Test 08/17/23  1410   TSH 0.98     Recent Labs   Lab Test 08/17/23  1208   INR 1.06          Elizabet Blanchard NP  Clinical Cardiac Electrophysiology  [facility]  Office: [phone]  Fax: [phone]   Nurses: [phone]     40 minutes spent on follow up

## 2023-08-22 NOTE — PLAN OF CARE
Physical Therapy Discharge Summary    Reason for therapy discharge:    Discharged to home.    Progress towards therapy goal(s). See goals on Care Plan in [facility] electronic health record for goal details.  Goals partially met.  Barriers to achieving goals:   discharge from facility.    Therapy recommendation(s):    No further therapy is recommended.    Alyson Briceño, PT  8/22/2023

## 2023-08-22 NOTE — PLAN OF CARE
Problem: Pain Acute  Goal: Optimal Pain Control and Function  Intervention: Prevent or Manage Pain       Problem: Fall Injury Risk  Goal: Absence of Fall and Fall-Related Injury  Intervention: Promote Injury-Free Environment       Problem: Hypertension Acute  Goal: Blood Pressure Within Desired Range  Outcome: Progressing    Problem: Syncope  Goal: Absence of Syncopal Symptoms  Outcome: Progressing   Goal Outcome Evaluation:    A&O x4 but can be forgetful. The pt c/o 8/10 bilateral facial pain. PRN Dilaudid given x1-This was effective per pt. Up with SBA. Call-light within reach. Will continue to monitor.    Tele- SR w/ PAC's.

## 2023-08-29 ENCOUNTER — ANCILLARY PROCEDURE (OUTPATIENT)
Dept: CARDIOLOGY | Facility: CLINIC | Age: 73
End: 2023-08-29
Attending: INTERNAL MEDICINE
Payer: COMMERCIAL

## 2023-08-29 DIAGNOSIS — Z95.818 IMPLANTABLE LOOP RECORDER PRESENT: ICD-10-CM

## 2023-08-29 LAB
MDC_IDC_EPISODE_DTM: NORMAL
MDC_IDC_EPISODE_DTM: NORMAL
MDC_IDC_EPISODE_ID: NORMAL
MDC_IDC_EPISODE_ID: NORMAL
MDC_IDC_EPISODE_TYPE: NORMAL
MDC_IDC_EPISODE_TYPE: NORMAL
MDC_IDC_MSMT_BATTERY_DTM: NORMAL
MDC_IDC_MSMT_BATTERY_STATUS: NORMAL
MDC_IDC_PG_IMPLANT_DTM: NORMAL
MDC_IDC_PG_MFG: NORMAL
MDC_IDC_PG_MODEL: NORMAL
MDC_IDC_PG_SERIAL: NORMAL
MDC_IDC_PG_TYPE: NORMAL
MDC_IDC_SESS_CLINIC_NAME: NORMAL
MDC_IDC_SESS_DTM: NORMAL
MDC_IDC_SESS_TYPE: NORMAL
MDC_IDC_STAT_AT_BURDEN_PERCENT: 1 %
MDC_IDC_STAT_AT_DTM_END: NORMAL
MDC_IDC_STAT_AT_DTM_START: NORMAL

## 2023-08-29 PROCEDURE — 93291 INTERROG DEV EVAL SCRMS IP: CPT | Performed by: INTERNAL MEDICINE

## 2023-08-31 ENCOUNTER — TRANSFERRED RECORDS (OUTPATIENT)
Dept: HEALTH INFORMATION MANAGEMENT | Facility: CLINIC | Age: 73
End: 2023-08-31
Payer: COMMERCIAL

## 2023-08-31 ENCOUNTER — LAB REQUISITION (OUTPATIENT)
Dept: LAB | Facility: CLINIC | Age: 73
End: 2023-08-31

## 2023-08-31 DIAGNOSIS — R55 SYNCOPE AND COLLAPSE: ICD-10-CM

## 2023-08-31 LAB
ALBUMIN SERPL BCG-MCNC: 4.1 G/DL (ref 3.5–5.2)
ALP SERPL-CCNC: 68 U/L (ref 35–104)
ALT SERPL W P-5'-P-CCNC: 17 U/L (ref 0–50)
ANION GAP SERPL CALCULATED.3IONS-SCNC: 13 MMOL/L (ref 7–15)
AST SERPL W P-5'-P-CCNC: 25 U/L (ref 0–45)
BASOPHILS # BLD AUTO: 0.1 10E3/UL (ref 0–0.2)
BASOPHILS NFR BLD AUTO: 1 %
BILIRUB SERPL-MCNC: 0.5 MG/DL
BUN SERPL-MCNC: 10.6 MG/DL (ref 8–23)
CALCIUM SERPL-MCNC: 9.4 MG/DL (ref 8.8–10.2)
CHLORIDE SERPL-SCNC: 97 MMOL/L (ref 98–107)
CREAT SERPL-MCNC: 0.72 MG/DL (ref 0.51–0.95)
DEPRECATED HCO3 PLAS-SCNC: 24 MMOL/L (ref 22–29)
EOSINOPHIL # BLD AUTO: 0.2 10E3/UL (ref 0–0.7)
EOSINOPHIL NFR BLD AUTO: 2 %
ERYTHROCYTE [DISTWIDTH] IN BLOOD BY AUTOMATED COUNT: 13.2 % (ref 10–15)
GFR SERPL CREATININE-BSD FRML MDRD: 87 ML/MIN/1.73M2
GLUCOSE SERPL-MCNC: 103 MG/DL (ref 70–99)
HCT VFR BLD AUTO: 39 % (ref 35–47)
HGB BLD-MCNC: 12.8 G/DL (ref 11.7–15.7)
IMM GRANULOCYTES # BLD: 0 10E3/UL
IMM GRANULOCYTES NFR BLD: 0 %
LYMPHOCYTES # BLD AUTO: 2.6 10E3/UL (ref 0.8–5.3)
LYMPHOCYTES NFR BLD AUTO: 26 %
MCH RBC QN AUTO: 31.8 PG (ref 26.5–33)
MCHC RBC AUTO-ENTMCNC: 32.8 G/DL (ref 31.5–36.5)
MCV RBC AUTO: 97 FL (ref 78–100)
MONOCYTES # BLD AUTO: 0.5 10E3/UL (ref 0–1.3)
MONOCYTES NFR BLD AUTO: 5 %
NEUTROPHILS # BLD AUTO: 6.7 10E3/UL (ref 1.6–8.3)
NEUTROPHILS NFR BLD AUTO: 66 %
NRBC # BLD AUTO: 0 10E3/UL
NRBC BLD AUTO-RTO: 0 /100
PLATELET # BLD AUTO: 319 10E3/UL (ref 150–450)
POTASSIUM SERPL-SCNC: 5.1 MMOL/L (ref 3.4–5.3)
PROT SERPL-MCNC: 6 G/DL (ref 6.4–8.3)
RBC # BLD AUTO: 4.03 10E6/UL (ref 3.8–5.2)
SODIUM SERPL-SCNC: 134 MMOL/L (ref 136–145)
WBC # BLD AUTO: 10.1 10E3/UL (ref 4–11)

## 2023-08-31 PROCEDURE — 85004 AUTOMATED DIFF WBC COUNT: CPT | Performed by: FAMILY MEDICINE

## 2023-08-31 PROCEDURE — 80053 COMPREHEN METABOLIC PANEL: CPT | Performed by: FAMILY MEDICINE

## 2023-09-05 ENCOUNTER — TELEPHONE (OUTPATIENT)
Dept: CARDIOLOGY | Facility: CLINIC | Age: 73
End: 2023-09-05
Payer: COMMERCIAL

## 2023-09-05 NOTE — TELEPHONE ENCOUNTER
9/5/23: Called patient to discuss latest symptom on ILR. She reported that at times she gets dizzy spells for about 45 seconds, BP has been ok.  One symptoms episode (heart racing, shortness of breath) on 9/3/23 at 1:09PM; rhythm is normal sinus rate 70's with rare ectopy. Will continue to monitor. Georgie Pulido, Device RN

## 2023-09-25 DIAGNOSIS — E10.65 TYPE 1 DIABETES MELLITUS WITH HYPERGLYCEMIA (H): ICD-10-CM

## 2023-09-25 RX ORDER — PROCHLORPERAZINE 25 MG/1
SUPPOSITORY RECTAL
Qty: 3 EACH | Refills: 0 | Status: SHIPPED | OUTPATIENT
Start: 2023-09-25 | End: 2023-10-25

## 2023-09-25 NOTE — TELEPHONE ENCOUNTER
Requested Prescriptions   Pending Prescriptions Disp Refills    Continuous Blood Gluc Sensor (DEXCOM G6 SENSOR) MISC [Pharmacy Med Name: DEXCOM G6 SENSOR  MISC]  6     Sig: CHANGE EVERY 10 DAYS.       There is no refill protocol information for this order

## 2023-10-03 ENCOUNTER — TELEPHONE (OUTPATIENT)
Dept: ENDOCRINOLOGY | Facility: CLINIC | Age: 73
End: 2023-10-03
Payer: COMMERCIAL

## 2023-10-03 NOTE — TELEPHONE ENCOUNTER
[facility] Call Center    Phone Message    May a detailed message be left on voicemail: yes     Reason for Call: Medication Refill Request    Has the patient contacted the pharmacy for the refill? Yes     Name of medication being requested:   insulin aspart (NOVOLOG VIAL) 100 UNITS/ML vial     Provider who prescribed the medication: Catia    Pharmacy: [facility]     Date medication is needed: 10/3/2023    Patient has one vial left, Patient is wanting to get a all back when this has been sent over.        Action Taken: Message routed to:  [facility] ([facility] Endo    Travel Screening: Not Applicable

## 2023-10-04 ENCOUNTER — OFFICE VISIT (OUTPATIENT)
Dept: CARDIOLOGY | Facility: CLINIC | Age: 73
End: 2023-10-04
Attending: FAMILY MEDICINE
Payer: COMMERCIAL

## 2023-10-04 VITALS
HEART RATE: 80 BPM | RESPIRATION RATE: 14 BRPM | BODY MASS INDEX: 26.52 KG/M2 | WEIGHT: 145 LBS | SYSTOLIC BLOOD PRESSURE: 128 MMHG | DIASTOLIC BLOOD PRESSURE: 58 MMHG

## 2023-10-04 DIAGNOSIS — R07.2 PRECORDIAL PAIN: ICD-10-CM

## 2023-10-04 DIAGNOSIS — I48.0 PAROXYSMAL ATRIAL FIBRILLATION (H): Primary | ICD-10-CM

## 2023-10-04 PROCEDURE — 99214 OFFICE O/P EST MOD 30 MIN: CPT | Performed by: INTERNAL MEDICINE

## 2023-10-04 RX ORDER — ESTRADIOL 0.1 MG/G
CREAM VAGINAL
COMMUNITY
Start: 2023-09-15 | End: 2024-03-13

## 2023-10-04 RX ORDER — LIRAGLUTIDE 6 MG/ML
1.2 INJECTION SUBCUTANEOUS DAILY
COMMUNITY

## 2023-10-04 RX ORDER — VIBEGRON 75 MG/1
75 TABLET, FILM COATED ORAL DAILY
COMMUNITY

## 2023-10-04 RX ORDER — CEPHALEXIN 500 MG/1
500 CAPSULE ORAL DAILY
COMMUNITY
Start: 2023-09-15 | End: 2024-05-21

## 2023-10-04 NOTE — PROGRESS NOTES
Cardiology Progress Note     Assessment:  Syncope, unclear etiology status post ILR with no recurrence  Chronic dyspnea, exertional, probably multifactorial, rule out anginal equivalent  Paroxysmal atrial fibrillation, not anticoagulated because of recent traumatic fall  COPD chronic tobacco use  Sleep apnea  Hypertension, good control  Diabetes mellitus  Rate dependent left bundle branch block    Plan:  Holter to assess frequency and ventricular rate during episodes of fibrillation  CT coronary angiogram to assess severity of coronary artery disease and rule out emphysema .  We discussed watchman as a way of preventing cardioembolic event -she will discuss with her family and let us know if she wants to proceed.    Subjective:   This is 73 year old female who comes in today for visit.  I saw her in the hospital in August when she came in after syncopal episode.  She had a fracture of the orbital bones.  There was no sustained arrhythmias during heart rhythm monitoring.  She was seen by EP and received a implantable loop recorder.  She reported that she has not had syncope since discharge.  She does feel irregular beating of the heart a few times a day.  Those episodes typically last for few minutes.  Her main complaint is shortness of breath.  She gets winded with fairly modest physical exertion.  She denies weight gain, PND, orthopnea.  She used to be a heavy smoker until about 6 months ago.  She reduce smoking to 1 cigarettes a day.    Review of Systems:   Negative other than history of present illness    Objective:   /58 (BP Location: Right arm, Patient Position: Sitting, Cuff Size: Adult Regular)   Pulse 80   Resp 14   Wt 65.8 kg (145 lb)   BMI 26.52 kg/m    Physical Exam:  GENERAL: no distress  NECK: No JVD  LUNGS:a Few dry crackles at the bases  CARDIAC: regular rhythm, S1 & S2 normal.  No heaves, thrills, gallops or murmurs.  ABDOMEN: flat, negative hepatosplenomegaly, soft and  non-tender.  EXTREMITIES: No evidence of cyanosis, clubbing or edema.    Current Outpatient Medications   Medication Sig Dispense Refill    acetaminophen (TYLENOL) 325 MG tablet Take 2 tablets (650 mg) by mouth every 4 hours as needed for other (mild pain (1-3))      albuterol (PROAIR HFA/PROVENTIL HFA/VENTOLIN HFA) 108 (90 Base) MCG/ACT inhaler Inhale 1-2 puffs into the lungs every 6 hours 18 g 4    amoxicillin (AMOXIL) 500 MG tablet Take 2,000 mg by mouth as needed Before dental appointments      ARIPiprazole (ABILIFY) 2 MG tablet Take 2 mg by mouth daily       aspirin 81 MG EC tablet Take 81 mg by mouth daily      cephALEXin (KEFLEX) 250 MG capsule Take 250 mg by mouth daily      cetirizine (ZYRTEC) 10 MG tablet Take 10 mg by mouth daily       cholecalciferol (VITAMIN D3) 25 mcg (1000 units) capsule Take 1,000 Units by mouth daily       cloNIDine (CATAPRES-TTS) 0.3 mg/24 hr Place 1 patch onto the skin once a week       Continuous Blood Gluc  (DEXCOM G6 ) TORRIE Use to read blood sugars as per 's instructions. 1 each 0    Continuous Blood Gluc Sensor (DEXCOM G6 SENSOR) MISC CHANGE EVERY 10 DAYS. 3 each 0    Continuous Blood Gluc Transmit (DEXCOM G6 TRANSMITTER) MISC Change every 3 months. 1 each 2    diclofenac sodium (VOLTAREN) 1 % Gel [DICLOFENAC SODIUM (VOLTAREN) 1 % GEL] Apply 1 g topically daily as needed.      DULoxetine (CYMBALTA) 60 MG capsule Take 60 mg by mouth daily      escitalopram (LEXAPRO) 10 MG tablet Take 10 mg by mouth daily      estradiol (ESTRACE) 0.1 MG/GM vaginal cream Place vaginally three times a week      fluticasone (FLONASE) 50 MCG/ACT nasal spray Spray 1 spray into both nostrils daily 16 g 11    galcanezumab-gnlm (EMGALITY) 120 MG/ML injection Inject 1 mL (120 mg) Subcutaneous every 28 days 1 mL 11    GEMTESA 75 MG TABS tablet Take 75 mg by mouth daily      Glucagon, rDNA, (GLUCAGON EMERGENCY) 1 MG KIT 1 mg IM one time, PRN severe hypoglycemia causing altered  consciousness affecting ability to take food by mouth 1 kit 1    glucose (BD GLUCOSE) 5 g chewable tablet Take 2 tablets (10 g) by mouth daily as needed (hypoglycemia) 300 tablet 3    insulin aspart (NOVOLOG VIAL) 100 UNITS/ML vial 9 units with meals plus correction scale with sensitivity of 40 mg/dL starting at 140 mg/dL      Insulin Disposable Pump (OMNIPOD DASH PODS, GEN 4,) MISC 1 each every 3 days 30 each 1    liraglutide (VICTOZA PEN) 18 MG/3ML solution Inject 1.2 mg Subcutaneous daily      magnesium oxide (MAG-OX) 400 MG tablet Take 400 mg by mouth daily       medical cannabis (Patient's own supply) 1 Dose by Other route See Admin Instructions [employer] Vape- 1-4 puffs HS PRN      metFORMIN (GLUCOPHAGE XR) 500 MG 24 hr tablet TAKE 2 TABS (1000MG) BY MOUTH TWICE DAILY WITH MEALS 360 tablet 0    metoprolol succinate ER (TOPROL-XL) 100 MG 24 hr tablet Take 100 mg by mouth 2 times daily      multivitamin w/minerals (THERA-VIT-M) tablet Take 1 tablet by mouth daily      nystatin (MYCOSTATIN) 647903 UNIT/GM external powder Apply topically 2 times daily      olmesartan (BENICAR) 40 MG tablet [OLMESARTAN (BENICAR) 40 MG TABLET] Take 40 mg by mouth daily.      omeprazole (PRILOSEC) 20 MG capsule [OMEPRAZOLE (PRILOSEC) 20 MG CAPSULE] Take 1 capsule by mouth daily before breakfast.      polyethylene glycol (MIRALAX) 17 g packet Take 17 g by mouth At Bedtime Take as long as taking oxycodone      pregabalin (LYRICA) 100 MG capsule Take 1 capsule (100 mg) by mouth 3 times daily 90 capsule 0    rosuvastatin (CRESTOR) 5 MG tablet TAKE 1 TABLET BY MOUTH EVERY DAY 90 tablet 0    tiZANidine (ZANAFLEX) 2 MG tablet Take 4 mg by mouth nightly as needed      valACYclovir (VALTREX) 500 MG tablet Take 500 mg by mouth daily As needed         Cardiographics:      Echocardiogram: Yes 2023  1. The left ventricle is normal in size. Left ventricular function is  decreased. The ejection fraction is 50-55% (borderline). There is  mild  concentric left ventricular hypertrophy. Left ventricular diastolic function  is abnormal. There is borderline global hypokinesia of the left ventricle.  2. Normal right ventricle size and systolic function.  3. The left atrium is mildly dilated.  4. No hemodynamically significant valvular abnormalities on 2D or color flow  imaging.   Lab Results    Chemistry/lipid CBC Cardiac Enzymes/BNP/TSH/INR   Recent Labs   Lab Test 07/25/23  1400   CHOL 127   HDL 59   LDL 43   TRIG 126     Recent Labs   Lab Test 07/25/23  1400 09/16/22  1541 08/16/22  1609   LDL 43 39 45     Recent Labs   Lab Test 08/31/23  1104   *   POTASSIUM 5.1   CHLORIDE 97*   CO2 24   *   BUN 10.6   CR 0.72   GFRESTIMATED 87   BRUNO 9.4     Recent Labs   Lab Test 08/31/23  1104 08/22/23  0453 08/21/23  0455   CR 0.72 0.71 0.62     Recent Labs   Lab Test 07/25/23  1400 03/22/23  1557 11/21/22  1606   A1C 7.4* 6.7* 6.4*          Recent Labs   Lab Test 08/31/23  1104   WBC 10.1   HGB 12.8   HCT 39.0   MCV 97        Recent Labs   Lab Test 08/31/23  1104 08/22/23  0453 08/21/23  1000   HGB 12.8 13.1 13.8    Recent Labs   Lab Test 01/20/22  1158 09/19/21  0211 09/17/21  2240   TROPONINI <0.01 0.06 0.01     No results for input(s): BNP, NTBNPI, NTBNP in the last 82386 hours.  Recent Labs   Lab Test 08/17/23  1410   TSH 0.98     Recent Labs   Lab Test 08/17/23  1208   INR 1.06

## 2023-10-10 ENCOUNTER — HOSPITAL ENCOUNTER (OUTPATIENT)
Dept: CARDIOLOGY | Facility: HOSPITAL | Age: 73
Discharge: HOME | End: 2023-10-10
Attending: INTERNAL MEDICINE | Admitting: INTERNAL MEDICINE
Payer: COMMERCIAL

## 2023-10-10 DIAGNOSIS — I48.0 PAROXYSMAL ATRIAL FIBRILLATION (H): ICD-10-CM

## 2023-10-10 PROCEDURE — 93226 XTRNL ECG REC<48 HR SCAN A/R: CPT

## 2023-10-10 NOTE — TELEPHONE ENCOUNTER
Rema with [facility] [employer] [facility] calling, form was incomplete. NPI and State license needs to filled out.  FX:  [phone]  PH: [phone]

## 2023-10-23 NOTE — PROGRESS NOTES
Subjective:    Established patient    Paige Mari is a 73 year old female who presents for DM.      Current DM therapy:  -Metformin 1000 mg BID; well tolerated    -Victoza 1.2 mg daily; well tolerated   -Omnipod DASH started 5/12/2022 with NovoLog    She had an episode of hypoglycemia in the summer of 2023 that required EMS.    Objective:    BMI 26.2 kg/m2, /78. Pleasant and conversational.    4/2023: Pleasant and conversational.  BMI 27.8 kg/m2, blood pressure 126/80    12/2022: BMI 28.3 kg/m2, /76. DM foot exam today: no ulcerations, reduced DP pulses bilaterally.     12/2021: DM foot exam performed and she has absent sensation to monofilament testing bilaterally. Reduced pedal pulses. No ulcers/skinbreakdown.    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 (HARRIETT)  -12/7/2021: C-peptide 0.7 ng/mL with concurrent venous glucose 176 mg/dL, CEFERINO Ab >250 (ULN 5.0 IU/mL), insulin Ab 0.6 (ULN 0.4 U/mL), IA-2 Ab 44.1 (ULN 7.4 U/mL), Zn T8 Ab 64.3 (ref range 0.0 - 15.0 U/mL)  -CT A/P 9/2021: Mild diffuse pancreatic atrophy  -12/7/2021: fructosamine 365 umol/L (corresponds to HbA1c ~9.0%), HbA1c 9.3%   -1/2022: HbA1c 8.6%, CBC normal   -3/2022: HbA1c 7.3%  -5/2022: HbA1c 7.7%, fructosamine 317 umol/L with normal albumin   -8/2022: HbA1c 6.4%  -11/2022: HbA1c 6.4%, GMI 6.4%  -3/2023: HbA1c 6.7%  -7/2023: HbA1c 7.4%  -She has glucagon at home   # No prior DM retinopathy, DM eye exam 3/2022  -She will set one up   # Hypertension, on clonidine, metoprolol, olmesartan, microalbuminuria     -8/2023: GFR 87  -12/2022: urine microalbumin 52 mg/g Cr  -3/2022: paired aldosterone 6.4 ng/dL and PRA 0.1 ng/mL/hr  -7/2023: serum aldosterone 4.3 ng/dL, PRA <0.1 ng/mL/hour  # Painful peripheral neuropathy, no prior DM foot ulcerations, on neuropathic pharmacologic therapy  # No prior ASCVD event, mild carotid atherosclerosis on US 2015, on ASA  # Mixed hyperlipidemia, on rosuvastatin 5 mg daily   -7/2023: TC  127, HDL 59, LDL 43,   -She has a prior statin intolerance (myalgias)   # Hepatic steatosis  # Other  -1/2022: B12 elevated      We reviewed her CGM (Dexcom G6) in detail. Over the past 2 weeks GMI 7.2%, 66% TIR, <1% low, 28% high, 5% very high. Average TDD of insulin is 16.9 units but 15.6 units are basal, she averages only bolusing once daily.     We reduced her basal rate globally by ~10%. Reviewed the importance of bolusing before all carbohydrate intake.     She has insulin detemir at home and in case of pump malfunction will take 7 units BID.     I rx intranasal glucagon.     She met with Ana Sanchez (CDE) 8/2023 and per Ana's note she didn't transition from OmniPod Dash to Omnipod 5 due to the cost. Paige is wondering more about the cost difference between the 2 pumps, will review with Ana.     Return in 6 months with labs ordered.      # Thyroid function     In the past has been on both thyroid hormone and methimazole at various points in time. She has not taken a medication for her thyroid in years.      12/2021: TSH, T4, T3 all normal, TSI undetectable, TRAb undetectable, TPO Ab significantly elevated     4/2023: TSH and free T4 normal   5/2023: TSH 3.3     Should have her TSH checked yearly, or at any time if symptoms of thyrotoxicosis or hypothyroidism.     TSH ordered before next visit.      # Bone and mineral metabolism    Many normal calcium values over the years, with 2 high serum calcium values (both remote). She had a remote mildly elevated PTH drawn shortly after a mildly elevated calcium value (10.6 with ULN 10.5 mg/dL), but on the day that PTH was drawn serum calcium was normal and serum phosphorous was mildly elevated. Serum calcium value of 12.7 mg/dL when she had an JACKSON in 2014.      Since 12/7/2021: albumin corrected serum calcium peak 10.6 mg/dL (ULN 10.5) and otherwise normal. Serum phosphorous and alkaine phosphatase have been normal. 11/2022: 25-OH vitamin D 52 (ref range  20 - 75 mcg/L), 12/2021: UPEP and SPEP negative      DEXA 11/14/2022:  -BMD at the spine, distal 1/3 radius, and both hips is normal     No prior low impact fractures.      3/2022: PTH 25 pg/mL, Cr, calcium, albumin, phosphorous all normal     3/2023: serum calcium normal, albumin normal     7/2023: uncorrected serum calcium 10.5 (ULN 10.2 mg/dL), albumin 4.3 g/dL, corrected serum calcium 10.3 mg/dL, 25-OH vitamin D mid normal       Will trend calcium over time - labs ordered before next visit.     No calcium pill. She takes a MVI. She takes vitamin D. She has a few servings of dairy each day.     # Partially empty sella    I reviewed her CT head 9/2022 and agree with the radiology read of partially empty sella. No history of pituitary pathology.    CT head 8/17/2023: per radiology - stable partially empty sella morphology, I reviewed the images and agree     3/22/2023: afternoon serum cortisol 10.0 mcg/dL, DHEA-S undetectable, TSH and free T4 normal, prolactin normal, IGF-1 normal      30 minutes spent on the date of the encounter doing chart review, history and exam, documentation and further activities as noted above. This did not include time spent on CGM review.

## 2023-10-24 ENCOUNTER — OFFICE VISIT (OUTPATIENT)
Dept: ENDOCRINOLOGY | Facility: CLINIC | Age: 73
End: 2023-10-24
Payer: COMMERCIAL

## 2023-10-24 VITALS
DIASTOLIC BLOOD PRESSURE: 78 MMHG | SYSTOLIC BLOOD PRESSURE: 134 MMHG | BODY MASS INDEX: 26.19 KG/M2 | WEIGHT: 143.2 LBS | OXYGEN SATURATION: 98 % | HEART RATE: 73 BPM

## 2023-10-24 DIAGNOSIS — E83.52 HYPERCALCEMIA: ICD-10-CM

## 2023-10-24 DIAGNOSIS — I65.23 CAROTID ATHEROSCLEROSIS, BILATERAL: ICD-10-CM

## 2023-10-24 DIAGNOSIS — E78.2 MIXED HYPERLIPIDEMIA: ICD-10-CM

## 2023-10-24 DIAGNOSIS — E66.3 OVERWEIGHT (BMI 25.0-29.9): ICD-10-CM

## 2023-10-24 DIAGNOSIS — R94.6 ABNORMAL FINDING ON THYROID FUNCTION TEST: ICD-10-CM

## 2023-10-24 DIAGNOSIS — Z96.41 INSULIN PUMP STATUS: ICD-10-CM

## 2023-10-24 DIAGNOSIS — Z79.4 LONG TERM (CURRENT) USE OF INSULIN (H): ICD-10-CM

## 2023-10-24 DIAGNOSIS — E23.6 EMPTY SELLA (H): ICD-10-CM

## 2023-10-24 DIAGNOSIS — E88.819 INSULIN RESISTANCE: ICD-10-CM

## 2023-10-24 DIAGNOSIS — K76.0 HEPATIC STEATOSIS: ICD-10-CM

## 2023-10-24 DIAGNOSIS — E10.42 TYPE 1 DIABETES MELLITUS WITH DIABETIC POLYNEUROPATHY (H): Primary | ICD-10-CM

## 2023-10-24 DIAGNOSIS — E10.65 TYPE 1 DIABETES MELLITUS WITH HYPERGLYCEMIA (H): ICD-10-CM

## 2023-10-24 DIAGNOSIS — E10.29 TYPE 1 DIABETES MELLITUS WITH MICROALBUMINURIA (H): ICD-10-CM

## 2023-10-24 DIAGNOSIS — I10 HYPERTENSION, UNSPECIFIED TYPE: ICD-10-CM

## 2023-10-24 DIAGNOSIS — Z46.81 INSULIN PUMP FITTING OR ADJUSTMENT: ICD-10-CM

## 2023-10-24 DIAGNOSIS — R80.9 TYPE 1 DIABETES MELLITUS WITH MICROALBUMINURIA (H): ICD-10-CM

## 2023-10-24 PROCEDURE — 95251 CONT GLUC MNTR ANALYSIS I&R: CPT | Performed by: INTERNAL MEDICINE

## 2023-10-24 PROCEDURE — 99214 OFFICE O/P EST MOD 30 MIN: CPT | Mod: 25 | Performed by: INTERNAL MEDICINE

## 2023-10-24 NOTE — LETTER
10/24/2023         RE: Paige Mari  [address]  [place] [address]        Dear Colleague,    Thank you for referring your patient, Paige Mari, to the [facility]. Please see a copy of my visit note below.    Subjective:    Established patient    Paige Mari is a 73 year old female who presents for DM.      Current DM therapy:  -Metformin 1000 mg BID; well tolerated    -Victoza 1.2 mg daily; well tolerated   -Omnipod DASH started 5/12/2022 with NovoLog    She had an episode of hypoglycemia in the summer of 2023 that required EMS.    Objective:    BMI 26.2 kg/m2, /78. Pleasant and conversational.    4/2023: Pleasant and conversational.  BMI 27.8 kg/m2, blood pressure 126/80    12/2022: BMI 28.3 kg/m2, /76. DM foot exam today: no ulcerations, reduced DP pulses bilaterally.     12/2021: DM foot exam performed and she has absent sensation to monofilament testing bilaterally. Reduced pedal pulses. No ulcers/skinbreakdown.    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 (HARRIETT)  -12/7/2021: C-peptide 0.7 ng/mL with concurrent venous glucose 176 mg/dL, CEFERINO Ab >250 (ULN 5.0 IU/mL), insulin Ab 0.6 (ULN 0.4 U/mL), IA-2 Ab 44.1 (ULN 7.4 U/mL), Zn T8 Ab 64.3 (ref range 0.0 - 15.0 U/mL)  -CT A/P 9/2021: Mild diffuse pancreatic atrophy  -12/7/2021: fructosamine 365 umol/L (corresponds to HbA1c ~9.0%), HbA1c 9.3%   -1/2022: HbA1c 8.6%, CBC normal   -3/2022: HbA1c 7.3%  -5/2022: HbA1c 7.7%, fructosamine 317 umol/L with normal albumin   -8/2022: HbA1c 6.4%  -11/2022: HbA1c 6.4%, GMI 6.4%  -3/2023: HbA1c 6.7%  -7/2023: HbA1c 7.4%  -She has glucagon at home   # No prior DM retinopathy, DM eye exam 3/2022  -She will set one up   # Hypertension, on clonidine, metoprolol, olmesartan, microalbuminuria     -8/2023: GFR 87  -12/2022: urine microalbumin 52 mg/g Cr  -3/2022: paired aldosterone 6.4 ng/dL and PRA 0.1 ng/mL/hr  -7/2023: serum aldosterone 4.3  ng/dL, PRA <0.1 ng/mL/hour  # Painful peripheral neuropathy, no prior DM foot ulcerations, on neuropathic pharmacologic therapy  # No prior ASCVD event, mild carotid atherosclerosis on US 2015, on ASA  # Mixed hyperlipidemia, on rosuvastatin 5 mg daily   -7/2023: , HDL 59, LDL 43,   -She has a prior statin intolerance (myalgias)   # Hepatic steatosis  # Other  -1/2022: B12 elevated      We reviewed her CGM (Dexcom G6) in detail. Over the past 2 weeks GMI 7.2%, 66% TIR, <1% low, 28% high, 5% very high. Average TDD of insulin is 16.9 units but 15.6 units are basal, she averages only bolusing once daily.     We reduced her basal rate globally by ~10%. Reviewed the importance of bolusing before all carbohydrate intake.     She has insulin detemir at home and in case of pump malfunction will take 7 units BID.     I rx intranasal glucagon.     She met with Ana Sanchez (CDE) 8/2023 and per Ana's note she didn't transition from OmniPod Dash to Omnipod 5 due to the cost. Paige is wondering more about the cost difference between the 2 pumps, will review with Ana.     Return in 6 months with labs ordered.      # Thyroid function     In the past has been on both thyroid hormone and methimazole at various points in time. She has not taken a medication for her thyroid in years.      12/2021: TSH, T4, T3 all normal, TSI undetectable, TRAb undetectable, TPO Ab significantly elevated     4/2023: TSH and free T4 normal   5/2023: TSH 3.3     Should have her TSH checked yearly, or at any time if symptoms of thyrotoxicosis or hypothyroidism.     TSH ordered before next visit.      # Bone and mineral metabolism    Many normal calcium values over the years, with 2 high serum calcium values (both remote). She had a remote mildly elevated PTH drawn shortly after a mildly elevated calcium value (10.6 with ULN 10.5 mg/dL), but on the day that PTH was drawn serum calcium was normal and serum phosphorous was mildly elevated.  Serum calcium value of 12.7 mg/dL when she had an JACKSON in 2014.      Since 12/7/2021: albumin corrected serum calcium peak 10.6 mg/dL (ULN 10.5) and otherwise normal. Serum phosphorous and alkaine phosphatase have been normal. 11/2022: 25-OH vitamin D 52 (ref range 20 - 75 mcg/L), 12/2021: UPEP and SPEP negative      DEXA 11/14/2022:  -BMD at the spine, distal 1/3 radius, and both hips is normal     No prior low impact fractures.      3/2022: PTH 25 pg/mL, Cr, calcium, albumin, phosphorous all normal     3/2023: serum calcium normal, albumin normal     7/2023: uncorrected serum calcium 10.5 (ULN 10.2 mg/dL), albumin 4.3 g/dL, corrected serum calcium 10.3 mg/dL, 25-OH vitamin D mid normal       Will trend calcium over time - labs ordered before next visit.     No calcium pill. She takes a MVI. She takes vitamin D. She has a few servings of dairy each day.     # Partially empty sella    I reviewed her CT head 9/2022 and agree with the radiology read of partially empty sella. No history of pituitary pathology.    CT head 8/17/2023: per radiology - stable partially empty sella morphology, I reviewed the images and agree     3/22/2023: afternoon serum cortisol 10.0 mcg/dL, DHEA-S undetectable, TSH and free T4 normal, prolactin normal, IGF-1 normal      30 minutes spent on the date of the encounter doing chart review, history and exam, documentation and further activities as noted above. This did not include time spent on CGM review.                Again, thank you for allowing me to participate in the care of your patient.        Sincerely,        Maynor Bardales MD

## 2023-10-25 DIAGNOSIS — E10.65 TYPE 1 DIABETES MELLITUS WITH HYPERGLYCEMIA (H): ICD-10-CM

## 2023-10-25 RX ORDER — PROCHLORPERAZINE 25 MG/1
SUPPOSITORY RECTAL
Qty: 3 EACH | Refills: 5 | Status: SHIPPED | OUTPATIENT
Start: 2023-10-25 | End: 2024-04-09

## 2023-10-27 PROCEDURE — 93227 XTRNL ECG REC<48 HR R&I: CPT | Performed by: INTERNAL MEDICINE

## 2023-11-03 DIAGNOSIS — E10.65 TYPE 1 DIABETES MELLITUS WITH HYPERGLYCEMIA (H): ICD-10-CM

## 2023-11-03 RX ORDER — METFORMIN HCL 500 MG
TABLET, EXTENDED RELEASE 24 HR ORAL
Qty: 360 TABLET | Refills: 0 | Status: SHIPPED | OUTPATIENT
Start: 2023-11-03 | End: 2024-01-08

## 2023-11-03 NOTE — TELEPHONE ENCOUNTER
"      Requested Prescriptions   Pending Prescriptions Disp Refills    metFORMIN (GLUCOPHAGE XR) 500 MG 24 hr tablet [Pharmacy Med Name: METFORMIN HCL  MG TABLET] 360 tablet 0     Sig: TAKE 2 TABS (1000MG) BY MOUTH TWICE DAILY WITH MEALS       Biguanide Agents Passed - 11/3/2023 12:22 AM        Passed - Patient is age 10 or older        Passed - Patient has documented A1c within the specified period of time.     If HgbA1C is 8 or greater, it needs to be on file within the past 3 months.  If less than 8, must be on file within the past 6 months.     Recent Labs   Lab Test 07/25/23  1400   A1C 7.4*             Passed - Patient's CR is NOT>1.4 OR Patient's EGFR is NOT<45 within past 12 mos.     Recent Labs   Lab Test 08/31/23  1104 09/17/21  2240 03/03/21  1206   GFRESTIMATED 87   < > >60   GFRESTBLACK  --   --  >60    < > = values in this interval not displayed.       Recent Labs   Lab Test 08/31/23  1104   CR 0.72             Passed - Patient does NOT have a diagnosis of CHF.        Passed - Medication is active on med list        Passed - Patient is not pregnant        Passed - Patient has not had a positive pregnancy test within the past 12 mos.         Passed - Recent (6 mo) or future (30 days) visit within the authorizing provider's specialty     Patient had office visit in the last 6 months or has a visit in the next 30 days with authorizing provider or within the authorizing provider's specialty.  See \"Patient Info\" tab in inbasket, or \"Choose Columns\" in Meds & Orders section of the refill encounter.                 "

## 2023-11-07 ENCOUNTER — TELEPHONE (OUTPATIENT)
Dept: EDUCATION SERVICES | Facility: CLINIC | Age: 73
End: 2023-11-07
Payer: COMMERCIAL

## 2023-11-07 DIAGNOSIS — E10.9 DIABETES MELLITUS TYPE 1 (H): Primary | ICD-10-CM

## 2023-11-07 RX ORDER — INSULIN PMP CART,AUT,G6/7,CNTR
1 EACH SUBCUTANEOUS DAILY
Qty: 1 KIT | Refills: 1 | Status: SHIPPED | OUTPATIENT
Start: 2023-11-07 | End: 2024-07-19

## 2023-11-07 RX ORDER — INSULIN PMP CART,AUT,G6/7,CNTR
1 EACH SUBCUTANEOUS
Qty: 30 EACH | Refills: 6 | Status: SHIPPED | OUTPATIENT
Start: 2023-11-07 | End: 2024-02-07

## 2023-11-07 NOTE — TELEPHONE ENCOUNTER
Per Dr. Bardales, pt interested in looking into cost of Omnipod 5 again. Currently using the DASH.     Reached out to Liya w/ [facility] who states Medicare D coverage is usually the same cost for DASH vs 5.     Spoke w/ pt. She notes she is in the donut hole but would like the Rx sent to see what the cost is. If the cost of PDM is too much, may wait until January when she is out of the donut hole.     Advised pt will send Omnipod 5 pods and PDM [facility]. IF she ends up getting it, to call and let CDE know so we can set up training. Pt agrees.

## 2023-11-09 DIAGNOSIS — E10.65 TYPE 1 DIABETES MELLITUS WITH HYPERGLYCEMIA (H): ICD-10-CM

## 2023-11-09 DIAGNOSIS — E78.2 MIXED HYPERLIPIDEMIA: ICD-10-CM

## 2023-11-09 RX ORDER — ROSUVASTATIN CALCIUM 5 MG/1
TABLET, COATED ORAL
Qty: 90 TABLET | Refills: 1 | Status: SHIPPED | OUTPATIENT
Start: 2023-11-09 | End: 2024-05-03

## 2023-11-09 NOTE — TELEPHONE ENCOUNTER
"Requested Prescriptions   Pending Prescriptions Disp Refills    rosuvastatin (CRESTOR) 5 MG tablet [Pharmacy Med Name: ROSUVASTATIN CALCIUM 5 MG TAB] 90 tablet 0     Sig: TAKE 1 TABLET BY MOUTH EVERY DAY       Statins Protocol Passed - 11/9/2023  2:31 AM        Passed - LDL on file in past 12 months     Recent Labs   Lab Test 07/25/23  1400   LDL 43             Passed - No abnormal creatine kinase in past 12 months     Recent Labs   Lab Test 03/03/21  1206   CKT 57                Passed - Recent (12 mo) or future (30 days) visit within the authorizing provider's specialty     Patient has had an office visit with the authorizing provider or a provider within the authorizing providers department within the previous 12 mos or has a future within next 30 days. See \"Patient Info\" tab in inbasket, or \"Choose Columns\" in Meds & Orders section of the refill encounter.              Passed - Medication is active on med list        Passed - Patient is age 18 or older        Passed - No active pregnancy on record        Passed - No positive pregnancy test in past 12 months             "

## 2023-11-16 ENCOUNTER — TELEPHONE (OUTPATIENT)
Dept: ENDOCRINOLOGY | Facility: CLINIC | Age: 73
End: 2023-11-16
Payer: COMMERCIAL

## 2023-11-16 DIAGNOSIS — E10.65 TYPE 1 DIABETES MELLITUS WITH HYPERGLYCEMIA (H): ICD-10-CM

## 2023-11-16 RX ORDER — INSULIN ASPART 100 [IU]/ML
30 INJECTION, SOLUTION INTRAVENOUS; SUBCUTANEOUS DAILY
Qty: 10 ML | Refills: 0 | Status: SHIPPED | OUTPATIENT
Start: 2023-11-16 | End: 2023-11-22

## 2023-11-16 RX ORDER — INSULIN PUMP CONTROLLER
1 EACH MISCELLANEOUS
Qty: 5 EACH | Refills: 0 | Status: SHIPPED | OUTPATIENT
Start: 2023-11-16 | End: 2024-02-07

## 2023-11-16 NOTE — TELEPHONE ENCOUNTER
[facility] Call Center    Phone Message    May a detailed message be left on voicemail: yes     Reason for Call: Medication Question or concern regarding medication   Prescription Clarification  Name of Medication: Insulin Disposable Pump (OMNIPOD DASH PODS, GEN 4,) MISC [939120]   insulin aspart (NOVOLOG VIAL) 100 UNITS/ML vial [58363]      Prescribing Provider: Maynor Bardales MD   Pharmacy: [facility]ARMACY [address] [facility] [address] [facility]   What on the order needs clarification? Patient states that she has enough for 9 days. Patient only have 2 pods left and won't be able to get into Diabetic Ed until 12/4 for new pump start up   Patient states that she only has a few days left of Novolog.  She would like a call back to discuss      Action Taken: Other: endo    Travel Screening: Not Applicable

## 2023-11-20 ENCOUNTER — TELEPHONE (OUTPATIENT)
Dept: CARDIOLOGY | Facility: CLINIC | Age: 73
End: 2023-11-20
Payer: COMMERCIAL

## 2023-11-22 ENCOUNTER — HOSPITAL ENCOUNTER (OUTPATIENT)
Dept: CT IMAGING | Facility: CLINIC | Age: 73
Discharge: HOME | End: 2023-11-22
Attending: INTERNAL MEDICINE | Admitting: INTERNAL MEDICINE
Payer: COMMERCIAL

## 2023-11-22 ENCOUNTER — TELEPHONE (OUTPATIENT)
Dept: ENDOCRINOLOGY | Facility: CLINIC | Age: 73
End: 2023-11-22
Payer: COMMERCIAL

## 2023-11-22 VITALS — DIASTOLIC BLOOD PRESSURE: 68 MMHG | SYSTOLIC BLOOD PRESSURE: 121 MMHG | HEART RATE: 70 BPM

## 2023-11-22 DIAGNOSIS — R07.2 PRECORDIAL PAIN: ICD-10-CM

## 2023-11-22 DIAGNOSIS — E10.65 TYPE 1 DIABETES MELLITUS WITH HYPERGLYCEMIA (H): ICD-10-CM

## 2023-11-22 DIAGNOSIS — R93.1 ABNORMAL FINDINGS ON DIAGNOSTIC IMAGING OF HEART AND CORONARY CIRCULATION: ICD-10-CM

## 2023-11-22 LAB
BSA FOR ECHO PROCEDURE: 0 M2
CREAT BLD-MCNC: 0.8 MG/DL (ref 0.6–1.1)
EGFRCR SERPLBLD CKD-EPI 2021: >60 ML/MIN/1.73M2

## 2023-11-22 PROCEDURE — 0504T CT FFR: CPT | Performed by: INTERNAL MEDICINE

## 2023-11-22 PROCEDURE — 75574 CT ANGIO HRT W/3D IMAGE: CPT

## 2023-11-22 PROCEDURE — 0503T CT FFR: CPT

## 2023-11-22 PROCEDURE — 75574 CT ANGIO HRT W/3D IMAGE: CPT | Mod: 26 | Performed by: INTERNAL MEDICINE

## 2023-11-22 PROCEDURE — 250N000009 HC RX 250: Performed by: INTERNAL MEDICINE

## 2023-11-22 PROCEDURE — 250N000013 HC RX MED GY IP 250 OP 250 PS 637: Performed by: INTERNAL MEDICINE

## 2023-11-22 PROCEDURE — 250N000011 HC RX IP 250 OP 636: Mod: JZ | Performed by: INTERNAL MEDICINE

## 2023-11-22 PROCEDURE — 82565 ASSAY OF CREATININE: CPT

## 2023-11-22 RX ORDER — NITROGLYCERIN 0.4 MG/1
0.4 TABLET SUBLINGUAL ONCE
Status: COMPLETED | OUTPATIENT
Start: 2023-11-22 | End: 2023-11-22

## 2023-11-22 RX ORDER — DILTIAZEM HYDROCHLORIDE 5 MG/ML
10 INJECTION INTRAVENOUS
Status: COMPLETED | OUTPATIENT
Start: 2023-11-22 | End: 2023-11-22

## 2023-11-22 RX ORDER — METOPROLOL TARTRATE 1 MG/ML
5 INJECTION, SOLUTION INTRAVENOUS
Status: DISCONTINUED | OUTPATIENT
Start: 2023-11-22 | End: 2023-11-23 | Stop reason: HOSPADM

## 2023-11-22 RX ORDER — DILTIAZEM HYDROCHLORIDE 5 MG/ML
5 INJECTION INTRAVENOUS
Status: DISCONTINUED | OUTPATIENT
Start: 2023-11-22 | End: 2023-11-23 | Stop reason: HOSPADM

## 2023-11-22 RX ORDER — LIDOCAINE 40 MG/G
CREAM TOPICAL
Status: DISCONTINUED | OUTPATIENT
Start: 2023-11-22 | End: 2023-11-23 | Stop reason: HOSPADM

## 2023-11-22 RX ORDER — IOPAMIDOL 755 MG/ML
100 INJECTION, SOLUTION INTRAVASCULAR ONCE
Status: COMPLETED | OUTPATIENT
Start: 2023-11-22 | End: 2023-11-22

## 2023-11-22 RX ORDER — INSULIN ASPART 100 [IU]/ML
30 INJECTION, SOLUTION INTRAVENOUS; SUBCUTANEOUS DAILY
Qty: 10 ML | Refills: 2 | Status: SHIPPED | OUTPATIENT
Start: 2023-11-22 | End: 2024-02-01

## 2023-11-22 RX ADMIN — DILTIAZEM HYDROCHLORIDE 10 MG: 5 INJECTION INTRAVENOUS at 13:05

## 2023-11-22 RX ADMIN — METOPROLOL TARTRATE 5 MG: 5 INJECTION INTRAVENOUS at 13:01

## 2023-11-22 RX ADMIN — NITROGLYCERIN 0.4 MG: 0.4 TABLET SUBLINGUAL at 12:57

## 2023-11-22 RX ADMIN — METOPROLOL TARTRATE 5 MG: 5 INJECTION INTRAVENOUS at 12:56

## 2023-11-22 RX ADMIN — IOPAMIDOL 100 ML: 755 INJECTION, SOLUTION INTRAVENOUS at 12:52

## 2023-11-22 NOTE — TELEPHONE ENCOUNTER
[facility] Call Center    Phone Message    May a detailed message be left on voicemail: yes     Reason for Call: Medication Refill Request    Has the patient contacted the pharmacy for the refill? Yes   Name of medication being requested: insulin aspart (NOVOLOG VIAL) 100 UNITS/ML vial , needs 1 vial  Provider who prescribed the medication: Catia  Pharmacy:  [facility]/PHARMACY #[id number] - [address]    Date medication is needed: ASAP, she is out     Action Taken: Message routed to:  [facility] ([facility]): Endo    Travel Screening: Not Applicable

## 2023-11-24 ENCOUNTER — TELEPHONE (OUTPATIENT)
Dept: EDUCATION SERVICES | Facility: CLINIC | Age: 73
End: 2023-11-24
Payer: COMMERCIAL

## 2023-11-24 ENCOUNTER — TELEPHONE (OUTPATIENT)
Dept: ENDOCRINOLOGY | Facility: CLINIC | Age: 73
End: 2023-11-24

## 2023-11-24 LAB — BSA FOR ECHO PROCEDURE: 0 M2

## 2023-11-24 NOTE — TELEPHONE ENCOUNTER
Called Paige.   Unfortunately we do not have samples for her. Discussed her back up plan (Levemir and Novolog) once she is out of the Dash. She has the Omnipod 5 - advised her to turn it on to start the process to get into the Omnipod training que.    Sent a message to her Endocrinologist to ask about self transfer to the 5 or if she should do her back up plan until she can get trained on the Omnipod 5.    Will call Paige Monday.    Emma Guillermo RD, LD, [facility]  Certified Diabetes Care &   Outpatient Adult [facility]

## 2023-11-24 NOTE — TELEPHONE ENCOUNTER
FREE DRUG APPLICATION INITIATED    Medication: NOVOLOG VIAL 100 UNIT/ML IJ SOLN  Free Drug Program Name:  [facility]  Date Submitted: 11/24/2023  4:34 PM  Phone #: [phone]  Fax #: [phone]  Additional Information: spoke with patient she has no option for me to email new application to her and print, I messaged Ana Sanchez to see about sending her the application.  Patient will be getting Novolog, Victoza and needles.

## 2023-11-24 NOTE — TELEPHONE ENCOUNTER
Patient called. She is wondering if there are any samples of Omnipod dash supplies at the [facility]?   She is not able to get any more supplies from the pharmacy since she is supposed to be starting on the new pump. She can't get an appointment to get the new pump placed until 12.04.2023 with Ana. Please advise.

## 2023-11-27 NOTE — TELEPHONE ENCOUNTER
Doctor's portion of [employer] application is scanned into media tab, You can send back to me by email, or scan it back into media tab and I will fax it to [employer].    Patient gets Hardy Naylor Needles [email or web address]

## 2023-11-27 NOTE — TELEPHONE ENCOUNTER
Called Paige regarding DASH.     I left a VM for patient to call back. We have a few DASH pods. We will work on getting her set up to transition to the .    Emma Guillermo RD, LD, [facility]  Certified Diabetes Care &   Outpatient Adult [facility]

## 2023-11-27 NOTE — TELEPHONE ENCOUNTER
Called and was able to get [place]. Instructed her there are a few DASH pods here for her and to turn on the Omnipod 5 controller to get her set up with Omnipod 5 for training.    Orders for Dr. Bardales left for signature.    Emma Guillermo RD, LD, [facility]  Certified Diabetes Care &   Outpatient Adult [facility]

## 2023-11-28 ENCOUNTER — MEDICAL CORRESPONDENCE (OUTPATIENT)
Dept: HEALTH INFORMATION MANAGEMENT | Facility: CLINIC | Age: 73
End: 2023-11-28

## 2023-12-04 ENCOUNTER — ALLIED HEALTH/NURSE VISIT (OUTPATIENT)
Dept: EDUCATION SERVICES | Facility: CLINIC | Age: 73
End: 2023-12-04
Payer: COMMERCIAL

## 2023-12-04 ENCOUNTER — ANCILLARY PROCEDURE (OUTPATIENT)
Dept: CARDIOLOGY | Facility: CLINIC | Age: 73
End: 2023-12-04
Attending: INTERNAL MEDICINE
Payer: COMMERCIAL

## 2023-12-04 DIAGNOSIS — E13.9 LADA (LATENT AUTOIMMUNE DIABETES IN ADULTS), MANAGED AS TYPE 1 (H): Primary | ICD-10-CM

## 2023-12-04 DIAGNOSIS — R55 SYNCOPE: ICD-10-CM

## 2023-12-04 DIAGNOSIS — Z95.818 IMPLANTABLE LOOP RECORDER PRESENT: ICD-10-CM

## 2023-12-04 PROCEDURE — 93297 REM INTERROG DEV EVAL ICPMS: CPT | Performed by: INTERNAL MEDICINE

## 2023-12-04 PROCEDURE — G2066 INTER DEVC REMOTE 30D: HCPCS | Performed by: INTERNAL MEDICINE

## 2023-12-04 PROCEDURE — G0108 DIAB MANAGE TRN  PER INDIV: HCPCS

## 2023-12-04 NOTE — PROGRESS NOTES
Diabetes Self-Management Education & Support    Presents for: Insulin Pump and CGM Review    Type of Service: In Person Visit      ASSESSMENT:    Patient seen today for follow up, she picked up the Omnipod 5 and was planning on starting it today. In fact, she almost ran out of DASH pods recently and was given 10 pods by the clinic. Unfortunately, pt is using dexcom G6 on the  and  her phone is not compatible with the G6 yany.     She is also taking Metformin 1000 mg BID and Victoza 1.2 mg daily.    Current pump settings with DASH:   Basal:   12am: 0.55  3am: 0.40  9am: 0.45  12pm: 0.65  Max basal: 2 units/hr     ICR: 18   ISF: 60   Target , correct above 150   Reverse correct: ON   Insulin action time: 4 hours   Max bolus: 15 units   Patient's most recent   Lab Results   Component Value Date    A1C 7.4 07/25/2023     is meeting goal of <8.0    Diabetes knowledge and skills assessment:   Patient is knowledgeable in diabetes management concepts related to: Healthy Eating, Being Active, Monitoring, Taking Medication, Problem Solving, Reducing Risks, and Healthy Coping    Continue education with the following diabetes management concepts: Healthy Eating, Being Active, Monitoring, Taking Medication, Problem Solving, Reducing Risks, and Healthy Coping    Based on learning assessment above, most appropriate setting for further diabetes education would be: Individual setting.      PLAN    Pt will look through her old phones at  home to see if one of them is compatible with G6 yany, if not she states her daughter probably has an old iphone she can use.   AFTER setting up G6 on the phone, pt to call Crystal Cameron to set up training, pt was given Crystal's phone number.     Pt advised to call CDE if she is running low again on DASH pods and has not yet started O5.       Follow-up: with CDE 1/22/24 - this will be after Omnipod 5 start     See Care Plan for co-developed, patient-state behavior change goals.  AVS provided  "for patient today.      SUBJECTIVE/OBJECTIVE:  Presents for: Insulin Pump and CGM Review  Accompanied by: Self  Diabetes education in the past 24mo: Yes  Focus of Visit: Monitoring, Self-care behavioral goal setting, Healthy Eating, Taking Medication  Diabetes type: Type 1  Date of diagnosis: HARRIETT  How confident are you filling out medical forms by yourself:: Extremely  Transportation concerns: No  Difficulty affording diabetes medication?: Sometimes (using the [employer] assistance program)  Difficulty affording diabetes testing supplies?: Sometimes  Other concerns:: None  Cultural Influences/Ethnic Background:  Not  or     Diabetes Symptoms & Complications:     Complications assessed today?: No    Patient Problem List and Family Medical History reviewed for relevant medical history, current medical status, and diabetes risk factors.    Vitals:  There were no vitals taken for this visit.  Estimated body mass index is 26.19 kg/m  as calculated from the following:    Height as of 8/17/23: 1.575 m (5' 2\").    Weight as of 10/24/23: 65 kg (143 lb 3.2 oz).   Last 3 BP:   BP Readings from Last 3 Encounters:   11/22/23 121/68   10/24/23 134/78   10/04/23 128/58       History   Smoking Status    Every Day    Packs/day: 0.50    Years: 19.00    Types: Cigarettes    Last attempt to quit: 9/15/2021   Smokeless Tobacco    Never       Labs:  Lab Results   Component Value Date    A1C 7.4 07/25/2023     Lab Results   Component Value Date     08/31/2023     08/22/2023    GLC 95 05/27/2022     Lab Results   Component Value Date    LDL 43 07/25/2023    LDL 99 07/06/2016     Direct Measure HDL   Date Value Ref Range Status   07/25/2023 59 >=50 mg/dL Final   ]  GFR Estimate   Date Value Ref Range Status   03/03/2021 >60 >60 mL/min/1.73m2 Final     GFR, ESTIMATED POCT   Date Value Ref Range Status   11/22/2023 >60 >60 mL/min/1.73m2 Final     GFR Estimate If Black   Date Value Ref Range Status   03/03/2021 >60 " ">60 mL/min/1.73m2 Final     Lab Results   Component Value Date    CR 0.8 11/22/2023    CR 0.72 08/31/2023     No results found for: \"MICROALBUMIN\"    Healthy Eating:  Healthy Eating Assessed Today: Yes  Cultural/Christian diet restrictions?: No  Meal planning/habits: Avoiding sweets  Meals include: Breakfast, Lunch, Dinner, Evening Snack  Has patient met with a dietitian in the past?: Yes    Being Active:  Being Active Assessed Today: Yes    Monitoring:  Monitoring Assessed Today: Yes  Did patient bring glucose meter to appointment? : Yes  Blood Glucose Meter: CGM (Dexcom G6)  Times checking blood sugar at home (number): 5+  Times checking blood sugar at home (per): Day  Blood glucose trend: Fluctuating    Taking Medications:  Diabetes Medication(s)       Biguanides       metFORMIN (GLUCOPHAGE XR) 500 MG 24 hr tablet    TAKE 2 TABS (1000MG) BY MOUTH TWICE DAILY WITH MEALS      Diabetic Other       Glucagon (BAQSIMI) 3 MG/DOSE nasal powder    Spray 1 spray (3 mg) in nostril as needed (severe hypoglycemia) in the event of unconscious hypoglycemia or hypoglycemic seizure. May repeat dose if no response after 15 minutes.     Glucagon, rDNA, (GLUCAGON EMERGENCY) 1 MG KIT    1 mg IM one time, PRN severe hypoglycemia causing altered consciousness affecting ability to take food by mouth     glucose (BD GLUCOSE) 5 g chewable tablet    Take 2 tablets (10 g) by mouth daily as needed (hypoglycemia)      Insulin       insulin aspart (NOVOLOG VIAL) 100 UNITS/ML vial    Inject 30 Units Subcutaneous daily     insulin aspart (NOVOLOG VIAL) 100 UNITS/ML vial    9 units with meals plus correction scale with sensitivity of 40 mg/dL starting at 140 mg/dL      Incretin Mimetic Agents       liraglutide (VICTOZA PEN) 18 MG/3ML solution    Inject 1.2 mg Subcutaneous daily            Taking Medication Assessed Today: Yes  Current Treatments: Insulin Injections, Non-insulin Injectables  Dose schedule: Pre-breakfast, Pre-lunch, Pre-dinner, At " bedtime  Given by: Patient  Injection/Infusion sites: Abdomen  Problems taking diabetes medications regularly?: No  Diabetes medication side effects?: No    Problem Solving:  Problem Solving Assessed Today: Yes  Is the patient at risk for hypoglycemia?: Yes  Hypoglycemia Frequency: Never  Is the patient at risk for DKA?: Yes  Does patient have DKA prevention plan?:  (will assess at upcoming visit)              Reducing Risks:  Diabetes Risks: Age over 45 years, History of gestational diabetes, Sedentary Lifestyle, Family History  CAD Risks: Diabetes Mellitus, Dyslipidemia, Hypertension, Obesity, Post-menopausal, Sedentary lifestyle    Healthy Coping:  Healthy Coping Assessed Today: Yes  Emotional response to diabetes: Ready to learn  Informal Support system:: Family  Stage of change: PREPARATION (Decided to change - considering how)  Support resources: In-person Offerings  Patient Activation Measure Survey Score:       No data to display                  Care Plan and Education Provided:  Care Plan: Diabetes   Updates made by Ana Sanchez RN since 12/4/2023 12:00 AM        Problem: Diabetes Self-Management Education Needed to Optimize Self-Care Behaviors         Goal: Healthy Eating - follow a healthy eating pattern for diabetes    This Visit's Progress: 80%   Recent Progress: 60%   Note:    Counting carbs for bolusing        Goal: Taking Medication - patient is consistently taking medications as directed    This Visit's Progress: 90%   Recent Progress: 80%   Note:    Bolus before eating            Time Spent: 30 minutes  Encounter Type: Individual    Any diabetes medication dose changes were made via the CDE Protocol per the patient's referring provider. A copy of this encounter was shared with the provider.

## 2023-12-04 NOTE — LETTER
12/4/2023         RE: Paige Mari  [address]  [place] [address]        Dear Colleague,    Thank you for referring your patient, Paige Mari, to the [facility]. Please see a copy of my visit note below.    Diabetes Self-Management Education & Support    Presents for: Insulin Pump and CGM Review    Type of Service: In Person Visit      ASSESSMENT:    Patient seen today for follow up, she picked up the Omnipod 5 and was planning on starting it today. In fact, she almost ran out of DASH pods recently and was given 10 pods by the clinic. Unfortunately, pt is using dexcom G6 on the  and  her phone is not compatible with the G6 yany.     She is also taking Metformin 1000 mg BID and Victoza 1.2 mg daily.    Current pump settings with DASH:   Basal:   12am: 0.55  3am: 0.40  9am: 0.45  12pm: 0.65  Max basal: 2 units/hr     ICR: 18   ISF: 60   Target , correct above 150   Reverse correct: ON   Insulin action time: 4 hours   Max bolus: 15 units   Patient's most recent   Lab Results   Component Value Date    A1C 7.4 07/25/2023     is meeting goal of <8.0    Diabetes knowledge and skills assessment:   Patient is knowledgeable in diabetes management concepts related to: Healthy Eating, Being Active, Monitoring, Taking Medication, Problem Solving, Reducing Risks, and Healthy Coping    Continue education with the following diabetes management concepts: Healthy Eating, Being Active, Monitoring, Taking Medication, Problem Solving, Reducing Risks, and Healthy Coping    Based on learning assessment above, most appropriate setting for further diabetes education would be: Individual setting.      PLAN    Pt will look through her old phones at  home to see if one of them is compatible with G6 yany, if not she states her daughter probably has an old iphone she can use.   AFTER setting up G6 on the phone, pt to call Crystal Cameron to set up training, pt was given Crystal's  "phone number.     Pt advised to call CDE if she is running low again on DASH pods and has not yet started O5.       Follow-up: with CDE 1/22/24 - this will be after Omnipod 5 start     See Care Plan for co-developed, patient-state behavior change goals.  AVS provided for patient today.      SUBJECTIVE/OBJECTIVE:  Presents for: Insulin Pump and CGM Review  Accompanied by: Self  Diabetes education in the past 24mo: Yes  Focus of Visit: Monitoring, Self-care behavioral goal setting, Healthy Eating, Taking Medication  Diabetes type: Type 1  Date of diagnosis: HARRIETT  How confident are you filling out medical forms by yourself:: Extremely  Transportation concerns: No  Difficulty affording diabetes medication?: Sometimes (using the [email or web address] pt assistance program)  Difficulty affording diabetes testing supplies?: Sometimes  Other concerns:: None  Cultural Influences/Ethnic Background:  Not  or     Diabetes Symptoms & Complications:     Complications assessed today?: No    Patient Problem List and Family Medical History reviewed for relevant medical history, current medical status, and diabetes risk factors.    Vitals:  There were no vitals taken for this visit.  Estimated body mass index is 26.19 kg/m  as calculated from the following:    Height as of 8/17/23: 1.575 m (5' 2\").    Weight as of 10/24/23: 65 kg (143 lb 3.2 oz).   Last 3 BP:   BP Readings from Last 3 Encounters:   11/22/23 121/68   10/24/23 134/78   10/04/23 128/58       History   Smoking Status     Every Day     Packs/day: 0.50     Years: 19.00     Types: Cigarettes     Last attempt to quit: 9/15/2021   Smokeless Tobacco     Never       Labs:  Lab Results   Component Value Date    A1C 7.4 07/25/2023     Lab Results   Component Value Date     08/31/2023     08/22/2023    GLC 95 05/27/2022     Lab Results   Component Value Date    LDL 43 07/25/2023    LDL 99 07/06/2016     Direct Measure HDL   Date Value Ref Range Status   07/25/2023 59 " ">=50 mg/dL Final   ]  GFR Estimate   Date Value Ref Range Status   03/03/2021 >60 >60 mL/min/1.73m2 Final     GFR, ESTIMATED POCT   Date Value Ref Range Status   11/22/2023 >60 >60 mL/min/1.73m2 Final     GFR Estimate If Black   Date Value Ref Range Status   03/03/2021 >60 >60 mL/min/1.73m2 Final     Lab Results   Component Value Date    CR 0.8 11/22/2023    CR 0.72 08/31/2023     No results found for: \"MICROALBUMIN\"    Healthy Eating:  Healthy Eating Assessed Today: Yes  Cultural/Orthodox diet restrictions?: No  Meal planning/habits: Avoiding sweets  Meals include: Breakfast, Lunch, Dinner, Evening Snack  Has patient met with a dietitian in the past?: Yes    Being Active:  Being Active Assessed Today: Yes    Monitoring:  Monitoring Assessed Today: Yes  Did patient bring glucose meter to appointment? : Yes  Blood Glucose Meter: CGM (Dexcom G6)  Times checking blood sugar at home (number): 5+  Times checking blood sugar at home (per): Day  Blood glucose trend: Fluctuating    Taking Medications:  Diabetes Medication(s)       Biguanides       metFORMIN (GLUCOPHAGE XR) 500 MG 24 hr tablet    TAKE 2 TABS (1000MG) BY MOUTH TWICE DAILY WITH MEALS      Diabetic Other       Glucagon (BAQSIMI) 3 MG/DOSE nasal powder    Spray 1 spray (3 mg) in nostril as needed (severe hypoglycemia) in the event of unconscious hypoglycemia or hypoglycemic seizure. May repeat dose if no response after 15 minutes.     Glucagon, rDNA, (GLUCAGON EMERGENCY) 1 MG KIT    1 mg IM one time, PRN severe hypoglycemia causing altered consciousness affecting ability to take food by mouth     glucose (BD GLUCOSE) 5 g chewable tablet    Take 2 tablets (10 g) by mouth daily as needed (hypoglycemia)      Insulin       insulin aspart (NOVOLOG VIAL) 100 UNITS/ML vial    Inject 30 Units Subcutaneous daily     insulin aspart (NOVOLOG VIAL) 100 UNITS/ML vial    9 units with meals plus correction scale with sensitivity of 40 mg/dL starting at 140 mg/dL      Incretin " Mimetic Agents       liraglutide (VICTOZA PEN) 18 MG/3ML solution    Inject 1.2 mg Subcutaneous daily            Taking Medication Assessed Today: Yes  Current Treatments: Insulin Injections, Non-insulin Injectables  Dose schedule: Pre-breakfast, Pre-lunch, Pre-dinner, At bedtime  Given by: Patient  Injection/Infusion sites: Abdomen  Problems taking diabetes medications regularly?: No  Diabetes medication side effects?: No    Problem Solving:  Problem Solving Assessed Today: Yes  Is the patient at risk for hypoglycemia?: Yes  Hypoglycemia Frequency: Never  Is the patient at risk for DKA?: Yes  Does patient have DKA prevention plan?:  (will assess at upcoming visit)              Reducing Risks:  Diabetes Risks: Age over 45 years, History of gestational diabetes, Sedentary Lifestyle, Family History  CAD Risks: Diabetes Mellitus, Dyslipidemia, Hypertension, Obesity, Post-menopausal, Sedentary lifestyle    Healthy Coping:  Healthy Coping Assessed Today: Yes  Emotional response to diabetes: Ready to learn  Informal Support system:: Family  Stage of change: PREPARATION (Decided to change - considering how)  Support resources: In-person Offerings  Patient Activation Measure Survey Score:       No data to display                  Care Plan and Education Provided:  Care Plan: Diabetes   Updates made by Ana Sanchez RN since 12/4/2023 12:00 AM        Problem: Diabetes Self-Management Education Needed to Optimize Self-Care Behaviors         Goal: Healthy Eating - follow a healthy eating pattern for diabetes    This Visit's Progress: 80%   Recent Progress: 60%   Note:    Counting carbs for bolusing        Goal: Taking Medication - patient is consistently taking medications as directed    This Visit's Progress: 90%   Recent Progress: 80%   Note:    Bolus before eating            Time Spent: 30 minutes  Encounter Type: Individual    Any diabetes medication dose changes were made via the CDE Protocol per the patient's referring  provider. A copy of this encounter was shared with the provider.

## 2023-12-06 LAB
MDC_IDC_EPISODE_DTM: NORMAL
MDC_IDC_EPISODE_ID: NORMAL
MDC_IDC_EPISODE_TYPE: NORMAL
MDC_IDC_MSMT_BATTERY_DTM: NORMAL
MDC_IDC_MSMT_BATTERY_STATUS: NORMAL
MDC_IDC_PG_IMPLANT_DTM: NORMAL
MDC_IDC_PG_MFG: NORMAL
MDC_IDC_PG_MODEL: NORMAL
MDC_IDC_PG_SERIAL: NORMAL
MDC_IDC_PG_TYPE: NORMAL
MDC_IDC_SESS_CLINIC_NAME: NORMAL
MDC_IDC_SESS_DTM: NORMAL
MDC_IDC_SESS_TYPE: NORMAL
MDC_IDC_STAT_AT_BURDEN_PERCENT: 1 %
MDC_IDC_STAT_AT_DTM_END: NORMAL
MDC_IDC_STAT_AT_DTM_START: NORMAL

## 2023-12-07 ENCOUNTER — TELEPHONE (OUTPATIENT)
Dept: EDUCATION SERVICES | Facility: CLINIC | Age: 73
End: 2023-12-07
Payer: COMMERCIAL

## 2023-12-07 NOTE — TELEPHONE ENCOUNTER
Spoke with pt.     Faxed PAP paperwork to Shelly Avina who is helping with this.       Pt notes she will not be able to get a phone that is compatible with G6. She states she cannot afford it right now. She has a track phone.     Advised pt we can start O5 in manual mode until she is able to get a compatible phone as she will run out of DASH pods and has filled the O5 pods. Pt agrees. Email sent to Crystal Cameron updating her that we will start in manual mode for now.

## 2023-12-30 DIAGNOSIS — E10.65 TYPE 1 DIABETES MELLITUS WITH HYPERGLYCEMIA (H): ICD-10-CM

## 2024-01-02 RX ORDER — INSULIN ASPART 100 [IU]/ML
30 INJECTION, SOLUTION INTRAVENOUS; SUBCUTANEOUS DAILY
Qty: 10 ML | Refills: 2 | OUTPATIENT
Start: 2024-01-02

## 2024-01-02 NOTE — TELEPHONE ENCOUNTER
"      Requested Prescriptions   Pending Prescriptions Disp Refills    NOVOLOG VIAL 100 UNIT/ML soln [Pharmacy Med Name: NOVOLOG 100 UNIT/ML VIAL] 10 mL 2     Sig: INJECT 30 UNITS SUBCUTANEOUS DAILY       Short Acting Insulin Protocol Passed - 12/30/2023  2:16 PM        Passed - Serum creatinine on file in past 12 months     Recent Labs   Lab Test 11/22/23  1256   CR 0.8       Ok to refill medication if creatinine is low          Passed - HgbA1C in past 3 or 6 months     If HgbA1C is 8 or greater, it needs to be on file within the past 3 months.  If less than 8, must be on file within the past 6 months.     Recent Labs   Lab Test 07/25/23  1400   A1C 7.4*             Passed - Medication is active on med list        Passed - Patient is age 18 or older        Passed - Recent (6 mo) or future (30 days) visit within the authorizing provider's specialty     Patient had office visit in the last 6 months or has a visit in the next 30 days with authorizing provider or within the authorizing provider's specialty.  See \"Patient Info\" tab in inbasket, or \"Choose Columns\" in Meds & Orders section of the refill encounter.                 "

## 2024-01-07 DIAGNOSIS — E10.65 TYPE 1 DIABETES MELLITUS WITH HYPERGLYCEMIA (H): ICD-10-CM

## 2024-01-08 RX ORDER — METFORMIN HCL 500 MG
TABLET, EXTENDED RELEASE 24 HR ORAL
Qty: 360 TABLET | Refills: 0 | Status: SHIPPED | OUTPATIENT
Start: 2024-01-08 | End: 2024-04-11

## 2024-01-08 NOTE — TELEPHONE ENCOUNTER
"      Requested Prescriptions   Pending Prescriptions Disp Refills    metFORMIN (GLUCOPHAGE XR) 500 MG 24 hr tablet [Pharmacy Med Name: METFORMIN HCL  MG TABLET] 360 tablet 0     Sig: TAKE 2 TABS (1000MG) BY MOUTH TWICE DAILY WITH MEALS       Biguanide Agents Passed - 1/7/2024  2:42 PM        Passed - Patient is age 10 or older        Passed - Patient has documented A1c within the specified period of time.     If HgbA1C is 8 or greater, it needs to be on file within the past 3 months.  If less than 8, must be on file within the past 6 months.     Recent Labs   Lab Test 07/25/23  1400   A1C 7.4*             Passed - Patient's CR is NOT>1.4 OR Patient's EGFR is NOT<45 within past 12 mos.     Recent Labs   Lab Test 11/22/23  1256 09/17/21  2240 03/03/21  1206   GFRESTIMATED >60   < > >60   GFRESTBLACK  --   --  >60    < > = values in this interval not displayed.       Recent Labs   Lab Test 11/22/23  1256   CR 0.8             Passed - Patient does NOT have a diagnosis of CHF.        Passed - Medication is active on med list        Passed - Patient is not pregnant        Passed - Patient has not had a positive pregnancy test within the past 12 mos.         Passed - Recent (6 mo) or future (30 days) visit within the authorizing provider's specialty     Patient had office visit in the last 6 months or has a visit in the next 30 days with authorizing provider or within the authorizing provider's specialty.  See \"Patient Info\" tab in inbasket, or \"Choose Columns\" in Meds & Orders section of the refill encounter.                 "

## 2024-01-23 ENCOUNTER — TELEPHONE (OUTPATIENT)
Dept: NEUROLOGY | Facility: CLINIC | Age: 74
End: 2024-01-23
Payer: COMMERCIAL

## 2024-01-23 NOTE — TELEPHONE ENCOUNTER
[facility] Call Center    Phone Message    May a detailed message be left on voicemail: yes     Reason for Call: Symptoms or Concerns     If patient has red-flag symptoms, warm transfer to triage line    Current symptom or concern: Dizziness; shaking and spasming    Symptoms have been present for:  one month(s)    Has patient previously been seen for this? Yes    By Jared Celis    Are there any new or worsening symptoms? Yes: Pt states her symptoms listed above came back about a month ago and she is getting it almost everyday. Please call pt back to advise at # [phone].     Action Taken: Message routed to:  Other: [facility]     Travel Screening: Not Applicable

## 2024-01-24 NOTE — TELEPHONE ENCOUNTER
Called and spoke with patient about symptoms described in original message, patient reports this shakiness is something she has had to deal with for a couple years or more.  Symptoms had resolved for a couple months but then began again in the last month timeframe.    Symptoms of shakiness occur when she stands and last only a short time and then resolve.  Per previous chart review of [facility], some of the symptoms described could be attributed to polypharmacy.  Writer advised that patient discuss the symptoms with provider at upcoming appointment on 2/7.    Patient amenable to plan and will call back to clinic as needed.    Hermes Goncalves RN, BSN  [facility]

## 2024-01-29 ENCOUNTER — TELEPHONE (OUTPATIENT)
Dept: ENDOCRINOLOGY | Facility: CLINIC | Age: 74
End: 2024-01-29
Payer: COMMERCIAL

## 2024-01-29 NOTE — TELEPHONE ENCOUNTER
[facility] Call Center    Phone Message    May a detailed message be left on voicemail: yes     Reason for Call: Other: patient calling to see if below medication is at the clinic  Please call her at [phone] to discuss. Thank you      liraglutide (VICTOZA PEN) 18 MG/3ML solution     Action Taken: Message routed to:  [facility] ([facility]):      Travel Screening: Not Applicable

## 2024-01-29 NOTE — TELEPHONE ENCOUNTER
FREE DRUG APPLICATION APPROVED    Medication: Victoza  Program Name:  [employer]  Effective Date:    Expiration Date:    Pharmacy Filling the Rx:    Patient Notified: yes  Additional Information: patient was approved on 01/12/2024 for Victoza until 12/31/2024

## 2024-01-29 NOTE — TELEPHONE ENCOUNTER
Per letter from [facility] they need a copy of patients part D card, I faxed it to [facility] along with the letter.

## 2024-02-05 DIAGNOSIS — E10.65 TYPE 1 DIABETES MELLITUS WITH HYPERGLYCEMIA (H): ICD-10-CM

## 2024-02-05 PROBLEM — R07.2 PRECORDIAL PAIN: Status: RESOLVED | Noted: 2023-10-04 | Resolved: 2024-02-05

## 2024-02-05 PROBLEM — E11.9 TYPE 2 DIABETES MELLITUS (H): Status: ACTIVE | Noted: 2023-09-15

## 2024-02-05 RX ORDER — METFORMIN HCL 500 MG
TABLET, EXTENDED RELEASE 24 HR ORAL
Qty: 360 TABLET | Refills: 0 | OUTPATIENT
Start: 2024-02-05

## 2024-02-05 NOTE — TELEPHONE ENCOUNTER
90 day supply sent 1/8/24    Requested Prescriptions   Pending Prescriptions Disp Refills    metFORMIN (GLUCOPHAGE XR) 500 MG 24 hr tablet [Pharmacy Med Name: METFORMIN HCL  MG TABLET] 360 tablet 0     Sig: TAKE 2 TABS (1000MG) BY MOUTH TWICE DAILY WITH MEALS       Biguanide Agents Failed - 2/5/2024  8:29 AM        Failed - Patient has documented A1c within the specified period of time.     If HgbA1C is 8 or greater, it needs to be on file within the past 3 months.  If less than 8, must be on file within the past 6 months.     Recent Labs   Lab Test 07/25/23  1400   A1C 7.4*             Failed - Medication indicated for associated diagnosis     Medication is associated with one or more of the following diagnoses:     Gestational diabetes mellitus     Hyperinsulinar obesity     Hypersecretion of ovarian androgens    Non-alcoholic fatty liver    Polycystic ovarian syndrome               Pre-diabetes (DM 2 prevention)    Type 2 diabetes mellitus     Weight gain, antipsychotic therapy-induced             Passed - Patient is age 10 or older        Passed - Patient does NOT have a diagnosis of CHF.        Passed - Medication is active on med list        Passed - Has GFR on file in past 12 months and most recent value is normal        Passed - Recent (6 mo) or future (90 days) visit within the authorizing provider's specialty     The patient must have completed an in-person or virtual visit within the past 6 months or has a future visit scheduled within the next 90 days with the authorizing provider s specialty.  Urgent care and e-visits do not quality as an office visit for this protocol.          Passed - Patient is not pregnant        Passed - Patient has not had a positive pregnancy test within the past 12 mos.

## 2024-02-06 NOTE — PROGRESS NOTES
NEUROLOGY FOLLOW UP VISIT  NOTE       [facility] NEUROLOGY [place]  [address] [place] [address]  Tel: [phone]  Fax: [phone]  [email or web address]     Paige Mari,  1950, MRN [id number]  PCP: Scar Tan  Date: 2024      ASSESSMENT & PLAN     Visit Diagnosis  Tremor  Complex partial seizure (H)     Complex partial seizures  73-year-old female with HTN, HLD, DM 2, diabetic polyneuropathy, COPD, A-fib, chronic pain and marijuana use who was initially admitted to the hospital with confusion and EEG showed rhythmic activity suggesting seizure.  She was briefly on Keppra that was since discontinued.  She has experienced multiple episodes of generalized body twitches and brain fog along with passing out.  During one such episode she fell down fracturing her facial bone.  I suspect she is experiencing complex partial seizures and I have recommended:    1.  Restart Keppra 500 mg twice daily  2.  Repeat EEG.  Most recent EEG done in 2023 showed paroxysmal slowing in theta range  3.  Check Keppra level after 2 weeks  4.  Follow-up after 6 months    Thank you again for this referral, please feel free to contact me if you have any questions.    Jared Celis MD  [facility]  ([facility], [facility]     HISTORY OF PRESENT ILLNESS     Patient is a 73-year-old female with history of HTN, HLD, DM2, diabetic polyneuropathy, COPD, A-fib, marijuana use and chronic pain who was initially evaluated on 2023 for bilateral intention tremor felt to be due to polypharmacy.  In 2023 she was admitted to the hospital with confusion and her EEG showed rhythmic activity suggesting seizures.  She was on Keppra for short duration that was discontinued as it was felt her seizures were due to polypharmacy.  I had recommended repeating EEG that was done on 2023 and showed diffuse slowing but no seizure  activity.  She also had bilateral intention tremor and I had reassured her that she does not have Parkinson disease.  Copper and ceruloplasmin level was normal.    Since her last visit she reports she had experienced multiple episodes during which suddenly feels her brain is in a fog and she gets generalized body twitches.  She has passed out few times and 1 such episode resulted in facial fractures and she was admitted to the hospital.  During such episodes she has at times with difficulty understanding what people are saying.       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    Nicotine Dependence F17.200    Hyperthyroidism E05.90    Migraine without aura and without status migrainosus, not intractable G43.009    Knee osteoarthritis M17.9    GERD (gastroesophageal reflux disease) K21.9    Mood disorder (H24) F39    Spondylisthesis M43.10    KP (obstructive sleep apnea) G47.33    Paroxysmal atrial fibrillation (H) I48.0    COPD (chronic obstructive pulmonary disease) (H) J44.9    Diabetic polyneuropathy (H) E11.42    Closed head injury, initial encounter S09.90XA    Syncope, unspecified syncope type R55    Multiple closed fractures of facial bone, initial encounter (H) S02.92XA         PAST MEDICAL & SURGICAL HISTORY     Past Medical History:   Patient  has a past medical history of Benign essential hypertension, COPD (chronic obstructive pulmonary disease) (H) (09/22/2021), Diabetic polyneuropathy (H) (02/02/2023), GERD (gastroesophageal reflux disease) (11/24/2014), 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    Surgical History:  She  has a past surgical history that includes TOTAL ABDOM HYSTERECTOMY; REMOVAL OF OVARY(S); appendectomy; Bowel Resection (12 years ago); Dilation and curettage; tonsillectomy & adenoidectomy; other surgical history (2005); Arthroscopy Knee With Meniscectomy (3/10/14); TOTAL KNEE ARTHROPLASTY (Right, 11/24/2014); TOTAL ABDOM HYSTERECTOMY; REMOVAL OF OVARY(S); joint replacement; Pr Arthrodesis Ant Interbody Min Discectomy,Lumbar (N/A, 1/30/2015); Oophorectomy; Hysterectomy; Colon surgery (2004); Blepharoplasty (Bilateral, 8/17/2015); back surgery; Toe Surgery; PICC/Midline Placement (9/19/2021); PICC/Midline Placement (1/22/2022); IR Lumbar Puncture (1/22/2022); and Loop Recorder Implant (N/A, 8/21/2023).     SOCIAL HISTORY     Reviewed, and she  reports that she has been smoking cigarettes. She has a 9.5 pack-year smoking history. She has never used smokeless tobacco. She reports that she does not drink alcohol and does not use drugs.     FAMILY HISTORY     Reviewed, and family history is not on file.     ALLERGIES     Allergies   Allergen Reactions    Morphine Other (See Comments)     \"make me delirious,\"  \"nightmares\"    Nitrofurantoin     Adhesive [Cyanoacrylate] Other (See Comments)     Can only tolerate tape for short periods of time shelia in sensitive areas    Amlodipine Unknown and Other (See Comments)     Other reaction(s): delerium    Doxazosin Other (See Comments)     Other reaction(s): feels foggy    Irbesartan-Hydrochlorothiazide Other (See Comments)     Other reaction(s): hyponatremia    Other Allergy (See Comments) [External Allergen Needs Reconciliation - See Comment] Unknown     Other reaction(s): skin rash    Prednisone Unknown     Caused extremely high blood sugars    Tramadol Other (See Comments)     Possible seizure activity    Trazodone Unknown     Other reaction(s): hung over         REVIEW OF SYSTEMS     A 12 point review of system was " performed and was negative except as outlined in the history of present illness.     HOME MEDICATIONS     Current Outpatient Rx   Medication Sig Dispense Refill    acetaminophen (TYLENOL) 325 MG tablet Take 2 tablets (650 mg) by mouth every 4 hours as needed for other (mild pain (1-3))      albuterol (PROAIR HFA/PROVENTIL HFA/VENTOLIN HFA) 108 (90 Base) MCG/ACT inhaler Inhale 1-2 puffs into the lungs every 6 hours 18 g 4    amoxicillin (AMOXIL) 500 MG tablet Take 2,000 mg by mouth as needed Before dental appointments      ARIPiprazole (ABILIFY) 2 MG tablet Take 2 mg by mouth daily       aspirin 81 MG EC tablet Take 81 mg by mouth daily      cephALEXin (KEFLEX) 250 MG capsule Take 250 mg by mouth daily      cetirizine (ZYRTEC) 10 MG tablet Take 10 mg by mouth daily       cholecalciferol (VITAMIN D3) 25 mcg (1000 units) capsule Take 1,000 Units by mouth daily       cloNIDine (CATAPRES-TTS) 0.3 mg/24 hr Place 1 patch onto the skin once a week       Continuous Blood Gluc  (DEXCOM G6 ) TORRIE Use to read blood sugars as per 's instructions. 1 each 0    Continuous Blood Gluc Sensor (DEXCOM G6 SENSOR) MISC CHANGE EVERY 10 DAYS. 3 each 5    Continuous Blood Gluc Transmit (DEXCOM G6 TRANSMITTER) MISC Change every 3 months. 1 each 2    diclofenac sodium (VOLTAREN) 1 % Gel [DICLOFENAC SODIUM (VOLTAREN) 1 % GEL] Apply 1 g topically daily as needed.      DULoxetine (CYMBALTA) 60 MG capsule Take 60 mg by mouth daily      escitalopram (LEXAPRO) 10 MG tablet Take 10 mg by mouth daily      estradiol (ESTRACE) 0.1 MG/GM vaginal cream Place vaginally three times a week      fluticasone (FLONASE) 50 MCG/ACT nasal spray Spray 1 spray into both nostrils daily 16 g 11    galcanezumab-gnlm (EMGALITY) 120 MG/ML injection Inject 1 mL (120 mg) Subcutaneous every 28 days 1 mL 11    GEMTESA 75 MG TABS tablet Take 75 mg by mouth daily      Glucagon (BAQSIMI) 3 MG/DOSE nasal powder Spray 1 spray (3 mg) in nostril as  needed (severe hypoglycemia) in the event of unconscious hypoglycemia or hypoglycemic seizure. May repeat dose if no response after 15 minutes. 2 each 4    glucose (BD GLUCOSE) 5 g chewable tablet Take 2 tablets (10 g) by mouth daily as needed (hypoglycemia) 300 tablet 3    insulin aspart (NOVOLOG VIAL) 100 UNITS/ML vial Inject 30 Units Subcutaneous daily 30 mL 0    insulin aspart (NOVOLOG VIAL) 100 UNITS/ML vial 9 units with meals plus correction scale with sensitivity of 40 mg/dL starting at 140 mg/dL      Insulin Disposable Pump (OMNIPOD 5 G6 INTRO, GEN 5,) KIT 1 each daily 1 kit 1    levETIRAcetam (KEPPRA) 500 MG tablet Take 1 tablet (500 mg) by mouth 2 times daily 60 tablet 11    liraglutide (VICTOZA PEN) 18 MG/3ML solution Inject 1.2 mg Subcutaneous daily      magnesium oxide (MAG-OX) 400 MG tablet Take 400 mg by mouth daily       medical cannabis (Patient's own supply) 1 Dose by Other route See Admin Instructions [employer] Vape- 1-4 puffs HS PRN      metFORMIN (GLUCOPHAGE XR) 500 MG 24 hr tablet TAKE 2 TABS (1000MG) BY MOUTH TWICE DAILY WITH MEALS 360 tablet 0    metoprolol succinate ER (TOPROL-XL) 100 MG 24 hr tablet Take 100 mg by mouth 2 times daily      multivitamin w/minerals (THERA-VIT-M) tablet Take 1 tablet by mouth daily      nystatin (MYCOSTATIN) 815453 UNIT/GM external powder Apply topically 2 times daily      olmesartan (BENICAR) 40 MG tablet [OLMESARTAN (BENICAR) 40 MG TABLET] Take 40 mg by mouth daily.      omeprazole (PRILOSEC) 20 MG capsule [OMEPRAZOLE (PRILOSEC) 20 MG CAPSULE] Take 1 capsule by mouth daily before breakfast.      pregabalin (LYRICA) 100 MG capsule Take 1 capsule (100 mg) by mouth 3 times daily 90 capsule 0    rosuvastatin (CRESTOR) 5 MG tablet TAKE 1 TABLET BY MOUTH EVERY DAY 90 tablet 1    tiZANidine (ZANAFLEX) 2 MG tablet Take 4 mg by mouth nightly as needed      valACYclovir (VALTREX) 500 MG tablet Take 500 mg by mouth daily As needed           PHYSICAL EXAM  "    Vital signs  /66 (BP Location: Right arm, Patient Position: Sitting)   Pulse 83   Ht 1.575 m (5' 2\")   Wt 63.5 kg (140 lb)   BMI 25.61 kg/m      Weight:   140 lbs 0 oz    Elderly female who is alert and oriented vital signs were reviewed and documented in electronic medical record. Neck supple. Neurologically speech was normal cranial nerves II through XII are intact there is no restriction of upgaze. She has bilateral intention tremor. No cogwheel rigidity or resting tremor. She has a wide-based gait unable to tandem walk. She has dysmetria on finger-nose testing and rapid alternating movement     PERTINENT DIAGNOSTIC STUDIES     Following studies were reviewed:     CT BRAIN 8/17/2023  1.  No acute process intracranially. No intracranial mass, mass effect or hemorrhage. Nothing for parenchymal contusion or acute infarction.     2.  Mild chronic ischemic changes deep white matter both cerebral hemispheres. Partially empty sella morphology as on previous MRI.     3.  No fracture of the visualized calvarium/skull base.     4.  Soft tissue swelling/hematoma left frontal scalp with left preseptal periorbital soft tissue swelling. Left orbital floor fracture described separately. High density air-fluid level left maxillary sinus presumed posttraumatic.     5.  Please see above and facial bone CT for details and description.     CT BRAIN 9/23/2022  1.  No CT evidence for acute intracranial process.     2.  Brain atrophy and presumed chronic microvascular ischemic changes as above.     3.  Stable appearance from previous head CT 01/20/2022.     MRI BRAIN 1/20/2022  1. No acute intracranial abnormality.  2. Mild generalized volume loss and chronic microvascular ischemic change.     MRI LUMBAR SPINE 3/12/2015  1.  Since the previous MRI, patient has undergone placement of an interbody fusion device at L4-L5. There is some edema and enhancement within the adjacent L4 and L5 vertebral bodies as well as a fluid " collection along the left ventral aspect of L4 and   L5. These changes are nonspecific. Most likely they reflect postoperative change in the seroma. Infection is not suspected, but not excluded. Correlate clinically.  2.  The degree of anterolisthesis of L4 with respect L5 may have increased subtly. The degree of spinal canal stenosis appears more pronounced, now at least moderate in degree.  3.  Some edema associated with the osseous L4-L5 facets has progressed in the interval.  4.  Some progression of the now moderate left and mild right foraminal narrowing at this level. There is probably some flattening of the exiting nerve on the left due to a small extrusion.  5.  Degenerative disc and facet changes at L5-S1 without significant stenosis appears similar to previous.  6.  Conus ends just above the L2-L3 interspace with fatty infiltration of the lower filum.     EEG 8/17/2023  This is a mildly abnormal EEG during wakefulness and drowsiness due to slightly slow generalized background suggestive of mild generalized cerebral dysfunction.  Such an EEG can be seen in patients with mild encephalopathies.  No electrographic seizures noted.  Further clinical correlation is needed.     Please note that the absence of epileptiform abnormalities does not exclude the possibility of epilepsy in any patient.     EEG 1/25/2022  This is a normal EEG during wakefulness and drowsiness except for mild generalized background dysrhythmia. Further clinical correlation is needed.     EEG 01/20/2022  This is an abnormal prolonged video EEG during wakefulness and drowsiness/somnolent state due to intermittent generalized periodic discharges with triphasic morphology.    These could be triphasic waves since these are more bifrontocentral prominent. The generalized slow background seen during the recording could suggest an encephalopathy or could be secondary to sedating medications. Further clinical correlation is needed.     EEG  1/20/2022  This is a severely abnormal EEG due to almost continuous generalized rhythmic activity interspersed with high amplitude sharp activity suggesting electrographic seizures.  Later in the tracing patient received Ativan with some attenuation of these discharges and background was replaced by theta delta admixture.     PERTINENT LABS  Following labs were reviewed:  Ancillary Procedure on 12/04/2023   Component Date Value Ref Range Status    Date Time Interrogation Session 12/04/2023 [id number]   Final    Implantable Pulse Generator Manufa* 12/04/2023 [employer]   Final    Implantable Pulse Generator Model 12/04/2023 M301 LUX-Dx ICM   Final    Implantable Pulse Generator Serial* 12/04/2023 111710   Final    Type Interrogation Session 12/04/2023 Remote Scheduled   Final    Clinic Name 12/04/2023 [facility]   Final    Implantable Pulse Generator Type 12/04/2023 Implantable Diagnostic Monitor   Final    Implantable Pulse Generator Implan* 12/04/2023 [id number]   Final    Battery Date Time of Measurements 12/04/2023 20231204001700   Final    Battery Status 12/04/2023 Beginning of Service   Final    Atrial Tachy Statistic Date Time S* 12/04/2023 20230821000000   Final    Atrial Tachy Statistic Date Time E* 12/04/2023 20231204000000   Final    Atrial Tachy Statistic AT/AF Burde* 12/04/2023 1  % Final    Episode Identifier 12/04/2023 [id number]   Final    Episode Type Category 12/04/2023 Periodic EGM   Final    Episode Date Time 12/04/2023 [id number]   Final   Hospital Outpatient Visit on 11/22/2023   Component Date Value Ref Range Status    BSA 11/22/2023 0.00  m2 Final    Creatinine POCT 11/22/2023 0.8  0.6 - 1.1 mg/dL Final    GFR, ESTIMATED POCT 11/22/2023 >60  >60 mL/min/1.73m2 Final    BSA 11/22/2023 0.00  m2 Final         Total time spent for face to face visit, reviewing labs/imaging studies, counseling and coordination of care was: 45 Minutes spent on the date of the encounter  doing chart review, review of outside records, review of test results, interpretation of tests, patient visit, and documentation   The longitudinal plan of care for the condition(s) below were addressed during this visit. Due to the added complexity in care, I will continue to support Paige in the subsequent management of this condition(s) and with the ongoing continuity of care of this condition(s).    Problem List Items Addressed This Visit as of 2/7/2024   Complex partial seizures     This note was dictated using voice recognition software.  Any grammatical or context distortions are unintentional and inherent to the software.    Orders Placed This Encounter   Procedures    Keppra (Levetiracetam) Level    Electrolyte panel    EEG      New Prescriptions    LEVETIRACETAM (KEPPRA) 500 MG TABLET    Take 1 tablet (500 mg) by mouth 2 times daily     Modified Medications    No medications on file

## 2024-02-07 ENCOUNTER — OFFICE VISIT (OUTPATIENT)
Dept: NEUROLOGY | Facility: CLINIC | Age: 74
End: 2024-02-07
Payer: COMMERCIAL

## 2024-02-07 VITALS
BODY MASS INDEX: 25.76 KG/M2 | DIASTOLIC BLOOD PRESSURE: 66 MMHG | WEIGHT: 140 LBS | SYSTOLIC BLOOD PRESSURE: 121 MMHG | HEART RATE: 83 BPM | HEIGHT: 62 IN

## 2024-02-07 DIAGNOSIS — R25.1 TREMOR: Primary | ICD-10-CM

## 2024-02-07 DIAGNOSIS — G40.209 COMPLEX PARTIAL SEIZURE (H): ICD-10-CM

## 2024-02-07 PROCEDURE — G2211 COMPLEX E/M VISIT ADD ON: HCPCS | Performed by: PSYCHIATRY & NEUROLOGY

## 2024-02-07 PROCEDURE — 99215 OFFICE O/P EST HI 40 MIN: CPT | Performed by: PSYCHIATRY & NEUROLOGY

## 2024-02-07 RX ORDER — LEVETIRACETAM 500 MG/1
500 TABLET ORAL 2 TIMES DAILY
Qty: 60 TABLET | Refills: 11 | Status: SHIPPED | OUTPATIENT
Start: 2024-02-07

## 2024-02-07 NOTE — LETTER
2024         RE: Paige Mari  [address]  [place] [address]        Dear Colleague,    Thank you for referring your patient, Paige Mari, to the [facility] [place]. Please see a copy of my visit note below.    NEUROLOGY FOLLOW UP VISIT  NOTE       [facility] [place]  [address] [place] [address]  Tel: [phone]  Fax: [phone]  [email or web address]     Paige Mari,  1950, MRN [id number]  PCP: Scar Tan  Date: 2024      ASSESSMENT & PLAN     Visit Diagnosis  Tremor  Complex partial seizure (H)     Complex partial seizures  73-year-old female with HTN, HLD, DM 2, diabetic polyneuropathy, COPD, A-fib, chronic pain and marijuana use who was initially admitted to the hospital with confusion and EEG showed rhythmic activity suggesting seizure.  She was briefly on Keppra that was since discontinued.  She has experienced multiple episodes of generalized body twitches and brain fog along with passing out.  During one such episode she fell down fracturing her facial bone.  I suspect she is experiencing complex partial seizures and I have recommended:    1.  Restart Keppra 500 mg twice daily  2.  Repeat EEG.  Most recent EEG done in 2023 showed paroxysmal slowing in theta range  3.  Check Keppra level after 2 weeks  4.  Follow-up after 6 months    Thank you again for this referral, please feel free to contact me if you have any questions.    Jared Celis MD  [facility], [place]  (Formerly, [facility]     HISTORY OF PRESENT ILLNESS     Patient is a 73-year-old female with history of HTN, HLD, DM2, diabetic polyneuropathy, COPD, A-fib, marijuana use and chronic pain who was initially evaluated on 2023 for bilateral intention tremor felt to be due to polypharmacy.  In 2023 she was admitted to the hospital with  confusion and her EEG showed rhythmic activity suggesting seizures.  She was on Keppra for short duration that was discontinued as it was felt her seizures were due to polypharmacy.  I had recommended repeating EEG that was done on 8/17/2023 and showed diffuse slowing but no seizure activity.  She also had bilateral intention tremor and I had reassured her that she does not have Parkinson disease.  Copper and ceruloplasmin level was normal.    Since her last visit she reports she had experienced multiple episodes during which suddenly feels her brain is in a fog and she gets generalized body twitches.  She has passed out few times and 1 such episode resulted in facial fractures and she was admitted to the hospital.  During such episodes she has at times with difficulty understanding what people are saying.       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     Nicotine Dependence F17.200     Hyperthyroidism E05.90     Migraine without aura and without status migrainosus, not intractable G43.009     Knee osteoarthritis M17.9     GERD (gastroesophageal reflux disease) K21.9     Mood disorder (H24) F39     Spondylisthesis M43.10     KP (obstructive sleep apnea) G47.33     Paroxysmal atrial fibrillation (H) I48.0     COPD (chronic obstructive pulmonary disease) (H) J44.9     Diabetic polyneuropathy (H) E11.42     Closed head injury, initial encounter S09.90XA     Syncope, unspecified syncope type R55     Multiple closed fractures of facial bone, initial encounter (H) S02.92XA         PAST MEDICAL & SURGICAL HISTORY     Past Medical History:   Patient  has a past medical history of Benign essential hypertension, COPD (chronic obstructive pulmonary disease) (H) (09/22/2021), Diabetic polyneuropathy (H) (02/02/2023), GERD  "(gastroesophageal reflux disease) (11/24/2014), 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    Surgical History:  She  has a past surgical history that includes TOTAL ABDOM HYSTERECTOMY; REMOVAL OF OVARY(S); appendectomy; Bowel Resection (12 years ago); Dilation and curettage; tonsillectomy & adenoidectomy; other surgical history (2005); Arthroscopy Knee With Meniscectomy (3/10/14); TOTAL KNEE ARTHROPLASTY (Right, 11/24/2014); TOTAL ABDOM HYSTERECTOMY; REMOVAL OF OVARY(S); joint replacement; Pr Arthrodesis Ant Interbody Min Discectomy,Lumbar (N/A, 1/30/2015); Oophorectomy; Hysterectomy; Colon surgery (2004); Blepharoplasty (Bilateral, 8/17/2015); back surgery; Toe Surgery; PICC/Midline Placement (9/19/2021); PICC/Midline Placement (1/22/2022); IR Lumbar Puncture (1/22/2022); and Loop Recorder Implant (N/A, 8/21/2023).     SOCIAL HISTORY     Reviewed, and she  reports that she has been smoking cigarettes. She has a 9.5 pack-year smoking history. She has never used smokeless tobacco. She reports that she does not drink alcohol and does not use drugs.     FAMILY HISTORY     Reviewed, and family history is not on file.     ALLERGIES     Allergies   Allergen Reactions     Morphine Other (See Comments)     \"make me delirious,\"  \"nightmares\"     Nitrofurantoin      Adhesive [Cyanoacrylate] Other (See Comments)     Can only tolerate tape for short periods of time shelia in sensitive areas     Amlodipine Unknown and Other (See Comments)     Other reaction(s): delerium     Doxazosin Other (See Comments)     Other reaction(s): feels foggy     Irbesartan-Hydrochlorothiazide Other (See Comments)     Other reaction(s): hyponatremia     Other Allergy (See Comments) [External Allergen " Needs Reconciliation - See Comment] Unknown     Other reaction(s): skin rash     Prednisone Unknown     Caused extremely high blood sugars     Tramadol Other (See Comments)     Possible seizure activity     Trazodone Unknown     Other reaction(s): hung over         REVIEW OF SYSTEMS     A 12 point review of system was performed and was negative except as outlined in the history of present illness.     HOME MEDICATIONS     Current Outpatient Rx   Medication Sig Dispense Refill     acetaminophen (TYLENOL) 325 MG tablet Take 2 tablets (650 mg) by mouth every 4 hours as needed for other (mild pain (1-3))       albuterol (PROAIR HFA/PROVENTIL HFA/VENTOLIN HFA) 108 (90 Base) MCG/ACT inhaler Inhale 1-2 puffs into the lungs every 6 hours 18 g 4     amoxicillin (AMOXIL) 500 MG tablet Take 2,000 mg by mouth as needed Before dental appointments       ARIPiprazole (ABILIFY) 2 MG tablet Take 2 mg by mouth daily        aspirin 81 MG EC tablet Take 81 mg by mouth daily       cephALEXin (KEFLEX) 250 MG capsule Take 250 mg by mouth daily       cetirizine (ZYRTEC) 10 MG tablet Take 10 mg by mouth daily        cholecalciferol (VITAMIN D3) 25 mcg (1000 units) capsule Take 1,000 Units by mouth daily        cloNIDine (CATAPRES-TTS) 0.3 mg/24 hr Place 1 patch onto the skin once a week        Continuous Blood Gluc  (DEXCOM G6 ) TORRIE Use to read blood sugars as per 's instructions. 1 each 0     Continuous Blood Gluc Sensor (DEXCOM G6 SENSOR) MISC CHANGE EVERY 10 DAYS. 3 each 5     Continuous Blood Gluc Transmit (DEXCOM G6 TRANSMITTER) MISC Change every 3 months. 1 each 2     diclofenac sodium (VOLTAREN) 1 % Gel [DICLOFENAC SODIUM (VOLTAREN) 1 % GEL] Apply 1 g topically daily as needed.       DULoxetine (CYMBALTA) 60 MG capsule Take 60 mg by mouth daily       escitalopram (LEXAPRO) 10 MG tablet Take 10 mg by mouth daily       estradiol (ESTRACE) 0.1 MG/GM vaginal cream Place vaginally three times a week        fluticasone (FLONASE) 50 MCG/ACT nasal spray Spray 1 spray into both nostrils daily 16 g 11     galcanezumab-gnlm (EMGALITY) 120 MG/ML injection Inject 1 mL (120 mg) Subcutaneous every 28 days 1 mL 11     GEMTESA 75 MG TABS tablet Take 75 mg by mouth daily       Glucagon (BAQSIMI) 3 MG/DOSE nasal powder Spray 1 spray (3 mg) in nostril as needed (severe hypoglycemia) in the event of unconscious hypoglycemia or hypoglycemic seizure. May repeat dose if no response after 15 minutes. 2 each 4     glucose (BD GLUCOSE) 5 g chewable tablet Take 2 tablets (10 g) by mouth daily as needed (hypoglycemia) 300 tablet 3     insulin aspart (NOVOLOG VIAL) 100 UNITS/ML vial Inject 30 Units Subcutaneous daily 30 mL 0     insulin aspart (NOVOLOG VIAL) 100 UNITS/ML vial 9 units with meals plus correction scale with sensitivity of 40 mg/dL starting at 140 mg/dL       Insulin Disposable Pump (OMNIPOD 5 G6 INTRO, GEN 5,) KIT 1 each daily 1 kit 1     levETIRAcetam (KEPPRA) 500 MG tablet Take 1 tablet (500 mg) by mouth 2 times daily 60 tablet 11     liraglutide (VICTOZA PEN) 18 MG/3ML solution Inject 1.2 mg Subcutaneous daily       magnesium oxide (MAG-OX) 400 MG tablet Take 400 mg by mouth daily        medical cannabis (Patient's own supply) 1 Dose by Other route See Admin Instructions [employer] Vape- 1-4 puffs HS PRN       metFORMIN (GLUCOPHAGE XR) 500 MG 24 hr tablet TAKE 2 TABS (1000MG) BY MOUTH TWICE DAILY WITH MEALS 360 tablet 0     metoprolol succinate ER (TOPROL-XL) 100 MG 24 hr tablet Take 100 mg by mouth 2 times daily       multivitamin w/minerals (THERA-VIT-M) tablet Take 1 tablet by mouth daily       nystatin (MYCOSTATIN) 828582 UNIT/GM external powder Apply topically 2 times daily       olmesartan (BENICAR) 40 MG tablet [OLMESARTAN (BENICAR) 40 MG TABLET] Take 40 mg by mouth daily.       omeprazole (PRILOSEC) 20 MG capsule [OMEPRAZOLE (PRILOSEC) 20 MG CAPSULE] Take 1 capsule by mouth daily before breakfast.        "pregabalin (LYRICA) 100 MG capsule Take 1 capsule (100 mg) by mouth 3 times daily 90 capsule 0     rosuvastatin (CRESTOR) 5 MG tablet TAKE 1 TABLET BY MOUTH EVERY DAY 90 tablet 1     tiZANidine (ZANAFLEX) 2 MG tablet Take 4 mg by mouth nightly as needed       valACYclovir (VALTREX) 500 MG tablet Take 500 mg by mouth daily As needed           PHYSICAL EXAM     Vital signs  /66 (BP Location: Right arm, Patient Position: Sitting)   Pulse 83   Ht 1.575 m (5' 2\")   Wt 63.5 kg (140 lb)   BMI 25.61 kg/m      Weight:   140 lbs 0 oz    Elderly female who is alert and oriented vital signs were reviewed and documented in electronic medical record. Neck supple. Neurologically speech was normal cranial nerves II through XII are intact there is no restriction of upgaze. She has bilateral intention tremor. No cogwheel rigidity or resting tremor. She has a wide-based gait unable to tandem walk. She has dysmetria on finger-nose testing and rapid alternating movement     PERTINENT DIAGNOSTIC STUDIES     Following studies were reviewed:     CT BRAIN 8/17/2023  1.  No acute process intracranially. No intracranial mass, mass effect or hemorrhage. Nothing for parenchymal contusion or acute infarction.     2.  Mild chronic ischemic changes deep white matter both cerebral hemispheres. Partially empty sella morphology as on previous MRI.     3.  No fracture of the visualized calvarium/skull base.     4.  Soft tissue swelling/hematoma left frontal scalp with left preseptal periorbital soft tissue swelling. Left orbital floor fracture described separately. High density air-fluid level left maxillary sinus presumed posttraumatic.     5.  Please see above and facial bone CT for details and description.     CT BRAIN 9/23/2022  1.  No CT evidence for acute intracranial process.     2.  Brain atrophy and presumed chronic microvascular ischemic changes as above.     3.  Stable appearance from previous head CT 01/20/2022.     MRI BRAIN " 1/20/2022  1. No acute intracranial abnormality.  2. Mild generalized volume loss and chronic microvascular ischemic change.     MRI LUMBAR SPINE 3/12/2015  1.  Since the previous MRI, patient has undergone placement of an interbody fusion device at L4-L5. There is some edema and enhancement within the adjacent L4 and L5 vertebral bodies as well as a fluid collection along the left ventral aspect of L4 and   L5. These changes are nonspecific. Most likely they reflect postoperative change in the seroma. Infection is not suspected, but not excluded. Correlate clinically.  2.  The degree of anterolisthesis of L4 with respect L5 may have increased subtly. The degree of spinal canal stenosis appears more pronounced, now at least moderate in degree.  3.  Some edema associated with the osseous L4-L5 facets has progressed in the interval.  4.  Some progression of the now moderate left and mild right foraminal narrowing at this level. There is probably some flattening of the exiting nerve on the left due to a small extrusion.  5.  Degenerative disc and facet changes at L5-S1 without significant stenosis appears similar to previous.  6.  Conus ends just above the L2-L3 interspace with fatty infiltration of the lower filum.     EEG 8/17/2023  This is a mildly abnormal EEG during wakefulness and drowsiness due to slightly slow generalized background suggestive of mild generalized cerebral dysfunction.  Such an EEG can be seen in patients with mild encephalopathies.  No electrographic seizures noted.  Further clinical correlation is needed.     Please note that the absence of epileptiform abnormalities does not exclude the possibility of epilepsy in any patient.     EEG 1/25/2022  This is a normal EEG during wakefulness and drowsiness except for mild generalized background dysrhythmia. Further clinical correlation is needed.     EEG 01/20/2022  This is an abnormal prolonged video EEG during wakefulness and drowsiness/somnolent  state due to intermittent generalized periodic discharges with triphasic morphology.    These could be triphasic waves since these are more bifrontocentral prominent. The generalized slow background seen during the recording could suggest an encephalopathy or could be secondary to sedating medications. Further clinical correlation is needed.     EEG 1/20/2022  This is a severely abnormal EEG due to almost continuous generalized rhythmic activity interspersed with high amplitude sharp activity suggesting electrographic seizures.  Later in the tracing patient received Ativan with some attenuation of these discharges and background was replaced by theta delta admixture.     PERTINENT LABS  Following labs were reviewed:  Ancillary Procedure on 12/04/2023   Component Date Value Ref Range Status     Date Time Interrogation Session 12/04/2023 [id number]   Final     Implantable Pulse Generator Manufa* 12/04/2023 [employer]   Final     Implantable Pulse Generator Model 12/04/2023 M301 LUX-Dx ICM   Final     Implantable Pulse Generator Serial* 12/04/2023 [id number]   Final     Type Interrogation Session 12/04/2023 Remote Scheduled   Final     Clinic Name 12/04/2023 [facility]   Final     Implantable Pulse Generator Type 12/04/2023 Implantable Diagnostic Monitor   Final     Implantable Pulse Generator Implan* 12/04/2023 [id number]   Final     Battery Date Time of Measurements 12/04/2023 20231204001700   Final     Battery Status 12/04/2023 Beginning of Service   Final     Atrial Tachy Statistic Date Time S* 12/04/2023 20230821000000   Final     Atrial Tachy Statistic Date Time E* 12/04/2023 20231204000000   Final     Atrial Tachy Statistic AT/AF [place]de* 12/04/2023 1  % Final     Episode Identifier 12/04/2023 [id number]   Final     Episode Type Category 12/04/2023 Periodic EGM   Final     Episode Date Time 12/04/2023 [id number]   Final   Hospital Outpatient Visit on 11/22/2023   Component Date Value Ref  Range Status     BSA 11/22/2023 0.00  m2 Final     Creatinine POCT 11/22/2023 0.8  0.6 - 1.1 mg/dL Final     GFR, ESTIMATED POCT 11/22/2023 >60  >60 mL/min/1.73m2 Final     BSA 11/22/2023 0.00  m2 Final         Total time spent for face to face visit, reviewing labs/imaging studies, counseling and coordination of care was: 45 Minutes spent on the date of the encounter doing chart review, review of outside records, review of test results, interpretation of tests, patient visit, and documentation   The longitudinal plan of care for the condition(s) below were addressed during this visit. Due to the added complexity in care, I will continue to support Paige in the subsequent management of this condition(s) and with the ongoing continuity of care of this condition(s).    Problem List Items Addressed This Visit as of 2/7/2024   Complex partial seizures     This note was dictated using voice recognition software.  Any grammatical or context distortions are unintentional and inherent to the software.    Orders Placed This Encounter   Procedures     Keppra (Levetiracetam) Level     Electrolyte panel     EEG      New Prescriptions    LEVETIRACETAM (KEPPRA) 500 MG TABLET    Take 1 tablet (500 mg) by mouth 2 times daily     Modified Medications    No medications on file                 Again, thank you for allowing me to participate in the care of your patient.        Sincerely,        Jared Celis MD

## 2024-02-07 NOTE — NURSING NOTE
Chief Complaint   Patient presents with    Tremors     Patient reports tremors are worsening- complains of all over body involuntary movements      Shae Cardona CMA on 2/7/2024 at 2:06 PM  [facility]

## 2024-02-09 ENCOUNTER — TELEPHONE (OUTPATIENT)
Dept: NEUROLOGY | Facility: CLINIC | Age: 74
End: 2024-02-09
Payer: COMMERCIAL

## 2024-02-09 NOTE — TELEPHONE ENCOUNTER
Forms were received via fax, Completed and faxed back to [place] [phone]  Shae Cardona CMA on 2/9/2024 at 9:42 AM  [facility]

## 2024-02-09 NOTE — TELEPHONE ENCOUNTER
[facility] Call Center    Phone Message    May a detailed message be left on voicemail: yes     Reason for Call: Tino at [employer] called to speak to care team, per Tino Emgality comes in 2 forms, the auto injector and the pre filled. Tino is wondering which one are they working on? Please review forms again on page 9 to fill this out and refax to [facility] at [phone]    Action Taken: Other: [facility]    Travel Screening: Not Applicable

## 2024-02-15 DIAGNOSIS — R51.9 CHRONIC INTRACTABLE HEADACHE, UNSPECIFIED HEADACHE TYPE: ICD-10-CM

## 2024-02-15 DIAGNOSIS — G89.29 CHRONIC INTRACTABLE HEADACHE, UNSPECIFIED HEADACHE TYPE: ICD-10-CM

## 2024-02-15 NOTE — TELEPHONE ENCOUNTER
Patient needing new rx for Emgality sent to [facility] pharmacy where she gets drug at free cost  Medication T'd for review and signature  Shae Cardona CMA on 2/15/2024 at 3:59 PM  [facility]

## 2024-02-20 ENCOUNTER — TELEPHONE (OUTPATIENT)
Dept: ENDOCRINOLOGY | Facility: CLINIC | Age: 74
End: 2024-02-20
Payer: COMMERCIAL

## 2024-02-20 NOTE — TELEPHONE ENCOUNTER
[facility] [place] is asking for a new form filled out and signed by Md for Novolog, the directions are not clear. The application is in the media tab on the date of 11/27/23 if you could please fill this out with novolog dose, directions md sign it and fax it to [place], or you can send to me and I will fax it.   [email or web address]

## 2024-03-01 NOTE — TELEPHONE ENCOUNTER
03/01/2024 [facility] will not accept this form for Novolog because is states 30 until daily then it also states up to 40 units daily,  It has to match, max dose if it is 40 units. Let me know if you need me to send you a new blank form, PT is in need of the Novolog asap  You can send form back to me or fax to urgent line [phone]

## 2024-03-02 ENCOUNTER — HEALTH MAINTENANCE LETTER (OUTPATIENT)
Age: 74
End: 2024-03-02

## 2024-03-12 DIAGNOSIS — E10.65 TYPE 1 DIABETES MELLITUS WITH HYPERGLYCEMIA (H): ICD-10-CM

## 2024-03-12 RX ORDER — PROCHLORPERAZINE 25 MG/1
SUPPOSITORY RECTAL
Qty: 1 EACH | Refills: 0 | Status: SHIPPED | OUTPATIENT
Start: 2024-03-12 | End: 2024-06-17

## 2024-03-12 NOTE — TELEPHONE ENCOUNTER
"    Requested Prescriptions   Pending Prescriptions Disp Refills    Continuous Blood Gluc Transmit (DEXCOM G6 TRANSMITTER) MISC [Pharmacy Med Name: DEXCOM G6 TRANSMITTER  MISC] 1 each 2     Sig: CHANGE EVERY 3 MONTHS.       Diabetic Supplies Protocol Passed - 3/12/2024 10:51 AM        Passed - Medication is active on med list        Passed - Medication indicated for associated diagnosis        Passed - Patient is 18 years of age or older        Passed - Recent (6 mo) or future (30 days) visit within the authorizing provider's specialty     Patient had office visit in the last 6 months or has a visit in the next 30 days with authorizing provider.  See \"Patient Info\" tab in inbasket, or \"Choose Columns\" in Meds & Orders section of the refill encounter.                 "

## 2024-03-13 ENCOUNTER — APPOINTMENT (OUTPATIENT)
Dept: ULTRASOUND IMAGING | Facility: HOSPITAL | Age: 74
End: 2024-03-13
Attending: STUDENT IN AN ORGANIZED HEALTH CARE EDUCATION/TRAINING PROGRAM
Payer: COMMERCIAL

## 2024-03-13 ENCOUNTER — HOSPITAL ENCOUNTER (OUTPATIENT)
Facility: HOSPITAL | Age: 74
Setting detail: OBSERVATION
Discharge: HOME | End: 2024-03-15
Attending: EMERGENCY MEDICINE | Admitting: EMERGENCY MEDICINE
Payer: COMMERCIAL

## 2024-03-13 ENCOUNTER — APPOINTMENT (OUTPATIENT)
Dept: CT IMAGING | Facility: HOSPITAL | Age: 74
End: 2024-03-13
Attending: EMERGENCY MEDICINE
Payer: COMMERCIAL

## 2024-03-13 DIAGNOSIS — R55 SYNCOPE, UNSPECIFIED SYNCOPE TYPE: ICD-10-CM

## 2024-03-13 DIAGNOSIS — E10.65 TYPE 1 DIABETES MELLITUS WITH HYPERGLYCEMIA (H): ICD-10-CM

## 2024-03-13 LAB
ABO/RH(D): NORMAL
ALBUMIN UR-MCNC: NEGATIVE MG/DL
ANION GAP SERPL CALCULATED.3IONS-SCNC: 8 MMOL/L (ref 7–15)
ANTIBODY SCREEN: NEGATIVE
APPEARANCE UR: CLEAR
BASE EXCESS BLDV CALC-SCNC: 5.9 MMOL/L (ref -3–3)
BASOPHILS # BLD AUTO: 0.1 10E3/UL (ref 0–0.2)
BASOPHILS NFR BLD AUTO: 1 %
BILIRUB UR QL STRIP: NEGATIVE
BUN SERPL-MCNC: 18.7 MG/DL (ref 8–23)
CALCIUM SERPL-MCNC: 8.5 MG/DL (ref 8.8–10.2)
CHLORIDE SERPL-SCNC: 101 MMOL/L (ref 98–107)
COLOR UR AUTO: ABNORMAL
CREAT SERPL-MCNC: 0.84 MG/DL (ref 0.51–0.95)
DEPRECATED HCO3 PLAS-SCNC: 28 MMOL/L (ref 22–29)
EGFRCR SERPLBLD CKD-EPI 2021: 73 ML/MIN/1.73M2
EOSINOPHIL # BLD AUTO: 0.3 10E3/UL (ref 0–0.7)
EOSINOPHIL NFR BLD AUTO: 2 %
ERYTHROCYTE [DISTWIDTH] IN BLOOD BY AUTOMATED COUNT: 14.6 % (ref 10–15)
ETHANOL SERPL-MCNC: <0.01 G/DL
FLUAV RNA SPEC QL NAA+PROBE: NEGATIVE
FLUBV RNA RESP QL NAA+PROBE: NEGATIVE
GLUCOSE BLDC GLUCOMTR-MCNC: 88 MG/DL (ref 70–99)
GLUCOSE SERPL-MCNC: 177 MG/DL (ref 70–99)
GLUCOSE UR STRIP-MCNC: NEGATIVE MG/DL
HCO3 BLDV-SCNC: 31 MMOL/L (ref 21–28)
HCT VFR BLD AUTO: 36.7 % (ref 35–47)
HGB BLD-MCNC: 12 G/DL (ref 11.7–15.7)
HGB UR QL STRIP: NEGATIVE
HYALINE CASTS: 1 /LPF
IMM GRANULOCYTES # BLD: 0.1 10E3/UL
IMM GRANULOCYTES NFR BLD: 0 %
INR PPP: 1.03 (ref 0.85–1.15)
KETONES UR STRIP-MCNC: NEGATIVE MG/DL
LEUKOCYTE ESTERASE UR QL STRIP: NEGATIVE
LYMPHOCYTES # BLD AUTO: 3 10E3/UL (ref 0.8–5.3)
LYMPHOCYTES NFR BLD AUTO: 24 %
MAGNESIUM SERPL-MCNC: 1.9 MG/DL (ref 1.7–2.3)
MCH RBC QN AUTO: 30.8 PG (ref 26.5–33)
MCHC RBC AUTO-ENTMCNC: 32.7 G/DL (ref 31.5–36.5)
MCV RBC AUTO: 94 FL (ref 78–100)
MONOCYTES # BLD AUTO: 0.8 10E3/UL (ref 0–1.3)
MONOCYTES NFR BLD AUTO: 7 %
NEUTROPHILS # BLD AUTO: 8.5 10E3/UL (ref 1.6–8.3)
NEUTROPHILS NFR BLD AUTO: 66 %
NITRATE UR QL: NEGATIVE
NRBC # BLD AUTO: 0 10E3/UL
NRBC BLD AUTO-RTO: 0 /100
NT-PROBNP SERPL-MCNC: 360 PG/ML (ref 0–900)
O2/TOTAL GAS SETTING VFR VENT: 21 %
OXYHGB MFR BLDV: 40 % (ref 70–75)
PCO2 BLDV: 54 MM HG (ref 40–50)
PH BLDV: 7.37 [PH] (ref 7.32–7.43)
PH UR STRIP: 7 [PH] (ref 5–7)
PLATELET # BLD AUTO: 248 10E3/UL (ref 150–450)
PO2 BLDV: 24 MM HG (ref 25–47)
POTASSIUM SERPL-SCNC: 3.9 MMOL/L (ref 3.4–5.3)
PROCALCITONIN SERPL IA-MCNC: 0.05 NG/ML
RBC # BLD AUTO: 3.9 10E6/UL (ref 3.8–5.2)
RBC URINE: <1 /HPF
RSV RNA SPEC NAA+PROBE: NEGATIVE
SAO2 % BLDV: 40.2 % (ref 70–75)
SARS-COV-2 RNA RESP QL NAA+PROBE: NEGATIVE
SODIUM SERPL-SCNC: 137 MMOL/L (ref 135–145)
SP GR UR STRIP: 1.02 (ref 1–1.03)
SPECIMEN EXPIRATION DATE: NORMAL
SQUAMOUS EPITHELIAL: 2 /HPF
TROPONIN T SERPL HS-MCNC: 14 NG/L
TROPONIN T SERPL HS-MCNC: 16 NG/L
TSH SERPL DL<=0.005 MIU/L-ACNC: 2.51 UIU/ML (ref 0.3–4.2)
UROBILINOGEN UR STRIP-MCNC: <2 MG/DL
WBC # BLD AUTO: 12.8 10E3/UL (ref 4–11)
WBC URINE: <1 /HPF

## 2024-03-13 PROCEDURE — 84443 ASSAY THYROID STIM HORMONE: CPT | Performed by: EMERGENCY MEDICINE

## 2024-03-13 PROCEDURE — 82805 BLOOD GASES W/O2 SATURATION: CPT | Performed by: EMERGENCY MEDICINE

## 2024-03-13 PROCEDURE — 85610 PROTHROMBIN TIME: CPT | Performed by: EMERGENCY MEDICINE

## 2024-03-13 PROCEDURE — G0378 HOSPITAL OBSERVATION PER HR: HCPCS

## 2024-03-13 PROCEDURE — 71275 CT ANGIOGRAPHY CHEST: CPT

## 2024-03-13 PROCEDURE — 82077 ASSAY SPEC XCP UR&BREATH IA: CPT | Performed by: EMERGENCY MEDICINE

## 2024-03-13 PROCEDURE — 83735 ASSAY OF MAGNESIUM: CPT | Performed by: EMERGENCY MEDICINE

## 2024-03-13 PROCEDURE — 84145 PROCALCITONIN (PCT): CPT | Performed by: EMERGENCY MEDICINE

## 2024-03-13 PROCEDURE — 36415 COLL VENOUS BLD VENIPUNCTURE: CPT | Performed by: EMERGENCY MEDICINE

## 2024-03-13 PROCEDURE — 82962 GLUCOSE BLOOD TEST: CPT

## 2024-03-13 PROCEDURE — 80048 BASIC METABOLIC PNL TOTAL CA: CPT | Performed by: EMERGENCY MEDICINE

## 2024-03-13 PROCEDURE — 81001 URINALYSIS AUTO W/SCOPE: CPT | Performed by: EMERGENCY MEDICINE

## 2024-03-13 PROCEDURE — 93005 ELECTROCARDIOGRAM TRACING: CPT | Performed by: EMERGENCY MEDICINE

## 2024-03-13 PROCEDURE — 86900 BLOOD TYPING SEROLOGIC ABO: CPT | Performed by: EMERGENCY MEDICINE

## 2024-03-13 PROCEDURE — 86140 C-REACTIVE PROTEIN: CPT | Performed by: INTERNAL MEDICINE

## 2024-03-13 PROCEDURE — 83880 ASSAY OF NATRIURETIC PEPTIDE: CPT | Performed by: EMERGENCY MEDICINE

## 2024-03-13 PROCEDURE — 93880 EXTRACRANIAL BILAT STUDY: CPT

## 2024-03-13 PROCEDURE — 250N000011 HC RX IP 250 OP 636: Performed by: EMERGENCY MEDICINE

## 2024-03-13 PROCEDURE — 99285 EMERGENCY DEPT VISIT HI MDM: CPT | Mod: 25

## 2024-03-13 PROCEDURE — 70450 CT HEAD/BRAIN W/O DYE: CPT

## 2024-03-13 PROCEDURE — 99223 1ST HOSP IP/OBS HIGH 75: CPT | Performed by: STUDENT IN AN ORGANIZED HEALTH CARE EDUCATION/TRAINING PROGRAM

## 2024-03-13 PROCEDURE — 87637 SARSCOV2&INF A&B&RSV AMP PRB: CPT | Performed by: EMERGENCY MEDICINE

## 2024-03-13 PROCEDURE — 84484 ASSAY OF TROPONIN QUANT: CPT | Performed by: EMERGENCY MEDICINE

## 2024-03-13 PROCEDURE — 72125 CT NECK SPINE W/O DYE: CPT

## 2024-03-13 PROCEDURE — 85025 COMPLETE CBC W/AUTO DIFF WBC: CPT | Performed by: EMERGENCY MEDICINE

## 2024-03-13 PROCEDURE — 250N000013 HC RX MED GY IP 250 OP 250 PS 637: Performed by: STUDENT IN AN ORGANIZED HEALTH CARE EDUCATION/TRAINING PROGRAM

## 2024-03-13 RX ORDER — ONDANSETRON 2 MG/ML
4 INJECTION INTRAMUSCULAR; INTRAVENOUS EVERY 6 HOURS PRN
Status: DISCONTINUED | OUTPATIENT
Start: 2024-03-13 | End: 2024-03-15 | Stop reason: HOSPADM

## 2024-03-13 RX ORDER — POLYETHYLENE GLYCOL 3350 17 G/17G
17 POWDER, FOR SOLUTION ORAL 2 TIMES DAILY PRN
Status: DISCONTINUED | OUTPATIENT
Start: 2024-03-13 | End: 2024-03-15 | Stop reason: HOSPADM

## 2024-03-13 RX ORDER — DEXTROSE MONOHYDRATE 25 G/50ML
25-50 INJECTION, SOLUTION INTRAVENOUS
Status: DISCONTINUED | OUTPATIENT
Start: 2024-03-13 | End: 2024-03-15 | Stop reason: HOSPADM

## 2024-03-13 RX ORDER — IOPAMIDOL 755 MG/ML
75 INJECTION, SOLUTION INTRAVASCULAR ONCE
Status: COMPLETED | OUTPATIENT
Start: 2024-03-13 | End: 2024-03-13

## 2024-03-13 RX ORDER — ONDANSETRON 4 MG/1
4 TABLET, ORALLY DISINTEGRATING ORAL EVERY 6 HOURS PRN
Status: DISCONTINUED | OUTPATIENT
Start: 2024-03-13 | End: 2024-03-15 | Stop reason: HOSPADM

## 2024-03-13 RX ORDER — ALBUTEROL SULFATE 90 UG/1
2 AEROSOL, METERED RESPIRATORY (INHALATION) EVERY 6 HOURS PRN
COMMUNITY

## 2024-03-13 RX ORDER — LIRAGLUTIDE 6 MG/ML
1.2 INJECTION SUBCUTANEOUS DAILY
Status: DISCONTINUED | OUTPATIENT
Start: 2024-03-14 | End: 2024-03-15 | Stop reason: HOSPADM

## 2024-03-13 RX ORDER — ROSUVASTATIN CALCIUM 5 MG/1
5 TABLET, COATED ORAL DAILY
Status: DISCONTINUED | OUTPATIENT
Start: 2024-03-14 | End: 2024-03-15 | Stop reason: HOSPADM

## 2024-03-13 RX ORDER — AMOXICILLIN 250 MG
2 CAPSULE ORAL 2 TIMES DAILY PRN
Status: DISCONTINUED | OUTPATIENT
Start: 2024-03-13 | End: 2024-03-15 | Stop reason: HOSPADM

## 2024-03-13 RX ORDER — HYDRALAZINE HYDROCHLORIDE 20 MG/ML
10 INJECTION INTRAMUSCULAR; INTRAVENOUS EVERY 6 HOURS PRN
Status: DISCONTINUED | OUTPATIENT
Start: 2024-03-13 | End: 2024-03-13

## 2024-03-13 RX ORDER — ACETAMINOPHEN 650 MG/1
650 SUPPOSITORY RECTAL EVERY 4 HOURS PRN
Status: DISCONTINUED | OUTPATIENT
Start: 2024-03-13 | End: 2024-03-15 | Stop reason: HOSPADM

## 2024-03-13 RX ORDER — HYDRALAZINE HYDROCHLORIDE 10 MG/1
10 TABLET, FILM COATED ORAL EVERY 4 HOURS PRN
Status: DISCONTINUED | OUTPATIENT
Start: 2024-03-13 | End: 2024-03-15 | Stop reason: HOSPADM

## 2024-03-13 RX ORDER — ASPIRIN 81 MG/1
81 TABLET ORAL DAILY
Status: DISCONTINUED | OUTPATIENT
Start: 2024-03-14 | End: 2024-03-15 | Stop reason: HOSPADM

## 2024-03-13 RX ORDER — PANTOPRAZOLE SODIUM 40 MG/1
40 TABLET, DELAYED RELEASE ORAL
Status: DISCONTINUED | OUTPATIENT
Start: 2024-03-14 | End: 2024-03-15 | Stop reason: HOSPADM

## 2024-03-13 RX ORDER — ESCITALOPRAM OXALATE 10 MG/1
10 TABLET ORAL DAILY
Status: DISCONTINUED | OUTPATIENT
Start: 2024-03-14 | End: 2024-03-15 | Stop reason: HOSPADM

## 2024-03-13 RX ORDER — ESTRADIOL 1 MG/1
1 TABLET ORAL DAILY
COMMUNITY

## 2024-03-13 RX ORDER — ACETAMINOPHEN 500 MG
1000 TABLET ORAL EVERY 6 HOURS PRN
COMMUNITY

## 2024-03-13 RX ORDER — PREGABALIN 100 MG/1
100 CAPSULE ORAL 3 TIMES DAILY
Status: DISCONTINUED | OUTPATIENT
Start: 2024-03-13 | End: 2024-03-15 | Stop reason: HOSPADM

## 2024-03-13 RX ORDER — OLMESARTAN MEDOXOMIL 20 MG/1
40 TABLET ORAL DAILY
Status: DISCONTINUED | OUTPATIENT
Start: 2024-03-14 | End: 2024-03-13

## 2024-03-13 RX ORDER — LOSARTAN POTASSIUM 50 MG/1
100 TABLET ORAL DAILY
Status: DISCONTINUED | OUTPATIENT
Start: 2024-03-14 | End: 2024-03-15 | Stop reason: HOSPADM

## 2024-03-13 RX ORDER — ESTRADIOL 1 MG/1
1 TABLET ORAL DAILY
Status: DISCONTINUED | OUTPATIENT
Start: 2024-03-14 | End: 2024-03-15 | Stop reason: HOSPADM

## 2024-03-13 RX ORDER — ACETAMINOPHEN 325 MG/1
650 TABLET ORAL EVERY 4 HOURS PRN
Status: DISCONTINUED | OUTPATIENT
Start: 2024-03-13 | End: 2024-03-15 | Stop reason: HOSPADM

## 2024-03-13 RX ORDER — OLMESARTAN MEDOXOMIL 40 MG/1
40 TABLET ORAL DAILY
COMMUNITY

## 2024-03-13 RX ORDER — LEVETIRACETAM 500 MG/1
500 TABLET ORAL 2 TIMES DAILY
Status: DISCONTINUED | OUTPATIENT
Start: 2024-03-13 | End: 2024-03-15 | Stop reason: HOSPADM

## 2024-03-13 RX ORDER — FLUTICASONE PROPIONATE 50 MCG
1 SPRAY, SUSPENSION (ML) NASAL DAILY PRN
COMMUNITY

## 2024-03-13 RX ORDER — METOPROLOL SUCCINATE 100 MG/1
100 TABLET, EXTENDED RELEASE ORAL 2 TIMES DAILY
Status: DISCONTINUED | OUTPATIENT
Start: 2024-03-13 | End: 2024-03-15 | Stop reason: HOSPADM

## 2024-03-13 RX ORDER — ACETAMINOPHEN 500 MG
1000 TABLET ORAL EVERY 6 HOURS PRN
Status: DISCONTINUED | OUTPATIENT
Start: 2024-03-13 | End: 2024-03-13

## 2024-03-13 RX ORDER — HYDRALAZINE HYDROCHLORIDE 20 MG/ML
10 INJECTION INTRAMUSCULAR; INTRAVENOUS EVERY 4 HOURS PRN
Status: DISCONTINUED | OUTPATIENT
Start: 2024-03-13 | End: 2024-03-15 | Stop reason: HOSPADM

## 2024-03-13 RX ORDER — MIRABEGRON 25 MG/1
25 TABLET, FILM COATED, EXTENDED RELEASE ORAL DAILY
Status: DISCONTINUED | OUTPATIENT
Start: 2024-03-14 | End: 2024-03-15 | Stop reason: HOSPADM

## 2024-03-13 RX ORDER — NICOTINE POLACRILEX 4 MG
15-30 LOZENGE BUCCAL
Status: DISCONTINUED | OUTPATIENT
Start: 2024-03-13 | End: 2024-03-15 | Stop reason: HOSPADM

## 2024-03-13 RX ORDER — ALBUTEROL SULFATE 90 UG/1
1-2 AEROSOL, METERED RESPIRATORY (INHALATION) EVERY 6 HOURS PRN
Status: DISCONTINUED | OUTPATIENT
Start: 2024-03-13 | End: 2024-03-15 | Stop reason: HOSPADM

## 2024-03-13 RX ORDER — AMOXICILLIN 250 MG
1 CAPSULE ORAL 2 TIMES DAILY PRN
Status: DISCONTINUED | OUTPATIENT
Start: 2024-03-13 | End: 2024-03-15 | Stop reason: HOSPADM

## 2024-03-13 RX ORDER — DULOXETIN HYDROCHLORIDE 60 MG/1
60 CAPSULE, DELAYED RELEASE ORAL DAILY
Status: DISCONTINUED | OUTPATIENT
Start: 2024-03-14 | End: 2024-03-15 | Stop reason: HOSPADM

## 2024-03-13 RX ADMIN — METOPROLOL SUCCINATE 100 MG: 50 TABLET, EXTENDED RELEASE ORAL at 22:34

## 2024-03-13 RX ADMIN — PREGABALIN 100 MG: 100 CAPSULE ORAL at 22:34

## 2024-03-13 RX ADMIN — LEVETIRACETAM 500 MG: 500 TABLET, FILM COATED ORAL at 22:34

## 2024-03-13 RX ADMIN — IOPAMIDOL 75 ML: 755 INJECTION, SOLUTION INTRAVENOUS at 17:12

## 2024-03-13 ASSESSMENT — COLUMBIA-SUICIDE SEVERITY RATING SCALE - C-SSRS
1. IN THE PAST MONTH, HAVE YOU WISHED YOU WERE DEAD OR WISHED YOU COULD GO TO SLEEP AND NOT WAKE UP?: NO
2. HAVE YOU ACTUALLY HAD ANY THOUGHTS OF KILLING YOURSELF IN THE PAST MONTH?: NO
6. HAVE YOU EVER DONE ANYTHING, STARTED TO DO ANYTHING, OR PREPARED TO DO ANYTHING TO END YOUR LIFE?: NO

## 2024-03-13 ASSESSMENT — ACTIVITIES OF DAILY LIVING (ADL)
ADLS_ACUITY_SCORE: 38
ADLS_ACUITY_SCORE: 38
ADLS_ACUITY_SCORE: 41
ADLS_ACUITY_SCORE: 38
DEPENDENT_IADLS:: TRANSPORTATION

## 2024-03-13 NOTE — ED NOTES
Bed: JNED-07  Expected date:   Expected time:   Means of arrival:   Comments:  [place], 73F, syncope hypotension

## 2024-03-13 NOTE — ED PROVIDER NOTES
EMERGENCY DEPARTMENT ENCOUNTER      NAME: Paige Mari  AGE: 73 year old female  YOB: 1950  MRN: [id number]  EVALUATION DATE & TIME: 3/13/2024  2:25 PM    PCP: Scar Tan    ED PROVIDER: Wendy Ivey M.D.      Chief Complaint   Patient presents with    Syncope         FINAL IMPRESSION:  1. Syncope, unspecified syncope type          ED COURSE & MEDICAL DECISION MAKING:    ED Course as of 03/13/24 1826   Wed Mar 13, 2024   1512 Patient with prior syncopal events with left anterior chest wall loop recorder BIB EMS s/p syncope event, denies a second one, neuro intact, VS WNL here, no medication changes or recent illness, no ability to interrogate loop recorder in ED, with head trauma at age 73. CT head and c-spine pending, no trauma alert called because she is neuro intact and not on blood thinners currently. She is ok with plan to check electrolytes, PE r/o, admit to cardiac tele observation   1740 I spoke with cardiology who will see patient while admitted, uncertain if loop recorder battery still functional as it was placed 1.5y ago but should be ok   1740 CT head and c-spine negative, CTA r/o PE negative for acute pathology       3:05 PM I met with the patient and performed the physical exam.     Pertinent Labs & Imaging studies reviewed. (See chart for details)    N95 worn  A face shield was worn also  COVID PPE    Medical Decision Making  Obtained supplemental history:Supplemental history obtained?: No  Reviewed external records: External records reviewed?: No  Care impacted by chronic illness:Chronic Lung Disease, Diabetes, Hypertension, Mental Health, Smoking / Nicotine Use, and Other: GERD, paroxysmal atrial fibrillation  Care significantly affected by social determinants of health:N/A  Did you consider but not order tests?: Work up considered but not performed and documented in chart, if applicable  Did you interpret images independently?: Independent interpretation of ECG and  images noted in documentation, when applicable.  Consultation discussion with other provider:Did you involve another provider (consultant, MH, pharmacy, etc.)?: I discussed the care with another health care provider, see documentation for details.  Admit.    At the conclusion of the encounter I discussed the results of all of the tests and the disposition. The questions were answered. The patient or family acknowledged understanding and was agreeable with the care plan.     MEDICATIONS GIVEN IN THE EMERGENCY:  Medications   iopamidol (ISOVUE-370) solution 75 mL (75 mLs Intravenous $Given 3/13/24 3265)       NEW PRESCRIPTIONS STARTED AT TODAY'S ER VISIT  New Prescriptions    No medications on file          =================================================================    HPI      Paige Mari is a 73 year old female with PMHx of HTN, COPD, GERD, paroxysmal atrial fibrillation, insulin-dependent diabetes who presents to the ED today via EMS with syncope.    Patient reports en episode of unwitnessed syncope when trying to walk to door today. She thing her blood pressure dropped and denies any chest pain or palpitations before fall. She endorses a history of syncope with last episode around 1 and a half to 2 years ago. Patient follows with cardiologist and has a loop recorder currently. She denies being told of anything abnormal from cardiologist recently. She takes Asprin daily but denies being on other blood thinners. Otherwise, patient denies being awake when hitting the floor, knowing how long she was unconscious, any pain from fall, any 2nd syncope, fever, cough, vomiting, diarrhea, Covid, flu, recent medicine changes, being prescribed any salt pills, current headache, new difficulty moving limbs, neck pain, abdominal pain, hip pain, knee pain, ankle pain, shoulder pain, or clavicle pain. No other complaints at this time.     REVIEW OF SYSTEMS   All other systems reviewed and are negative except as noted  above in HPI.    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), managed as type 1 (H)     Created by Conversion       Mood disorder (H24) 11/24/2014    KP (obstructive sleep apnea) 05/15/2015    Paroxysmal atrial fibrillation (H) 09/22/2021    Small bowel obstruction (H) 09/18/2021    Status post total knee replacement 11/24/2014       PAST SURGICAL HISTORY:  Past Surgical History:   Procedure Laterality Date    APPENDECTOMY      at age 3    ARTHROSCOPY KNEE WITH MENISCECTOMY  3/10/14    partial medial and lateral meniscectomies, subpatellar chondroplasty    BACK SURGERY      BLEPHAROPLASTY Bilateral 8/17/2015    Procedure: BLEPHAROPLASTY BILATERAL UPPER LIDS;  Surgeon: Chasidy Bryant MD;  Location: [facility];  Service:     BOWEL RESECTION  12 years ago    COLON SURGERY  2004    sigmoid colectomy    DILATION AND CURETTAGE      x3    EP LOOP RECORDER IMPLANT N/A 8/21/2023    Procedure: Loop Recorder Implant;  Surgeon: Nolvia Ruiz MD;  Location: [facility]    HYSTERECTOMY      age 39    IR LUMBAR PUNCTURE  1/22/2022    JOINT REPLACEMENT      OOPHORECTOMY      OTHER SURGICAL HISTORY  2005    bowel obstruction    PICC TRIPLE LUMEN PLACEMENT  9/19/2021         PICC TRIPLE LUMEN PLACEMENT  1/22/2022         TN ARTHRODESIS ANT INTERBODY MIN DISCECTOMY,LUMBAR N/A 1/30/2015    Procedure: ANTERIOR LUMBAR INTERBODY FUSION L4-5, INTERBODY GRAFT, BMP INSTRUMENTATION;  Surgeon: Zeeshan Guillaume MD;  Location: [facility] OR;  Service: Spine    TOE SURGERY      TONSILLECTOMY & ADENOIDECTOMY      age 10    ZZC REMOVAL OF OVARY(S)      Description: Oophorectomy;   Recorded: 06/03/2010;    [facility] REMOVAL OF OVARY(S)      Description: Oophorectomy;  Recorded: 06/03/2010;    Z TOTAL ABDOM HYSTERECTOMY      Description: Hysterectomy;  Recorded: 06/03/2010;    Z TOTAL ABDOM HYSTERECTOMY      Description: Hysterectomy;  Recorded: 06/03/2010;    [facility] TOTAL KNEE ARTHROPLASTY Right 11/24/2014    Procedure: RIGHT TOTAL KNEE ARTHROPLASTY;  Surgeon: Jules Harris MD;  Location: [facility];  Service: Orthopedics       CURRENT MEDICATIONS:    acetaminophen (TYLENOL) 500 MG tablet  albuterol (PROAIR HFA/PROVENTIL HFA/VENTOLIN HFA) 108 (90 Base) MCG/ACT inhaler  amoxicillin (AMOXIL) 500 MG tablet  aspirin 81 MG EC tablet  cephALEXin (KEFLEX) 500 MG capsule  cetirizine (ZYRTEC) 10 MG tablet  cholecalciferol (VITAMIN D3) 25 mcg (1000 units) capsule  cloNIDine (CATAPRES-TTS) 0.3 mg/24 hr  Continuous Blood Gluc  (DEXCOM G6 ) TORRIE  Continuous Blood Gluc Sensor (DEXCOM G6 SENSOR) MISC  Continuous Blood Gluc Transmit (DEXCOM G6 TRANSMITTER) MISC  diclofenac sodium (VOLTAREN) 1 % Gel  DULoxetine (CYMBALTA) 60 MG capsule  escitalopram (LEXAPRO) 10 MG tablet  estradiol (ESTRACE) 1 MG tablet  fluticasone (FLONASE) 50 MCG/ACT nasal spray  galcanezumab-gnlm (EMGALITY) 120 MG/ML injection  GEMTESA 75 MG TABS tablet  Glucagon (BAQSIMI) 3 MG/DOSE nasal powder  glucose (BD GLUCOSE) 5 g chewable tablet  insulin aspart (NOVOLOG VIAL) 100 UNITS/ML vial  Insulin Disposable Pump (OMNIPOD 5 G6 INTRO, GEN 5,) KIT  levETIRAcetam (KEPPRA) 500 MG tablet  liraglutide (VICTOZA PEN) 18 MG/3ML solution  magnesium oxide (MAG-OX) 400 MG tablet  medical cannabis (Patient's own supply)  metFORMIN (GLUCOPHAGE XR) 500 MG 24 hr tablet  metoprolol succinate ER (TOPROL-XL) 100 MG 24 hr tablet  multivitamin w/minerals (THERA-VIT-M) tablet  nystatin (MYCOSTATIN) 062864 UNIT/GM external powder  olmesartan (BENICAR) 40 MG tablet  omeprazole (PRILOSEC) 20 MG capsule  pregabalin (LYRICA) 100 MG  "capsule  rosuvastatin (CRESTOR) 5 MG tablet  tiZANidine (ZANAFLEX) 2 MG tablet  valACYclovir (VALTREX) 500 MG tablet        ALLERGIES:  Allergies   Allergen Reactions    Morphine Other (See Comments)     \"make me delirious,\"  \"nightmares\"    Nitrofurantoin     Adhesive [Cyanoacrylate] Other (See Comments)     Can only tolerate tape for short periods of time shelia in sensitive areas    Amlodipine Unknown and Other (See Comments)     Other reaction(s): delerium    Doxazosin Other (See Comments)     Other reaction(s): feels foggy    Irbesartan-Hydrochlorothiazide Other (See Comments)     Other reaction(s): hyponatremia    Other Allergy (See Comments) [External Allergen Needs Reconciliation - See Comment] Unknown     Other reaction(s): skin rash    Prednisone Unknown     Caused extremely high blood sugars    Tramadol Other (See Comments)     Possible seizure activity    Trazodone Unknown     Other reaction(s): hung over       FAMILY HISTORY:  Family History   Problem Relation Age of Onset    Breast Cancer No family hx of        SOCIAL HISTORY:   Social History     Socioeconomic History    Marital status:    Tobacco Use    Smoking status: Every Day     Packs/day: 0.50     Years: 19.00     Additional pack years: 0.00     Total pack years: 9.50     Types: Cigarettes     Last attempt to quit: 9/15/2021     Years since quittin.4    Smokeless tobacco: Never    Tobacco comments:     Seen as Inpatient by Tobacco Treatment and history updated on 2023, at time smoking 1 cigarette per day   Substance and Sexual Activity    Alcohol use: No    Drug use: No       VITALS:  Patient Vitals for the past 24 hrs:   BP Temp Temp src Pulse Resp SpO2 Height Weight   24 1744 -- -- -- 67 25 97 % -- --   24 1734 (!) 155/110 -- -- 66 12 98 % -- --   24 171 -- -- -- 67 16 97 % -- --   24 171 -- -- -- 67 15 98 % -- --   24 171 -- -- -- 68 17 98 % -- --   24 1709 -- -- -- 68 13 98 % -- -- " "  03/13/24 1708 (!) 188/64 -- -- 68 12 98 % -- --   03/13/24 1630 -- -- -- 68 14 93 % -- --   03/13/24 1625 -- -- -- 68 19 96 % -- --   03/13/24 1620 -- -- -- 71 22 96 % -- --   03/13/24 1618 -- -- -- 71 14 97 % -- --   03/13/24 1615 -- -- -- 72 21 98 % -- --   03/13/24 1610 -- -- -- 72 20 97 % -- --   03/13/24 1605 -- -- -- 70 18 96 % -- --   03/13/24 1603 108/55 -- -- 70 17 96 % -- --   03/13/24 1548 -- -- -- 73 14 97 % -- --   03/13/24 1533 104/58 -- -- 72 13 98 % -- --   03/13/24 1433 110/61 98.2  F (36.8  C) Oral 76 11 96 % 1.575 m (5' 2\") 64.9 kg (143 lb)       PHYSICAL EXAM    VITAL SIGNS: BP (!) 155/110   Pulse 67   Temp 98.2  F (36.8  C) (Oral)   Resp 25   Ht 1.575 m (5' 2\")   Wt 64.9 kg (143 lb)   SpO2 97%   BMI 26.16 kg/m     GENERAL: Awake, alert.  In no acute distress. GCS 15  HEENT: Normocephalic, atraumatic.  Pupils equal, round and reactive.  Conjunctiva normal.  EOMI. No granados sign, no racoon eyes, no mastoid tenderness, no hemotympanum, no facial instability, no nasal bridge pain, no nasal septal hematoma, no intraoral lacerations, no loose teeth, no mandible pain or deformity  NECK: No stridor or apparent deformity. No midline pain to palpation.  PULMONARY: Symmetrical breath sounds without distress.  Lungs clear to auscultation bilaterally without wheezes, rhonchi or rales.  CARDIO: Regular rate and rhythm.  No significant murmur, rub or gallop.  Radial pulses strong and symmetrical.  THORAX: No focal chest wall deformity or crepitus  BACK: No focal tenderness or deformity to each vertebral level in midline  ABDOMINAL: Abdomen soft, non-distended and non-tender to palpation.  No CVAT, BL.  EXTREMITIES: No lower extremity swelling or edema. Pelvis stable and without focal tenderness. Bilateral pedal pulses 2+ and equal.  NEURO: Alert and oriented to person, place and time.  Cranial nerves grossly intact.  No focal motor deficit. Sensation globally intact.  PSYCH: Normal mood and " affect  SKIN: No rashes      LAB:  All pertinent labs reviewed and interpreted.  Results for orders placed or performed during the hospital encounter of 03/13/24   CT Head w/o Contrast    Impression    IMPRESSION:  1.  No CT evidence for acute intracranial process.  2.  Mild chronic microvascular ischemic changes as above.    3.  DDD change throughout the cervical spine remains stable compared to previous CT scan of 08/17/2023. No evidence of fracture.   CT Cervical Spine w/o Contrast    Impression    IMPRESSION:  1.  No CT evidence for acute intracranial process.  2.  Mild chronic microvascular ischemic changes as above.    3.  DDD change throughout the cervical spine remains stable compared to previous CT scan of 08/17/2023. No evidence of fracture.   CT Chest Pulmonary Embolism w Contrast    Impression    IMPRESSION:  1.  No acute pulmonary emboli.    2.  Small subpleural consolidation in the left lower lobe could be an area of atelectasis or early pneumonia.   Result Value Ref Range    INR 1.03 0.85 - 1.15   Basic metabolic panel   Result Value Ref Range    Sodium 137 135 - 145 mmol/L    Potassium 3.9 3.4 - 5.3 mmol/L    Chloride 101 98 - 107 mmol/L    Carbon Dioxide (CO2) 28 22 - 29 mmol/L    Anion Gap 8 7 - 15 mmol/L    Urea Nitrogen 18.7 8.0 - 23.0 mg/dL    Creatinine 0.84 0.51 - 0.95 mg/dL    GFR Estimate 73 >60 mL/min/1.73m2    Calcium 8.5 (L) 8.8 - 10.2 mg/dL    Glucose 177 (H) 70 - 99 mg/dL   Result Value Ref Range    Procalcitonin 0.05 <0.50 ng/mL   Result Value Ref Range    Troponin T, High Sensitivity 16 (H) <=14 ng/L   Result Value Ref Range    Magnesium 1.9 1.7 - 2.3 mg/dL   TSH with free T4 reflex   Result Value Ref Range    TSH 2.51 0.30 - 4.20 uIU/mL   Blood gas venous   Result Value Ref Range    pH Venous 7.37 7.32 - 7.43    pCO2 Venous 54 (H) 40 - 50 mm Hg    pO2 Venous 24 (L) 25 - 47 mm Hg    Bicarbonate Venous 31 (H) 21 - 28 mmol/L    Base Excess/Deficit Venous 5.9 (H) -3.0 - 3.0 mmol/L     FIO2 21     Oxyhemoglobin Venous 40 (L) 70 - 75 %    O2 Sat, Venous 40.2 (L) 70.0 - 75.0 %   Nt probnp inpatient (BNP)   Result Value Ref Range    N terminal Pro BNP Inpatient 360 0 - 900 pg/mL   Symptomatic Influenza A/B, RSV, & SARS-CoV2 PCR (COVID-19) Nasopharyngeal    Specimen: Nasopharyngeal; Swab   Result Value Ref Range    Influenza A PCR Negative Negative    Influenza B PCR Negative Negative    RSV PCR Negative Negative    SARS CoV2 PCR Negative Negative   Ethyl Alcohol Level   Result Value Ref Range    Alcohol ethyl <0.01 <=0.01 g/dL   CBC with platelets and differential   Result Value Ref Range    WBC Count 12.8 (H) 4.0 - 11.0 10e3/uL    RBC Count 3.90 3.80 - 5.20 10e6/uL    Hemoglobin 12.0 11.7 - 15.7 g/dL    Hematocrit 36.7 35.0 - 47.0 %    MCV 94 78 - 100 fL    MCH 30.8 26.5 - 33.0 pg    MCHC 32.7 31.5 - 36.5 g/dL    RDW 14.6 10.0 - 15.0 %    Platelet Count 248 150 - 450 10e3/uL    % Neutrophils 66 %    % Lymphocytes 24 %    % Monocytes 7 %    % Eosinophils 2 %    % Basophils 1 %    % Immature Granulocytes 0 %    NRBCs per 100 WBC 0 <1 /100    Absolute Neutrophils 8.5 (H) 1.6 - 8.3 10e3/uL    Absolute Lymphocytes 3.0 0.8 - 5.3 10e3/uL    Absolute Monocytes 0.8 0.0 - 1.3 10e3/uL    Absolute Eosinophils 0.3 0.0 - 0.7 10e3/uL    Absolute Basophils 0.1 0.0 - 0.2 10e3/uL    Absolute Immature Granulocytes 0.1 <=0.4 10e3/uL    Absolute NRBCs 0.0 10e3/uL   Adult Type and Screen   Result Value Ref Range    ABO/RH(D) O NEG     Antibody Screen Negative Negative    SPECIMEN EXPIRATION DATE [id number]        RADIOLOGY:  Reviewed all pertinent imaging. Please see official radiology report.  CT Chest Pulmonary Embolism w Contrast   Final Result   IMPRESSION:   1.  No acute pulmonary emboli.      2.  Small subpleural consolidation in the left lower lobe could be an area of atelectasis or early pneumonia.      CT Cervical Spine w/o Contrast   Final Result   IMPRESSION:   1.  No CT evidence for acute intracranial  process.   2.  Mild chronic microvascular ischemic changes as above.      3.  DDD change throughout the cervical spine remains stable compared to previous CT scan of 08/17/2023. No evidence of fracture.      CT Head w/o Contrast   Final Result   IMPRESSION:   1.  No CT evidence for acute intracranial process.   2.  Mild chronic microvascular ischemic changes as above.      3.  DDD change throughout the cervical spine remains stable compared to previous CT scan of 08/17/2023. No evidence of fracture.            EKG:    From: 3/13/24 at 15:49:10  Reviewed and interpreted as: sinus rhythm. Left bundle branch block. Vent rate 72 BPM. When compared to ECG of 8/17/232 at 11:56, no significant changes were found.      I have independently reviewed and interpreted the EKG(s) documented above.        I, Kim Krishna, am serving as a scribe to document services personally performed by Dr. Wendy Ivey based on my observation and the provider's statements to me. I, Wendy Ivey MD attest that Kim Krishna is acting in a scribe capacity, has observed my performance of the services and has documented them in accordance with my direction.       Wendy Ivey MD  03/13/24 4037

## 2024-03-13 NOTE — PHARMACY-ADMISSION MEDICATION HISTORY
Pharmacist Admission Medication History    Admission medication history is complete. The information provided in this note is only as accurate as the sources available at the time of the update.    Information Source(s): Patient, Clinic records, and CareEverywhere/SureScripts via in-person    Pertinent Information: Patient is on insulin pump. She is stable on it and I have included her pump settings in medication history. Patient said she has not taken Olmesartan for quite some time but believes she should be on it. I have included on med history.    Changes made to PTA medication list:  Added: Estradiol  Deleted: Abilify, Estrace, Olmesartan  Changed: APAP to 1 gm q6h PRN, Estrogen to PO route    Allergies reviewed with patient and updates made in EHR: yes    Medication History Completed By: KYUNG MANDEL [facility] 3/13/2024 4:33 PM    PTA Med List   Medication Sig Note Last Dose    acetaminophen (TYLENOL) 500 MG tablet Take 1,000 mg by mouth every 6 hours as needed for mild pain  Past Week at prn    albuterol (PROAIR HFA/PROVENTIL HFA/VENTOLIN HFA) 108 (90 Base) MCG/ACT inhaler Inhale 2 puffs into the lungs every 6 hours as needed for shortness of breath, wheezing or cough  Past Week at prn    amoxicillin (AMOXIL) 500 MG tablet Take 2,000 mg by mouth as needed Before dental appointments  Unknown    aspirin 81 MG EC tablet Take 81 mg by mouth daily  3/13/2024 at am    cephALEXin (KEFLEX) 500 MG capsule Take 500 mg by mouth daily  3/13/2024 at am    cetirizine (ZYRTEC) 10 MG tablet Take 10 mg by mouth daily as needed  Unknown at prn    cholecalciferol (VITAMIN D3) 25 mcg (1000 units) capsule Take 1,000 Units by mouth daily   3/13/2024 at am    cloNIDine (CATAPRES-TTS) 0.3 mg/24 hr Place 1 patch onto the skin once a week   3/10/2024    Continuous Blood Gluc  (DEXCOM G6 ) TORRIE Use to read blood sugars as per 's instructions.      Continuous Blood Gluc Sensor (DEXCOM G6 SENSOR) MISC CHANGE  EVERY 10 DAYS.      Continuous Blood Gluc Transmit (DEXCOM G6 TRANSMITTER) MISC CHANGE EVERY 3 MONTHS.      diclofenac sodium (VOLTAREN) 1 % Gel [DICLOFENAC SODIUM (VOLTAREN) 1 % GEL] Apply 1 g topically daily as needed.  Unknown at prn    DULoxetine (CYMBALTA) 60 MG capsule Take 60 mg by mouth daily  3/13/2024 at am    escitalopram (LEXAPRO) 10 MG tablet Take 10 mg by mouth daily  3/13/2024 at am    estradiol (ESTRACE) 1 MG tablet Take 1 mg by mouth daily  3/13/2024 at am    fluticasone (FLONASE) 50 MCG/ACT nasal spray Spray 1 spray into both nostrils daily as needed for rhinitis or allergies  Unknown at prn    galcanezumab-gnlm (EMGALITY) 120 MG/ML injection Inject 1 mL (120 mg) Subcutaneous every 28 days  3/12/2024    GEMTESA 75 MG TABS tablet Take 75 mg by mouth daily  3/13/2024 at am    Glucagon (BAQSIMI) 3 MG/DOSE nasal powder Spray 1 spray (3 mg) in nostril as needed (severe hypoglycemia) in the event of unconscious hypoglycemia or hypoglycemic seizure. May repeat dose if no response after 15 minutes.      glucose (BD GLUCOSE) 5 g chewable tablet Take 2 tablets (10 g) by mouth daily as needed (hypoglycemia)      insulin aspart (NOVOLOG VIAL) 100 UNITS/ML vial Inject 30 Units Subcutaneous daily 3/13/2024: This is for insulin pump     Insulin Disposable Pump (OMNIPOD 5 G6 INTRO, GEN 5,) KIT 1 each daily (Patient taking differently: 1 each daily Current pump settings with DASH:   Basal:   12am: 0.55 units/hr  3am: 0.40 units/hr  9am: 0.45 units/hr  12pm: 0.65 units/hr  Max basal: 2 units/hr      ICR: 1 units per 18 g CHO  ISF: 60 mg/dL per unit  Target , correct above 150   Reverse correct: ON   Insulin action time: 4 hours   Max bolus: 15 units)  3/13/2024 at pump is on patient    levETIRAcetam (KEPPRA) 500 MG tablet Take 1 tablet (500 mg) by mouth 2 times daily  3/13/2024 at am    liraglutide (VICTOZA PEN) 18 MG/3ML solution Inject 1.2 mg Subcutaneous daily  3/13/2024 at am    magnesium oxide (MAG-OX)  400 MG tablet Take 400 mg by mouth daily   3/13/2024 at am    medical cannabis (Patient's own supply) 1 Dose by Other route See Admin Instructions [employer] Vape- 1-4 puffs HS PRN  3/12/2024 at qhs    metFORMIN (GLUCOPHAGE XR) 500 MG 24 hr tablet TAKE 2 TABS (1000MG) BY MOUTH TWICE DAILY WITH MEALS  3/13/2024 at am    metoprolol succinate ER (TOPROL-XL) 100 MG 24 hr tablet Take 100 mg by mouth 2 times daily  3/13/2024 at am    multivitamin w/minerals (THERA-VIT-M) tablet Take 1 tablet by mouth daily  3/13/2024 at am    nystatin (MYCOSTATIN) 341744 UNIT/GM external powder Apply topically 2 times daily as needed  Unknown at prn    olmesartan (BENICAR) 40 MG tablet Take 40 mg by mouth daily 3/13/2024: Patient has not filled since October but believed they should still be taking it.  Unknown    omeprazole (PRILOSEC) 20 MG capsule [OMEPRAZOLE (PRILOSEC) 20 MG CAPSULE] Take 1 capsule by mouth daily before breakfast.  3/13/2024 at am    pregabalin (LYRICA) 100 MG capsule Take 1 capsule (100 mg) by mouth 3 times daily  3/13/2024 at X1 AM dose    rosuvastatin (CRESTOR) 5 MG tablet TAKE 1 TABLET BY MOUTH EVERY DAY  3/13/2024 at am    tiZANidine (ZANAFLEX) 2 MG tablet Take 4 mg by mouth nightly as needed  3/12/2024 at [facility]    valACYclovir (VALTREX) 500 MG tablet Take 500 mg by mouth daily  3/13/2024 at am

## 2024-03-13 NOTE — ED TRIAGE NOTES
Pt presented to ED via ambulance from home. 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 Per medic report when pt came to door for them another syncopal episode when pt stood back up quickly, positive ortho blood pressures for medic. Fluids started (300mL administered). Pt denies hitting head, no thinners, VS WDL at rest.      Triage Assessment (Adult)       Row Name 03/13/24 9246          Triage Assessment    Airway WDL WDL        Respiratory WDL    Respiratory WDL WDL        Skin Circulation/Temperature WDL    Skin Circulation/Temperature WDL WDL        Cardiac WDL    Cardiac WDL WDL  Sinus Rhythm with bundle block        Peripheral/Neurovascular WDL    Peripheral Neurovascular WDL WDL        Cognitive/Neuro/Behavioral WDL    Cognitive/Neuro/Behavioral WDL WDL

## 2024-03-14 ENCOUNTER — TRANSFERRED RECORDS (OUTPATIENT)
Dept: HEALTH INFORMATION MANAGEMENT | Facility: CLINIC | Age: 74
End: 2024-03-14

## 2024-03-14 ENCOUNTER — APPOINTMENT (OUTPATIENT)
Dept: CARDIOLOGY | Facility: HOSPITAL | Age: 74
End: 2024-03-14
Attending: STUDENT IN AN ORGANIZED HEALTH CARE EDUCATION/TRAINING PROGRAM
Payer: COMMERCIAL

## 2024-03-14 ENCOUNTER — APPOINTMENT (OUTPATIENT)
Dept: NEUROLOGY | Facility: HOSPITAL | Age: 74
End: 2024-03-14
Attending: INTERNAL MEDICINE
Payer: COMMERCIAL

## 2024-03-14 ENCOUNTER — APPOINTMENT (OUTPATIENT)
Dept: PHYSICAL THERAPY | Facility: HOSPITAL | Age: 74
End: 2024-03-14
Attending: STUDENT IN AN ORGANIZED HEALTH CARE EDUCATION/TRAINING PROGRAM
Payer: COMMERCIAL

## 2024-03-14 ENCOUNTER — APPOINTMENT (OUTPATIENT)
Dept: OCCUPATIONAL THERAPY | Facility: HOSPITAL | Age: 74
End: 2024-03-14
Attending: STUDENT IN AN ORGANIZED HEALTH CARE EDUCATION/TRAINING PROGRAM
Payer: COMMERCIAL

## 2024-03-14 ENCOUNTER — ANCILLARY PROCEDURE (OUTPATIENT)
Dept: CARDIOLOGY | Facility: CLINIC | Age: 74
End: 2024-03-14
Attending: INTERNAL MEDICINE
Payer: COMMERCIAL

## 2024-03-14 DIAGNOSIS — Z95.818 IMPLANTABLE LOOP RECORDER PRESENT: ICD-10-CM

## 2024-03-14 DIAGNOSIS — R55 SYNCOPE: ICD-10-CM

## 2024-03-14 PROBLEM — G40.209 COMPLEX PARTIAL SEIZURE (H): Status: ACTIVE | Noted: 2024-03-14

## 2024-03-14 PROBLEM — J44.9 COPD (CHRONIC OBSTRUCTIVE PULMONARY DISEASE) (H): Status: ACTIVE | Noted: 2021-09-22

## 2024-03-14 PROBLEM — I48.0 PAROXYSMAL ATRIAL FIBRILLATION (H): Status: ACTIVE | Noted: 2021-09-22

## 2024-03-14 LAB
ANION GAP SERPL CALCULATED.3IONS-SCNC: 9 MMOL/L (ref 7–15)
BUN SERPL-MCNC: 14.8 MG/DL (ref 8–23)
CALCIUM SERPL-MCNC: 8.9 MG/DL (ref 8.8–10.2)
CHLORIDE SERPL-SCNC: 102 MMOL/L (ref 98–107)
CREAT SERPL-MCNC: 0.73 MG/DL (ref 0.51–0.95)
CRP SERPL-MCNC: <3 MG/L
DEPRECATED HCO3 PLAS-SCNC: 27 MMOL/L (ref 22–29)
EGFRCR SERPLBLD CKD-EPI 2021: 86 ML/MIN/1.73M2
ERYTHROCYTE [DISTWIDTH] IN BLOOD BY AUTOMATED COUNT: 14.6 % (ref 10–15)
GLUCOSE BLDC GLUCOMTR-MCNC: 101 MG/DL (ref 70–99)
GLUCOSE BLDC GLUCOMTR-MCNC: 163 MG/DL (ref 70–99)
GLUCOSE BLDC GLUCOMTR-MCNC: 173 MG/DL (ref 70–99)
GLUCOSE BLDC GLUCOMTR-MCNC: 184 MG/DL (ref 70–99)
GLUCOSE BLDC GLUCOMTR-MCNC: 186 MG/DL (ref 70–99)
GLUCOSE BLDC GLUCOMTR-MCNC: 206 MG/DL (ref 70–99)
GLUCOSE BLDC GLUCOMTR-MCNC: 288 MG/DL (ref 70–99)
GLUCOSE BLDC GLUCOMTR-MCNC: 51 MG/DL (ref 70–99)
GLUCOSE BLDC GLUCOMTR-MCNC: 53 MG/DL (ref 70–99)
GLUCOSE BLDC GLUCOMTR-MCNC: 66 MG/DL (ref 70–99)
GLUCOSE SERPL-MCNC: 305 MG/DL (ref 70–99)
HBA1C MFR BLD: 7.5 %
HCT VFR BLD AUTO: 38.9 % (ref 35–47)
HGB BLD-MCNC: 13 G/DL (ref 11.7–15.7)
HOLD SPECIMEN: NORMAL
LVEF ECHO: NORMAL
MCH RBC QN AUTO: 31.3 PG (ref 26.5–33)
MCHC RBC AUTO-ENTMCNC: 33.4 G/DL (ref 31.5–36.5)
MCV RBC AUTO: 94 FL (ref 78–100)
PLATELET # BLD AUTO: 244 10E3/UL (ref 150–450)
POTASSIUM SERPL-SCNC: 4.5 MMOL/L (ref 3.4–5.3)
RBC # BLD AUTO: 4.16 10E6/UL (ref 3.8–5.2)
SODIUM SERPL-SCNC: 138 MMOL/L (ref 135–145)
WBC # BLD AUTO: 15.2 10E3/UL (ref 4–11)

## 2024-03-14 PROCEDURE — 99233 SBSQ HOSP IP/OBS HIGH 50: CPT | Performed by: INTERNAL MEDICINE

## 2024-03-14 PROCEDURE — 97535 SELF CARE MNGMENT TRAINING: CPT | Mod: GO

## 2024-03-14 PROCEDURE — 250N000009 HC RX 250: Performed by: STUDENT IN AN ORGANIZED HEALTH CARE EDUCATION/TRAINING PROGRAM

## 2024-03-14 PROCEDURE — 82962 GLUCOSE BLOOD TEST: CPT

## 2024-03-14 PROCEDURE — 36415 COLL VENOUS BLD VENIPUNCTURE: CPT | Performed by: STUDENT IN AN ORGANIZED HEALTH CARE EDUCATION/TRAINING PROGRAM

## 2024-03-14 PROCEDURE — 85048 AUTOMATED LEUKOCYTE COUNT: CPT | Performed by: STUDENT IN AN ORGANIZED HEALTH CARE EDUCATION/TRAINING PROGRAM

## 2024-03-14 PROCEDURE — 80048 BASIC METABOLIC PNL TOTAL CA: CPT | Performed by: STUDENT IN AN ORGANIZED HEALTH CARE EDUCATION/TRAINING PROGRAM

## 2024-03-14 PROCEDURE — 36415 COLL VENOUS BLD VENIPUNCTURE: CPT | Performed by: INTERNAL MEDICINE

## 2024-03-14 PROCEDURE — 93306 TTE W/DOPPLER COMPLETE: CPT | Mod: 26 | Performed by: INTERNAL MEDICINE

## 2024-03-14 PROCEDURE — G0378 HOSPITAL OBSERVATION PER HR: HCPCS

## 2024-03-14 PROCEDURE — 97162 PT EVAL MOD COMPLEX 30 MIN: CPT | Mod: GP

## 2024-03-14 PROCEDURE — 93306 TTE W/DOPPLER COMPLETE: CPT

## 2024-03-14 PROCEDURE — 83036 HEMOGLOBIN GLYCOSYLATED A1C: CPT | Performed by: INTERNAL MEDICINE

## 2024-03-14 PROCEDURE — 250N000012 HC RX MED GY IP 250 OP 636 PS 637: Performed by: INTERNAL MEDICINE

## 2024-03-14 PROCEDURE — 250N000013 HC RX MED GY IP 250 OP 250 PS 637: Performed by: STUDENT IN AN ORGANIZED HEALTH CARE EDUCATION/TRAINING PROGRAM

## 2024-03-14 PROCEDURE — 97165 OT EVAL LOW COMPLEX 30 MIN: CPT | Mod: GO

## 2024-03-14 PROCEDURE — 95819 EEG AWAKE AND ASLEEP: CPT

## 2024-03-14 PROCEDURE — 80177 DRUG SCRN QUAN LEVETIRACETAM: CPT | Performed by: INTERNAL MEDICINE

## 2024-03-14 PROCEDURE — 99222 1ST HOSP IP/OBS MODERATE 55: CPT | Performed by: INTERNAL MEDICINE

## 2024-03-14 PROCEDURE — 97116 GAIT TRAINING THERAPY: CPT | Mod: GP

## 2024-03-14 RX ORDER — CLONIDINE 0.3 MG/24H
1 PATCH, EXTENDED RELEASE TRANSDERMAL WEEKLY
Status: DISCONTINUED | OUTPATIENT
Start: 2024-03-17 | End: 2024-03-15 | Stop reason: HOSPADM

## 2024-03-14 RX ADMIN — PREGABALIN 100 MG: 100 CAPSULE ORAL at 08:34

## 2024-03-14 RX ADMIN — ESCITALOPRAM OXALATE 10 MG: 10 TABLET ORAL at 08:35

## 2024-03-14 RX ADMIN — Medication 81 MG: at 08:34

## 2024-03-14 RX ADMIN — INSULIN GLARGINE 10 UNITS: 100 INJECTION, SOLUTION SUBCUTANEOUS at 09:56

## 2024-03-14 RX ADMIN — ROSUVASTATIN CALCIUM 5 MG: 5 TABLET, FILM COATED ORAL at 08:34

## 2024-03-14 RX ADMIN — MIRABEGRON 25 MG: 25 TABLET, FILM COATED, EXTENDED RELEASE ORAL at 08:35

## 2024-03-14 RX ADMIN — LEVETIRACETAM 500 MG: 500 TABLET, FILM COATED ORAL at 08:34

## 2024-03-14 RX ADMIN — LIRAGLUTIDE 1.2 MG: 6 INJECTION SUBCUTANEOUS at 09:56

## 2024-03-14 RX ADMIN — PANTOPRAZOLE SODIUM 40 MG: 40 TABLET, DELAYED RELEASE ORAL at 07:00

## 2024-03-14 RX ADMIN — METOPROLOL SUCCINATE 100 MG: 50 TABLET, EXTENDED RELEASE ORAL at 20:53

## 2024-03-14 RX ADMIN — DULOXETINE HYDROCHLORIDE 60 MG: 60 CAPSULE, DELAYED RELEASE PELLETS ORAL at 08:34

## 2024-03-14 RX ADMIN — LEVETIRACETAM 500 MG: 500 TABLET, FILM COATED ORAL at 20:53

## 2024-03-14 RX ADMIN — METOPROLOL SUCCINATE 100 MG: 50 TABLET, EXTENDED RELEASE ORAL at 08:34

## 2024-03-14 RX ADMIN — PREGABALIN 100 MG: 100 CAPSULE ORAL at 20:53

## 2024-03-14 RX ADMIN — ESTRADIOL 1 MG: 1 TABLET ORAL at 08:35

## 2024-03-14 RX ADMIN — PREGABALIN 100 MG: 100 CAPSULE ORAL at 13:53

## 2024-03-14 RX ADMIN — DEXTROSE 15 G: 15 GEL ORAL at 07:57

## 2024-03-14 RX ADMIN — LOSARTAN POTASSIUM 100 MG: 50 TABLET, FILM COATED ORAL at 08:34

## 2024-03-14 ASSESSMENT — ACTIVITIES OF DAILY LIVING (ADL)
ADLS_ACUITY_SCORE: 39
ADLS_ACUITY_SCORE: 43
ADLS_ACUITY_SCORE: 39
ADLS_ACUITY_SCORE: 43
ADLS_ACUITY_SCORE: 39
ADLS_ACUITY_SCORE: 39
ADLS_ACUITY_SCORE: 43
ADLS_ACUITY_SCORE: 43
ADLS_ACUITY_SCORE: 39
ADLS_ACUITY_SCORE: 39
ADLS_ACUITY_SCORE: 43
ADLS_ACUITY_SCORE: 43
ADLS_ACUITY_SCORE: 39
ADLS_ACUITY_SCORE: 43
ADLS_ACUITY_SCORE: 43

## 2024-03-14 NOTE — PROVIDER NOTIFICATION
Dr. Cooper notified at 0901:    What about liraglutide? Do you want me to hold that? Her blood sugar is now 163.

## 2024-03-14 NOTE — PROGRESS NOTES
"PRIMARY DIAGNOSIS: \"GENERIC\" NURSING  OUTPATIENT/OBSERVATION GOALS TO BE MET BEFORE DISCHARGE:  ADLs back to baseline: No    Activity and level of assistance: Up with standby assistance.    Pain status: Pain free.    Return to near baseline physical activity: No     Discharge Planner Nurse   Safe discharge environment identified: Yes  Barriers to discharge: No       Entered by: Nannette Landis RN 03/14/2024 6:11 AM   A&Ox4. Denies pain. SBA. Tele Normal sinus rhythm. Last /72.  On room air.  Please review provider order for any additional goals.   Nurse to notify provider when observation goals have been met and patient is ready for discharge.  "

## 2024-03-14 NOTE — DISCHARGE INSTRUCTIONS
Per your request, home care HAS NOT been arranged for you.  If you change your mind, please call your primary care provider.   Resume insulin pump use tomorrow 0600 3/16/24

## 2024-03-14 NOTE — PLAN OF CARE
Called to [facility] for loop recorder device check [phone]. A staff will be here shortly to get device checked.

## 2024-03-14 NOTE — PROVIDER NOTIFICATION
Dr. Cooper notified:    Pt blood glucose is 53, just gave  15gm of glucose gel. Will check level in 15 minutes. I held sliding scale insulin but pt has Lantus ordered too. Do you still want me to give it? Also she said she has an insulin pump that is still working but she doesn't know how much she is getting. Do you want her to remove it.

## 2024-03-14 NOTE — H&P
[facility]    History and Physical - Hospitalist Service       Date of Admission:  3/13/2024    Assessment & Plan      Paige Mari is a 73 year old female admitted on 3/13/2024 with recurrent syncope.  She was here in August for similar issues.  Her symptomatology does not strongly suggest one etiology over another.  She describes syncopal episodes as occurring usually when she is exerting herself and admits to associated intermittent palpitations and/or urinary continence with these episodes.    Syncope  -Patient has a longstanding history of syncopal episodes and was admitted here in August for similar.  Has a loop recorder.  Brain imaging on this admission negative for acute findings.  -ED discussed with cardiology, plan for cardiology consultation and loop recorder check  -Question tachybradycardia syndrome with hx of a-fib  -Will check carotid ultrasound  -TTE  -telemetry   -Patient is on a handful of psychotropic meds, also a medical card for [employer] for which she uses daily.  May consider psych consult for polypharmacy pending the above  -Continue PTA Keppra which was initially started due to one of the syncopal episodes.  Has previously had EEG showing rhythmic activity and is due for repeat EEG as an outpatient.  Consider repeating here    Leukocytosis  -No signs and symptoms of active infection, likely reactive to syncopal fall  -UA unrevealing, Pro-Nicolas negative    Diabetes (HARRIETT)  -Has a home insulin pump which she says needs to be charged  -Daughter to try to bring equipment tomorrow  -Will initiate 24 basal with low-dose sliding scale insulin in the meantime  -Monitor glucose per protocol  -Continue home liraglutide, holding home metformin    COPD  Tobacco abuse  -PTA albuterol inhaler as needed  -Smokes 1 cigarette a day alongside multiple vapes  -Discussed cessation, patient deferring nicotine replacement at the moment    A-fib  -Not on anticoagulation  -Continue PTA  "metoprolol       Observation Goals: -diagnostic tests and consults completed and resulted, -vital signs normal or at patient baseline, -returns to baseline functional status, -safe disposition plan has been identified, Nurse to notify provider when observation goals have been met and patient is ready for discharge.  Diet: Moderate Consistent Carb (60 g CHO per Meal) Diet    DVT Prophylaxis: SCDs  Gomes Catheter: Not present  Lines: None     Cardiac Monitoring: ACTIVE order. Indication: Syncope- high cardiac risk (48 hours)  Code Status: No CPR- Pre-arrest intubation OK      Clinically Significant Risk Factors Present on Admission                # Drug Induced Platelet Defect: home medication list includes an antiplatelet medication   # Hypertension: Noted on problem list      # Overweight: Estimated body mass index is 26.16 kg/m  as calculated from the following:    Height as of this encounter: 1.575 m (5' 2\").    Weight as of this encounter: 64.9 kg (143 lb).              Disposition Plan      Expected Discharge Date: 03/14/2024      Destination: home with family            Ortiz Gutierres DO  Hospitalist Service  [facility]  Securely message with [employer] (more info)  Text page via [employer] Paging/Directory     ______________________________________________________________________    Chief Complaint   Syncope    History is obtained from the patient    History of Present Illness   Paige Mari is a 73 year old female who is presenting with syncopal fall that occurred earlier today.  Patient states that she was resting in her recliner when she heard a knock at the door, at which point she stood up and started walking to answer the door.  At this point patient does not remember falling.  She lives at home with her  did not hear her fall and likely the person at the door was able to get help.  Patient states she was down for a minute or 2.  This has occurred multiple times in the past " and patient has had a relatively extensive workup and even has a loop recorder.  However, when asked about loop recorder, patient states that she does not always press record when she is feeling symptoms.  She admits to intermittent episodes of urinary incontinence with these episodes.  She admits to postictal state that lasts 1 to 5 minutes and no longer.  Patient admits to a central chest pressure during these episodes at times alongside palpitations.  She denies any fevers or chills at home.      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), managed as type 1 (H)     Created by Conversion       Mood disorder (H24) 11/24/2014    KP (obstructive sleep apnea) 05/15/2015    Paroxysmal atrial fibrillation (H) 09/22/2021    Small bowel obstruction (H) 09/18/2021    Status post total knee replacement 11/24/2014       Past Surgical History   Past Surgical History:   Procedure Laterality Date    APPENDECTOMY      at age 3    ARTHROSCOPY KNEE WITH MENISCECTOMY  3/10/14    partial medial and lateral meniscectomies, subpatellar chondroplasty    BACK SURGERY      BLEPHAROPLASTY Bilateral 8/17/2015    Procedure: BLEPHAROPLASTY BILATERAL UPPER LIDS;  Surgeon: Chasidy Bryant MD;  Location: [facility];  Service:     BOWEL RESECTION  12 years ago    COLON SURGERY  2004    sigmoid colectomy    DILATION AND CURETTAGE      x3    EP LOOP RECORDER IMPLANT N/A 8/21/2023    Procedure: Loop Recorder Implant;  Surgeon: Nolvia Ruiz MD;  Location: [facility] CV    HYSTERECTOMY      age 39    IR LUMBAR PUNCTURE  1/22/2022    JOINT REPLACEMENT      OOPHORECTOMY      OTHER SURGICAL  HISTORY  2005    bowel obstruction    PICC TRIPLE LUMEN PLACEMENT  2021         PICC TRIPLE LUMEN PLACEMENT  2022         WY ARTHRODESIS ANT INTERBODY MIN DISCECTOMY,LUMBAR N/A 2015    Procedure: ANTERIOR LUMBAR INTERBODY FUSION L4-5, INTERBODY GRAFT, BMP INSTRUMENTATION;  Surgeon: Zeeshan Guillaume MD;  Location: [facility];  Service: Spine    TOE SURGERY      TONSILLECTOMY & ADENOIDECTOMY      age 10    ZZC REMOVAL OF OVARY(S)      Description: Oophorectomy;  Recorded: 2010;    ZZC REMOVAL OF OVARY(S)      Description: Oophorectomy;  Recorded: 2010;    ZZC TOTAL ABDOM HYSTERECTOMY      Description: Hysterectomy;  Recorded: 2010;    ZZC TOTAL ABDOM HYSTERECTOMY      Description: Hysterectomy;  Recorded: 2010;    ZC TOTAL KNEE ARTHROPLASTY Right 2014    Procedure: RIGHT TOTAL KNEE ARTHROPLASTY;  Surgeon: Jules Harris MD;  Location: [facility];  Service: Orthopedics       Prior to Admission Medications   Prior to Admission Medications   Prescriptions Last Dose Informant Patient Reported? Taking?   Continuous Blood Gluc  (DEXCOM G6 ) TORRIE   No Yes   Sig: Use to read blood sugars as per 's instructions.   Continuous Blood Gluc Sensor (DEXCOM G6 SENSOR) MISC   No Yes   Sig: CHANGE EVERY 10 DAYS.   Continuous Blood Gluc Transmit (DEXCOM G6 TRANSMITTER) MISC   No Yes   Sig: CHANGE EVERY 3 MONTHS.   DULoxetine (CYMBALTA) 60 MG capsule 3/13/2024 at am  Yes Yes   Sig: Take 60 mg by mouth daily   GEMTESA 75 MG TABS tablet 3/13/2024 at am  Yes Yes   Sig: Take 75 mg by mouth daily   Glucagon (BAQSIMI) 3 MG/DOSE nasal powder   No Yes   Sig: Spray 1 spray (3 mg) in nostril as needed (severe hypoglycemia) in the event of unconscious hypoglycemia or hypoglycemic seizure. May repeat dose if no response after 15 minutes.   Insulin Disposable Pump (OMNIPOD 5 G6 INTRO, GEN 5,) KIT 3/13/2024 at pump is on patient  No Yes   Si each daily    Patient taking differently: 1 each daily Current pump settings with DASH:   Basal:   12am: 0.55 units/hr  3am: 0.40 units/hr  9am: 0.45 units/hr  12pm: 0.65 units/hr  Max basal: 2 units/hr      ICR: 1 units per 18 g CHO  ISF: 60 mg/dL per unit  Target , correct above 150   Reverse correct: ON   Insulin action time: 4 hours   Max bolus: 15 units   acetaminophen (TYLENOL) 500 MG tablet Past Week at prn  Yes Yes   Sig: Take 1,000 mg by mouth every 6 hours as needed for mild pain   albuterol (PROAIR HFA/PROVENTIL HFA/VENTOLIN HFA) 108 (90 Base) MCG/ACT inhaler Past Week at prn  Yes Yes   Sig: Inhale 2 puffs into the lungs every 6 hours as needed for shortness of breath, wheezing or cough   amoxicillin (AMOXIL) 500 MG tablet Unknown Self, Daughter Yes Yes   Sig: Take 2,000 mg by mouth as needed Before dental appointments   aspirin 81 MG EC tablet 3/13/2024 at am  Yes Yes   Sig: Take 81 mg by mouth daily   cephALEXin (KEFLEX) 500 MG capsule 3/13/2024 at am  Yes Yes   Sig: Take 500 mg by mouth daily   cetirizine (ZYRTEC) 10 MG tablet Unknown at prn Self, Daughter Yes Yes   Sig: Take 10 mg by mouth daily as needed   cholecalciferol (VITAMIN D3) 25 mcg (1000 units) capsule 3/13/2024 at am Self, Daughter Yes Yes   Sig: Take 1,000 Units by mouth daily    cloNIDine (CATAPRES-TTS) 0.3 mg/24 hr 3/10/2024 Self, Daughter Yes Yes   Sig: Place 1 patch onto the skin once a week    diclofenac sodium (VOLTAREN) 1 % Gel Unknown at prn Self, Daughter Yes Yes   Sig: [DICLOFENAC SODIUM (VOLTAREN) 1 % GEL] Apply 1 g topically daily as needed.   escitalopram (LEXAPRO) 10 MG tablet 3/13/2024 at am  Yes Yes   Sig: Take 10 mg by mouth daily   estradiol (ESTRACE) 1 MG tablet 3/13/2024 at am  Yes Yes   Sig: Take 1 mg by mouth daily   fluticasone (FLONASE) 50 MCG/ACT nasal spray Unknown at prn  Yes Yes   Sig: Spray 1 spray into both nostrils daily as needed for rhinitis or allergies   galcanezumab-gnlm (EMGALITY) 120 MG/ML injection  3/12/2024  No Yes   Sig: Inject 1 mL (120 mg) Subcutaneous every 28 days   glucose (BD GLUCOSE) 5 g chewable tablet  Self, Daughter No Yes   Sig: Take 2 tablets (10 g) by mouth daily as needed (hypoglycemia)   insulin aspart (NOVOLOG VIAL) 100 UNITS/ML vial   No Yes   Sig: Inject 30 Units Subcutaneous daily   levETIRAcetam (KEPPRA) 500 MG tablet 3/13/2024 at am  No Yes   Sig: Take 1 tablet (500 mg) by mouth 2 times daily   liraglutide (VICTOZA PEN) 18 MG/3ML solution 3/13/2024 at am  Yes Yes   Sig: Inject 1.2 mg Subcutaneous daily   magnesium oxide (MAG-OX) 400 MG tablet 3/13/2024 at am Self, Daughter Yes Yes   Sig: Take 400 mg by mouth daily    medical cannabis (Patient's own supply) 3/12/2024 at qhs Self, Daughter Yes Yes   Si Dose by Other route See Admin Instructions Malena Tangerine Vape- 1-4 puffs HS PRN   metFORMIN (GLUCOPHAGE XR) 500 MG 24 hr tablet 3/13/2024 at am  No Yes   Sig: TAKE 2 TABS (1000MG) BY MOUTH TWICE DAILY WITH MEALS   metoprolol succinate ER (TOPROL-XL) 100 MG 24 hr tablet 3/13/2024 at am Self, Daughter Yes Yes   Sig: Take 100 mg by mouth 2 times daily   multivitamin w/minerals (THERA-VIT-M) tablet 3/13/2024 at am Self, Daughter Yes Yes   Sig: Take 1 tablet by mouth daily   nystatin (MYCOSTATIN) 662053 UNIT/GM external powder Unknown at prn  Yes Yes   Sig: Apply topically 2 times daily as needed   olmesartan (BENICAR) 40 MG tablet Unknown  Yes Yes   Sig: Take 40 mg by mouth daily   omeprazole (PRILOSEC) 20 MG capsule 3/13/2024 at am Self, Daughter Yes Yes   Sig: [OMEPRAZOLE (PRILOSEC) 20 MG CAPSULE] Take 1 capsule by mouth daily before breakfast.   pregabalin (LYRICA) 100 MG capsule 3/13/2024 at X1 AM dose Self, Daughter No Yes   Sig: Take 1 capsule (100 mg) by mouth 3 times daily   rosuvastatin (CRESTOR) 5 MG tablet 3/13/2024 at am  No Yes   Sig: TAKE 1 TABLET BY MOUTH EVERY DAY   tiZANidine (ZANAFLEX) 2 MG tablet 3/12/2024 at [facility]  Yes Yes   Sig: Take 4 mg by mouth nightly as needed  "  valACYclovir (VALTREX) 500 MG tablet 3/13/2024 at am Self, Daughter Yes Yes   Sig: Take 500 mg by mouth daily      Facility-Administered Medications: None        Social History   I have reviewed this patient's social history and updated it with pertinent information if needed.  Social History     Tobacco Use    Smoking status: Every Day     Packs/day: 0.50     Years: 19.00     Additional pack years: 0.00     Total pack years: 9.50     Types: Cigarettes     Last attempt to quit: 9/15/2021     Years since quittin.4    Smokeless tobacco: Never    Tobacco comments:     Seen as Inpatient by [facility] and history updated on 2023, at time smoking 1 cigarette per day   Substance Use Topics    Alcohol use: No    Drug use: No         Family History           Allergies   Allergies   Allergen Reactions    Morphine Other (See Comments)     \"make me delirious,\"  \"nightmares\"    Nitrofurantoin     Adhesive [Cyanoacrylate] Other (See Comments)     Can only tolerate tape for short periods of time shelia in sensitive areas    Amlodipine Unknown and Other (See Comments)     Other reaction(s): delerium    Doxazosin Other (See Comments)     Other reaction(s): feels foggy    Irbesartan-Hydrochlorothiazide Other (See Comments)     Other reaction(s): hyponatremia    Other Allergy (See Comments) [External Allergen Needs Reconciliation - See Comment] Unknown     Other reaction(s): skin rash    Prednisone Unknown     Caused extremely high blood sugars    Tramadol Other (See Comments)     Possible seizure activity    Trazodone Unknown     Other reaction(s): hung over        Physical Exam   Vital Signs: Temp: 98.2  F (36.8  C) Temp src: Oral BP: (!) 189/90 Pulse: 62   Resp: 14 SpO2: 99 % O2 Device: None (Room air)    Weight: 143 lbs 0 oz    General Appearance: Nontoxic, nondiaphoretic, appears stated age  Respiratory: Poor inspiratory effort without any rales, rhonchi or wheezing  Cardiovascular: Tachycardic, irregular  GI: No " pain  Skin: No rash on exposed areas  Other: No focal deficits, no facial droop, pupils equal and reactive bilaterally, without skew, head impulse neg     Medical Decision Making       80 MINUTES SPENT BY ME on the date of service doing chart review, history, exam, documentation & further activities per the note.      Data     I have personally reviewed the following data over the past 24 hrs:    12.8 (H)  \   12.0   / 248     137 101 18.7 /  88   3.9 28 0.84 \     Trop: 14 BNP: 360     TSH: 2.51 T4: N/A A1C: N/A     Procal: 0.05 CRP: N/A Lactic Acid: N/A       INR:  1.03 PTT:  N/A   D-dimer:  N/A Fibrinogen:  N/A       Imaging results reviewed over the past 24 hrs:   Recent Results (from the past 24 hour(s))   CT Head w/o Contrast    Narrative    EXAM: CT HEAD W/O CONTRAST, CT CERVICAL SPINE W/O CONTRAST  LOCATION: [facility]  DATE: 3/13/2024    INDICATION: head trauma syncope. Midline neck pain following traumatic injury to the cervical spine.  COMPARISON: 08/17/2023. 01/20/2022.  TECHNIQUE: Routine CT Head and cervical spine spine CT. without IV contrast. Multiplanar reformats. Dose reduction techniques were used.    FINDINGS:  INTRACRANIAL CONTENTS: No intracranial hemorrhage, extraaxial collection, or mass effect.  No CT evidence of acute infarct. Mild presumed chronic small vessel ischemic changes. Posterior fossa structures including cerebellar hemispheres, fourth ventricle   and region of CP angle cisterns are clear. Nasopharynx is clear. Region of the sella and suprasellar cistern are clear.     VISUALIZED ORBITS/SINUSES/MASTOIDS: No intraorbital abnormality. Benign osteoma left ethmoid air cells as seen previously. No middle ear or mastoid effusion.    CT cervical spine:    Paraspinous soft tissues, lung apices region of thyroid gland and mucosal spaces of the neck are clear. No evidence of cervical spine fracture. No prevertebral soft tissue swelling.    Degenerative change  including 3 mm of anterior degenerative subluxation of C4 on C5. Variable degrees of uncovertebral joint and facet hypertrophic change result in multilevel foraminal stenosis including bilateral foraminal stenosis at C4-C5, bilateral   foraminal stenosis at C5-C6. Left-sided foraminal stenosis at C6-C7. 1 mm anterior degenerative subluxation of C5 on C6 unchanged.          Impression    IMPRESSION:  1.  No CT evidence for acute intracranial process.  2.  Mild chronic microvascular ischemic changes as above.    3.  DDD change throughout the cervical spine remains stable compared to previous CT scan of 08/17/2023. No evidence of fracture.   CT Cervical Spine w/o Contrast    Narrative    EXAM: CT HEAD W/O CONTRAST, CT CERVICAL SPINE W/O CONTRAST  LOCATION: [facility]  DATE: 3/13/2024    INDICATION: head trauma syncope. Midline neck pain following traumatic injury to the cervical spine.  COMPARISON: 08/17/2023. 01/20/2022.  TECHNIQUE: Routine CT Head and cervical spine spine CT. without IV contrast. Multiplanar reformats. Dose reduction techniques were used.    FINDINGS:  INTRACRANIAL CONTENTS: No intracranial hemorrhage, extraaxial collection, or mass effect.  No CT evidence of acute infarct. Mild presumed chronic small vessel ischemic changes. Posterior fossa structures including cerebellar hemispheres, fourth ventricle   and region of CP angle cisterns are clear. Nasopharynx is clear. Region of the sella and suprasellar cistern are clear.     VISUALIZED ORBITS/SINUSES/MASTOIDS: No intraorbital abnormality. Benign osteoma left ethmoid air cells as seen previously. No middle ear or mastoid effusion.    CT cervical spine:    Paraspinous soft tissues, lung apices region of thyroid gland and mucosal spaces of the neck are clear. No evidence of cervical spine fracture. No prevertebral soft tissue swelling.    Degenerative change including 3 mm of anterior degenerative subluxation of C4 on C5.  Variable degrees of uncovertebral joint and facet hypertrophic change result in multilevel foraminal stenosis including bilateral foraminal stenosis at C4-C5, bilateral   foraminal stenosis at C5-C6. Left-sided foraminal stenosis at C6-C7. 1 mm anterior degenerative subluxation of C5 on C6 unchanged.          Impression    IMPRESSION:  1.  No CT evidence for acute intracranial process.  2.  Mild chronic microvascular ischemic changes as above.    3.  DDD change throughout the cervical spine remains stable compared to previous CT scan of 08/17/2023. No evidence of fracture.   CT Chest Pulmonary Embolism w Contrast    Narrative    EXAM: CT CHEST PULMONARY EMBOLISM W CONTRAST  LOCATION: [facility]  DATE: 3/13/2024    INDICATION: r o PE, multiple episodes of syncope  COMPARISON: 11/18/2021, 11/22/2023  TECHNIQUE: CT chest pulmonary angiogram during arterial phase injection of IV contrast. Multiplanar reformats and MIP reconstructions were performed. Dose reduction techniques were used.   CONTRAST: 75ml isovue 370    FINDINGS:  ANGIOGRAM CHEST: Pulmonary arteries are normal caliber and negative for pulmonary emboli. Thoracic aorta is not well opacified and is  indeterminate for dissection. No CT evidence of right heart strain.    LUNGS AND PLEURA: Small area of subpleural consolidation in the left lower lobe. No new or enlarging suspicious nodules.    MEDIASTINUM/AXILLAE: Normal.    CORONARY ARTERY CALCIFICATION: Mild.    UPPER ABDOMEN: Simple renal cysts, which do not require additional follow-up.    MUSCULOSKELETAL: Normal.      Impression    IMPRESSION:  1.  No acute pulmonary emboli.    2.  Small subpleural consolidation in the left lower lobe could be an area of atelectasis or early pneumonia.   US Carotid Bilateral    Narrative    EXAM: US CAROTID BILATERAL  LOCATION: [facility]  DATE: 3/13/2024    INDICATION: syncope  COMPARISON: None.  TECHNIQUE: Duplex exam  performed utilizing 2D gray-scale imaging, Doppler interrogation with color-flow and spectral waveform analysis. The percent diameter stenosis is determined using NASCET criteria and Society of Radiologists in Ultrasound Consensus   Criteria.    FINDINGS:    RIGHT: Mild plaque at the bifurcation. The peak systolic velocity in the ICA is less than 125 cm/sec, consistent with less than 50% stenosis. Normal velocities in the ECA. Antegrade flow within the vertebral artery.     LEFT: Mild plaque at the bifurcation. The peak systolic velocity in the ICA is less than 125 cm/sec, consistent with less than 50% stenosis. Normal velocities in the ECA. Antegrade flow within the vertebral artery.    VELOCITY CHART:  CCA   Right: 85 cm/s   Left: 63 cm/s  ICA   Right: 85 cm/s   Left: 52 cm/s  ECA   Right: 64 cm/s   Left: 71 cm/s  ICA/CCA PSV Ratio   Right: 1.0   Left: 0.83      Impression    IMPRESSION:  1.  Mild plaque formation, velocities consistent with less than 50% stenosis in the right internal carotid artery.  2.  Mild plaque formation, velocities consistent with less than 50% stenosis in the left internal carotid artery.  3.  Flow within the vertebral arteries is antegrade.

## 2024-03-14 NOTE — PROGRESS NOTES
Care Management Follow Up    Length of Stay (days): 0    Expected Discharge Date: 03/14/2024    Concerns to be Addressed:   Workup in progress;   Patient plan of care discussed at interdisciplinary rounds: Yes    Anticipated Discharge Disposition:  Home.     Anticipated Discharge Services:  None (declined home care).  Anticipated Discharge DME:  Per therapy (if indicated).    Patient/family educated on Medicare website which has current facility and service quality ratings:   NA  Education Provided on the Discharge Plan:   Per team  Patient/Family in Agreement with the Plan:   Yes    Referrals Placed by CM/SW:  None;    Private pay costs discussed: Not applicable at this time.     Additional Information:  Patient is  and independent with activities of daily living at baseline. Per therapy, patient appears to be at baseline for mentation but would benefit from home therapy. Writer met with patient to discuss recommendation. Patient declines home care at this time.   No care management needs identified at this time but CM will continue to monitor progression of care, review team recommendations and provide discharge planning assist as needed.      Gaviota Cooley RN

## 2024-03-14 NOTE — PROGRESS NOTES
"   03/14/24 1100   Appointment Info   Signing Clinician's Name / Credentials (OT) Kim Schuler, OTR/L   Quick Adds   Quick Adds Certification   Living Environment   People in Home spouse   Current Living Arrangements apartment   Home Accessibility stairs to enter home;stairs within home   Number of Stairs, Main Entrance 2   Stair Railings, Main Entrance none   Number of Stairs, Within Home, Primary eight   Stair Railings, Within Home, Primary railings safe and in good condition   Transportation Anticipated family or friend will provide   Living Environment Comments Pt states she ambulates ~30' to bathrooom. Per pt's daughter, pt's  sleeps during the day and is hard of hearing, cannot hear pt if she falls/needs help.   Self-Care   Equipment Currently Used at Home none  (owns SEC and FWW, does not use)   Fall history within last six months yes   Number of times patient has fallen within last six months 4  (from syncope/dizziness)   Activity/Exercise/Self-Care Comment Independent with ADLs.   Instrumental Activities of Daily Living (IADL)   IADL Comments Pt's  provides total A with IADLs.   General Information   Onset of Illness/Injury or Date of Surgery 03/13/24   Referring Physician Ortiz Gutierres DO   Patient/Family Therapy Goal Statement (OT) none stated   Additional Occupational Profile Info/Pertinent History of Current Problem Per chart review, pt \"is a 73 year old female admitted on 3/13/2024 with recurrent syncope.  She was here in August for similar issues.  Her symptomatology does not strongly suggest one etiology over another.  She describes syncopal episodes as occurring usually when she is exerting herself and admits to associated intermittent palpitations and/or urinary continence with these episodes.\"   Existing Precautions/Restrictions fall   Cognitive Status Examination   Orientation Status orientation to person, place and time  (A&Ox4)   Cognitive Status Comments some forgetfulness " noted functionally   Pain Assessment   Patient Currently in Pain No   Range of Motion Comprehensive   General Range of Motion no range of motion deficits identified   Strength Comprehensive (MMT)   General Manual Muscle Testing (MMT) Assessment no strength deficits identified   Bed Mobility   Bed Mobility supine-sit;sit-supine   Supine-Sit Rhea (Bed Mobility) supervision   Sit-Supine Rhea (Bed Mobility) supervision   Transfers   Transfers sit-stand transfer;toilet transfer   Sit-Stand Transfer   Sit-Stand Rhea (Transfers) contact guard   Assistive Device (Sit-Stand Transfers) walker, front-wheeled   Toilet Transfer   Type (Toilet Transfer) sit-stand;stand-sit   Rhea Level (Toilet Transfer) contact guard   Assistive Device (Toilet Transfer) grab bars/safety frame;walker, front-wheeled   Balance   Balance Comments Pt noted to furniture walk when ambulating with no AD, no LOB with CGA for safety using FWW.   Activities of Daily Living   BADL Assessment/Intervention grooming   Grooming Assessment/Training   Position (Grooming) sink side;unsupported standing   Rhea Level (Grooming) supervision   Clinical Impression   Criteria for Skilled Therapeutic Interventions Met (OT) Yes, treatment indicated   OT Diagnosis Impaired ability to perform ADLs, IADLs, and functional mobility.   Influenced by the following impairments syncope   OT Problem List-Impairments impacting ADL problems related to;activity tolerance impaired;mobility;sensory feedback;balance;cognition   Assessment of Occupational Performance 1-3 Performance Deficits   Identified Performance Deficits grooming/hygiene, toileting, functional mobility   Planned Therapy Interventions (OT) ADL retraining;balance training;strengthening;transfer training;home program guidelines;progressive activity/exercise;risk factor education   Clinical Decision Making Complexity (OT) problem focused assessment/low complexity   Risk & Benefits of  "therapy have been explained evaluation/treatment results reviewed;care plan/treatment goals reviewed;risks/benefits reviewed;current/potential barriers reviewed;participants voiced agreement with care plan;participants included;patient;daughter   OT Total Evaluation Time   OT Eval, Low Complexity Minutes (18833) 15   Therapy Certification   Medical Diagnosis syncope   Start of Care Date 03/14/24   Certification date from 03/14/24   Certification date to 03/21/24   OT Goals   Therapy Frequency (OT) One time eval and treatment   OT Predicted Duration/Target Date for Goal Attainment 03/14/24   OT Goals Hygiene/Grooming;Toilet Transfer/Toileting   OT: Hygiene/Grooming modified independent;using adaptive equipment;while standing;Completed   OT: Toilet Transfer/Toileting Modified independent;toilet transfer;cleaning and garment management;using adaptive equipment   Self-Care/Home Management   Self-Care/Home Mgmt/ADL, Compensatory, Meal Prep Minutes (99351) 10   Symptoms Noted During/After Treatment (Meal Preparation/Planning Training) dizziness   Treatment Detail/Skilled Intervention Pt reported some \"fogginess\" when asked if she was dizzy, states this is baseline for her. No significant dizziness reported throughout session, provided cueing for rest breaks between each positionional transition. Pt tolerated standing at sink for >5 min with close SBA, no seated rest break needed. Pt ambulated in hallway ~25' x 2 with CGA/close SBA using FWW to simulate functional distances in home, cues for pacing for safety needed.   OT Discharge Planning   OT Plan discharge OT   OT Discharge Recommendation (DC Rec) home with assist   OT Rationale for DC Rec Pt moving well and completing ADLs with SBA/Ind, limited by intermittent dizziness that did not occur during OT session. Pt has good support from daughter, recommend daughter check in with pt daily.   OT Brief overview of current status SBA/CGA functional mobility, SBA/Ind ADLs "   Total Session Time   Timed Code Treatment Minutes 10   Total Session Time (sum of timed and untimed services) 25      [facility]  OUTPATIENT OCCUPATIONAL THERAPY  EVALUATION  PLAN OF TREATMENT FOR OUTPATIENT REHABILITATION  (COMPLETE FOR INITIAL CLAIMS ONLY)  Patient's Last Name, First Name, M.I.  YOB: 1950  Paige Mari                          Provider's Name  [facility] Medical Record No.  [id number]                             Onset Date:  03/13/24   Start of Care Date:  03/14/24   Type:     ___PT   _X_OT   ___SLP Medical Diagnosis:  syncope                    OT Diagnosis:  Impaired ability to perform ADLs, IADLs, and functional mobility. Visits from SOC:  1     See note for plan of treatment, functional goals and certification details    I CERTIFY THE NEED FOR THESE SERVICES FURNISHED UNDER        THIS PLAN OF TREATMENT AND WHILE UNDER MY CARE     (Physician co-signature of this document indicates review and certification of the therapy plan).

## 2024-03-14 NOTE — PROGRESS NOTES
Patient admitted to room 3 at approximately 2300 via wheel chair from emergency room.  Reason for Admission: Syncope  Report received from: written hand off repot from Elvis Brink RN    Patient was accompanied by Self.  Patient ambulated/transferred:  with stand-by assist. self.  Patient is alert and orientated x 4.  Outpatient Observation education provided to: patient  PRIMARY DIAGNOSIS: SYNCOPE/TIA  OUTPATIENT/OBSERVATION GOALS TO BE MET BEFORE DISCHARGE:  1. Orthostatic performed: No    2. Diagnostic testing complete & at baseline neurologic testing: Yes    3. Cleared by consultants (if involved): N/A    4. Interpretation of cardiac rhythm per telemetry tech: yes    5. Tolerating adequate PO diet and medications: Yes    6. Return to near baseline physical activity or neurologic status: No    Discharge Planner Nurse   Safe discharge environment identified: Yes  Barriers to discharge: Yes       Entered by: Nannette Landis RN 03/14/2024 12:34 AM     Please review provider order for any additional goals.   Nurse to notify provider when observation goals have been met and patient is ready for discharge.Safety risks were identified during admission:  fall.   Yellow risk/fall band applied:  Yes  Detailed Belongings: wallet, purse, clothing, cellphone remain with pt.

## 2024-03-14 NOTE — PLAN OF CARE
PRIMARY DIAGNOSIS: SYNCOPE  OUTPATIENT/OBSERVATION GOALS TO BE MET BEFORE DISCHARGE:  1. Orthostatic performed: N/A    2. Diagnostic testing complete & at baseline neurologic testing: Yes    3. Cleared by consultants (if involved): No    4. Interpretation of cardiac rhythm per telemetry tech: SR with first degree AVB    5. Tolerating adequate PO diet and medications: Yes    6. Return to near baseline physical activity or neurologic status: No    Discharge Planner Nurse   Safe discharge environment identified: Yes  Barriers to discharge: No       Entered by: Dylon Miller RN 03/14/2024 12:37 PM  Pt alert and oriented denies pain. BG was 51 glucose gel 15 g was given after 15 mins, recheck BG was 66 another glucose gel 15 g  BG was 163. pt had breakfast after. Walked to the bath room worked with therapy pt had loop recorder device check competed.

## 2024-03-14 NOTE — CONSULTS
Thank you, Dr. Herrmann , for asking the [facility] team to see Ms. Paige Mari for evaluation of recurrent syncope.      Assessment/Recommendations   Assessment/Plan:  Recurrent syncope: The patient had no warning symptoms prior to syncopal episode.  Post syncopal episode, she was confused, mild fatigue, mild shortness of breath.  She had arm jerking, shoulder movement during syncopal episode.  She lost urine control during syncopal episode.  She had extensive cardiac evaluation which did not have a positive findings.  Loop recorder did not record long pauses, significant bradycardia, conduction abnormality, ventricular tachycardia/fibrillation.  Coronary CT angiogram was reported and no obstructive coronary artery disease.  Her symptoms are more consistent with seizure disorder, noncardiac.  Recommended neurology consultation.  Nonobstructive coronary artery disease: No chest pain.  Hyposensitivity troponin normal.  ECG showed sinus rhythm of with left bundle branch block.  Continue aspirin and statin.  Benign essential hypertension: Continue losartan 100 mg daily, metoprolol succinate 100 mg daily.  Paroxysmal atrial fibrillation: She is in sinus rhythm.  Continue metoprolol succinate 100 mg daily.  She is not on anticoagulation because of traumatic injury from recurrent syncope.  Discussed the Watchman device.  The patient will follow-up with Dr. Reynolds.  Dyslipidemia: Continue rosuvastatin.  Type 2 diabetes and other chronic medical conditions:    No further cardiac evaluation.  Cardiologist signed off.  Discussed with primary team.       History of Present Illness/Subjective    It is my pleasure to see Paige Mari at [facility] for evaluation of syncope.  Paige Mari is a 73 year old female with a medical history of nonobstructive coronary artery disease, benign essential hypertension, dyslipidemia, paroxysmal atrial fibrillation, type 2  "diabetes, recurrent syncope.    The patient was admitted to hospital with syncopal episode.  She described her syncopal episode as no prior warning, suddenly passed out, usually lasted less than a minute.  She states that her  saw the event a few times.  She had arm jerking, some movement of shoulders.  She lost urine control few times during syncopal episode.  She was confused a little bit after she woke up.  She denies chest pain.  She had mild shortness of breath after she woke up.  She felt fatigued after episode.  She had no palpitations, dizziness, orthopnea, PND or leg edema.    She had extended evaluation of syncope.  So far there is no cardiac etiology to explain her syncopal episode.  Her loop recorder recorded a few episode of atrial fibrillation, but no episode of sinus pause, ventricular tachycardia or significant bradycardia.  Her coronary CT angiogram has no significant coronary artery disease.       Physical Examination Review of Systems   BP (!) 175/83 (BP Location: Right arm)   Pulse 80   Temp 98  F (36.7  C) (Oral)   Resp 18   Ht 1.575 m (5' 2\")   Wt 64.9 kg (143 lb)   SpO2 100%   BMI 26.16 kg/m    Body mass index is 26.16 kg/m .  Wt Readings from Last 3 Encounters:   03/13/24 64.9 kg (143 lb)   02/07/24 63.5 kg (140 lb)   10/24/23 65 kg (143 lb 3.2 oz)       Intake/Output Summary (Last 24 hours) at 3/14/2024 1525  Last data filed at 3/14/2024 0903  Gross per 24 hour   Intake 120 ml   Output --   Net 120 ml       General Appearance:   Awake, Alert, No acute distress.   HEENT:  No scleral icterus; the mucous membranes were moist.   Neck: No cervical bruits. No JVD. No thyromegaly.    Chest: The spine was straight. The chest was symmetric.   Lungs:   Respirations unlabored; Lungs are clear to auscultation. No crackles.  No wheezing.   Cardiovascular:   Regular rhythm and rate, normal first and second heart sounds with no murmurs. No rubs or gallops.    Abdomen:  Soft. No tenderness. " Bowels sounds are present   Extremities: Equal tibial pulses. No leg edema.   Skin: No rashes. Warm, Dry.   Musculoskeletal: No tenderness.   Neurologic: Oriented x 3. Mood and affect are appropriate.     A 12 point comprehensive review of systems was negative except as noted.        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), managed as type 1 (H)     Created by Conversion       Mood disorder (H24) 11/24/2014    KP (obstructive sleep apnea) 05/15/2015    Paroxysmal atrial fibrillation (H) 09/22/2021    Small bowel obstruction (H) 09/18/2021    Status post total knee replacement 11/24/2014    Past Surgical History:   Procedure Laterality Date    APPENDECTOMY      at age 3    ARTHROSCOPY KNEE WITH MENISCECTOMY  3/10/14    partial medial and lateral meniscectomies, subpatellar chondroplasty    BACK SURGERY      BLEPHAROPLASTY Bilateral 8/17/2015    Procedure: BLEPHAROPLASTY BILATERAL UPPER LIDS;  Surgeon: Chasidy Bryant MD;  Location: [facility];  Service:     BOWEL RESECTION  12 years ago    COLON SURGERY  2004    sigmoid colectomy    DILATION AND CURETTAGE      x3    EP LOOP RECORDER IMPLANT N/A 8/21/2023    Procedure: Loop Recorder Implant;  Surgeon: Nolvia Ruiz MD;  Location: [facility] CV    HYSTERECTOMY      age 39    IR LUMBAR PUNCTURE  1/22/2022    JOINT REPLACEMENT      OOPHORECTOMY      OTHER SURGICAL HISTORY  2005    bowel obstruction    PICC TRIPLE LUMEN PLACEMENT  9/19/2021         PICC TRIPLE LUMEN PLACEMENT  1/22/2022         CT ARTHRODESIS ANT INTERBODY MIN DISCECTOMY,LUMBAR N/A 1/30/2015     Procedure: ANTERIOR LUMBAR INTERBODY FUSION L4-5, INTERBODY GRAFT, BMP INSTRUMENTATION;  Surgeon: Zeeshan Guillaume MD;  Location: [facility];  Service: Spine    TOE SURGERY      TONSILLECTOMY & ADENOIDECTOMY      age 10    ZZC REMOVAL OF OVARY(S)      Description: Oophorectomy;  Recorded: 2010;    ZZC REMOVAL OF OVARY(S)      Description: Oophorectomy;  Recorded: 2010;    ZZC TOTAL ABDOM HYSTERECTOMY      Description: Hysterectomy;  Recorded: 2010;    ZZC TOTAL ABDOM HYSTERECTOMY      Description: Hysterectomy;  Recorded: 2010;    ZZC TOTAL KNEE ARTHROPLASTY Right 2014    Procedure: RIGHT TOTAL KNEE ARTHROPLASTY;  Surgeon: Jules Harris MD;  Location: [facility];  Service: Orthopedics    Family History   Problem Relation Age of Onset    Breast Cancer No family hx of     Social History     Socioeconomic History    Marital status:      Spouse name: Not on file    Number of children: Not on file    Years of education: Not on file    Highest education level: Not on file   Occupational History    Not on file   Tobacco Use    Smoking status: Every Day     Packs/day: 0.50     Years: 19.00     Additional pack years: 0.00     Total pack years: 9.50     Types: Cigarettes     Last attempt to quit: 9/15/2021     Years since quittin.4    Smokeless tobacco: Never    Tobacco comments:     Seen as Inpatient by Tobacco Treatment and history updated on 2023, at time smoking 1 cigarette per day   Substance and Sexual Activity    Alcohol use: No    Drug use: No    Sexual activity: Not on file   Other Topics Concern    Not on file   Social History Narrative    Not on file     Social Determinants of Health     Financial Resource Strain: Not on file   Food Insecurity: Not on file   Transportation Needs: Not on file   Physical Activity: Not on file   Stress: Not on file   Social Connections: Not on file   Interpersonal Safety: Not on file   Housing Stability: Not on file  "         Medications  Allergies   Scheduled Meds:   aspirin  81 mg Oral Daily    DULoxetine  60 mg Oral Daily    escitalopram  10 mg Oral Daily    estradiol  1 mg Oral Daily    insulin aspart   Subcutaneous Daily with breakfast    insulin aspart   Subcutaneous Daily with lunch    insulin aspart   Subcutaneous Daily with supper    insulin aspart  1-7 Units Subcutaneous TID AC    insulin aspart  1-5 Units Subcutaneous At Bedtime    insulin glargine  10 Units Subcutaneous QAM AC    levETIRAcetam  500 mg Oral BID    liraglutide  1.2 mg Subcutaneous Daily    losartan  100 mg Oral Daily    metoprolol succinate ER  100 mg Oral BID    mirabegron  25 mg Oral Daily    pantoprazole  40 mg Oral QAM AC    pregabalin  100 mg Oral TID    rosuvastatin  5 mg Oral Daily     Continuous Infusions:  PRN Meds:.acetaminophen **OR** acetaminophen, albuterol, glucose **OR** dextrose **OR** glucagon, hydrALAZINE **OR** hydrALAZINE, ondansetron **OR** ondansetron, polyethylene glycol, senna-docusate **OR** senna-docusate Allergies   Allergen Reactions    Morphine Other (See Comments)     \"make me delirious,\"  \"nightmares\"    Nitrofurantoin     Adhesive [Cyanoacrylate] Other (See Comments)     Can only tolerate tape for short periods of time shelia in sensitive areas    Amlodipine Unknown and Other (See Comments)     Other reaction(s): delerium    Doxazosin Other (See Comments)     Other reaction(s): feels foggy    Irbesartan-Hydrochlorothiazide Other (See Comments)     Other reaction(s): hyponatremia    Other Allergy (See Comments) [External Allergen Needs Reconciliation - See Comment] Unknown     Other reaction(s): skin rash    Prednisone Unknown     Caused extremely high blood sugars    Tramadol Other (See Comments)     Possible seizure activity    Trazodone Unknown     Other reaction(s): hung over         Lab Results    Chemistry/lipid CBC Cardiac Enzymes/BNP/TSH/INR   Lab Results   Component Value Date    CHOL 127 07/25/2023    HDL 59 " 07/25/2023    TRIG 126 07/25/2023    BUN 14.8 03/14/2024     03/14/2024    CO2 27 03/14/2024    Lab Results   Component Value Date    WBC 15.2 (H) 03/14/2024    HGB 13.0 03/14/2024    HCT 38.9 03/14/2024    MCV 94 03/14/2024     03/14/2024    Lab Results   Component Value Date    TROPONINI <0.01 01/20/2022    TSH 2.51 03/13/2024    INR 1.03 03/13/2024

## 2024-03-14 NOTE — PLAN OF CARE
PRIMARY DIAGNOSIS: SYNCOPE  OUTPATIENT/OBSERVATION GOALS TO BE MET BEFORE DISCHARGE:  1. Orthostatic performed: N/A    2. Diagnostic testing complete & at baseline neurologic testing: Yes    3. Cleared by consultants (if involved): No    4. Interpretation of cardiac rhythm per telemetry tech: sinus rhythm with first degree block    5. Tolerating adequate PO diet and medications: Yes    6. Return to near baseline physical activity or neurologic status: Yes    Discharge Planner Nurse   Safe discharge environment identified: Yes  Barriers to discharge: No       Entered by: Dylon Miller RN 03/14/2024 3:22 PM    Pt alert and oriented. Walked to the bathroom. Had lunch BG was  288 before lunch.Pt is on carb count now. WBC is 15.2.    Subjective   Patient ID: Ammy is a 63 year old female.    Chief Complaint   Patient presents with   • Sinus Problem     Pt states she started with cold symptoms on Friday.  Pt has a low grade temp all weekend.Symptoms are getting worse, pt feels sinus pain and pressure.  Pt has a h/o sinus infections at this time of year. Pt declines Covid testing at this time.     HPI  Sinus pain/pressure/PND/sinus pain x 4 days    MEDICATIONS:  Current Outpatient Medications   Medication Sig   • amoxicillin (AMOXIL) 875 MG tablet Take 1 tablet by mouth in the morning and 1 tablet in the evening. Do all this for 10 days.   • omeprazole (PrilOSEC) 20 MG capsule Take 1 capsule by mouth every morning.   • Budesonide (RHINOCORT ALLERGY NA)    • meclizine (ANTIVERT) 25 MG tablet Take 1 tablet by mouth every 12 hours.   • Ascorbic Acid (VITAMIN C PO) Take 1 tablet by mouth daily.   • KRILL OIL PO Take 1 capsule by mouth daily.   • Menaquinone-7 (VITAMIN K2 PO) Take 1 tablet by mouth daily.   • MAGNESIUM OXIDE PO Take 1 tablet by mouth daily.   • cholecalciferol (VITAMIN D3) 1000 UNITS tablet Take 1,000 Units by mouth daily.   • cetirizine (ZYRTEC) 10 MG tablet Take 10 mg by mouth every evening.      No current facility-administered medications for this visit.         ALLERGIES:  ALLERGIES:   Allergen Reactions   • Augmentin [Amoclan] VOMITING, NAUSEA and DIARRHEA         MEDICAL HISTORY:  Past Medical History:   Diagnosis Date   • Allergic rhinitis    • Depression with anxiety 04/29/2014   • DJD (degenerative joint disease)    • Generalized anxiety disorder    • History of shingles 2018   • Lipid disorder    • Trigger finger (acquired) 12/13/2012   • Vertigo          PAST SURGICAL HISTORY:  Past Surgical History:   Procedure Laterality Date   • Carpal tunnel release Bilateral 08/2013   • Colonoscopy diagnostic  12/01/2009    Colonoscopy, Dx, per patient results normal   • Knee cartilage surgery Left 2014   • Trigger finger release   08/2013   • Wrist surgery Right 12/2021    plate, 3 screws, 6 pins         FAMILY HISTORY:  Family History   Problem Relation Age of Onset   • Arthritis Mother    • Osteoporosis Mother    • Patient is unaware of any medical problems Father    • Diabetes Maternal Grandmother          SOCIAL HISTORY:  Social History     Tobacco Use   • Smoking status: Never Smoker   • Smokeless tobacco: Never Used   Vaping Use   • Vaping Use: never used   Substance Use Topics   • Alcohol use: No   • Drug use: Never         Patient's medications, allergies, past medical, surgical, and social history  were reviewed and updated as appropriate.      Review of Systems   Constitutional: Positive for fatigue.   HENT: Positive for congestion, postnasal drip, rhinorrhea and sinus pain.    Eyes: Negative.    Respiratory: Negative.    Cardiovascular: Negative.    Gastrointestinal: Negative.    Genitourinary: Negative.    Musculoskeletal: Negative.    Skin: Negative.    Neurological: Negative.    Hematological: Negative.    Psychiatric/Behavioral: Negative.          Objective     Physical Exam  Vitals and nursing note reviewed.   Constitutional:       Appearance: Normal appearance. She is well-developed.   HENT:      Head: Normocephalic and atraumatic.      Right Ear: Tympanic membrane normal.      Left Ear: Tympanic membrane normal.      Nose: Congestion present.      Neck: Normal range of motion and neck supple.   Eyes:      Conjunctiva/sclera: Conjunctivae normal.      Pupils: Pupils are equal, round, and reactive to light.   Cardiovascular:      Rate and Rhythm: Normal rate and regular rhythm.      Heart sounds: Normal heart sounds.   Pulmonary:      Effort: Pulmonary effort is normal.      Breath sounds: Normal breath sounds.   Musculoskeletal:         General: Normal range of motion.   Skin:     General: Skin is warm and dry.   Neurological:      General: No focal deficit present.      Mental Status: She is alert and oriented to person,  place, and time.   Psychiatric:         Mood and Affect: Mood normal.         Behavior: Behavior normal.           Visit Vitals  /85   Pulse 92   Temp 98.5 °F (36.9 °C) (Oral)   Resp 14   Wt 72.6 kg (160 lb)   SpO2 97%   BMI 30.23 kg/m²       Labs:    No results found for this visit on 12/13/22.     Imaging:                Assessment     Problem List Items Addressed This Visit    None     Visit Diagnoses     Acute sinusitis, recurrence not specified, unspecified location    -  Primary        F/u PMD for further care/return here as needed        Instructions provided as documented in the AVS.

## 2024-03-14 NOTE — PROVIDER NOTIFICATION
Dr. Cooper notified at 1237:    Loop recorder deviced check completed. Normal device function. Multiple AF episode detected. Most recent Jan 2024.

## 2024-03-14 NOTE — PROVIDER NOTIFICATION
Left a message in chart for hospitalist:     Patient's daughter at bedside and reported pt has been here 3x for fall and usually when she falls her blood sugar are low. Daughter is also concerned pt is more forgetful. Is it safe for patient to have an insulin pump? Daughter also noticed pt fell the last couple times since taking keppra and victoza.

## 2024-03-14 NOTE — PLAN OF CARE
PRIMARY DIAGNOSIS: SYNCOPE  OUTPATIENT/OBSERVATION GOALS TO BE MET BEFORE DISCHARGE:  1. Orthostatic performed: NA    2. Diagnostic testing complete & at baseline neurologic testing: Yes, completed EEG. Result pending.    3. Cleared by consultants (if involved): No    4. Interpretation of cardiac rhythm per telemetry tech: SR with 1st AVB    5. Tolerating adequate PO diet and medications: Yes    6. Return to near baseline physical activity or neurologic status: No    Discharge Planner Nurse   Safe discharge environment identified: Yes  Barriers to discharge: Yes       Entered by: Sharon Bernal RN 03/14/2024 6:59 PM    Pt alert and oriented x4, forgetful. Pleasant. Denies pain. LS clear. BS active. /81.  Denies dizziness or lightheadedness with ambulating. 1 A with gaitbelt and walker to bathroom.  Pt had EEG completed this evening. Neuro and Cardio consult in place.   Blood glucose thise veing was 173, gave 1u of novolog. Ate 52 gm of carb, gave 1u of novolog.

## 2024-03-14 NOTE — ED NOTES
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), managed as type 1 (H)     Created by Conversion       Mood disorder (H24) 11/24/2014    KP (obstructive sleep apnea) 05/15/2015    Paroxysmal atrial fibrillation (H) 09/22/2021    Small bowel obstruction (H) 09/18/2021    Status post total knee replacement 11/24/2014        Code Status:  No CPR- Pre-arrest intubation OK     Falls Risk: Yes Band: Applied    Current Living Situation/Residence: lives in a house     Elimination Status: Continent: Yes     Activity Level: 2 A    Patients Preferred Language:  English     Needed: No    Vital Signs:  BP (!) 189/90   Pulse 62   Temp 98.2  F (36.8  C) (Oral)   Resp 14   Ht 1.575 m (5' 2\")   Wt 64.9 kg (143 lb)   SpO2 99%   BMI 26.16 kg/m       Cardiac Rhythm: SR    Pain Score: 4/10    Is the Patient Confused:  No    Last Food or Drink: 03/13/24     Focused Assessment:  A&O. Weakness reported. Neurologically intact, no overt defecits. Denies pain, no other complaints aside from syncopal episodes    Tests Performed: Done: Labs and Imaging    Treatments Provided:  bolus    Family Dynamics/Concerns: No    Family Updated On Visitor Policy: Yes    Plan of Care Communicated to Family: Yes    Who Was Updated about Plan of Care: daughter    Belongings Checklist Done and Signed by Patient: Yes    Medications sent with patient:     Covid: asymptomatic     Additional Information:     RN: Elvis Brink RN   3/13/2024 10:05 PM       "

## 2024-03-14 NOTE — PROGRESS NOTES
"PRIMARY DIAGNOSIS: \"GENERIC\" NURSING  OUTPATIENT/OBSERVATION GOALS TO BE MET BEFORE DISCHARGE:  ADLs back to baseline: No    Activity and level of assistance: Up with standby assistance.    Pain status: Pain free.    Return to near baseline physical activity: No     Discharge Planner Nurse   Safe discharge environment identified: Yes  Barriers to discharge: No       Entered by: Nannette Landis RN 03/14/2024 4:45 AM     Please review provider order for any additional goals.   Nurse to notify provider when observation goals have been met and patient is ready for discharge.  "

## 2024-03-14 NOTE — CONSULTS
"Care Management Initial Consult    General Information  Assessment completed with: Patient, Children, Paige and daughter Karishma  Type of CM/SW Visit: Initial Assessment    Primary Care Provider verified and updated as needed: Yes   Readmission within the last 30 days: no previous admission in last 30 days      Reason for Consult: discharge planning  Advance Care Planning: Advance Care Planning Reviewed: present on chart          Communication Assessment  Patient's communication style: spoken language (English or Bilingual)             Cognitive  Cognitive/Neuro/Behavioral: WDL                      Living Environment:   People in home: spouse  Paige and  Liam  Current living Arrangements: apartment (\"There are 2 steps to get inside the apartment. Then there is no elevator. I live on the 2nd floor. There are 8 steps to my floor\".)      Able to return to prior arrangements: yes       Family/Social Support:  Care provided by: self  Provides care for: no one  Marital Status:   , Tom Wheeler       Description of Support System: Supportive, Involved    Support Assessment: Adequate family and caregiver support, Adequate social supports, Patient communicates needs well met    Current Resources:   Patient receiving home care services: No     Community Resources: None  Equipment currently used at home: other (see comments) (\"I have a FWW, cane, and wheelchair all that I could use at home. I don't use any of them normally\".)  Supplies currently used at home: Diabetic Supplies, Other (\"Insulin pump, glasses, partial dentures\")    Employment/Financial:  Employment Status: retired     Employment/ Comments: \"no  history\"  Financial Concerns:     Referral to Financial Worker: No       Does the patient's insurance plan have a 3 day qualifying hospital stay waiver?  No      Functional Status:  Prior to admission patient needed assistance:   Dependent ADLs:: Ambulation-no assistive device, " "Independent (\"I have a FWW, cane, and wheelchair all that I could use at home. I don't use any of them normally\".)  Dependent IADLs:: Transportation (\"I don't drive, my  drives\")       Mental Health Status:  Mental Health Status: Past Concern  Mental Health Management: Medication    Chemical Dependency Status:                Values/Beliefs:  Spiritual, Cultural Beliefs, [employer] Practices, Values that affect care:                 Additional Information:  Paige lives in an apartment with her . \"There are 2 steps to get inside the apartment. Then there is no elevator. I live on the 2nd floor. There are 8 steps to my floor\".    She is independent with ADLs and  helps with minimal IADLs. She doesn't drive and her  helps with transportation. \"I have a FWW, cane, and wheelchair all that I could use at home. I don't use any of them normally\".    Unknown discharge needs at this time, awaiting PT/OT recommendations.     Family to transport at discharge.    CM to follow for medical progression of care, discharge recommendations, and final discharge plan.    Anamaria Mcginnis RN    "

## 2024-03-14 NOTE — PROGRESS NOTES
Bedside EEG was performed that included photic stimulation; hyperventilation was deferred. The patient was awake and asleep throughout the recording.     [id number] used.

## 2024-03-14 NOTE — PROGRESS NOTES
03/14/24 0835   Appointment Info   Signing Clinician's Name / Credentials (PT) Kim Hester DPT   Quick Adds   Quick Adds Certification   Living Environment   People in Home spouse   Current Living Arrangements apartment   Home Accessibility stairs to enter home;stairs within home   Number of Stairs, Main Entrance 2   Stair Railings, Main Entrance none   Number of Stairs, Within Home, Primary eight   Stair Railings, Within Home, Primary railings safe and in good condition   Transportation Anticipated family or friend will provide   Self-Care   Usual Activity Tolerance moderate   Current Activity Tolerance fair   Equipment Currently Used at Home none  (owns SEC, FWW and WC)   Fall history within last six months yes   Number of times patient has fallen within last six months 4   General Information   Onset of Illness/Injury or Date of Surgery 03/13/24   Referring Physician Ortiz Gutierres DO   Patient/Family Therapy Goals Statement (PT) none   Pertinent History of Current Problem (include personal factors and/or comorbidities that impact the POC) 73 year old female admitted on 3/13/2024 with recurrent syncope.  She was here in August for similar issues.  Her symptomatology does not strongly suggest one etiology over another.  She describes syncopal episodes as occurring usually when she is exerting herself and admits to associated intermittent palpitations and/or urinary continence with these episodes.   Strength (Manual Muscle Testing)   Strength (Manual Muscle Testing) Deficits observed during functional mobility   Bed Mobility   Bed Mobility supine-sit;sit-supine   Supine-Sit [place] (Bed Mobility) supervision   Sit-Supine [place] (Bed Mobility) supervision   Transfers   Transfers sit-stand transfer   Sit-Stand Transfer   Sit-Stand [place] (Transfers) contact guard   Assistive Device (Sit-Stand Transfers) walker, front-wheeled   Gait/Stairs (Locomotion)   [place] Level (Gait) contact guard    Assistive Device (Gait) walker, front-wheeled   Distance in Feet (Gait) 10'   Pattern (Gait) step-through   Deviations/Abnormal Patterns (Gait) gait speed decreased   Clinical Impression   Criteria for Skilled Therapeutic Intervention Yes, treatment indicated   PT Diagnosis (PT) Impaired functional mobility   Influenced by the following impairments Weakness, fatigue   Functional limitations due to impairments Impaired strength, gait, endurance   Clinical Presentation (PT Evaluation Complexity) stable   Clinical Presentation Rationale Presents as diagnosed   Clinical Decision Making (Complexity) moderate complexity   Planned Therapy Interventions (PT) balance training;bed mobility training;gait training;home exercise program;stair training;strengthening;transfer training   Risk & Benefits of therapy have been explained patient   PT Total Evaluation Time   PT Eval, Moderate Complexity Minutes (80631) 10   Therapy Certification   Start of care date 03/14/24   Certification date from 03/14/24   Certification date to 03/21/24   Medical Diagnosis Syncope   Physical Therapy Goals   PT Frequency 5x/week   PT Predicted Duration/Target Date for Goal Attainment 03/21/24   PT Goals Bed Mobility;Transfers;Gait;Stairs   PT: Bed Mobility Independent;Supine to/from sit   PT: Transfers Modified independent;Sit to/from stand;Bed to/from chair;Assistive device   PT: Gait Modified independent;Rolling walker;150 feet   PT: Stairs Supervision/stand-by assist;8 stairs   PT Discharge Planning   PT Plan progress amb with FWW, 8 RASHEED, HEP   PT Discharge Recommendation (DC Rec) home with assist;home with home care physical therapy   PT Rationale for DC Rec Ax1 with mobility. Spouse able to assist at home as needed.   PT Brief overview of current status Amb 40', 25' with FWW and CGA.   PT Equipment Needed at Discharge walker, rolling  (pt owns)   Total Session Time   Total Session Time (sum of timed and untimed services) 10       [facility]  [facility]  OUTPATIENT PHYSICAL THERAPY EVALUATION  PLAN OF TREATMENT FOR OUTPATIENT REHABILITATION  (COMPLETE FOR INITIAL CLAIMS ONLY)  Patient's Last Name, First Name, M.I.  YOB: 1950  Paige Mari                        Provider's Name  [facility] Medical Record No.  [id number]                             Onset Date:  03/13/24   Start of Care Date:  (P) 03/14/24   Type:     _X_PT   ___OT   ___SLP Medical Diagnosis:  (P) Syncope              PT Diagnosis:  Impaired functional mobility Visits from SOC:  1     See note for plan of treatment, functional goals and certification details    I CERTIFY THE NEED FOR THESE SERVICES FURNISHED UNDER        THIS PLAN OF TREATMENT AND WHILE UNDER MY CARE     (Physician co-signature of this document indicates review and certification of the therapy plan).                Kim Hester, PT, DPT  3/14/2024

## 2024-03-14 NOTE — PLAN OF CARE
Occupational Therapy Discharge Summary    Reason for therapy discharge:    All goals and outcomes met, no further needs identified.    Progress towards therapy goal(s). See goals on Care Plan in [facility] electronic health record for goal details.  Goals met    Therapy recommendation(s):    No further therapy is recommended.    Kim Schuler, OTR/L 3/14/24

## 2024-03-15 VITALS
HEART RATE: 71 BPM | TEMPERATURE: 98.3 F | OXYGEN SATURATION: 97 % | DIASTOLIC BLOOD PRESSURE: 70 MMHG | SYSTOLIC BLOOD PRESSURE: 162 MMHG | RESPIRATION RATE: 18 BRPM | WEIGHT: 138.01 LBS | HEIGHT: 62 IN | BODY MASS INDEX: 25.4 KG/M2

## 2024-03-15 LAB
ANION GAP SERPL CALCULATED.3IONS-SCNC: 5 MMOL/L (ref 7–15)
ATRIAL RATE - MUSE: 72 BPM
BASE EXCESS BLDV CALC-SCNC: 7.1 MMOL/L (ref -3–3)
BUN SERPL-MCNC: 15.9 MG/DL (ref 8–23)
CALCIUM SERPL-MCNC: 9.1 MG/DL (ref 8.8–10.2)
CHLORIDE SERPL-SCNC: 99 MMOL/L (ref 98–107)
CREAT SERPL-MCNC: 0.74 MG/DL (ref 0.51–0.95)
DEPRECATED HCO3 PLAS-SCNC: 31 MMOL/L (ref 22–29)
DIASTOLIC BLOOD PRESSURE - MUSE: 58 MMHG
EGFRCR SERPLBLD CKD-EPI 2021: 84 ML/MIN/1.73M2
ERYTHROCYTE [DISTWIDTH] IN BLOOD BY AUTOMATED COUNT: 14.5 % (ref 10–15)
GLUCOSE BLDC GLUCOMTR-MCNC: 117 MG/DL (ref 70–99)
GLUCOSE BLDC GLUCOMTR-MCNC: 162 MG/DL (ref 70–99)
GLUCOSE BLDC GLUCOMTR-MCNC: 164 MG/DL (ref 70–99)
GLUCOSE BLDC GLUCOMTR-MCNC: 202 MG/DL (ref 70–99)
GLUCOSE BLDC GLUCOMTR-MCNC: 204 MG/DL (ref 70–99)
GLUCOSE SERPL-MCNC: 267 MG/DL (ref 70–99)
HCO3 BLDV-SCNC: 32 MMOL/L (ref 21–28)
HCT VFR BLD AUTO: 36.9 % (ref 35–47)
HGB BLD-MCNC: 12.2 G/DL (ref 11.7–15.7)
INTERPRETATION ECG - MUSE: NORMAL
LEVETIRACETAM SERPL-MCNC: 17.6 ΜG/ML (ref 10–40)
MCH RBC QN AUTO: 31.1 PG (ref 26.5–33)
MCHC RBC AUTO-ENTMCNC: 33.1 G/DL (ref 31.5–36.5)
MCV RBC AUTO: 94 FL (ref 78–100)
MDC_IDC_EPISODE_DTM: NORMAL
MDC_IDC_EPISODE_ID: NORMAL
MDC_IDC_EPISODE_TYPE: NORMAL
MDC_IDC_MSMT_BATTERY_DTM: NORMAL
MDC_IDC_MSMT_BATTERY_STATUS: NORMAL
MDC_IDC_PG_IMPLANT_DTM: NORMAL
MDC_IDC_PG_MFG: NORMAL
MDC_IDC_PG_MODEL: NORMAL
MDC_IDC_PG_SERIAL: NORMAL
MDC_IDC_PG_TYPE: NORMAL
MDC_IDC_SESS_CLINIC_NAME: NORMAL
MDC_IDC_SESS_DTM: NORMAL
MDC_IDC_SESS_TYPE: NORMAL
MDC_IDC_SET_ZONE_TYPE: NORMAL
MDC_IDC_STAT_AT_BURDEN_PERCENT: 1 %
MDC_IDC_STAT_AT_DTM_END: NORMAL
MDC_IDC_STAT_AT_DTM_START: NORMAL
O2/TOTAL GAS SETTING VFR VENT: 21 %
OXYHGB MFR BLDV: 41 % (ref 70–75)
P AXIS - MUSE: 51 DEGREES
PCO2 BLDV: 51 MM HG (ref 40–50)
PH BLDV: 7.4 [PH] (ref 7.32–7.43)
PLATELET # BLD AUTO: 238 10E3/UL (ref 150–450)
PO2 BLDV: 24 MM HG (ref 25–47)
POTASSIUM SERPL-SCNC: 4.7 MMOL/L (ref 3.4–5.3)
PR INTERVAL - MUSE: 174 MS
QRS DURATION - MUSE: 132 MS
QT - MUSE: 456 MS
QTC - MUSE: 499 MS
R AXIS - MUSE: -29 DEGREES
RBC # BLD AUTO: 3.92 10E6/UL (ref 3.8–5.2)
SAO2 % BLDV: 41 % (ref 70–75)
SODIUM SERPL-SCNC: 135 MMOL/L (ref 135–145)
SYSTOLIC BLOOD PRESSURE - MUSE: 104 MMHG
T AXIS - MUSE: 96 DEGREES
VENTRICULAR RATE- MUSE: 72 BPM
WBC # BLD AUTO: 10.4 10E3/UL (ref 4–11)

## 2024-03-15 PROCEDURE — 85027 COMPLETE CBC AUTOMATED: CPT | Performed by: INTERNAL MEDICINE

## 2024-03-15 PROCEDURE — 93298 REM INTERROG DEV EVAL SCRMS: CPT | Performed by: INTERNAL MEDICINE

## 2024-03-15 PROCEDURE — 95816 EEG AWAKE AND DROWSY: CPT | Mod: 26 | Performed by: PSYCHIATRY & NEUROLOGY

## 2024-03-15 PROCEDURE — G0378 HOSPITAL OBSERVATION PER HR: HCPCS

## 2024-03-15 PROCEDURE — 99215 OFFICE O/P EST HI 40 MIN: CPT | Performed by: PSYCHIATRY & NEUROLOGY

## 2024-03-15 PROCEDURE — 36415 COLL VENOUS BLD VENIPUNCTURE: CPT | Performed by: INTERNAL MEDICINE

## 2024-03-15 PROCEDURE — 250N000013 HC RX MED GY IP 250 OP 250 PS 637: Performed by: STUDENT IN AN ORGANIZED HEALTH CARE EDUCATION/TRAINING PROGRAM

## 2024-03-15 PROCEDURE — 82962 GLUCOSE BLOOD TEST: CPT

## 2024-03-15 PROCEDURE — 99417 PROLNG OP E/M EACH 15 MIN: CPT | Performed by: PSYCHIATRY & NEUROLOGY

## 2024-03-15 PROCEDURE — 80048 BASIC METABOLIC PNL TOTAL CA: CPT | Performed by: INTERNAL MEDICINE

## 2024-03-15 PROCEDURE — 99239 HOSP IP/OBS DSCHRG MGMT >30: CPT | Performed by: INTERNAL MEDICINE

## 2024-03-15 PROCEDURE — 82805 BLOOD GASES W/O2 SATURATION: CPT | Performed by: INTERNAL MEDICINE

## 2024-03-15 RX ORDER — INSULIN ASPART 100 [IU]/ML
30 INJECTION, SOLUTION INTRAVENOUS; SUBCUTANEOUS DAILY
Qty: 30 ML | Refills: 0 | Status: SHIPPED | OUTPATIENT
Start: 2024-03-16

## 2024-03-15 RX ADMIN — LEVETIRACETAM 500 MG: 500 TABLET, FILM COATED ORAL at 19:32

## 2024-03-15 RX ADMIN — ESTRADIOL 1 MG: 1 TABLET ORAL at 07:59

## 2024-03-15 RX ADMIN — METOPROLOL SUCCINATE 100 MG: 50 TABLET, EXTENDED RELEASE ORAL at 19:29

## 2024-03-15 RX ADMIN — METOPROLOL SUCCINATE 100 MG: 50 TABLET, EXTENDED RELEASE ORAL at 07:58

## 2024-03-15 RX ADMIN — PREGABALIN 100 MG: 100 CAPSULE ORAL at 08:00

## 2024-03-15 RX ADMIN — PREGABALIN 100 MG: 100 CAPSULE ORAL at 13:28

## 2024-03-15 RX ADMIN — LEVETIRACETAM 500 MG: 500 TABLET, FILM COATED ORAL at 07:59

## 2024-03-15 RX ADMIN — DULOXETINE HYDROCHLORIDE 60 MG: 60 CAPSULE, DELAYED RELEASE PELLETS ORAL at 07:58

## 2024-03-15 RX ADMIN — PREGABALIN 100 MG: 100 CAPSULE ORAL at 19:32

## 2024-03-15 RX ADMIN — ACETAMINOPHEN 650 MG: 325 TABLET ORAL at 19:40

## 2024-03-15 RX ADMIN — LOSARTAN POTASSIUM 100 MG: 50 TABLET, FILM COATED ORAL at 07:59

## 2024-03-15 RX ADMIN — Medication 81 MG: at 08:00

## 2024-03-15 RX ADMIN — LIRAGLUTIDE 1.2 MG: 6 INJECTION SUBCUTANEOUS at 07:56

## 2024-03-15 RX ADMIN — ESCITALOPRAM OXALATE 10 MG: 10 TABLET ORAL at 07:59

## 2024-03-15 RX ADMIN — INSULIN GLARGINE 10 UNITS: 100 INJECTION, SOLUTION SUBCUTANEOUS at 07:53

## 2024-03-15 RX ADMIN — MIRABEGRON 25 MG: 25 TABLET, FILM COATED, EXTENDED RELEASE ORAL at 07:59

## 2024-03-15 RX ADMIN — PANTOPRAZOLE SODIUM 40 MG: 40 TABLET, DELAYED RELEASE ORAL at 07:59

## 2024-03-15 RX ADMIN — ROSUVASTATIN CALCIUM 5 MG: 5 TABLET, FILM COATED ORAL at 08:00

## 2024-03-15 ASSESSMENT — ACTIVITIES OF DAILY LIVING (ADL)
ADLS_ACUITY_SCORE: 38
ADLS_ACUITY_SCORE: 39
ADLS_ACUITY_SCORE: 38
ADLS_ACUITY_SCORE: 39
ADLS_ACUITY_SCORE: 39
ADLS_ACUITY_SCORE: 38
ADLS_ACUITY_SCORE: 39
ADLS_ACUITY_SCORE: 38
ADLS_ACUITY_SCORE: 39

## 2024-03-15 NOTE — PROVIDER NOTIFICATION
Delivered - 11:10 am  Pts daughter in room. Daughter concerned pt lost tooth. Front tooth cap lost, underlying tooth in place, no pain. Daughter also concerned with bilat shoulder pain, appears muscular, no loss of ROM. Daughter concerned with increased CO2 in labs, states prev confusion to the point of restraints. Education provided.     Kenneth messaged via [employer].

## 2024-03-15 NOTE — PROGRESS NOTES
Care Management Follow Up    Length of Stay (days): 0    Expected Discharge Date: 03/16/2024    Concerns to be Addressed:   Workup in progress;   Patient plan of care discussed at interdisciplinary rounds: Yes    Anticipated Discharge Disposition:  Home.     Anticipated Discharge Services:  None (declined home care).  Anticipated Discharge DME:  Per therapy (if indicated).    Patient/family educated on Medicare website which has current facility and service quality ratings:   NA  Education Provided on the Discharge Plan:   Per team  Patient/Family in Agreement with the Plan:   Yes    Referrals Placed by CM/SW:  None;    Private pay costs discussed: Not applicable at this time.     Additional Information:  Patient is  and independent with activities of daily living at baseline.     Per therapy, patient appears to be at baseline for mentation but would benefit from home therapy. Patient declines home care at this time.     No care management needs identified at this time but CM will continue to monitor progression of care, review team recommendations and provide discharge planning assist as needed.      Kathy Navarro RN

## 2024-03-15 NOTE — PLAN OF CARE
PRIMARY DIAGNOSIS: SYNCOPE/TIA  OUTPATIENT/OBSERVATION GOALS TO BE MET BEFORE DISCHARGE:  1. Orthostatic performed: No    2. Diagnostic testing complete & at baseline neurologic testing: No    3. Cleared by consultants (if involved): No    4. Interpretation of cardiac rhythm per telemetry tech: SR BBB    5. Tolerating adequate PO diet and medications: Yes    6. Return to near baseline physical activity or neurologic status: No    Discharge Planner Nurse   Safe discharge environment identified: No  Barriers to discharge: Yes       Entered by: Izabella Angel RN 03/15/2024 12:36 PM     Please review provider order for any additional goals.   Nurse to notify provider when observation goals have been met and patient is ready for discharge.

## 2024-03-15 NOTE — UTILIZATION REVIEW
Admission Status; Secondary Review Determination     Under the authority of the Utilization Management Committee, the utilization review process indicated a secondary review on the above patient.  The review outcome is based on review of the medical records, discussions with staff, and applying clinical experience noted on the date of the review.       (x) Observation Status Appropriate      RATIONALE FOR DETERMINATION/SUMMARY:  73 year old female admitted on 3/13/2024 with recurrent syncope.  She was here in August for similar issues.  Her symptomatology does not strongly suggest one etiology over another. Cardiology does not feel syncope is cardiac related.  Further neurologic evaluation is pending.     The severity of illness, intensity of service provided, expected LOS and risk for adverse outcome make the care appropriate for observation.     The information on this document is developed by the utilization review team in order for the business office to ensure compliance.  This only denotes the appropriateness of proper admission status and does not reflect the quality of care rendered.       The definitions of Inpatient Status and Observation Status used in making the determination above are those provided in the CMS Coverage Manual, Chapter 1 and Chapter 6, section 70.4.     Sincerely,    Peewee Carreon DO, [facility]  Utilization Review  Physician Advisor

## 2024-03-15 NOTE — PLAN OF CARE
A&O. A1 w. Refuses seizure pads. HTN. RA. Hydralazine avail prn. . Tele SR. EEG resulted. Keppra. Clonidine patch L shoulder. Slight tremor bilat UE, pt states baseline. Possible disch 3-16 pending neuro rec.

## 2024-03-15 NOTE — PLAN OF CARE
PRIMARY DIAGNOSIS: SYNCOPE/TIA  OUTPATIENT/OBSERVATION GOALS TO BE MET BEFORE DISCHARGE:  1. Orthostatic performed: No    2. Diagnostic testing complete & at baseline neurologic testing: No    3. Cleared by consultants (if involved): No    4. Interpretation of cardiac rhythm per telemetry tech: SR BBB    5. Tolerating adequate PO diet and medications: Yes    6. Return to near baseline physical activity or neurologic status: No    Discharge Planner Nurse   Safe discharge environment identified: No  Barriers to discharge: Yes       Entered by: Izabella Angel RN 03/15/2024 10:14 AM     Please review provider order for any additional goals.   Nurse to notify provider when observation goals have been met and patient is ready for discharge.

## 2024-03-15 NOTE — CONSULTS
NEUROLOGY INPATIENT CONSULTATION NOTE       [facility]  [address] [place] [address]  Tel: [phone]  Fax: [phone]  [email or web address]     Paige Mari,  1950, MRN [id number]  PCP: Scar Tan  Date: 03/15/2024     ASSESSMENT & PLAN     Diagnosis code: complex partial seizure    Complex partial seizures  73-year-old female with HTN, HLD, DM 2, diabetic polyneuropathy, COPD, A-fib, chronic pain and marijuana use admitted with a syncopal episode.  Patient had similar episodes in the past and previously EEG had shown a rhythmic discharges suggesting EEG seizures.  During her last visit to the clinic on 2024 she was restarted on Keppra  CT of the head shows no acute intracranial process.  Cervical spine shows degenerative changes  EEG shows nonspecific slowing in theta range.  No sharp discharges or rhythmic activity seen to suggest seizures  Carotid ultrasound shows no significant bilateral internal carotid artery stenosis  Continue Keppra and check Keppra level  Patient can be discharged from neurology standpoint.  Follow-up with me as scheduled    Thank you again for this referral, please feel free to contact me if you have any questions.    Jared Celis MD  [facility]     CHIEF COMPLAINT Syncope, unspecified syncope type     HISTORY OF PRESENT ILLNESS     We have been requested by Dr. Cooper to evaluate Paige Mari who is a 73 year old  female for seizure    Patient is a 73-year-old female with history of complex partial seizures, HTN, HLD, DM 2, diabetic polyneuropathy, COPD, A-fib, chronic pain and marijuana use well-known to me who presented to the emergency room with recurrent syncopal episode.  According to patient she was resting in her recliner and as she got up to answer the door she passed out.  She was unresponsive for about 2 minutes she had a CT of the brain and cervical spine in the ER  "that did not show any acute abnormality.  CT chest was negative for pulmonary emboli.  Carotid ultrasound showed less than 50% bilateral ICA stenosis.    She was recently seen in my clinic for episodes of confusion.  She had an EEG that showed rhythmic discharges suggesting EEG seizures.  She was on Keppra for short period that she had discontinued but during her last visit I recommended to restart Keppra.  Repeat EEG is pending also, I had ordered Keppra level that too is pending.     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    Nicotine Dependence F17.200    Hyperthyroidism E05.90    Migraine without aura and without status migrainosus, not intractable G43.009    Knee osteoarthritis M17.9    GERD (gastroesophageal reflux disease) K21.9    Mood disorder (H24) F39    Spondylisthesis M43.10    KP (obstructive sleep apnea) G47.33    Paroxysmal atrial fibrillation (H) I48.0    COPD (chronic obstructive pulmonary disease) (H) J44.9    Diabetic polyneuropathy (H) E11.42    Closed head injury, initial encounter S09.90XA    Syncope, unspecified syncope type R55    Multiple closed fractures of facial bone, initial encounter (H) S02.92XA    Complex partial seizure (H) G40.209      Clinically Significant Risk Factors Present on Admission                  # Drug Induced Platelet Defect: home medication list includes an antiplatelet medication       # Hypertension: Noted on problem list       # DMII: A1C = 7.5 % (Ref range: <5.7 %) within past 6 months    # Overweight: Estimated body mass index is 25.24 kg/m  as calculated from the following:    Height as of this encounter: 1.575 m (5' 2\").    Weight as of this encounter: 62.6 kg (138 lb 0.1 oz).              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    Past Surgical History: She  has a past surgical history that includes TOTAL ABDOM HYSTERECTOMY; REMOVAL OF OVARY(S); appendectomy; Bowel Resection (12 years ago); Dilation and curettage; tonsillectomy & adenoidectomy; other surgical history (2005); Arthroscopy Knee With Meniscectomy (3/10/14); TOTAL KNEE ARTHROPLASTY (Right, 11/24/2014); TOTAL ABDOM HYSTERECTOMY; REMOVAL OF OVARY(S); joint replacement; Pr Arthrodesis Ant Interbody Min Discectomy,Lumbar (N/A, 1/30/2015); Oophorectomy; Hysterectomy; Colon surgery (2004); Blepharoplasty (Bilateral, 8/17/2015); back surgery; Toe Surgery; PICC/Midline Placement (9/19/2021); PICC/Midline Placement (1/22/2022); IR Lumbar Puncture (1/22/2022); and Loop Recorder Implant (N/A, 8/21/2023).     SOCIAL HISTORY     Reviewed, and she  reports that she has been smoking cigarettes. She has a 9.5 pack-year smoking history. She has never used smokeless tobacco. She reports that she does not drink alcohol and does not use drugs.     FAMILY HISTORY     Reviewed, and family history is not on file.     ALLERGIES     Allergies   Allergen Reactions    Morphine Other (See Comments)     \"make me delirious,\"  \"nightmares\"    Nitrofurantoin     Adhesive [Cyanoacrylate] Other (See Comments)     Can only tolerate tape for short periods of time shelia in sensitive areas    Amlodipine Unknown and Other (See Comments)     Other reaction(s): delerium    " Doxazosin Other (See Comments)     Other reaction(s): feels foggy    Irbesartan-Hydrochlorothiazide Other (See Comments)     Other reaction(s): hyponatremia    Other Allergy (See Comments) [External Allergen Needs Reconciliation - See Comment] Unknown     Other reaction(s): skin rash    Prednisone Unknown     Caused extremely high blood sugars    Tramadol Other (See Comments)     Possible seizure activity    Trazodone Unknown     Other reaction(s): hung over        REVIEW OF SYSTEMS     Pertinent items are noted in HPI.     HOME & HOSPITAL MEDICATIONS     Prior to Admission Medications  Medications Prior to Admission   Medication Sig Dispense Refill Last Dose    acetaminophen (TYLENOL) 500 MG tablet Take 1,000 mg by mouth every 6 hours as needed for mild pain   Past Week at prn    albuterol (PROAIR HFA/PROVENTIL HFA/VENTOLIN HFA) 108 (90 Base) MCG/ACT inhaler Inhale 2 puffs into the lungs every 6 hours as needed for shortness of breath, wheezing or cough   Past Week at prn    amoxicillin (AMOXIL) 500 MG tablet Take 2,000 mg by mouth as needed Before dental appointments   Unknown    aspirin 81 MG EC tablet Take 81 mg by mouth daily   3/13/2024 at am    cephALEXin (KEFLEX) 500 MG capsule Take 500 mg by mouth daily   3/13/2024 at am    cetirizine (ZYRTEC) 10 MG tablet Take 10 mg by mouth daily as needed   Unknown at prn    cholecalciferol (VITAMIN D3) 25 mcg (1000 units) capsule Take 1,000 Units by mouth daily    3/13/2024 at am    cloNIDine (CATAPRES-TTS) 0.3 mg/24 hr Place 1 patch onto the skin once a week    3/10/2024    Continuous Blood Gluc  (DEXCOM G6 ) TORRIE Use to read blood sugars as per 's instructions. 1 each 0     Continuous Blood Gluc Sensor (DEXCOM G6 SENSOR) MISC CHANGE EVERY 10 DAYS. 3 each 5     Continuous Blood Gluc Transmit (DEXCOM G6 TRANSMITTER) MISC CHANGE EVERY 3 MONTHS. 1 each 0     diclofenac sodium (VOLTAREN) 1 % Gel [DICLOFENAC SODIUM (VOLTAREN) 1 % GEL] Apply 1 g  topically daily as needed.   Unknown at prn    DULoxetine (CYMBALTA) 60 MG capsule Take 60 mg by mouth daily   3/13/2024 at am    escitalopram (LEXAPRO) 10 MG tablet Take 10 mg by mouth daily   3/13/2024 at am    estradiol (ESTRACE) 1 MG tablet Take 1 mg by mouth daily   3/13/2024 at am    fluticasone (FLONASE) 50 MCG/ACT nasal spray Spray 1 spray into both nostrils daily as needed for rhinitis or allergies   Unknown at prn    galcanezumab-gnlm (EMGALITY) 120 MG/ML injection Inject 1 mL (120 mg) Subcutaneous every 28 days 1 mL 11 3/12/2024    GEMTESA 75 MG TABS tablet Take 75 mg by mouth daily   3/13/2024 at am    Glucagon (BAQSIMI) 3 MG/DOSE nasal powder Spray 1 spray (3 mg) in nostril as needed (severe hypoglycemia) in the event of unconscious hypoglycemia or hypoglycemic seizure. May repeat dose if no response after 15 minutes. 2 each 4     glucose (BD GLUCOSE) 5 g chewable tablet Take 2 tablets (10 g) by mouth daily as needed (hypoglycemia) 300 tablet 3     insulin aspart (NOVOLOG VIAL) 100 UNITS/ML vial Inject 30 Units Subcutaneous daily 30 mL 0     Insulin Disposable Pump (OMNIPOD 5 G6 INTRO, GEN 5,) KIT 1 each daily (Patient taking differently: 1 each daily Current pump settings with DASH:   Basal:   12am: 0.55 units/hr  3am: 0.40 units/hr  9am: 0.45 units/hr  12pm: 0.65 units/hr  Max basal: 2 units/hr      ICR: 1 units per 18 g CHO  ISF: 60 mg/dL per unit  Target , correct above 150   Reverse correct: ON   Insulin action time: 4 hours   Max bolus: 15 units) 1 kit 1 3/13/2024 at pump is on patient    levETIRAcetam (KEPPRA) 500 MG tablet Take 1 tablet (500 mg) by mouth 2 times daily 60 tablet 11 3/13/2024 at am    liraglutide (VICTOZA PEN) 18 MG/3ML solution Inject 1.2 mg Subcutaneous daily   3/13/2024 at am    magnesium oxide (MAG-OX) 400 MG tablet Take 400 mg by mouth daily    3/13/2024 at am    medical cannabis (Patient's own supply) 1 Dose by Other route See Admin Instructions [employer]  Vape- 1-4 puffs HS PRN   3/12/2024 at qhs    metFORMIN (GLUCOPHAGE XR) 500 MG 24 hr tablet TAKE 2 TABS (1000MG) BY MOUTH TWICE DAILY WITH MEALS 360 tablet 0 3/13/2024 at am    metoprolol succinate ER (TOPROL-XL) 100 MG 24 hr tablet Take 100 mg by mouth 2 times daily   3/13/2024 at am    multivitamin w/minerals (THERA-VIT-M) tablet Take 1 tablet by mouth daily   3/13/2024 at am    nystatin (MYCOSTATIN) 957403 UNIT/GM external powder Apply topically 2 times daily as needed   Unknown at prn    olmesartan (BENICAR) 40 MG tablet Take 40 mg by mouth daily   Unknown    omeprazole (PRILOSEC) 20 MG capsule [OMEPRAZOLE (PRILOSEC) 20 MG CAPSULE] Take 1 capsule by mouth daily before breakfast.   3/13/2024 at am    pregabalin (LYRICA) 100 MG capsule Take 1 capsule (100 mg) by mouth 3 times daily 90 capsule 0 3/13/2024 at X1 AM dose    rosuvastatin (CRESTOR) 5 MG tablet TAKE 1 TABLET BY MOUTH EVERY DAY 90 tablet 1 3/13/2024 at am    tiZANidine (ZANAFLEX) 2 MG tablet Take 4 mg by mouth nightly as needed   3/12/2024 at [facility]    valACYclovir (VALTREX) 500 MG tablet Take 500 mg by mouth daily   3/13/2024 at am       Hospital Medications   aspirin  81 mg Oral Daily    cloNIDine   Transdermal Q8H    [START ON 3/17/2024] cloNIDine  1 patch Transdermal Weekly    DULoxetine  60 mg Oral Daily    escitalopram  10 mg Oral Daily    estradiol  1 mg Oral Daily    insulin aspart   Subcutaneous Daily with breakfast    insulin aspart   Subcutaneous Daily with lunch    insulin aspart   Subcutaneous Daily with supper    insulin aspart  1-7 Units Subcutaneous TID AC    insulin aspart  1-5 Units Subcutaneous At Bedtime    insulin glargine  10 Units Subcutaneous QAM AC    levETIRAcetam  500 mg Oral BID    liraglutide  1.2 mg Subcutaneous Daily    losartan  100 mg Oral Daily    metoprolol succinate ER  100 mg Oral BID    mirabegron  25 mg Oral Daily    pantoprazole  40 mg Oral QAM AC    pregabalin  100 mg Oral TID    rosuvastatin  5 mg Oral Daily         PHYSICAL EXAM     Vital signs  Temp:  [97.4  F (36.3  C)-97.8  F (36.6  C)] 97.8  F (36.6  C)  Pulse:  [64-74] 71  Resp:  [12-18] 18  BP: (148-197)/(74-97) 161/74  SpO2:  [96 %-99 %] 97 %    General Physical Exam: Patient is alert and oriented x 3. Vital signs were reviewed and are documented in EMR. Neck was supple, no carotid bruit, thyromegaly, JVD or lymphadenopathy noted.  Neurological Exam:  Elderly female who is alert and oriented vital signs were reviewed and documented in electronic medical record. Neck supple. Neurologically speech was normal cranial nerves II through XII are intact there is no restriction of upgaze. She has bilateral intention tremor. No cogwheel rigidity or resting tremor.  She has minimal dysmetria on finger-nose testing and rapid alternating movement.  Gait testing deferred.  Patient has decreased light touch pinprick below knees     DIAGNOSTIC STUDIES     Pertinent Radiology   Radiology Results: Reviewed impression and images     CT BRAIN 3/13/2024  1.  No CT evidence for acute intracranial process.  2.  Mild chronic microvascular ischemic changes as above.     3.  DDD change throughout the cervical spine remains stable compared to previous CT scan of 08/17/2023. No evidence of fracture.    CT BRAIN 8/17/2023  1.  No acute process intracranially. No intracranial mass, mass effect or hemorrhage. Nothing for parenchymal contusion or acute infarction.     2.  Mild chronic ischemic changes deep white matter both cerebral hemispheres. Partially empty sella morphology as on previous MRI.     3.  No fracture of the visualized calvarium/skull base.     4.  Soft tissue swelling/hematoma left frontal scalp with left preseptal periorbital soft tissue swelling. Left orbital floor fracture described separately. High density air-fluid level left maxillary sinus presumed posttraumatic.     5.  Please see above and facial bone CT for details and description.      CT BRAIN 9/23/2022  1.  No CT  evidence for acute intracranial process.     2.  Brain atrophy and presumed chronic microvascular ischemic changes as above.     3.  Stable appearance from previous head CT 01/20/2022.     MRI BRAIN 1/20/2022  1. No acute intracranial abnormality.  2. Mild generalized volume loss and chronic microvascular ischemic change.     MRI LUMBAR SPINE 3/12/2015  1.  Since the previous MRI, patient has undergone placement of an interbody fusion device at L4-L5. There is some edema and enhancement within the adjacent L4 and L5 vertebral bodies as well as a fluid collection along the left ventral aspect of L4 and   L5. These changes are nonspecific. Most likely they reflect postoperative change in the seroma. Infection is not suspected, but not excluded. Correlate clinically.  2.  The degree of anterolisthesis of L4 with respect L5 may have increased subtly. The degree of spinal canal stenosis appears more pronounced, now at least moderate in degree.  3.  Some edema associated with the osseous L4-L5 facets has progressed in the interval.  4.  Some progression of the now moderate left and mild right foraminal narrowing at this level. There is probably some flattening of the exiting nerve on the left due to a small extrusion.  5.  Degenerative disc and facet changes at L5-S1 without significant stenosis appears similar to previous.  6.  Conus ends just above the L2-L3 interspace with fatty infiltration of the lower filum.     EEG 8/17/2023  This is a mildly abnormal EEG during wakefulness and drowsiness due to slightly slow generalized background suggestive of mild generalized cerebral dysfunction.  Such an EEG can be seen in patients with mild encephalopathies.  No electrographic seizures noted.  Further clinical correlation is needed.     Please note that the absence of epileptiform abnormalities does not exclude the possibility of epilepsy in any patient.      EEG 1/25/2022  This is a normal EEG during wakefulness and  drowsiness except for mild generalized background dysrhythmia. Further clinical correlation is needed.     EEG 01/20/2022  This is an abnormal prolonged video EEG during wakefulness and drowsiness/somnolent state due to intermittent generalized periodic discharges with triphasic morphology.    These could be triphasic waves since these are more bifrontocentral prominent. The generalized slow background seen during the recording could suggest an encephalopathy or could be secondary to sedating medications. Further clinical correlation is needed.     EEG 1/20/2022  This is a severely abnormal EEG due to almost continuous generalized rhythmic activity interspersed with high amplitude sharp activity suggesting electrographic seizures.  Later in the tracing patient received Ativan with some attenuation of these discharges and background was replaced by theta delta admixture.    Pertinent Labs   Lab Results: Personally Reviewed   Recent Results (from the past 24 hour(s))   Glucose by meter    Collection Time: 03/14/24  6:02 PM   Result Value Ref Range    GLUCOSE BY METER POCT 173 (H) 70 - 99 mg/dL   Glucose by meter    Collection Time: 03/14/24  8:47 PM   Result Value Ref Range    GLUCOSE BY METER POCT 184 (H) 70 - 99 mg/dL   Extra Purple Top Tube    Collection Time: 03/14/24  8:56 PM   Result Value Ref Range    Hold Specimen JIC    Glucose by meter    Collection Time: 03/14/24  9:09 PM   Result Value Ref Range    GLUCOSE BY METER POCT 206 (H) 70 - 99 mg/dL   Glucose by meter    Collection Time: 03/15/24  2:22 AM   Result Value Ref Range    GLUCOSE BY METER POCT 164 (H) 70 - 99 mg/dL   Glucose by meter    Collection Time: 03/15/24  7:26 AM   Result Value Ref Range    GLUCOSE BY METER POCT 162 (H) 70 - 99 mg/dL   Basic metabolic panel    Collection Time: 03/15/24  9:01 AM   Result Value Ref Range    Sodium 135 135 - 145 mmol/L    Potassium 4.7 3.4 - 5.3 mmol/L    Chloride 99 98 - 107 mmol/L    Carbon Dioxide (CO2) 31 (H)  22 - 29 mmol/L    Anion Gap 5 (L) 7 - 15 mmol/L    Urea Nitrogen 15.9 8.0 - 23.0 mg/dL    Creatinine 0.74 0.51 - 0.95 mg/dL    GFR Estimate 84 >60 mL/min/1.73m2    Calcium 9.1 8.8 - 10.2 mg/dL    Glucose 267 (H) 70 - 99 mg/dL   CBC with platelets    Collection Time: 03/15/24  9:01 AM   Result Value Ref Range    WBC Count 10.4 4.0 - 11.0 10e3/uL    RBC Count 3.92 3.80 - 5.20 10e6/uL    Hemoglobin 12.2 11.7 - 15.7 g/dL    Hematocrit 36.9 35.0 - 47.0 %    MCV 94 78 - 100 fL    MCH 31.1 26.5 - 33.0 pg    MCHC 33.1 31.5 - 36.5 g/dL    RDW 14.5 10.0 - 15.0 %    Platelet Count 238 150 - 450 10e3/uL   Glucose by meter    Collection Time: 03/15/24 11:26 AM   Result Value Ref Range    GLUCOSE BY METER POCT 204 (H) 70 - 99 mg/dL   Blood gas venous    Collection Time: 03/15/24 11:59 AM   Result Value Ref Range    pH Venous 7.40 7.32 - 7.43    pCO2 Venous 51 (H) 40 - 50 mm Hg    pO2 Venous 24 (L) 25 - 47 mm Hg    Bicarbonate Venous 32 (H) 21 - 28 mmol/L    Base Excess/Deficit Venous 7.1 (H) -3.0 - 3.0 mmol/L    FIO2 21     Oxyhemoglobin Venous 41 (L) 70 - 75 %    O2 Sat, Venous 41.0 (L) 70.0 - 75.0 %   Glucose by meter    Collection Time: 03/15/24  2:12 PM   Result Value Ref Range    GLUCOSE BY METER POCT 202 (H) 70 - 99 mg/dL   Glucose by meter    Collection Time: 03/15/24  4:44 PM   Result Value Ref Range    GLUCOSE BY METER POCT 117 (H) 70 - 99 mg/dL       Total time spent for face to face visit, reviewing labs/imaging studies, counseling and coordination of care was: 1 Hour 30 Minutes More than 50% of this time was spent on counseling and coordination of care.    This note was dictated using voice recognition software.  Any grammatical or context distortions are unintentional and inherent to the software.

## 2024-03-15 NOTE — PROGRESS NOTES
"PRIMARY DIAGNOSIS: \"GENERIC\" NURSING  OUTPATIENT/OBSERVATION GOALS TO BE MET BEFORE DISCHARGE:  ADLs back to baseline: No    Activity and level of assistance: Up with standby assistance.    Pain status: Pain free.    Return to near baseline physical activity: No     Discharge Planner Nurse   Safe discharge environment identified: Yes  Barriers to discharge: Yes       Entered by: Nannette Landis RN 03/15/2024 12:29 AM   Pt A&Ox4. Denies pain. SBA. forgetful. Pleasant. LS clear. On room air. Blood glucose was 206, gave her 1u of Novolog.      Please review provider order for any additional goals.   Nurse to notify provider when observation goals have been met and patient is ready for discharge.  "

## 2024-03-15 NOTE — PROGRESS NOTES
[facility]    Medicine Progress Note - Hospitalist Service    Date of Admission:  3/13/2024    Assessment & Plan       Recurrent Syncope - unclear etiology as above, cardiology does not feel it is cardiac related.  Loop recorder does not show any significant arrhythmia that would be responsible for this.  More likely is recurrent seizure vs less likely hypoglycemia, workup as above, seizure precautions. Check EEG and consult with neurology.  Carotid dopplers are mild disease. Echo with normal EF and septal mild HK previously has been shown to have no obstructive CAD on Coronary CTA so cardiology does not feel this needs any further eval. Her daughter thinks her episodes are related to being on Victoza.  Would defer to neurology and her outpatient endocrinologist.     Partial Complex Seizure disorder - had been on Keppra a couple of years ago briefly and was restarted on this a month ago by neurology.  Will check EEG and Keppra trough this evening.         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 Did get hypoglycemic to 53 this AM early but did not lose consciousness.  This was treated.  She states her pump is working but she thinks the battery may be low so have asked her to remove it and will give lantus 10 units this AM with 1:15 carb ratio Novolog with meals    GERD (gastroesophageal reflux disease) - PPI    Mood disorder - continue lexapro, and cymbalta as at home    KP (obstructive sleep apnea) - not on CPAP    Paroxysmal atrial fibrillation - continue toprol and ASA as at home.  Not on anticoagulation.    COPD (chronic obstructive pulmonary disease) - uses albuterol MDI, continue for now.    Diabetic polyneuropathy - on lyrica    Dyslipidemia - continue crestor as at home       Observation  "Goals: -diagnostic tests and consults completed and resulted, -vital signs normal or at patient baseline, -returns to baseline functional status, -safe disposition plan has been identified, Nurse to notify provider when observation goals have been met and patient is ready for discharge.  Diet: Moderate Consistent Carb (60 g CHO per Meal) Diet    DVT Prophylaxis: Low Risk/Ambulatory with no VTE prophylaxis indicated  Gomes Catheter: Not present  Lines: None     Cardiac Monitoring: ACTIVE order. Indication: Syncope- high cardiac risk (48 hours)  Code Status: No CPR- Pre-arrest intubation OK      Clinically Significant Risk Factors Present on Admission                # Drug Induced Platelet Defect: home medication list includes an antiplatelet medication   # Hypertension: Noted on problem list     # DMII: A1C = 7.5 % (Ref range: <5.7 %) within past 6 months    # Overweight: Estimated body mass index is 25.24 kg/m  as calculated from the following:    Height as of this encounter: 1.575 m (5' 2\").    Weight as of this encounter: 62.6 kg (138 lb 0.1 oz).              Disposition Plan      Expected Discharge Date: 03/14/2024      Destination: home with family              Jeet Cooper MD  Hospitalist Service  [facility]  Securely message with [employer] (more info)  Text page via [employer] Paging/Directory   ______________________________________________________________________    Interval History   Feels ok overnight.  Daughter thinks her episodes are related to being on Victoza.  Would defer to neurology and her outpatient endocrinologist.      Physical Exam   Vital Signs: Temp: 97.7  F (36.5  C) Temp src: Oral BP: (!) 161/91 Pulse: 70   Resp: 17 SpO2: 99 % O2 Device: None (Room air)    Weight: 138 lbs .13 oz    General Appearance: Older F in NAD  Respiratory:  CTA  Cardiovascular: RRR S1S2  GI: +BS, soft, NT  Skin: no rashes on exposed areas  Neuro: Alert, generally nonfocal on motor and sensory " testing      Medical Decision Making       50 MINUTES SPENT BY ME on the date of service doing chart review, history, exam, documentation & further activities per the note.      Data     I have personally reviewed the following data over the past 24 hrs:    15.2 (H)  \   13.0   / 244     138 102 14.8 /  173 (H)   4.5 27 0.73 \     Trop: N/A BNP: N/A     TSH: N/A T4: N/A A1C: 7.5 (H)     Procal: N/A CRP: N/A Lactic Acid: N/A         Imaging results reviewed over the past 24 hrs:   Recent Results (from the past 24 hour(s))   US Carotid Bilateral    Narrative    EXAM: US CAROTID BILATERAL  LOCATION: [facility] [place]  DATE: 3/13/2024    INDICATION: syncope  COMPARISON: None.  TECHNIQUE: Duplex exam performed utilizing 2D gray-scale imaging, Doppler interrogation with color-flow and spectral waveform analysis. The percent diameter stenosis is determined using NASCET criteria and Society of Radiologists in Ultrasound Consensus   Criteria.    FINDINGS:    RIGHT: Mild plaque at the bifurcation. The peak systolic velocity in the ICA is less than 125 cm/sec, consistent with less than 50% stenosis. Normal velocities in the ECA. Antegrade flow within the vertebral artery.     LEFT: Mild plaque at the bifurcation. The peak systolic velocity in the ICA is less than 125 cm/sec, consistent with less than 50% stenosis. Normal velocities in the ECA. Antegrade flow within the vertebral artery.    VELOCITY CHART:  CCA   Right: 85 cm/s   Left: 63 cm/s  ICA   Right: 85 cm/s   Left: 52 cm/s  ECA   Right: 64 cm/s   Left: 71 cm/s  ICA/CCA PSV Ratio   Right: 1.0   Left: 0.83      Impression    IMPRESSION:  1.  Mild plaque formation, velocities consistent with less than 50% stenosis in the right internal carotid artery.  2.  Mild plaque formation, velocities consistent with less than 50% stenosis in the left internal carotid artery.  3.  Flow within the vertebral arteries is antegrade.   Echocardiogram Complete   Result  Value    LVEF  55-60%    [facility]    [id number]  [id number]  [id number]  [id number]^CHON^[place] [address]     Name: KRYSTEN LONDON  MRN: [id number]  : 1950  Study Date: 2024 07:04 AM  Age: 73 yrs  Gender: Female  Patient Location: [facility]  Reason For Study: Syncope  Ordering Physician: FRANCHESCA GIVENS  Performed By: THELMA     BSA: 1.7 m2  Height: 62 in  Weight: 143 lb  HR: 72  BP: 121/66 mmHg  ______________________________________________________________________________  Procedure  Complete Portable Echo Adult.  ______________________________________________________________________________  Interpretation Summary     The visual ejection fraction is 55-60%. Focal area of mid septal hypokinesis.  Normal RV size with mildly decreased right ventricular systolic function  There is mild (1+) mitral regurgitation. The mitral regurgitant jet is  posteriorly directed, which is consistent with anterior leaflet pathology.  Compared to the prior study dated 2023, there have been no changes.  ______________________________________________________________________________  Left Ventricle  The left ventricle is normal in size. There is normal left ventricular wall  thickness. The visual ejection fraction is 55-60%. Left ventricular diastolic  function is normal. Focal area of mid septal hypokinesis.     Right Ventricle  The right ventricle is normal size. TAPSE is abnormal, which is consistent  with abnormal right ventricular systolic function. Mildly decreased right  ventricular systolic function.     Atria  Normal left atrial size. Right atrial size is normal. Intact atrial septum.     Mitral Valve  The mitral valve leaflets appear thickened, but open well. There is mild (1+)  mitral regurgitation. The mitral regurgitant jet is posteriorly directed,  which is consistent with anterior leaflet pathology.     Tricuspid Valve  Tricuspid valve leaflets appear normal.  There is mild (1+) tricuspid  regurgitation.     Aortic Valve  The aortic valve is not well visualized. No aortic regurgitation is present.  No aortic stenosis is present.     Pulmonic Valve  The pulmonic valve is not well visualized.     Vessels  The aorta root cannot be assessed. The thoracic aorta cannot be assessed. IVC  diameter <2.1 cm collapsing >50% with sniff suggests a normal RA pressure of 3  mmHg.     Pericardium  There is no pericardial effusion.     ______________________________________________________________________________  MMode/2D Measurements & Calculations  IVSd: 1.0 cm  LVIDd: 3.7 cm  LVIDs: 2.6 cm  LVPWd: 0.95 cm  FS: 29.0 %     LV mass(C)d: 109.3 grams  LV mass(C)dI: 66.0 grams/m2  LVOT diam: 2.0 cm  LVOT area: 3.1 cm2  LA Volume (BP): 40.7 ml  LA Volume Index (BP): 24.5 ml/m2     LA Volume Indexed (AL/bp): 26.6 ml/m2  RV Base: 3.1 cm  RWT: 0.51  TAPSE: 1.4 cm     Doppler Measurements & Calculations  MV E max agustín: 61.8 cm/sec  MV A max agustín: 93.8 cm/sec  MV E/A: 0.66  MV dec slope: 355.0 cm/sec2  MV dec time: 0.17 sec  Ao V2 max: 117.0 cm/sec  Ao max P.0 mmHg  Ao V2 mean: 82.4 cm/sec  Ao mean PG: 3.0 mmHg  Ao V2 VTI: 25.9 cm  CHELSEA(I,D): 1.8 cm2  CHELSEA(V,D): 1.9 cm2  LV V1 max P.1 mmHg  LV V1 max: 72.2 cm/sec  LV V1 VTI: 15.2 cm  SV(LVOT): 47.8 ml  SI(LVOT): 28.8 ml/m2  PA V2 max: 90.8 cm/sec  PA max PG: 3.3 mmHg  PA acc time: 0.08 sec  AV Agustín Ratio (DI): 0.62  CHELSEA Index (cm2/m2): 1.1  E/E' avg: 10.3     Lateral E/e': 8.7  Medial E/e': 11.8  RV S Agustín: 13.2 cm/sec     ______________________________________________________________________________  Report approved by: Larry Villarreal 2024 12:11 PM         Cardiac Device Check - Remote   Result Value    Date Time Interrogation Session [id number]    Implantable Pulse Generator  citiservi    Implantable Pulse Generator Model M301 LUX-Dx ICM    Implantable Pulse Generator Serial Number [id number]    Type Interrogation  Session Remote Scheduled    [facility]inic Name [facility]    Implantable Pulse Generator Type Implantable Diagnostic Monitor    Implantable Pulse Generator Implant Date 20230821    Zone Setting Type Category BRADYCARDIA    Battery Date Time of Measurements 20240314123000    Battery Status Beginning of Service    Atrial Tachy Statistic Date Time Start 20231204000000    Atrial Tachy Statistic Date Time End 20240314000000    Atrial Tachy Statistic AT/AF Gilchrist Percent 1    Episode Identifier [id number]    Episode Type Category Periodic EGM    Episode Date Time 20240314122900    Episode Identifier [id number]    Episode Type Category Periodic EGM    Episode Date Time [id number]    Episode Identifier [id number]    Episode Type Category Periodic EGM    Episode Date Time [id number]    Narrative    Type: routine remote loop recorder transmission.   Device: BSCI LUX-Dc ICM.   Presenting rhythm: Sinus 75 bpm.   Battery status: OK.  Arrhythmias: since 12/4/23; Four AF episodes, poor quality baseline, but recordings appear to be sinus with PACs.   Plan: Next remote check scheduled for 6/20/24. E. Pivec, Device Specialist    Device follow up for the entirety of having the loop recorder, based on best practices determined by [employer] and in compliance with Medicare guidelines. Continue remote device monitoring per patient plan.  I have reviewed and interpreted the device interrogation, settings, programming, and encounter summary. The device is functioning within normal device parameters. I agree with the current findings, assessment and plan.

## 2024-03-16 PROBLEM — S02.92XA MULTIPLE CLOSED FRACTURES OF FACIAL BONE, INITIAL ENCOUNTER (H): Status: RESOLVED | Noted: 2023-08-17 | Resolved: 2024-03-16

## 2024-03-16 PROBLEM — R55 SYNCOPE, UNSPECIFIED SYNCOPE TYPE: Status: RESOLVED | Noted: 2023-08-17 | Resolved: 2024-03-16

## 2024-03-16 NOTE — DISCHARGE SUMMARY
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    Weight as of this encounter: 62.6 kg (138 lb 0.1 oz).       Follow-ups Needed After Discharge      Follow up with primary care provider, Scar Tan, within 7 days for hospital follow- up.  The following labs/tests are recommended: referral to sleep specialist as her symptoms may be being exacerbated by untreated sleep apnea and with endocrinologist.           Unresulted Labs Ordered in the Past 30 Days of this Admission       Date and Time Order Name Status Description    3/14/2024  5:42 PM Keppra (Levetiracetam) Level In process         These results will be followed up by hospital medicine and neurology    Discharge Disposition   Discharged to home  Condition at discharge: Stable    Hospital Course   74 yo F with h/o HTN, HARRIETT, GERD, KP not on CPAP, pAfib, COPD, HLD, and partial complex seizure d/o who presents with recurrent syncope.  Has had extensive workup in the past.  Cardiology evaluated the patient again this time and does not feel it is cardiac related.  Loop recorder does not show any significant arrhythmia that would be " responsible for this.   Echo with normal EF and septal mild HK previously has been shown to have no obstructive CAD on Coronary CTA so cardiology does not feel this needs any further eval. Neurology evaluated the patient and felt it was recurrent seizure.  EEG did not show any acute issues.  Carotid dopplers show mild disease. Her daughter had raised the concern about whether these could be related to being on Victoza. Its not clear how these would be related.  The patient states she is not hypoglycemic when she has these episodes most of the time though she is not good about always checking.  But she can discuss further with her endocrinologist.    Her CO2 did go up and so we checked a VBG which had pH 7.4 but pCO2 of 51.  She may be retaining CO2 at night due to her untreated sleep apnea.  This could also be increasing her tendency to having seizures.  I recommended to her that she talk to her PCP about going to see a sleep specialist and getting another sleep study as it has been many years and consider therapy for this.    Consultations This Hospital Stay   OCCUPATIONAL THERAPY ADULT IP CONSULT  PHYSICAL THERAPY ADULT IP CONSULT  CARE MANAGEMENT / SOCIAL WORK IP CONSULT  CARDIOLOGY IP CONSULT  NEUROLOGY IP CONSULT    Code Status   No CPR- Pre-arrest intubation OK    Time Spent on this Encounter   I, Jeet Cooper MD, personally saw the patient today and spent greater than 30 minutes discharging this patient.       Jeet Cooper MD  [facility] EXTENDED RECOVERY AND SHORT STAY  [address] [place] [id number]  Phone: [phone]  Fax: [phone]  ______________________________________________________________________    Physical Exam   Vital Signs: Temp: 98.3  F (36.8  C) Temp src: Oral BP: (!) 162/70 Pulse: 71   Resp: 18 SpO2: 97 % O2 Device: None (Room air)    Weight: 138 lbs .13 oz  The patient was interviewed and examined on the day of discharge.         Primary Care  Physician   Scar Tan    Discharge Orders      Reason for your hospital stay    seizures     Follow-up and recommended labs and tests     Follow up with primary care provider, Scar Tan, within 7 days for hospital follow- up.  The following labs/tests are recommended: referral to sleep specialist and with endocrinologist.     Activity    Your activity upon discharge: activity as tolerated     Diet    Follow this diet upon discharge: Orders Placed This Encounter      Moderate Consistent Carb (60 g CHO per Meal) Diet       Significant Results and Procedures   Results for orders placed or performed during the hospital encounter of 03/13/24   CT Head w/o Contrast    Narrative    EXAM: CT HEAD W/O CONTRAST, CT CERVICAL SPINE W/O CONTRAST  LOCATION: [facility]  DATE: 3/13/2024    INDICATION: head trauma syncope. Midline neck pain following traumatic injury to the cervical spine.  COMPARISON: 08/17/2023. 01/20/2022.  TECHNIQUE: Routine CT Head and cervical spine spine CT. without IV contrast. Multiplanar reformats. Dose reduction techniques were used.    FINDINGS:  INTRACRANIAL CONTENTS: No intracranial hemorrhage, extraaxial collection, or mass effect.  No CT evidence of acute infarct. Mild presumed chronic small vessel ischemic changes. Posterior fossa structures including cerebellar hemispheres, fourth ventricle   and region of CP angle cisterns are clear. Nasopharynx is clear. Region of the sella and suprasellar cistern are clear.     VISUALIZED ORBITS/SINUSES/MASTOIDS: No intraorbital abnormality. Benign osteoma left ethmoid air cells as seen previously. No middle ear or mastoid effusion.    CT cervical spine:    Paraspinous soft tissues, lung apices region of thyroid gland and mucosal spaces of the neck are clear. No evidence of cervical spine fracture. No prevertebral soft tissue swelling.    Degenerative change including 3 mm of anterior degenerative subluxation of  C4 on C5. Variable degrees of uncovertebral joint and facet hypertrophic change result in multilevel foraminal stenosis including bilateral foraminal stenosis at C4-C5, bilateral   foraminal stenosis at C5-C6. Left-sided foraminal stenosis at C6-C7. 1 mm anterior degenerative subluxation of C5 on C6 unchanged.          Impression    IMPRESSION:  1.  No CT evidence for acute intracranial process.  2.  Mild chronic microvascular ischemic changes as above.    3.  DDD change throughout the cervical spine remains stable compared to previous CT scan of 08/17/2023. No evidence of fracture.   CT Cervical Spine w/o Contrast    Narrative    EXAM: CT HEAD W/O CONTRAST, CT CERVICAL SPINE W/O CONTRAST  LOCATION: [facility]  DATE: 3/13/2024    INDICATION: head trauma syncope. Midline neck pain following traumatic injury to the cervical spine.  COMPARISON: 08/17/2023. 01/20/2022.  TECHNIQUE: Routine CT Head and cervical spine spine CT. without IV contrast. Multiplanar reformats. Dose reduction techniques were used.    FINDINGS:  INTRACRANIAL CONTENTS: No intracranial hemorrhage, extraaxial collection, or mass effect.  No CT evidence of acute infarct. Mild presumed chronic small vessel ischemic changes. Posterior fossa structures including cerebellar hemispheres, fourth ventricle   and region of CP angle cisterns are clear. Nasopharynx is clear. Region of the sella and suprasellar cistern are clear.     VISUALIZED ORBITS/SINUSES/MASTOIDS: No intraorbital abnormality. Benign osteoma left ethmoid air cells as seen previously. No middle ear or mastoid effusion.    CT cervical spine:    Paraspinous soft tissues, lung apices region of thyroid gland and mucosal spaces of the neck are clear. No evidence of cervical spine fracture. No prevertebral soft tissue swelling.    Degenerative change including 3 mm of anterior degenerative subluxation of C4 on C5. Variable degrees of uncovertebral joint and facet  hypertrophic change result in multilevel foraminal stenosis including bilateral foraminal stenosis at C4-C5, bilateral   foraminal stenosis at C5-C6. Left-sided foraminal stenosis at C6-C7. 1 mm anterior degenerative subluxation of C5 on C6 unchanged.          Impression    IMPRESSION:  1.  No CT evidence for acute intracranial process.  2.  Mild chronic microvascular ischemic changes as above.    3.  DDD change throughout the cervical spine remains stable compared to previous CT scan of 08/17/2023. No evidence of fracture.   CT Chest Pulmonary Embolism w Contrast    Narrative    EXAM: CT CHEST PULMONARY EMBOLISM W CONTRAST  LOCATION: [facility]  DATE: 3/13/2024    INDICATION: r o PE, multiple episodes of syncope  COMPARISON: 11/18/2021, 11/22/2023  TECHNIQUE: CT chest pulmonary angiogram during arterial phase injection of IV contrast. Multiplanar reformats and MIP reconstructions were performed. Dose reduction techniques were used.   CONTRAST: 75ml isovue 370    FINDINGS:  ANGIOGRAM CHEST: Pulmonary arteries are normal caliber and negative for pulmonary emboli. Thoracic aorta is not well opacified and is  indeterminate for dissection. No CT evidence of right heart strain.    LUNGS AND PLEURA: Small area of subpleural consolidation in the left lower lobe. No new or enlarging suspicious nodules.    MEDIASTINUM/AXILLAE: Normal.    CORONARY ARTERY CALCIFICATION: Mild.    UPPER ABDOMEN: Simple renal cysts, which do not require additional follow-up.    MUSCULOSKELETAL: Normal.      Impression    IMPRESSION:  1.  No acute pulmonary emboli.    2.  Small subpleural consolidation in the left lower lobe could be an area of atelectasis or early pneumonia.   US Carotid Bilateral    Narrative    EXAM: US CAROTID BILATERAL  LOCATION: [facility]  DATE: 3/13/2024    INDICATION: syncope  COMPARISON: None.  TECHNIQUE: Duplex exam performed utilizing 2D gray-scale imaging, Doppler  interrogation with color-flow and spectral waveform analysis. The percent diameter stenosis is determined using NASCET criteria and Society of Radiologists in Ultrasound Consensus   Criteria.    FINDINGS:    RIGHT: Mild plaque at the bifurcation. The peak systolic velocity in the ICA is less than 125 cm/sec, consistent with less than 50% stenosis. Normal velocities in the ECA. Antegrade flow within the vertebral artery.     LEFT: Mild plaque at the bifurcation. The peak systolic velocity in the ICA is less than 125 cm/sec, consistent with less than 50% stenosis. Normal velocities in the ECA. Antegrade flow within the vertebral artery.    VELOCITY CHART:  CCA   Right: 85 cm/s   Left: 63 cm/s  ICA   Right: 85 cm/s   Left: 52 cm/s  ECA   Right: 64 cm/s   Left: 71 cm/s  ICA/CCA PSV Ratio   Right: 1.0   Left: 0.83      Impression    IMPRESSION:  1.  Mild plaque formation, velocities consistent with less than 50% stenosis in the right internal carotid artery.  2.  Mild plaque formation, velocities consistent with less than 50% stenosis in the left internal carotid artery.  3.  Flow within the vertebral arteries is antegrade.   Echocardiogram Complete     Value    LVEF  55-60%    [facility]    [id number]  [id number]  [id number]  [id number]^CHON^[place] [address]     Name: KRYSTEN LONDON  MRN: [id number]  : 1950  Study Date: 2024 07:04 AM  Age: 73 yrs  Gender: Female  Patient Location: [facility]  Reason For Study: Syncope  Ordering Physician: FRANCHESCA GIVENS  Performed By: THELMA     BSA: 1.7 m2  Height: 62 in  Weight: 143 lb  HR: 72  BP: 121/66 mmHg  ______________________________________________________________________________  Procedure  Complete Portable Echo Adult.  ______________________________________________________________________________  Interpretation Summary     The visual ejection fraction is 55-60%. Focal area of mid septal hypokinesis.  Normal RV size  with mildly decreased right ventricular systolic function  There is mild (1+) mitral regurgitation. The mitral regurgitant jet is  posteriorly directed, which is consistent with anterior leaflet pathology.  Compared to the prior study dated 8/18/2023, there have been no changes.  ______________________________________________________________________________  Left Ventricle  The left ventricle is normal in size. There is normal left ventricular wall  thickness. The visual ejection fraction is 55-60%. Left ventricular diastolic  function is normal. Focal area of mid septal hypokinesis.     Right Ventricle  The right ventricle is normal size. TAPSE is abnormal, which is consistent  with abnormal right ventricular systolic function. Mildly decreased right  ventricular systolic function.     Atria  Normal left atrial size. Right atrial size is normal. Intact atrial septum.     Mitral Valve  The mitral valve leaflets appear thickened, but open well. There is mild (1+)  mitral regurgitation. The mitral regurgitant jet is posteriorly directed,  which is consistent with anterior leaflet pathology.     Tricuspid Valve  Tricuspid valve leaflets appear normal. There is mild (1+) tricuspid  regurgitation.     Aortic Valve  The aortic valve is not well visualized. No aortic regurgitation is present.  No aortic stenosis is present.     Pulmonic Valve  The pulmonic valve is not well visualized.     Vessels  The aorta root cannot be assessed. The thoracic aorta cannot be assessed. IVC  diameter <2.1 cm collapsing >50% with sniff suggests a normal RA pressure of 3  mmHg.     Pericardium  There is no pericardial effusion.     ______________________________________________________________________________  MMode/2D Measurements & Calculations  IVSd: 1.0 cm  LVIDd: 3.7 cm  LVIDs: 2.6 cm  LVPWd: 0.95 cm  FS: 29.0 %     LV mass(C)d: 109.3 grams  LV mass(C)dI: 66.0 grams/m2  LVOT diam: 2.0 cm  LVOT area: 3.1 cm2  LA Volume (BP): 40.7 ml  LA  Volume Index (BP): 24.5 ml/m2     LA Volume Indexed (AL/bp): 26.6 ml/m2  RV Base: 3.1 cm  RWT: 0.51  TAPSE: 1.4 cm     Doppler Measurements & Calculations  MV E max agustín: 61.8 cm/sec  MV A max agustín: 93.8 cm/sec  MV E/A: 0.66  MV dec slope: 355.0 cm/sec2  MV dec time: 0.17 sec  Ao V2 max: 117.0 cm/sec  Ao max P.0 mmHg  Ao V2 mean: 82.4 cm/sec  Ao mean PG: 3.0 mmHg  Ao V2 VTI: 25.9 cm  CHELSEA(I,D): 1.8 cm2  CHELSEA(V,D): 1.9 cm2  LV V1 max P.1 mmHg  LV V1 max: 72.2 cm/sec  LV V1 VTI: 15.2 cm  SV(LVOT): 47.8 ml  SI(LVOT): 28.8 ml/m2  PA V2 max: 90.8 cm/sec  PA max PG: 3.3 mmHg  PA acc time: 0.08 sec  AV Agustín Ratio (DI): 0.62  CHELSEA Index (cm2/m2): 1.1  E/E' avg: 10.3     Lateral E/e': 8.7  Medial E/e': 11.8  RV S Agustín: 13.2 cm/sec     ______________________________________________________________________________  Report approved by: Larry Villarreal 2024 12:11 PM             Discharge Medications   Current Discharge Medication List        CONTINUE these medications which have CHANGED    Details   insulin aspart (NOVOLOG VIAL) 100 UNITS/ML vial Inject 30 Units Subcutaneous daily  Qty: 30 mL, Refills: 0    Associated Diagnoses: Type 1 diabetes mellitus with hyperglycemia (H)           CONTINUE these medications which have NOT CHANGED    Details   acetaminophen (TYLENOL) 500 MG tablet Take 1,000 mg by mouth every 6 hours as needed for mild pain      albuterol (PROAIR HFA/PROVENTIL HFA/VENTOLIN HFA) 108 (90 Base) MCG/ACT inhaler Inhale 2 puffs into the lungs every 6 hours as needed for shortness of breath, wheezing or cough      amoxicillin (AMOXIL) 500 MG tablet Take 2,000 mg by mouth as needed Before dental appointments    Associated Diagnoses: Thyrotoxicosis; Uncontrolled type 2 diabetes mellitus without complication, with long-term current use of insulin      aspirin 81 MG EC tablet Take 81 mg by mouth daily      cephALEXin (KEFLEX) 500 MG capsule Take 500 mg by mouth daily      cetirizine (ZYRTEC) 10 MG tablet Take  10 mg by mouth daily as needed      cholecalciferol (VITAMIN D3) 25 mcg (1000 units) capsule Take 1,000 Units by mouth daily       cloNIDine (CATAPRES-TTS) 0.3 mg/24 hr Place 1 patch onto the skin once a week       Continuous Blood Gluc  (DEXCOM G6 ) TORRIE Use to read blood sugars as per 's instructions.  Qty: 1 each, Refills: 0    Associated Diagnoses: Type 1 diabetes mellitus with hyperglycemia (H)      Continuous Blood Gluc Sensor (DEXCOM G6 SENSOR) MISC CHANGE EVERY 10 DAYS.  Qty: 3 each, Refills: 5    Associated Diagnoses: Type 1 diabetes mellitus with hyperglycemia (H)      Continuous Blood Gluc Transmit (DEXCOM G6 TRANSMITTER) MISC CHANGE EVERY 3 MONTHS.  Qty: 1 each, Refills: 0    Associated Diagnoses: Type 1 diabetes mellitus with hyperglycemia (H)      diclofenac sodium (VOLTAREN) 1 % Gel [DICLOFENAC SODIUM (VOLTAREN) 1 % GEL] Apply 1 g topically daily as needed.      DULoxetine (CYMBALTA) 60 MG capsule Take 60 mg by mouth daily      escitalopram (LEXAPRO) 10 MG tablet Take 10 mg by mouth daily      estradiol (ESTRACE) 1 MG tablet Take 1 mg by mouth daily      fluticasone (FLONASE) 50 MCG/ACT nasal spray Spray 1 spray into both nostrils daily as needed for rhinitis or allergies      galcanezumab-gnlm (EMGALITY) 120 MG/ML injection Inject 1 mL (120 mg) Subcutaneous every 28 days  Qty: 1 mL, Refills: 11    Associated Diagnoses: Chronic intractable headache, unspecified headache type      GEMTESA 75 MG TABS tablet Take 75 mg by mouth daily      Glucagon (BAQSIMI) 3 MG/DOSE nasal powder Spray 1 spray (3 mg) in nostril as needed (severe hypoglycemia) in the event of unconscious hypoglycemia or hypoglycemic seizure. May repeat dose if no response after 15 minutes.  Qty: 2 each, Refills: 4    Associated Diagnoses: Type 1 diabetes mellitus with diabetic polyneuropathy (H)      glucose (BD GLUCOSE) 5 g chewable tablet Take 2 tablets (10 g) by mouth daily as needed (hypoglycemia)  Qty: 300  tablet, Refills: 3    Associated Diagnoses: Type 1 diabetes mellitus with hyperglycemia (H)      Insulin Disposable Pump (OMNIPOD 5 G6 INTRO, GEN 5,) KIT 1 each daily  Qty: 1 kit, Refills: 1    Associated Diagnoses: Diabetes mellitus type 1 (H)      levETIRAcetam (KEPPRA) 500 MG tablet Take 1 tablet (500 mg) by mouth 2 times daily  Qty: 60 tablet, Refills: 11    Associated Diagnoses: Complex partial seizure (H)      liraglutide (VICTOZA PEN) 18 MG/3ML solution Inject 1.2 mg Subcutaneous daily      magnesium oxide (MAG-OX) 400 MG tablet Take 400 mg by mouth daily       medical cannabis (Patient's own supply) 1 Dose by Other route See Admin Instructions [employer] Vape- 1-4 puffs HS PRN      metFORMIN (GLUCOPHAGE XR) 500 MG 24 hr tablet TAKE 2 TABS (1000MG) BY MOUTH TWICE DAILY WITH MEALS  Qty: 360 tablet, Refills: 0    Associated Diagnoses: Type 1 diabetes mellitus with hyperglycemia (H)      metoprolol succinate ER (TOPROL-XL) 100 MG 24 hr tablet Take 100 mg by mouth 2 times daily      multivitamin w/minerals (THERA-VIT-M) tablet Take 1 tablet by mouth daily      nystatin (MYCOSTATIN) 179188 UNIT/GM external powder Apply topically 2 times daily as needed      olmesartan (BENICAR) 40 MG tablet Take 40 mg by mouth daily      omeprazole (PRILOSEC) 20 MG capsule [OMEPRAZOLE (PRILOSEC) 20 MG CAPSULE] Take 1 capsule by mouth daily before breakfast.      pregabalin (LYRICA) 100 MG capsule Take 1 capsule (100 mg) by mouth 3 times daily  Qty: 90 capsule, Refills: 0    Associated Diagnoses: Post-op pain      rosuvastatin (CRESTOR) 5 MG tablet TAKE 1 TABLET BY MOUTH EVERY DAY  Qty: 90 tablet, Refills: 1    Associated Diagnoses: Type 1 diabetes mellitus with hyperglycemia (H); Mixed hyperlipidemia      tiZANidine (ZANAFLEX) 2 MG tablet Take 4 mg by mouth nightly as needed      valACYclovir (VALTREX) 500 MG tablet Take 500 mg by mouth daily           Allergies   Allergies   Allergen Reactions    Morphine Other (See  "Comments)     \"make me delirious,\"  \"nightmares\"    Nitrofurantoin     Adhesive [Cyanoacrylate] Other (See Comments)     Can only tolerate tape for short periods of time shelia in sensitive areas    Amlodipine Unknown and Other (See Comments)     Other reaction(s): delerium    Doxazosin Other (See Comments)     Other reaction(s): feels foggy    Irbesartan-Hydrochlorothiazide Other (See Comments)     Other reaction(s): hyponatremia    Other Allergy (See Comments) [External Allergen Needs Reconciliation - See Comment] Unknown     Other reaction(s): skin rash    Prednisone Unknown     Caused extremely high blood sugars    Tramadol Other (See Comments)     Possible seizure activity    Trazodone Unknown     Other reaction(s): hung over     "

## 2024-03-16 NOTE — PROGRESS NOTES
Physical Therapy Discharge Summary    Reason for therapy discharge:    Discharged to home (declined home PT).    Progress towards therapy goal(s). See [facility] on [facility] Plan in [facility] health record for goal details.  Goals partially met.  Barriers to achieving goals:   discharge from facility.    Therapy recommendation(s):    Further recommended therapy is related to documented deficits, and is necessary to maximize functional independence in order for patient to return to prior level of function.      Sarah Carrizales, DPT 3/16/2024

## 2024-03-20 ENCOUNTER — TELEPHONE (OUTPATIENT)
Dept: NEUROLOGY | Facility: CLINIC | Age: 74
End: 2024-03-20
Payer: COMMERCIAL

## 2024-03-20 NOTE — TELEPHONE ENCOUNTER
[facility] Call Center    Phone Message    May a detailed message be left on voicemail: yes     Reason for Call:  Patient called states that she was admitted at [facility] last week for observation and has EEG done there. Patient asking if  can view her test and if she needs to keep the EEG scheduled on 3- at 12:30 PM    Action Taken: Other: [facility] neurology    Travel Screening: Not Applicable

## 2024-03-21 NOTE — TELEPHONE ENCOUNTER
Called and left detailed message for patient, advising that this question about whether the 3/26 EEG is still needed, will be addressed upon provider return to clinic.    Conveyed that our clinic will reach out on Monday with direction related plan of care.    Routing to provider to evaluate recent EEG and whether additionally scheduled EEG on 3/26 is appropriate. Patient also has appointment with Dr. Celis on 3/26.    Hermes Goncalves RN, BSN  [facility]

## 2024-03-26 NOTE — TELEPHONE ENCOUNTER
Patient has already cancelled EEG. Communicated provider message to patient via [employer].    Hermes Goncalves RN, BSN  [facility]

## 2024-03-27 NOTE — TELEPHONE ENCOUNTER
Called and left patient a message. Writer want to know if patient had her DEXA scan done. Writer did not see an appointment for radiology in the patient's chart. If patient haven't schedule her bone density scan please have her call [phone] to for an appointment.    [FreeTextEntry1] : NAD. AOx3. Intact memory. Speech fluent, nondysarthric. CN 2 - 12 w/ L hemifacial spasm w/ activity in zygomaticus, nasalis, levator labii superioris, orbicularis oculi. Strength 5/5 b/l UE/LE. NL tone, bulk. No abnl movements. DTRs 2+ throughout. Plantar response downgoing b/l. (-) Hoffmans, clonus. Sensory intact LT/PP, pain, temp, proprioception and vibration. NL FTN/HKS. No dysdiadokinesia. Gait narrow based/NL tandem.

## 2024-04-09 DIAGNOSIS — E10.65 TYPE 1 DIABETES MELLITUS WITH HYPERGLYCEMIA (H): ICD-10-CM

## 2024-04-09 RX ORDER — PROCHLORPERAZINE 25 MG/1
SUPPOSITORY RECTAL
Qty: 3 EACH | Refills: 1 | Status: SHIPPED | OUTPATIENT
Start: 2024-04-09 | End: 2024-05-28

## 2024-04-09 NOTE — TELEPHONE ENCOUNTER
Requested Prescriptions   Pending Prescriptions Disp Refills    Continuous Blood Gluc Sensor (DEXCOM G6 SENSOR) MISC [Pharmacy Med Name: DEXCOM G6 SENSOR  MISC]  5     Sig: CHANGE EVERY 10 DAYS.       There is no refill protocol information for this order

## 2024-04-11 ENCOUNTER — TRANSFERRED RECORDS (OUTPATIENT)
Dept: HEALTH INFORMATION MANAGEMENT | Facility: CLINIC | Age: 74
End: 2024-04-11
Payer: COMMERCIAL

## 2024-04-11 DIAGNOSIS — E10.65 TYPE 1 DIABETES MELLITUS WITH HYPERGLYCEMIA (H): ICD-10-CM

## 2024-04-11 LAB — RETINOPATHY: POSITIVE

## 2024-04-11 RX ORDER — METFORMIN HCL 500 MG
TABLET, EXTENDED RELEASE 24 HR ORAL
Qty: 240 TABLET | Refills: 0 | Status: SHIPPED | OUTPATIENT
Start: 2024-04-11 | End: 2024-06-03

## 2024-04-11 NOTE — TELEPHONE ENCOUNTER
Requested Prescriptions   Pending Prescriptions Disp Refills    metFORMIN (GLUCOPHAGE XR) 500 MG 24 hr tablet [Pharmacy Med Name: METFORMIN HCL  MG TABLET] 360 tablet 0     Sig: TAKE 2 TABS (1000MG) BY MOUTH TWICE DAILY WITH MEALS       Biguanide Agents Failed - 4/11/2024  2:47 PM        Failed - Medication indicated for associated diagnosis     Medication is associated with one or more of the following diagnoses:     Gestational diabetes mellitus     Hyperinsulinar obesity     Hypersecretion of ovarian androgens    Non-alcoholic fatty liver    Polycystic ovarian syndrome               Pre-diabetes (DM 2 prevention)    Type 2 diabetes mellitus     Weight gain, antipsychotic therapy-induced             Passed - Patient is age 10 or older        Passed - Patient has documented A1c within the specified period of time.     If HgbA1C is 8 or greater, it needs to be on file within the past 3 months.  If less than 8, must be on file within the past 6 months.     Recent Labs   Lab Test 03/14/24  1149   A1C 7.5*             Passed - Patient does NOT have a diagnosis of CHF.        Passed - Medication is active on med list        Passed - Has GFR on file in past 12 months and most recent value is normal        Passed - Recent (6 mo) or future (90 days) visit within the authorizing provider's specialty     The patient must have completed an in-person or virtual visit within the past 6 months or has a future visit scheduled within the next 90 days with the authorizing provider s specialty.  Urgent care and e-visits do not quality as an office visit for this protocol.          Passed - Patient is not pregnant        Passed - Patient has not had a positive pregnancy test within the past 12 mos.

## 2024-04-16 NOTE — TELEPHONE ENCOUNTER
[facility] is asking for a refill for Victoza, there is a blank form in the media tab on 11/27/23 you could use. I would write Refill on top  They are asking for Victoza, but I would add Novolog and needles too for her next fill save you some time. We don't handle refills so once it is done fax directly to [facility]

## 2024-05-02 ENCOUNTER — MYC MEDICAL ADVICE (OUTPATIENT)
Dept: ENDOCRINOLOGY | Facility: CLINIC | Age: 74
End: 2024-05-02
Payer: COMMERCIAL

## 2024-05-03 DIAGNOSIS — E78.2 MIXED HYPERLIPIDEMIA: ICD-10-CM

## 2024-05-03 DIAGNOSIS — E10.65 TYPE 1 DIABETES MELLITUS WITH HYPERGLYCEMIA (H): ICD-10-CM

## 2024-05-03 RX ORDER — ROSUVASTATIN CALCIUM 5 MG/1
TABLET, COATED ORAL
Qty: 90 TABLET | Refills: 0 | Status: SHIPPED | OUTPATIENT
Start: 2024-05-03 | End: 2024-07-29

## 2024-05-03 NOTE — TELEPHONE ENCOUNTER
Requested Prescriptions   Pending Prescriptions Disp Refills    rosuvastatin (CRESTOR) 5 MG tablet [Pharmacy Med Name: ROSUVASTATIN CALCIUM 5 MG TAB] 90 tablet 1     Sig: TAKE 1 TABLET BY MOUTH EVERY DAY       Antihyperlipidemic agents Passed - 5/3/2024 12:22 AM        Passed - LDL on file in the past 12 months        Passed - Medication is active on med list        Passed - Recent (12 mo) or future (90 days) visit within the authorizing provider's specialty     The patient must have completed an in-person or virtual visit within the past 12 months or has a future visit scheduled within the next 90 days with the authorizing provider s specialty.  Urgent care and e-visits do not quality as an office visit for this protocol.          Passed - Patient is age 18 years or older        Passed - No active pregnancy on record        Passed - No positive pregnancy test in past 12 mos

## 2024-05-10 ENCOUNTER — LAB (OUTPATIENT)
Dept: LAB | Facility: CLINIC | Age: 74
End: 2024-05-10
Payer: COMMERCIAL

## 2024-05-10 DIAGNOSIS — E78.2 MIXED HYPERLIPIDEMIA: ICD-10-CM

## 2024-05-10 DIAGNOSIS — R94.6 ABNORMAL FINDING ON THYROID FUNCTION TEST: ICD-10-CM

## 2024-05-10 DIAGNOSIS — E23.6 EMPTY SELLA (H): ICD-10-CM

## 2024-05-10 DIAGNOSIS — G40.209 COMPLEX PARTIAL SEIZURE (H): ICD-10-CM

## 2024-05-10 DIAGNOSIS — E83.52 HYPERCALCEMIA: ICD-10-CM

## 2024-05-10 DIAGNOSIS — E10.42 TYPE 1 DIABETES MELLITUS WITH DIABETIC POLYNEUROPATHY (H): ICD-10-CM

## 2024-05-10 LAB
HBA1C MFR BLD: 6.8 % (ref 0–5.6)
LEVETIRACETAM SERPL-MCNC: 25.4 ΜG/ML (ref 10–40)

## 2024-05-10 PROCEDURE — 82310 ASSAY OF CALCIUM: CPT

## 2024-05-10 PROCEDURE — 80061 LIPID PANEL: CPT

## 2024-05-10 PROCEDURE — 84443 ASSAY THYROID STIM HORMONE: CPT

## 2024-05-10 PROCEDURE — 80051 ELECTROLYTE PANEL: CPT

## 2024-05-10 PROCEDURE — 80177 DRUG SCRN QUAN LEVETIRACETAM: CPT

## 2024-05-10 PROCEDURE — 82040 ASSAY OF SERUM ALBUMIN: CPT

## 2024-05-10 PROCEDURE — 83036 HEMOGLOBIN GLYCOSYLATED A1C: CPT

## 2024-05-10 PROCEDURE — 86364 TISS TRNSGLTMNASE EA IG CLAS: CPT

## 2024-05-10 PROCEDURE — 36415 COLL VENOUS BLD VENIPUNCTURE: CPT

## 2024-05-10 PROCEDURE — 82784 ASSAY IGA/IGD/IGG/IGM EACH: CPT

## 2024-05-11 ENCOUNTER — HEALTH MAINTENANCE LETTER (OUTPATIENT)
Age: 74
End: 2024-05-11

## 2024-05-11 LAB
ALBUMIN SERPL BCG-MCNC: 4.2 G/DL (ref 3.5–5.2)
ANION GAP SERPL CALCULATED.3IONS-SCNC: 10 MMOL/L (ref 7–15)
CALCIUM SERPL-MCNC: 9.2 MG/DL (ref 8.8–10.2)
CHLORIDE SERPL-SCNC: 97 MMOL/L (ref 98–107)
CHOLEST SERPL-MCNC: 130 MG/DL
DEPRECATED HCO3 PLAS-SCNC: 28 MMOL/L (ref 22–29)
FASTING STATUS PATIENT QL REPORTED: ABNORMAL
HDLC SERPL-MCNC: 59 MG/DL
LDLC SERPL CALC-MCNC: 40 MG/DL
NONHDLC SERPL-MCNC: 71 MG/DL
POTASSIUM SERPL-SCNC: 4.7 MMOL/L (ref 3.4–5.3)
SODIUM SERPL-SCNC: 135 MMOL/L (ref 135–145)
TRIGL SERPL-MCNC: 153 MG/DL
TSH SERPL DL<=0.005 MIU/L-ACNC: 3.67 UIU/ML (ref 0.3–4.2)

## 2024-05-13 LAB
IGA SERPL-MCNC: 144 MG/DL (ref 84–499)
TTG IGA SER-ACNC: 0.3 U/ML
TTG IGG SER-ACNC: <0.6 U/ML

## 2024-05-15 ENCOUNTER — APPOINTMENT (OUTPATIENT)
Dept: LAB | Facility: CLINIC | Age: 74
End: 2024-05-15
Payer: COMMERCIAL

## 2024-05-15 PROCEDURE — 82570 ASSAY OF URINE CREATININE: CPT

## 2024-05-15 PROCEDURE — 82043 UR ALBUMIN QUANTITATIVE: CPT

## 2024-05-16 LAB
CREAT UR-MCNC: 54.5 MG/DL
MICROALBUMIN UR-MCNC: <12 MG/L
MICROALBUMIN/CREAT UR: NORMAL MG/G{CREAT}

## 2024-05-20 NOTE — PROGRESS NOTES
Subjective:    Established patient    Paige Mari is a 73 year old female who presents for DM.      Current DM therapy:  -Metformin XR 1000 mg BID; well tolerated    -Victoza 1.2 mg daily; well tolerated   -Omnipod 5 with NovoLog    Objective:    BMI 27 kg/m2, /82, DM foot exam today: no ulcerations, reduced DP pulses bilaterally but palpable.     10/2023: BMI 26.2 kg/m2, /78. Pleasant and conversational.    12/2021: DM foot exam performed and she has absent sensation to monofilament testing bilaterally. Reduced pedal pulses. No ulcers/skinbreakdown.    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 ([facility])  -12/7/2021: C-peptide 0.7 ng/mL with concurrent venous glucose 176 mg/dL, MAXI Ab >250 (ULN 5.0 IU/mL), insulin Ab 0.6 (ULN 0.4 U/mL), IA-2 Ab 44.1 (ULN 7.4 U/mL), Zn T8 Ab 64.3 (ref range 0.0 - 15.0 U/mL)  -CT A/P 9/2021: Mild diffuse pancreatic atrophy  -12/7/2021: fructosamine 365 umol/L (corresponds to HbA1c ~9.0%), HbA1c 9.3%   -5/2024: HbA1c 6.8%  -She has glucagon at home   # Mild nonproliferative DR, DM eye exam 4/2024  # Hypertension, on olmesartan, microalbuminuria (has normalized)    -3/2024: GFR 84  -5/2024: urine microalbumin undetectable   -3/2022: paired aldosterone 6.4 ng/dL and PRA 0.1 ng/mL/hr  -7/2023: serum aldosterone 4.3 ng/dL, PRA <0.1 ng/mL/hour  # Painful peripheral neuropathy, no prior DM foot ulcerations, on neuropathic pharmacologic therapy  # No prior ASCVD event, CAD, on ASA  # Mixed hyperlipidemia, on rosuvastatin 5 mg daily   -5/2024: , HDL 59, LDL 40,   -She has a prior statin intolerance (myalgias)   # Hepatic steatosis  # Other  -1/2022: B12 elevated   -5/2024: Celiac screen negative      We reviewed her CGM (Dexcom G6) in detail. Over the past 2 weeks TIR 54%, 1% low, 30% high, 14% very high. Lows are sporadic but are clearly related to excess meal-time insulin at times. 100% manual mode. She averages 12.4 units of basal/day and 7.1 units  "of bolus/day. She has a \"Tracphone\" and uses a reader with her Dexcom G6. She is manually entering her glucose data from her CGM into her pump.    I reduced her basal rates throughout the day by ~10% to mitigate some of the hypoglycemia we're seeing.   I adjusted ICR from 1:18 to 1:20 throughout the day. No change in Victoza and metformin.   3.   I reached out to our [facility] team to see if we can assist her with the technology needed so she can use her Omnipod 5 in automated mode. Paige is willing to get a new phone as needed.  4.   She has insulin detemir at home and in case of pump malfunction will take 6 units BID. Reviewed this insulin is being discontinued and that when it expires to let me know and I'll put a different basal insulin on file in case of pump failure/malfunction.     She met with Ana Sanchez ([facility]) most recently 12/2024. Will have her meet with Ana in the coming months. I'll see her back in ~6 months with labs ordered.     She's also had trouble with affording some meds, I put in an [facility] referral to see if there is any assistance we can offer her.      # Thyroid function     In the past has been on both thyroid hormone and methimazole at various points in time. She has not taken a medication for her thyroid in years.      12/2021: TSH, T4, T3 all normal, TSI undetectable, TRAb undetectable, TPO Ab significantly elevated     4/2023: TSH and free T4 normal   5/2023: TSH 3.3  5/2024: TSH 3.7      Should have her TSH checked yearly, or at any time if symptoms of thyrotoxicosis or hypothyroidism.     TSH ordered for 6 months.      # Bone and mineral metabolism    Many normal calcium values over the years, with 2 high serum calcium values (both remote). She had a remote mildly elevated PTH drawn shortly after a mildly elevated calcium value (10.6 with ULN 10.5 mg/dL), but on the day that PTH was drawn serum calcium was normal and serum phosphorous was mildly elevated. Serum calcium value of 12.7 " mg/dL when she had an JACKSON in 2014.      Since 12/7/2021: albumin corrected serum calcium peak 10.6 mg/dL (ULN 10.5) and otherwise normal. Serum phosphorous and alkaine phosphatase have been normal. 11/2022: 25-OH vitamin D 52 (ref range 20 - 75 mcg/L), 12/2021: UPEP and SPEP negative      DEXA 11/14/2022:  -BMD at the spine, distal 1/3 radius, and both hips is normal     No prior low impact fractures.      3/2022: PTH 25 pg/mL, Cr, calcium, albumin, phosphorous all normal     3/2023: serum calcium normal, albumin normal     7/2023: uncorrected serum calcium 10.5 (ULN 10.2 mg/dL), albumin 4.3 g/dL, corrected serum calcium 10.3 mg/dL, 25-OH vitamin D mid normal      3/2024, 5/2024: serum calcium normal      Will trend calcium over time.    No calcium pill. She takes a MVI. She takes vitamin D. She has a few servings of dairy each day.     # Partially empty sella    I reviewed her CT head 9/2022 and agree with the radiology read of partially empty sella. No history of pituitary pathology.    CT head 8/17/2023: per radiology - stable partially empty sella morphology, I reviewed the images and agree     CT head 3/13/2024: stable partially empty sella on my review     3/22/2023: afternoon serum cortisol 10.0 mcg/dL, DHEA-S undetectable, TSH and free T4 normal, prolactin normal, IGF-1 normal      30 minutes spent on the date of the encounter doing chart review, history and exam, documentation and further activities as noted above. This did not include time spent on CGM review.      The longitudinal plan of care for the diagnosis(es)/condition(s) as documented were addressed during this visit. Due to the added complexity in care, I will continue to support Paige in the subsequent management and with ongoing continuity of care.

## 2024-05-21 ENCOUNTER — OFFICE VISIT (OUTPATIENT)
Dept: ENDOCRINOLOGY | Facility: CLINIC | Age: 74
End: 2024-05-21
Payer: COMMERCIAL

## 2024-05-21 VITALS
HEART RATE: 68 BPM | BODY MASS INDEX: 27.07 KG/M2 | DIASTOLIC BLOOD PRESSURE: 82 MMHG | SYSTOLIC BLOOD PRESSURE: 130 MMHG | WEIGHT: 148 LBS

## 2024-05-21 DIAGNOSIS — Z79.4 LONG TERM (CURRENT) USE OF INSULIN (H): ICD-10-CM

## 2024-05-21 DIAGNOSIS — K76.0 HEPATIC STEATOSIS: ICD-10-CM

## 2024-05-21 DIAGNOSIS — I65.23 CAROTID ATHEROSCLEROSIS, BILATERAL: ICD-10-CM

## 2024-05-21 DIAGNOSIS — Z96.41 INSULIN PUMP STATUS: ICD-10-CM

## 2024-05-21 DIAGNOSIS — E66.3 OVERWEIGHT (BMI 25.0-29.9): ICD-10-CM

## 2024-05-21 DIAGNOSIS — R94.6 ABNORMAL FINDING ON THYROID FUNCTION TEST: ICD-10-CM

## 2024-05-21 DIAGNOSIS — I10 HYPERTENSION, UNSPECIFIED TYPE: ICD-10-CM

## 2024-05-21 DIAGNOSIS — E10.65 TYPE 1 DIABETES MELLITUS WITH HYPERGLYCEMIA (H): Primary | ICD-10-CM

## 2024-05-21 DIAGNOSIS — E78.2 MIXED HYPERLIPIDEMIA: ICD-10-CM

## 2024-05-21 DIAGNOSIS — E88.819 INSULIN RESISTANCE: ICD-10-CM

## 2024-05-21 DIAGNOSIS — E23.6 EMPTY SELLA (H): ICD-10-CM

## 2024-05-21 DIAGNOSIS — Z46.81 INSULIN PUMP FITTING OR ADJUSTMENT: ICD-10-CM

## 2024-05-21 PROCEDURE — G2211 COMPLEX E/M VISIT ADD ON: HCPCS | Performed by: INTERNAL MEDICINE

## 2024-05-21 PROCEDURE — 99214 OFFICE O/P EST MOD 30 MIN: CPT | Performed by: INTERNAL MEDICINE

## 2024-05-21 NOTE — LETTER
5/21/2024         RE: Paige Mari  [address]  [place] [address]        Dear Colleague,    Thank you for referring your patient, Paige Mari, to the [facility]. Please see a copy of my visit note below.    Subjective:    Established patient    Paige Mari is a 73 year old female who presents for DM.      Current DM therapy:  -Metformin XR 1000 mg BID; well tolerated    -Victoza 1.2 mg daily; well tolerated   -Omnipod 5 with NovoLog    Objective:    BMI 27 kg/m2, /82, DM foot exam today: no ulcerations, reduced DP pulses bilaterally but palpable.     10/2023: BMI 26.2 kg/m2, /78. Pleasant and conversational.    12/2021: DM foot exam performed and she has absent sensation to monofilament testing bilaterally. Reduced pedal pulses. No ulcers/skinbreakdown.    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 ([facility])  -12/7/2021: C-peptide 0.7 ng/mL with concurrent venous glucose 176 mg/dL, MAXI Ab >250 (ULN 5.0 IU/mL), insulin Ab 0.6 (ULN 0.4 U/mL), IA-2 Ab 44.1 (ULN 7.4 U/mL), Zn T8 Ab 64.3 (ref range 0.0 - 15.0 U/mL)  -CT A/P 9/2021: Mild diffuse pancreatic atrophy  -12/7/2021: fructosamine 365 umol/L (corresponds to HbA1c ~9.0%), HbA1c 9.3%   -5/2024: HbA1c 6.8%  -She has glucagon at home   # Mild nonproliferative DR, DM eye exam 4/2024  # Hypertension, on olmesartan, microalbuminuria (has normalized)    -3/2024: GFR 84  -5/2024: urine microalbumin undetectable   -3/2022: paired aldosterone 6.4 ng/dL and PRA 0.1 ng/mL/hr  -7/2023: serum aldosterone 4.3 ng/dL, PRA <0.1 ng/mL/hour  # Painful peripheral neuropathy, no prior DM foot ulcerations, on neuropathic pharmacologic therapy  # No prior ASCVD event, CAD, on ASA  # Mixed hyperlipidemia, on rosuvastatin 5 mg daily   -5/2024: , HDL 59, LDL 40,   -She has a prior statin intolerance (myalgias)   # Hepatic steatosis  # Other  -1/2022: B12 elevated   -5/2024: Celiac screen  "negative      We reviewed her CGM (Dexcom G6) in detail. Over the past 2 weeks TIR 54%, 1% low, 30% high, 14% very high. Lows are sporadic but are clearly related to excess meal-time insulin at times. 100% manual mode. She averages 12.4 units of basal/day and 7.1 units of bolus/day. She has a \"Tracphone\" and uses a reader with her Dexcom G6. She is manually entering her glucose data from her CGM into her pump.    I reduced her basal rates throughout the day by ~10% to mitigate some of the hypoglycemia we're seeing.   I adjusted ICR from 1:18 to 1:20 throughout the day. No change in Victoza and metformin.   3.   I reached out to our [facility] team to see if we can assist her with the technology needed so she can use her Omnipod 5 in automated mode. Paige is willing to get a new phone as needed.  4.   She has insulin detemir at home and in case of pump malfunction will take 6 units BID. Reviewed this insulin is being discontinued and that when it expires to let me know and I'll put a different basal insulin on file in case of pump failure/malfunction.     She met with Ana Sanchez ([facility]) most recently 12/2024. Will have her meet with Ana in the coming months. I'll see her back in ~6 months with labs ordered.     She's also had trouble with affording some meds, I put in an MT referral to see if there is any assistance we can offer her.      # Thyroid function     In the past has been on both thyroid hormone and methimazole at various points in time. She has not taken a medication for her thyroid in years.      12/2021: TSH, T4, T3 all normal, TSI undetectable, TRAb undetectable, TPO Ab significantly elevated     4/2023: TSH and free T4 normal   5/2023: TSH 3.3  5/2024: TSH 3.7      Should have her TSH checked yearly, or at any time if symptoms of thyrotoxicosis or hypothyroidism.     TSH ordered for 6 months.      # Bone and mineral metabolism    Many normal calcium values over the years, with 2 high serum calcium " values (both remote). She had a remote mildly elevated PTH drawn shortly after a mildly elevated calcium value (10.6 with ULN 10.5 mg/dL), but on the day that PTH was drawn serum calcium was normal and serum phosphorous was mildly elevated. Serum calcium value of 12.7 mg/dL when she had an JACKSON in 2014.      Since 12/7/2021: albumin corrected serum calcium peak 10.6 mg/dL (ULN 10.5) and otherwise normal. Serum phosphorous and alkaine phosphatase have been normal. 11/2022: 25-OH vitamin D 52 (ref range 20 - 75 mcg/L), 12/2021: UPEP and SPEP negative      DEXA 11/14/2022:  -BMD at the spine, distal 1/3 radius, and both hips is normal     No prior low impact fractures.      3/2022: PTH 25 pg/mL, Cr, calcium, albumin, phosphorous all normal     3/2023: serum calcium normal, albumin normal     7/2023: uncorrected serum calcium 10.5 (ULN 10.2 mg/dL), albumin 4.3 g/dL, corrected serum calcium 10.3 mg/dL, 25-OH vitamin D mid normal      3/2024, 5/2024: serum calcium normal      Will trend calcium over time.    No calcium pill. She takes a MVI. She takes vitamin D. She has a few servings of dairy each day.     # Partially empty sella    I reviewed her CT head 9/2022 and agree with the radiology read of partially empty sella. No history of pituitary pathology.    CT head 8/17/2023: per radiology - stable partially empty sella morphology, I reviewed the images and agree     CT head 3/13/2024: stable partially empty sella on my review     3/22/2023: afternoon serum cortisol 10.0 mcg/dL, DHEA-S undetectable, TSH and free T4 normal, prolactin normal, IGF-1 normal      30 minutes spent on the date of the encounter doing chart review, history and exam, documentation and further activities as noted above. This did not include time spent on CGM review.      The longitudinal plan of care for the diagnosis(es)/condition(s) as documented were addressed during this visit. Due to the added complexity in care, I will continue to support  Paige in the subsequent management and with ongoing continuity of care.      Again, thank you for allowing me to participate in the care of your patient.        Sincerely,        Maynor Bardales MD

## 2024-05-23 ENCOUNTER — TELEPHONE (OUTPATIENT)
Dept: EDUCATION SERVICES | Facility: CLINIC | Age: 74
End: 2024-05-23
Payer: COMMERCIAL

## 2024-05-23 NOTE — TELEPHONE ENCOUNTER
Spoke with patient. Informed pt a list of compatible phones for Dexcom G6 was put in the mail yesterday. Pt states she will keep an eye out for it. Bring the list into the store to try to see if she can switch to one of those phones. Pt states she will call [facility] to schedule follow up once she does this, then we can get switched over to automated mode.

## 2024-05-28 DIAGNOSIS — E10.65 TYPE 1 DIABETES MELLITUS WITH HYPERGLYCEMIA (H): ICD-10-CM

## 2024-05-28 RX ORDER — PROCHLORPERAZINE 25 MG/1
SUPPOSITORY RECTAL
Qty: 9 EACH | Refills: 0 | Status: SHIPPED | OUTPATIENT
Start: 2024-05-28 | End: 2024-07-23

## 2024-05-28 NOTE — TELEPHONE ENCOUNTER
[employer] [facility] is asking for a refill for Novolog vial there is a blank form in the media tab on 11/27/23 you could use. I would write Refill on top  They are asking for Novolog, but I would add Victoza and needles too for her next fill save you some time. We don't handle refills so once it is done fax directly to [facility] sent this request for Novolog, not sure why they would not use the form you sent on April, but I would send another one, tomorrow we have a meeting with Quin and I will ask about this.  Again there is a form in the media tab on 11/27/23

## 2024-05-28 NOTE — TELEPHONE ENCOUNTER
Requested Prescriptions   Pending Prescriptions Disp Refills    Continuous Glucose Sensor (DEXCOM G6 SENSOR) MISC [Pharmacy Med Name: DEXCOM G6 SENSOR  MISC]  1     Sig: CHANGE EVERY 10 DAYS.       There is no refill protocol information for this order

## 2024-06-02 DIAGNOSIS — E10.65 TYPE 1 DIABETES MELLITUS WITH HYPERGLYCEMIA (H): ICD-10-CM

## 2024-06-03 RX ORDER — METFORMIN HCL 500 MG
TABLET, EXTENDED RELEASE 24 HR ORAL
Qty: 360 TABLET | Refills: 0 | Status: SHIPPED | OUTPATIENT
Start: 2024-06-03 | End: 2024-07-30

## 2024-06-03 NOTE — TELEPHONE ENCOUNTER
Requested Prescriptions   Pending Prescriptions Disp Refills    metFORMIN (GLUCOPHAGE XR) 500 MG 24 hr tablet [Pharmacy Med Name: METFORMIN HCL  MG TABLET] 240 tablet 0     Sig: TAKE 2 TABS (1000MG) BY MOUTH TWICE DAILY WITH MEALS       Biguanide Agents Failed - 6/2/2024 12:40 PM        Failed - Medication indicated for associated diagnosis     Medication is associated with one or more of the following diagnoses:     Gestational diabetes mellitus     Hyperinsulinar obesity     Hypersecretion of ovarian androgens    Non-alcoholic fatty liver    Polycystic ovarian syndrome               Pre-diabetes (DM 2 prevention)    Type 2 diabetes mellitus     Weight gain, antipsychotic therapy-induced             Passed - Patient is age 10 or older        Passed - Patient has documented A1c within the specified period of time.     If HgbA1C is 8 or greater, it needs to be on file within the past 3 months.  If less than 8, must be on file within the past 6 months.     Recent Labs   Lab Test 05/10/24  1645   A1C 6.8*             Passed - Patient does NOT have a diagnosis of CHF.        Passed - Medication is active on med list        Passed - Has GFR on file in past 12 months and most recent value is normal        Passed - Recent (6 mo) or future (90 days) visit within the authorizing provider's specialty     The patient must have completed an in-person or virtual visit within the past 6 months or has a future visit scheduled within the next 90 days with the authorizing provider s specialty.  Urgent care and e-visits do not quality as an office visit for this protocol.          Passed - Patient is not pregnant        Passed - Patient has not had a positive pregnancy test within the past 12 mos.

## 2024-06-05 ENCOUNTER — TELEPHONE (OUTPATIENT)
Dept: ENDOCRINOLOGY | Facility: CLINIC | Age: 74
End: 2024-06-05
Payer: COMMERCIAL

## 2024-06-05 DIAGNOSIS — E10.42 TYPE 1 DIABETES MELLITUS WITH DIABETIC POLYNEUROPATHY (H): ICD-10-CM

## 2024-06-05 NOTE — TELEPHONE ENCOUNTER
[facility] Call Center    Phone Message    May a detailed message be left on voicemail: yes     Reason for Call: Medication Refill Request    Has the patient contacted the pharmacy for the refill? Yes   Name of medication being requested: Glucagon (BAQSIMI) 3 MG/DOSE nasal powder   Provider who prescribed the medication: Dr Bardales  Pharmacy:    [facility]/PHARMACY #[id number] [address]  Date medication is needed: ASAP   Whenever you can send over to the pharmacy    Action Taken: Message routed to:  [facility] ([facility] Endo    Travel Screening: Not Applicable     Date of Service:

## 2024-06-13 ENCOUNTER — LAB (OUTPATIENT)
Dept: LAB | Facility: CLINIC | Age: 74
End: 2024-06-13
Payer: COMMERCIAL

## 2024-06-13 DIAGNOSIS — G40.209 COMPLEX PARTIAL SEIZURE (H): ICD-10-CM

## 2024-06-13 PROCEDURE — 80177 DRUG SCRN QUAN LEVETIRACETAM: CPT

## 2024-06-13 PROCEDURE — 36415 COLL VENOUS BLD VENIPUNCTURE: CPT

## 2024-06-14 LAB — LEVETIRACETAM SERPL-MCNC: 29.3 ΜG/ML (ref 10–40)

## 2024-06-17 DIAGNOSIS — E10.65 TYPE 1 DIABETES MELLITUS WITH HYPERGLYCEMIA (H): ICD-10-CM

## 2024-06-17 RX ORDER — PROCHLORPERAZINE 25 MG/1
SUPPOSITORY RECTAL
Qty: 1 EACH | Refills: 0 | Status: SHIPPED | OUTPATIENT
Start: 2024-06-17 | End: 2024-07-23

## 2024-06-17 NOTE — TELEPHONE ENCOUNTER
Requested Prescriptions   Pending Prescriptions Disp Refills    Continuous Glucose Transmitter (DEXCOM G6 TRANSMITTER) MISC [Pharmacy Med Name: DEXCOM G6 TRANSMITTER  MISC] 1 each 0     Sig: CHANGE EVERY 3 MONTHS.       Diabetic Supplies Protocol Passed - 6/17/2024 10:40 AM        Passed - Medication is active on med list        Passed - Recent (12 month) or future (90 days) visit with authorizing provider s specialty     The patient must have completed an in-person or virtual visit within the past 12 months or has a future visit scheduled within the next 90 days with the authorizing provider s specialty.  Urgent care and e-visits do not quality as an office visit for this protocol.          Passed - Medication indicated for associated diagnosis        Passed - Patient is 18 years of age or older

## 2024-06-20 ENCOUNTER — ANCILLARY PROCEDURE (OUTPATIENT)
Dept: CARDIOLOGY | Facility: CLINIC | Age: 74
End: 2024-06-20
Attending: INTERNAL MEDICINE
Payer: COMMERCIAL

## 2024-06-20 DIAGNOSIS — Z95.818 IMPLANTABLE LOOP RECORDER PRESENT: ICD-10-CM

## 2024-06-20 DIAGNOSIS — R55 SYNCOPE: ICD-10-CM

## 2024-06-24 PROCEDURE — 93298 REM INTERROG DEV EVAL SCRMS: CPT | Performed by: INTERNAL MEDICINE

## 2024-07-17 DIAGNOSIS — E10.9 DIABETES MELLITUS TYPE 1 (H): ICD-10-CM

## 2024-07-17 NOTE — TELEPHONE ENCOUNTER
Pt is requesting new rx for    Omnipod 5 g6 (gen 5)    Did not see on active med list please verify and send new rx. Thank you!    [place] [facility]/mail pharmacy  [phone]

## 2024-07-22 RX ORDER — INSULIN PMP CART,AUT,G6/7,CNTR
1 EACH SUBCUTANEOUS DAILY
Qty: 1 KIT | Refills: 1 | Status: SHIPPED | OUTPATIENT
Start: 2024-07-22

## 2024-07-23 DIAGNOSIS — E10.65 TYPE 1 DIABETES MELLITUS WITH HYPERGLYCEMIA (H): ICD-10-CM

## 2024-07-23 RX ORDER — PROCHLORPERAZINE 25 MG/1
SUPPOSITORY RECTAL
Qty: 9 EACH | Refills: 0 | Status: SHIPPED | OUTPATIENT
Start: 2024-07-23

## 2024-07-23 RX ORDER — PROCHLORPERAZINE 25 MG/1
SUPPOSITORY RECTAL
Qty: 1 EACH | Refills: 0 | Status: SHIPPED | OUTPATIENT
Start: 2024-07-23

## 2024-07-23 NOTE — TELEPHONE ENCOUNTER
Requested Prescriptions   Pending Prescriptions Disp Refills    Continuous Glucose Transmitter (DEXCOM G6 TRANSMITTER) MISC [Pharmacy Med Name: DEXCOM G6 TRANSMITTER  MISC] 1 each 0     Sig: CHANGE EVERY 3 MONTHS.       Diabetic Supplies Protocol Passed - 7/23/2024 12:55 PM        Passed - Medication is active on med list        Passed - Recent (12 month) or future (90 days) visit with authorizing provider s specialty     The patient must have completed an in-person or virtual visit within the past 12 months or has a future visit scheduled within the next 90 days with the authorizing provider s specialty.  Urgent care and e-visits do not quality as an office visit for this protocol.          Passed - Medication indicated for associated diagnosis        Passed - Patient is 18 years of age or older          Continuous Glucose Sensor (DEXCOM G6 SENSOR) MISC [Pharmacy Med Name: DEXCOM G6 SENSOR  MISC]  0     Sig: CHANGE EVERY 10 DAYS.       There is no refill protocol information for this order

## 2024-07-24 ENCOUNTER — TELEPHONE (OUTPATIENT)
Dept: ENDOCRINOLOGY | Facility: CLINIC | Age: 74
End: 2024-07-24

## 2024-07-24 NOTE — TELEPHONE ENCOUNTER
[facility] appointment cancelled via [facility], we made one more attempt to reschedule.     Routing back to referring provider and [facility] Pharmacist Team    [facility] Message Sent    Paula Fernandes CPhT  [facility]

## 2024-07-24 NOTE — TELEPHONE ENCOUNTER
I just spoke with [facility] they are asking for a refill for to be sent to them for Novolog, Victoza and needles  Here is a refill form

## 2024-07-25 ENCOUNTER — OFFICE VISIT (OUTPATIENT)
Dept: PHARMACY | Facility: PHYSICIAN GROUP | Age: 74
End: 2024-07-25
Payer: COMMERCIAL

## 2024-07-25 DIAGNOSIS — F39 MOOD DISORDER (H): Primary | ICD-10-CM

## 2024-07-25 DIAGNOSIS — R52 PAIN: ICD-10-CM

## 2024-07-25 DIAGNOSIS — N39.0 RECURRENT UTI: ICD-10-CM

## 2024-07-25 DIAGNOSIS — Z78.9 TAKES DIETARY SUPPLEMENTS: ICD-10-CM

## 2024-07-25 DIAGNOSIS — Z79.890 NEED FOR PROPHYLACTIC HORMONE REPLACEMENT THERAPY (POSTMENOPAUSAL): ICD-10-CM

## 2024-07-25 DIAGNOSIS — R32 URINARY INCONTINENCE, UNSPECIFIED TYPE: ICD-10-CM

## 2024-07-25 DIAGNOSIS — E78.2 MIXED HYPERLIPIDEMIA: ICD-10-CM

## 2024-07-25 DIAGNOSIS — K21.9 GASTROESOPHAGEAL REFLUX DISEASE, UNSPECIFIED WHETHER ESOPHAGITIS PRESENT: Chronic | ICD-10-CM

## 2024-07-25 DIAGNOSIS — E13.9: ICD-10-CM

## 2024-07-25 DIAGNOSIS — G43.009 MIGRAINE WITHOUT AURA AND WITHOUT STATUS MIGRAINOSUS, NOT INTRACTABLE: ICD-10-CM

## 2024-07-25 DIAGNOSIS — G40.209 COMPLEX PARTIAL SEIZURE (H): ICD-10-CM

## 2024-07-25 DIAGNOSIS — I10 BENIGN ESSENTIAL HYPERTENSION: ICD-10-CM

## 2024-07-25 DIAGNOSIS — J30.2 SEASONAL ALLERGIC RHINITIS, UNSPECIFIED TRIGGER: ICD-10-CM

## 2024-07-25 PROCEDURE — 99605 MTMS BY PHARM NP 15 MIN: CPT | Performed by: PHARMACIST

## 2024-07-25 PROCEDURE — 99607 MTMS BY PHARM ADDL 15 MIN: CPT | Performed by: PHARMACIST

## 2024-07-25 NOTE — LETTER
_  Medication List        Prepared on: Jul 25, 2024     Bring your Medication List when you go to the doctor, hospital, or   emergency room. And, share it with your family or caregivers.     Note any changes to how you take your medications.  Cross out medications when you no longer use them.    Medication How I take it Why I use it Prescriber   acetaminophen (TYLENOL) 500 MG tablet Take 1,000 mg by mouth every 6 hours as needed for pain Pain Patient Reported   albuterol (PROAIR HFA/PROVENTIL HFA/VENTOLIN HFA) 108 (90 Base) MCG/ACT inhaler Inhale 2 puffs into the lungs every 6 hours as needed for shortness of breath, wheezing or cough Trouble Breathing Scar Tan MD   aspirin 81 MG EC tablet Take 81 mg by mouth daily Reduce Risk of Heart Event or Stroke Patient Reported   cephALEXin (KEFLEX) 250 MG capsule Take 250 mg by mouth daily Prevent Urinary Treact Infections Julia Boykin PA-C   cetirizine (ZYRTEC) 10 MG tablet Take 10 mg by mouth daily as needed for allergies Hayfever Patient Reported   cholecalciferol (VITAMIN D3) 25 mcg (1000 units) capsule Take 1,000 Units by mouth daily General Health Patient Reported   cloNIDine (CATAPRES-TTS3) 0.3 MG/24HR WK patch Place 1 patch onto the skin once a week (Sunday) High Blood Pressure Disorder Scar Tan MD   Continuous Blood Gluc  (DEXCOM G6 ) TORRIE Use to read blood sugars as per 's instructions. Type 1 diabetes mellitus with hyperglycemia (H) Maynor Bardales MD   Continuous Glucose Sensor (DEXCOM G6 SENSOR) MISC CHANGE EVERY 10 DAYS. Type 1 diabetes mellitus with hyperglycemia (H) Maynor Bardales MD   Continuous Glucose Transmitter (DEXCOM G6 TRANSMITTER) MISC CHANGE EVERY 3 MONTHS. Type 1 diabetes mellitus with hyperglycemia (H) Maynor Bardales MD   diclofenac sodium (VOLTAREN) 1 % Gel Apply 1 g topically daily as needed (pain) Joint Damage causing Pain and Loss of Function Patient Reported    DULoxetine (CYMBALTA) 60 MG capsule Take 60 mg by mouth daily Diabetes with Nerve Disease; Fibromyalgia Syndrome; Major Depressive Disorder Scar Tan MD   escitalopram (LEXAPRO) 20 MG tablet Take 20 mg by mouth daily Major Depressive Disorder Scar Tan MD   estradiol (ESTRACE) 1 MG tablet Take 1 mg by mouth daily Excision of Both Ovaries; Bones Scar Tan MD   fluticasone (FLONASE) 50 MCG/ACT nasal spray Spray 1 spray into both nostrils daily as needed for rhinitis or allergies Allergic Rhinitis; Stuffy Nose Patient Reported   galcanezumab-gnlm (EMGALITY) 120 MG/ML injection Inject 1 mL (120 mg) Subcutaneous every 28 days Chronic intractable headache, unspecified headache type Jared Celis MD   GEMTESA 75 MG TABS tablet Take 75 mg by mouth daily Urinary Incontinence Julia Boykin PA-C   Glucagon (GVOKE HYPOPEN) 1 MG/0.2ML pen Inject 0.2 mLs (1 mg) into the muscle as needed for low blood sugar Type 1 diabetes mellitus with diabetic polyneuropathy (H) Maynor Bardales MD   glucose (BD GLUCOSE) 5 g chewable tablet Take 2 tablets (10 g) by mouth daily as needed (hypoglycemia) Type 1 diabetes mellitus with hyperglycemia (H) Maynor Bardales MD   insulin aspart (NOVOLOG VIAL) 100 UNITS/ML vial Inject 30 Units Subcutaneous daily Type 1 diabetes mellitus with hyperglycemia (H) Jeet Cooper MD   Insulin Disposable Pump (OMNIPOD 5 G6 INTRO, GEN 5,) KIT 1 each daily Diabetes Mellitus Type 1 (H) Maynor Bardales MD   levETIRAcetam (KEPPRA) 500 MG tablet Take 1 tablet (500 mg) by mouth 2 times daily Complex partial seizure (H) Jared Celis MD   liraglutide (VICTOZA PEN) 18 MG/3ML solution Inject 1.2 mg subcutaneously daily Diabetes Maynor Bardales MD   magnesium oxide (MAG-OX) 400 MG tablet Take 400 mg by mouth daily General Health Patient Reported   medical cannabis (Patient's own supply) 1 Dose by Other route See Admin Instructions [employer] Vape- 1-4  puffs HS PRN  Pain, Sleep Scar Tan MD   metFORMIN (GLUCOPHAGE XR) 500 MG 24 hr tablet TAKE 2 TABS (1000MG) BY MOUTH TWICE DAILY WITH MEALS Diabetes Maynor Bardales MD   metoprolol succinate ER (TOPROL-XL) 100 MG 24 hr tablet Take 100 mg by mouth 2 times daily High Blood Pressure Disorder Scar Tan MD   Multiple Vitamins-Minerals (MULTIVITAMIN WOMEN 50+) TABS Take 1 tablet by mouth daily General Health Patient Reported   nystatin (MYCOSTATIN) 293665 UNIT/GM external powder Apply topically 2 times daily as needed Skin Infection due to Candida Yeast Scar Tan MD   omeprazole (PRILOSEC) 20 MG capsule Take 1 capsule by mouth every morning Gastroesophageal Reflux Disease Scar Tan MD   pregabalin (LYRICA) 100 MG capsule Take 1 capsule (100 mg) by mouth 3 times daily Pain Scar Tan MD   rosuvastatin (CRESTOR) 5 MG tablet TAKE 1 TABLET BY MOUTH EVERY DAY Mixed Hyperlipidemia Maynor Bardales MD   tiZANidine (ZANAFLEX) 2 MG tablet Take 4 mg by mouth nightly as needed for muscle spasms Musculoskeletal Pain Scar Tan MD   valACYclovir (VALTREX) 500 MG tablet Take 500 mg by mouth daily Viral Suppression  Scar Tan MD         Add new medications, over-the-counter drugs, herbals, vitamins, or  minerals in the blank rows below.    Medication How I take it Why I use it Prescriber                                      Allergies:      - Morphine - Other (See Comments)  - Nitrofurantoin  - Adhesive [cyanoacrylate] - Other (See Comments)  - Amlodipine - Unknown, Other (See Comments)  - Doxazosin - Other (See Comments)  - Irbesartan-hydrochlorothiazide - Other (See Comments)  - Other Allergy (see Comments) [external Allergen Needs Reconciliation - See Comment] - Unknown  - Prednisone - Unknown  - Tramadol - Other (See Comments)  - Trazodone - Unknown        Side effects I have had:      Not on File        Other Information:               My notes and questions:

## 2024-07-25 NOTE — LETTER
July 31, 2024  Paige WARD Jacey  [address]   [place] [address]    Dear Ms. Mari, [facility]     Thank you for talking with me on Jul 25, 2024 about your health and medications. As a follow-up to our conversation, I have included two documents:      Your Recommended To-Do List has steps you should take to get the best results from your medications.  Your Medication List will help you keep track of your medications and how to take them.    If you want to talk about these documents, please call Nava Welch PharmD at phone: [phone], Monday-Friday 8-4:30pm.    I look forward to working with you and your doctors to make sure your medications work well for you.    Sincerely,  Nava Welch, Sisi  [facility] Pharmacist, [facility]

## 2024-07-25 NOTE — PROGRESS NOTES
Medication Therapy Management (MTM) Encounter    ASSESSMENT:                            Medication Adherence/Access:   No issues reported  _  Depression  Symptoms are stable per patient and prefers to remain on current regimen. Discussed risks with patient of combination SSRI and SNRI including signs of serotonin syndrome, she confirms understanding and accepts risks. Unclear if she is taking aripiprazole based on no refill history for at least 6 months. Not realized until after visit and will confirm with her if it is in her home pill bottles. If she is taking aripiprazole it would be most likely medication to cause tremor, Incidence: 2% to 11.8%. Duloxetine also may contribute to tremor- Incidence: Up to 3%. Discussed pharmacogenetic testing and patient declined due to not wanting to change medications and cost of test.   _  Diabetes   A1c is at goal of <7%.   Patient is not meeting goal of > 70% time in target with continuous glucose monitoring.   Follow-up plan in place with endocrinology. Will look into cost comparison with getting Dexcom supplies through pharmacy vs DME and coordinate with endocrinology if will be more cost effective.   Microalbumin is at goal < 30mg/g.  _  Hypertension   Blood pressure is at goal of <140/90 mmHg. Not been taking olmesartan for several months. No elevated urine microalbumin on last check but has been elevated in the past. Will discuss with her provider if she should restart this.   _  Hyperlipidemia   Stable. LDL is <70 mg/dL.   _  Migraine:   Stable.   _  Pain, Fibromyalgia:    Stable per patient. She is on high dose pregabalin, continue to monitor for fall risk.   _  GERD    Stable.   _  Hormone replacement therapy:    Duration of estrogen replacement therapy of over 10 years. She may benefit from trial reducing dose or considering tapering off if able. Not routinely recommended for postmenopausal osteoporosis prophylaxis.   _  Urinary Incontinence, Recurrent UTIs:     Stable.   _  History of Seizures:    Stable. Follow-up plan in place with neurology.   _  Allergic Rhinitis:   Stable.   _  Viral suppression:    Stable.   _  Supplements   Stable.   _  PLAN:                            Continue current medicines  I will look into the cost of your Dexcom and Omnipod supplies through DME versus pharmacy, to see if that can help with cost.   Candace is one example: [email or web address]    3. I will talk with Dr. Tan about if you should be taking olmesartan    Follow-up: 6-12 months for annual medication review    SUBJECTIVE/OBJECTIVE:                          Paige Mari is a 74 year old female seen for an initial visit. She was referred to me from Holley Brown PA-C.      Reason for visit: Medication review, discuss pharmacogenetic testing.    Allergies/ADRs: Reviewed in chart  Past Medical History: Reviewed in chart  Tobacco: She reports that she has been smoking cigarettes. She started smoking about 21 years ago. She has a 9.5 pack-year smoking history. She has never used smokeless tobacco. Smokes 1 cigarette a day because she likes the taste and is not wanting to give it up. Nicotine/Tobacco Cessation Plan  Information offered: Patient not interested at this time  Alcohol: none    Medication Adherence/Access: no issues reported. She is taking a lot of medications in the morning. She is good about taking them every day. Takes medicine 3 times a day.     Mental Health     Depression  Escitalopram Oxalate 20 MG Tablet 1 tablet Orally Once a day for mood  DULoxetine HCl 60 MG Capsule Delayed Release Particles 1 capsule Orally Once a day for mood/pain  ARIPiprazole 2 MG Tablet 1 tablet Orally Once a day for mood- no refills in over 6 months but reports she is taking it  Medical cannabis- vape, help with sleep  She has struggled with depression for a long time. Her escitalopram dose was increased in May. She reports taking aripiprazole which is on  medication list but no refill history of it in the last 6 months. Duloxetine was started for depression and pain. She is not interested in making any adjustments to these medications. She does not think they are causing her side effects. She does have issues with tremors but would rather deal with that then depression worsening. She is not sure pharmacogenetic testing would be beneficial right now because she does not want to make any medication changes.        Diabetes     NovoLOG 100 UNIT/ML per insulin pump (Omnipod)  metFORMIN HCl 500 MG Tablet 2 tablets with a meal Orally 2 times a day  Liraglutide 18 MG/3ML Solution Pen-injector 1.2mg Subcutaneous daily  Aspirin 81mg daily  Patient reports no current medication side effects. Uses Omnipod for insulin. Follows with Dr. Bardales for endocr[facility]ology at [facility]. 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 Uses a reader with her Dexcom G6 and is manually entering her glucose data from her CGM into her pump.   Per last endo note:   - reduced her basal rates throughout the day by ~10% to mitigate some of the hypoglycemia we're seeing.   -  ICR from 1:18 to 1:20 throughout the day. No change in Victoza and metformin.   Current diabetes symptoms: none  Blood sugar monitoring: Continuous Glucose Monitor- cost for CGM supplies is high, currently filling at regular pharmacy not DME  Dexcom G6 Continuous Glucose Monitoring Results:   -----------------------------  Dexcom Clarity  -----------------------------  Paige Mari  YOB: 1950  Generated at: Thu, Jul 25, 2024 3:38 PM CDT  Reporting period: Fri Jul 12, 2024 - Thu Jul 25, 2024  -----------------------------  Glucose Details  Average glucose: 179 mg/dL  Standard deviation: 56 mg/dL  GMI: 7.6%  -----------------------------  Time in Range  Very High: 10%  High: 36%  In Range: 52%  Low: 1%  Very Low: <1%    Target Range   mg/dL  -----------------------------  CGM  Details  Sensor usage: 100%  Days with CGM data: 14/14     Eye exam is up to date  Foot exam: not discussed today    Hypertension   cloNIDine 0.3 MG/24HR Patch Weekly 1 patch to skin Transdermal change weekly  Metoprolol Succinate  MG Tablet Extended Release 24 Hour 1 tablet Orally twice a day  Previously was taking olmesartan but has not had this in a long time and doesn't know if she is supposed to be taking it. 4  Patient reports no current medication side effects  Patient does not self-monitor blood pressure.         Hyperlipidemia     Rosuvastatin Calcium 5 MG Tablet 1 tablet Orally Once a day.  Patient reports no significant myalgias or other side effects.       Migraine:   Preventive medications  Emgality 120 MG/ML Solution Prefilled Syringe 1 mL Subcutaneous every 28 days  Metoprolol Succinate  MG Tablet Extended Release 24 Hour 1 tablet Orally twice a day  Acute medications  Acetaminophen 1000 mg   Headache frequency greatly improved after starting Emgality. She was having more than 8 headaches a month and they would last long. Now they are less frequent, not as intense and last shorter duration.  She denies experiencing any side effects.     Pain, Fibromyalgia:    DULoxetine HCl 60 MG Capsule Delayed Release Particles 1 capsule Orally Once a day for mood/pain  Pregabalin 100 MG Capsule 1 capsule Orally three times a day.  tiZANidine HCl 2 MG Tablet TAKE TWO TABLETS BY MOUTH ONCE A DAY AT BEDTIME FOR MUSCLE SPASM  Acetaminophen 500 MG Tablet 2 tablets as needed Orally every 6 hrs.  Medical cannabis- vape, helps with sleep  Voltaren 1 % Gel 1 application as needed Externally daily.  She denies experiencing side effects. Denies issues with swelling in ankles and no daytime grogginess. She does think medications are helpful.     GERD      Omeprazole 20 MG Capsule Delayed Release 1 cap Orally Once a day for stomach.   Patient feels that current regimen is effective.  Patient has not tried a trial  off of therapy and is not interested in doing so.       Hormone replacement therapy:    Estradiol 1 MG Tablet TAKE 1 TABLET BY MOUTH EVERY DAY  She has history of hysterectomy with oophorectomy. She has been on hormones for a long time. She has not tried a dose decrease in a long time. She thinks she is on it for her bones. She does not have history of blood clots. She does not have personal or family history of breast cancer.     Urinary Incontinence, Recurrent UTIs:    Gemtesa 75 MG Tablet 1 tablet Orally Once a day    Cephalexin 250 MG Capsule 1 capsule Orally Once a day to prevent urinary tract infection.  Gemtesa has been very helpful for her. She has not had issues with cost. She was started on cephalexin to preventing urinary tract infections which has helped. Follows with urology.     History of Seizures:    levETIRAcetam 500 MG Tablet 1 tablet Orally every 12 hrs.    Followed by neurology. She did have seizure activity confirmed on EEG.    Latest Reference Range & Units 06/13/24 16:40   Keppra (Levetiracetam) Level 10.0 - 40.0  g/mL 29.3       Allergic Rhinitis:   Cetirizine 10 mg daily as needed   Flonase Allergy Relief 50 MCG/ACT Suspension 1 spray in each nostril as needed Nasally Once a day.  Patient reports no current medication side effects.     Patient feels that current therapy is effective.       Viral suppression:    valACYclovir HCl 500 MG Tablet 1 tablet Orally Once a day for viral suppression.  She has been taking for years to prevent outbreak. No issues reported.     Supplements     Vitamin D3 25 MCG (1000 UT) Capsule 1 capsule Orally Once a day  Magnesium Oxide 400 MG Tablet 1 tab(s) orally once a day.  Multivitamin women 50+ 1 tablet daily  No reported issues at this time.          Today's Vitals: /80 (BP Location: Left arm, Patient Position: Sitting, Cuff Size: Adult Regular)   ----------------      I spent 60 minutes with this patient today. All changes were made via collaborative  practice agreement with Scar Tan MD. A copy of the visit note was provided to the patient's provider(s).    A summary of these recommendations was sent via [employer].    Art WisdomD, BCACP  Medication Therapy Management Pharmacist  [facility]  [phone]           Medication Therapy Recommendations  No medication therapy recommendations to display

## 2024-07-25 NOTE — LETTER
"Recommended To-Do List      Prepared on: Jul 25, 2024       You can get the best results from your medications by completing the items on this \"To-Do List.\"      Bring your To-Do List when you go to your doctor. And, share it with your family or caregivers.    My To-Do List:  What we talked about: What I should do:    What my medicines are for, how to know if my medicines are working, made sure my medicines are safe for me and reviewed how to take my medicines.      Continue current medicines  I will look into the cost of your Dexcom and Omnipod supplies through DME versus pharmacy, to see if that can help with cost.   [place] is one example: [email or web address]    3. I will talk with Dr. Tan about if you should be taking olmesartan    Follow-up: 6-12 months for annual medication review              "

## 2024-07-27 DIAGNOSIS — E10.65 TYPE 1 DIABETES MELLITUS WITH HYPERGLYCEMIA (H): ICD-10-CM

## 2024-07-27 DIAGNOSIS — E78.2 MIXED HYPERLIPIDEMIA: ICD-10-CM

## 2024-07-29 RX ORDER — ROSUVASTATIN CALCIUM 5 MG/1
TABLET, COATED ORAL
Qty: 90 TABLET | Refills: 0 | Status: SHIPPED | OUTPATIENT
Start: 2024-07-29

## 2024-07-29 NOTE — TELEPHONE ENCOUNTER
Requested Prescriptions   Pending Prescriptions Disp Refills    rosuvastatin (CRESTOR) 5 MG tablet [Pharmacy Med Name: ROSUVASTATIN CALCIUM 5 MG TAB] 90 tablet 0     Sig: TAKE 1 TABLET BY MOUTH EVERY DAY       Antihyperlipidemic agents Passed - 7/27/2024  7:56 AM        Passed - LDL on file in the past 12 months        Passed - Medication is active on med list        Passed - Recent (12 mo) or future (90 days) visit within the authorizing provider's specialty     The patient must have completed an in-person or virtual visit within the past 12 months or has a future visit scheduled within the next 90 days with the authorizing provider s specialty.  Urgent care and e-visits do not quality as an office visit for this protocol.          Passed - Patient is age 18 years or older        Passed - No active pregnancy on record        Passed - No positive pregnancy test in past 12 mos

## 2024-07-30 DIAGNOSIS — E10.65 TYPE 1 DIABETES MELLITUS WITH HYPERGLYCEMIA (H): ICD-10-CM

## 2024-07-30 RX ORDER — METFORMIN HCL 500 MG
TABLET, EXTENDED RELEASE 24 HR ORAL
Qty: 360 TABLET | Refills: 0 | Status: SHIPPED | OUTPATIENT
Start: 2024-07-30

## 2024-07-30 NOTE — TELEPHONE ENCOUNTER
Requested Prescriptions   Pending Prescriptions Disp Refills    metFORMIN (GLUCOPHAGE XR) 500 MG 24 hr tablet [Pharmacy Med Name: METFORMIN HCL  MG TABLET] 360 tablet 0     Sig: TAKE 2 TABS (1000MG) BY MOUTH TWICE DAILY WITH MEALS       Biguanide Agents Passed - 7/30/2024 12:58 PM        Passed - Patient is age 10 or older        Passed - Patient has documented A1c within the specified period of time.     If HgbA1C is 8 or greater, it needs to be on file within the past 3 months.  If less than 8, must be on file within the past 6 months.     Recent Labs   Lab Test 05/10/24  1645   A1C 6.8*             Passed - Patient does NOT have a diagnosis of CHF.        Passed - Medication is active on med list        Passed - Medication indicated for associated diagnosis     Medication is associated with one or more of the following diagnoses:     Gestational diabetes mellitus     Hyperinsulinar obesity     Hypersecretion of ovarian androgens    Non-alcoholic fatty liver    Polycystic ovarian syndrome               Pre-diabetes (DM 2 prevention)    Type 2 diabetes mellitus     Weight gain, antipsychotic therapy-induced    Impaired fasting glucose          Passed - Has GFR on file in past 12 months and most recent value is normal        Passed - Recent (6 mo) or future (90 days) visit within the authorizing provider's specialty     The patient must have completed an in-person or virtual visit within the past 6 months or has a future visit scheduled within the next 90 days with the authorizing provider s specialty.  Urgent care and e-visits do not quality as an office visit for this protocol.          Passed - Patient is not pregnant        Passed - Patient has not had a positive pregnancy test within the past 12 mos.

## 2024-07-31 VITALS — SYSTOLIC BLOOD PRESSURE: 119 MMHG | DIASTOLIC BLOOD PRESSURE: 80 MMHG

## 2024-07-31 RX ORDER — CLONIDINE 0.3 MG/24H
1 PATCH, EXTENDED RELEASE TRANSDERMAL WEEKLY
COMMUNITY
Start: 2024-05-20

## 2024-08-01 NOTE — PATIENT INSTRUCTIONS
Recommendations from today's MTM visit:                                                       Continue current medicines  I will look into the cost of your Dexcom and Omnipod supplies through DME versus pharmacy, to see if that can help with cost.   Candace is one example: [email or web address]    3. I will talk with Dr. Tan about if you should be taking olmesartan    Follow-up: 6-12 months for annual medication review    It was great speaking with you today.  I value your experience and would be very thankful for your time in providing feedback in our clinic survey. In the next few days, you may receive an email or text message from [employer] with a link to a survey related to your clinical pharmacist.    To schedule another MTM appointment, please call [phone].    My Clinical Pharmacist's contact information:                                                      Please feel free to contact me with any questions or concerns you have.      Nava Welch, PharmD, BCACP  Medication Therapy Management Pharmacist  [facility]  [phone]

## 2024-08-05 ENCOUNTER — LAB REQUISITION (OUTPATIENT)
Dept: LAB | Facility: CLINIC | Age: 74
End: 2024-08-05

## 2024-08-05 DIAGNOSIS — S99.929A UNSPECIFIED INJURY OF UNSPECIFIED FOOT, INITIAL ENCOUNTER: ICD-10-CM

## 2024-08-05 LAB
ALBUMIN SERPL BCG-MCNC: 4.1 G/DL (ref 3.5–5.2)
BASOPHILS # BLD AUTO: 0.1 10E3/UL (ref 0–0.2)
BASOPHILS NFR BLD AUTO: 1 %
EOSINOPHIL # BLD AUTO: 0.1 10E3/UL (ref 0–0.7)
EOSINOPHIL NFR BLD AUTO: 1 %
IMM GRANULOCYTES # BLD: 0.1 10E3/UL
IMM GRANULOCYTES NFR BLD: 1 %
LYMPHOCYTES # BLD AUTO: 4.6 10E3/UL (ref 0.8–5.3)
LYMPHOCYTES NFR BLD AUTO: 37 %
MONOCYTES # BLD AUTO: 0.9 10E3/UL (ref 0–1.3)
MONOCYTES NFR BLD AUTO: 7 %
NEUTROPHILS # BLD AUTO: 6.7 10E3/UL (ref 1.6–8.3)
NEUTROPHILS NFR BLD AUTO: 53 %
NRBC # BLD AUTO: 0 10E3/UL
NRBC BLD AUTO-RTO: 0 /100
PREALB SERPL-MCNC: 21.9 MG/DL (ref 20–40)
TRANSFERRIN SERPL-MCNC: 274 MG/DL (ref 200–360)
WBC # BLD AUTO: 12.5 10E3/UL (ref 4–11)

## 2024-08-05 PROCEDURE — 84134 ASSAY OF PREALBUMIN: CPT | Performed by: FAMILY MEDICINE

## 2024-08-05 PROCEDURE — 82040 ASSAY OF SERUM ALBUMIN: CPT | Performed by: FAMILY MEDICINE

## 2024-08-05 PROCEDURE — 85048 AUTOMATED LEUKOCYTE COUNT: CPT | Performed by: FAMILY MEDICINE

## 2024-08-05 PROCEDURE — 84466 ASSAY OF TRANSFERRIN: CPT | Performed by: FAMILY MEDICINE

## 2024-08-13 ENCOUNTER — TELEPHONE (OUTPATIENT)
Dept: ENDOCRINOLOGY | Facility: CLINIC | Age: 74
End: 2024-08-13
Payer: COMMERCIAL

## 2024-08-18 DIAGNOSIS — E10.65 TYPE 1 DIABETES MELLITUS WITH HYPERGLYCEMIA (H): ICD-10-CM

## 2024-08-18 DIAGNOSIS — E78.2 MIXED HYPERLIPIDEMIA: ICD-10-CM

## 2024-08-19 RX ORDER — ROSUVASTATIN CALCIUM 5 MG/1
TABLET, COATED ORAL
Qty: 90 TABLET | Refills: 0 | OUTPATIENT
Start: 2024-08-19

## 2024-08-19 NOTE — TELEPHONE ENCOUNTER
Requested Prescriptions   Pending Prescriptions Disp Refills    rosuvastatin (CRESTOR) 5 MG tablet [Pharmacy Med Name: ROSUVASTATIN CALCIUM 5 MG TAB] 90 tablet 0     Sig: TAKE 1 TABLET BY MOUTH EVERY DAY       Antihyperlipidemic agents Passed - 8/18/2024  2:56 PM        Passed - LDL on file in the past 12 months        Passed - Medication is active on med list        Passed - Recent (12 mo) or future (90 days) visit within the authorizing provider's specialty     The patient must have completed an in-person or virtual visit within the past 12 months or has a future visit scheduled within the next 90 days with the authorizing provider s specialty.  Urgent care and e-visits do not quality as an office visit for this protocol.          Passed - Patient is age 18 years or older        Passed - No active pregnancy on record        Passed - No positive pregnancy test in past 12 mos

## 2024-08-27 ENCOUNTER — LAB REQUISITION (OUTPATIENT)
Dept: LAB | Facility: CLINIC | Age: 74
End: 2024-08-27

## 2024-08-27 ENCOUNTER — TRANSFERRED RECORDS (OUTPATIENT)
Dept: HEALTH INFORMATION MANAGEMENT | Facility: CLINIC | Age: 74
End: 2024-08-27

## 2024-08-27 DIAGNOSIS — Z01.818 ENCOUNTER FOR OTHER PREPROCEDURAL EXAMINATION: ICD-10-CM

## 2024-08-27 LAB
ANION GAP SERPL CALCULATED.3IONS-SCNC: 15 MMOL/L (ref 7–15)
BUN SERPL-MCNC: 18.1 MG/DL (ref 8–23)
CALCIUM SERPL-MCNC: 9 MG/DL (ref 8.8–10.4)
CHLORIDE SERPL-SCNC: 98 MMOL/L (ref 98–107)
CREAT SERPL-MCNC: 0.79 MG/DL (ref 0.51–0.95)
EGFRCR SERPLBLD CKD-EPI 2021: 78 ML/MIN/1.73M2
GLUCOSE SERPL-MCNC: 311 MG/DL (ref 70–99)
HCO3 SERPL-SCNC: 22 MMOL/L (ref 22–29)
POTASSIUM SERPL-SCNC: 5.2 MMOL/L (ref 3.4–5.3)
SODIUM SERPL-SCNC: 135 MMOL/L (ref 135–145)

## 2024-08-27 PROCEDURE — 80048 BASIC METABOLIC PNL TOTAL CA: CPT | Performed by: STUDENT IN AN ORGANIZED HEALTH CARE EDUCATION/TRAINING PROGRAM

## 2024-09-10 ENCOUNTER — TELEPHONE (OUTPATIENT)
Dept: CARDIOLOGY | Facility: CLINIC | Age: 74
End: 2024-09-10

## 2024-09-10 ENCOUNTER — ANCILLARY PROCEDURE (OUTPATIENT)
Dept: CARDIOLOGY | Facility: CLINIC | Age: 74
End: 2024-09-10
Attending: INTERNAL MEDICINE
Payer: COMMERCIAL

## 2024-09-10 DIAGNOSIS — Z45.09 ENCOUNTER FOR LOOP RECORDER CHECK: ICD-10-CM

## 2024-09-10 DIAGNOSIS — Z95.818 IMPLANTABLE LOOP RECORDER PRESENT: ICD-10-CM

## 2024-09-10 DIAGNOSIS — I48.0 PAROXYSMAL ATRIAL FIBRILLATION (H): ICD-10-CM

## 2024-09-10 DIAGNOSIS — I44.2 AV BLOCK, 3RD DEGREE (H): ICD-10-CM

## 2024-09-10 DIAGNOSIS — R55 SYNCOPE: ICD-10-CM

## 2024-09-10 DIAGNOSIS — Z95.0 CARDIAC PACEMAKER IN SITU: Primary | ICD-10-CM

## 2024-09-10 NOTE — TELEPHONE ENCOUNTER
Matt Reynolds MD Gebel, Mandy L, RN  Caller: Unspecified (Today, 11:08 AM)  Should see EP LANNY          Previous Messages       ----- Message -----  From: Payton Aleman RN  Sent: 9/10/2024  11:39 AM CDT  To: Matt Reynolds MD    ----- Message from Payton Aleman RN sent at 9/10/2024 11:39 AM CDT -----  Loop Recorder alerts received for what appears to be A.Fib, not on OAC. This loop was implanted for syncope, (not Cryptogenic CVA)- so what routing criteria/duration should we use for routing AF episodes to you? Thanks!----------------    Above recommendations for EP LANNY follow up noted. Call placed to patient to review device findings of brief AF episodes and need for EP LANNY follow up to discuss, no answer. LVMx1.     Payton Aleman RN

## 2024-09-10 NOTE — TELEPHONE ENCOUNTER
Type: Alert remote loop recorder transmission for AF burden > threshold.  Device: [employer]SC[employer] LUX-Dx ICM.   Presenting rhythm: Sinus 73 bpm.   Battery status: OK  Arrhythmias: Since 6/20/2024 22 AF episodes, most recent episodes on 9/9/2024, recordings difficult at times to determine SR with ectopy and noise vs AF, longest lasting ~30 minutes, v rates >/=120 bpm <5%, burden <1%.  Plan: Routed to device RN for review. Hany, Device Specialist      Alert for AF episodes noted, review of ECGs shows probable A.fib (irregular, no consistent P waves noted) longest episode lasting ~30 minutes. Overall low AF burden, <1%. Of note: Holter was worn in 2023 and no AF detected at that time, not on OAC.     Device implant indication: Syncope/Collapse    Will review ECGs, and AF routing criteria with Dr. Reynolds- since patient is not on OAC.     Payton Aleman RN

## 2024-09-11 NOTE — TELEPHONE ENCOUNTER
Attempted again to reach patient, no answer. LVM x1, and will send Revenewt message.     Payton Aleman RN

## 2024-09-28 ENCOUNTER — HEALTH MAINTENANCE LETTER (OUTPATIENT)
Age: 74
End: 2024-09-28

## 2024-09-30 LAB
MDC_IDC_EPISODE_DTM: NORMAL
MDC_IDC_EPISODE_DURATION: 1440 S
MDC_IDC_EPISODE_DURATION: 360 S
MDC_IDC_EPISODE_DURATION: 600 S
MDC_IDC_EPISODE_DURATION: 600 S
MDC_IDC_EPISODE_DURATION: 840 S
MDC_IDC_EPISODE_ID: NORMAL
MDC_IDC_EPISODE_TYPE: NORMAL
MDC_IDC_EPISODE_VENDOR_TYPE: NORMAL
MDC_IDC_MSMT_BATTERY_DTM: NORMAL
MDC_IDC_MSMT_BATTERY_STATUS: NORMAL
MDC_IDC_PG_IMPLANT_DTM: NORMAL
MDC_IDC_PG_MFG: NORMAL
MDC_IDC_PG_MODEL: NORMAL
MDC_IDC_PG_SERIAL: NORMAL
MDC_IDC_PG_TYPE: NORMAL
MDC_IDC_SESS_CLINIC_NAME: NORMAL
MDC_IDC_SESS_DTM: NORMAL
MDC_IDC_SESS_TYPE: NORMAL
MDC_IDC_STAT_AT_BURDEN_PERCENT: 1 %
MDC_IDC_STAT_AT_DTM_END: NORMAL
MDC_IDC_STAT_AT_DTM_START: NORMAL

## 2024-10-25 DIAGNOSIS — E10.65 TYPE 1 DIABETES MELLITUS WITH HYPERGLYCEMIA (H): ICD-10-CM

## 2024-10-25 DIAGNOSIS — E78.2 MIXED HYPERLIPIDEMIA: ICD-10-CM

## 2024-10-25 RX ORDER — ROSUVASTATIN CALCIUM 5 MG/1
TABLET, COATED ORAL
Qty: 90 TABLET | Refills: 1 | Status: SHIPPED | OUTPATIENT
Start: 2024-10-25

## 2024-11-06 ENCOUNTER — TELEPHONE (OUTPATIENT)
Dept: ENDOCRINOLOGY | Facility: CLINIC | Age: 74
End: 2024-11-06
Payer: COMMERCIAL

## 2024-11-08 NOTE — TELEPHONE ENCOUNTER
Patient reaching out again regarding the below information. Please reach out to discuss. Thank you.   08-Nov-2024 04:40

## 2024-11-12 ENCOUNTER — LAB REQUISITION (OUTPATIENT)
Dept: LAB | Facility: CLINIC | Age: 74
End: 2024-11-12

## 2024-11-12 DIAGNOSIS — E10.65 TYPE 1 DIABETES MELLITUS WITH HYPERGLYCEMIA (H): ICD-10-CM

## 2024-11-12 DIAGNOSIS — J02.9 ACUTE PHARYNGITIS, UNSPECIFIED: ICD-10-CM

## 2024-11-12 PROCEDURE — 87081 CULTURE SCREEN ONLY: CPT | Performed by: STUDENT IN AN ORGANIZED HEALTH CARE EDUCATION/TRAINING PROGRAM

## 2024-11-12 RX ORDER — PROCHLORPERAZINE 25 MG/1
SUPPOSITORY RECTAL
Qty: 9 EACH | Refills: 0 | Status: SHIPPED | OUTPATIENT
Start: 2024-11-12

## 2024-11-12 NOTE — TELEPHONE ENCOUNTER
Requested Prescriptions   Pending Prescriptions Disp Refills    Continuous Glucose Sensor (DEXCOM G6 SENSOR) MISC [Pharmacy Med Name: DEXCOM G6 SENSOR  MISC]  0     Sig: CHANGE EVERY 10 DAYS.       There is no refill protocol information for this order

## 2024-11-14 ENCOUNTER — ANCILLARY PROCEDURE (OUTPATIENT)
Dept: CARDIOLOGY | Facility: CLINIC | Age: 74
End: 2024-11-14
Attending: INTERNAL MEDICINE
Payer: COMMERCIAL

## 2024-11-14 ENCOUNTER — TELEPHONE (OUTPATIENT)
Dept: CARDIOLOGY | Facility: CLINIC | Age: 74
End: 2024-11-14
Payer: COMMERCIAL

## 2024-11-14 DIAGNOSIS — R55 SYNCOPE: ICD-10-CM

## 2024-11-14 DIAGNOSIS — Z45.09 ENCOUNTER FOR LOOP RECORDER CHECK: ICD-10-CM

## 2024-11-14 LAB
BACTERIA SPEC CULT: NORMAL
MDC_IDC_EPISODE_DTM: NORMAL
MDC_IDC_EPISODE_DTM: NORMAL
MDC_IDC_EPISODE_DURATION: 600 S
MDC_IDC_EPISODE_ID: NORMAL
MDC_IDC_EPISODE_ID: NORMAL
MDC_IDC_EPISODE_TYPE: NORMAL
MDC_IDC_EPISODE_TYPE: NORMAL
MDC_IDC_EPISODE_VENDOR_TYPE: NORMAL
MDC_IDC_MSMT_BATTERY_DTM: NORMAL
MDC_IDC_MSMT_BATTERY_STATUS: NORMAL
MDC_IDC_PG_IMPLANT_DTM: NORMAL
MDC_IDC_PG_MFG: NORMAL
MDC_IDC_PG_MODEL: NORMAL
MDC_IDC_PG_SERIAL: NORMAL
MDC_IDC_PG_TYPE: NORMAL
MDC_IDC_SESS_CLINIC_NAME: NORMAL
MDC_IDC_SESS_DTM: NORMAL
MDC_IDC_SESS_TYPE: NORMAL
MDC_IDC_STAT_AT_BURDEN_PERCENT: 1 %
MDC_IDC_STAT_AT_DTM_END: NORMAL
MDC_IDC_STAT_AT_DTM_START: NORMAL

## 2024-11-14 NOTE — TELEPHONE ENCOUNTER
Regarding: device RN review  No charge. No Epic interface required.  Type: alert remote loop recorder transmission for AF episode.   Device: [employer]SC[employer] LUX-Dx ICM.  presenting rhythm: Sinus 70 bpm.   Battery status: OK  Arrhythmias: since 10/3/24; One AF episode on 11/13/24 4:23PM, burden <1%. v-rates >/=120bpm ~10%.   Plan: Routed to device RN for review. MALGORZATA Gottlieb, Device Specialist      Intermittent episodes of A.fib episodes recorded by device. First noted back in September and attempted to reach patient several times then. Unfortunately, patient had phone issues and had to get new phone number. We now have her current/correct number updated in Epic.     Reviewed new findings of AF on loop recorder and need for EP LANNY follow up per Dr. Reynolds's recommendations. Patient verbalized understanding. Denies any awareness of AF, currently on Baby ASA only. Advised to call with any troublesome symptoms in meantime. Patient verbalized understanding.     Will forward to schedulers to set up with EP LANNY   Payton Aleman RN

## 2024-11-19 DIAGNOSIS — E10.9 DIABETES MELLITUS TYPE 1 (H): ICD-10-CM

## 2024-11-19 RX ORDER — INSULIN PMP CART,AUT,G6/7,CNTR
1 EACH SUBCUTANEOUS
Qty: 10 EACH | Refills: 2 | Status: SHIPPED | OUTPATIENT
Start: 2024-11-19

## 2024-11-19 NOTE — TELEPHONE ENCOUNTER
Requested Prescriptions   Pending Prescriptions Disp Refills    Insulin Disposable Pump (OMNIPOD 5 PODS (GEN 5)) MISC [Pharmacy Med Name: OMNIPOD 5 [id number] PODS (GEN 5)] 10 each 20     Si EACH EVERY 3 DAYS       There is no refill protocol information for this order

## 2024-12-07 ENCOUNTER — HEALTH MAINTENANCE LETTER (OUTPATIENT)
Age: 74
End: 2024-12-07

## 2024-12-16 ENCOUNTER — HOSPITAL ENCOUNTER (EMERGENCY)
Facility: HOSPITAL | Age: 74
Discharge: HOME | End: 2024-12-16
Attending: EMERGENCY MEDICINE | Admitting: EMERGENCY MEDICINE
Payer: COMMERCIAL

## 2024-12-16 ENCOUNTER — APPOINTMENT (OUTPATIENT)
Dept: CT IMAGING | Facility: HOSPITAL | Age: 74
End: 2024-12-16
Attending: EMERGENCY MEDICINE
Payer: COMMERCIAL

## 2024-12-16 VITALS
BODY MASS INDEX: 26.31 KG/M2 | HEIGHT: 62 IN | HEART RATE: 75 BPM | TEMPERATURE: 98.2 F | OXYGEN SATURATION: 100 % | WEIGHT: 143 LBS | SYSTOLIC BLOOD PRESSURE: 178 MMHG | DIASTOLIC BLOOD PRESSURE: 87 MMHG | RESPIRATION RATE: 16 BRPM

## 2024-12-16 DIAGNOSIS — N39.0 URINARY TRACT INFECTION WITH HEMATURIA, SITE UNSPECIFIED: ICD-10-CM

## 2024-12-16 DIAGNOSIS — R31.9 URINARY TRACT INFECTION WITH HEMATURIA, SITE UNSPECIFIED: ICD-10-CM

## 2024-12-16 LAB
ALBUMIN UR-MCNC: 70 MG/DL
ANION GAP SERPL CALCULATED.3IONS-SCNC: 11 MMOL/L (ref 7–15)
APPEARANCE UR: ABNORMAL
BACTERIA #/AREA URNS HPF: ABNORMAL /HPF
BILIRUB UR QL STRIP: NEGATIVE
BUN SERPL-MCNC: 18.9 MG/DL (ref 8–23)
CALCIUM SERPL-MCNC: 9.5 MG/DL (ref 8.8–10.4)
CHLORIDE SERPL-SCNC: 102 MMOL/L (ref 98–107)
COLOR UR AUTO: YELLOW
CREAT SERPL-MCNC: 0.76 MG/DL (ref 0.51–0.95)
EGFRCR SERPLBLD CKD-EPI 2021: 81 ML/MIN/1.73M2
ERYTHROCYTE [DISTWIDTH] IN BLOOD BY AUTOMATED COUNT: 16.2 % (ref 10–15)
GLUCOSE SERPL-MCNC: 68 MG/DL (ref 70–99)
GLUCOSE UR STRIP-MCNC: 50 MG/DL
HCO3 SERPL-SCNC: 27 MMOL/L (ref 22–29)
HCT VFR BLD AUTO: 38.4 % (ref 35–47)
HGB BLD-MCNC: 12.5 G/DL (ref 11.7–15.7)
HGB UR QL STRIP: ABNORMAL
HOLD SPECIMEN: NORMAL
KETONES UR STRIP-MCNC: NEGATIVE MG/DL
LEUKOCYTE ESTERASE UR QL STRIP: ABNORMAL
MCH RBC QN AUTO: 27.7 PG (ref 26.5–33)
MCHC RBC AUTO-ENTMCNC: 32.6 G/DL (ref 31.5–36.5)
MCV RBC AUTO: 85 FL (ref 78–100)
MUCOUS THREADS #/AREA URNS LPF: PRESENT /LPF
NITRATE UR QL: POSITIVE
PH UR STRIP: 6.5 [PH] (ref 5–7)
PLAT MORPH BLD: NORMAL
PLATELET # BLD AUTO: 404 10E3/UL (ref 150–450)
POTASSIUM SERPL-SCNC: 4.3 MMOL/L (ref 3.4–5.3)
RBC # BLD AUTO: 4.51 10E6/UL (ref 3.8–5.2)
RBC MORPH BLD: NORMAL
RBC URINE: 181 /HPF
SODIUM SERPL-SCNC: 140 MMOL/L (ref 135–145)
SP GR UR STRIP: 1.03 (ref 1–1.03)
SQUAMOUS EPITHELIAL: 3 /HPF
TRANSITIONAL EPI: <1 /HPF
UROBILINOGEN UR STRIP-MCNC: <2 MG/DL
WBC # BLD AUTO: 14 10E3/UL (ref 4–11)
WBC CLUMPS #/AREA URNS HPF: PRESENT /HPF
WBC URINE: >182 /HPF

## 2024-12-16 PROCEDURE — 250N000011 HC RX IP 250 OP 636: Performed by: EMERGENCY MEDICINE

## 2024-12-16 PROCEDURE — 84520 ASSAY OF UREA NITROGEN: CPT | Performed by: EMERGENCY MEDICINE

## 2024-12-16 PROCEDURE — 82962 GLUCOSE BLOOD TEST: CPT

## 2024-12-16 PROCEDURE — 87086 URINE CULTURE/COLONY COUNT: CPT | Performed by: EMERGENCY MEDICINE

## 2024-12-16 PROCEDURE — 85014 HEMATOCRIT: CPT | Performed by: EMERGENCY MEDICINE

## 2024-12-16 PROCEDURE — 74176 CT ABD & PELVIS W/O CONTRAST: CPT

## 2024-12-16 PROCEDURE — 81001 URINALYSIS AUTO W/SCOPE: CPT | Performed by: EMERGENCY MEDICINE

## 2024-12-16 PROCEDURE — 36415 COLL VENOUS BLD VENIPUNCTURE: CPT | Performed by: EMERGENCY MEDICINE

## 2024-12-16 PROCEDURE — 51702 INSERT TEMP BLADDER CATH: CPT

## 2024-12-16 PROCEDURE — 80048 BASIC METABOLIC PNL TOTAL CA: CPT | Performed by: EMERGENCY MEDICINE

## 2024-12-16 PROCEDURE — 87186 SC STD MICRODIL/AGAR DIL: CPT | Performed by: EMERGENCY MEDICINE

## 2024-12-16 PROCEDURE — 99285 EMERGENCY DEPT VISIT HI MDM: CPT | Mod: 25

## 2024-12-16 PROCEDURE — 51798 US URINE CAPACITY MEASURE: CPT

## 2024-12-16 PROCEDURE — 96365 THER/PROPH/DIAG IV INF INIT: CPT | Mod: 59

## 2024-12-16 RX ORDER — CEPHALEXIN 500 MG/1
500 CAPSULE ORAL 4 TIMES DAILY
Qty: 28 CAPSULE | Refills: 0 | Status: SHIPPED | OUTPATIENT
Start: 2024-12-16 | End: 2024-12-23

## 2024-12-16 RX ORDER — CEFTRIAXONE 1 G/1
1 INJECTION, POWDER, FOR SOLUTION INTRAMUSCULAR; INTRAVENOUS ONCE
Status: COMPLETED | OUTPATIENT
Start: 2024-12-16 | End: 2024-12-16

## 2024-12-16 RX ORDER — CEFTRIAXONE SODIUM 1 G
VIAL (EA) INJECTION
Status: DISCONTINUED
Start: 2024-12-16 | End: 2024-12-17 | Stop reason: HOSPADM

## 2024-12-16 RX ADMIN — CEFTRIAXONE SODIUM 1 G: 1 INJECTION, POWDER, FOR SOLUTION INTRAMUSCULAR; INTRAVENOUS at 21:52

## 2024-12-16 ASSESSMENT — ACTIVITIES OF DAILY LIVING (ADL)
ADLS_ACUITY_SCORE: 57

## 2024-12-16 ASSESSMENT — COLUMBIA-SUICIDE SEVERITY RATING SCALE - C-SSRS
6. HAVE YOU EVER DONE ANYTHING, STARTED TO DO ANYTHING, OR PREPARED TO DO ANYTHING TO END YOUR LIFE?: NO
1. IN THE PAST MONTH, HAVE YOU WISHED YOU WERE DEAD OR WISHED YOU COULD GO TO SLEEP AND NOT WAKE UP?: NO
2. HAVE YOU ACTUALLY HAD ANY THOUGHTS OF KILLING YOURSELF IN THE PAST MONTH?: NO

## 2024-12-16 NOTE — ED PROVIDER NOTES
EMERGENCY DEPARTMENT ENCOUNTER     NAME: Paige Mari   AGE: 74 year old female   YOB: 1950   MRN: [id number]   EVALUATION DATE & TIME: No admission date for patient encounter.   PCP: Scar Tan     Chief Complaint   Patient presents with    Urinary Retention   :    FINAL IMPRESSION       1. Urinary tract infection with hematuria, site unspecified           ED COURSE & MEDICAL DECISION MAKING      4:10 PM Introduced myself to the patient, obtained history of present illness, and performed initial physical exam at this time.    9:36 PM I rechecked the patient and discussed results, discharge, follow up, and reasons to return to the ED. We discussed the plan for discharge and the patient is agreeable. Reviewed supportive cares, symptomatic treatment, outpatient follow up, and reasons to return to the Emergency Department. Patient to be discharged by ED RN.       Pertinent Labs & Imaging studies reviewed. (See chart for details)   74 year old female  presents to the Emergency Department for evaluation of a feeling of urinary retention.  Patient stated that she feels suprapubic fullness and the sense of needing to urinate, but had not been able to pee at all today.. Initial Vitals Reviewed. Initial exam notable for patient to did not have visible suprapubic distention but complained of a lot of discomfort.  A bladder scan was attempted in the lobby, and was technically difficult but it did not show an obvious super distended bladder.  However, given her inability to urinate and symptoms we discussed straight cath versus putting in a Gomes catheter and she verbally consented to Gomes catheter placement.  Gomes yield was only a little less than 50 cc of urine, so there actually was not urinary retention.  Urinalysis is nitrite positive consistent with UTI.  I did check renal function to rule out acute renal failure and this is unremarkable, and added a CT to rule out some sort of  obstruction like an infected stone given her odd presentation and this was also unremarkable.  Clinically she has no symptoms of pyelonephritis though she does have very slight leukocytosis.  I discussed the results and options with her and we discussed leaving the Gomes catheter given her lack of urination at home all day today and doing a voiding trial with urology versus removing it here as there was not a true urinary retention and discharging with treatment for the UTI.  She prefers the latter so we will remove the Gomes catheter here and I have given an initial dose of Rocephin in the ED and will discharge with outpatient antibiotics.  She is comfortable with this plan and return precautions at time of discharge.         At the conclusion of the encounter I discussed the results of all of the tests and the disposition. The questions were answered. The patient or family acknowledged understanding and was agreeable with the care plan.     0 minutes critical care time, see procedure note below for details if relevant    Medical Decision Making    History:  Supplemental history from: Documented in chart and N/A  External Record(s) reviewed: Documented in chart and Outpatient Record: [facility] 12/16/24    Work Up:  Chart documentation includes differential considered and any EKGs or imaging independently interpreted by provider, where specified.  In additional to work up documented, I considered the following work up: Documented in chart, if applicable.    External consultation:  Discussion of management with another provider: Documented in chart, if applicable    Complicating factors:  Care impacted by chronic illness: Chronic Lung Disease, Diabetes, Mental Health, Smoking / Nicotine Use, and Other: afib  Care affected by social determinants of health: Access to Medical Care    Disposition considerations: Discharge. I prescribed additional prescription strength medication(s) as charted. I considered  admission, but ultimately discharged patient with reassuring workup and clinical improvement.    Gomes Insertion:Clinical Indications: Acute urinary retention or bladder outlet obstruction.             MEDICATIONS GIVEN IN THE EMERGENCY:   Medications   cefTRIAXone (ROCEPHIN) 1 g vial to attach to  mL bag for ADULTS or NS 50 mL bag for PEDS (has no administration in time range)      NEW PRESCRIPTIONS STARTED AT TODAY'S ER VISIT   New Prescriptions    No medications on file     ================================================================   HISTORY OF PRESENT ILLNESS       Patient information was obtained from: the patient   Use of Intrepreter: N/A   Paige Mari is a 74 year old female with history of diabetes who presents with chief complaint of urinary retention.    Patient reports over a week of difficulty urinating. She states the last time she urinated was around 28 hours ago. She endorses a current UTI, feeling like she has to urinate. She also reports pain that shoots down bilateral arms when she tries to urinate.     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), managed as type 1 (H)     Created by Conversion       Mood disorder (H) 11/24/2014    Multiple closed fractures of facial bone, initial encounter (H)     KP (obstructive sleep apnea) 05/15/2015    Paroxysmal atrial fibrillation (H) 09/22/2021    Small bowel obstruction (H) 09/18/2021    Status post total knee replacement 11/24/2014      PAST SURGICAL HISTORY:   Past  Surgical History:   Procedure Laterality Date    APPENDECTOMY      at age 3    ARTHROSCOPY KNEE WITH MENISCECTOMY  3/10/14    partial medial and lateral meniscectomies, subpatellar chondroplasty    BACK SURGERY      BLEPHAROPLASTY Bilateral 8/17/2015    Procedure: BLEPHAROPLASTY BILATERAL UPPER LIDS;  Surgeon: Chasidy Bryant MD;  Location: [facility];  Service:     BOWEL RESECTION  12 years ago    COLON SURGERY  2004    sigmoid colectomy    DILATION AND CURETTAGE      x3    EP LOOP RECORDER IMPLANT N/A 8/21/2023    Procedure: Loop Recorder Implant;  Surgeon: Nolvia Ruiz MD;  Location: [facility] CV    HYSTERECTOMY      age 39    IR LUMBAR PUNCTURE  1/22/2022    JOINT REPLACEMENT      OOPHORECTOMY      OTHER SURGICAL HISTORY  2005    bowel obstruction    PICC TRIPLE LUMEN PLACEMENT  9/19/2021         PICC TRIPLE LUMEN PLACEMENT  1/22/2022         MS ARTHRODESIS ANT INTERBODY MIN DISCECTOMY,LUMBAR N/A 1/30/2015    Procedure: ANTERIOR LUMBAR INTERBODY FUSION L4-5, INTERBODY GRAFT, BMP INSTRUMENTATION;  Surgeon: Zeeshan Guillaume MD;  Location: [facility];  Service: Spine    TOE SURGERY      TONSILLECTOMY & ADENOIDECTOMY      age 10    ZZ REMOVAL OF OVARY(S)      Description: Oophorectomy;  Recorded: 06/03/2010;    ZZ REMOVAL OF OVARY(S)      Description: Oophorectomy;  Recorded: 06/03/2010;    [facility] [place] TOTAL ABDOM HYSTERECTOMY      Description: Hysterectomy;  Recorded: 06/03/2010;    [facility] [place] TOTAL ABDOM HYSTERECTOMY      Description: Hysterectomy;  Recorded: 06/03/2010;    [facility] [place] TOTAL KNEE ARTHROPLASTY Right 11/24/2014    Procedure: RIGHT TOTAL KNEE ARTHROPLASTY;  Surgeon: Jules Harris MD;  Location: [facility];  Service: Orthopedics      CURRENT MEDICATIONS:   acetaminophen (TYLENOL) 500 MG tablet  albuterol (PROAIR HFA/PROVENTIL HFA/VENTOLIN HFA) 108 (90 Base) MCG/ACT inhaler  aspirin 81 MG EC tablet  cephALEXin (KEFLEX) 250 MG capsule  cetirizine (ZYRTEC) 10 MG  "tablet  cholecalciferol (VITAMIN D3) 25 mcg (1000 units) capsule  cloNIDine (CATAPRES-TTS3) 0.3 MG/24HR WK patch  Continuous Blood Gluc  (DEXCOM G6 ) TORRIE  Continuous Glucose Sensor (DEXCOM G6 SENSOR) MISC  Continuous Glucose Transmitter (DEXCOM G6 TRANSMITTER) MISC  diclofenac sodium (VOLTAREN) 1 % Gel  DULoxetine (CYMBALTA) 60 MG capsule  escitalopram (LEXAPRO) 20 MG tablet  estradiol (ESTRACE) 1 MG tablet  fluticasone (FLONASE) 50 MCG/ACT nasal spray  galcanezumab-gnlm (EMGALITY) 120 MG/ML injection  GEMTESA 75 MG TABS tablet  Glucagon (GVOKE HYPOPEN) 1 MG/0.2ML pen  glucose (BD GLUCOSE) 5 g chewable tablet  insulin aspart (NOVOLOG VIAL) 100 UNITS/ML vial  insulin detemir (LEVEMIR VIAL) 100 UNIT/ML vial  Insulin Disposable Pump (OMNIPOD 5 G6 INTRO, GEN 5,) KIT  Insulin Disposable Pump (OMNIPOD 5 PODS, GEN 5,) MISC  levETIRAcetam (KEPPRA) 500 MG tablet  liraglutide (VICTOZA PEN) 18 MG/3ML solution  magnesium oxide (MAG-OX) 400 MG tablet  medical cannabis (Patient's own supply)  metFORMIN (GLUCOPHAGE XR) 500 MG 24 hr tablet  metoprolol succinate ER (TOPROL-XL) 100 MG 24 hr tablet  Multiple Vitamins-Minerals (MULTIVITAMIN WOMEN 50+) TABS  nystatin (MYCOSTATIN) 004070 UNIT/GM external powder  olmesartan (BENICAR) 40 MG tablet  omeprazole (PRILOSEC) 20 MG capsule  pregabalin (LYRICA) 100 MG capsule  rosuvastatin (CRESTOR) 5 MG tablet  tiZANidine (ZANAFLEX) 2 MG tablet  valACYclovir (VALTREX) 500 MG tablet      ALLERGIES:   Allergies   Allergen Reactions    Morphine Other (See Comments)     \"make me delirious,\"  \"nightmares\"    Nitrofurantoin     Adhesive [Cyanoacrylate] Other (See Comments)     Can only tolerate tape for short periods of time shelia in sensitive areas    Amlodipine Unknown and Other (See Comments)     Other reaction(s): delerium    Doxazosin Other (See Comments)     Other reaction(s): feels foggy    Irbesartan-Hydrochlorothiazide Other (See Comments)     Other reaction(s): hyponatremia    " "Other Allergy (See Comments) [External Allergen Needs Reconciliation - See Comment] Unknown     Other reaction(s): skin rash    Prednisone Unknown     Caused extremely high blood sugars    Tramadol Other (See Comments)     Possible seizure activity    Trazodone Unknown     Other reaction(s): hung over      FAMILY HISTORY:   Family History   Problem Relation Age of Onset    Breast Cancer No family hx of       SOCIAL HISTORY:   Social History     Socioeconomic History    Marital status:    Tobacco Use    Smoking status: Every Day     Current packs/day: 0.00     Average packs/day: 0.5 packs/day for 19.0 years (9.5 ttl pk-yrs)     Types: Cigarettes     Start date: 9/15/2002     Last attempt to quit: 9/15/2021     Years since quitting: 3.2    Smokeless tobacco: Never    Tobacco comments:     Seen as Inpatient by Tobacco Treatment and history updated on 8/21/2023, at time smoking 1 cigarette per day   Substance and Sexual Activity    Alcohol use: No    Drug use: No        VITALS  Patient Vitals for the past 24 hrs:   BP Temp Temp src Pulse Resp SpO2 Height Weight   12/16/24 1945 (!) 197/96 -- -- 76 16 100 % -- --   12/16/24 1648 (!) 178/91 98.2  F (36.8  C) Oral 90 18 98 % -- --   12/16/24 1549 -- -- -- -- -- -- 1.575 m (5' 2\") 64.9 kg (143 lb)        ================================================================    PHYSICAL EXAM     VITAL SIGNS: BP (!) 197/96   Pulse 76   Temp 98.2  F (36.8  C) (Oral)   Resp 16   Ht 1.575 m (5' 2\")   Wt 64.9 kg (143 lb)   SpO2 100%   BMI 26.16 kg/m     Constitutional:  Awake, no acute distress   HENT:  Atraumatic, oropharynx without exudate or erythema, membranes moist  Lymph:  No adenopathy  Eyes: EOM intact, PERRL, no injection  Neck: Supple  Respiratory:  Clear to auscultation bilaterally, no wheezes or crackles   Cardiovascular:  Regular rate and rhythm, single S1 and S2   GI:  Soft, suprapubic tenderness, nondistended, no rebound or guarding   Musculoskeletal:  Moves " all extremities, no lower extremity edema, no deformities    Skin:  Warm, dry  Neurologic:  Alert and oriented x3, no focal deficits noted       ================================================================  LAB       All pertinent labs reviewed and interpreted.   Labs Ordered and Resulted from Time of ED Arrival to Time of ED Departure   BASIC METABOLIC PANEL - Abnormal       Result Value    Sodium 140      Potassium 4.3      Chloride 102      Carbon Dioxide (CO2) 27      Anion Gap 11      Urea Nitrogen 18.9      Creatinine 0.76      GFR Estimate 81      Calcium 9.5      Glucose 68 (*)    ROUTINE UA WITH MICROSCOPIC REFLEX TO CULTURE - Abnormal    Color Urine Yellow      Appearance Urine Turbid (*)     Glucose Urine 50 (*)     Bilirubin Urine Negative      Ketones Urine Negative      Specific Gravity Urine 1.029      Blood Urine >1.0 mg/dL (*)     pH Urine 6.5      Protein Albumin Urine 70 (*)     Urobilinogen Urine <2.0      Nitrite Urine Positive (*)     Leukocyte Esterase Urine 500 Monica/uL (*)     Bacteria Urine Few (*)     WBC Clumps Urine Present (*)     Mucus Urine Present (*)     RBC Urine 181 (*)     WBC Urine >182 (*)     Squamous Epithelials Urine 3 (*)     Transitional Epithelials Urine <1     CBC WITH PLATELETS AND DIFFERENTIAL - Abnormal    WBC Count 14.0 (*)     RBC Count 4.51      Hemoglobin 12.5      Hematocrit 38.4      MCV 85      MCH 27.7      MCHC 32.6      RDW 16.2 (*)     Platelet Count 404     RBC AND PLATELET MORPHOLOGY   URINE CULTURE        ===============================================================  RADIOLOGY       Reviewed all pertinent imaging. Please see official radiology report.   Abd/pelvis CT no contrast - Stone Protocol   Final Result   IMPRESSION:    1.  No hydronephrosis. Small nonobstructing stone at the left kidney.   2.  Catheter decompresses the urinary bladder.   3.  No acute abnormality identified.                ================================================================  EKG         I have independently reviewed and interpreted the EKG(s) documented above.     ================================================================  PROCEDURES         I, Amna Lopez, am serving as a scribe to document services personally performed by Dr. Burgos based on my observation and the provider's statements to me. I, Kathleen Burgos MD attest that Amna Lopez is acting in a scribe capacity, has observed my performance of the services and has documented them in accordance with my direction.   Kathleen Burgos M.D.   Emergency Medicine   [facility] EMERGENCY DEPARTMENT  [address] [place] [id number]  [phone]  Dept: [phone]      Kathleen Burgos MD  12/16/24 [id number]

## 2024-12-16 NOTE — ED TRIAGE NOTES
Pt with urinary retention pt states unable to void since yesterday morning.  Did have burning and foul smelling urine prior to these.  NO fevers/chills.  Senht over by pmd.

## 2024-12-17 LAB
GLUCOSE BLDC GLUCOMTR-MCNC: 67 MG/DL (ref 70–99)
GLUCOSE BLDC GLUCOMTR-MCNC: 83 MG/DL (ref 70–99)

## 2024-12-18 LAB — BACTERIA UR CULT: ABNORMAL

## 2024-12-19 ENCOUNTER — TELEPHONE (OUTPATIENT)
Dept: NURSING | Facility: CLINIC | Age: 74
End: 2024-12-19
Payer: COMMERCIAL

## 2024-12-19 NOTE — TELEPHONE ENCOUNTER
[facility]    Reason for call: Lab Result Notification     Lab Result (including Rx patient on, if applicable).  If culture, copy of lab report at bottom.  Lab Result: Final Urine Culture Report on 12/18/24  [facility] Emergency Dept discharge antibiotic prescribed: Cephalexin (Keflex) 500 mg capsule, 1 capsule (500 mg) by mouth 4 times daily for 7 days.   Bacterial Growth: >100,000 CFU/ML Escherichia coli [Susceptible to Cephalexin]    Patient's current Symptoms:   No difficulty urinating  The urinary urgency has subsided    RN Recommendations/Instructions per [facility] ED lab result protocol:   [facility] ED lab result protocol utilized: urine culture    Patient/care giver notified to contact your PCP clinic or return to the Emergency department if your:  Symptoms return.  Symptoms worsen or other concerning symptoms.        Vasile Francis RN

## 2024-12-29 DIAGNOSIS — E10.65 TYPE 1 DIABETES MELLITUS WITH HYPERGLYCEMIA (H): ICD-10-CM

## 2024-12-30 RX ORDER — METFORMIN HYDROCHLORIDE 500 MG/1
TABLET, EXTENDED RELEASE ORAL
Qty: 360 TABLET | Refills: 0 | Status: SHIPPED | OUTPATIENT
Start: 2024-12-30

## 2024-12-30 NOTE — TELEPHONE ENCOUNTER
Requested Prescriptions   Pending Prescriptions Disp Refills    metFORMIN (GLUCOPHAGE XR) 500 MG 24 hr tablet [Pharmacy Med Name: METFORMIN HCL  MG TABLET] 360 tablet 0     Sig: TAKE 2 TABS (1000MG) BY MOUTH TWICE DAILY WITH MEALS       Biguanide Agents Failed - 12/30/2024 12:18 PM        Failed - Patient has documented A1c within the specified period of time.     If HgbA1C is 8 or greater, it needs to be on file within the past 3 months.  If less than 8, must be on file within the past 6 months.     Recent Labs   Lab Test 05/10/24  1645   A1C 6.8*             Failed - Recent (6 mo) or future (90 days) visit within the authorizing provider's specialty     The patient must have completed an in-person or virtual visit within the past 6 months or has a future visit scheduled within the next 90 days with the authorizing provider s specialty.  Urgent care and e-visits do not quality as an office visit for this protocol.          Passed - Patient is age 10 or older        Passed - Patient does NOT have a diagnosis of CHF.        Passed - Medication is active on med list        Passed - Medication indicated for associated diagnosis     Medication is associated with one or more of the following diagnoses:     Gestational diabetes mellitus     Hyperinsulinar obesity     Hypersecretion of ovarian androgens    Non-alcoholic fatty liver    Polycystic ovarian syndrome               Pre-diabetes (DM 2 prevention)    Type 2 diabetes mellitus     Weight gain, antipsychotic therapy-induced    Impaired fasting glucose          Passed - Has GFR on file in past 12 months and most recent value is normal        Passed - Patient is not pregnant        Passed - Patient has not had a positive pregnancy test within the past 12 mos.

## 2025-01-24 ENCOUNTER — ANCILLARY PROCEDURE (OUTPATIENT)
Dept: CARDIOLOGY | Facility: CLINIC | Age: 75
End: 2025-01-24
Attending: INTERNAL MEDICINE
Payer: COMMERCIAL

## 2025-01-24 ENCOUNTER — TELEPHONE (OUTPATIENT)
Dept: ENDOCRINOLOGY | Facility: CLINIC | Age: 75
End: 2025-01-24

## 2025-01-24 DIAGNOSIS — R55 SYNCOPE: ICD-10-CM

## 2025-01-24 DIAGNOSIS — Z45.09 ENCOUNTER FOR LOOP RECORDER CHECK: ICD-10-CM

## 2025-01-24 PROCEDURE — 93298 REM INTERROG DEV EVAL SCRMS: CPT | Performed by: INTERNAL MEDICINE

## 2025-01-24 NOTE — TELEPHONE ENCOUNTER
[facility] Call Center    Phone Message    May a detailed message be left on voicemail: yes     Reason for Call: Other: Pt would like to know if the clinic received a fax from her insulin . Please review and call pt to discuss.     Action Taken: Other: Endo    Travel Screening: Not Applicable     Date of Service:

## 2025-01-27 PROBLEM — Z45.09 ENCOUNTER FOR LOOP RECORDER CHECK: Status: ACTIVE | Noted: 2025-01-27

## 2025-01-27 NOTE — PROGRESS NOTES
Thank you, Dr. Reynolds, for asking the [facility] team to see Ms. Paige Mari to evaluate PAF/ ILR check.    Assessment/Recommendations     Assessment/Plan:    Diagnoses and all orders for this visit:  Paroxysmal atrial fibrillation (H)  -asymptomatic, newly diagnosed Afib captured on ILR check  -we discussed the ongoing importance of lifestyle modification (maintaining a healthy weight, sleep apnea diagnosis and management, alcohol avoidance) as part of a long term strategy for atrial fibrillation management  -rate controlled, Metoprolol  mg daily, continue  -start Eliquis 5 mg every 12 hours, discontinue ASA 81 mg daily once Eliquis initiated  -will continue to monitor ILR check to assess progression of AF along with symptomatic correlation  -follow up with me again in 1 year       [id number] score of 5 due to gender, age 74, HTN, DM and on ASA 81 mg daily.  Patient with known history of falls associated with her diabetic neuropathy.  Would be a candidate for left atrial appendage closure device.  Patient was provided with information today regarding watchman.      Encounter for loop recorder check    ILR check 1/24/25  Device: BSCI LUX-Dx ICM.  Presenting rhythm: Sinus 75 bpm.   Battery status: OK  Arrhythmias: since 11/14/24; 6 AF episodes, longest lasting 14 minutes, recordings appear to be AFib at times difficult to determine if NSR with ectopy.   Plan: Next remote check scheduled for 5/5/25.     Benign essential hypertension  --146/70, recheck, 110/58 using large cuff    KP (obstructive sleep apnea)  -intolerant of CPAP, currently untreated. We discussed the direct correlation between untreated KP and Afib. Patient no longer has the equipment. Decline referral back to sleep medicine.          History of Present Illness/Subjective     Paige Mari is a very pleasant 74 year old female who comes in today for EP consult PAF, referred by Dr Reynolds     Paige Pierrestaceyprashanth has  a known history of paroxysmal atrial fibrillation diagnosed September 2024, ILR in situ, HTN, DM, hypothyroidism, KP, mood disorder, COPD, GERD    Arrhythmia hx:   Sx: asymptomatic  Sx onset:   Dx/date: 9/2024, paroxysmal, captured on ILR lasting 12 - 30 minutes per episode  [id number], OAC: 5 due to gender, age 74, HTN, DM and on ASA 81 mg daily.  Rate control: metoprolol  AAD:no prior AADs  DCCV: no  Ablation: no  LAAO: no  Device: ILR 8/21/23 (recurrent syncope)    Paige presents for consult today regarding new onset paroxysmal atrial fibrillation captured by ILR.  She denies any palpitations, chest pain, shortness of breath on exertion or at rest, dizziness or near syncope directly correlating with her atrial fibrillation episodes.  She suffers with shortness of breath on exertion which has been present now for well over 2 years.  ILR was inserted due to recurrent syncope, she has not had any recurrence of syncope since A-fib was captured on loop recorder which was initially diagnosed in September 2024.  She is not currently anticoagulated at this time other than taking aspirin 81 mg daily.  She remains on metoprolol  mg daily.  Ventricular rates are controlled in the 70s.  She has a known history of falls resulting in facial fractures related to her peripheral neuropathy due to her diabetes.  She has no prior history of stroke, PE or DVT.  No prior history of esophageal stricture requiring dilation.  No history of chronic pain limiting use of NSAIDs related to Eliquis use.  Most recent TTE shows preserved LV function, mild MR noted. LA normal dimension.  She is intolerant of CPAP due to claustrophobia.  KP remains untreated at this time.         Cardiographics (personally reviewed):  EKG  3/13/24 NSR LBBB 72 bpm  ms QT/QTC: 456/499 ms    ILR check 1/24/25  Device: BSCI LUX-Dx ICM.  Presenting rhythm: Sinus 75 bpm.   Battery status: OK  Arrhythmias: since 11/14/24; 6 AF episodes, longest lasting  14 minutes, recordings appear to be AFib at times difficult to determine if NSR with ectopy.   Plan: Next remote check scheduled for 5/5/25.     ILR check 10/3/24  Device: BSCI LUX-Dx ICM.  Presenting rhythm: Sinus 65-70 bpm.   Battery status: OK  Arrhythmias: since 9/10/24; 9 AF episodes, longest lasting 12 minutes.         ILR check 9/10/24  Device: BSCI LUX-Dx ICM.   Presenting rhythm: Sinus 73 bpm.   Battery status: OK  Arrhythmias: Since 6/20/2024 22 AF episodes, most recent episodes on 9/9/2024, recordings difficult at times to determine SR with ectopy and noise vs AF, longest lasting ~ 30 minutes, v rates >/=120 bpm <5%, burden <1%.  Plan: Routed to device RN for review. Hany, Device Specialist. ADD: Possible A.fib noted, not on OAC.         TTE 3/14/24  The visual ejection fraction is 55-60%. Focal area of mid septal hypokinesis.  Normal RV size with mildly decreased right ventricular systolic function  There is mild (1+) mitral regurgitation. The mitral regurgitant jet is  posteriorly directed, which is consistent with anterior leaflet pathology.  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), managed as type 1 (H)    Nicotine Dependence    Hyperthyroidism    Migraine without aura and without status migrainosus, not intractable    Knee osteoarthritis    GERD (gastroesophageal reflux disease)    Mood disorder    Spondylisthesis    KP (obstructive sleep apnea)    Paroxysmal atrial fibrillation (H)    COPD (chronic obstructive pulmonary disease) (H)    Diabetic polyneuropathy (H)    Closed head injury, initial encounter    Complex partial seizure (H)    Encounter for loop recorder check     Revi  e  Physical Examination Review of Systems   w fystems  There were no vitals taken for this  visit.  There is no height or weight on file to calculate BMI.  Wt Readings from Last 3 Encounters:   12/16/24 64.9 kg (143 lb)   05/21/24 67.1 kg (148 lb)   03/14/24 62.6 kg (138 lb 0.1 oz)     General Appearance:   Alert, well-appearing and in no acute distress.   HEENT: Atraumatic, normocephalic.  No scleral icterus, normal conjunctivae; mucous membranes pink and moist.     Chest: Chest symmetric, spine straight.   Lungs:   Respirations unlabored: Lungs are clear to auscultation.   Cardiovascular:   Normal first and second heart sounds with no murmurs, rubs, or gallops.  Regular, regular.   Normal JVD, Trace BLE edema.       Extremities: No cyanosis or clubbing   Musculoskeletal: Moves all extremities   Skin: Warm, dry, intact.    Neurologic: Mood and affect are appropriate, alert and oriented to person, place, time, and situation. Mild unsteady gait noted.      ROS: 10 point ROS neg other than the symptoms noted above in the HPI.     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), managed as type 1 (H)     Created by Conversion       Mood disorder 11/24/2014    Multiple closed fractures of facial bone, initial encounter (H)     KP (obstructive sleep apnea) 05/15/2015    Paroxysmal atrial fibrillation (H) 09/22/2021    Small bowel obstruction (H) 09/18/2021    Status post total knee replacement 11/24/2014    Past Surgical History:   Procedure Laterality Date    APPENDECTOMY      at age 3    ARTHROSCOPY KNEE WITH MENISCECTOMY  3/10/14    partial medial and lateral meniscectomies, subpatellar  chondroplasty    BACK SURGERY      BLEPHAROPLASTY Bilateral 8/17/2015    Procedure: BLEPHAROPLASTY BILATERAL UPPER LIDS;  Surgeon: Chasidy Bryant MD;  Location: [facility] OR;  Service:     BOWEL RESECTION  12 years ago    COLON SURGERY  2004    sigmoid colectomy    DILATION AND CURETTAGE      x3    EP LOOP RECORDER IMPLANT N/A 8/21/2023    Procedure: Loop Recorder Implant;  Surgeon: Nolvia Ruiz MD;  Location: [facility] LAB CV    HYSTERECTOMY      age 39    IR LUMBAR PUNCTURE  1/22/2022    JOINT REPLACEMENT      OOPHORECTOMY      OTHER SURGICAL HISTORY  2005    bowel obstruction    PICC TRIPLE LUMEN PLACEMENT  9/19/2021         PICC TRIPLE LUMEN PLACEMENT  1/22/2022         FL ARTHRODESIS ANT INTERBODY MIN DISCECTOMY,LUMBAR N/A 1/30/2015    Procedure: ANTERIOR LUMBAR INTERBODY FUSION L4-5, INTERBODY GRAFT, BMP INSTRUMENTATION;  Surgeon: Zeeshan Guillaume MD;  Location: [facility];  Service: Spine    TOE SURGERY      TONSILLECTOMY & ADENOIDECTOMY      age 10    Z REMOVAL OF OVARY(S)      Description: Oophorectomy;  Recorded: 06/03/2010;    [facility] REMOVAL OF OVARY(S)      Description: Oophorectomy;  Recorded: 06/03/2010;    [facility] TOTAL ABDOM HYSTERECTOMY      Description: Hysterectomy;  Recorded: 06/03/2010;    [facility] TOTAL ABDOM HYSTERECTOMY      Description: Hysterectomy;  Recorded: 06/03/2010;    [facility] TOTAL KNEE ARTHROPLASTY Right 11/24/2014    Procedure: RIGHT TOTAL KNEE ARTHROPLASTY;  Surgeon: Jules Harris MD;  Location: [facility];  Service: Orthopedics    Family History   Problem Relation Age of Onset    Breast Cancer No family hx of     History   Smoking Status    Every Day    Types: Cigarettes   Smokeless Tobacco    Never     Social History    Substance and Sexual Activity      Alcohol use: No       Medications  Allergies     Current Outpatient Medications   Medication Sig Dispense Refill    acetaminophen (TYLENOL) 500 MG tablet Take 1,000 mg by mouth every 6 hours as needed  for pain      albuterol (PROAIR HFA/PROVENTIL HFA/VENTOLIN HFA) 108 (90 Base) MCG/ACT inhaler Inhale 2 puffs into the lungs every 6 hours as needed for shortness of breath, wheezing or cough      aspirin 81 MG EC tablet Take 81 mg by mouth daily      cephALEXin (KEFLEX) 250 MG capsule Take 250 mg by mouth daily      cetirizine (ZYRTEC) 10 MG tablet Take 10 mg by mouth daily as needed for allergies      cholecalciferol (VITAMIN D3) 25 mcg (1000 units) capsule Take 1,000 Units by mouth daily      cloNIDine (CATAPRES-TTS3) 0.3 MG/24HR WK patch Place 1 patch onto the skin once a week (Sunday)      Continuous Blood Gluc  (DEXCOM G6 ) TORRIE Use to read blood sugars as per 's instructions. 1 each 0    Continuous Glucose Sensor (DEXCOM G6 SENSOR) MISC CHANGE EVERY 10 DAYS. 9 each 0    Continuous Glucose Transmitter (DEXCOM G6 TRANSMITTER) MISC CHANGE EVERY 3 MONTHS. 1 each 0    diclofenac sodium (VOLTAREN) 1 % Gel Apply 1 g topically daily as needed (pain)      DULoxetine (CYMBALTA) 60 MG capsule Take 60 mg by mouth daily      escitalopram (LEXAPRO) 20 MG tablet Take 20 mg by mouth daily      estradiol (ESTRACE) 1 MG tablet Take 1 mg by mouth daily      fluticasone (FLONASE) 50 MCG/ACT nasal spray Spray 1 spray into both nostrils daily as needed for rhinitis or allergies      galcanezumab-gnlm (EMGALITY) 120 MG/ML injection Inject 1 mL (120 mg) Subcutaneous every 28 days 1 mL 11    GEMTESA 75 MG TABS tablet Take 75 mg by mouth daily      Glucagon (GVOKE HYPOPEN) 1 MG/0.2ML pen Inject 0.2 mLs (1 mg) into the muscle as needed for low blood sugar 0.4 mL 1    glucose (BD GLUCOSE) 5 g chewable tablet Take 2 tablets (10 g) by mouth daily as needed (hypoglycemia) 300 tablet 3    insulin aspart (NOVOLOG VIAL) 100 UNITS/ML vial Inject 30 Units Subcutaneous daily 30 mL 0    insulin detemir (LEVEMIR VIAL) 100 UNIT/ML vial Inject 24 Units Subcutaneous 2 times daily Only with very high sugars (Patient not  "taking: Reported on 7/31/2024)      Insulin Disposable Pump (OMNIPOD 5 G6 INTRO, GEN 5,) KIT 1 each daily 1 kit 1    Insulin Disposable Pump (OMNIPOD 5 PODS, GEN 5,) MISC 1 EACH EVERY 3 DAYS 10 each 2    levETIRAcetam (KEPPRA) 500 MG tablet TAKE 1 TABLET BY MOUTH TWICE A  tablet 0    liraglutide (VICTOZA PEN) 18 MG/3ML solution Inject 1.2 mg subcutaneously daily      magnesium oxide (MAG-OX) 400 MG tablet Take 400 mg by mouth daily      medical cannabis (Patient's own supply) 1 Dose by Other route See Admin Instructions [employer] Vape- 1-4 puffs HS PRN      metFORMIN (GLUCOPHAGE XR) 500 MG 24 hr tablet TAKE 2 TABS (1000MG) BY MOUTH TWICE DAILY WITH MEALS 360 tablet 0    metoprolol succinate ER (TOPROL-XL) 100 MG 24 hr tablet Take 100 mg by mouth 2 times daily      Multiple Vitamins-Minerals (MULTIVITAMIN WOMEN 50+) TABS Take 1 tablet by mouth daily      nystatin (MYCOSTATIN) 950154 UNIT/GM external powder Apply topically 2 times daily as needed      olmesartan (BENICAR) 40 MG tablet Take 40 mg by mouth daily (Patient not taking: Reported on 7/31/2024)      omeprazole (PRILOSEC) 20 MG capsule Take 1 capsule by mouth every morning      pregabalin (LYRICA) 100 MG capsule Take 1 capsule (100 mg) by mouth 3 times daily 90 capsule 0    rosuvastatin (CRESTOR) 5 MG tablet TAKE 1 TABLET BY MOUTH EVERY DAY 90 tablet 1    tiZANidine (ZANAFLEX) 2 MG tablet Take 4 mg by mouth nightly as needed for muscle spasms      valACYclovir (VALTREX) 500 MG tablet Take 500 mg by mouth daily        Allergies   Allergen Reactions    Morphine Other (See Comments)     \"make me delirious,\"  \"nightmares\"    Nitrofurantoin     Adhesive [Cyanoacrylate] Other (See Comments)     Can only tolerate tape for short periods of time shelia in sensitive areas    Amlodipine Unknown and Other (See Comments)     Other reaction(s): delerium    Doxazosin Other (See Comments)     Other reaction(s): feels foggy    Irbesartan-Hydrochlorothiazide Other " (See Comments)     Other reaction(s): hyponatremia    Other Allergy (See Comments) [External Allergen Needs Reconciliation - See Comment] Unknown     Other reaction(s): skin rash    Prednisone Unknown     Caused extremely high blood sugars    Tramadol Other (See Comments)     Possible seizure activity    Trazodone Unknown     Other reaction(s): hung over      Medical, surgical, family, social history, and medications were all reviewed and updated as necessary.   Lab Results    Chemistry/lipid CBC Cardiac Enzymes/BNP/TSH/INR   Recent Labs   Lab Test 05/10/24  1645   CHOL 130   HDL 59   LDL 40   TRIG 153*     Recent Labs   Lab Test 05/10/24  1645 07/25/23  1400 09/16/22  1541   LDL 40 43 39     Recent Labs   Lab Test 12/16/24 2037 12/16/24  1945 12/16/24 1939   NA  --   --  140   POTASSIUM  --   --  4.3   CHLORIDE  --   --  102   CO2  --   --  27   GLC 83   < > 68*   BUN  --   --  18.9   CR  --   --  0.76   GFRESTIMATED  --   --  81   BRUNO  --   --  9.5    < > = values in this interval not displayed.     Recent Labs   Lab Test 12/16/24  1939 08/27/24  1316 03/15/24  0901   CR 0.76 0.79 0.74     Recent Labs   Lab Test 05/10/24  1645 03/14/24  1149 07/25/23  1400   A1C 6.8* 7.5* 7.4*          Recent Labs   Lab Test 12/16/24 1939   WBC 14.0*   HGB 12.5   HCT 38.4   MCV 85        Recent Labs   Lab Test 12/16/24  1939 03/15/24  0901 03/14/24  1149   HGB 12.5 12.2 13.0    Recent Labs   Lab Test 01/20/22  1158 09/19/21  0211 09/17/21  2240   TROPONINI <0.01 0.06 0.01     Recent Labs   Lab Test 03/13/24  1532   NTBNPI 360     Recent Labs   Lab Test 05/10/24  1645   TSH 3.67     Recent Labs   Lab Test 03/13/24  1532 08/17/23  1208   INR 1.03 1.06          Total Time- 40 minutes spent on date of encounter doing chart review, history and exam, documentation and further activities as noted above.  This note has been dictated using voice recognition software. Any grammatical, typographical, or context distortions are  unintentional and inherent to the software.    Elizabet Blanchard [facility]  [phone]

## 2025-01-28 ENCOUNTER — OFFICE VISIT (OUTPATIENT)
Dept: CARDIOLOGY | Facility: CLINIC | Age: 75
End: 2025-01-28
Payer: COMMERCIAL

## 2025-01-28 VITALS
RESPIRATION RATE: 16 BRPM | BODY MASS INDEX: 27.79 KG/M2 | HEIGHT: 62 IN | DIASTOLIC BLOOD PRESSURE: 58 MMHG | WEIGHT: 151 LBS | SYSTOLIC BLOOD PRESSURE: 110 MMHG | HEART RATE: 72 BPM

## 2025-01-28 DIAGNOSIS — G47.33 OSA (OBSTRUCTIVE SLEEP APNEA): ICD-10-CM

## 2025-01-28 DIAGNOSIS — I10 BENIGN ESSENTIAL HYPERTENSION: ICD-10-CM

## 2025-01-28 DIAGNOSIS — Z45.09 ENCOUNTER FOR LOOP RECORDER CHECK: ICD-10-CM

## 2025-01-28 DIAGNOSIS — Z95.818 IMPLANTABLE LOOP RECORDER PRESENT: ICD-10-CM

## 2025-01-28 DIAGNOSIS — I48.0 PAROXYSMAL ATRIAL FIBRILLATION (H): Primary | ICD-10-CM

## 2025-01-28 PROCEDURE — G2211 COMPLEX E/M VISIT ADD ON: HCPCS | Performed by: NURSE PRACTITIONER

## 2025-01-28 PROCEDURE — 99204 OFFICE O/P NEW MOD 45 MIN: CPT | Performed by: NURSE PRACTITIONER

## 2025-01-28 NOTE — LETTER
1/28/2025    Scar Tan MD  [address]  [place] [address]    RE: Paige WARD Jacey       Dear Colleague,     I had the pleasure of seeing Paige Mari in the [facility].    Thank you, Dr. Reynolds, for asking the [facility] team to see Ms. Paige Mari to evaluate PAF/ ILR check.    Assessment/Recommendations     Assessment/Plan:    Diagnoses and all orders for this visit:  Paroxysmal atrial fibrillation (H)  -asymptomatic, newly diagnosed Afib captured on ILR check  -we discussed the ongoing importance of lifestyle modification (maintaining a healthy weight, sleep apnea diagnosis and management, alcohol avoidance) as part of a long term strategy for atrial fibrillation management  -rate controlled, Metoprolol  mg daily, continue  -start Eliquis 5 mg every 12 hours, discontinue ASA 81 mg daily once Eliquis initiated  -will continue to monitor ILR check to assess progression of AF along with symptomatic correlation  -follow up with me again in 1 year       [id number] score of 5 due to gender, age 74, HTN, DM and on ASA 81 mg daily.  Patient with known history of falls associated with her diabetic neuropathy.  Would be a candidate for left atrial appendage closure device.  Patient was provided with information today regarding watchman.      Encounter for loop recorder check    ILR check 1/24/25  Device: BSCI LUX-Dx ICM.  Presenting rhythm: Sinus 75 bpm.   Battery status: OK  Arrhythmias: since 11/14/24; 6 AF episodes, longest lasting 14 minutes, recordings appear to be AFib at times difficult to determine if NSR with ectopy.   Plan: Next remote check scheduled for 5/5/25.     Benign essential hypertension  --146/70, recheck, 110/58 using large cuff    KP (obstructive sleep apnea)  -intolerant of CPAP, currently untreated. We discussed the direct correlation between untreated KP and Afib. Patient no longer has the equipment.  Decline referral back to sleep medicine.          History of Present Illness/Subjective     Paige Mari is a very pleasant 74 year old female who comes in today for EP consult PAF, referred by Dr Reynolds     Paige Mari has a known history of paroxysmal atrial fibrillation diagnosed September 2024, ILR in situ, HTN, DM, hypothyroidism, KP, mood disorder, COPD, GERD    Arrhythmia hx:   Sx: asymptomatic  Sx onset:   Dx/date: 9/2024, paroxysmal, captured on ILR lasting 12 - 30 minutes per episode  [id number], OAC: 5 due to gender, age 74, HTN, DM and on ASA 81 mg daily.  Rate control: metoprolol  AAD:no prior AADs  DCCV: no  Ablation: no  LAAO: no  Device: ILR 8/21/23 (recurrent syncope)    Paige presents for consult today regarding new onset paroxysmal atrial fibrillation captured by ILR.  She denies any palpitations, chest pain, shortness of breath on exertion or at rest, dizziness or near syncope directly correlating with her atrial fibrillation episodes.  She suffers with shortness of breath on exertion which has been present now for well over 2 years.  ILR was inserted due to recurrent syncope, she has not had any recurrence of syncope since A-fib was captured on loop recorder which was initially diagnosed in September 2024.  She is not currently anticoagulated at this time other than taking aspirin 81 mg daily.  She remains on metoprolol  mg daily.  Ventricular rates are controlled in the 70s.  She has a known history of falls resulting in facial fractures related to her peripheral neuropathy due to her diabetes.  She has no prior history of stroke, PE or DVT.  No prior history of esophageal stricture requiring dilation.  No history of chronic pain limiting use of NSAIDs related to Eliquis use.  Most recent TTE shows preserved LV function, mild MR noted. LA normal dimension.  She is intolerant of CPAP due to claustrophobia.  KP remains untreated at this time.         Cardiographics  (personally reviewed):  EKG  3/13/24 NSR LBBB 72 bpm  ms QT/QTC: 456/499 ms    ILR check 1/24/25  Device: BSCI LUX-Dx ICM.  Presenting rhythm: Sinus 75 bpm.   Battery status: OK  Arrhythmias: since 11/14/24; 6 AF episodes, longest lasting 14 minutes, recordings appear to be AFib at times difficult to determine if NSR with ectopy.   Plan: Next remote check scheduled for 5/5/25.     ILR check 10/3/24  Device: BSCI LUX-Dx ICM.  Presenting rhythm: Sinus 65-70 bpm.   Battery status: OK  Arrhythmias: since 9/10/24; 9 AF episodes, longest lasting 12 minutes.         ILR check 9/10/24  Device: BSCI LUX-Dx ICM.   Presenting rhythm: Sinus 73 bpm.   Battery status: OK  Arrhythmias: Since 6/20/2024 22 AF episodes, most recent episodes on 9/9/2024, recordings difficult at times to determine SR with ectopy and noise vs AF, longest lasting ~ 30 minutes, v rates >/=120 bpm <5%, burden <1%.  Plan: Routed to device RN for review. Hany, Device Specialist. ADD: Possible A.fib noted, not on OAC.         TTE 3/14/24  The visual ejection fraction is 55-60%. Focal area of mid septal hypokinesis.  Normal RV size with mildly decreased right ventricular systolic function  There is mild (1+) mitral regurgitation. The mitral regurgitant jet is  posteriorly directed, which is consistent with anterior leaflet pathology.  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), managed as type 1 (H)     Nicotine Dependence     Hyperthyroidism     Migraine without aura and without status migrainosus, not intractable     Knee osteoarthritis     GERD (gastroesophageal reflux disease)     Mood disorder     Spondylisthesis     KP (obstructive sleep apnea)     Paroxysmal atrial fibrillation (H)     COPD (chronic obstructive  pulmonary disease) (H)     Diabetic polyneuropathy (H)     Closed head injury, initial encounter     Complex partial seizure (H)     Encounter for loop recorder check     Revi  e  Physical Examination Review of Systems   w [facility]  There were no vitals taken for this visit.  There is no height or weight on file to calculate BMI.  Wt Readings from Last 3 Encounters:   12/16/24 64.9 kg (143 lb)   05/21/24 67.1 kg (148 lb)   03/14/24 62.6 kg (138 lb 0.1 oz)     General Appearance:   Alert, well-appearing and in no acute distress.   HEENT: Atraumatic, normocephalic.  No scleral icterus, normal conjunctivae; mucous membranes pink and moist.     Chest: Chest symmetric, spine straight.   Lungs:   Respirations unlabored: Lungs are clear to auscultation.   Cardiovascular:   Normal first and second heart sounds with no murmurs, rubs, or gallops.  Regular, regular.   Normal JVD, Trace BLE edema.       Extremities: No cyanosis or clubbing   Musculoskeletal: Moves all extremities   Skin: Warm, dry, intact.    Neurologic: Mood and affect are appropriate, alert and oriented to person, place, time, and situation. Mild unsteady gait noted.      ROS: 10 point ROS neg other than the symptoms noted above in the HPI.     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), managed as type 1 (H)     Created by Conversion        Mood disorder 11/24/2014     Multiple closed fractures of facial bone, initial encounter (H)      KP (obstructive sleep apnea) 05/15/2015     Paroxysmal atrial fibrillation (H) 09/22/2021      Small bowel obstruction (H) 09/18/2021     Status post total knee replacement 11/24/2014    Past Surgical History:   Procedure Laterality Date     APPENDECTOMY      at age 3     ARTHROSCOPY KNEE WITH MENISCECTOMY  3/10/14    partial medial and lateral meniscectomies, subpatellar chondroplasty     BACK SURGERY       BLEPHAROPLASTY Bilateral 8/17/2015    Procedure: BLEPHAROPLASTY BILATERAL UPPER LIDS;  Surgeon: Chasidy Bryant MD;  Location: [facility];  Service:      BOWEL RESECTION  12 years ago     COLON SURGERY  2004    sigmoid colectomy     DILATION AND CURETTAGE      x3     EP LOOP RECORDER IMPLANT N/A 8/21/2023    Procedure: Loop Recorder Implant;  Surgeon: Nolvia Ruiz MD;  Location: [facility] CV     HYSTERECTOMY      age 39     IR LUMBAR PUNCTURE  1/22/2022     JOINT REPLACEMENT       OOPHORECTOMY       OTHER SURGICAL HISTORY  2005    bowel obstruction     PICC TRIPLE LUMEN PLACEMENT  9/19/2021          PICC TRIPLE LUMEN PLACEMENT  1/22/2022          IN ARTHRODESIS ANT INTERBODY MIN DISCECTOMY,LUMBAR N/A 1/30/2015    Procedure: ANTERIOR LUMBAR INTERBODY FUSION L4-5, INTERBODY GRAFT, BMP INSTRUMENTATION;  Surgeon: Zeeshan Guillaume MD;  Location: [facility];  Service: Spine     TOE SURGERY       TONSILLECTOMY & ADENOIDECTOMY      age 10     ZZC REMOVAL OF OVARY(S)      Description: Oophorectomy;  Recorded: 06/03/2010;     ZC REMOVAL OF OVARY(S)      Description: Oophorectomy;  Recorded: 06/03/2010;     [facility] TOTAL ABDOM HYSTERECTOMY      Description: Hysterectomy;  Recorded: 06/03/2010;     ZZC TOTAL ABDOM HYSTERECTOMY      Description: Hysterectomy;  Recorded: 06/03/2010;     C TOTAL KNEE ARTHROPLASTY Right 11/24/2014    Procedure: RIGHT TOTAL KNEE ARTHROPLASTY;  Surgeon: Jules Harris MD;  Location: [facility];  Service: Orthopedics    Family History   Problem Relation Age of Onset     Breast Cancer No family hx of     History   Smoking Status     Every Day      Types: Cigarettes   Smokeless Tobacco     Never     Social History    Substance and Sexual Activity      Alcohol use: No       Medications  Allergies     Current Outpatient Medications   Medication Sig Dispense Refill     acetaminophen (TYLENOL) 500 MG tablet Take 1,000 mg by mouth every 6 hours as needed for pain       albuterol (PROAIR HFA/PROVENTIL HFA/VENTOLIN HFA) 108 (90 Base) MCG/ACT inhaler Inhale 2 puffs into the lungs every 6 hours as needed for shortness of breath, wheezing or cough       aspirin 81 MG EC tablet Take 81 mg by mouth daily       cephALEXin (KEFLEX) 250 MG capsule Take 250 mg by mouth daily       cetirizine (ZYRTEC) 10 MG tablet Take 10 mg by mouth daily as needed for allergies       cholecalciferol (VITAMIN D3) 25 mcg (1000 units) capsule Take 1,000 Units by mouth daily       cloNIDine (CATAPRES-TTS3) 0.3 MG/24HR WK patch Place 1 patch onto the skin once a week (Sunday)       Continuous Blood Gluc  (DEXCOM G6 ) TORRIE Use to read blood sugars as per 's instructions. 1 each 0     Continuous Glucose Sensor (DEXCOM G6 SENSOR) MISC CHANGE EVERY 10 DAYS. 9 each 0     Continuous Glucose Transmitter (DEXCOM G6 TRANSMITTER) MISC CHANGE EVERY 3 MONTHS. 1 each 0     diclofenac sodium (VOLTAREN) 1 % Gel Apply 1 g topically daily as needed (pain)       DULoxetine (CYMBALTA) 60 MG capsule Take 60 mg by mouth daily       escitalopram (LEXAPRO) 20 MG tablet Take 20 mg by mouth daily       estradiol (ESTRACE) 1 MG tablet Take 1 mg by mouth daily       fluticasone (FLONASE) 50 MCG/ACT nasal spray Spray 1 spray into both nostrils daily as needed for rhinitis or allergies       galcanezumab-gnlm (EMGALITY) 120 MG/ML injection Inject 1 mL (120 mg) Subcutaneous every 28 days 1 mL 11     GEMTESA 75 MG TABS tablet Take 75 mg by mouth daily       Glucagon (GVOKE HYPOPEN) 1 MG/0.2ML pen Inject 0.2 mLs (1 mg) into the muscle as needed for low blood sugar 0.4 mL 1     glucose (BD GLUCOSE) 5  "g chewable tablet Take 2 tablets (10 g) by mouth daily as needed (hypoglycemia) 300 tablet 3     insulin aspart (NOVOLOG VIAL) 100 UNITS/ML vial Inject 30 Units Subcutaneous daily 30 mL 0     insulin detemir (LEVEMIR VIAL) 100 UNIT/ML vial Inject 24 Units Subcutaneous 2 times daily Only with very high sugars (Patient not taking: Reported on 7/31/2024)       Insulin Disposable Pump (OMNIPOD 5 G6 INTRO, GEN 5,) KIT 1 each daily 1 kit 1     Insulin Disposable Pump (OMNIPOD 5 PODS, GEN 5,) MISC 1 EACH EVERY 3 DAYS 10 each 2     levETIRAcetam (KEPPRA) 500 MG tablet TAKE 1 TABLET BY MOUTH TWICE A  tablet 0     liraglutide (VICTOZA PEN) 18 MG/3ML solution Inject 1.2 mg subcutaneously daily       magnesium oxide (MAG-OX) 400 MG tablet Take 400 mg by mouth daily       medical cannabis (Patient's own supply) 1 Dose by Other route See Admin Instructions [employer] Vape- 1-4 puffs HS PRN       metFORMIN (GLUCOPHAGE XR) 500 MG 24 hr tablet TAKE 2 TABS (1000MG) BY MOUTH TWICE DAILY WITH MEALS 360 tablet 0     metoprolol succinate ER (TOPROL-XL) 100 MG 24 hr tablet Take 100 mg by mouth 2 times daily       Multiple Vitamins-Minerals (MULTIVITAMIN WOMEN 50+) TABS Take 1 tablet by mouth daily       nystatin (MYCOSTATIN) 288235 UNIT/GM external powder Apply topically 2 times daily as needed       olmesartan (BENICAR) 40 MG tablet Take 40 mg by mouth daily (Patient not taking: Reported on 7/31/2024)       omeprazole (PRILOSEC) 20 MG capsule Take 1 capsule by mouth every morning       pregabalin (LYRICA) 100 MG capsule Take 1 capsule (100 mg) by mouth 3 times daily 90 capsule 0     rosuvastatin (CRESTOR) 5 MG tablet TAKE 1 TABLET BY MOUTH EVERY DAY 90 tablet 1     tiZANidine (ZANAFLEX) 2 MG tablet Take 4 mg by mouth nightly as needed for muscle spasms       valACYclovir (VALTREX) 500 MG tablet Take 500 mg by mouth daily        Allergies   Allergen Reactions     Morphine Other (See Comments)     \"make me delirious,\"  " "\"nightmares\"     Nitrofurantoin      Adhesive [Cyanoacrylate] Other (See Comments)     Can only tolerate tape for short periods of time shelia in sensitive areas     Amlodipine Unknown and Other (See Comments)     Other reaction(s): delerium     Doxazosin Other (See Comments)     Other reaction(s): feels foggy     Irbesartan-Hydrochlorothiazide Other (See Comments)     Other reaction(s): hyponatremia     Other Allergy (See Comments) [External Allergen Needs Reconciliation - See Comment] Unknown     Other reaction(s): skin rash     Prednisone Unknown     Caused extremely high blood sugars     Tramadol Other (See Comments)     Possible seizure activity     Trazodone Unknown     Other reaction(s): hung over      Medical, surgical, family, social history, and medications were all reviewed and updated as necessary.   Lab Results    Chemistry/lipid CBC Cardiac Enzymes/BNP/TSH/INR   Recent Labs   Lab Test 05/10/24  1645   CHOL 130   HDL 59   LDL 40   TRIG 153*     Recent Labs   Lab Test 05/10/24  1645 07/25/23  1400 09/16/22  1541   LDL 40 43 39     Recent Labs   Lab Test 12/16/24 2037 12/16/24 1945 12/16/24 1939   NA  --   --  140   POTASSIUM  --   --  4.3   CHLORIDE  --   --  102   CO2  --   --  27   GLC 83   < > 68*   BUN  --   --  18.9   CR  --   --  0.76   GFRESTIMATED  --   --  81   BRUNO  --   --  9.5    < > = values in this interval not displayed.     Recent Labs   Lab Test 12/16/24 1939 08/27/24  1316 03/15/24  0901   CR 0.76 0.79 0.74     Recent Labs   Lab Test 05/10/24  1645 03/14/24  1149 07/25/23  1400   A1C 6.8* 7.5* 7.4*          Recent Labs   Lab Test 12/16/24 1939   WBC 14.0*   HGB 12.5   HCT 38.4   MCV 85        Recent Labs   Lab Test 12/16/24  1939 03/15/24  0901 03/14/24  1149   HGB 12.5 12.2 13.0    Recent Labs   Lab Test 01/20/22  1158 09/19/21  0211 09/17/21  2240   TROPONINI <0.01 0.06 0.01     Recent Labs   Lab Test 03/13/24  1532   NTBNPI 360     Recent Labs   Lab Test 05/10/24  1645   TSH " 3.67     Recent Labs   Lab Test 03/13/24  1532 08/17/23  1208   INR 1.03 1.06          Total Time- 40 minutes spent on date of encounter doing chart review, history and exam, documentation and further activities as noted above.  This note has been dictated using voice recognition software. Any grammatical, typographical, or context distortions are unintentional and inherent to the software.    Elizabet Blanchard CNP  [facility]3]  [phone]                         Thank you for allowing me to participate in the care of your patient.      Sincerely,     Elizabet Blanchard NP     [facility]  cc:   Matt Reynolds MD  [address]  [address]  [place] [address]

## 2025-01-28 NOTE — PATIENT INSTRUCTIONS
Paige Mari,    It was a pleasure to see you today at the [facility].     My recommendations after this visit include:    -continue metoprolol  mg daily  -start Eliquis 5 mg every 12 hours for stroke prevention  -next ILR check is scheduled for May, we will continue to monitor the progression of your Afib through your device checks every 3 months. Monitor for any changes in your symptoms.   -I will see you back in clinic in 1 year, sooner if you have any significant symptomatic changes.    -stop Aspirin 81 mg daily once you start your Eliquis, no need to take Eliquis with food, you can if you prefer to.       Elizabet Blanchard CNP  [facility], Electrophysiology  [phone]  EP nurses [phone]

## 2025-01-28 NOTE — TELEPHONE ENCOUNTER
Patients daughter Merline notified Hardy is ready for  at the [facility]

## 2025-01-30 ENCOUNTER — TELEPHONE (OUTPATIENT)
Dept: CARDIOLOGY | Facility: CLINIC | Age: 75
End: 2025-01-30
Payer: COMMERCIAL

## 2025-01-30 NOTE — TELEPHONE ENCOUNTER
Phone call from patient who has had 3 doses of Eliquis since she met with Elizabet Blanchard on 1-28-25.  She started yesterday morning and has felt sick to her stomach ever since.  She took a dose yesterday morning and noted symptoms shortly after.  She states its worse today and she feels that she may throw up.     Discussed possibly trialing a little bit longer to see if her symptoms will subside vs sending Elizabet a message today.  She states she will take again tonight and tomorrow am and will call tomorrow afternoon with an update.  Reviewed contact information.

## 2025-02-09 DIAGNOSIS — E78.2 MIXED HYPERLIPIDEMIA: ICD-10-CM

## 2025-02-09 DIAGNOSIS — G40.209 COMPLEX PARTIAL SEIZURE (H): ICD-10-CM

## 2025-02-09 DIAGNOSIS — E10.65 TYPE 1 DIABETES MELLITUS WITH HYPERGLYCEMIA (H): ICD-10-CM

## 2025-02-10 RX ORDER — LEVETIRACETAM 500 MG/1
500 TABLET ORAL 2 TIMES DAILY
Qty: 60 TABLET | Refills: 0 | Status: SHIPPED | OUTPATIENT
Start: 2025-02-10

## 2025-02-10 NOTE — TELEPHONE ENCOUNTER
Refill request for: levetiracetam 500mg   Directions: TAKE 1 TABLET BY MOUTH TWICE A DAY     LOV: 02/07/24  NOV: Due for follow up appt. [employer] message was already sent to pt reminding to schedule. Still no appt made.    30 day supply with 0 refills Medication T'd for review and signature    Helena López LPN on 2/10/2025 at 11:20 AM

## 2025-02-11 ENCOUNTER — MEDICAL CORRESPONDENCE (OUTPATIENT)
Dept: HEALTH INFORMATION MANAGEMENT | Facility: CLINIC | Age: 75
End: 2025-02-11
Payer: COMMERCIAL

## 2025-02-11 RX ORDER — METFORMIN HYDROCHLORIDE 500 MG/1
TABLET, EXTENDED RELEASE ORAL
Qty: 360 TABLET | Refills: 0 | OUTPATIENT
Start: 2025-02-11

## 2025-02-11 RX ORDER — ROSUVASTATIN CALCIUM 5 MG/1
TABLET, COATED ORAL
Qty: 90 TABLET | Refills: 1 | OUTPATIENT
Start: 2025-02-11

## 2025-02-13 ENCOUNTER — TELEPHONE (OUTPATIENT)
Dept: ENDOCRINOLOGY | Facility: CLINIC | Age: 75
End: 2025-02-13
Payer: COMMERCIAL

## 2025-02-13 DIAGNOSIS — E10.65 TYPE 1 DIABETES MELLITUS WITH HYPERGLYCEMIA (H): ICD-10-CM

## 2025-02-13 RX ORDER — PROCHLORPERAZINE 25 MG/1
SUPPOSITORY RECTAL
Qty: 3 EACH | Refills: 0 | Status: SHIPPED | OUTPATIENT
Start: 2025-02-13

## 2025-02-23 DIAGNOSIS — G40.209 COMPLEX PARTIAL SEIZURE (H): ICD-10-CM

## 2025-02-24 NOTE — PROGRESS NOTES
Subjective:    Established patient    Paige Mrai is a 74 year old female who presents for DM.      Current DM therapy:  -Metformin XR 1000 mg BID    -Victoza 1.2 mg daily - generally well tolerated   -Omnipod 5 with NovoLog U-100    UTI 12/2024.     Objective:    BMI 27.9 kg/m2, /81    5/2024: BMI 27 kg/m2, /82, DM foot exam today: no ulcerations, reduced DP pulses bilaterally but palpable.     10/2023: BMI 26.2 kg/m2, /78. Pleasant and conversational.    12/2021: DM foot exam performed and she has absent sensation to monofilament testing bilaterally. Reduced pedal pulses. No ulcers/skinbreakdown.    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, DM eye exam 4/2024  # Hypertension, microalbuminuria, was on olmesartan but it was stopped spring of 2024    -2/2025: GFR 79  -5/2024: urine microalbumin undetectable   -2/2025: urine microalbumin 67 mg/g Cr  -3/2022: paired aldosterone 6.4 ng/dL and PRA 0.1 ng/mL/hr  -7/2023: serum aldosterone 4.3 ng/dL, PRA <0.1 ng/mL/hour  # Painful peripheral neuropathy, no prior DM foot ulcerations, on neuropathic pharmacologic therapy  # No prior ASCVD event, CAD, on ASA  # Mixed hyperlipidemia, on rosuvastatin 5 mg daily   -2/2025: , HDL 63, LDL 28,   -She has a prior statin intolerance (myalgias)   # Hepatic steatosis  # Other  -2/2025: B12 normal, MMA pending    -5/2024: Celiac screen negative      Paige notes she was on injectable insulin (unclear the exact issue but she was unable to get pods for a few  weeks) but she restarted her Omnipod 5 last week. She is using a Dexcom G6 but hasn't linked the CGM and controller.    [facility] to assist with this and when linked turn on automated mode. Her pump settings are reasonable (based on weight) and we didn't adjust them today: ICR 1:20, BG target and BG correction threshold both at 130 mg/dL, sensitivity 60 mg/dL, and basal rate delivers 12 units/day.   2.   My team will then send me her pump report in 2 weeks and I'll review.    She'd like to try Ozempic instead of Victoza. Reviewed I intentionally didn't rx Ozempic before because I don't want her losing significant weight, but I'm ok with a low dose and mild weight loss. When I see her pump data in 2 weeks I can issue the rx.    I'll see her back in the summer with labs beforehand (not ordered yet). If I need to see her sooner we can arrange that.      # Thyroid function     In the past has been on both thyroid hormone and methimazole at various points in time. She has not taken a medication for her thyroid in years.      12/2021: TSH, T4, T3 all normal, TSI undetectable, TRAb undetectable, TPO Ab significantly elevated     4/2023: TSH and free T4 normal   2/2025: TSH 4.02 (normal and stable)      Should have her TSH checked yearly, or at any time if symptoms of thyrotoxicosis or hypothyroidism.      # Bone and mineral metabolism    Many normal calcium values over the years, with 2 high serum calcium values (both remote). She had a remote mildly elevated PTH drawn shortly after a mildly elevated calcium value (10.6 with ULN 10.5 mg/dL), but on the day that PTH was drawn serum calcium was normal and serum phosphorous was mildly elevated. Serum calcium value of 12.7 mg/dL when she had an JACKSON in 2014.      Since 12/7/2021: albumin corrected serum calcium peak 10.6 mg/dL (ULN 10.5) and otherwise normal. Serum phosphorous and alkaine phosphatase have been normal. 12/2021: UPEP and SPEP negative       DEXA 11/14/2022:  -BMD at the spine, distal 1/3 radius, and both hips is normal     No prior low impact fractures.     CT A/P 12/2024: 1 mm nonobstructing kidney stone     3/2022: PTH 25 pg/mL, Cr, calcium, albumin, phosphorous all normal     3/2023: serum calcium normal, albumin normal     7/2023: uncorrected serum calcium 10.5 (ULN 10.2 mg/dL), albumin 4.3 g/dL, corrected serum calcium 10.3 mg/dL, 25-OH vitamin D mid normal      Since 7/2023 serum calcium has been normal.     8/2024: 25-OH vitamin D 80 (ULN 50 ng/mL)     2/2025: 25-OH vitamin D level pending     2/25/2025: she takes vitamin D 1000 units daily and a MVI, no calcium supplement     # Partially empty sella    I reviewed her CT head 9/2022 and agree with the radiology read of partially empty sella. No history of pituitary pathology.    CT head 8/17/2023: per radiology - stable partially empty sella morphology, I reviewed the images and agree     CT head 3/13/2024: stable partially empty sella on my review     3/22/2023: afternoon serum cortisol 10.0 mcg/dL, DHEA-S undetectable, TSH and free T4 normal, prolactin normal, IGF-1 normal      34 minutes spent on the date of the encounter doing chart review, history and exam, documentation and further activities as noted above.     The longitudinal plan of care for the diagnosis(es)/condition(s) as documented were addressed during this visit. Due to the added complexity in care, I will continue to support Paige in the subsequent management and with ongoing continuity of care.

## 2025-02-24 NOTE — TELEPHONE ENCOUNTER
Refill request for: levetiracetam 500mg   Directions: Take 1 tablet (500 mg) by mouth 2 times daily     LOV: 02/07/24  NOV: No future appt scheduled. Letter has already been sent to pt.    Pt of Dr. Celis. He is out of the office until 3/2/25. Can you refill in his absence?    14 day supply with 0 refills Medication T'd for review and signature    Helena López LPN on 2/24/2025 at 4:25 PM

## 2025-02-25 ENCOUNTER — OFFICE VISIT (OUTPATIENT)
Dept: ENDOCRINOLOGY | Facility: CLINIC | Age: 75
End: 2025-02-25
Payer: COMMERCIAL

## 2025-02-25 VITALS
DIASTOLIC BLOOD PRESSURE: 81 MMHG | WEIGHT: 152.6 LBS | BODY MASS INDEX: 27.91 KG/M2 | HEART RATE: 79 BPM | OXYGEN SATURATION: 98 % | SYSTOLIC BLOOD PRESSURE: 123 MMHG

## 2025-02-25 DIAGNOSIS — E10.42 TYPE 1 DIABETES MELLITUS WITH DIABETIC POLYNEUROPATHY (H): ICD-10-CM

## 2025-02-25 DIAGNOSIS — I10 HYPERTENSION, UNSPECIFIED TYPE: ICD-10-CM

## 2025-02-25 DIAGNOSIS — R94.6 ABNORMAL FINDING ON THYROID FUNCTION TEST: ICD-10-CM

## 2025-02-25 DIAGNOSIS — E78.2 MIXED HYPERLIPIDEMIA: ICD-10-CM

## 2025-02-25 DIAGNOSIS — E23.6 EMPTY SELLA: ICD-10-CM

## 2025-02-25 DIAGNOSIS — E10.65 TYPE 1 DIABETES MELLITUS WITH HYPERGLYCEMIA (H): Primary | ICD-10-CM

## 2025-02-25 DIAGNOSIS — Z79.4 LONG TERM (CURRENT) USE OF INSULIN (H): ICD-10-CM

## 2025-02-25 DIAGNOSIS — I65.23 CAROTID ATHEROSCLEROSIS, BILATERAL: ICD-10-CM

## 2025-02-25 DIAGNOSIS — E88.819 INSULIN RESISTANCE: ICD-10-CM

## 2025-02-25 DIAGNOSIS — K76.0 HEPATIC STEATOSIS: ICD-10-CM

## 2025-02-25 DIAGNOSIS — E66.3 OVERWEIGHT (BMI 25.0-29.9): ICD-10-CM

## 2025-02-25 DIAGNOSIS — Z96.41 INSULIN PUMP STATUS: ICD-10-CM

## 2025-02-25 RX ORDER — LEVETIRACETAM 500 MG/1
500 TABLET ORAL 2 TIMES DAILY
Qty: 28 TABLET | Refills: 0 | Status: SHIPPED | OUTPATIENT
Start: 2025-02-25

## 2025-02-25 NOTE — LETTER
2/25/2025      Paige Mari  [address]  [place] [address]      Dear Colleague,    Thank you for referring your patient, Paige Mari, to the [facility]. Please see a copy of my visit note below.    Subjective:    Established patient    Paige Mari is a 74 year old female who presents for DM.      Current DM therapy:  -Metformin XR 1000 mg BID    -Victoza 1.2 mg daily - generally well tolerated   -Omnipod 5 with NovoLog U-100    UTI 12/2024.     Objective:    BMI 27.9 kg/m2, /81    5/2024: BMI 27 kg/m2, /82, DM foot exam today: no ulcerations, reduced DP pulses bilaterally but palpable.     10/2023: BMI 26.2 kg/m2, /78. Pleasant and conversational.    12/2021: DM foot exam performed and she has absent sensation to monofilament testing bilaterally. Reduced pedal pulses. No ulcers/skinbreakdown.    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, DM eye exam 4/2024  # Hypertension, microalbuminuria, was on olmesartan but it was stopped spring of 2024    -2/2025: GFR 79  -5/2024: urine microalbumin undetectable   -2/2025: urine microalbumin 67 mg/g Cr  -3/2022: paired aldosterone 6.4 ng/dL and PRA 0.1 ng/mL/hr  -7/2023: serum aldosterone 4.3 ng/dL, PRA <0.1 ng/mL/hour  # Painful peripheral neuropathy, no prior DM foot ulcerations, on neuropathic pharmacologic therapy  # No prior ASCVD event, CAD, on ASA  # Mixed hyperlipidemia, on rosuvastatin 5 mg daily   -2/2025: , HDL 63, LDL 28,   -She  has a prior statin intolerance (myalgias)   # Hepatic steatosis  # Other  -2/2025: B12 normal, MMA pending    -5/2024: Celiac screen negative      Paige notes she was on injectable insulin (unclear the exact issue but she was unable to get pods for a few weeks) but she restarted her Omnipod 5 last week. She is using a Dexcom G6 but hasn't linked the CGM and controller.    [facility] to assist with this and when linked turn on automated mode. Her pump settings are reasonable (based on weight) and we didn't adjust them today: ICR 1:20, BG target and BG correction threshold both at 130 mg/dL, sensitivity 60 mg/dL, and basal rate delivers 12 units/day.   2.   My team will then send me her pump report in 2 weeks and I'll review.    She'd like to try Ozempic instead of Victoza. Reviewed I intentionally didn't rx Ozempic before because I don't want her losing significant weight, but I'm ok with a low dose and mild weight loss. When I see her pump data in 2 weeks I can issue the rx.    I'll see her back in the summer with labs beforehand (not ordered yet). If I need to see her sooner we can arrange that.      # Thyroid function     In the past has been on both thyroid hormone and methimazole at various points in time. She has not taken a medication for her thyroid in years.      12/2021: TSH, T4, T3 all normal, TSI undetectable, TRAb undetectable, TPO Ab significantly elevated     4/2023: TSH and free T4 normal   2/2025: TSH 4.02 (normal and stable)      Should have her TSH checked yearly, or at any time if symptoms of thyrotoxicosis or hypothyroidism.      # Bone and mineral metabolism    Many normal calcium values over the years, with 2 high serum calcium values (both remote). She had a remote mildly elevated PTH drawn shortly after a mildly elevated calcium value (10.6 with ULN 10.5 mg/dL), but on the day that PTH was drawn serum calcium was normal and serum phosphorous was mildly elevated. Serum  calcium value of 12.7 mg/dL when she had an JACKSON in 2014.      Since 12/7/2021: albumin corrected serum calcium peak 10.6 mg/dL (ULN 10.5) and otherwise normal. Serum phosphorous and alkaine phosphatase have been normal. 12/2021: UPEP and SPEP negative      DEXA 11/14/2022:  -BMD at the spine, distal 1/3 radius, and both hips is normal     No prior low impact fractures.     CT A/P 12/2024: 1 mm nonobstructing kidney stone     3/2022: PTH 25 pg/mL, Cr, calcium, albumin, phosphorous all normal     3/2023: serum calcium normal, albumin normal     7/2023: uncorrected serum calcium 10.5 (ULN 10.2 mg/dL), albumin 4.3 g/dL, corrected serum calcium 10.3 mg/dL, 25-OH vitamin D mid normal      Since 7/2023 serum calcium has been normal.     8/2024: 25-OH vitamin D 80 (ULN 50 ng/mL)     2/2025: 25-OH vitamin D level pending     2/25/2025: she takes vitamin D 1000 units daily and a MVI, no calcium supplement     # Partially empty sella    I reviewed her CT head 9/2022 and agree with the radiology read of partially empty sella. No history of pituitary pathology.    CT head 8/17/2023: per radiology - stable partially empty sella morphology, I reviewed the images and agree     CT head 3/13/2024: stable partially empty sella on my review     3/22/2023: afternoon serum cortisol 10.0 mcg/dL, DHEA-S undetectable, TSH and free T4 normal, prolactin normal, IGF-1 normal      34 minutes spent on the date of the encounter doing chart review, history and exam, documentation and further activities as noted above.     The longitudinal plan of care for the diagnosis(es)/condition(s) as documented were addressed during this visit. Due to the added complexity in care, I will continue to support Paige in the subsequent management and with ongoing continuity of care.      Again, thank you for allowing me to participate in the care of your patient.        Sincerely,        Maynor Bardales MD    Electronically signed

## 2025-02-27 NOTE — TELEPHONE ENCOUNTER
Diabetes Education Note:     Left voicemail for Paige regarding need to get insulin pump and CGM paired.   Waiting for a response.        Ceci Mason, KAITLYNNN, RN, [facility]  Certified Diabetes Care and   [facility] Endocrinology and Diabetes  [facility]  Phone [phone]  Hours:  Tuesday:       In Clinic and Virtual Visits  8am to 4pm              Wednesday:  In Clinic and Virtual Visits  8am to 4pm               Thursday:     Virtural  Visits only             8am to 4pm

## 2025-02-27 NOTE — TELEPHONE ENCOUNTER
----- Message from Maynor Bardales sent at 2/25/2025  4:25 PM CST -----  Regarding: patient call  Paige has T1D. She has an Omnipod 5 and Dexcom G6. However, she hasn't synchronized her CGM with her pods. She thinks she has the Dexcom transmitter # at home.    I'm hoping she can get a phone call on 2/26 (she'll be home all day) to talk her through linking her CGM with her pump and then switching from manual to automated mode.    Thanks, Lemuel

## 2025-03-09 DIAGNOSIS — E10.65 TYPE 1 DIABETES MELLITUS WITH HYPERGLYCEMIA (H): ICD-10-CM

## 2025-03-10 RX ORDER — METFORMIN HYDROCHLORIDE 500 MG/1
TABLET, EXTENDED RELEASE ORAL
Qty: 360 TABLET | Refills: 0 | Status: SHIPPED | OUTPATIENT
Start: 2025-03-10

## 2025-03-10 NOTE — TELEPHONE ENCOUNTER
Requested Prescriptions   Pending Prescriptions Disp Refills    metFORMIN (GLUCOPHAGE XR) 500 MG 24 hr tablet [Pharmacy Med Name: METFORMIN HCL  MG TABLET] 360 tablet 0     Sig: TAKE 2 TABS (1000MG) BY MOUTH TWICE DAILY WITH MEALS       Biguanide Agents Passed - 3/10/2025  2:06 PM        Passed - Patient is age 10 or older        Passed - Patient has documented A1c within the specified period of time.     If HgbA1C is 8 or greater, it needs to be on file within the past 3 months.  If less than 8, must be on file within the past 6 months.     Recent Labs   Lab Test 02/21/25  1603   A1C 8.6*             Passed - Patient does NOT have a diagnosis of CHF.        Passed - Medication is active on med list and the sig matches. RN to manually verify dose and sig if red X/fail.     If the protocol passes (green check), you do not need to verify med dose and sig.    A prescription matches if they are the same clinical intention.    For Example: once daily and every morning are the same.    The protocol can not identify upper and lower case letters as matching and will fail.     For Example: Take 1 tablet (50 mg) by mouth daily     TAKE 1 TABLET (50 MG) BY MOUTH DAILY    For all fails (red x), verify dose and sig.    If the refill does match what is on file, the RN can still proceed to approve the refill request.       If they do not match, route to the appropriate provider.             Passed - Medication indicated for associated diagnosis     Medication is associated with one or more of the following diagnoses:     Gestational diabetes mellitus     Hyperinsulinar obesity     Hypersecretion of ovarian androgens    Non-alcoholic fatty liver    Polycystic ovarian syndrome               Pre-diabetes (DM 2 prevention)    Type 2 diabetes mellitus     Weight gain, antipsychotic therapy-induced    Impaired fasting glucose          Passed - Has GFR on file in past 12 months and most recent value is normal        Passed -  Recent (6 mo) or future (90 days) visit within the authorizing provider's specialty     The patient must have completed an in-person or virtual visit within the past 6 months or has a future visit scheduled within the next 90 days with the authorizing provider s specialty.  Urgent care and e-visits do not quality as an office visit for this protocol.          Passed - Patient is not pregnant        Passed - Patient has not had a positive pregnancy test within the past 12 mos.

## 2025-03-12 DIAGNOSIS — E10.65 TYPE 1 DIABETES MELLITUS WITH HYPERGLYCEMIA (H): ICD-10-CM

## 2025-03-12 RX ORDER — PROCHLORPERAZINE 25 MG/1
SUPPOSITORY RECTAL
Qty: 1 EACH | Refills: 1 | Status: SHIPPED | OUTPATIENT
Start: 2025-03-12

## 2025-03-12 RX ORDER — PROCHLORPERAZINE 25 MG/1
SUPPOSITORY RECTAL
Qty: 9 EACH | Refills: 1 | Status: SHIPPED | OUTPATIENT
Start: 2025-03-12

## 2025-03-12 NOTE — TELEPHONE ENCOUNTER
Requested Prescriptions   Pending Prescriptions Disp Refills    Continuous Glucose Sensor (DEXCOM G6 SENSOR) MISC [Pharmacy Med Name: DEXCOM G6 SENSOR  MISC]  0     Sig: CHANGE EVERY 10 DAYS.       There is no refill protocol information for this order       Continuous Glucose Transmitter (DEXCOM G6 TRANSMITTER) MISC [Pharmacy Med Name: DEXCOM G6 TRANSMITTER  MISC] 1 each 0     Sig: CHANGE EVERY 3 MONTHS.       Diabetic Supplies Protocol Passed - 3/12/2025 10:43 AM        Passed - Medication is active on med list and the sig matches. RN to manually verify dose and sig if red X/fail.     If the protocol passes (green check), you do not need to verify med dose and sig.    A prescription matches if they are the same clinical intention.    For Example: once daily and every morning are the same.    The protocol can not identify upper and lower case letters as matching and will fail.     For Example: Take 1 tablet (50 mg) by mouth daily     TAKE 1 TABLET (50 MG) BY MOUTH DAILY    For all fails (red x), verify dose and sig.    If the refill does match what is on file, the RN can still proceed to approve the refill request.       If they do not match, route to the appropriate provider.             Passed - Recent (12 month) or future (90 days) visit with authorizing provider s specialty     The patient must have completed an in-person or virtual visit within the past 12 months or has a future visit scheduled within the next 90 days with the authorizing provider s specialty.  Urgent care and e-visits do not qualify as an office visit for this protocol.          Passed - Medication indicated for associated diagnosis        Passed - Patient is 18 years of age or older

## 2025-03-18 ENCOUNTER — ANCILLARY PROCEDURE (OUTPATIENT)
Dept: CARDIOLOGY | Facility: CLINIC | Age: 75
End: 2025-03-18
Attending: INTERNAL MEDICINE
Payer: COMMERCIAL

## 2025-03-18 ENCOUNTER — DOCUMENTATION ONLY (OUTPATIENT)
Dept: CARDIOLOGY | Facility: CLINIC | Age: 75
End: 2025-03-18
Payer: COMMERCIAL

## 2025-03-18 DIAGNOSIS — E10.9 DIABETES MELLITUS TYPE 1 (H): ICD-10-CM

## 2025-03-18 DIAGNOSIS — Z45.09 ENCOUNTER FOR LOOP RECORDER CHECK: ICD-10-CM

## 2025-03-18 DIAGNOSIS — R55 SYNCOPE: ICD-10-CM

## 2025-03-18 LAB
MDC_IDC_EPISODE_DTM: NORMAL
MDC_IDC_EPISODE_DTM: NORMAL
MDC_IDC_EPISODE_DURATION: 360 S
MDC_IDC_EPISODE_ID: NORMAL
MDC_IDC_EPISODE_ID: NORMAL
MDC_IDC_EPISODE_TYPE: NORMAL
MDC_IDC_EPISODE_TYPE: NORMAL
MDC_IDC_EPISODE_VENDOR_TYPE: NORMAL
MDC_IDC_MSMT_BATTERY_DTM: NORMAL
MDC_IDC_MSMT_BATTERY_STATUS: NORMAL
MDC_IDC_PG_IMPLANT_DTM: NORMAL
MDC_IDC_PG_MFG: NORMAL
MDC_IDC_PG_MODEL: NORMAL
MDC_IDC_PG_SERIAL: NORMAL
MDC_IDC_PG_TYPE: NORMAL
MDC_IDC_SESS_CLINIC_NAME: NORMAL
MDC_IDC_SESS_DTM: NORMAL
MDC_IDC_SESS_TYPE: NORMAL
MDC_IDC_STAT_AT_BURDEN_PERCENT: 1 %
MDC_IDC_STAT_AT_DTM_END: NORMAL
MDC_IDC_STAT_AT_DTM_START: NORMAL

## 2025-03-18 NOTE — PROGRESS NOTES
3/18/2025: Afib episodes 6-14 minutes, Patient is now on Eliquis. Can we change alert parameters to monitor time in Afib and HR > 100-120 bpm??  Georgie Pulido RN on 3/18/2025 at 8:15 AM      Type: Alert remote loop recorder transmission for AF episode.   Device: [employer]SC[employer] LUX-Dx ICM.  Presenting rhythm: Sinus  bpm.   Battery status: OK  Arrhythmias: since 1/24/25; One AF episode on 3/17/25 duration 6 minutes, recording appears to be AFib at times, difficult to determine if NSR with ectopy. P waves difficult to visualize.  Plan: Routed to device RN for review. MALGORZATA Gottlieb, Device Specialist. Add: Remote reviewed. Per Epic, pt is on Eliquis. Continue to monitor. Georgie Pulido, Device RN

## 2025-03-19 RX ORDER — INSULIN PMP CART,AUT,G6/7,CNTR
EACH SUBCUTANEOUS
Qty: 30 EACH | Refills: 0 | Status: SHIPPED | OUTPATIENT
Start: 2025-03-19

## 2025-03-19 NOTE — TELEPHONE ENCOUNTER
Requested Prescriptions   Pending Prescriptions Disp Refills    Insulin Disposable Pump (OMNIPOD 5 PODS (GEN 5)) MISC [Pharmacy Med Name: OMNIPOD 5 [id number] PODS (GEN 5)] 10 each 0     Sig: USE AS DIRECTED AND CHANGE EVERY 3 DAYS       There is no refill protocol information for this order

## 2025-03-21 PROCEDURE — 93298 REM INTERROG DEV EVAL SCRMS: CPT | Performed by: INTERNAL MEDICINE

## 2025-03-25 ENCOUNTER — HOSPITAL ENCOUNTER (INPATIENT)
Facility: CLINIC | Age: 75
End: 2025-03-25
Attending: SURGERY | Admitting: SURGERY
Payer: COMMERCIAL

## 2025-03-25 ENCOUNTER — APPOINTMENT (OUTPATIENT)
Dept: CT IMAGING | Facility: HOSPITAL | Age: 75
End: 2025-03-25
Attending: STUDENT IN AN ORGANIZED HEALTH CARE EDUCATION/TRAINING PROGRAM
Payer: COMMERCIAL

## 2025-03-25 ENCOUNTER — APPOINTMENT (OUTPATIENT)
Dept: RADIOLOGY | Facility: HOSPITAL | Age: 75
End: 2025-03-25
Attending: STUDENT IN AN ORGANIZED HEALTH CARE EDUCATION/TRAINING PROGRAM
Payer: COMMERCIAL

## 2025-03-25 ENCOUNTER — HOSPITAL ENCOUNTER (EMERGENCY)
Facility: HOSPITAL | Age: 75
Discharge: ANOTHER HOSPITAL | End: 2025-03-25
Attending: EMERGENCY MEDICINE | Admitting: EMERGENCY MEDICINE
Payer: COMMERCIAL

## 2025-03-25 ENCOUNTER — ANCILLARY PROCEDURE (OUTPATIENT)
Dept: ULTRASOUND IMAGING | Facility: HOSPITAL | Age: 75
End: 2025-03-25
Attending: STUDENT IN AN ORGANIZED HEALTH CARE EDUCATION/TRAINING PROGRAM
Payer: COMMERCIAL

## 2025-03-25 VITALS
OXYGEN SATURATION: 96 % | RESPIRATION RATE: 29 BRPM | WEIGHT: 146.7 LBS | DIASTOLIC BLOOD PRESSURE: 79 MMHG | SYSTOLIC BLOOD PRESSURE: 155 MMHG | BODY MASS INDEX: 24.44 KG/M2 | TEMPERATURE: 97.9 F | HEIGHT: 65 IN | HEART RATE: 86 BPM

## 2025-03-25 DIAGNOSIS — E11.10 DIABETIC KETOACIDOSIS WITHOUT COMA ASSOCIATED WITH TYPE 2 DIABETES MELLITUS (H): ICD-10-CM

## 2025-03-25 DIAGNOSIS — S09.90XA CLOSED HEAD INJURY, INITIAL ENCOUNTER: ICD-10-CM

## 2025-03-25 DIAGNOSIS — E10.65 TYPE 1 DIABETES MELLITUS WITH HYPERGLYCEMIA (H): ICD-10-CM

## 2025-03-25 DIAGNOSIS — E87.20 LACTIC ACIDOSIS: ICD-10-CM

## 2025-03-25 DIAGNOSIS — E13.10 DIABETIC KETOACIDOSIS WITHOUT COMA ASSOCIATED WITH OTHER SPECIFIED DIABETES MELLITUS (H): ICD-10-CM

## 2025-03-25 DIAGNOSIS — E10.10 DIABETIC KETOACIDOSIS WITHOUT COMA ASSOCIATED WITH TYPE 1 DIABETES MELLITUS (H): Primary | ICD-10-CM

## 2025-03-25 DIAGNOSIS — I16.1 HYPERTENSIVE EMERGENCY: ICD-10-CM

## 2025-03-25 LAB
ALBUMIN UR-MCNC: 100 MG/DL
ANION GAP SERPL CALCULATED.3IONS-SCNC: 21 MMOL/L (ref 7–15)
ANION GAP SERPL CALCULATED.3IONS-SCNC: 29 MMOL/L (ref 7–15)
APPEARANCE UR: CLEAR
B-OH-BUTYR SERPL-SCNC: 5.62 MMOL/L
BASE EXCESS BLDV CALC-SCNC: -10.9 MMOL/L (ref -3–3)
BASOPHILS # BLD AUTO: 0.1 10E3/UL (ref 0–0.2)
BASOPHILS NFR BLD AUTO: 0 %
BILIRUB UR QL STRIP: NEGATIVE
BUN SERPL-MCNC: 21.6 MG/DL (ref 8–23)
BUN SERPL-MCNC: 25.7 MG/DL (ref 8–23)
CALCIUM SERPL-MCNC: 10.8 MG/DL (ref 8.8–10.4)
CALCIUM SERPL-MCNC: 9.6 MG/DL (ref 8.8–10.4)
CHLORIDE SERPL-SCNC: 91 MMOL/L (ref 98–107)
CHLORIDE SERPL-SCNC: 99 MMOL/L (ref 98–107)
COLOR UR AUTO: COLORLESS
CREAT SERPL-MCNC: 0.76 MG/DL (ref 0.51–0.95)
CREAT SERPL-MCNC: 0.84 MG/DL (ref 0.51–0.95)
EGFRCR SERPLBLD CKD-EPI 2021: 72 ML/MIN/1.73M2
EGFRCR SERPLBLD CKD-EPI 2021: 81 ML/MIN/1.73M2
EOSINOPHIL # BLD AUTO: 0 10E3/UL (ref 0–0.7)
EOSINOPHIL NFR BLD AUTO: 0 %
ERYTHROCYTE [DISTWIDTH] IN BLOOD BY AUTOMATED COUNT: 14.6 % (ref 10–15)
EST. AVERAGE GLUCOSE BLD GHB EST-MCNC: 237 MG/DL
GLUCOSE BLDC GLUCOMTR-MCNC: 387 MG/DL (ref 70–99)
GLUCOSE BLDC GLUCOMTR-MCNC: 390 MG/DL (ref 70–99)
GLUCOSE BLDC GLUCOMTR-MCNC: 424 MG/DL (ref 70–99)
GLUCOSE BLDC GLUCOMTR-MCNC: 595 MG/DL (ref 70–99)
GLUCOSE SERPL-MCNC: 389 MG/DL (ref 70–99)
GLUCOSE SERPL-MCNC: 569 MG/DL (ref 70–99)
GLUCOSE UR STRIP-MCNC: >1000 MG/DL
HBA1C MFR BLD: 9.9 %
HCO3 BLDV-SCNC: 17 MMOL/L (ref 21–28)
HCO3 SERPL-SCNC: 15 MMOL/L (ref 22–29)
HCO3 SERPL-SCNC: 16 MMOL/L (ref 22–29)
HCT VFR BLD AUTO: 40.6 % (ref 35–47)
HGB BLD-MCNC: 13.4 G/DL (ref 11.7–15.7)
HGB UR QL STRIP: ABNORMAL
HOLD SPECIMEN: NORMAL
HYALINE CASTS: 3 /LPF
IMM GRANULOCYTES # BLD: 0.1 10E3/UL
IMM GRANULOCYTES NFR BLD: 1 %
KETONES UR STRIP-MCNC: 100 MG/DL
LACTATE SERPL-SCNC: 3.5 MMOL/L (ref 0.7–2)
LACTATE SERPL-SCNC: 4.8 MMOL/L (ref 0.7–2)
LACTATE SERPL-SCNC: 5 MMOL/L (ref 0.7–2)
LEUKOCYTE ESTERASE UR QL STRIP: NEGATIVE
LYMPHOCYTES # BLD AUTO: 2.2 10E3/UL (ref 0.8–5.3)
LYMPHOCYTES NFR BLD AUTO: 11 %
MCH RBC QN AUTO: 27.5 PG (ref 26.5–33)
MCHC RBC AUTO-ENTMCNC: 33 G/DL (ref 31.5–36.5)
MCV RBC AUTO: 83 FL (ref 78–100)
MONOCYTES # BLD AUTO: 0.6 10E3/UL (ref 0–1.3)
MONOCYTES NFR BLD AUTO: 3 %
NEUTROPHILS # BLD AUTO: 17.3 10E3/UL (ref 1.6–8.3)
NEUTROPHILS NFR BLD AUTO: 85 %
NITRATE UR QL: NEGATIVE
NRBC # BLD AUTO: 0 10E3/UL
NRBC BLD AUTO-RTO: 0 /100
NT-PROBNP SERPL-MCNC: 2206 PG/ML (ref 0–900)
O2/TOTAL GAS SETTING VFR VENT: 21 %
OXYHGB MFR BLDV: 39 % (ref 70–75)
PCO2 BLDV: 45 MM HG (ref 40–50)
PH BLDV: 7.19 [PH] (ref 7.32–7.43)
PH UR STRIP: 5.5 [PH] (ref 5–7)
PLATELET # BLD AUTO: 412 10E3/UL (ref 150–450)
PO2 BLDV: 31 MM HG (ref 25–47)
POTASSIUM SERPL-SCNC: 3.8 MMOL/L (ref 3.4–5.3)
POTASSIUM SERPL-SCNC: 4.8 MMOL/L (ref 3.4–5.3)
RBC # BLD AUTO: 4.88 10E6/UL (ref 3.8–5.2)
RBC URINE: 1 /HPF
SAO2 % BLDV: 39.5 % (ref 70–75)
SODIUM SERPL-SCNC: 135 MMOL/L (ref 135–145)
SODIUM SERPL-SCNC: 136 MMOL/L (ref 135–145)
SP GR UR STRIP: 1.02 (ref 1–1.03)
SQUAMOUS EPITHELIAL: <1 /HPF
TROPONIN T SERPL HS-MCNC: 27 NG/L
TROPONIN T SERPL HS-MCNC: 28 NG/L
TSH SERPL DL<=0.005 MIU/L-ACNC: 1.28 UIU/ML (ref 0.3–4.2)
UROBILINOGEN UR STRIP-MCNC: NORMAL MG/DL
WBC # BLD AUTO: 20.3 10E3/UL (ref 4–11)
WBC URINE: <1 /HPF

## 2025-03-25 PROCEDURE — 93005 ELECTROCARDIOGRAM TRACING: CPT | Performed by: STUDENT IN AN ORGANIZED HEALTH CARE EDUCATION/TRAINING PROGRAM

## 2025-03-25 PROCEDURE — 70450 CT HEAD/BRAIN W/O DYE: CPT

## 2025-03-25 PROCEDURE — 250N000011 HC RX IP 250 OP 636: Performed by: STUDENT IN AN ORGANIZED HEALTH CARE EDUCATION/TRAINING PROGRAM

## 2025-03-25 PROCEDURE — 83036 HEMOGLOBIN GLYCOSYLATED A1C: CPT | Performed by: STUDENT IN AN ORGANIZED HEALTH CARE EDUCATION/TRAINING PROGRAM

## 2025-03-25 PROCEDURE — 93005 ELECTROCARDIOGRAM TRACING: CPT

## 2025-03-25 PROCEDURE — 250N000009 HC RX 250: Performed by: STUDENT IN AN ORGANIZED HEALTH CARE EDUCATION/TRAINING PROGRAM

## 2025-03-25 PROCEDURE — 84443 ASSAY THYROID STIM HORMONE: CPT | Performed by: STUDENT IN AN ORGANIZED HEALTH CARE EDUCATION/TRAINING PROGRAM

## 2025-03-25 PROCEDURE — 83880 ASSAY OF NATRIURETIC PEPTIDE: CPT | Performed by: STUDENT IN AN ORGANIZED HEALTH CARE EDUCATION/TRAINING PROGRAM

## 2025-03-25 PROCEDURE — 82565 ASSAY OF CREATININE: CPT | Performed by: STUDENT IN AN ORGANIZED HEALTH CARE EDUCATION/TRAINING PROGRAM

## 2025-03-25 PROCEDURE — 99292 CRITICAL CARE ADDL 30 MIN: CPT

## 2025-03-25 PROCEDURE — 96361 HYDRATE IV INFUSION ADD-ON: CPT

## 2025-03-25 PROCEDURE — 84484 ASSAY OF TROPONIN QUANT: CPT | Performed by: STUDENT IN AN ORGANIZED HEALTH CARE EDUCATION/TRAINING PROGRAM

## 2025-03-25 PROCEDURE — 96365 THER/PROPH/DIAG IV INF INIT: CPT | Mod: 59

## 2025-03-25 PROCEDURE — 96375 TX/PRO/DX INJ NEW DRUG ADDON: CPT

## 2025-03-25 PROCEDURE — 99222 1ST HOSP IP/OBS MODERATE 55: CPT

## 2025-03-25 PROCEDURE — 250N000013 HC RX MED GY IP 250 OP 250 PS 637: Performed by: STUDENT IN AN ORGANIZED HEALTH CARE EDUCATION/TRAINING PROGRAM

## 2025-03-25 PROCEDURE — 200N000002 HC R&B ICU UMMC

## 2025-03-25 PROCEDURE — 71046 X-RAY EXAM CHEST 2 VIEWS: CPT

## 2025-03-25 PROCEDURE — 272N000278 HC DEVICE 5FR SECURACATH

## 2025-03-25 PROCEDURE — 81001 URINALYSIS AUTO W/SCOPE: CPT | Performed by: STUDENT IN AN ORGANIZED HEALTH CARE EDUCATION/TRAINING PROGRAM

## 2025-03-25 PROCEDURE — 80048 BASIC METABOLIC PNL TOTAL CA: CPT | Performed by: STUDENT IN AN ORGANIZED HEALTH CARE EDUCATION/TRAINING PROGRAM

## 2025-03-25 PROCEDURE — 82805 BLOOD GASES W/O2 SATURATION: CPT | Performed by: STUDENT IN AN ORGANIZED HEALTH CARE EDUCATION/TRAINING PROGRAM

## 2025-03-25 PROCEDURE — 36415 COLL VENOUS BLD VENIPUNCTURE: CPT | Performed by: STUDENT IN AN ORGANIZED HEALTH CARE EDUCATION/TRAINING PROGRAM

## 2025-03-25 PROCEDURE — 82010 KETONE BODYS QUAN: CPT | Performed by: STUDENT IN AN ORGANIZED HEALTH CARE EDUCATION/TRAINING PROGRAM

## 2025-03-25 PROCEDURE — 82962 GLUCOSE BLOOD TEST: CPT

## 2025-03-25 PROCEDURE — 82435 ASSAY OF BLOOD CHLORIDE: CPT | Performed by: STUDENT IN AN ORGANIZED HEALTH CARE EDUCATION/TRAINING PROGRAM

## 2025-03-25 PROCEDURE — 85025 COMPLETE CBC W/AUTO DIFF WBC: CPT | Performed by: STUDENT IN AN ORGANIZED HEALTH CARE EDUCATION/TRAINING PROGRAM

## 2025-03-25 PROCEDURE — 36569 INSJ PICC 5 YR+ W/O IMAGING: CPT

## 2025-03-25 PROCEDURE — 93308 TTE F-UP OR LMTD: CPT

## 2025-03-25 PROCEDURE — 83605 ASSAY OF LACTIC ACID: CPT | Performed by: STUDENT IN AN ORGANIZED HEALTH CARE EDUCATION/TRAINING PROGRAM

## 2025-03-25 PROCEDURE — 74177 CT ABD & PELVIS W/CONTRAST: CPT

## 2025-03-25 PROCEDURE — 96366 THER/PROPH/DIAG IV INF ADDON: CPT | Mod: 59

## 2025-03-25 PROCEDURE — 272N000452 HC KIT SHRLOCK 5FR POWER PICC TRIPLE LUMEN

## 2025-03-25 PROCEDURE — 258N000003 HC RX IP 258 OP 636: Performed by: STUDENT IN AN ORGANIZED HEALTH CARE EDUCATION/TRAINING PROGRAM

## 2025-03-25 PROCEDURE — 96376 TX/PRO/DX INJ SAME DRUG ADON: CPT | Mod: 59

## 2025-03-25 PROCEDURE — 99291 CRITICAL CARE FIRST HOUR: CPT | Mod: 25

## 2025-03-25 RX ORDER — AMOXICILLIN 250 MG
1 CAPSULE ORAL 2 TIMES DAILY PRN
Status: DISCONTINUED | OUTPATIENT
Start: 2025-03-25 | End: 2025-04-03 | Stop reason: HOSPADM

## 2025-03-25 RX ORDER — POLYETHYLENE GLYCOL 3350 17 G/17G
17 POWDER, FOR SOLUTION ORAL DAILY PRN
Status: CANCELLED | OUTPATIENT
Start: 2025-03-25

## 2025-03-25 RX ORDER — PREGABALIN 100 MG/1
200 CAPSULE ORAL EVERY EVENING
COMMUNITY

## 2025-03-25 RX ORDER — DEXTROSE MONOHYDRATE 25 G/50ML
25-50 INJECTION, SOLUTION INTRAVENOUS
Status: DISCONTINUED | OUTPATIENT
Start: 2025-03-25 | End: 2025-03-25

## 2025-03-25 RX ORDER — PREGABALIN 100 MG/1
100 CAPSULE ORAL EVERY MORNING
COMMUNITY

## 2025-03-25 RX ORDER — IOPAMIDOL 755 MG/ML
71 INJECTION, SOLUTION INTRAVASCULAR ONCE
Status: COMPLETED | OUTPATIENT
Start: 2025-03-25 | End: 2025-03-25

## 2025-03-25 RX ORDER — NICOTINE POLACRILEX 4 MG
15-30 LOZENGE BUCCAL
Status: CANCELLED | OUTPATIENT
Start: 2025-03-25

## 2025-03-25 RX ORDER — LIDOCAINE 40 MG/G
CREAM TOPICAL
Status: DISCONTINUED | OUTPATIENT
Start: 2025-03-25 | End: 2025-03-25 | Stop reason: HOSPADM

## 2025-03-25 RX ORDER — ONDANSETRON 4 MG/1
4 TABLET, ORALLY DISINTEGRATING ORAL EVERY 6 HOURS PRN
Status: CANCELLED | OUTPATIENT
Start: 2025-03-25

## 2025-03-25 RX ORDER — SODIUM CHLORIDE AND POTASSIUM CHLORIDE 150; 900 MG/100ML; MG/100ML
INJECTION, SOLUTION INTRAVENOUS CONTINUOUS
Status: DISCONTINUED | OUTPATIENT
Start: 2025-03-25 | End: 2025-03-25 | Stop reason: HOSPADM

## 2025-03-25 RX ORDER — LABETALOL HYDROCHLORIDE 5 MG/ML
10 INJECTION, SOLUTION INTRAVENOUS ONCE
Status: COMPLETED | OUTPATIENT
Start: 2025-03-25 | End: 2025-03-25

## 2025-03-25 RX ORDER — POLYETHYLENE GLYCOL 3350 17 G/17G
17 POWDER, FOR SOLUTION ORAL DAILY PRN
Status: DISCONTINUED | OUTPATIENT
Start: 2025-03-25 | End: 2025-04-03 | Stop reason: HOSPADM

## 2025-03-25 RX ORDER — ROSUVASTATIN CALCIUM 5 MG/1
5 TABLET, COATED ORAL EVERY EVENING
COMMUNITY

## 2025-03-25 RX ORDER — SODIUM CHLORIDE AND POTASSIUM CHLORIDE 150; 450 MG/100ML; MG/100ML
INJECTION, SOLUTION INTRAVENOUS CONTINUOUS
Status: DISCONTINUED | OUTPATIENT
Start: 2025-03-26 | End: 2025-03-26

## 2025-03-25 RX ORDER — CLONIDINE HYDROCHLORIDE 0.1 MG/1
0.1 TABLET ORAL ONCE
Status: COMPLETED | OUTPATIENT
Start: 2025-03-25 | End: 2025-03-25

## 2025-03-25 RX ORDER — AMOXICILLIN 250 MG
2 CAPSULE ORAL 2 TIMES DAILY PRN
Status: CANCELLED | OUTPATIENT
Start: 2025-03-25

## 2025-03-25 RX ORDER — AMOXICILLIN 250 MG
1 CAPSULE ORAL 2 TIMES DAILY PRN
Status: CANCELLED | OUTPATIENT
Start: 2025-03-25

## 2025-03-25 RX ORDER — ENOXAPARIN SODIUM 100 MG/ML
40 INJECTION SUBCUTANEOUS EVERY 24 HOURS
Status: DISCONTINUED | OUTPATIENT
Start: 2025-03-26 | End: 2025-03-26

## 2025-03-25 RX ORDER — SODIUM CHLORIDE AND POTASSIUM CHLORIDE 150; 450 MG/100ML; MG/100ML
INJECTION, SOLUTION INTRAVENOUS CONTINUOUS
Status: CANCELLED | OUTPATIENT
Start: 2025-03-25

## 2025-03-25 RX ORDER — DEXTROSE MONOHYDRATE 25 G/50ML
25-50 INJECTION, SOLUTION INTRAVENOUS
Status: CANCELLED | OUTPATIENT
Start: 2025-03-25

## 2025-03-25 RX ORDER — ONDANSETRON 4 MG/1
4 TABLET, ORALLY DISINTEGRATING ORAL EVERY 6 HOURS PRN
Status: DISCONTINUED | OUTPATIENT
Start: 2025-03-25 | End: 2025-04-03 | Stop reason: HOSPADM

## 2025-03-25 RX ORDER — NICOTINE POLACRILEX 4 MG
15-30 LOZENGE BUCCAL
Status: DISCONTINUED | OUTPATIENT
Start: 2025-03-25 | End: 2025-04-03 | Stop reason: HOSPADM

## 2025-03-25 RX ORDER — AMOXICILLIN 250 MG
2 CAPSULE ORAL 2 TIMES DAILY PRN
Status: DISCONTINUED | OUTPATIENT
Start: 2025-03-25 | End: 2025-04-03 | Stop reason: HOSPADM

## 2025-03-25 RX ORDER — METFORMIN HYDROCHLORIDE 500 MG/1
1000 TABLET, EXTENDED RELEASE ORAL 2 TIMES DAILY
Status: ON HOLD | COMMUNITY
End: 2025-04-03

## 2025-03-25 RX ORDER — ONDANSETRON 2 MG/ML
4 INJECTION INTRAMUSCULAR; INTRAVENOUS EVERY 6 HOURS PRN
Status: CANCELLED | OUTPATIENT
Start: 2025-03-25

## 2025-03-25 RX ORDER — DEXTROSE MONOHYDRATE, SODIUM CHLORIDE, AND POTASSIUM CHLORIDE 50; 1.49; 4.5 G/1000ML; G/1000ML; G/1000ML
INJECTION, SOLUTION INTRAVENOUS CONTINUOUS
Status: DISCONTINUED | OUTPATIENT
Start: 2025-03-26 | End: 2025-03-26

## 2025-03-25 RX ORDER — LEVETIRACETAM 500 MG/1
500 TABLET ORAL 2 TIMES DAILY
COMMUNITY

## 2025-03-25 RX ORDER — ACETAMINOPHEN 325 MG/1
650 TABLET ORAL EVERY 4 HOURS PRN
Status: CANCELLED | OUTPATIENT
Start: 2025-03-25

## 2025-03-25 RX ORDER — ACETAMINOPHEN 325 MG/10.15ML
650 LIQUID ORAL EVERY 4 HOURS PRN
Status: CANCELLED | OUTPATIENT
Start: 2025-03-25

## 2025-03-25 RX ORDER — NICOTINE POLACRILEX 4 MG
15-30 LOZENGE BUCCAL
Status: DISCONTINUED | OUTPATIENT
Start: 2025-03-25 | End: 2025-03-25

## 2025-03-25 RX ORDER — ACETAMINOPHEN 325 MG/10.15ML
650 LIQUID ORAL EVERY 4 HOURS PRN
Status: DISCONTINUED | OUTPATIENT
Start: 2025-03-25 | End: 2025-04-03 | Stop reason: HOSPADM

## 2025-03-25 RX ORDER — DEXTROSE MONOHYDRATE 25 G/50ML
25-50 INJECTION, SOLUTION INTRAVENOUS
Status: DISCONTINUED | OUTPATIENT
Start: 2025-03-25 | End: 2025-04-03 | Stop reason: HOSPADM

## 2025-03-25 RX ORDER — DEXTROSE MONOHYDRATE 25 G/50ML
25-50 INJECTION, SOLUTION INTRAVENOUS
Status: DISCONTINUED | OUTPATIENT
Start: 2025-03-25 | End: 2025-03-25 | Stop reason: HOSPADM

## 2025-03-25 RX ORDER — ACETAMINOPHEN 325 MG/1
650 TABLET ORAL EVERY 4 HOURS PRN
Status: DISCONTINUED | OUTPATIENT
Start: 2025-03-25 | End: 2025-04-03 | Stop reason: HOSPADM

## 2025-03-25 RX ORDER — PIPERACILLIN SODIUM, TAZOBACTAM SODIUM 3; .375 G/15ML; G/15ML
3.38 INJECTION, POWDER, LYOPHILIZED, FOR SOLUTION INTRAVENOUS ONCE
Status: COMPLETED | OUTPATIENT
Start: 2025-03-25 | End: 2025-03-25

## 2025-03-25 RX ORDER — ENOXAPARIN SODIUM 100 MG/ML
40 INJECTION SUBCUTANEOUS EVERY 24 HOURS
Status: CANCELLED | OUTPATIENT
Start: 2025-03-25

## 2025-03-25 RX ORDER — ONDANSETRON 2 MG/ML
4 INJECTION INTRAMUSCULAR; INTRAVENOUS EVERY 6 HOURS PRN
Status: DISCONTINUED | OUTPATIENT
Start: 2025-03-25 | End: 2025-04-03 | Stop reason: HOSPADM

## 2025-03-25 RX ORDER — INSULIN ASPART 100 [IU]/ML
INJECTION, SOLUTION INTRAVENOUS; SUBCUTANEOUS
COMMUNITY

## 2025-03-25 RX ORDER — DEXTROSE MONOHYDRATE, SODIUM CHLORIDE, AND POTASSIUM CHLORIDE 50; 1.49; 4.5 G/1000ML; G/1000ML; G/1000ML
INJECTION, SOLUTION INTRAVENOUS CONTINUOUS
Status: CANCELLED | OUTPATIENT
Start: 2025-03-25

## 2025-03-25 RX ADMIN — INSULIN HUMAN 5.5 UNITS/HR: 1 INJECTION, SOLUTION INTRAVENOUS at 20:29

## 2025-03-25 RX ADMIN — CLONIDINE HYDROCHLORIDE 0.1 MG: 0.1 TABLET ORAL at 20:52

## 2025-03-25 RX ADMIN — POTASSIUM CHLORIDE AND SODIUM CHLORIDE: 900; 150 INJECTION, SOLUTION INTRAVENOUS at 20:38

## 2025-03-25 RX ADMIN — IOPAMIDOL 71 ML: 755 INJECTION, SOLUTION INTRAVENOUS at 20:08

## 2025-03-25 RX ADMIN — LABETALOL HYDROCHLORIDE 10 MG: 5 INJECTION, SOLUTION INTRAVENOUS at 19:05

## 2025-03-25 RX ADMIN — LIDOCAINE HYDROCHLORIDE 2 ML: 10 INJECTION, SOLUTION EPIDURAL; INFILTRATION; INTRACAUDAL; PERINEURAL at 22:20

## 2025-03-25 RX ADMIN — SODIUM CHLORIDE, POTASSIUM CHLORIDE, SODIUM LACTATE AND CALCIUM CHLORIDE 1000 ML: 600; 310; 30; 20 INJECTION, SOLUTION INTRAVENOUS at 18:49

## 2025-03-25 RX ADMIN — LABETALOL HYDROCHLORIDE 10 MG: 5 INJECTION, SOLUTION INTRAVENOUS at 20:57

## 2025-03-25 RX ADMIN — NICARDIPINE HYDROCHLORIDE 2.5 MG/HR: 0.2 INJECTION, SOLUTION INTRAVENOUS at 21:19

## 2025-03-25 RX ADMIN — PIPERACILLIN AND TAZOBACTAM 3.38 G: 3; .375 INJECTION, POWDER, FOR SOLUTION INTRAVENOUS at 20:54

## 2025-03-25 ASSESSMENT — ACTIVITIES OF DAILY LIVING (ADL)
ADLS_ACUITY_SCORE: 57

## 2025-03-25 NOTE — LETTER
Transition Communication Hand-off for Care Transitions to Next Level of Care Provider    Name: Paige Mari  : 1950  MRN #: [id number]  Primary Care Provider: Scar Tan     Primary Clinic: [address]  [place] [address]     Reason for Hospitalization:  DKA (diabetic ketoacidosis) (H) [E11.10]  Admit Date/Time: 3/25/2025 11:50 PM  Discharge Date: 25    Payor Source: Payor: BCBS / Plan: [facility] MEDICARE ADVANTAGE / Product Type: Medicare /       Discharge Plan:  Discharge Plan:      Flowsheet Row Most Recent Value   Concerns Comments Pt is unable to care for self and manage diseases at this time               Discharge Needs Assessment:  Needs      Flowsheet Row Most Recent Value   Anticipated Changes Related to Illness inability to care for self, other (see comments)  [TBD pending cognitive assessment]   Equipment Currently Used at Home walker, rolling, shower chair, grab bar, tub/shower   Current Discharge Risk cognitively impaired              Follow-up plan:    Future Appointments   Date Time Provider Department Center   2025  1:00 PM Ana Sacnhez RN MDDIAB [facility]   2025  7:30 AM Maynor Bardalse MD MDENDO [facility]   2025 12:00 AM JN [facility]   7/15/2025  4:15 PM MPLW LAB MDLABR [facility]   [facility]  3[facility]30 [facility]       Any outstanding tests or procedures:        Referrals       Future Labs/Procedures    Adult Neuropsychology  Referral     Process Instructions:    The [facility] Neuropsychology clinic does not see patients for the following reasons (patients referred for these reasons will be referred outside of [facility]):  ADHD,  Autism/Developmental Delay/Learning Disability, Court-Ordered Evaluations, Guardianship, Citizenship Test Waiver, patients for whom mental health or psychiatric concerns are the primary reason for referral.    Comments:    Please be aware  that coverage of these services is subject to the terms and limitations of your health insurance plan.  Call member services at your health plan with any benefit or coverage questions.  [facility] will call you to coordinate your care as prescribed by the provider.  If you don t hear from a representative within 2 business days, please call [phone].      Home Care Referral     Comments:    Your provider has ordered home health services. If you have not been contacted within 2 days of your discharge please call the selected Home Care agency listed on your Discharge document.  If a Home Care agency is NOT listed, please call [phone].              Key Recommendations:  See GRANT Car RN    AVS/Discharge Summary is the source of truth; this is a helpful guide for improved communication of patient story

## 2025-03-25 NOTE — ED PROVIDER NOTES
"EMERGENCY DEPARTMENT ENCOUNTER      NAME: Paige Mari  AGE: 74 year old female  YOB: 1950  MRN: [id number]  EVALUATION DATE & TIME: 3/25/2025  6:33 PM    PCP: Scar Tan    ED PROVIDER: Pierce Rowe MD      Chief Complaint   Patient presents with    Altered Mental Status         FINAL IMPRESSION:  1. Diabetic ketoacidosis without coma associated with other specified diabetes mellitus (H)    2. Hypertensive emergency    3. Lactic acidosis          ED COURSE & MEDICAL DECISION MAKING:    Pertinent Labs & Imaging studies reviewed. (See chart for details)  74 year old female presents to the Emergency Department for evaluation of confusion, hyperglycemia    ED Course as of 03/25/25 2227   Tue Mar 25, 2025   1844 Patient is a 74-year-old female with history of hypertension, recently diagnosed paroxysmal atrial fibrillation currently on Eliquis, thyroidism who presents to the emergency department with confusion.  Medics report that  found her sitting the toilet confused about 30 minutes prior to arrival here.  Unclear how long she was on the toilet for.  No known trauma.  No obvious facial droop or unilateral weakness.  Patient herself unable provide much history.  No she is in the hospital but says \"yes\" to most questions without being able to elaborate further.    Upon initial evaluation markedly hypertensive at 220/127.  Afebrile and saturating well on room air.  Lungs are clear without any wheezes or rails.  Somewhat diffuse abdominal tenderness particular in the lower abdomen but no guarding or rebound.  Somewhat dry mucous membranes.    Initial differentials broad but includes is not limited to HHS or DKA, underlying occult infectious process including urinary tract infection, acute intracranial abnormality or perhaps hypertensive encephalopathy.  Will obtain metabolic and infectious workup as well as CT head and abdomen.   1855 EKG shows a sinus rhythm with occasional PACs " ventricular rate 92 beats a minute, normal axis, left bundle branch block, no excessive ST elevation or discordant ST elevation or depression.   1900 Call from lab.  Venous pH 7.19 with a bicarb of 17 and lactic acid of 5.  Could be secondary to ketosis.  No clear localizing signs of infection at this point in time but will continue his septic metabolic workup.  Once potassium resolved plan for initiation of insulin   1901 Still markedly hypertensive and this could be contributing to her encephalopathy will give a dose of labetalol at this point in time as her systolics remain over 220.  Looks like patient's been prescribed a clonidine patch in the past do not see this anywhere in her skin.  Could potentially be component of clonidine withdrawal contributing to her persistent hypertension.   2109 CT head does not show any acute intracranial abnormality   2110     Chest x-ray does not show any signs of pneumonia.    CT abdomen pelvis show any acute intra-abdominal process.      At this point time no clear underlying infectious process however persistently elevated lactate and marked leukocytosis which could be secondary to DKA itself but will start empirically on Zosyn.    I suspect the trigger for DKA at this point in time is likely medication nonadherence as patient is supposed to be on an insulin pump and does not seem to be wearing 1 and it is unclear the last time she received any insulin.    Additionally unclear etiology of her hypertension.  Significantly elevated blood pressures here with systolics ranging between 220 and 240.  This could also be contributing to her encephalopathy.  Discussed with family and she is not using any alcohol so do not suspect withdrawal is contributing.  She does potentially have a clonidine patch on although unclear how long this is been on.  Gave several as needed doses of labetalol but persistently hypertensive.  Will start on  nicardipine.   2110 TSH within normal limits.    2111 Performed a bedside echocardiogram.  Somewhat difficult anatomical windows but LV appears grossly normal ejection fraction, pericardial fat pad but no pericardial effusion.  IVC not plethoric with good respiratory variation, no B-lines.   2151 Patient started on nicardipine however blood pressure began to quickly drop reaching goal of 20% lowering after about 10-15 minutes on the nicardipine blood pressure dropped to 150.  Will hold nicardipine this point time to avoid over correction.  Closely monitor blood pressure   2209 At this point time patient does still remain critically ill.  Suspect her altered mental status/encephalopathy is a combination of her underlying DKA and metabolic derangement as well as potentially malignant hypertension.  She does have any focal neurological deficits and has full strength in upper and lower extremities lower suspicion this point time for acute CVA.  She does require ongoing insulin drip for DKA   2209   No beds currently available here at [facility] given ongoing boarding crisis.  Discussed with [facility] ICU agrees  to accept the patient for transfer at this point in time.    She was started empirically on Zosyn given lactic acidosis and leukocytosis of unclear a source of infection at this point in time.  Additionally started on a insulin drip.  Was briefly on nicardipine drip however blood pressure responded quite well and at this point is at goal of 20% acute reduction in systolic BP.  Also did have a PICC placed during her time in the emergency department in her left arm     6:34 PM I met and evaluated the patient.       Medical Decision Making  I obtained history from Caregiver, Family Member/Significant Other, and EMS, I reviewed the EMR Inpatient Record:  , Care impacted by Anticoagulated State, Diabetes, and Hypertension, and I discussed the care with another health care provider: Intensive care  Admit.    MIPS (CTPE, Dental pain, Gomes, Sinusitis,  Asthma/COPD, Head Trauma): Not Applicable    SEPSIS: Lactic acid elevated due to potentially sepsis or underlying DKA. At this time there are no signs of sepsis or septic shock is actually markedly hypertensive.       At the conclusion of the encounter I discussed the results of all of the tests and the disposition. The questions were answered. The patient or family acknowledged understanding and was agreeable with the care plan.     45 minutes of critical care time     MEDICATIONS GIVEN IN THE EMERGENCY:  Medications   dextrose 50 % injection 25-50 mL (has no administration in time range)   insulin regular (MYXREDLIN) 1 unit/mL infusion (5.5 Units/hr Intravenous Rate/Dose Verify 3/25/25 2204)   0.9% sodium chloride + KCl 20 mEq/L infusion ( Intravenous $New Bag 3/25/25 2038)   lidocaine 1 % 0.1-5 mL (2 mLs Other $Given 3/25/25 2220)   lidocaine (LMX4) cream (has no administration in time range)   niCARdipine 40 mg in 200 mL NS (CARDENE) infusion (0 mg/hr Intravenous Stopped 3/25/25 2151)   lactated ringers BOLUS 1,000 mL (0 mLs Intravenous Stopped 3/25/25 2042)   labetalol (NORMODYNE/TRANDATE) injection 10 mg (10 mg Intravenous $Given 3/25/25 1905)   iopamidol (ISOVUE-370) solution 71 mL (71 mLs Intravenous $Given 3/25/25 2008)   cloNIDine (CATAPRES) tablet 0.1 mg (0.1 mg Oral $Given 3/25/25 2052)   labetalol (NORMODYNE/TRANDATE) injection 10 mg (10 mg Intravenous $Given 3/25/25 2057)   piperacillin-tazobactam (ZOSYN) 3.375 g vial to attach to  mL bag (0 g Intravenous Stopped 3/25/25 2124)   sodium chloride (PF) 0.9% PF flush 10-40 mL (20 mLs Intracatheter $Given 3/25/25 2220)       NEW PRESCRIPTIONS STARTED AT TODAY'S ER VISIT  New Prescriptions    No medications on file          =================================================================    HPI    Patient information was obtained from: patient, patient's RN    Use of : N/A         Paige WARD Jacey is a 74 year old female with a pertinent  history of atrial fibrillation on Eliquis, hypertension, COPD, complex partial seizure, and 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     Per patient's RN: Patient was found on the toiler around 1800 this evening by her . She was confused and sitting in the dark. It is unclear how long she was sitting there. Patient stated that she is confused, does not feel well, and has nausea. Her blood sugar was over 600. Recently began Eliquis.    Patient endorses pain, but is unable to specify the location or how long she has had the pain. Also endorses dysuria and hematuria. She states that she has a Dexcom and an insulin pump on her abdomen. She does not know when she last gave herself insulin.     History is somewhat limited due to patient's mentality.      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), managed as type 1 (H)     Created by Conversion       Mood disorder 11/24/2014    Multiple closed fractures of facial bone, initial encounter (H)     KP (obstructive sleep apnea) 05/15/2015    Paroxysmal atrial fibrillation (H) 09/22/2021    Small bowel obstruction (H) 09/18/2021    Status post total knee replacement 11/24/2014       PAST SURGICAL HISTORY:  Past Surgical History:   Procedure Laterality Date    APPENDECTOMY      at age 3    ARTHROSCOPY KNEE WITH MENISCECTOMY  3/10/14    partial medial and lateral meniscectomies, subpatellar chondroplasty    BACK SURGERY      BLEPHAROPLASTY Bilateral 8/17/2015    Procedure:  BLEPHAROPLASTY BILATERAL UPPER LIDS;  Surgeon: Chasidy Bryant MD;  Location: [facility] OR;  Service:     BOWEL RESECTION  12 years ago    COLON SURGERY  2004    sigmoid colectomy    DILATION AND CURETTAGE      x3    EP LOOP RECORDER IMPLANT N/A 8/21/2023    Procedure: Loop Recorder Implant;  Surgeon: Nolvia Ruiz MD;  Location: [facility] CV    HYSTERECTOMY      age 39    IR LUMBAR PUNCTURE  1/22/2022    JOINT REPLACEMENT      OOPHORECTOMY      OTHER SURGICAL HISTORY  2005    bowel obstruction    PICC TRIPLE LUMEN PLACEMENT  9/19/2021         PICC TRIPLE LUMEN PLACEMENT  1/22/2022         MN ARTHRODESIS ANT INTERBODY MIN DISCECTOMY,LUMBAR N/A 1/30/2015    Procedure: ANTERIOR LUMBAR INTERBODY FUSION L4-5, INTERBODY GRAFT, BMP INSTRUMENTATION;  Surgeon: Zeeshan Guillaume MD;  Location: [facility];  Service: Spine    TOE SURGERY      TONSILLECTOMY & ADENOIDECTOMY      age 10    ZZC REMOVAL OF OVARY(S)      Description: Oophorectomy;  Recorded: 06/03/2010;    [facility] REMOVAL OF OVARY(S)      Description: Oophorectomy;  Recorded: 06/03/2010;    [facility] TOTAL ABDOM HYSTERECTOMY      Description: Hysterectomy;  Recorded: 06/03/2010;    [facility] TOTAL ABDOM HYSTERECTOMY      Description: Hysterectomy;  Recorded: 06/03/2010;    [facility] TOTAL KNEE ARTHROPLASTY Right 11/24/2014    Procedure: RIGHT TOTAL KNEE ARTHROPLASTY;  Surgeon: Jules Harris MD;  Location: [facility];  Service: Orthopedics           CURRENT MEDICATIONS:    acetaminophen (TYLENOL) 500 MG tablet  albuterol (PROAIR HFA/PROVENTIL HFA/VENTOLIN HFA) 108 (90 Base) MCG/ACT inhaler  apixaban ANTICOAGULANT (ELIQUIS) 5 MG tablet  cephALEXin (KEFLEX) 250 MG capsule  cetirizine (ZYRTEC) 10 MG tablet  cholecalciferol (VITAMIN D3) 25 mcg (1000 units) capsule  cloNIDine (CATAPRES-TTS3) 0.3 MG/24HR WK patch  diclofenac sodium (VOLTAREN) 1 % Gel  DULoxetine (CYMBALTA) 60 MG capsule  escitalopram (LEXAPRO) 20 MG tablet  estradiol (ESTRACE) 1 MG  "tablet  fluticasone (FLONASE) 50 MCG/ACT nasal spray  Glucagon (GVOKE HYPOPEN) 1 MG/0.2ML pen  glucose (BD GLUCOSE) 5 g chewable tablet  insulin aspart (NOVOLOG VIAL) 100 UNITS/ML vial  insulin detemir (LEVEMIR VIAL) 100 UNIT/ML vial  INSULIN PUMP - OUTPATIENT  levETIRAcetam (KEPPRA) 500 MG tablet  liraglutide (VICTOZA PEN) 18 MG/3ML solution  magnesium oxide (MAG-OX) 400 MG tablet  medical cannabis (Patient's own supply)  metFORMIN (GLUCOPHAGE XR) 500 MG 24 hr tablet  metoprolol succinate ER (TOPROL-XL) 100 MG 24 hr tablet  Multiple Vitamins-Minerals (MULTIVITAMIN WOMEN 50+) TABS  nystatin (MYCOSTATIN) 032666 UNIT/GM external powder  omeprazole (PRILOSEC) 20 MG capsule  pregabalin (LYRICA) 100 MG capsule  pregabalin (LYRICA) 100 MG capsule  rosuvastatin (CRESTOR) 5 MG tablet  tiZANidine (ZANAFLEX) 2 MG tablet  valACYclovir (VALTREX) 500 MG tablet  Continuous Blood Gluc  (DEXCOM G6 ) TORRIE  Continuous Glucose Sensor (DEXCOM G6 SENSOR) MISC  Continuous Glucose Transmitter (DEXCOM G6 TRANSMITTER) MISC  Insulin Disposable Pump (OMNIPOD 5 G6 INTRO, GEN 5,) KIT  Insulin Disposable Pump (OMNIPOD 5 PODS, GEN 5,) MISC        ALLERGIES:  Allergies   Allergen Reactions    Morphine Other (See Comments)     \"make me delirious,\"  \"nightmares\"    Nitrofurantoin     Adhesive [Cyanoacrylate] Other (See Comments)     Can only tolerate tape for short periods of time shelia in sensitive areas    Amlodipine Unknown and Other (See Comments)     Other reaction(s): delerium    Doxazosin Other (See Comments)     Other reaction(s): feels foggy    Irbesartan-Hydrochlorothiazide Other (See Comments)     Other reaction(s): hyponatremia    Other Allergy (See Comments) [External Allergen Needs Reconciliation - See Comment] Unknown     Other reaction(s): skin rash    Prednisone Unknown     Caused extremely high blood sugars    Tramadol Other (See Comments)     Possible seizure activity    Trazodone Unknown     Other reaction(s): hung " "over       FAMILY HISTORY:  Family History   Problem Relation Age of Onset    Breast Cancer No family hx of        SOCIAL HISTORY:   Social History     Socioeconomic History    Marital status:    Tobacco Use    Smoking status: Every Day     Current packs/day: 0.00     Average packs/day: 0.5 packs/day for 19.0 years (9.5 ttl pk-yrs)     Types: Cigarettes     Start date: 9/15/2002     Last attempt to quit: 9/15/2021     Years since quitting: 3.5    Smokeless tobacco: Never    Tobacco comments:     Seen as Inpatient by Tobacco Treatment and history updated on 8/21/2023, at time smoking 1 cigarette per day   Substance and Sexual Activity    Alcohol use: No    Drug use: No       VITALS:  BP (!) 153/92   Pulse 96   Temp 97.9  F (36.6  C) (Oral)   Resp 15   Ht 1.651 m (5' 5\")   Wt 66.5 kg (146 lb 11.2 oz)   SpO2 98%   BMI 24.41 kg/m      PHYSICAL EXAM    Constitutional: Well developed, Well nourished, NAD  HENT: Normocephalic, Atraumatic,  mucous membranes moist,   Neck- trachea midline, No stridor.    Eyes:EOMI, Conjunctiva normal, No discharge.   Respiratory: Normal breath sounds, No respiratory distress, No wheezing.    Cardiovascular: Borderline tachycardic, Regular rhythm, No murmurs, no significant lower extremity edema  Abdominal: Soft, Diffuse abdominal tenderness, No rebound or guarding.     Musculoskeletal: no deformity or malalignment   Integument: Warm, Dry, No erythema  Neurologic: Alert & oriented to self only, does know she is in the hospital. , No facial droop, fluent speech, spontaneous movement upper lower extremities and follows commands, symmetric  strength, no pronator drift, able to lift both the right and left lower extremity off the bed, intact sensation to touch   .     LAB:  All pertinent labs reviewed and interpreted.  Results for orders placed or performed during the hospital encounter of 03/25/25   CT Head w/o Contrast    Impression    IMPRESSION:  1.  No CT evidence for acute " intracranial process.  2.  Mild chronic microvascular ischemic changes as above.     CT Abdomen Pelvis w Contrast    Impression    IMPRESSION:   1.  No acute process in the abdomen or pelvis.   Chest XR,  PA & LAT    Impression    IMPRESSION: Negative chest.   POC US ECHO LIMITED    Impression    Somewhat difficult anatomical windows but LV appears grossly normal ejection fraction, pericardial fat pad but no pericardial effusion.  IVC not plethoric with good respiratory variation, no B-lines.   Basic metabolic panel   Result Value Ref Range    Sodium 135 135 - 145 mmol/L    Potassium 4.8 3.4 - 5.3 mmol/L    Chloride 91 (L) 98 - 107 mmol/L    Carbon Dioxide (CO2) 15 (L) 22 - 29 mmol/L    Anion Gap 29 (H) 7 - 15 mmol/L    Urea Nitrogen 25.7 (H) 8.0 - 23.0 mg/dL    Creatinine 0.84 0.51 - 0.95 mg/dL    GFR Estimate 72 >60 mL/min/1.73m2    Calcium 10.8 (H) 8.8 - 10.4 mg/dL    Glucose 569 (HH) 70 - 99 mg/dL   Result Value Ref Range    Troponin T, High Sensitivity 28 (H) <=14 ng/L   UA with Microscopic reflex to Culture    Specimen: Urine, Midstream   Result Value Ref Range    Color Urine Colorless Colorless, Straw, Light Yellow, Yellow    Appearance Urine Clear Clear    Glucose Urine >1000 (A) Negative mg/dL    Bilirubin Urine Negative Negative    Ketones Urine 100 (A) Negative mg/dL    Specific Gravity Urine 1.025 1.001 - 1.030    Blood Urine 0.06 mg/dL (A) Negative    pH Urine 5.5 5.0 - 7.0    Protein Albumin Urine 100 (A) Negative mg/dL    Urobilinogen Urine Normal Normal mg/dL    Nitrite Urine Negative Negative    Leukocyte Esterase Urine Negative Negative    RBC Urine 1 <=2 /HPF    WBC Urine <1 <=5 /HPF    Squamous Epithelials Urine <1 <=1 /HPF    Hyaline Casts Urine 3 (H) <=2 /LPF   Blood gas venous   Result Value Ref Range    pH Venous 7.19 (LL) 7.32 - 7.43    pCO2 Venous 45 40 - 50 mm Hg    pO2 Venous 31 25 - 47 mm Hg    Bicarbonate Venous 17 (L) 21 - 28 mmol/L    Base Excess/Deficit Venous -10.9 (L) -3.0 - 3.0  mmol/L    FIO2 21     Oxyhemoglobin Venous 39 (L) 70 - 75 %    O2 Sat, Venous 39.5 (L) 70.0 - 75.0 %    Lactic Acid 5.0 (HH) 0.7 - 2.0 mmol/L   CBC with platelets and differential   Result Value Ref Range    WBC Count 20.3 (H) 4.0 - 11.0 10e3/uL    RBC Count 4.88 3.80 - 5.20 10e6/uL    Hemoglobin 13.4 11.7 - 15.7 g/dL    Hematocrit 40.6 35.0 - 47.0 %    MCV 83 78 - 100 fL    MCH 27.5 26.5 - 33.0 pg    MCHC 33.0 31.5 - 36.5 g/dL    RDW 14.6 10.0 - 15.0 %    Platelet Count 412 150 - 450 10e3/uL    % Neutrophils 85 %    % Lymphocytes 11 %    % Monocytes 3 %    % Eosinophils 0 %    % Basophils 0 %    % Immature Granulocytes 1 %    NRBCs per 100 WBC 0 <1 /100    Absolute Neutrophils 17.3 (H) 1.6 - 8.3 10e3/uL    Absolute Lymphocytes 2.2 0.8 - 5.3 10e3/uL    Absolute Monocytes 0.6 0.0 - 1.3 10e3/uL    Absolute Eosinophils 0.0 0.0 - 0.7 10e3/uL    Absolute Basophils 0.1 0.0 - 0.2 10e3/uL    Absolute Immature Granulocytes 0.1 <=0.4 10e3/uL    Absolute NRBCs 0.0 10e3/uL   Nt probnp inpatient (BNP)   Result Value Ref Range    N terminal Pro BNP Inpatient 2,206 (H) 0 - 900 pg/mL   TSH with free T4 reflex   Result Value Ref Range    TSH 1.28 0.30 - 4.20 uIU/mL   Glucose by meter   Result Value Ref Range    GLUCOSE BY METER POCT 595 (HH) 70 - 99 mg/dL   Extra Blue Top Tube   Result Value Ref Range    Hold Specimen JIC    Ketone Beta-Hydroxybutyrate Quantitative   Result Value Ref Range    Ketone (Beta-Hydroxybutyrate) Quantitative 5.62 (HH) <=0.30 mmol/L   Lactic Acid Whole Blood with 1X Repeat in 2 HR when >2   Result Value Ref Range    Lactic Acid, Initial 4.8 (HH) 0.7 - 2.0 mmol/L   Result Value Ref Range    Troponin T, High Sensitivity 27 (H) <=14 ng/L   Hemoglobin A1c   Result Value Ref Range    Estimated Average Glucose 237 (H) <117 mg/dL    Hemoglobin A1C 9.9 (H) <5.7 %   Glucose by meter   Result Value Ref Range    GLUCOSE BY METER POCT 390 (H) 70 - 99 mg/dL   Glucose by meter   Result Value Ref Range    GLUCOSE BY METER  POCT 424 (H) 70 - 99 mg/dL       RADIOLOGY:  Reviewed all pertinent imaging. Please see official radiology report.  POC US ECHO LIMITED   ED Interpretation   Somewhat difficult anatomical windows but LV appears grossly normal ejection fraction, pericardial fat pad but no pericardial effusion.  IVC not plethoric with good respiratory variation, no B-lines.      Chest XR,  PA & LAT   Final Result   IMPRESSION: Negative chest.      CT Abdomen Pelvis w Contrast   Final Result   IMPRESSION:    1.  No acute process in the abdomen or pelvis.      CT Head w/o Contrast   Final Result   IMPRESSION:   1.  No CT evidence for acute intracranial process.   2.  Mild chronic microvascular ischemic changes as above.             EKG:    Performed at: 18:41    Impression: Sinus rhythm with occasional PACs ventricular rate 92 beats a minute, normal axis, left bundle branch block, no excessive ST elevation or discordant ST elevation or depression.        I have independently reviewed and interpreted the EKG(s) documented above.    PROCEDURES:     PROCEDURE:    Emergency Department Limited Bedside Screening Cardiac Ultrasound   INDICATIONS: chest pain   PROCEDURE PROVIDER: Pierce Rowe    WINDOW AND FINDINGS:    SUB-XYPHOID     :      Cardiac activity: Hyperkinetic  Pericardial effusion: No clinically significant pericardial effusion  Signs of Tamponade physiology: Absent  Pericardial fat pad but do not see any hypoechoic fluid or any signs of tamponade.   PARASTERNAL    :  Cardiac activity: Hyperkinetic  Pericardial effusion: No clinically significant pericardial effusion  Signs of Tamponade physiology: Absent     IMAGES SAVED AND STORED FOR ARCHIVE AND REVIEW: Yes          [employer] Documentation:   CMS Diagnoses:             Natalie ANGULO, am serving as a scribe to document services personally performed by Pierce Rowe MD based on my observation and the provider's statements to me. I, Pierce Rowe MD, attest that  Natalie Craven is acting in a scribe capacity, has observed my performance of the services and has documented them in accordance with my direction.    Pierce Rowe MD  [facility] EMERGENCY DEPARTMENT  [address] [place] [id number] [phone]       Pierce Rowe MD  03/25/25 0063

## 2025-03-25 NOTE — ED NOTES
Bed: [place] [address]  Expected date:   Expected time:   Means of arrival: Ambulance  Comments:  LV 74F AMS hyperglycemia; BS over 600

## 2025-03-25 NOTE — ED TRIAGE NOTES
Pt brought in by EMS from private home for AMS.   found pt on the toilet in the dark about 30 mins ago.  Pt very confused.  Unknown how long had been sitting there.  EMS BG >600.  Triage      Triage Assessment (Adult)       Row Name 03/25/25 1833          Triage Assessment    Airway WDL WDL        Respiratory WDL    Respiratory WDL WDL

## 2025-03-26 ENCOUNTER — APPOINTMENT (OUTPATIENT)
Dept: CARDIOLOGY | Facility: CLINIC | Age: 75
End: 2025-03-26
Payer: COMMERCIAL

## 2025-03-26 LAB
ALBUMIN SERPL BCG-MCNC: 4.3 G/DL (ref 3.5–5.2)
ALP SERPL-CCNC: 79 U/L (ref 40–150)
ALT SERPL W P-5'-P-CCNC: 15 U/L (ref 0–50)
ANION GAP SERPL CALCULATED.3IONS-SCNC: 11 MMOL/L (ref 7–15)
ANION GAP SERPL CALCULATED.3IONS-SCNC: 13 MMOL/L (ref 7–15)
ANION GAP SERPL CALCULATED.3IONS-SCNC: 15 MMOL/L (ref 7–15)
ANION GAP SERPL CALCULATED.3IONS-SCNC: 21 MMOL/L (ref 7–15)
AST SERPL W P-5'-P-CCNC: 19 U/L (ref 0–45)
ATRIAL RATE - MUSE: 95 BPM
B-OH-BUTYR SERPL-SCNC: 0.21 MMOL/L
B-OH-BUTYR SERPL-SCNC: 0.98 MMOL/L
B-OH-BUTYR SERPL-SCNC: 2.48 MMOL/L
B-OH-BUTYR SERPL-SCNC: <0.18 MMOL/L
BASE EXCESS BLDV CALC-SCNC: -6 MMOL/L (ref -3–3)
BILIRUB SERPL-MCNC: 0.7 MG/DL
BUN SERPL-MCNC: 21.8 MG/DL (ref 8–23)
BUN SERPL-MCNC: 23.3 MG/DL (ref 8–23)
BUN SERPL-MCNC: 23.5 MG/DL (ref 8–23)
BUN SERPL-MCNC: 23.6 MG/DL (ref 8–23)
CA-I BLD-MCNC: 4.9 MG/DL (ref 4.4–5.2)
CALCIUM SERPL-MCNC: 7.7 MG/DL (ref 8.8–10.4)
CALCIUM SERPL-MCNC: 8.8 MG/DL (ref 8.8–10.4)
CALCIUM SERPL-MCNC: 9.4 MG/DL (ref 8.8–10.4)
CALCIUM SERPL-MCNC: 9.7 MG/DL (ref 8.8–10.4)
CHLORIDE SERPL-SCNC: 102 MMOL/L (ref 98–107)
CHLORIDE SERPL-SCNC: 103 MMOL/L (ref 98–107)
CHLORIDE SERPL-SCNC: 104 MMOL/L (ref 98–107)
CHLORIDE SERPL-SCNC: 99 MMOL/L (ref 98–107)
CREAT SERPL-MCNC: 0.6 MG/DL (ref 0.51–0.95)
CREAT SERPL-MCNC: 0.67 MG/DL (ref 0.51–0.95)
CREAT SERPL-MCNC: 0.71 MG/DL (ref 0.51–0.95)
CREAT SERPL-MCNC: 0.74 MG/DL (ref 0.51–0.95)
DIASTOLIC BLOOD PRESSURE - MUSE: NORMAL MMHG
EGFRCR SERPLBLD CKD-EPI 2021: 84 ML/MIN/1.73M2
EGFRCR SERPLBLD CKD-EPI 2021: 88 ML/MIN/1.73M2
EGFRCR SERPLBLD CKD-EPI 2021: >90 ML/MIN/1.73M2
EGFRCR SERPLBLD CKD-EPI 2021: >90 ML/MIN/1.73M2
ERYTHROCYTE [DISTWIDTH] IN BLOOD BY AUTOMATED COUNT: 14.4 % (ref 10–15)
ERYTHROCYTE [DISTWIDTH] IN BLOOD BY AUTOMATED COUNT: 14.5 % (ref 10–15)
GLUCOSE BLDC GLUCOMTR-MCNC: 125 MG/DL (ref 70–99)
GLUCOSE BLDC GLUCOMTR-MCNC: 130 MG/DL (ref 70–99)
GLUCOSE BLDC GLUCOMTR-MCNC: 132 MG/DL (ref 70–99)
GLUCOSE BLDC GLUCOMTR-MCNC: 134 MG/DL (ref 70–99)
GLUCOSE BLDC GLUCOMTR-MCNC: 167 MG/DL (ref 70–99)
GLUCOSE BLDC GLUCOMTR-MCNC: 168 MG/DL (ref 70–99)
GLUCOSE BLDC GLUCOMTR-MCNC: 177 MG/DL (ref 70–99)
GLUCOSE BLDC GLUCOMTR-MCNC: 183 MG/DL (ref 70–99)
GLUCOSE BLDC GLUCOMTR-MCNC: 184 MG/DL (ref 70–99)
GLUCOSE BLDC GLUCOMTR-MCNC: 187 MG/DL (ref 70–99)
GLUCOSE BLDC GLUCOMTR-MCNC: 202 MG/DL (ref 70–99)
GLUCOSE BLDC GLUCOMTR-MCNC: 202 MG/DL (ref 70–99)
GLUCOSE BLDC GLUCOMTR-MCNC: 225 MG/DL (ref 70–99)
GLUCOSE BLDC GLUCOMTR-MCNC: 229 MG/DL (ref 70–99)
GLUCOSE BLDC GLUCOMTR-MCNC: 229 MG/DL (ref 70–99)
GLUCOSE BLDC GLUCOMTR-MCNC: 237 MG/DL (ref 70–99)
GLUCOSE BLDC GLUCOMTR-MCNC: 242 MG/DL (ref 70–99)
GLUCOSE BLDC GLUCOMTR-MCNC: 246 MG/DL (ref 70–99)
GLUCOSE BLDC GLUCOMTR-MCNC: 253 MG/DL (ref 70–99)
GLUCOSE BLDC GLUCOMTR-MCNC: 267 MG/DL (ref 70–99)
GLUCOSE BLDC GLUCOMTR-MCNC: 295 MG/DL (ref 70–99)
GLUCOSE BLDC GLUCOMTR-MCNC: 344 MG/DL (ref 70–99)
GLUCOSE BLDC GLUCOMTR-MCNC: 353 MG/DL (ref 70–99)
GLUCOSE BLDC GLUCOMTR-MCNC: 356 MG/DL (ref 70–99)
GLUCOSE BLDC GLUCOMTR-MCNC: 87 MG/DL (ref 70–99)
GLUCOSE BLDC GLUCOMTR-MCNC: 89 MG/DL (ref 70–99)
GLUCOSE SERPL-MCNC: 201 MG/DL (ref 70–99)
GLUCOSE SERPL-MCNC: 235 MG/DL (ref 70–99)
GLUCOSE SERPL-MCNC: 311 MG/DL (ref 70–99)
GLUCOSE SERPL-MCNC: 558 MG/DL (ref 70–99)
HCO3 BLDV-SCNC: 20 MMOL/L (ref 21–28)
HCO3 SERPL-SCNC: 18 MMOL/L (ref 22–29)
HCO3 SERPL-SCNC: 18 MMOL/L (ref 22–29)
HCO3 SERPL-SCNC: 20 MMOL/L (ref 22–29)
HCO3 SERPL-SCNC: 21 MMOL/L (ref 22–29)
HCT VFR BLD AUTO: 32.7 % (ref 35–47)
HCT VFR BLD AUTO: 36.7 % (ref 35–47)
HGB BLD-MCNC: 10.8 G/DL (ref 11.7–15.7)
HGB BLD-MCNC: 12.2 G/DL (ref 11.7–15.7)
INR PPP: 1.21 (ref 0.85–1.15)
INTERPRETATION ECG - MUSE: NORMAL
LACTATE SERPL-SCNC: 2 MMOL/L (ref 0.7–2)
LACTATE SERPL-SCNC: 2.8 MMOL/L (ref 0.7–2)
LEVETIRACETAM SERPL-MCNC: <2 ÂΜG/ML (ref 10–40)
LVEF ECHO: NORMAL
MAGNESIUM SERPL-MCNC: 1.6 MG/DL (ref 1.7–2.3)
MAGNESIUM SERPL-MCNC: 2.7 MG/DL (ref 1.7–2.3)
MCH RBC QN AUTO: 26.7 PG (ref 26.5–33)
MCH RBC QN AUTO: 27.2 PG (ref 26.5–33)
MCHC RBC AUTO-ENTMCNC: 33 G/DL (ref 31.5–36.5)
MCHC RBC AUTO-ENTMCNC: 33.2 G/DL (ref 31.5–36.5)
MCV RBC AUTO: 81 FL (ref 78–100)
MCV RBC AUTO: 82 FL (ref 78–100)
O2/TOTAL GAS SETTING VFR VENT: 21 %
OSMOLALITY SERPL: 308 MMOL/KG (ref 280–301)
OXYHGB MFR BLDV: 59 % (ref 70–75)
P AXIS - MUSE: NORMAL DEGREES
PCO2 BLDV: 38 MM HG (ref 40–50)
PH BLDV: 7.32 [PH] (ref 7.32–7.43)
PHOSPHATE SERPL-MCNC: 1.9 MG/DL (ref 2.5–4.5)
PHOSPHATE SERPL-MCNC: 2.7 MG/DL (ref 2.5–4.5)
PHOSPHATE SERPL-MCNC: 3.1 MG/DL (ref 2.5–4.5)
PHOSPHATE SERPL-MCNC: 3.6 MG/DL (ref 2.5–4.5)
PHOSPHATE SERPL-MCNC: 3.7 MG/DL (ref 2.5–4.5)
PLATELET # BLD AUTO: 326 10E3/UL (ref 150–450)
PLATELET # BLD AUTO: 340 10E3/UL (ref 150–450)
PO2 BLDV: 35 MM HG (ref 25–47)
POTASSIUM SERPL-SCNC: 3.6 MMOL/L (ref 3.4–5.3)
POTASSIUM SERPL-SCNC: 3.7 MMOL/L (ref 3.4–5.3)
POTASSIUM SERPL-SCNC: 4.5 MMOL/L (ref 3.4–5.3)
POTASSIUM SERPL-SCNC: 5.4 MMOL/L (ref 3.4–5.3)
PR INTERVAL - MUSE: 66 MS
PROT SERPL-MCNC: 7.3 G/DL (ref 6.4–8.3)
QRS DURATION - MUSE: 136 MS
QT - MUSE: 422 MS
QTC - MUSE: 530 MS
R AXIS - MUSE: -24 DEGREES
RBC # BLD AUTO: 4.04 10E6/UL (ref 3.8–5.2)
RBC # BLD AUTO: 4.49 10E6/UL (ref 3.8–5.2)
SAO2 % BLDV: 60 % (ref 70–75)
SODIUM SERPL-SCNC: 133 MMOL/L (ref 135–145)
SODIUM SERPL-SCNC: 137 MMOL/L (ref 135–145)
SODIUM SERPL-SCNC: 137 MMOL/L (ref 135–145)
SODIUM SERPL-SCNC: 138 MMOL/L (ref 135–145)
SYSTOLIC BLOOD PRESSURE - MUSE: NORMAL MMHG
T AXIS - MUSE: 125 DEGREES
TROPONIN T SERPL HS-MCNC: 21 NG/L
VENTRICULAR RATE- MUSE: 95 BPM
WBC # BLD AUTO: 20.5 10E3/UL (ref 4–11)
WBC # BLD AUTO: 21.9 10E3/UL (ref 4–11)

## 2025-03-26 PROCEDURE — 85014 HEMATOCRIT: CPT

## 2025-03-26 PROCEDURE — 80053 COMPREHEN METABOLIC PANEL: CPT

## 2025-03-26 PROCEDURE — 250N000013 HC RX MED GY IP 250 OP 250 PS 637

## 2025-03-26 PROCEDURE — 82805 BLOOD GASES W/O2 SATURATION: CPT

## 2025-03-26 PROCEDURE — 80048 BASIC METABOLIC PNL TOTAL CA: CPT

## 2025-03-26 PROCEDURE — 250N000011 HC RX IP 250 OP 636

## 2025-03-26 PROCEDURE — 82010 KETONE BODYS QUAN: CPT

## 2025-03-26 PROCEDURE — 93010 ELECTROCARDIOGRAM REPORT: CPT | Performed by: INTERNAL MEDICINE

## 2025-03-26 PROCEDURE — 83735 ASSAY OF MAGNESIUM: CPT

## 2025-03-26 PROCEDURE — 83605 ASSAY OF LACTIC ACID: CPT

## 2025-03-26 PROCEDURE — C8929 TTE W OR WO FOL WCON,DOPPLER: HCPCS

## 2025-03-26 PROCEDURE — 120N000002 HC R&B MED SURG/OB UMMC

## 2025-03-26 PROCEDURE — 999N000208 ECHOCARDIOGRAM COMPLETE

## 2025-03-26 PROCEDURE — 255N000002 HC RX 255 OP 636: Performed by: INTERNAL MEDICINE

## 2025-03-26 PROCEDURE — 84100 ASSAY OF PHOSPHORUS: CPT

## 2025-03-26 PROCEDURE — 83930 ASSAY OF BLOOD OSMOLALITY: CPT

## 2025-03-26 PROCEDURE — 250N000012 HC RX MED GY IP 250 OP 636 PS 637: Performed by: NURSE PRACTITIONER

## 2025-03-26 PROCEDURE — 84484 ASSAY OF TROPONIN QUANT: CPT

## 2025-03-26 PROCEDURE — 80177 DRUG SCRN QUAN LEVETIRACETAM: CPT

## 2025-03-26 PROCEDURE — 85610 PROTHROMBIN TIME: CPT

## 2025-03-26 PROCEDURE — 250N000009 HC RX 250

## 2025-03-26 PROCEDURE — 82310 ASSAY OF CALCIUM: CPT

## 2025-03-26 PROCEDURE — 258N000003 HC RX IP 258 OP 636

## 2025-03-26 PROCEDURE — 36415 COLL VENOUS BLD VENIPUNCTURE: CPT

## 2025-03-26 PROCEDURE — 82330 ASSAY OF CALCIUM: CPT

## 2025-03-26 PROCEDURE — 99232 SBSQ HOSP IP/OBS MODERATE 35: CPT

## 2025-03-26 PROCEDURE — 99223 1ST HOSP IP/OBS HIGH 75: CPT

## 2025-03-26 PROCEDURE — 99418 PROLNG IP/OBS E/M EA 15 MIN: CPT

## 2025-03-26 PROCEDURE — 250N000012 HC RX MED GY IP 250 OP 636 PS 637

## 2025-03-26 PROCEDURE — 87040 BLOOD CULTURE FOR BACTERIA: CPT

## 2025-03-26 PROCEDURE — 93306 TTE W/DOPPLER COMPLETE: CPT | Mod: 26 | Performed by: INTERNAL MEDICINE

## 2025-03-26 RX ORDER — CLONIDINE 0.3 MG/24H
1 PATCH, EXTENDED RELEASE TRANSDERMAL WEEKLY
Status: DISCONTINUED | OUTPATIENT
Start: 2025-03-26 | End: 2025-04-03 | Stop reason: HOSPADM

## 2025-03-26 RX ORDER — MAGNESIUM OXIDE 400 MG/1
400 TABLET ORAL EVERY EVENING
Status: DISCONTINUED | OUTPATIENT
Start: 2025-03-26 | End: 2025-03-26

## 2025-03-26 RX ORDER — PREGABALIN 100 MG/1
100 CAPSULE ORAL EVERY MORNING
Status: DISCONTINUED | OUTPATIENT
Start: 2025-03-27 | End: 2025-04-03 | Stop reason: HOSPADM

## 2025-03-26 RX ORDER — ROSUVASTATIN CALCIUM 5 MG/1
5 TABLET, COATED ORAL EVERY EVENING
Status: DISCONTINUED | OUTPATIENT
Start: 2025-03-26 | End: 2025-04-03 | Stop reason: HOSPADM

## 2025-03-26 RX ORDER — MULTIPLE VITAMINS W/ MINERALS TAB 9MG-400MCG
1 TAB ORAL EVERY EVENING
Status: DISCONTINUED | OUTPATIENT
Start: 2025-03-26 | End: 2025-04-03 | Stop reason: HOSPADM

## 2025-03-26 RX ORDER — LABETALOL HYDROCHLORIDE 5 MG/ML
10-20 INJECTION, SOLUTION INTRAVENOUS EVERY 10 MIN PRN
Status: DISCONTINUED | OUTPATIENT
Start: 2025-03-26 | End: 2025-04-03 | Stop reason: HOSPADM

## 2025-03-26 RX ORDER — PANTOPRAZOLE SODIUM 40 MG/1
40 TABLET, DELAYED RELEASE ORAL EVERY MORNING
Status: DISCONTINUED | OUTPATIENT
Start: 2025-03-26 | End: 2025-04-03 | Stop reason: HOSPADM

## 2025-03-26 RX ORDER — ESTRADIOL 1 MG/1
1 TABLET ORAL EVERY MORNING
Status: DISCONTINUED | OUTPATIENT
Start: 2025-03-27 | End: 2025-04-03 | Stop reason: HOSPADM

## 2025-03-26 RX ORDER — POTASSIUM CHLORIDE 29.8 MG/ML
20 INJECTION INTRAVENOUS ONCE
Status: COMPLETED | OUTPATIENT
Start: 2025-03-26 | End: 2025-03-26

## 2025-03-26 RX ORDER — LEVETIRACETAM 500 MG/1
500 TABLET ORAL 2 TIMES DAILY
Status: DISCONTINUED | OUTPATIENT
Start: 2025-03-26 | End: 2025-04-03 | Stop reason: HOSPADM

## 2025-03-26 RX ORDER — VITAMIN B COMPLEX
25 TABLET ORAL EVERY EVENING
Status: DISCONTINUED | OUTPATIENT
Start: 2025-03-26 | End: 2025-04-03 | Stop reason: HOSPADM

## 2025-03-26 RX ORDER — VALACYCLOVIR HYDROCHLORIDE 500 MG/1
500 TABLET, FILM COATED ORAL EVERY MORNING
Status: DISCONTINUED | OUTPATIENT
Start: 2025-03-26 | End: 2025-04-03 | Stop reason: HOSPADM

## 2025-03-26 RX ORDER — PREGABALIN 100 MG/1
200 CAPSULE ORAL EVERY EVENING
Status: DISCONTINUED | OUTPATIENT
Start: 2025-03-26 | End: 2025-04-03 | Stop reason: HOSPADM

## 2025-03-26 RX ORDER — METOPROLOL TARTRATE 25 MG/1
100 TABLET, FILM COATED ORAL 2 TIMES DAILY
Status: DISCONTINUED | OUTPATIENT
Start: 2025-03-26 | End: 2025-04-03 | Stop reason: HOSPADM

## 2025-03-26 RX ORDER — HYDRALAZINE HYDROCHLORIDE 20 MG/ML
10-20 INJECTION INTRAMUSCULAR; INTRAVENOUS EVERY 30 MIN PRN
Status: DISCONTINUED | OUTPATIENT
Start: 2025-03-26 | End: 2025-04-03 | Stop reason: HOSPADM

## 2025-03-26 RX ORDER — MAGNESIUM SULFATE HEPTAHYDRATE 40 MG/ML
2 INJECTION, SOLUTION INTRAVENOUS ONCE
Status: COMPLETED | OUTPATIENT
Start: 2025-03-26 | End: 2025-03-26

## 2025-03-26 RX ORDER — SODIUM CHLORIDE 9 MG/ML
INJECTION, SOLUTION INTRAVENOUS CONTINUOUS
Status: DISCONTINUED | OUTPATIENT
Start: 2025-03-26 | End: 2025-03-29

## 2025-03-26 RX ORDER — ESCITALOPRAM OXALATE 10 MG/1
20 TABLET ORAL EVERY MORNING
Status: DISCONTINUED | OUTPATIENT
Start: 2025-03-26 | End: 2025-04-03 | Stop reason: HOSPADM

## 2025-03-26 RX ORDER — CETIRIZINE HYDROCHLORIDE 10 MG/1
10 TABLET ORAL DAILY PRN
Status: DISCONTINUED | OUTPATIENT
Start: 2025-03-26 | End: 2025-04-01

## 2025-03-26 RX ADMIN — TIZANIDINE 4 MG: 2 TABLET ORAL at 19:32

## 2025-03-26 RX ADMIN — ROSUVASTATIN CALCIUM 5 MG: 5 TABLET, FILM COATED ORAL at 19:32

## 2025-03-26 RX ADMIN — POTASSIUM CHLORIDE, DEXTROSE MONOHYDRATE AND SODIUM CHLORIDE: 150; 5; 450 INJECTION, SOLUTION INTRAVENOUS at 10:22

## 2025-03-26 RX ADMIN — LEVETIRACETAM 500 MG: 500 TABLET, FILM COATED ORAL at 19:32

## 2025-03-26 RX ADMIN — MAGNESIUM SULFATE HEPTAHYDRATE 2 G: 40 INJECTION, SOLUTION INTRAVENOUS at 01:36

## 2025-03-26 RX ADMIN — CLONIDINE 1 PATCH: 0.3 PATCH TRANSDERMAL at 15:03

## 2025-03-26 RX ADMIN — PREGABALIN 200 MG: 100 CAPSULE ORAL at 19:32

## 2025-03-26 RX ADMIN — METOPROLOL TARTRATE 100 MG: 25 TABLET, FILM COATED ORAL at 19:33

## 2025-03-26 RX ADMIN — POTASSIUM CHLORIDE AND SODIUM CHLORIDE: 450; 150 INJECTION, SOLUTION INTRAVENOUS at 00:17

## 2025-03-26 RX ADMIN — POTASSIUM CHLORIDE 20 MEQ: 29.8 INJECTION INTRAVENOUS at 01:31

## 2025-03-26 RX ADMIN — PANTOPRAZOLE SODIUM 40 MG: 40 TABLET, DELAYED RELEASE ORAL at 11:56

## 2025-03-26 RX ADMIN — INSULIN GLARGINE 10 UNITS: 100 INJECTION, SOLUTION SUBCUTANEOUS at 15:03

## 2025-03-26 RX ADMIN — LEVETIRACETAM 500 MG: 500 TABLET, FILM COATED ORAL at 13:27

## 2025-03-26 RX ADMIN — Medication 25 MCG: at 19:32

## 2025-03-26 RX ADMIN — CEPHALEXIN 250 MG: 250 CAPSULE ORAL at 13:27

## 2025-03-26 RX ADMIN — ESCITALOPRAM OXALATE 20 MG: 10 TABLET ORAL at 11:56

## 2025-03-26 RX ADMIN — POTASSIUM & SODIUM PHOSPHATES POWDER PACK 280-160-250 MG 1 PACKET: 280-160-250 PACK at 11:56

## 2025-03-26 RX ADMIN — INSULIN ASPART 2 UNITS: 100 INJECTION, SOLUTION INTRAVENOUS; SUBCUTANEOUS at 18:12

## 2025-03-26 RX ADMIN — POTASSIUM CHLORIDE 20 MEQ: 29.8 INJECTION INTRAVENOUS at 08:14

## 2025-03-26 RX ADMIN — APIXABAN 5 MG: 5 TABLET, FILM COATED ORAL at 08:16

## 2025-03-26 RX ADMIN — APIXABAN 5 MG: 5 TABLET, FILM COATED ORAL at 19:32

## 2025-03-26 RX ADMIN — INSULIN HUMAN: 1 INJECTION, SOLUTION INTRAVENOUS at 00:00

## 2025-03-26 RX ADMIN — HUMAN ALBUMIN MICROSPHERES AND PERFLUTREN 5 ML: 10; .22 INJECTION, SOLUTION INTRAVENOUS at 14:15

## 2025-03-26 RX ADMIN — POTASSIUM & SODIUM PHOSPHATES POWDER PACK 280-160-250 MG 1 PACKET: 280-160-250 PACK at 08:16

## 2025-03-26 RX ADMIN — SODIUM PHOSPHATE, MONOBASIC, MONOHYDRATE AND SODIUM PHOSPHATE, DIBASIC, ANHYDROUS 15 MMOL: 142; 276 INJECTION, SOLUTION INTRAVENOUS at 03:32

## 2025-03-26 RX ADMIN — POTASSIUM CHLORIDE, DEXTROSE MONOHYDRATE AND SODIUM CHLORIDE: 150; 5; 450 INJECTION, SOLUTION INTRAVENOUS at 04:00

## 2025-03-26 RX ADMIN — INSULIN HUMAN: 1 INJECTION, SOLUTION INTRAVENOUS at 09:30

## 2025-03-26 RX ADMIN — Medication 1 TABLET: at 19:33

## 2025-03-26 RX ADMIN — METOPROLOL TARTRATE 100 MG: 25 TABLET, FILM COATED ORAL at 11:56

## 2025-03-26 RX ADMIN — VALACYCLOVIR HYDROCHLORIDE 500 MG: 500 TABLET, FILM COATED ORAL at 15:02

## 2025-03-26 ASSESSMENT — ACTIVITIES OF DAILY LIVING (ADL)
ADLS_ACUITY_SCORE: 68
ADLS_ACUITY_SCORE: 65
ADLS_ACUITY_SCORE: 68
ADLS_ACUITY_SCORE: 65
ADLS_ACUITY_SCORE: 68
ADLS_ACUITY_SCORE: 68
ADLS_ACUITY_SCORE: 66
ADLS_ACUITY_SCORE: 68
ADLS_ACUITY_SCORE: 68
ADLS_ACUITY_SCORE: 65
ADLS_ACUITY_SCORE: 68
ADLS_ACUITY_SCORE: 65
ADLS_ACUITY_SCORE: 65
ADLS_ACUITY_SCORE: 68
ADLS_ACUITY_SCORE: 65
ADLS_ACUITY_SCORE: 65

## 2025-03-26 NOTE — PROGRESS NOTES
Handoff was given by phone to Dr Barrientos of the hospitalist service at [phone].  Questions and follow up from this point forward should be forwarded to the primary team or the hospitalist team.     [facility] Critical Response Provider  Haroldo Caro, NP

## 2025-03-26 NOTE — PROGRESS NOTES
[facility] [place] ICU PROGRESS NOTE  03/26/2025      Date of Service (when I saw the patient): 03/26/2025    ASSESSMENT:   Paige Mari is a 74 year old female with PMH HTN, paroxysmal atrial fibrillation (on Eliquis), COPD, KP, and latent autoimmune diabetes mellitus who was admitted on 3/25/25 for DKA and hypertensive urgency. The patient was found by her  sitting on the toilet confused. She was then transported to the ED via EMS where she was found to be in DKA with blood sugars to 560, ketones of 5.6, pH 7.19, and lactic acid of 5.0. She was also found to be hypertensive eventually requiring nicardipine drip.       INTERVAL HISTORY:   Notes, labs, vitals reviewed. Admitted overnight to the ICU for DKA and hypertension management. Off nicardipine gtt, remains on insulin gtt this morning. Vitals are stable. No acute events since arrival to ICU.     CHANGES and MAJOR THINGS TODAY:   ~ ECHO today  ~ Remove PICC line  ~ Case Management consult  ~ Endocrinology consult  ~ PT/OT consult  ~ Transfer to Hospital Medicine    PLAN:    Neuro:  # Chronic Pain  # Encephalopathy, metabolic  Patient is altered on arrival. She is very pleasant and cooperative. She knows her name but not where she is or the date. CT head negative in outside ED. AMS likely secondary to DKA.  - Acetaminophen PRN  - PTA pregabalin, 100 mg qam + 200 mg qpm     # Partial Seizures  # MDD  # Generalized Anxiety Disorder  - PTA tizanidine  - PTA duloxetine  - PTA Keppra    Pulmonary:  # KP  # COPD  - Patient does not use CPAP at home  - Maintain O2 saturations >92%  - Pending home medication reconciliation   - PTA      Cardiovascular:  # Paroxysmal atrial fibrillation  # Hx HTN  # Hypertensive urgency   # Type 2 NSTEMI, likely demand ischemia  When the patient presented to the ED, she was found to have a blood pressure of 240 systolic. She was given labetalol 10 mg x2 with brief improvement but ultimately needed nicardipine infusion. This was  discontinued after the patient's blood pressure over corrected. BNP was also noted to be elevated. Patient does not appear fluid overload on exam and she is 100% on room air.   - MAP > 65 SBP < 180  ~ Labetalol and hydralazine for SBP >180  - EKG with LBBB noted on previous EKG  - ECHO today  - PTA Metoprolol + clonidine patch  - PTA rosuvastatin     GI/Nutrition:  No acute concerns  - Diet: Regular  - PTA omeprazole   - Bowel regimen  - Multivitamin + Mag Ox + Vitamin D3     Renal/Fluids/Electrolytes:  # Lactic acidosis, resolved  # Anion gap metabolic acidosis, resolved  - Monitor I&O  - BMP, Daily  - RN managed electrolyte replacement     Endocrine:  # Latent autoimmune diabetes mellitus  # DKA vs HHS  - Continue DKA protocol  - Insulin, Regular: Continuous Infusion  - Insulin, aspart: Carb coverage  - HOLD PTA Insulin pump + metformin + liraglutide  - 1/2 NS at 150 ml/hr, transition to D5W in 1/2 NS when blood sugar is <200  - Endocrinology consulted  - PTA estradiol for osteoporosis     ID:  # Leukocytosis  ~ PTA keflex for UTI ppx  ~ PTA valacyclovir for HSV ppx  ~ UA in ED was non-infections  ~ WBC elevation, likely secondary to DKA, no signs of infection.      Hematology:    # Leukocytosis, likely stress induced  # Anticoagulation  - PTA Apixaban  - Maintain hgb >7  - CBC daily      Musculoskeletal/Skin:  No acute concerns       General Cares/Prophylaxis:    DVT Prophylaxis: Pneumatic Compression Devices  GI Prophylaxis: Not indicated  Restraints: Not Indicated  Family Communication: DaughterMerline, called and updated.   Code Status: No CPR- Pre-arrest intubation OK    Lines/tubes/drains:  ~ PIV  ~ Remove PICC line    Disposition:  ~ [facility] ICU  ~ Transfer to Medicine floor    Patient seen and findings/plan discussed with medical ICU staff, Dr. Joshua.      I spent 30 minutes providing care, reviewing chart, medical decision making and coordination of care for this patient.     Haroldo Caro,  AG-ACNP  Critical Care  [facility]     ====================================    OBJECTIVE:   1. VITAL SIGNS:   Temp:  [97.9  F (36.6  C)-98.4  F (36.9  C)] 98  F (36.7  C)  Pulse:  [] 83  Resp:  [14-29] 14  BP: (109-245)/() 155/71  SpO2:  [95 %-100 %] 97 %  Resp: 14  2. INTAKE/ OUTPUT:   I/O last 3 completed shifts:  In: 1207.5 [I.V.:857.5; IV Piggyback:350]  Out: -     3. Physical Exam  Vitals and nursing note reviewed.   HENT:      Head: Normocephalic.      Mouth/Throat:      Mouth: Mucous membranes are moist.      Pharynx: Oropharynx is clear.   Eyes:      Extraocular Movements: Extraocular movements intact.      Conjunctiva/sclera: Conjunctivae normal.      Pupils: Pupils are equal, round, and reactive to light.   Cardiovascular:      Rate and Rhythm: Normal rate and regular rhythm.      Pulses: Normal pulses.   Pulmonary:      Effort: Pulmonary effort is normal.      Breath sounds: Normal breath sounds.   Abdominal:      General: There is no distension.      Palpations: Abdomen is soft.      Tenderness: There is no abdominal tenderness.   Musculoskeletal:         General: Normal range of motion.      Cervical back: Normal range of motion.   Skin:     General: Skin is warm and dry.      Capillary Refill: Capillary refill takes less than 2 seconds.   Neurological:      General: No focal deficit present.      Mental Status: She is alert. She is disoriented and confused.         4. LABS:   Arterial Blood Gases   No lab results found in last 7 days.  Complete Blood Count   Recent Labs   Lab 03/26/25  0602 03/26/25  0030 03/25/25  1847   WBC 21.9* 20.5* 20.3*   HGB 10.8* 12.2 13.4    326 412     Basic Metabolic Panel  Recent Labs   Lab 03/26/25  0657 03/26/25  0602 03/26/25  0600 03/26/25  0458 03/26/25  0302 03/26/25  0253 03/26/25  0103 03/26/25  0030 03/25/25  2356 03/25/25  1461   NA  --  137  --   --   --  137  --  138  --  136   POTASSIUM  --  3.6  --   --   --  4.5  --   3.7  --  3.8   CHLORIDE  --  103  --   --   --  102  --  99  --  99   CO2  --  21*  --   --   --  20*  --  18*  --  16*   BUN  --  23.3*  --   --   --  23.6*  --  23.5*  --  21.6   CR  --  0.67  --   --   --  0.71  --  0.74  --  0.76   * 201* 202* 229*   < > 235*   < > 311*   < > 389*    < > = values in this interval not displayed.     Liver Function Tests  Recent Labs   Lab 03/26/25  0030   AST 19   ALT 15   ALKPHOS 79   BILITOTAL 0.7   ALBUMIN 4.3   INR 1.21*     Coagulation Profile  Recent Labs   Lab 03/26/25  0030   INR 1.21*       5. RADIOLOGY:   Recent Results (from the past 24 hours)   CT Head w/o Contrast    Narrative    EXAM: CT HEAD W/O CONTRAST  LOCATION: [facility]  DATE: 3/25/2025    INDICATION: Sudden onset confusion, marked hypertension eval for ICH  COMPARISON: None.  TECHNIQUE: Routine CT Head without IV contrast. Multiplanar reformats. Dose reduction techniques were used.    FINDINGS:  INTRACRANIAL CONTENTS: No intracranial hemorrhage, extraaxial collection, or mass effect.  No CT evidence of acute infarct. Mild presumed chronic small vessel ischemic changes. Mild generalized volume loss. No hydrocephalus.     VISUALIZED ORBITS/SINUSES/MASTOIDS: No intraorbital abnormality. No paranasal sinus mucosal disease. No middle ear or mastoid effusion.    BONES/SOFT TISSUES: No acute abnormality.      Impression    IMPRESSION:  1.  No CT evidence for acute intracranial process.  2.  Mild chronic microvascular ischemic changes as above.     CT Abdomen Pelvis w Contrast    Narrative    EXAM: CT ABDOMEN PELVIS W CONTRAST  LOCATION: [facility]  DATE: 3/25/2025    INDICATION: generalized abdominal pain, nausea, eval for sbo  COMPARISON: None.  TECHNIQUE: CT scan of the abdomen and pelvis was performed following injection of IV contrast. Multiplanar reformats were obtained. Dose reduction techniques were used.  CONTRAST: 71ml isovue 370    FINDINGS:    LOWER CHEST: Normal.    HEPATOBILIARY: Normal.    PANCREAS: Normal.    SPLEEN: Normal.    ADRENAL GLANDS: Normal.    KIDNEYS/BLADDER: Small benign renal cysts requiring no follow-up.    BOWEL: Bowel is normal in caliber. No obstruction.    LYMPH NODES: No adenopathy.    VASCULATURE: Atherosclerotic plaque throughout the aorta.    PELVIC ORGANS: Normal.    MUSCULOSKELETAL: Postoperative changes lumbar spine.      Impression    IMPRESSION:   1.  No acute process in the abdomen or pelvis.   Chest XR,  PA & LAT    Narrative    EXAM: XR CHEST 2 VIEWS  LOCATION: [facility]  DATE: 3/25/2025    INDICATION: eval for pna or pulm edema  COMPARISON: 7/23/2023      Impression    IMPRESSION: Negative chest.   POC US ECHO LIMITED    Impression    Somewhat difficult anatomical windows but LV appears grossly normal ejection fraction, pericardial fat pad but no pericardial effusion.  IVC not plethoric with good respiratory variation, no B-lines.

## 2025-03-26 NOTE — ED NOTES
Daughter at bedside stating that pt has fallen multiple times over the past few months.  Pt has to hold on to walls and chairs for stability to get around the home.  Daughter is concerned for pt safety while at home.  Pt  is on opposite schedules for care taking.

## 2025-03-26 NOTE — ED NOTES
According to DKA algorithm, after 2 consecutive 30 min checks BG has fallen less than 50 mg/dL, going to remain in algorithm 1.  See MAR.  Next recheck at 2330.

## 2025-03-26 NOTE — PROGRESS NOTES
[facility]    ICU Accept Note - Hospitalist Service, GOLD TEAM        Date of Admission:  3/25/2025    Assessment & Plan   Paige Mari is a 74 year old female with a past medical history of HTN, paroxysmal atrial fibrillation (on Eliquis), COPD, KP, and latent autoimmune diabetes mellitus who was admitted on 3/25/25 for DKA and hypertensive urgency. The patient was found by her  sitting on the toilet confused. She was then transported to the ED via EMS where she was found to be in DKA with blood sugars to 560, ketones of 5.6, pH 7.19, and lactic acid of 5.0. She was also found to be hypertensive eventually requiring nicardipine drip. Since weaned off the nicardipine drip, she is now transferring to the internal medicine service for ongoing cares.     DKA   Latent Autoimmune Diabetes Mellitus - Presented to the ED after being found on the toilet by her  confused. ED work up consistent with DKA. Started on insulin gtt and IV fluids. Gap now closed, still with some acidosis.  PTA managed with metformin, victoza, and insulin pump, however concerns patient's pump turned off or insulin reservoir not replaced due to patient's memory issues.   -Endocrinology following  -insulin gtt, non-DKA protocol   -Lantus 10 units  -carb correctional sliding scale insulin   -- PTA pregabalin, 100 mg qam + 200 mg qpm for neuropathy     Leukocytosis - Suspect stress induced in setting of above DKA. Low suspicion for underlying infection at this time.   -Trend    Hypertensive urgency, resolved  Type 2 NSTEMI, likely demand ischemia  Paroxysmal atrial fibrillation  HTN - Blood pressure in ED ~240 systolic. She was given labetalol 10 mg x2 with brief improvement but ultimately needed nicardipine infusion. Subsequently discontinued when blood pressure overcorrected. BNP elevated to 2206. Given patient's memory impairment, question if patient complaint with medications at  home.    - MAP > 65 SBP < 180  ~ Labetalol and hydralazine for SBP >180  - ECHO today  - PTA Metoprolol + clonidine patch  -PTA Eliquis  -PTA rosuvastatin    Recurrent UTIs  Urinary Retention - On Keflex for UTI prophylaxis.  UA 3/25 w/o growth  -continue PTA Keflex     Acute Metabolic Encephalopathy  Cognitive Impairment, concerns for Dementia   Patient unable to give her own name, where she is, why she's here.  Alert and pleasantly confused. Per endocrinology note, provider spoke with daughter who noted several years of declining cognition. CT head negative in outside ED. Suspect some element of acute confusion in setting of acute illness as above.   -OT for SLUMs/MOCA   -social work consult     Partial Seizures  MDD  Generalized Anxiety Disorder  - PTA tizanidine  - PTA duloxetine  - PTA Keppra    Normocytic Anemia - Hgb 10.8, suspect dilutional anemia d/t above IV fluids  -repeat CBC in AM  -monitor for sign/symptoms of active bleeding         Clinically Significant Risk Factors Present on Admission        # Hyperkalemia: Highest K = 5.4 mmol/L in last 2 days, will monitor as appropriate  # Hyponatremia: Lowest Na = 133 mmol/L in last 2 days, will monitor as appropriate  # Hypochloremia: Lowest Cl = 91 mmol/L in last 2 days, will monitor as appropriate  # Hypocalcemia: Lowest Ca = 7.7 mg/dL in last 2 days, will monitor and replace as appropriate  # Hypercalcemia: Highest Ca = 10.8 mg/dL in last 2 days, will monitor as appropriate  # Hypomagnesemia: Lowest Mg = 1.6 mg/dL in last 2 days, will replace as needed  # Anion Gap Metabolic Acidosis: Highest Anion Gap = 29 mmol/L in last 2 days, will monitor and treat as appropriate   # Drug Induced Coagulation Defect: home medication list includes an anticoagulant medication    # Hypertension: Noted on problem list      # Anemia: based on hgb <11      # DMII: A1C = 9.9 % (Ref range: <5.7 %) within past 6 months               Disposition Plan     Medically Ready for  "Discharge: Anticipated in 2-4 Days         The patient's care was discussed with the Bedside Nurse and Patient.    Lizzie Garcia PA-C  Hospitalist Service, [facility]  Securely message with [employer]planes (more info)  Text page via [facility] Paging/Directory   See signed in provider for up to date coverage information  ______________________________________________________________________    Interval History   All overnight events reviewed. Patient sitting up in bed, alert, very pleasant.  Answers \"hmm yes\" to every question. Is unable to give her own name, location, season, reason for admission. Struggles following commands, but no focal deficits appreciated.     Physical Exam   Vital Signs: Temp: 98  F (36.7  C) Temp src: Oral BP: (!) 136/96 Pulse: 87   Resp: 25 SpO2: 98 % O2 Device: None (Room air)    Weight: 134 lbs .63 oz    GENERAL: Alert, pleasantly confused.   HEENT: Anicteric sclera. Mucous membranes moist. NC. AT.   CV: RRR. S1, S2. No murmurs appreciated.   RESPIRATORY: Effort normal on RA. Lungs CTAB with no wheezing, rales, rhonchi.   GI: Abdomen soft and non distended with normoactive bowel sounds present in all quadrants. No tenderness   NEUROLOGICAL: No focal deficits. Moves all extremities.  EXTREMITIES: No peripheral edema.  SKIN: No jaundice. No rashes.        Medical Decision Making       65 MINUTES SPENT BY ME on the date of service doing chart review, history, exam, documentation & further activities per the note.      Data   Unresulted Labs Ordered in the Past 30 Days of this Admission       Date and Time Order Name Status Description    3/26/2025 11:28 AM Keppra (Levetiracetam) Level In process     3/25/2025 11:52 PM Blood Culture Hand, Left In process     3/25/2025 11:52 PM Blood Culture Hand, Right In process           Recent Labs   Lab 03/26/25  1408 03/26/25  1302 03/26/25  1202 03/26/25  1053 03/26/25  1029 03/26/25  0657 03/26/25  0602 " 03/26/25  0302 03/26/25  0253 03/26/25  0103 03/26/25  0030 03/25/25  2129 03/25/25  1847   WBC  --   --   --   --   --   --  21.9*  --   --   --  20.5*  --  20.3*   HGB  --   --   --   --   --   --  10.8*  --   --   --  12.2  --  13.4   MCV  --   --   --   --   --   --  81  --   --   --  82  --  83   PLT  --   --   --   --   --   --  340  --   --   --  326  --  412   INR  --   --   --   --   --   --   --   --   --   --  1.21*  --   --    NA  --   --   --   --  133*  --  137  --  137  --  138   < > 135   POTASSIUM  --   --   --   --  5.4*  --  3.6  --  4.5  --  3.7   < > 4.8   CHLORIDE  --   --   --   --  104  --  103  --  102  --  99   < > 91*   CO2  --   --   --   --  18*  --  21*  --  20*  --  18*   < > 15*   BUN  --   --   --   --  21.8  --  23.3*  --  23.6*  --  23.5*   < > 25.7*   CR  --   --   --   --  0.60  --  0.67  --  0.71  --  0.74   < > 0.84   ANIONGAP  --   --   --   --  11  --  13  --  15  --  21*   < > 29*   BRUNO  --   --   --   --  7.7*  --  8.8  --  9.4  --  9.7   < > 10.8*   * 225* 168*   < > 558*   < > 201*   < > 235*   < > 311*   < > 569*   ALBUMIN  --   --   --   --   --   --   --   --   --   --  4.3  --   --    PROTTOTAL  --   --   --   --   --   --   --   --   --   --  7.3  --   --    BILITOTAL  --   --   --   --   --   --   --   --   --   --  0.7  --   --    ALKPHOS  --   --   --   --   --   --   --   --   --   --  79  --   --    ALT  --   --   --   --   --   --   --   --   --   --  15  --   --    AST  --   --   --   --   --   --   --   --   --   --  19  --   --     < > = values in this interval not displayed.

## 2025-03-26 NOTE — PROGRESS NOTES
Neuro: Alert, oriented to self only. Pleasantly confused. Slept poorly overnight. Denies pain.  Cardiac: Sinus rhythm, normotensive.   Resp: Lung sounds clear, RA. Denies SOB. No cough  GI: No bowel movement overnight. Consistent carb diet  : purewick in place, incontinent  Skin: skin grossly intact  Lines: left triple lumen PICC, x2 right PIV  Drips:   - Insulin algorithm 4, 8 units/hr   - D5 1/2 NS w/ Kcl 20mEq at 150mL  Activity: advance as tolerated    Major Shift Events:  Arrived to unit at 0000. Magnesium, potassium, and phosphorus replaced overnight. Ketones trending downwards    Plan: Continue POC.    For vital signs and complete assessments, please see documentation flowsheets.

## 2025-03-26 NOTE — H&P
MEDICAL ICU H&P  03/25/2025    Date of Hospital Admission: 03/25/25   Date of ICU Admission: 03/25/25   Reason for Critical Care Admission: DKA and Hypertensive Urgency  Date of Service (when I saw the patient): 03/25/2025    ASSESSMENT: Paige Mari is a 74 year old female with PMH HTN, paroxysmal atrial fibrillation (on Eliquis), COPD, KP, and latent autoimmune diabetes mellitus who was admitted on 3/25/25 for DKA and hypertensive urgency. The patient was found by her  sitting on the toilet confused. She was then transported to the ED via EMS where she was found to be in DKA with blood sugars to 560, ketones of 5.6, pH 7.19, and lactic acid of 5.0. She was also found to be hypertensive eventually requiring nicardipine drip.    CHANGES and MAJOR THINGS TODAY:   - Admit to ICU  - Continue insulin and fluid resuscitation  - Obtain ECHO in AM  - Continue blood pressure management  - Pending home medication reconciliation     PLAN:    Neuro:  # Pain  # Encephalopathy, metabolic  Patient is altered on arrival. She is very pleasant and cooperative. She knows her name but not where she is or the date. CT head negative in outside ED. AMS likely secondary to DKA.  - Acetaminophen PRN  - Pharmacy consult placed to determine patient's home medication regimen    Pulmonary:  # KP  # COPD  - Patient does not use CPAP at home  - Maintain O2 saturations >92%  - Pending home medication reconciliation     Cardiovascular:  # Paroxysmal atrial fibrillation  # Hx HTN  # Hypertensive urgency   # Type 2 NSTEMI, likely demand ischemia  When the patient presented to the ED, she was found to have a blood pressure of 240 systolic. She was given labetalol 10 mg x2 with brief improvement but ultimately needed nicardipine infusion. This was discontinued after the patient's blood pressure over corrected. BNP was also noted to be elevated. Patient does not appear fluid overload on exam and she is 100% on room air.   - Labetalol and  hydralazine for SBP >180  - If blood pressure is not controlled will initiate nicardipine  - Trops flat in ED  - EKG with LBBB noted on previous EKG  - ECHO ordered    GI/Nutrition:  No acute concerns  - Bedside swallow followed by diabetic diet  - CT ab/pelvis negative at outside ED    Renal/Fluids/Electrolytes:  # Lactic acidosis  # Anion gap metabolic acidosis  - Recheck lactic acid, VBG on arrival   - No galaviz needed at this time     Endocrine:  # Latent autoimmune diabetes mellitus  # DKA vs HHS  - Continue DKA protocol  - Insulin infusion  - 1/2 NS at 150 ml/hr, transition to D5W in 1/2 NS when blood sugar is <200  - BMP, ketones, lactic acid Q4H    ID:  No acute concerns for infection  Patient received    Hematology:    # Leukocytosis, likely stress induced  - Maintain hgb >7  - CBC daily     Musculoskeletal/Skin:  No acute concerns    General Cares/Prophylaxis:  DVT Prophylaxis: Enoxaparin (Lovenox) SQ  GI Prophylaxis: Not indicated  Restraints: None  Family Communication: Daughter called on the phone  Code Status: Full code    Lines/tubes/drains:  - PIV    Disposition:  - [facility] ICU    I spent a total of 55 minutes (excluding procedure time) personally providing and directing critical care services at the bedside and on the critical care unit for Paige Mari     Mike Dunlap PA-C    Clinically Significant Risk Factors Present on Admission          # Hypochloremia: Lowest Cl = 91 mmol/L in last 2 days, will monitor as appropriate   # Hypercalcemia: Highest Ca = 10.8 mg/dL in last 2 days, will monitor as appropriate   # Anion Gap Metabolic Acidosis: Highest Anion Gap = 29 mmol/L in last 2 days, will monitor and treat as appropriate      # Hypertension: Noted on problem list          # DMII: A1C = 9.9 % (Ref range: <5.7 %) within past 6 months                 -----------------------------------------------------------------------  HISTORY PRESENTING ILLNESS: Paige Mari is a 74 year old  female with PMH HTN, paroxysmal atrial fibrillation (on Eliquis), COPD, KP, and latent autoimmune diabetes mellitus who was admitted on 3/25/25 for DKA and hypertensive urgency. The patient was found by her  sitting on the toilet confused. She was then transported to the ED via EMS where she was found to be in DKA with blood sugars to 560, ketones of 5.6, pH 7.19, and lactic acid of 5.0. She was also found to be hypertensive eventually requiring nicardipine drip.    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), managed as type 1 (H)     Created by Conversion       Mood disorder 11/24/2014    Multiple closed fractures of facial bone, initial encounter (H)     KP (obstructive sleep apnea) 05/15/2015    Paroxysmal atrial fibrillation (H) 09/22/2021    Small bowel obstruction (H) 09/18/2021    Status post total knee replacement 11/24/2014     SURGICAL HISTORY:  Past Surgical History:   Procedure Laterality Date    APPENDECTOMY      at age 3    ARTHROSCOPY KNEE WITH MENISCECTOMY  3/10/14    partial medial and lateral meniscectomies, subpatellar chondroplasty    BACK SURGERY      BLEPHAROPLASTY Bilateral 8/17/2015    Procedure: BLEPHAROPLASTY BILATERAL UPPER LIDS;  Surgeon: Chasidy Bryant MD;  Location: [facility];  Service:     BOWEL RESECTION  12 years ago    COLON SURGERY  2004    sigmoid colectomy    DILATION AND CURETTAGE      x3    EP LOOP RECORDER IMPLANT N/A 8/21/2023    Procedure: Loop Recorder Implant;  Surgeon: Nolvia Ruiz,  "MD;  Location: [facility] CV    HYSTERECTOMY      age 39    IR LUMBAR PUNCTURE  1/22/2022    JOINT REPLACEMENT      OOPHORECTOMY      OTHER SURGICAL HISTORY  2005    bowel obstruction    PICC TRIPLE LUMEN PLACEMENT  9/19/2021         PICC TRIPLE LUMEN PLACEMENT  1/22/2022         MD ARTHRODESIS ANT INTERBODY MIN DISCECTOMY,LUMBAR N/A 1/30/2015    Procedure: ANTERIOR LUMBAR INTERBODY FUSION L4-5, INTERBODY GRAFT, BMP INSTRUMENTATION;  Surgeon: Zeeshan Guillaume MD;  Location: [facility];  Service: Spine    TOE SURGERY      TONSILLECTOMY & ADENOIDECTOMY      age 10    ZZC REMOVAL OF OVARY(S)      Description: Oophorectomy;  Recorded: 06/03/2010;    ZZC REMOVAL OF OVARY(S)      Description: Oophorectomy;  Recorded: 06/03/2010;    ZZC TOTAL ABDOM HYSTERECTOMY      Description: Hysterectomy;  Recorded: 06/03/2010;    ZZC TOTAL ABDOM HYSTERECTOMY      Description: Hysterectomy;  Recorded: 06/03/2010;    ZC TOTAL KNEE ARTHROPLASTY Right 11/24/2014    Procedure: RIGHT TOTAL KNEE ARTHROPLASTY;  Surgeon: Jules Harris MD;  Location: [facility];  Service: Orthopedics     SOCIAL HISTORY:  Social History     Socioeconomic History    Marital status:    Tobacco Use    Smoking status: Every Day     Current packs/day: 0.00     Average packs/day: 0.5 packs/day for 19.0 years (9.5 ttl pk-yrs)     Types: Cigarettes     Start date: 9/15/2002     Last attempt to quit: 9/15/2021     Years since quitting: 3.5    Smokeless tobacco: Never    Tobacco comments:     Seen as Inpatient by Tobacco Treatment and history updated on 8/21/2023, at time smoking 1 cigarette per day   Substance and Sexual Activity    Alcohol use: No    Drug use: No     FAMILY HISTORY:   Family History   Problem Relation Age of Onset    Breast Cancer No family hx of      ALLERGIES:   Allergies   Allergen Reactions    Morphine Other (See Comments)     \"make me delirious,\"  \"nightmares\"    Nitrofurantoin     Adhesive [Cyanoacrylate] " Other (See Comments)     Can only tolerate tape for short periods of time shelia in sensitive areas    Amlodipine Unknown and Other (See Comments)     Other reaction(s): delerium    Doxazosin Other (See Comments)     Other reaction(s): feels foggy    Irbesartan-Hydrochlorothiazide Other (See Comments)     Other reaction(s): hyponatremia    Other Allergy (See Comments) [External Allergen Needs Reconciliation - See Comment] Unknown     Other reaction(s): skin rash    Prednisone Unknown     Caused extremely high blood sugars    Tramadol Other (See Comments)     Possible seizure activity    Trazodone Unknown     Other reaction(s): hung over     MEDICATIONS:  No current facility-administered medications for this encounter.     Current Outpatient Medications   Medication Sig Dispense Refill    acetaminophen (TYLENOL) 500 MG tablet Take 1,000 mg by mouth 2 times daily.      albuterol (PROAIR HFA/PROVENTIL HFA/VENTOLIN HFA) 108 (90 Base) MCG/ACT inhaler Inhale 2 puffs into the lungs every 6 hours as needed for shortness of breath, wheezing or cough      apixaban ANTICOAGULANT (ELIQUIS) 5 MG tablet Take 1 tablet (5 mg) by mouth 2 times daily. 60 tablet 11    cephALEXin (KEFLEX) 250 MG capsule Take 250 mg by mouth every morning.      cetirizine (ZYRTEC) 10 MG tablet Take 10 mg by mouth daily as needed for allergies      cholecalciferol (VITAMIN D3) 25 mcg (1000 units) capsule Take 1,000 Units by mouth every evening.      cloNIDine (CATAPRES-TTS3) 0.3 MG/24HR WK patch Place 1 patch onto the skin once a week (Sunday)      Continuous Blood Gluc  (DEXCOM G6 ) TORRIE Use to read blood sugars as per 's instructions. 1 each 0    Continuous Glucose Sensor (DEXCOM G6 SENSOR) MISC CHANGE EVERY 10 DAYS. 9 each 1    Continuous Glucose Transmitter (DEXCOM G6 TRANSMITTER) MISC CHANGE EVERY 3 MONTHS. 1 each 1    diclofenac sodium (VOLTAREN) 1 % Gel Apply 1 g topically daily as needed (pain)      DULoxetine (CYMBALTA)  60 MG capsule Take 60 mg by mouth every morning.      escitalopram (LEXAPRO) 20 MG tablet Take 20 mg by mouth every morning.      estradiol (ESTRACE) 1 MG tablet Take 1 mg by mouth every morning.      fluticasone (FLONASE) 50 MCG/ACT nasal spray Spray 1 spray into both nostrils daily as needed for rhinitis or allergies      Glucagon (GVOKE HYPOPEN) 1 MG/0.2ML pen Inject 0.2 mLs (1 mg) into the muscle as needed for low blood sugar 0.4 mL 1    glucose (BD GLUCOSE) 5 g chewable tablet Take 2 tablets (10 g) by mouth daily as needed (hypoglycemia) 300 tablet 3    insulin aspart (NOVOLOG VIAL) 100 UNITS/ML vial For Pump refill      insulin detemir (LEVEMIR VIAL) 100 UNIT/ML vial Inject subcutaneously as needed. For pump failure and very high sugars      Insulin Disposable Pump (OMNIPOD 5 G6 INTRO, GEN 5,) KIT 1 each daily 1 kit 1    Insulin Disposable Pump (OMNIPOD 5 PODS, GEN 5,) MISC USE AS DIRECTED AND CHANGE EVERY 3 DAYS 30 each 0    INSULIN PUMP - OUTPATIENT Inject subcutaneously. Date Last Updated: 2/25/25  ICR 1:20, BG target and BG correction threshold both at 130 mg/dL, sensitivity 60 mg/dL, and basal rate delivers 12 units/day.      levETIRAcetam (KEPPRA) 500 MG tablet Take 500 mg by mouth 2 times daily.      liraglutide (VICTOZA PEN) 18 MG/3ML solution Inject 1.2 mg subcutaneously daily      magnesium oxide (MAG-OX) 400 MG tablet Take 400 mg by mouth every evening.      medical cannabis (Patient's own supply) 1 Dose by Other route See Admin Instructions [employer] Tangerine Vape- 1-4 puffs HS PRN      metFORMIN (GLUCOPHAGE XR) 500 MG 24 hr tablet Take 1,000 mg by mouth 2 times daily.      metoprolol succinate ER (TOPROL-XL) 100 MG 24 hr tablet Take 100 mg by mouth 2 times daily      Multiple Vitamins-Minerals (MULTIVITAMIN WOMEN 50+) TABS Take 1 tablet by mouth every evening.      nystatin (MYCOSTATIN) 032556 UNIT/GM external powder Apply topically 2 times daily as needed      omeprazole (PRILOSEC) 20 MG capsule  Take 1 capsule by mouth every morning      pregabalin (LYRICA) 100 MG capsule Take 100 mg by mouth every morning. In addition, take 2 x 100 (dose = 200 mg) capsules every evening.      pregabalin (LYRICA) 100 MG capsule Take 200 mg by mouth every evening. In addition, take one 100 capsule every morning. .      rosuvastatin (CRESTOR) 5 MG tablet Take 5 mg by mouth every evening.      tiZANidine (ZANAFLEX) 2 MG tablet Take 4 mg by mouth every evening.      valACYclovir (VALTREX) 500 MG tablet Take 500 mg by mouth every morning.       Facility-Administered Medications Ordered in Other Encounters   Medication Dose Route Frequency Provider Last Rate Last Admin    0.9% sodium chloride + KCl 20 mEq/L infusion   Intravenous Continuous Pierce Rowe  mL/hr at 03/25/25 2237 Rate Verify at 03/25/25 2237    dextrose 50 % injection 25-50 mL  25-50 mL Intravenous Q15 Min PRN Pierce Rowe MD        insulin regular (MYXREDLIN) 1 unit/mL infusion   Intravenous Continuous Pierce Rowe MD 5.5 mL/hr at 03/25/25 2227 Rate Verify at 03/25/25 2237    lidocaine (LMX4) cream   Topical Q1H PRN Pierce Rowe MD        lidocaine 1 % 0.1-5 mL  0.1-5 mL Other Q1H PRN Pierce Rowe MD   2 mL at 03/25/25 2220    niCARdipine 40 mg in 200 mL NS (CARDENE) infusion  0.5-15 mg/hr Intravenous Continuous Pierce Rowe MD   Stopped at 03/25/25 2151     PHYSICAL EXAMINATION:  Temp:  [97.9  F (36.6  C)] 97.9  F (36.6  C)  Pulse:  [] 96  Resp:  [15-22] 15  BP: (132-245)/() 153/92  SpO2:  [95 %-100 %] 98 %  General: Lying supine in bed, in no acute distress  Skin:  Pale, warm and dry.   Neuro: CNII-XII grossly intact  ENT: PERRL, EOMI, no nystagmus, anicteric  Heart: Regular rate and rhythm, no murmurs, rubs, or gallops appreciated  Lungs: CTA BL, unlabored breathing, on room air  Abdomen: Soft, nondistended, nontender, BS x4  Extremities: Moves to command, no edema, capillary refill <3 sec BL upper and lower extremities.   Mental:  Alert and  oriented to person but not to place or time.      LABS: Reviewed.   Arterial Blood Gases   No lab results found in last 7 days.  Complete Blood Count   Recent Labs   Lab 03/25/25  1847   WBC 20.3*   HGB 13.4        Basic Metabolic Panel  Recent Labs   Lab 03/25/25 2225 03/25/25 2158 03/25/25 2129 03/25/25  1847   NA  --   --   --  135   POTASSIUM  --   --   --  4.8   CHLORIDE  --   --   --  91*   CO2  --   --   --  15*   BUN  --   --   --  25.7*   CR  --   --   --  0.84   * 424* 390* 569*     Liver Function Tests  No lab results found in last 7 days.  Coagulation Profile  No lab results found in last 7 days.    IMAGING:  Recent Results (from the past 24 hours)   CT Head w/o Contrast    Narrative    EXAM: CT HEAD W/O CONTRAST  LOCATION: [facility]  DATE: 3/25/2025    INDICATION: Sudden onset confusion, marked hypertension eval for ICH  COMPARISON: None.  TECHNIQUE: Routine CT Head without IV contrast. Multiplanar reformats. Dose reduction techniques were used.    FINDINGS:  INTRACRANIAL CONTENTS: No intracranial hemorrhage, extraaxial collection, or mass effect.  No CT evidence of acute infarct. Mild presumed chronic small vessel ischemic changes. Mild generalized volume loss. No hydrocephalus.     VISUALIZED ORBITS/SINUSES/MASTOIDS: No intraorbital abnormality. No paranasal sinus mucosal disease. No middle ear or mastoid effusion.    BONES/SOFT TISSUES: No acute abnormality.      Impression    IMPRESSION:  1.  No CT evidence for acute intracranial process.  2.  Mild chronic microvascular ischemic changes as above.     CT Abdomen Pelvis w Contrast    Narrative    EXAM: CT ABDOMEN PELVIS W CONTRAST  LOCATION: [facility]  DATE: 3/25/2025    INDICATION: generalized abdominal pain, nausea, eval for sbo  COMPARISON: None.  TECHNIQUE: CT scan of the abdomen and pelvis was performed following injection of IV contrast. Multiplanar reformats were obtained.  Dose reduction techniques were used.  CONTRAST: 71ml isovue 370    FINDINGS:   LOWER CHEST: Normal.    HEPATOBILIARY: Normal.    PANCREAS: Normal.    SPLEEN: Normal.    ADRENAL GLANDS: Normal.    KIDNEYS/BLADDER: Small benign renal cysts requiring no follow-up.    BOWEL: Bowel is normal in caliber. No obstruction.    LYMPH NODES: No adenopathy.    VASCULATURE: Atherosclerotic plaque throughout the aorta.    PELVIC ORGANS: Normal.    MUSCULOSKELETAL: Postoperative changes lumbar spine.      Impression    IMPRESSION:   1.  No acute process in the abdomen or pelvis.   Chest XR,  PA & LAT    Narrative    EXAM: XR CHEST 2 VIEWS  LOCATION: [facility]  DATE: 3/25/2025    INDICATION: eval for pna or pulm edema  COMPARISON: 7/23/2023      Impression    IMPRESSION: Negative chest.   POC US ECHO LIMITED    Impression    Somewhat difficult anatomical windows but LV appears grossly normal ejection fraction, pericardial fat pad but no pericardial effusion.  IVC not plethoric with good respiratory variation, no B-lines.       Calm

## 2025-03-26 NOTE — MEDICATION SCRIBE - ADMISSION MEDICATION HISTORY
"Medication Scribe Admission Medication History    Admission medication history is complete. The information provided in this note is only as accurate as the sources available at the time of the update.    Information Source(s): Family member, Clinic records, Hospital records, Prescription bottles, and CareEverywhere/SureScripts via in-person    Pertinent Information: patient unable to be interviewed.     Patient's filled pill box was brought into hospital and was reviewed. Pills were identified through [employer]. Patient's daughter, Merline, [phone], was present and participated in medication interview    Patient has insulin pump. Alerted [facility] to enter pump settings     Liraglutide: Per 8/17/23 PTA note: \"patient had been instructed to stop medication. Daughter reported that patient continued to take.\" Sure scripts contains no fill data for the past 12 months. Clinic visits state patient is taking. Daughter reports that she thinks patient is taking. Kept on PTA list.     Changes made to PTA medication list:  Added: None  Deleted: Olmesartan,   Changed: Insulin dosing to reflect pump, Lyrica to 100 AM, 200 PM    Allergies reviewed with patient and updates made in EHR: yes    Medication History Completed By: Haroldo Leonard 3/25/2025 9:45 PM    PTA Med List   Medication Sig Last Dose/Taking    acetaminophen (TYLENOL) 500 MG tablet Take 1,000 mg by mouth 2 times daily. 3/24/2025 Evening    albuterol (PROAIR HFA/PROVENTIL HFA/VENTOLIN HFA) 108 (90 Base) MCG/ACT inhaler Inhale 2 puffs into the lungs every 6 hours as needed for shortness of breath, wheezing or cough Taking As Needed    apixaban ANTICOAGULANT (ELIQUIS) 5 MG tablet Take 1 tablet (5 mg) by mouth 2 times daily. 3/24/2025 Evening    cephALEXin (KEFLEX) 250 MG capsule Take 250 mg by mouth every morning. 3/24/2025 Morning    cetirizine (ZYRTEC) 10 MG tablet Take 10 mg by mouth daily as needed for allergies Taking As Needed    cholecalciferol (VITAMIN D3) 25 " mcg (1000 units) capsule Take 1,000 Units by mouth every evening. 3/24/2025 Evening    cloNIDine (CATAPRES-TTS3) 0.3 MG/24HR WK patch Place 1 patch onto the skin once a week (Sunday) 3/23/2025    diclofenac sodium (VOLTAREN) 1 % Gel Apply 1 g topically daily as needed (pain) Taking As Needed    DULoxetine (CYMBALTA) 60 MG capsule Take 60 mg by mouth every morning. 3/24/2025 Morning    escitalopram (LEXAPRO) 20 MG tablet Take 20 mg by mouth every morning. 3/24/2025 Morning    estradiol (ESTRACE) 1 MG tablet Take 1 mg by mouth every morning. 3/24/2025 Morning    fluticasone (FLONASE) 50 MCG/ACT nasal spray Spray 1 spray into both nostrils daily as needed for rhinitis or allergies Taking As Needed    Glucagon (GVOKE HYPOPEN) 1 MG/0.2ML pen Inject 0.2 mLs (1 mg) into the muscle as needed for low blood sugar Taking As Needed    glucose (BD GLUCOSE) 5 g chewable tablet Take 2 tablets (10 g) by mouth daily as needed (hypoglycemia) Taking As Needed    insulin aspart (NOVOLOG VIAL) 100 UNITS/ML vial For Pump refill Taking    insulin detemir (LEVEMIR VIAL) 100 UNIT/ML vial Inject subcutaneously as needed. For pump failure and very high sugars Taking As Needed    INSULIN PUMP - OUTPATIENT Inject subcutaneously. Date Last Updated: 2/25/25  ICR 1:20, BG target and BG correction threshold both at 130 mg/dL, sensitivity 60 mg/dL, and basal rate delivers 12 units/day. Taking    levETIRAcetam (KEPPRA) 500 MG tablet Take 500 mg by mouth 2 times daily. 3/24/2025 Evening    liraglutide (VICTOZA PEN) 18 MG/3ML solution Inject 1.2 mg subcutaneously daily 3/24/2025    magnesium oxide (MAG-OX) 400 MG tablet Take 400 mg by mouth every evening. 3/24/2025 Evening    medical cannabis (Patient's own supply) 1 Dose by Other route See Admin Instructions [employer] Tangerine Vape- 1-4 puffs HS PRN Taking    metFORMIN (GLUCOPHAGE XR) 500 MG 24 hr tablet Take 1,000 mg by mouth 2 times daily. 3/24/2025 Evening    metoprolol succinate ER (TOPROL-XL) 100  MG 24 hr tablet Take 100 mg by mouth 2 times daily 3/24/2025 Evening    Multiple Vitamins-Minerals (MULTIVITAMIN WOMEN 50+) TABS Take 1 tablet by mouth every evening. 3/24/2025 Evening    nystatin (MYCOSTATIN) 451323 UNIT/GM external powder Apply topically 2 times daily as needed Taking As Needed    omeprazole (PRILOSEC) 20 MG capsule Take 1 capsule by mouth every morning 3/24/2025 Morning    pregabalin (LYRICA) 100 MG capsule Take 100 mg by mouth every morning. In addition, take 2 x 100 (dose = 200 mg) capsules every evening. 3/24/2025 Morning    pregabalin (LYRICA) 100 MG capsule Take 200 mg by mouth every evening. In addition, take one 100 capsule every morning. . 3/24/2025 Evening    rosuvastatin (CRESTOR) 5 MG tablet Take 5 mg by mouth every evening. 3/24/2025 Evening    tiZANidine (ZANAFLEX) 2 MG tablet Take 4 mg by mouth every evening. 3/24/2025 Evening    valACYclovir (VALTREX) 500 MG tablet Take 500 mg by mouth every morning. 3/24/2025 Morning

## 2025-03-26 NOTE — CONSULTS
Inpatient Diabetes Management Service : New Consult Note     Patient: Paige Mari   YOB: 1950    MRN: [id number]     Date of Consult : 03/26/2025   Date of Admission: 3/25/2025     Reason for Consult: Admitted for DKA, now resolved, loooking for assistance with transition back to home pump and possible some assistance with home managment as patient has possible dimentia.    Consult Requestor: JOLYNN Flynn            History of Present Illness:   Paige Mari is a 74 year old with Type 1 Diabetes Mellitus (HARRIETT variant), managed with Omnipod 5 insulin pump and complicated by neuropathy and mild non proliferative retinopathy, as well as a comorbid history of paroxysmal Afib, HTN, HLD,hypothyroidism, KP, and COPD, who was admitted on 3/25/2025 with DKA. Inpatient Diabetes Service consulted for assistance with glycemic management.     History obtained via the patient, chart review and discussion with primary team.       Additional Historian:  none    Patient is not known to the Inpatient Diabetes Service from past admission(s).    Follows with Dr Bardales at [facility] (LOV 2/25/25).     Patient has a diagnosis of Type 1 DM, HARRIETT. Initially diagnosed around age 49 and was immediately started on insulin. Was also on Metoformin  and Victoza at stable dosing for many years. Autoimmune labs  12/7/2021: C-peptide 0.7 ng/mL with concurrent venous glucose 176 mg/dL, MAXI Ab >250.      At her last clinic visit, was noted to not have her insulin pump connected to CGM. No adjustments made to settings. [facility] however no notes indicating she was able to meet with them. Did also report a period to Dr. Bardales when she was off the pump and using injections.     The patient is confused on assessment. Disoriented to date, time, and situation. Slow to respond, minimally conversant and unable to provide history.     ICU team reports that patient was found in the bathroom  by her  disoriented.  reached out to patient's daughter who called EMS to bring her to the hospital. Admission labs were remarkable for DKA as below.     Daughter, Merline called over the phone who provides additional history. Per Merline patient has struggled to care for herself over the past few years. Has had multiple admissions for confusion, but ultimately resolves and she is discharged home, only for the cycle to repeat again. No documented dementia though daughter reports trouble with memory for some time. Patient will omit insulin or forget to replace her insulin pump. Lives with , but Merline states he does not have insight and is unaware of how to assist with diabetes management, insulin injections, and insulin pump. Arline expresses concern over mother returning home as she feels she is not able to continue managing her diabetes care independently.     Last dose insulin PTA: Per glucoo report, insulin pump removed on Emil 3/23 and not replaced. Patient cannot confirm any additional insulin    Current inpatient regimen:  IV insulin drip- DKA protocol     BG at time of consult:  229  Planned Procedures/Surgeries: none    Relevant Labs on Admission:  if contributory, otherwise see labs below   Latest Reference Range & Units 03/25/25 18:47   Sodium 135 - 145 mmol/L 135   Potassium 3.4 - 5.3 mmol/L 4.8   Chloride 98 - 107 mmol/L 91 (L)   Carbon Dioxide (CO2) 22 - 29 mmol/L 15 (L)   Urea Nitrogen 8.0 - 23.0 mg/dL 25.7 (H)   Creatinine 0.51 - 0.95 mg/dL 0.84   GFR Estimate >60 mL/min/1.73m2 72   Calcium 8.8 - 10.4 mg/dL 10.8 (H)   Anion Gap 7 - 15 mmol/L 29 (H)   Glucose 70 - 99 mg/dL 569 (HH)   Estimated Average Glucose <117 mg/dL 237 (H)   Hemoglobin A1C <5.7 % 9.9 (H)   Ketone Quantitative <=0.30 mmol/L 5.62 (HH)   Lactic Acid 0.7 - 2.0 mmol/L 5.0 (HH)   N-Terminal Pro BNP Inpatient 0 - 900 pg/mL 2,206 (H)   Troponin T, High Sensitivity <=14 ng/L 28 (H)   TSH 0.30 - 4.20 uIU/mL  1.28   FIO2  21   Ph Venous 7.32 - 7.43  7.19 (LL)   PCO2 Venous 40 - 50 mm Hg 45   PO2 Venous 25 - 47 mm Hg 31   O2 Sat, Venous 70.0 - 75.0 % 39.5 (L)   Bicarbonate Venous 21 - 28 mmol/L 17 (L)   Base Excess Venous -3.0 - 3.0 mmol/L -10.9 (L)   Oxyhemoglobin Venous 70 - 75 % 39 (L)   WBC 4.0 - 11.0 10e3/uL 20.3 (H)   Hemoglobin 11.7 - 15.7 g/dL 13.4   (HH): Data is critically high  (LL): Data is critically low  (L): Data is abnormally low  (H): Data is abnormally high     Inpatient Glucose Trends:           Diabetes History:   Diabetes Type and Duration: Diagnosed with     12/7/2021: C-peptide 0.7 ng/mL with concurrent venous glucose 176 mg/dL, MAXI Ab >250 (ULN 5.0 IU/mL), insulin Ab 0.6 (ULN 0.4 U/mL), IA-2 Ab 44.1 (ULN 7.4 U/mL), Zn T8 Ab 64.3 (ref range 0.0 - 15.0 U/mL)  -CT A/P 9/2021: Mild diffuse pancreatic atrophy  -12/7/2021: fructosamine 365 umol/L (corresponds to HbA1c ~9.0%), HbA1c 9.3%   -5/2024: HbA1c 6.8%  -2/2025: HbA1c 8.6%  - 3/2025: HbA1c 9.9%- worsening     PTA Medication Regimen:   Metformin XR 1000 mg BID (stable for many years)   Victoza 1.2 mg daily (stable dose for many years)     Settings via Glucoo- pump not present to be assessed 3/26/25  Pump: Omnipod 5    Mode: Manual- patient had not connected CGM to run in Auto   CGM: Dexcom G 6 but not connected   Insulin: Novolog U-100   Basal Rates:  4471-8375: 0.5 units/hr  9753-1656: 0.35 units/hr   5402-9409: 0.4 units/hr   7907-6112: 0.6 units/hr     Total: 12 units daily      Bolus Settings:  Insulin Sensitivity Factor:  60  Insulin to CHO Ratio:  0000 to 2359 - 1 unit for every 20 grams     Target Glucose: 130mg/dL  Active insulin time: 4    Backup for Pump Failure: Lantus 12 units     Missing doses?: yes, pump removed Emil 3/23 and not replaced   Historical Diabetes Medications:   Insulin Detemir     Glucose monitoring device/frequency/trends: Dexcom G 6 - unknown as not linked to Gl[employer] and unable to check via other apps.   Pump running  in manual mode- evidence that is was not replaced on 3/23 noted below       Hypoglycemia PTA:   - Frequency: unknown  - Severity: unknown  - Awareness: hypoglycemia unawareness recorded in notes.   - Treatment: unknown     Outpatient Diabetes Provider: Dr. Bardales with [facility]   Formal Diabetes Education/Educator: yes in the past, no recent follow up     ------------------------  Complications:  + peripheral neuropathy, mild proliferative retinopathy (last eye exam: due), + microalbuminura, no gastroparesis, no macrovascular disease      Most recent A1c: 9.9%     Hemoglobin at time of most recent A1c: 13.4  RBC transfusion 3 months prior to most recent A1c:  none      History of DKA: yes      Safety Plan:   - Glucagon: unknown   - Ketone Strips: unknown  Medic Alert if Type 1: no    Diet/Lifestyle/Living Situation: unknown diet- lives with  and is independent in cares. Per daughter,  sleeps during the day as he used to work night shift. Does not assist with cares during the day. Patient has been struggling with memory and caring for self. Daughter concerned she is not taking medications regularly or monitoring BGs.     Ability to [place] Prescribed Regimen:  no   Food/Housing Insecurity: no          Medications Impacting Glycemia:    Steroids: none  D5W containing solutions/medications: D5 1/2 NS with 20 K per DKA protocol   Other medications impacting glucose: none         Diet/Nutrition:    Orders Placed This Encounter      Low Consistent Carb (45 g CHO per Meal) Diet     Supplements:  none  TF: none  TPN: none          Review of Systems:    CC: unable to provide ROS- lethargic, minimal responses to questions          Past Medical History:       Past Medical History:   Diagnosis Date    Benign essential hypertension     Created by Conversion  Replacement Utility updated for latest IMO load       Complex partial seizure (H) 03/14/2024    COPD (chronic obstructive pulmonary disease)  (H) 09/22/2021    Diabetic polyneuropathy (H) 02/02/2023    GERD (gastroesophageal reflux disease) 11/24/2014    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), managed as type 1 (H)     Created by Conversion       Mood disorder 11/24/2014    Multiple closed fractures of facial bone, initial encounter (H)     KP (obstructive sleep apnea) 05/15/2015    Paroxysmal atrial fibrillation (H) 09/22/2021    Small bowel obstruction (H) 09/18/2021    Status post total knee replacement 11/24/2014             Past Surgical History:      Past Surgical History:   Procedure Laterality Date    APPENDECTOMY      at age 3    ARTHROSCOPY KNEE WITH MENISCECTOMY  3/10/14    partial medial and lateral meniscectomies, subpatellar chondroplasty    BACK SURGERY      BLEPHAROPLASTY Bilateral 8/17/2015    Procedure: BLEPHAROPLASTY BILATERAL UPPER LIDS;  Surgeon: Chasidy Bryant MD;  Location: [facility];  Service:     BOWEL RESECTION  12 years ago    COLON SURGERY  2004    sigmoid colectomy    DILATION AND CURETTAGE      x3    EP LOOP RECORDER IMPLANT N/A 8/21/2023    Procedure: Loop Recorder Implant;  Surgeon: Nolvia Ruiz MD;  Location: [facility] CV    HYSTERECTOMY      age 39    IR LUMBAR PUNCTURE  1/22/2022    JOINT REPLACEMENT      OOPHORECTOMY      OTHER SURGICAL HISTORY  2005    bowel obstruction    PICC TRIPLE LUMEN PLACEMENT  9/19/2021         PICC TRIPLE LUMEN PLACEMENT  1/22/2022         IN ARTHRODESIS ANT INTERBODY MIN DISCECTOMY,LUMBAR N/A 1/30/2015    Procedure: ANTERIOR LUMBAR INTERBODY FUSION L4-5, INTERBODY GRAFT, BMP INSTRUMENTATION;  Surgeon: Zeeshan Guillaume MD;  Location: [facility];  Service: Spine    TOE SURGERY      TONSILLECTOMY & ADENOIDECTOMY      age 10    ZZC REMOVAL OF OVARY(S)      Description: Oophorectomy;  Recorded: 06/03/2010;    ZZC REMOVAL OF OVARY(S)      Description: Oophorectomy;  Recorded: 06/03/2010;    ZZC TOTAL ABDOM HYSTERECTOMY      Description:  "Hysterectomy;  Recorded: 06/03/2010;    ZZC TOTAL ABDOM HYSTERECTOMY      Description: Hysterectomy;  Recorded: 06/03/2010;    ZZC TOTAL KNEE ARTHROPLASTY Right 11/24/2014    Procedure: RIGHT TOTAL KNEE ARTHROPLASTY;  Surgeon: Jules Harris MD;  Location: [facility];  Service: Orthopedics             Social History:      Social History     Tobacco Use    Smoking status: Every Day     Current packs/day: 0.00     Average packs/day: 0.5 packs/day for 19.0 years (9.5 ttl pk-yrs)     Types: Cigarettes     Start date: 9/15/2002     Last attempt to quit: 9/15/2021     Years since quitting: 3.5    Smokeless tobacco: Never    Tobacco comments:     Seen as Inpatient by Tobacco Treatment and history updated on 8/21/2023, at time smoking 1 cigarette per day   Substance Use Topics    Alcohol use: No        History   Sexual Activity    Sexual activity: Not on file             Family History:        Family History   Problem Relation Age of Onset    Breast Cancer No family hx of              Physical Exam:    BP (!) 146/60   Pulse 90   Temp 98.2  F (36.8  C) (Oral)   Resp 16   Ht 1.651 m (5' 5\")   Wt 60.8 kg (134 lb 0.6 oz)   SpO2 98%   BMI 22.31 kg/m     General Appearance:  Ill-appearing, slow to respond, confused  HEENT: Normocephalic, atraumatic.   Heart: Regular rate and rhythm.    Lungs:  Breathing comfortably   Abdomen: Round, nondistended. Soft, nontender.   Extremities:  No significant lower extremity edema. Fluid movement of extremities.   Skin:  Warm and dry.   Neuro: disoriented to time and situation. No focal deficits     Feet:    warm without discoloration,  without evidence of wounds, corns, calluses, or vascular compromise, pulses 1+, numbness present           Laboratory Data:      Lab Results   Component Value Date    A1C 9.9 (H) 03/25/2025    A1C 8.6 (H) 02/21/2025    A1C 6.8 (H) 05/10/2024    A1C 7.5 (H) 03/14/2024    A1C 7.4 (H) 07/25/2023     Recent Labs   Lab Test 03/26/25  0602   WBC 21.9* "   RBC 4.04   HGB 10.8*   HCT 32.7*   MCV 81   MCH 26.7   MCHC 33.0   RDW 14.5        Recent Labs   Lab Test 03/26/25  1053 03/26/25  1012 03/26/25  0657 03/26/25  0602 03/26/25  0302 03/26/25  0253   NA  --   --   --  137  --  137   POTASSIUM  --   --   --  3.6  --  4.5   CHLORIDE  --   --   --  103  --  102   CO2  --   --   --  21*  --  20*   ANIONGAP  --   --   --  13  --  15   * 253*   < > 201*   < > 235*   BUN  --   --   --  23.3*  --  23.6*   CR  --   --   --  0.67  --  0.71   BRUNO  --   --   --  8.8  --  9.4    < > = values in this interval not displayed.     Recent Labs   Lab Test 03/26/25  0030   PROTTOTAL 7.3   ALBUMIN 4.3   BILITOTAL 0.7   ALKPHOS 79   AST 19   ALT 15            Assessment and Recommendations:       Assessment:   Type 1 Diabetes Mellitus (HARRIETT), complicated by nephropathy, retinopathy and microalbuminuria. Sub-optimal and worsening control  (A1c 9.9 %, Hgb: 13.4)  DKA (BicarbL 15, pH 7.19, glucose 569, ketones 5.62) - resolved  Lactic acidosis (5.0)- resolved  AMS with question of cognitive decline  Concern for ability to care for self     Plan/Recommendations:    - Lantus 10 units q 24 hrs at 1500 (DKA protective dosing)   - Remain on IV insulin drip--> Switch to non-DKA protocol and stop dextrose fluids as patient now taking good PO    - Novolog Meal Coverage: 1 units per 20 g CHO, TID AC and PRN with snacks/supplements   - BG monitoring: Every 1-2 hours on insulin drip     - Hypoglycemia protocol    - Carb counting protocol     Discussion:    Patient admitted for DKA in the setting of inability to care for self at home. Spoke with daughter over the phone who notes concern about patient's ability to care for herself for many months. Reports repeated history of admissions for confusion and altered mental status, but once acute event is resolved, patient will state she is fine to manage medications again and is discharged home. Daughter is unaware of any official diagnosis of  dementia or cognitive testing. She is interested in speaking with a care coordinator or  to further discuss discharge disposition for the patient as she is concerned about re-admissions. The patient lives with her  and the daughter (Merline's) stepfather, who is supportive, but at 81 years old is also unable to assume full diabetes and other medical cares/management for Paige.     In my assessment, the patient is unable to manage her insulin pump presently, and there is concern about her ability to continue using her insulin pump given history provided by daughter. She was last seen in Endocrinology clinic in February 2025 and did not have her insulin pump and dexcom connected. She was advised to follow up with diabetic educators but did not do so. Review of her pump data available in [email or web address] shows no connection to CGM and thus I am unable to trend her blood sugars. However suspect she has been running high as her A1c is up from 8.2 to 9.9. her pump has been in manual mode and there are minimal to no correction or carb/meal time boluses entered for the past 30 + days. I note that the pump was removed/disconnected on 3/23 and there is no data following this. Daughter Merline confirms the patient's pod was not on when she responded to her stepfather's call yesterday evening before calling EMS.     Insulin drip rates are significantly elevated from Paige's home pump settings which typically run a basal rate of 0.4-0.6 units per hour or 12 units per day. Her drip rates are from 3-8 units per hour while NPO. Suspect significant glucotoxicity is contributing to high insulin needs. Patient has resumed diet however and is eating and drinking well with assistance from RN. Given this and resolution of DKA, will start a low dose of DKA protective Lantus (10 units), but maintain IV insulin drip until glucotoxicity resolves. Ok to stop dextrose fluids with good PO intake as insulin drip rates are high.  Should the glucoses drop, she will be safe to stop the drip as she will have Lantus in the background. Will also add ICR at her previous pump settings and monitor trends with meals.     Discharge Planning: (tentative)    Medications: TBD    Test Claims:  none needed.   Education:  Needs to be assessed closer to discharge.    Outpatient Follow-up:  recommend [facility]- Dr. Bardales       Thank you for this consult. IDS will continue to follow.      Please notify Inpatient Diabetes Service if changes are planned to steroids, nutrition, TPN/TF and anticipated procedures requiring prolonged NPO status.     Silvestre Hernandez, JOLYNN, CNP   Inpatient Diabetes Management Service   Pager: [phone]   Available on [employer]        To contact Inpatient Diabetes Service:     7 AM - 5 PM: Page the IDS LANNY following the patient that day (see filed or incomplete progress notes/consult notes under Endocrinology)    OR if uncertain of provider assignment: page job code [phone]    5 PM - 7 AM: First call after hours is to primary service.    For urgent after-hours questions, page job code for on call fellow: [phone]     I spent a total of 110 minutes on the date of the encounter doing prep/post-work, chart review, history and exam, documentation and further activities per the note including lab review, multidisciplinary communication, counseling the patient and/or coordinating care regarding acute hyper/hypoglycemic management, as well as discharge management and planning/communication.  See note for details.

## 2025-03-26 NOTE — PROCEDURES
[facility]    Triple Lumen PICC Placement    Date/Time: 3/25/2025 10:16 PM    Performed by: Lisy Apodaca RN  Authorized by: Pierce Rowe MD  Indications: vascular access      UNIVERSAL PROTOCOL   Site Marked: Yes  Prior Images Obtained and Reviewed:  Yes  Required items: Required blood products, implants, devices and special equipment available    Patient identity confirmed:  Verbally with patient and arm band  NA - No sedation, light sedation, or local anesthesia  Confirmation Checklist:  Patient's identity using two indicators, relevant allergies, procedure was appropriate and matched the consent or emergent situation and correct equipment/implants were available  Time out: Immediately prior to the procedure a time out was called    Universal Protocol: the Joint Commission Universal Protocol was followed    Preparation: Patient was prepped and draped in usual sterile fashion    ESBL (mL):  1     ANESTHESIA    Anesthesia:  Local infiltration  Local Anesthetic:  Lidocaine 1% without epinephrine  Anesthetic Total (mL):  2      SEDATION    Patient Sedated: No        Preparation: skin prepped with ChloraPrep  Skin prep agent: skin prep agent completely dried prior to procedure  Sterile barriers: maximum sterile barriers were used: cap, mask, sterile gown, sterile gloves, and large sterile sheet  Hand hygiene: hand hygiene performed prior to central venous catheter insertion  Type of line used: PICC  Catheter type: triple lumen  Lumen type: valved and power PICC  Lumen Identification: Red, White and Gray  Catheter size: 5 Fr  Brand: Bard  Lot number: [id number]  Placement method: MST and ultrasound  Number of attempts: 1  Difficulty threading catheter: no  Successful placement: yes  Orientation: left  Catheter to Vein (%): 15  Location: basilic vein  Tip Location: SVC/RA Junction  Arm circumference: adults 10 cm  Extremity circumference: 33  Visible catheter length: 2  Total catheter  length: 41  Dressing and securement: chlorhexidine disc applied, subcutaneous anchor securement system and transparent securement dressing  Post procedure assessment: blood return through all ports and placement verified by 3CG technology  PROCEDURE Describe Procedure: Pt tolerated picc placement well. Good blood return. Flushes easily. Verified by 3CG. Ok to use.    Ok to use  Disposal: sharps and needle count correct at the end of procedure, needles and guidewire disposed in sharps container  Patient Tolerance:  Patient tolerated the procedure well with no immediate complications

## 2025-03-26 NOTE — PLAN OF CARE
ICU End of Shift Summary. See flowsheets for vital signs and detailed assessment.    Changes this shift: Discontinued dextrose gtt, start lantus, carb coverage novolog during meals. Pt eating and drinking with total assistance. Incontinence present. BM on shift. Purewick in place.     Plan: SW to assist in discharge planning. Continue plan of care & supportive cares as needed

## 2025-03-27 ENCOUNTER — APPOINTMENT (OUTPATIENT)
Dept: OCCUPATIONAL THERAPY | Facility: CLINIC | Age: 75
End: 2025-03-27
Payer: COMMERCIAL

## 2025-03-27 ENCOUNTER — APPOINTMENT (OUTPATIENT)
Dept: PHYSICAL THERAPY | Facility: CLINIC | Age: 75
End: 2025-03-27
Payer: COMMERCIAL

## 2025-03-27 VITALS
HEART RATE: 81 BPM | HEIGHT: 62 IN | TEMPERATURE: 97.7 F | OXYGEN SATURATION: 97 % | BODY MASS INDEX: 24.67 KG/M2 | WEIGHT: 134.04 LBS | SYSTOLIC BLOOD PRESSURE: 170 MMHG | RESPIRATION RATE: 19 BRPM | DIASTOLIC BLOOD PRESSURE: 68 MMHG

## 2025-03-27 LAB
ANION GAP SERPL CALCULATED.3IONS-SCNC: 9 MMOL/L (ref 7–15)
ATRIAL RATE - MUSE: 92 BPM
BACTERIA BLD CULT: NORMAL
BACTERIA BLD CULT: NORMAL
BUN SERPL-MCNC: 19.7 MG/DL (ref 8–23)
CALCIUM SERPL-MCNC: 8.4 MG/DL (ref 8.8–10.4)
CHLORIDE SERPL-SCNC: 108 MMOL/L (ref 98–107)
CREAT SERPL-MCNC: 0.75 MG/DL (ref 0.51–0.95)
DIASTOLIC BLOOD PRESSURE - MUSE: 127 MMHG
EGFRCR SERPLBLD CKD-EPI 2021: 83 ML/MIN/1.73M2
ERYTHROCYTE [DISTWIDTH] IN BLOOD BY AUTOMATED COUNT: 14.6 % (ref 10–15)
GLUCOSE BLDC GLUCOMTR-MCNC: 101 MG/DL (ref 70–99)
GLUCOSE BLDC GLUCOMTR-MCNC: 102 MG/DL (ref 70–99)
GLUCOSE BLDC GLUCOMTR-MCNC: 114 MG/DL (ref 70–99)
GLUCOSE BLDC GLUCOMTR-MCNC: 118 MG/DL (ref 70–99)
GLUCOSE BLDC GLUCOMTR-MCNC: 125 MG/DL (ref 70–99)
GLUCOSE BLDC GLUCOMTR-MCNC: 127 MG/DL (ref 70–99)
GLUCOSE BLDC GLUCOMTR-MCNC: 129 MG/DL (ref 70–99)
GLUCOSE BLDC GLUCOMTR-MCNC: 149 MG/DL (ref 70–99)
GLUCOSE BLDC GLUCOMTR-MCNC: 168 MG/DL (ref 70–99)
GLUCOSE BLDC GLUCOMTR-MCNC: 175 MG/DL (ref 70–99)
GLUCOSE BLDC GLUCOMTR-MCNC: 226 MG/DL (ref 70–99)
GLUCOSE BLDC GLUCOMTR-MCNC: 230 MG/DL (ref 70–99)
GLUCOSE BLDC GLUCOMTR-MCNC: 230 MG/DL (ref 70–99)
GLUCOSE BLDC GLUCOMTR-MCNC: 236 MG/DL (ref 70–99)
GLUCOSE BLDC GLUCOMTR-MCNC: 263 MG/DL (ref 70–99)
GLUCOSE BLDC GLUCOMTR-MCNC: 288 MG/DL (ref 70–99)
GLUCOSE BLDC GLUCOMTR-MCNC: 57 MG/DL (ref 70–99)
GLUCOSE BLDC GLUCOMTR-MCNC: 65 MG/DL (ref 70–99)
GLUCOSE BLDC GLUCOMTR-MCNC: 71 MG/DL (ref 70–99)
GLUCOSE BLDC GLUCOMTR-MCNC: 89 MG/DL (ref 70–99)
GLUCOSE BLDC GLUCOMTR-MCNC: 99 MG/DL (ref 70–99)
GLUCOSE SERPL-MCNC: 117 MG/DL (ref 70–99)
HCO3 SERPL-SCNC: 22 MMOL/L (ref 22–29)
HCT VFR BLD AUTO: 29.4 % (ref 35–47)
HGB BLD-MCNC: 9.9 G/DL (ref 11.7–15.7)
INTERPRETATION ECG - MUSE: NORMAL
MAGNESIUM SERPL-MCNC: 1.9 MG/DL (ref 1.7–2.3)
MCH RBC QN AUTO: 28 PG (ref 26.5–33)
MCHC RBC AUTO-ENTMCNC: 33.7 G/DL (ref 31.5–36.5)
MCV RBC AUTO: 83 FL (ref 78–100)
P AXIS - MUSE: 90 DEGREES
PHOSPHATE SERPL-MCNC: 3.1 MG/DL (ref 2.5–4.5)
PLATELET # BLD AUTO: 260 10E3/UL (ref 150–450)
POTASSIUM SERPL-SCNC: 3.9 MMOL/L (ref 3.4–5.3)
PR INTERVAL - MUSE: 160 MS
QRS DURATION - MUSE: 140 MS
QT - MUSE: 404 MS
QTC - MUSE: 499 MS
R AXIS - MUSE: -9 DEGREES
RBC # BLD AUTO: 3.54 10E6/UL (ref 3.8–5.2)
SODIUM SERPL-SCNC: 139 MMOL/L (ref 135–145)
SYSTOLIC BLOOD PRESSURE - MUSE: 220 MMHG
T AXIS - MUSE: 100 DEGREES
VENTRICULAR RATE- MUSE: 92 BPM
WBC # BLD AUTO: 16.2 10E3/UL (ref 4–11)

## 2025-03-27 PROCEDURE — 97165 OT EVAL LOW COMPLEX 30 MIN: CPT | Mod: GO

## 2025-03-27 PROCEDURE — 80048 BASIC METABOLIC PNL TOTAL CA: CPT

## 2025-03-27 PROCEDURE — 85014 HEMATOCRIT: CPT

## 2025-03-27 PROCEDURE — 250N000013 HC RX MED GY IP 250 OP 250 PS 637

## 2025-03-27 PROCEDURE — 97116 GAIT TRAINING THERAPY: CPT | Mod: GP

## 2025-03-27 PROCEDURE — 97530 THERAPEUTIC ACTIVITIES: CPT | Mod: GP

## 2025-03-27 PROCEDURE — 99233 SBSQ HOSP IP/OBS HIGH 50: CPT | Performed by: STUDENT IN AN ORGANIZED HEALTH CARE EDUCATION/TRAINING PROGRAM

## 2025-03-27 PROCEDURE — 83735 ASSAY OF MAGNESIUM: CPT

## 2025-03-27 PROCEDURE — 250N000011 HC RX IP 250 OP 636: Performed by: INTERNAL MEDICINE

## 2025-03-27 PROCEDURE — 84100 ASSAY OF PHOSPHORUS: CPT

## 2025-03-27 PROCEDURE — 97535 SELF CARE MNGMENT TRAINING: CPT | Mod: GO

## 2025-03-27 PROCEDURE — 250N000011 HC RX IP 250 OP 636: Mod: JZ

## 2025-03-27 PROCEDURE — 120N000002 HC R&B MED SURG/OB UMMC

## 2025-03-27 PROCEDURE — 97161 PT EVAL LOW COMPLEX 20 MIN: CPT | Mod: GP

## 2025-03-27 PROCEDURE — 82310 ASSAY OF CALCIUM: CPT

## 2025-03-27 PROCEDURE — 250N000012 HC RX MED GY IP 250 OP 636 PS 637: Performed by: STUDENT IN AN ORGANIZED HEALTH CARE EDUCATION/TRAINING PROGRAM

## 2025-03-27 RX ORDER — MAGNESIUM SULFATE HEPTAHYDRATE 40 MG/ML
2 INJECTION, SOLUTION INTRAVENOUS ONCE
Status: COMPLETED | OUTPATIENT
Start: 2025-03-27 | End: 2025-03-27

## 2025-03-27 RX ADMIN — METOPROLOL TARTRATE 100 MG: 25 TABLET, FILM COATED ORAL at 21:41

## 2025-03-27 RX ADMIN — LABETALOL HYDROCHLORIDE 10 MG: 5 INJECTION, SOLUTION INTRAVENOUS at 18:08

## 2025-03-27 RX ADMIN — LABETALOL HYDROCHLORIDE 20 MG: 5 INJECTION, SOLUTION INTRAVENOUS at 18:27

## 2025-03-27 RX ADMIN — METOPROLOL TARTRATE 100 MG: 25 TABLET, FILM COATED ORAL at 08:45

## 2025-03-27 RX ADMIN — DEXTROSE 15 G: 15 GEL ORAL at 16:48

## 2025-03-27 RX ADMIN — Medication 25 MCG: at 21:40

## 2025-03-27 RX ADMIN — LEVETIRACETAM 500 MG: 500 TABLET, FILM COATED ORAL at 21:41

## 2025-03-27 RX ADMIN — APIXABAN 5 MG: 5 TABLET, FILM COATED ORAL at 08:45

## 2025-03-27 RX ADMIN — INSULIN ASPART 9 UNITS: 100 INJECTION, SOLUTION INTRAVENOUS; SUBCUTANEOUS at 18:28

## 2025-03-27 RX ADMIN — ESCITALOPRAM OXALATE 20 MG: 10 TABLET ORAL at 08:46

## 2025-03-27 RX ADMIN — MAGNESIUM SULFATE HEPTAHYDRATE 2 G: 40 INJECTION, SOLUTION INTRAVENOUS at 06:28

## 2025-03-27 RX ADMIN — LEVETIRACETAM 500 MG: 500 TABLET, FILM COATED ORAL at 08:45

## 2025-03-27 RX ADMIN — INSULIN ASPART 2 UNITS: 100 INJECTION, SOLUTION INTRAVENOUS; SUBCUTANEOUS at 10:02

## 2025-03-27 RX ADMIN — PANTOPRAZOLE SODIUM 40 MG: 40 TABLET, DELAYED RELEASE ORAL at 08:46

## 2025-03-27 RX ADMIN — PREGABALIN 200 MG: 100 CAPSULE ORAL at 21:55

## 2025-03-27 RX ADMIN — ROSUVASTATIN CALCIUM 5 MG: 5 TABLET, FILM COATED ORAL at 21:41

## 2025-03-27 RX ADMIN — APIXABAN 5 MG: 5 TABLET, FILM COATED ORAL at 21:41

## 2025-03-27 RX ADMIN — DEXTROSE 15 G: 15 GEL ORAL at 17:07

## 2025-03-27 RX ADMIN — PREGABALIN 100 MG: 100 CAPSULE ORAL at 08:45

## 2025-03-27 RX ADMIN — Medication 1 TABLET: at 21:40

## 2025-03-27 RX ADMIN — CEPHALEXIN 250 MG: 250 CAPSULE ORAL at 08:45

## 2025-03-27 RX ADMIN — VALACYCLOVIR HYDROCHLORIDE 500 MG: 500 TABLET, FILM COATED ORAL at 08:45

## 2025-03-27 RX ADMIN — ESTRADIOL 1 MG: 1 TABLET ORAL at 08:45

## 2025-03-27 ASSESSMENT — ACTIVITIES OF DAILY LIVING (ADL)
ADLS_ACUITY_SCORE: 64
ADLS_ACUITY_SCORE: 65
ADLS_ACUITY_SCORE: 64
ADLS_ACUITY_SCORE: 65
ADLS_ACUITY_SCORE: 64
ADLS_ACUITY_SCORE: 65
ADLS_ACUITY_SCORE: 65
ADLS_ACUITY_SCORE: 64
ADLS_ACUITY_SCORE: 65
ADLS_ACUITY_SCORE: 64
ADLS_ACUITY_SCORE: 65

## 2025-03-27 NOTE — PLAN OF CARE
Problem: Adult Inpatient Plan of Care  Goal: Plan of Care Review  Description: The Plan of Care Review/Shift note should be completed every shift.  The Outcome Evaluation is a brief statement about your assessment that the patient is improving, declining, or no change.  This information will be displayed automatically on your shiftnote.  Outcome: Progressing  Flowsheets (Taken 3/26/2025 8367)  Plan of Care Reviewed With: patient  Overall Patient Progress: no change   Goal Outcome Evaluation:      Plan of Care Reviewed With: patient    Overall Patient Progress: no changeOverall Patient Progress: no change      End of Shift Summary:       Neuro: Alert, disoriented x 4. Denies pain. Afebrile  Pulm/Resp: on room air. Lung sounds clear.  CV: apical pulse irregular, normotensive  GI/: moderate consistent carb diet.   Had 1 x  bowel movement  during the shift  With external catheter- urine output adequate  Access: Triple lumen PICC on left basilic  Skin: intact.  Gtts: insulin 1 unit/hr- algorithm 2  Normal saline 10 ml/hr- TKO for insulin drip      Shift Events:   -Magnesium replacement given per RN managed protocol, no replacement indicated for Potassium and Phosphorus  -Diet changed to moderate consistent carb per order      Plan:  Monitor Blood glucose- titrate per insulin algorithm  Transfer to medical floor once bed is available  Continue POC

## 2025-03-27 NOTE — PROGRESS NOTES
03/27/25 1411   Appointment Info   Signing Clinician's Name / Credentials (PT) Prateek Reilly DPT   Living Environment   People in Home spouse   Current Living Arrangements apartment   Home Accessibility stairs to enter home   Number of Stairs, Main Entrance 8   Stair Railings, Main Entrance railing on right side (ascending)   Transportation Anticipated family or friend will provide   Living Environment Comments pt lives in apartment with , 8 RASHEED with R rail. Bathroom has tub shower with shower chair   Self-Care   Usual Activity Tolerance moderate   Current Activity Tolerance fair   Equipment Currently Used at Home shower chair;grab bar, tub/shower   Fall history within last six months no   Activity/Exercise/Self-Care Comment Pt reports PLOF IND all ADLs and IADLs, except  did the shopping and some coooking. Reports no AE use for mobility, but notes have FWW at home.   General Information   Onset of Illness/Injury or Date of Surgery 03/25/25   Referring Physician Pierce Rowe MD   Patient/Family Therapy Goals Statement (PT) get stronger, return home safely   Pertinent History of Current Problem (include personal factors and/or comorbidities that impact the POC) Per EMR: Paige Mari is a 74 year old female with a past medical history of HTN, paroxysmal atrial fibrillation (on Eliquis), COPD, KP, and latent autoimmune diabetes mellitus who was admitted on 3/25/25 for DKA and hypertensive urgency. The patient was found by her  sitting on the toilet confused. She was then transported to the ED via EMS where she was found to be in DKA with blood sugars to 560, ketones of 5.6, pH 7.19, and lactic acid of 5.0. She was also found to be hypertensive eventually requiring nicardipine drip. Since weaned off the nicardipine drip, she is now transferring to the internal medicine service for ongoing cares.   Existing Precautions/Restrictions fall   Weight-Bearing Status - LUE full weight-bearing    Weight-Bearing Status - RUE full weight-bearing   Weight-Bearing Status - LLE full weight-bearing   Weight-Bearing Status - RLE full weight-bearing   Cognition   Affect/Mental Status (Cognition) WFL   Orientation Status (Cognition) oriented x 4   Follows Commands (Cognition) WFL   Pain Assessment   Patient Currently in Pain No   Integumentary/Edema   Integumentary/Edema Comments mild LE edema in kaley LEs, old R wound on dorsum of R foot   Posture    Posture Forward head position;Protracted shoulders   Range of Motion (ROM)   ROM Comment decreased ankle DF to neutral kaley   Strength (Manual Muscle Testing)   Strength Comments grossly 5/5 LE strength except 4/5 kaley hip flexion and kaley ankle DF   Bed Mobility   Comment, (Bed Mobility) SBA sit > supine   Transfers   Comment, (Transfers) CGA for STS with FWW   Gait/Stairs (Locomotion)   Comment, (Gait/Stairs) ambulates with FWW and CGA, demos minimal UE reliance, fair upright posture   Balance   Balance Comments able to stand and ambulate without AD, benefits from UE support for increased stability   Sensory Examination   Sensory Perception Comments baseline neuropathy in kaley feet/ankles up to mid shin, L > R wtih absent light touch on L, diminished on R   Clinical Impression   Criteria for Skilled Therapeutic Intervention Yes, treatment indicated   PT Diagnosis (PT) impaired functional mobility   Influenced by the following impairments deconditioning, weakness, fatigue   Functional limitations due to impairments bed mobility, transfers, gait, stairs, activity tolerance   Clinical Presentation (PT Evaluation Complexity) stable   Clinical Presentation Rationale clinical reasoning   Clinical Decision Making (Complexity) low complexity   Planned Therapy Interventions (PT) balance training;bed mobility training;gait training;home exercise program;neuromuscular re-education;patient/family education;ROM (range of motion);strengthening;stair training;transfer  training;progressive activity/exercise;risk factor education;home program guidelines   Risk & Benefits of therapy have been explained evaluation/treatment results reviewed;care plan/treatment goals reviewed;risks/benefits reviewed;current/potential barriers reviewed;participants voiced agreement with care plan;participants included;patient   PT Total Evaluation Time   PT Eval, Low Complexity Minutes (57636) 6   Physical Therapy Goals   PT Frequency 5x/week   PT Predicted Duration/Target Date for Goal Attainment 04/11/25   PT Goals Bed Mobility;Transfers;Gait;Stairs   PT: Bed Mobility Independent;Supine to/from sit;Rolling   PT: Transfers Modified independent;Sit to/from stand;Bed to/from chair;Assistive device   PT: Gait Modified independent;Rolling walker;150 feet   PT: Stairs Supervision/stand-by assist;8 stairs;Rail on right   Interventions   Interventions Quick Adds Therapeutic Activity;Gait Training   Therapeutic Activity   Therapeutic Activities: dynamic activities to improve functional performance Minutes (43451) 10   Symptoms Noted During/After Treatment Fatigue   Treatment Detail/Skilled Intervention post eval, continued functional mobility training to progress IND. Pt performing STS with CGA progressing to SBA throughout session. Time spent to prep lines for OOR. Discussing d/c recs, recs for returning home,  able to assist as needed. Pt ending supine in bed, all needs met within reach.   Gait Training   Gait Training Minutes (42306) 14   Symptoms Noted During/After Treatment (Gait Training) fatigue   Treatment Detail/Skilled Intervention post eval, facilitated gait training to progress pt's IND and tolerance. Pt ambulating 150' initially with FWW and CGA, then additional 75' with IV pole and CGA, and additional 75' with no UE support and CGA. Chair follow throughout  but not needed. Cues for taking time with turns, overall pt remains stable, no overt LOB. Some slight instability without AD. Edu on  continued IP walking program and goal for stairs tomorrow.   PT Discharge Planning   PT Plan progress gait and balance without AD, trial stairs   PT Discharge Recommendation (DC Rec) home with assist   PT Rationale for DC Rec pt mobilizing well this date with and without AD, has FWW at home if needed.  able to assist pt as needed. Anticipate pt to be safe to d/c home once medically appropriate.   PT Brief overview of current status CGA with IV pole/FWW   PT Total Distance Amb During Session (feet) 300   Physical Therapy Time and Intention   Timed Code Treatment Minutes 24   Total Session Time (sum of timed and untimed services) 30

## 2025-03-27 NOTE — PROGRESS NOTES
Brief Care Management Note.     Per [facility] policy if Pt's Cousin is the family most aware of Pt's wishes, involved in Pt's care and willing/able to participate with TX team it is appropriate for her to act as decision maker while Pt is unable to.     Mahtew Matt, [facility]  [facility] ICU & [place] [facility]   Ph: [phone]

## 2025-03-27 NOTE — PROGRESS NOTES
Inpatient Diabetes Management Service: Daily Progress Note     HPI: Paige Mari is a 74 year old with Type 1 Diabetes Mellitus (HARRIETT variant), managed with Omnipod 5 insulin pump and complicated by neuropathy and mild non proliferative retinopathy, as well as a comorbid history of paroxysmal Afib, HTN, HLD,hypothyroidism, KP, and COPD, who was admitted on 3/25/2025 with DKA. Inpatient Diabetes Service consulted for assistance with glycemic management.          Assessment/Plan:     Assessment:   Type 1 Diabetes Mellitus (HARRIETT), complicated by nephropathy, retinopathy and microalbuminuria. Sub-optimal and worsening control  (A1c 9.9 %, Hgb: 13.4)  DKA (BicarbL 15, pH 7.19, glucose 569, ketones 5.62) - resolved  Lactic acidosis (5.0)- resolved  AMS with question of cognitive decline  Concern for ability to care for self      Plan/Recommendations:    - increase Lantus 10 --> 28 units q 24 hrs at 1500   - discontinue IV insulin drip (non-DKA protocol) 2 hours after Lantus given, ~1700  - increase Novolog Meal Coverage: 1 units per 20 --> 10 (breakfast) --> 8 (lunch) g CHO, TID AC and PRN with snacks/supplements --> addendum 1500: increase ICR to 1:5 g cho starting with dinner as BG still trending high with increased IV insulin needs after lunch.   - start Novolog correction scale: high resistance 1:25 >140 TID AC, 1:25 >200 HS & 0200 (starting with dinner)   - BG monitoring: Every 1-2 hours on insulin drip  --> TID AC, HS, 0200 (starting with dinner)   - Hypoglycemia protocol    - Carb counting protocol      Discussion: Hyperglycemia and high IV insulin needs after eating dinner last night (IV insulin up to 14.5 units/hr). D5 fluids discontinued at 1500 3/26. BG trended well overnight on IV insulin utilizing ~1 unit/hr. Increase Novolog carb coverage and will increase Lantus this afternoon, then transition off IV insulin. Patient remains very confused and will be unable to resume her amb  insulin pump or self-manage her diabetes via injections. Will need SW to work on placement options for patient. See diabetes consult note discussion from yesterday, 3/26, for more detailed discussion provider had with patient's daughter.      Discharge Planning: (tentative)    Medications: TBD    Test Claims:  none needed.   Education:  Needs to be assessed closer to discharge.    Outpatient Follow-up:  recommend [facility]- Dr. Bardales        Please notify Inpatient Diabetes Service if changes are planned to steroids, nutrition, TPN/TF and anticipated procedures requiring prolonged NPO status.         Interval History/Review of Systems :   - The last 24 hours progress and nursing notes reviewed.  - Reports feeling well with normal appetite and no nausea or emesis. Per RN, patient was unable to feed herself yesterday but could feed herself this morning. She was A&Ox0 this morning on RN assessment.  - Patient states she ran out of either her omnipod or CGM a week ago and hasn't been using it since. Was unable to say the name of her CGM (dexcom). Says she's been giving herself injections of Lantus but was unable to say what dose. Says she counts carbohydrates but is unable to say what her insulin to carbohydrate ratio is.   - At time of visit was able to state that she is in a hospital but unsure which hospital and asking RN if RN knows her home phone number so she can call her .     Planned Procedures/Surgeries: none    Inpatient Glucose Control:       Recent Labs   Lab 03/27/25  0532 03/27/25  0515 03/27/25  0329 03/27/25  0234 03/27/25  0131 03/27/25  0028   * 117* 118* 114* 127* 102*             Medications Impacting Glycemia:   Steroids:  none   D5W-containing solutions/medications: D5 1/2 NS with 20 K per DKA protocol    Other medications impacting glucose: none         Nutrition:   Orders Placed This Encounter      Moderate Consistent Carb (60 g CHO per Meal) Diet  Supplements: none    TF: none   TPN: none         Diabetes History: see full consult note for complete diabetes history   Diabetes Type and Duration: Diagnosed with      12/7/2021: C-peptide 0.7 ng/mL with concurrent venous glucose 176 mg/dL, MAXI Ab >250 (ULN 5.0 IU/mL), insulin Ab 0.6 (ULN 0.4 U/mL), IA-2 Ab 44.1 (ULN 7.4 U/mL), Zn T8 Ab 64.3 (ref range 0.0 - 15.0 U/mL)  -CT A/P 9/2021: Mild diffuse pancreatic atrophy  -12/7/2021: fructosamine 365 umol/L (corresponds to HbA1c ~9.0%), HbA1c 9.3%   -5/2024: HbA1c 6.8%  -2/2025: HbA1c 8.6%  - 3/2025: HbA1c 9.9%- worsening      PTA Medication Regimen:   Metformin XR 1000 mg BID (stable for many years)   Victoza 1.2 mg daily (stable dose for many years)      Settings via Glucoo- pump not present to be assessed 3/26/25  Pump: Omnipod 5    Mode: Manual- patient had not connected CGM to run in Auto   CGM: Dexcom G 6 but not connected   Insulin: Novolog U-100   Basal Rates:  7170-7073: 0.5 units/hr  0204-2161: 0.35 units/hr   3177-8327: 0.4 units/hr   2551-6664: 0.6 units/hr      Total: 12 units daily      Bolus Settings:  Insulin Sensitivity Factor:  60  Insulin to CHO Ratio:  0000 to 2359 - 1 unit for every 20 grams     Target Glucose: 130mg/dL  Active insulin time: 4     Backup for Pump Failure: Lantus 12 units      Missing doses?: yes, pump removed Emil 3/23 and not replaced   Historical Diabetes Medications:   Insulin Detemir      Glucose monitoring device/frequency/trends: Dexcom G 6 - unknown as not linked to [employer] and unable to check via other apps.   Pump running in manual mode- evidence that is was not replaced on 3/23 noted below        Hypoglycemia PTA:   - Frequency: unknown  - Severity: unknown  - Awareness: hypoglycemia unawareness recorded in notes.   - Treatment: unknown      Outpatient Diabetes Provider: Dr. Bardales with [facility]   Formal Diabetes Education/Educator: yes in the past, no recent follow up        Physical Exam:   /71   Pulse 74    "Temp 98.8  F (37.1  C) (Axillary)   Resp 10   Ht 1.651 m (5' 5\")   Wt 60.8 kg (134 lb 0.6 oz)   SpO2 98%   BMI 22.31 kg/m    General:  well appearing, NAD, resting comfortably sitting up in chair.  Lungs: unlabored breathing on RA.  Mental Status:  Alert and oriented x1  Psych:  confused         Data:     Lab Results   Component Value Date    A1C 9.9 (H) 03/25/2025    A1C 8.6 (H) 02/21/2025    A1C 6.8 (H) 05/10/2024    A1C 7.5 (H) 03/14/2024    A1C 7.4 (H) 07/25/2023       ROUTINE IP LABS (Last four results)  BMP  Recent Labs   Lab 03/27/25  0532 03/27/25  0515 03/27/25  0329 03/27/25  0234 03/26/25  1053 03/26/25  1029 03/26/25  0657 03/26/25  0602 03/26/25  0302 03/26/25  0253   NA  --  139  --   --   --  133*  --  137  --  137   POTASSIUM  --  3.9  --   --   --  5.4*  --  3.6  --  4.5   CHLORIDE  --  108*  --   --   --  104  --  103  --  102   BRUNO  --  8.4*  --   --   --  7.7*  --  8.8  --  9.4   CO2  --  22  --   --   --  18*  --  21*  --  20*   BUN  --  19.7  --   --   --  21.8  --  23.3*  --  23.6*   CR  --  0.75  --   --   --  0.60  --  0.67  --  0.71   * 117* 118* 114*   < > 558*   < > 201*   < > 235*    < > = values in this interval not displayed.     CBC  Recent Labs   Lab 03/27/25  0515 03/26/25  0602 03/26/25  0030 03/25/25  1847   WBC 16.2* 21.9* 20.5* 20.3*   RBC 3.54* 4.04 4.49 4.88   HGB 9.9* 10.8* 12.2 13.4   HCT 29.4* 32.7* 36.7 40.6   MCV 83 81 82 83   MCH 28.0 26.7 27.2 27.5   MCHC 33.7 33.0 33.2 33.0   RDW 14.6 14.5 14.4 14.6    [phone]     Inpatient Diabetes Service will continue to follow, please don't hesitate to contact the team with any questions or concerns.     Wen Santos PA-C  Inpatient Diabetes Service  Pager: [phone]  Available on vocera    Plan discussed with patient, bedside RN, and primary team via this note.    To contact Inpatient Diabetes Service:     7 AM - 5 PM: Page the IDS LANNY following the patient that day (see filed or incomplete progress " notes/consult notes under Endocrinology)    OR if uncertain of provider assignment: page job code [phone]    5 PM - 7 AM: First call after hours is to primary service.    For urgent after-hours questions, page job code for on call fellow: [phone]     I spent a total of 50 minutes on the date of the encounter doing prep/post-work, chart review, history and exam, documentation and further activities per the note including lab review, multidisciplinary communication, counseling the patient and/or coordinating care regarding acute hyper/hypoglycemic management, as well as discharge management and planning/communication.

## 2025-03-27 NOTE — PROGRESS NOTES
[facility]    Medicine Progress Note - Hospitalist Service, GOLD TEAM 20    Date of Admission:  3/25/2025    Assessment & Plan   Paige Mari is a 74 year old female with a past medical history of HTN, paroxysmal atrial fibrillation (on Eliquis), COPD, KP, and latent autoimmune diabetes mellitus who was admitted on 3/25/25 for DKA and hypertensive urgency. The patient was found by her  sitting on the toilet confused. She was then transported to the ED via EMS where she was found to be in DKA with blood sugars to 560, ketones of 5.6, pH 7.19, and lactic acid of 5.0. She was also found to be hypertensive eventually requiring nicardipine drip. Since weaned off the nicardipine drip, she is now transferring to the internal medicine service for ongoing cares.     Interval Changes:   --OT/PT/SW evaluation for dispo needs, likely unable to manage diabetes on discharge  -Transfer to floor  -Mag replacement    DKA   Latent Autoimmune Diabetes Mellitus -   Presented to the ED after being found on the toilet by her  confused. ED work up consistent with DKA. Started on insulin gtt and IV fluids. Gap now closed, still with some acidosis.  PTA managed with metformin, victoza, and insulin pump, however concerns patient's pump turned off or insulin reservoir not replaced due to patient's memory issues.   -Endocrinology following  -insulin gtt, non-DKA protocol   -Lantus 10 units  -carb correctional sliding scale insulin   -- PTA pregabalin, 100 mg qam + 200 mg qpm for neuropathy   -OT/PT/SW evaluation for dispo needs, likely unable to manage diabetes on discharge    Leukocytosis - improving  Suspect stress induced in setting of above DKA. Low suspicion for underlying infection at this time.   -Trend    Hypertensive urgency, resolved  Type 2 NSTEMI, likely demand ischemia  Paroxysmal atrial fibrillation  HTN   HLD  - Blood pressure in ED ~240 systolic. She was given  labetalol 10 mg x2 with brief improvement but ultimately needed nicardipine infusion. Subsequently discontinued when blood pressure overcorrected. BNP elevated to 2206.   -Given patient's memory impairment, question if patient complaint with medications at home.    -ECHO (3/27) - EF 60%  Plan:  - MAP > 65 SBP < 180  ~ Labetalol and hydralazine for SBP >180  - PTA Metoprolol + clonidine patch  -PTA Eliquis  -PTA rosuvastatin    Recurrent UTIs  Urinary Retention - On Keflex for UTI prophylaxis.  UA 3/25 w/o growth  -continue PTA Keflex     Acute Metabolic Encephalopathy  Cognitive Impairment  Probable Dementia   Patient unable to give her own name, where she is, why she's here.  Alert and pleasantly confused. Per endocrinology note, provider spoke with daughter who noted several years of declining cognition. CT head negative in outside ED. Suspect some element of acute confusion in setting of acute illness as above.   -OT for SLUMs/MOCA   -PT evaluation  -social work consult     Partial Seizures  MDD  Generalized Anxiety Disorder  - PTA tizanidine  - PTA duloxetine  - PTA Keppra    Normocytic Anemia - Hgb 10.8, suspect dilutional anemia d/t above IV fluids  -repeat CBC in AM, [facility]          Diet: Moderate Consistent Carb (60 g CHO per Meal) Diet    DVT Prophylaxis: DOAC  Gomes Catheter: Not present  Lines: PRESENT      PICC 03/25/25 Triple Lumen Left Basilic medication drips-Site Assessment: WDL except;Ecchymotic      Cardiac Monitoring: ACTIVE order. Indication: ICU  Code Status: No CPR- Pre-arrest intubation OK      Clinically Significant Risk Factors        # Hyperkalemia: Highest K = 5.4 mmol/L in last 2 days, will monitor as appropriate  # Hyponatremia: Lowest Na = 133 mmol/L in last 2 days, will monitor as appropriate  # Hypochloremia: Lowest Cl = 91 mmol/L in last 2 days, will monitor as appropriate  # Hyperchloremia: Highest Cl = 108 mmol/L in last 2 days, will monitor as appropriate      # Hypocalcemia:  Lowest Ca = 7.7 mg/dL in last 2 days, will monitor and replace as appropriate  # Hypercalcemia: Highest Ca = 10.8 mg/dL in last 2 days, will monitor as appropriate  # Hypomagnesemia: Lowest Mg = 1.6 mg/dL in last 2 days, will replace as needed  # Anion Gap Metabolic Acidosis: Highest Anion Gap = 29 mmol/L in last 2 days, will monitor and treat as appropriate   # Coagulation Defect: INR = 1.21 (Ref range: 0.85 - 1.15) and/or PTT = N/A, will monitor for bleeding    # Hypertension: Noted on problem list           # DMII: A1C = 9.9 % (Ref range: <5.7 %) within past 6 months, PRESENT ON ADMISSION             Social Drivers of Health    Tobacco Use: High Risk (1/28/2025)    Patient History     Smoking Tobacco Use: Every Day     Smokeless Tobacco Use: Never          Disposition Plan     Medically Ready for Discharge:              Michele Nicole MD  Hospitalist Service, GOLD TEAM 20  [facility]  Securely message with [employer] (more info)  Text page via [facility] Paging/Directory   See signed in provider for up to date coverage information  ______________________________________________________________________    Interval History   No acute events overnight. No fever or chills. NO chest pain. No shortness of breath.  No abdominal pain. NO dysuria or hematuria.      Physical Exam   Vital Signs: Temp: 97.6  F (36.4  C) Temp src: Oral BP: 116/71 Pulse: 72   Resp: 15 SpO2: 100 % O2 Device: None (Room air)    Weight: 134 lbs .63 oz    Constitutional: alert, oriented, no acute distress  Eyes: no icterus  ENT: supple  Respiratory: no wheezing or tachypnea  Cardiovascular: RRR  GI: soft, non-distended, no peritoneal signs  Musculoskeletal: no edema  Neurologic: moving all extremities, holding eye contact    Medical Decision Making             Data

## 2025-03-27 NOTE — PLAN OF CARE
ICU End of Shift Summary. See flowsheets for vital signs and detailed assessment.    Changes this shift: Insulin gtt discontinued. Hypertension SBP >180, prn labetolol given x2. Hypoglycemic x1 glucose gel 15gms given x2.   Neuro: Progressed throughout the day to become A/Ox4. Endorses numbness and tingling in all limbs, not new.   Cardiac: Irregular rhythm. Intermittent a. fib, not new. Hypertension, see above.  Respiratory: LS: clear. RA, no cough, no SOB.   GI/: LBM 3/27. External catheter in place draining clear, lore, slightly odorous urine.   Diet/appetite: Mod consistent carb, meds crushed in pudding for this AM po meds, however should reassess for PM po meds. No longer requires feed assist.  Pain: Denies  Skin: Bruising rt inner forearm. Redness/moisture in groin/perineum.  LDA's: See flowsheet  Activities: Up Ax1 with GB, and IV pole.   Drips: Nil  Plan: Await bed on MS/floor    Goal Outcome Evaluation:    Plan of Care reviewed with: Patient         Problem: Comorbidity Management  Goal: Blood Glucose Levels Within Targeted Range  Outcome: Not Progressing     Problem: Comorbidity Management  Goal: Blood Pressure in Desired Range  Outcome: Not Progressing     Problem: Delirium  Goal: Optimal Coping  Outcome: Progressing

## 2025-03-27 NOTE — PROGRESS NOTES
"   03/27/25 1140   Appointment Info   Signing Clinician's Name / Credentials (OT) PETRA Alicia   Student Supervision On-site supervision provided   Rehab Comments (OT) Mild cognitive limitations   Living Environment   People in Home significant other   Current Living Arrangements apartment   Home Accessibility stairs to enter home   Number of Stairs, Main Entrance 8   Transportation Anticipated family or friend will provide   Living Environment Comments Pt possibly unreliable historian, unsure of accuracy of all information provided. Pt reports she lives in apartment with . 8 RASHEED, no elevator. Has tub with shower chair.   Self-Care   Usual Activity Tolerance moderate   Current Activity Tolerance fair   Equipment Currently Used at Home shower chair   Fall history within last six months no   Activity/Exercise/Self-Care Comment Pt reports PLOF IND all ADLs and IADLs, except  did the shopping. Reports no AE use for mobility.   General Information   Onset of Illness/Injury or Date of Surgery 03/26/25   Referring Physician Pierce Rowe MD   Patient/Family Therapy Goal Statement (OT) Did not endorse   Additional Occupational Profile Info/Pertinent History of Current Problem Per chart: \"Paige Mari is a 74 year old female with a past medical history of HTN, paroxysmal atrial fibrillation (on Eliquis), COPD, KP, and latent autoimmune diabetes mellitus who was admitted on 3/25/25 for DKA and hypertensive urgency. The patient was found by her  sitting on the toilet confused. She was then transported to the ED via EMS where she was found to be in DKA with blood sugars to 560, ketones of 5.6, pH 7.19, and lactic acid of 5.0. She was also found to be hypertensive eventually requiring nicardipine drip. Since weaned off the nicardipine drip, she is now transferring to the internal medicine service for ongoing cares.\"   Existing Precautions/Restrictions no known precautions/restrictions " "  Limitations/Impairments safety/cognitive;sensory   Left Upper Extremity (Weight-bearing Status) full weight-bearing (FWB)   Right Upper Extremity (Weight-bearing Status) full weight-bearing (FWB)   Left Lower Extremity (Weight-bearing Status) full weight-bearing (FWB)   Right Lower Extremity (Weight-bearing Status) full weight-bearing (FWB)   Cognitive Status Examination   Orientation Status orientation to person, place and time   Affect/Mental Status (Cognitive) confabulatory;confused   Follows Commands initiation impaired;delayed response/completion   Executive Function Deficit minimal deficit;initiation;insight/awareness of deficits   Cognitive Status Comments Disoriented to situation, telling inaccurate stories (e.g., \"I walked outside yesterday.\"), pt appeared to be able accurately recall biographical information, required VC for task initiation/termination.   Visual Perception   Visual Impairment/Limitations WFL   Sensory   Sensory Comments B foot neuropathy   Pain Assessment   Patient Currently in Pain No   Posture   Posture forward head position;protracted shoulders   Range of Motion Comprehensive   Comment, General Range of Motion WFL   Strength Comprehensive (MMT)   Comment, General Manual Muscle Testing (MMT) Assessment Min generalized weakness   Muscle Tone Assessment   Muscle Tone Comments WFL   Coordination   Upper Extremity Coordination No deficits were identified   Bed Mobility   Bed Mobility supine-sit   Supine-Sit [employer] (Bed Mobility) contact guard   Transfers   Transfers sit-stand transfer   Sit-Stand Transfer   Sit-Stand [employer] (Transfers) supervision   Balance   Balance Comments WFL   Activities of Daily Living   BADL Assessment/Intervention bathing;lower body dressing;grooming;toileting   Bathing Assessment/Intervention   [employer] Level (Bathing) moderate assist (50% patient effort)   Lower Body Dressing Assessment/Training   [employer] Level (Lower Body Dressing) contact " guard assist   Grooming Assessment/Training   [place] Level (Grooming) contact guard assist   Toileting   [place] Level (Toileting) minimum assist (75% patient effort)   Clinical Impression   Criteria for Skilled Therapeutic Interventions Met (OT) Yes, treatment indicated   OT Diagnosis Decreased I w/ ADLs   OT Problem List-Impairments impacting ADL problems related to;activity tolerance impaired;strength;cognition;balance   Assessment of Occupational Performance 1-3 Performance Deficits   Identified Performance Deficits Bathing, dressing, toileting, functional mobility   Planned Therapy Interventions (OT) ADL retraining;strengthening;progressive activity/exercise;risk factor education;cognition   Clinical Decision Making Complexity (OT) problem focused assessment/low complexity   Risk & Benefits of therapy have been explained evaluation/treatment results reviewed;care plan/treatment goals reviewed;risks/benefits reviewed;current/potential barriers reviewed;participants voiced agreement with care plan;participants included;patient   Clinical Impression Comments Pt unable to safely/ind complete ADLs and functional mobility d/t weakness and min cognitive limitations. Pt would benefit from skilled OT to increase safe, functional mobility.   OT Total Evaluation Time   OT Eval, Low Complexity Minutes (64115) 8   OT Goals   Therapy Frequency (OT) 6 times/week   OT Predicted Duration/Target Date for Goal Attainment 04/03/25   OT Goals Hygiene/Grooming;Lower Body Dressing;Lower Body Bathing;Toilet Transfer/Toileting   OT: Hygiene/Grooming modified independent;while standing   OT: Lower Body Dressing Modified independent   OT: Lower Body Bathing Modified independent   OT: Toilet Transfer/Toileting Modified independent;toilet transfer;cleaning and garment management   Self-Care/Home Management   Self-Care/Home Mgmt/ADL, Compensatory, Meal Prep Minutes (01771) 26   Symptoms Noted During/After Treatment (Meal  Preparation/Planning Training) fatigue   Treatment Detail/Skilled Intervention After eval, treatment initiated. Pt agreeable to therapy. Focused on increasing activity tolerance and functional mobility as needed for safe/ind ADLs and functional mobility. Extra time required for line management and environmental set-up. Pt reporting feeling better than yesterday. Able to accurately report time of day, location, president, and year. Disoriented to situation, lack of awareness of deficits. Pt appears to accurately recall biographical information, occasional comfabulatory stories reported throughout session. O2 WNL throughout on RA. Pt transferred supine > sitting EOB CGA. Reports min dizziness EOB, BP = 131/107. Pt denies using FWW/4WW at home. STS from EOB CGA. Amb few steps to sit in chair SBA. Pt completed G/H and oral cares SBA/set-up seated, min VC for task initiation/termination/sequencing. BP sitting = 163/70, pt reports dizziness subsided, no headache. Th facilitated comfortable seated position. Exited w/ pt seated in chair, call light and all other needs in reach.   OT Discharge Planning   OT Plan Complete SLUMS, progress to BR, standing ADLs, toilet tx, TB dressing   OT Discharge Recommendation (DC Rec) home with assist;Transitional Care Facility   OT Rationale for DC Rec Pt below baseline, limited by weakness and min cognitive limitations. Pt w/ good initial mobility, but has yet to amb longer distances. Unclear of level of assist available at home. D/c rec pending results of cognitive assessment and additional mobilization. W/ improved mental state and safe mobility, anticipate safe to d/c home with assist. However, if mental state does not improve, cognitive assessment indicates decifits, and any functional mobility challenges surface, additional rehab at TCU recommended to maximize safe/ind ADLs/mobility.   OT Brief overview of current status [id number]   OT Total Distance Amb During Session (feet) 3   Total  Session Time   Timed Code Treatment Minutes 26   Total Session Time (sum of timed and untimed services) 34

## 2025-03-27 NOTE — PROGRESS NOTES
[facility] [employer] consulted to explore MA for Long term care. [facility] financial counseling states Pt will either need to be able to sign forms, discharge to TCU and work on MA for LTC  there or look at guardianship for Pt and then have family sign paperwork for MA.  Mathew Matt, [facility]  [facility] & [facility]   Ph: [phone]

## 2025-03-28 ENCOUNTER — APPOINTMENT (OUTPATIENT)
Dept: OCCUPATIONAL THERAPY | Facility: CLINIC | Age: 75
End: 2025-03-28
Attending: SURGERY
Payer: COMMERCIAL

## 2025-03-28 ENCOUNTER — APPOINTMENT (OUTPATIENT)
Dept: PHYSICAL THERAPY | Facility: CLINIC | Age: 75
End: 2025-03-28
Attending: SURGERY
Payer: COMMERCIAL

## 2025-03-28 LAB
ANION GAP SERPL CALCULATED.3IONS-SCNC: 10 MMOL/L (ref 7–15)
BUN SERPL-MCNC: 19.4 MG/DL (ref 8–23)
CALCIUM SERPL-MCNC: 8.8 MG/DL (ref 8.8–10.4)
CHLORIDE SERPL-SCNC: 108 MMOL/L (ref 98–107)
CREAT SERPL-MCNC: 0.67 MG/DL (ref 0.51–0.95)
EGFRCR SERPLBLD CKD-EPI 2021: >90 ML/MIN/1.73M2
ERYTHROCYTE [DISTWIDTH] IN BLOOD BY AUTOMATED COUNT: 14.9 % (ref 10–15)
GLUCOSE BLDC GLUCOMTR-MCNC: 201 MG/DL (ref 70–99)
GLUCOSE BLDC GLUCOMTR-MCNC: 249 MG/DL (ref 70–99)
GLUCOSE BLDC GLUCOMTR-MCNC: 252 MG/DL (ref 70–99)
GLUCOSE BLDC GLUCOMTR-MCNC: 265 MG/DL (ref 70–99)
GLUCOSE BLDC GLUCOMTR-MCNC: 373 MG/DL (ref 70–99)
GLUCOSE SERPL-MCNC: 212 MG/DL (ref 70–99)
HCO3 SERPL-SCNC: 22 MMOL/L (ref 22–29)
HCT VFR BLD AUTO: 31.4 % (ref 35–47)
HGB BLD-MCNC: 10.5 G/DL (ref 11.7–15.7)
MAGNESIUM SERPL-MCNC: 2.1 MG/DL (ref 1.7–2.3)
MCH RBC QN AUTO: 27.9 PG (ref 26.5–33)
MCHC RBC AUTO-ENTMCNC: 33.4 G/DL (ref 31.5–36.5)
MCV RBC AUTO: 83 FL (ref 78–100)
PHOSPHATE SERPL-MCNC: 3.8 MG/DL (ref 2.5–4.5)
PLATELET # BLD AUTO: 247 10E3/UL (ref 150–450)
POTASSIUM SERPL-SCNC: 4 MMOL/L (ref 3.4–5.3)
RBC # BLD AUTO: 3.77 10E6/UL (ref 3.8–5.2)
SODIUM SERPL-SCNC: 140 MMOL/L (ref 135–145)
WBC # BLD AUTO: 11.8 10E3/UL (ref 4–11)

## 2025-03-28 PROCEDURE — 83735 ASSAY OF MAGNESIUM: CPT | Performed by: INTERNAL MEDICINE

## 2025-03-28 PROCEDURE — 85027 COMPLETE CBC AUTOMATED: CPT

## 2025-03-28 PROCEDURE — 84100 ASSAY OF PHOSPHORUS: CPT | Performed by: INTERNAL MEDICINE

## 2025-03-28 PROCEDURE — 82310 ASSAY OF CALCIUM: CPT

## 2025-03-28 PROCEDURE — 250N000013 HC RX MED GY IP 250 OP 250 PS 637: Performed by: INTERNAL MEDICINE

## 2025-03-28 PROCEDURE — 250N000013 HC RX MED GY IP 250 OP 250 PS 637

## 2025-03-28 PROCEDURE — 80048 BASIC METABOLIC PNL TOTAL CA: CPT

## 2025-03-28 PROCEDURE — 97116 GAIT TRAINING THERAPY: CPT | Mod: GP

## 2025-03-28 PROCEDURE — 99233 SBSQ HOSP IP/OBS HIGH 50: CPT | Performed by: INTERNAL MEDICINE

## 2025-03-28 PROCEDURE — 99232 SBSQ HOSP IP/OBS MODERATE 35: CPT

## 2025-03-28 PROCEDURE — 120N000002 HC R&B MED SURG/OB UMMC

## 2025-03-28 PROCEDURE — 97129 THER IVNTJ 1ST 15 MIN: CPT | Mod: GO

## 2025-03-28 PROCEDURE — 36415 COLL VENOUS BLD VENIPUNCTURE: CPT

## 2025-03-28 RX ORDER — FLUCONAZOLE 150 MG/1
150 TABLET ORAL ONCE
Status: COMPLETED | OUTPATIENT
Start: 2025-03-28 | End: 2025-03-28

## 2025-03-28 RX ADMIN — APIXABAN 5 MG: 5 TABLET, FILM COATED ORAL at 08:09

## 2025-03-28 RX ADMIN — ACETAMINOPHEN 650 MG: 325 TABLET, FILM COATED ORAL at 23:26

## 2025-03-28 RX ADMIN — CEPHALEXIN 250 MG: 250 CAPSULE ORAL at 08:09

## 2025-03-28 RX ADMIN — METOPROLOL TARTRATE 100 MG: 25 TABLET, FILM COATED ORAL at 08:08

## 2025-03-28 RX ADMIN — ESCITALOPRAM OXALATE 20 MG: 10 TABLET ORAL at 08:08

## 2025-03-28 RX ADMIN — ACETAMINOPHEN 650 MG: 325 TABLET, FILM COATED ORAL at 01:50

## 2025-03-28 RX ADMIN — PREGABALIN 100 MG: 100 CAPSULE ORAL at 08:09

## 2025-03-28 RX ADMIN — FLUCONAZOLE 150 MG: 150 TABLET ORAL at 17:08

## 2025-03-28 RX ADMIN — PANTOPRAZOLE SODIUM 40 MG: 40 TABLET, DELAYED RELEASE ORAL at 08:09

## 2025-03-28 RX ADMIN — ROSUVASTATIN CALCIUM 5 MG: 5 TABLET, FILM COATED ORAL at 20:15

## 2025-03-28 RX ADMIN — APIXABAN 5 MG: 5 TABLET, FILM COATED ORAL at 20:15

## 2025-03-28 RX ADMIN — Medication 25 MCG: at 20:15

## 2025-03-28 RX ADMIN — Medication 1 TABLET: at 20:14

## 2025-03-28 RX ADMIN — LEVETIRACETAM 500 MG: 500 TABLET, FILM COATED ORAL at 20:15

## 2025-03-28 RX ADMIN — LEVETIRACETAM 500 MG: 500 TABLET, FILM COATED ORAL at 08:08

## 2025-03-28 RX ADMIN — METOPROLOL TARTRATE 100 MG: 25 TABLET, FILM COATED ORAL at 20:15

## 2025-03-28 RX ADMIN — ESTRADIOL 1 MG: 1 TABLET ORAL at 08:09

## 2025-03-28 RX ADMIN — PREGABALIN 200 MG: 100 CAPSULE ORAL at 20:15

## 2025-03-28 RX ADMIN — VALACYCLOVIR HYDROCHLORIDE 500 MG: 500 TABLET, FILM COATED ORAL at 08:09

## 2025-03-28 RX ADMIN — TIZANIDINE 4 MG: 2 TABLET ORAL at 20:15

## 2025-03-28 ASSESSMENT — ACTIVITIES OF DAILY LIVING (ADL)
ADLS_ACUITY_SCORE: 64
DEPENDENT_IADLS:: COOKING;SHOPPING
ADLS_ACUITY_SCORE: 64

## 2025-03-28 NOTE — PROGRESS NOTES
[facility]    Medicine Progress Note - Hospitalist Service, GOLD TEAM 18    Date of Admission:  3/25/2025    Assessment & Plan   Paige Mari is a 74 year old female with a past medical history of HTN, paroxysmal atrial fibrillation (on Eliquis), COPD, KP, and latent autoimmune diabetes mellitus who was admitted on 3/25/25 for DKA and hypertensive urgency. The patient was found by her  sitting on the toilet confused. She was then transported to the ED via EMS where she was found to be in DKA with blood sugars to 560, ketones of 5.6, pH 7.19, and lactic acid of 5.0. She was also found to be hypertensive eventually requiring nicardipine drip. Since weaned off the nicardipine drip, she is now transferring to the internal medicine service for ongoing cares.      Clinical updates 3/28/2025:  Scored 18/30 on slums testing today.  Discussed with Silvestre Hernandez CNP at the diabetes team who was able to provide very helpful collateral information.  At the time when patient was first admitted she was able to have a long conversation with her daughter who noted that she has had cognitive decline progressing over recent years, her  sleeps until about 3 PM every day.  They have been encouraging them to go into assisted living but have not had any success in doing so.  She has had repeated hospitalizations for issues related to her inability to properly care for herself or her diabetes.  Her  by her estimation would not be helpful in managing her insulin or diabetes given his age and his own issues, including the fact that he sleeps until 3 PM daily because he had had a night shift job all his life.  It is noted that she was not using her insulin pump properly, was not feeling properly or giving bolus doses in her current subcu insulin needs are vastly higher than what she was receiving through her pump.   -Discussed with MSW.  Based on the above findings, and  the fact that the patient is insulin-dependent would strongly recommend assisted living with medication, insulin management provided by trained staff.  This time I do not believe she requires memory care or long-term care.  She is fully oriented, but clearly has some significant underlying cognitive impairment.  -Continue PT, OT  -MSW following for placement needs.   -Insulin regimen being adjusted by diabetes team, management greatly appreciated  -Monitor daily CBC, BMP for now  -Give fluconazole 150 mg x 1 for complaints of vaginal yeast infection  -Recommending outpatient neuropsychology assessment for detailed assessment of cognitive function  -Attempted to contact patient's , no answer.  Attempted to contact patient's daughter, no answer.     DKA   Latent Autoimmune Diabetes Mellitus -   Presented to the ED after being found on the toilet by her  confused. ED work up consistent with DKA. Started on insulin gtt and IV fluids. Gap now closed, still with some acidosis.  PTA managed with metformin, victoza, and insulin pump, however has significant cognitive impairment and was not managing the device properly.  Transitioned to subcu insulin per diabetes team, blood sugars still elevated but overall improved.  Insulin needs far greater than what she was receiving through her pump, again supporting that she was not managing her diabetes properly.  Due to her cognitive impairment, she is not capable of safely managing her own insulin going forward, and per reports from patient's daughter her  would not be capable of managing this either.  -Endocrinology following  -insulin gtt, non-DKA protocol   -Lantus 10 units  -carb correctional sliding scale insulin   -- PTA pregabalin, 100 mg qam + 200 mg qpm for neuropathy   -OT/PT/SW following for discharge needs     Leukocytosis - improving  Suspect stress induced in setting of above DKA. Low suspicion for underlying infection at this time.  No localizing  signs or symptoms of infection.  UA was bland, CXR normal.  -Trend     Hypertensive urgency, resolved  Type 2 NSTEMI, likely demand ischemia  Paroxysmal atrial fibrillation  HTN   HLD  ECHO 3/26/2025: EF 65-70%, mild concentric LVH.  Normal RV function.  No significant valvular disease.  Blood pressure in ED ~240 systolic. She was given labetalol 10 mg x2 with brief improvement but ultimately needed nicardipine infusion. Subsequently discontinued when blood pressure overcorrected. BNP elevated to 2206.  Euvolemic clinically.  Highly suspect noncompliance with medications PTA given patient's cognitive impairment, with rebound hypertension with noncompliance with clonidine.  Has listed allergy to amlodipine causing delirium, HCTZ causing hyponatremia.  Blood pressure now much better controlled.  -Continue PTA Eliquis  -Continue clonidine patch, however would advise against continuing this at discharge and would wean off unless she has adequate medication oversight given risk of rebound hypertension in the setting of noncompliance  -Continue metoprolol  -Consider adding ARB versus ACE inhibitor given diabetes  -Daily BMP for now     Recurrent UTIs  Urinary Retention -   On Keflex for UTI prophylaxis.  UA 3/25 bland.  Complaining of vaginal yeast symptoms 3/28  -continue PTA Keflex   -Fluconazole 150 mg x 1 3/28     Acute Metabolic Encephalopathy  Cognitive Impairment  Probable Dementia   Patient was unable to give her own name, where she is, why she's here at the time of admission and was noted to be alert and pleasantly confused.  Per endocrinology note, provider spoke with daughter who noted several years of declining cognition. CT head negative in outside ED. TSH, LFTs, albumin normal.  Suspect some element of acute confusion in setting of acute illness as above.  With correction of metabolic derangements is now fully oriented, speech normal.  Scored 18/30 on slums per OT on 3/28.  Collateral information from  daughter combined with repeated hospitalizations for what is likely noncompliance or inability to appropriately care for herself favor significant cognitive impairment and probable at least mild dementia.  Is not capable of managing her own insulin regimen going forward.  Per collateral information,  would not be able to provide this oversight either.  Based on her current cognitive status, assisted living with medication, insulin management by nursing staff would be appropriate.  I do not believe she requires memory care or LTC at this time.  -PT, OT  -social work consult following for safe disposition, again recommend assisted living with medication management, insulin management by staff     Partial Seizures  MDD  Generalized Anxiety Disorder  - PTA tizanidine  - PTA duloxetine  - PTA Keppra     Normocytic Anemia  Presenting hemoglobin 13.4, MCV 83.  Hemoglobin trended down to 9.9 following IV fluids, now trending back up off IV fluids.  No signs or symptoms of bleeding clinically.  -Daily CBC for now  -Check B12, folic acid, iron studies 3/29          Diet: Moderate Consistent Carb (60 g CHO per Meal) Diet    DVT Prophylaxis: DOAC  Gomes Catheter: Not present  Lines: PRESENT             Cardiac Monitoring: None  Code Status: No CPR- Pre-arrest intubation OK      Clinically Significant Risk Factors          # Hyperchloremia: Highest Cl = 108 mmol/L in last 2 days, will monitor as appropriate           # Coagulation Defect: INR = 1.21 (Ref range: 0.85 - 1.15) and/or PTT = N/A, will monitor for bleeding    # Hypertension: Noted on problem list           # DMII: A1C = 9.9 % (Ref range: <5.7 %) within past 6 months, PRESENT ON ADMISSION      # Financial/Environmental Concerns: none         Social Drivers of Health    Tobacco Use: High Risk (1/28/2025)    Patient History     Smoking Tobacco Use: Every Day     Smokeless Tobacco Use: Never          Disposition Plan     Medically Ready for Discharge: Anticipated  in 2-4 Days             Aman Powell MD  Hospitalist Service, GOLD TEAM 18  [facility]  Securely message with [employer] (more info)  Text page via [facility] Paging/Directory   See signed in provider for up to date coverage information  ______________________________________________________________________    Interval History   Very pleasant and fully oriented.  Discussing current events while watching TV.  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  Watching the [place] is right.  Eating great.  Ambulating well with a walker with therapy.  Denies any pain complaints.  No constipation or diarrhea.    Physical Exam   Vital Signs: Temp: 97.5  F (36.4  C) Temp src: Oral BP: (!) 143/70 Pulse: 66   Resp: 16 SpO2: 98 % O2 Device: None (Room air)    Weight: 134 lbs .63 oz  General: Alert and oriented x 4.  Very pleasant.  Speech normal.  Affect appropriate.  HEENT: Atraumatic.  No icterus or injection.  OP moist and clear  Chest: CTA bilaterally  CV: RRR.  No murmurs.  Abdomen: Obese.  NABS.  Soft, nontender, nondistended  Extremities: No edema  Neuro: CN II through XII grossly intact.  Strength 5/5 symmetrically.  No tremors.  Normal finger-nose.            60 MINUTES SPENT BY ME on the date of service doing chart review, history, exam, documentation & further activities per the note.      Data   Imaging results reviewed over the past 24 hrs:   No results found for this or any previous visit (from the past 24 hours).  Most Recent 3 CBC's:  Recent Labs   Lab Test 03/28/25  0554 03/27/25  0515 03/26/25  0602   WBC 11.8* 16.2* 21.9*   HGB 10.5* 9.9* 10.8*   MCV 83 83 81    260 340     Most Recent 3 BMP's:  Recent Labs   Lab Test 03/28/25  1118 03/28/25  0752 03/28/25  0554 03/27/25  0532 03/27/25  0515 03/26/25  1053 03/26/25  1029   NA  --   --  140  --  139  --  133*   POTASSIUM  --   --  4.0  --  3.9  --  5.4*   CHLORIDE  --   --  108*  --   108*  --  104   CO2  --   --  22  --  22  --  18*   BUN  --   --  19.4  --  19.7  --  21.8   CR  --   --  0.67  --  0.75  --  0.60   ANIONGAP  --   --  10  --  9  --  11   BRUNO  --   --  8.8  --  8.4*  --  7.7*   * 201* 212*   < > 117*   < > 558*    < > = values in this interval not displayed.     Most Recent 2 LFT's:  Recent Labs   Lab Test 03/26/25  0030 08/31/23  1104   AST 19 25   ALT 15 17   ALKPHOS 79 68   BILITOTAL 0.7 0.5     Most Recent 6 Bacteria Isolates From Any Culture (See EPIC Reports for Culture Details):No lab results found.  Most Recent TSH and T4:  Recent Labs   Lab Test 03/25/25  1847 05/10/23  1430 03/22/23  1557   TSH 1.28   < > 3.12   T4  --   --  1.22    < > = values in this interval not displayed.     Most Recent Hemoglobin A1c:  Recent Labs   Lab Test 03/25/25 1847   A1C 9.9*     Most Recent Urinalysis:  Recent Labs   Lab Test 03/25/25  1930   COLOR Colorless   APPEARANCE Clear   URINEGLC >1000*   URINEBILI Negative   URINEKETONE 100*   SG 1.025   UBLD 0.06 mg/dL*   URINEPH 5.5   PROTEIN 100*   NITRITE Negative   LEUKEST Negative   RBCU 1   WBCU <1     Most Recent ABG:  Recent Labs   Lab Test 07/24/23  0140   PH 7.42     Most Recent ESR & CRP:  Recent Labs   Lab Test 03/13/24 2012 01/22/22  0734   SED  --  6   CRP  --  3.1*   CRPI <3.00  --      Most Recent Anemia Panel:  Recent Labs   Lab Test 03/28/25  0554 03/25/25  1847 02/21/25  1603   WBC 11.8*   < >  --    HGB 10.5*   < >  --    HCT 31.4*   < >  --    MCV 83   < >  --       < >  --    B12  --   --  474    < > = values in this interval not displayed.     Most Recent CPK:  Recent Labs   Lab Test 03/03/21  1206   CKT 57

## 2025-03-28 NOTE — PROGRESS NOTES
Admission: Admitted Into [facility] 602  -----------------------------------------------------------  Reason for admission: DKA-Recovering  Primary team notified of patient arrival: NA, patient already seen when transferred to medicine team just switching rooms due to bed availability.    Admitted from: 10A ICU  Via: On the cart with bedside nurse  Belongings: Remains with patient     Access: L [facility] PICC     Code Status verified on armband: Yes    2 RN Skin Assessment Completed By: Yes, Gaviota Norwood and [place],     Result of skin assessment and interventions/actions: Skin intact except a large R upper forearm arm bruising, perineal redness     Pt status: Oriented to room, intermittent confusion, call light in reach able to make needs known, denies of pain or SOB, connected to pure wick, took bedtime medications whole, VSS.

## 2025-03-28 NOTE — PLAN OF CARE
"Goal Outcome Evaluation: Overall Patient Progress: improving  Problem: Adult Inpatient Plan of Care  Goal: Plan of Care Review  Description: The Plan of Care Review/Shift note should be completed every shift.  The Outcome Evaluation is a brief statement about your assessment that the patient is improving, declining, or no change.  This information will be displayed automatically on your shiftnote.  Outcome: Progressing  Flowsheets (Taken 3/28/2025 1111)  Plan of Care Reviewed With: patient  Overall Patient Progress: improving  Goal: Patient-Specific Goal (Individualized)  Description: You can add care plan individualizations to a care plan. Examples of Individualization might be:  \"Parent requests to be called daily at 9am for status\", \"I have a hard time hearing out of my right ear\", or \"Do not touch me to wake me up as it startlesme\".  Outcome: Progressing  Goal: Absence of Hospital-Acquired Illness or Injury  Outcome: Progressing  Intervention: Identify and Manage Fall Risk  Recent Flowsheet Documentation  Taken 3/28/2025 0830 by Jass Rockwell RN  Safety Promotion/Fall Prevention:   activity supervised   clutter free environment maintained   increased rounding and observation   increase visualization of patient   lighting adjusted   patient and family education   room near nurse's station   room organization consistent   safety round/check completed   supervised activity  Intervention: Prevent Skin Injury  Recent Flowsheet Documentation  Taken 3/28/2025 0830 by Jass Rockwell RN  Body Position:   position changed independently   weight shifting  Intervention: Prevent Infection  Recent Flowsheet Documentation  Taken 3/28/2025 0830 by Jass Rockwell RN  Infection Prevention:   equipment surfaces disinfected   hand hygiene promoted   personal protective equipment utilized   rest/sleep promoted   single patient room provided   environmental surveillance performed  Goal: Optimal Comfort and Wellbeing  Outcome: " Progressing  Intervention: Provide Person-Centered Care  Recent Flowsheet Documentation  Taken 3/28/2025 0830 by Jass Rockwell RN  Trust Relationship/Rapport:   care explained   choices provided   emotional support provided   empathic listening provided   questions answered   questions encouraged   reassurance provided   thoughts/feelings acknowledged  Goal: Readiness for Transition of Care  Outcome: Progressing     Problem: Delirium  Goal: Optimal Coping  Outcome: Progressing  Intervention: Optimize Psychosocial Adjustment to Delirium  Recent Flowsheet Documentation  Taken 3/28/2025 0830 by Jass Rockwell RN  Supportive Measures:   active listening utilized   verbalization of feelings encouraged   relaxation techniques promoted  Goal: Improved Behavioral Control  Outcome: Progressing  Intervention: Prevent and Manage Agitation  Recent Flowsheet Documentation  Taken 3/28/2025 0830 by Jass Rockwell RN  Environment Familiarity/Consistency: daily routine followed  Intervention: Minimize Safety Risk  Recent Flowsheet Documentation  Taken 3/28/2025 0830 by Jass Rockwell RN  Enhanced Safety Measures:   patient/family teach back on injury risk   review medications for side effects with activity   room near unit station  Trust Relationship/Rapport:   care explained   choices provided   emotional support provided   empathic listening provided   questions answered   questions encouraged   reassurance provided   thoughts/feelings acknowledged  Goal: Improved Attention and Thought Clarity  Outcome: Progressing  Intervention: Maximize Cognitive Function  Recent Flowsheet Documentation  Taken 3/28/2025 0830 by Jass Rockwell RN  Sensory Stimulation Regulation:   care clustered   lighting decreased   quiet environment promoted  Reorientation Measures:   clock in view   reorientation provided  Goal: Improved Sleep  Outcome: Progressing     Problem: Comorbidity Management  Goal: Blood Glucose Levels Within Targeted  Range  Outcome: Progressing  Intervention: Monitor and Manage Glycemia  Recent Flowsheet Documentation  Taken 3/28/2025 0830 by Jass Rockwell RN  Medication Review/Management:   medications reviewed   high-risk medications identified   infusion titrated  Goal: Blood Pressure in Desired Range  Outcome: Progressing  Intervention: Maintain Blood Pressure Management  Recent Flowsheet Documentation  Taken 3/28/2025 0830 by Jass Rockwell RN  Medication Review/Management:   medications reviewed   high-risk medications identified   infusion titrated  A&O x 4, denied pain or other distress. Worked with PT In this shift. Pleasant and cooperative with care.

## 2025-03-28 NOTE — CONSULTS
Care Management Initial Consult    General Information  Assessment completed with: VM-chart review,    Type of CM/SW Visit: Initial Assessment    Primary Care Provider verified and updated as needed: No   Readmission within the last 30 days: no previous admission in last 30 days         Advance Care Planning: Advance Care Planning Reviewed: present on chart          Communication Assessment  Patient's communication style: spoken language (English or Bilingual)             Cognitive  Cognitive/Neuro/Behavioral: .WDL except, orientation  Level of Consciousness: confused  Arousal Level: opens eyes spontaneously  Orientation: oriented x 4 (intermittently confused)  Mood/Behavior: cooperative, calm  Best Language: 0 - No aphasia  Speech: clear, spontaneous    Living Environment:   People in home: spouse   (, Liam)  Current living Arrangements: apartment      Able to return to prior arrangements: other (see comments) (TBD pending cognitive assessment)       Family/Social Support:  Care provided by: self, spouse/significant other  Provides care for: no one  Marital Status:   Support system: Children,           Description of Support System: Supportive, Involved         Current Resources:   Patient receiving home care services: No        Community Resources: Other (see comment) (unable to assess)  Equipment currently used at home: walker, rolling, shower chair, grab bar, tub/shower  Supplies currently used at home: Other (unable to assess)    Employment/Financial:  Employment Status: other (see comments) (unable to assess)        Financial Concerns: none   Referral to Financial Worker: No       Does the patient's insurance plan have a 3 day qualifying hospital stay waiver?  No    Lifestyle & Psychosocial Needs:  Social Drivers of Health     Food Insecurity: Not on file   Depression: Not at risk (1/28/2025)    PHQ-2     PHQ-2 Score: 0   Housing Stability: Not on file   Tobacco Use: High Risk (1/28/2025)     Patient History     Smoking Tobacco Use: Every Day     Smokeless Tobacco Use: Never     Passive Exposure: Not on file   Financial Resource Strain: Not on file   Alcohol Use: Not on file   Transportation Needs: Not on file   Physical Activity: Not on file   Interpersonal Safety: Not on file   Stress: Not on file   Social Connections: Not on file   Health Literacy: Not on file       Functional Status:  Prior to admission patient needed assistance:   Dependent ADLs:: Independent  Dependent IADLs:: Cooking, Shopping       Mental Health Status:  Mental Health Status: Current Concern (potential dementia, awaiting assessment)  Mental Health Management: Other (see comment) (none currently)    Chemical Dependency Status:  Chemical Dependency Status: No Current Concerns             Values/Beliefs:  Spiritual, Cultural Beliefs, Episcopal Practices, Values that affect care: other (see comments) (unable to assess)               Discussed  Partnership in Safe Discharge Planning  document with patient/family: No    Additional Information:  CMA completed through chart review as Paige is currently an inaccurate historian. Attempted to call daughter, Merline, twice but call went to an unidentified voicemail.   Paige lives in an apartment with her , Liam, and Liam has reported that she is independent with all ADLs and most IADLs, though he assists with shopping and cooking.   Paige uses a shower chair and grab bar at home and has a 4WW but chart review indicates she does not use it for mobility at this time and ambulates independently. She receives social security; it is unclear if she has other sources of income.  Paige's daughter Merline and hospital staff have raised concerns that Paige may be experiencing some cognitive and memory deficits; assessment is pending. Discharge to an assisted living or memory care facility should be considered.    Liam worked the night shift throughout his career and has maintained  an atypical sleep schedule; he may be difficult to reach during the day.     Next Steps: Attempt to contact daughter Merline to gain a clearer understanding of Paige's safety at home and the family's openness to considering memory care; inquire about POA status for potential MA application    Marc Ashby  MSW Intern  [phone]

## 2025-03-28 NOTE — PROGRESS NOTES
See flow sheet for assessment.  Pt A+O, verbalized understanding of transfer and all belongings, medications sent.  Report was given to Debbi Krause, 6A charge nurse.

## 2025-03-28 NOTE — PROGRESS NOTES
Care Management Follow Up    Length of Stay (days): 3    Expected Discharge Date: 03/31/2025     Concerns to be Addressed: discharge planning, cognitive/perceptual, financial/insurance, home safety  Pt is unable to care for self and manage diseases at this time  Patient plan of care discussed at interdisciplinary rounds: Yes    Anticipated Discharge Disposition: Home, Skilled Nursing Facility, Other (Comments) (assisted living/memory care)        Anticipated Discharge Services: Other (see comment) (TBD)  Anticipated Discharge DME:  (TBD)    Patient/family educated on Medicare website which has current facility and service quality ratings: no  Education Provided on the Discharge Plan: No (plan undetermined)  Patient/Family in Agreement with the Plan:  (plan is undetermined)    Referrals Placed by CM/SW:    Private pay costs discussed: Not applicable    Discussed  Partnership in Safe Discharge Planning  document with patient/family: Yes:     Handoff Completed: No, handoff not indicated or clinically appropriate    Additional Information:  Pt is not safe to discharge home at this time.  Pt will need assisted living or LTC to  aid with insulin and other medication management. Pt is wanting to discharge  home however, Pt's spouse  is not able to provide adequate care. Pt will either need to have capacity to sign CAROLINE's with financial  counseling for  MA-LTC or have daughter  explore guardianship.     Next Steps:   SW/RNCC will continue to follow for safe discharge planning.     Mathew Matt, Bridgton HospitalSW  [facility]   Ph: [phone]

## 2025-03-28 NOTE — PROGRESS NOTES
Inpatient Diabetes Management Service: Daily Progress Note     HPI: Paige Mari is a 74 year old with Type 1 Diabetes Mellitus (HARRIETT variant), managed with Omnipod 5 insulin pump and complicated by neuropathy and mild non proliferative retinopathy, as well as a comorbid history of paroxysmal Afib, HTN, HLD,hypothyroidism, KP, and COPD, who was admitted on 3/25/2025 with DKA. Inpatient Diabetes Service consulted for assistance with glycemic management.          Assessment/Plan:     Assessment:   Type 1 Diabetes Mellitus (HARRIETT), complicated by nephropathy, retinopathy and microalbuminuria. Sub-optimal and worsening control  (A1c 9.9 %, Hgb: 13.4)  DKA (BicarbL 15, pH 7.19, glucose 569, ketones 5.62) - resolved  Lactic acidosis (5.0)- resolved  AMS with question of cognitive decline  Concern for ability to care for self      Plan/Recommendations:    - Increase Lantus 28--> 34 units q 24 hrs at 1500   -  Novolog Meal Coverage: 1 units per 5 g CHO, TID AC and PRN with snacks/supplements   -  Novolog correction scale: high resistance 1:25 >140 TID AC, 1:25 >200 HS & 0200 (starting with dinner)   - BG monitoring: TID AC, HS, 0200 (starting with dinner)   - Hypoglycemia protocol    - Carb counting protocol      Discussion:   Glucoses elevated despite increase in Lantus and meal time insulin yesterday. Fasting BG of 201. Will further increase Lantus today by 20% and monitor mealtime trends for need to adjust insulin. Patient continues to require more insulin than her PTA Insulin pump settings. It is unclear how consistent she was with her insulin administration outpatient, switching between her pump and injections frequently. Today she is oriented to self and time, but unable to state the dosing for Long acting insulin if she was off her pump. Not appropriate to resume insulin pump or self administer injections at this time. Discharge disposition pending cognitive assessment and there is strong  consideration for memory care or assisted living facility.      Discharge Planning: (tentative)    Medications: TBD    Test Claims:  none needed.   Education:  Needs to be assessed closer to discharge.    Outpatient Follow-up:  recommend [facility]- Dr. Bardales        Please notify Inpatient Diabetes Service if changes are planned to steroids, nutrition, TPN/TF and anticipated procedures requiring prolonged NPO status.         Interval History/Review of Systems :   - The last 24 hours progress and nursing notes reviewed.  Awake and alert. Eating well, now requiring less assistance with feeding. Watching TV.   Denies abdominal pain, nausea and vomiting.     Planned Procedures/Surgeries: none    Inpatient Glucose Control:       Recent Labs   Lab 03/28/25  0140 03/27/25  2139 03/27/25  2015 03/27/25  1907 03/27/25  1748 03/27/25  1734   * 288* 230* 175* 168* 99             Medications Impacting Glycemia:   Steroids:  none   D5W-containing solutions/medications: D5 1/2 NS with 20 K per DKA protocol  - stopped 3/26  Other medications impacting glucose: none         Nutrition:   Orders Placed This Encounter      Moderate Consistent Carb (60 g CHO per Meal) Diet  Supplements: none   TF: none   TPN: none         Diabetes History: see full consult note for complete diabetes history   Diabetes Type and Duration: Diagnosed with      12/7/2021: C-peptide 0.7 ng/mL with concurrent venous glucose 176 mg/dL, MAXI Ab >250 (ULN 5.0 IU/mL), insulin Ab 0.6 (ULN 0.4 U/mL), IA-2 Ab 44.1 (ULN 7.4 U/mL), Zn T8 Ab 64.3 (ref range 0.0 - 15.0 U/mL)  -CT A/P 9/2021: Mild diffuse pancreatic atrophy  -12/7/2021: fructosamine 365 umol/L (corresponds to HbA1c ~9.0%), HbA1c 9.3%   -5/2024: HbA1c 6.8%  -2/2025: HbA1c 8.6%  - 3/2025: HbA1c 9.9%- worsening      PTA Medication Regimen:   Metformin XR 1000 mg BID (stable for many years)   Victoza 1.2 mg daily (stable dose for many years)      Settings via Glucoo- pump not present  "to be assessed 3/26/25  Pump: Omnipod 5    Mode: Manual- patient had not connected CGM to run in Auto   CGM: Dexcom G 6 but not connected   Insulin: Novolog U-100   Basal Rates:  0311-2219: 0.5 units/hr  4905-4207: 0.35 units/hr   1562-2683: 0.4 units/hr   5245-1125: 0.6 units/hr      Total: 12 units daily      Bolus Settings:  Insulin Sensitivity Factor:  60  Insulin to CHO Ratio:  0000 to 2359 - 1 unit for every 20 grams     Target Glucose: 130mg/dL  Active insulin time: 4     Backup for Pump Failure: Lantus 12 units      Missing doses?: yes, pump removed Emil 3/23 and not replaced   Historical Diabetes Medications:   Insulin Detemir      Glucose monitoring device/frequency/trends: Dexcom G 6 - unknown as not linked to [employer] and unable to check via other apps.   Pump running in manual mode- evidence that is was not replaced on 3/23 noted below        Hypoglycemia PTA:   - Frequency: unknown  - Severity: unknown  - Awareness: hypoglycemia unawareness recorded in notes.   - Treatment: unknown      Outpatient Diabetes Provider: Dr. Bardales with [facility]   Formal Diabetes Education/Educator: yes in the past, no recent follow up        Physical Exam:   BP (!) 170/68 (BP Location: Left arm)   Pulse 81   Temp 97.7  F (36.5  C) (Oral)   Resp 19   Ht 1.575 m (5' 2\")   Wt 60.8 kg (134 lb 0.6 oz)   SpO2 97%   BMI 24.52 kg/m    General Appearance: awake and alert. Pleasantly confused   HEENT: Normocephalic, atraumatic.   Lungs:  Breathing comfortably   Abdomen: Round, nondistended. Soft, nontender.   Extremities:  No significant lower extremity edema. Fluid movement of extremities.   Neuro: no focal deficits. Oriented to self, time. Disoriented to situation   Psych:  Calm         Data:     Lab Results   Component Value Date    A1C 9.9 (H) 03/25/2025    A1C 8.6 (H) 02/21/2025    A1C 6.8 (H) 05/10/2024    A1C 7.5 (H) 03/14/2024    A1C 7.4 (H) 07/25/2023       ROUTINE IP LABS (Last four " results)  BMP  Recent Labs   Lab 03/28/25  0140 03/27/25  2139 03/27/25 2015 03/27/25  1907 03/27/25  0532 03/27/25  0515 03/26/25  1053 03/26/25  1029 03/26/25  0657 03/26/25  0602 03/26/25  0302 03/26/25  0253   NA  --   --   --   --   --  139  --  133*  --  137  --  137   POTASSIUM  --   --   --   --   --  3.9  --  5.4*  --  3.6  --  4.5   CHLORIDE  --   --   --   --   --  108*  --  104  --  103  --  102   BRUNO  --   --   --   --   --  8.4*  --  7.7*  --  8.8  --  9.4   CO2  --   --   --   --   --  22  --  18*  --  21*  --  20*   BUN  --   --   --   --   --  19.7  --  21.8  --  23.3*  --  23.6*   CR  --   --   --   --   --  0.75  --  0.60  --  0.67  --  0.71   * 288* 230* 175*   < > 117*   < > 558*   < > 201*   < > 235*    < > = values in this interval not displayed.     CBC  Recent Labs   Lab 03/27/25  0515 03/26/25  0602 03/26/25  0030 03/25/25  1847   WBC 16.2* 21.9* 20.5* 20.3*   RBC 3.54* 4.04 4.49 4.88   HGB 9.9* 10.8* 12.2 13.4   HCT 29.4* 32.7* 36.7 40.6   MCV 83 81 82 83   MCH 28.0 26.7 27.2 27.5   MCHC 33.7 33.0 33.2 33.0   RDW 14.6 14.5 14.4 14.6    [phone]     Inpatient Diabetes Service will continue to follow, please don't hesitate to contact the team with any questions or concerns.     Silvestre Hernandez, APRN, CNP   Inpatient Diabetes Management Service   Pager: [phone]   Available on Vocera      Plan discussed with patient, bedside RN, and primary team via this note.    To contact Inpatient Diabetes Service:     7 AM - 5 PM: Page the IDS LANNY following the patient that day (see filed or incomplete progress notes/consult notes under Endocrinology)    OR if uncertain of provider assignment: page job code [phone]    5 PM - 7 AM: First call after hours is to primary service.    For urgent after-hours questions, page job code for on call fellow: [phone]     I spent a total of 35 minutes on the date of the encounter doing prep/post-work, chart review, history and exam, documentation and  further activities per the note including lab review, multidisciplinary communication, counseling the patient and/or coordinating care regarding acute hyper/hypoglycemic management, as well as discharge management and planning/communication.

## 2025-03-28 NOTE — PLAN OF CARE
"Goal Outcome Evaluation:      Plan of Care Reviewed With: patient    Overall Patient Progress: no change  Problem: Adult Inpatient Plan of Care  Goal: Plan of Care Review  Description: The Plan of Care Review/Shift note should be completed every shift.  The Outcome Evaluation is a brief statement about your assessment that the patient is improving, declining, or no change.  This information will be displayed automatically on your shiftnote.  Outcome: Progressing  Flowsheets (Taken 3/28/2025 0657)  Plan of Care Reviewed With: patient  Overall Patient Progress: no change  Goal: Patient-Specific Goal (Individualized)  Description: You can add care plan individualizations to a care plan. Examples of Individualization might be:  \"Parent requests to be called daily at 9am for status\", \"I have a hard time hearing out of my right ear\", or \"Do not touch me to wake me up as it startlesme\".  Outcome: Progressing  Goal: Absence of Hospital-Acquired Illness or Injury  Outcome: Progressing  Intervention: Identify and Manage Fall Risk  Recent Flowsheet Documentation  Taken 3/28/2025 0600 by Ramos Robles RN  Safety Promotion/Fall Prevention:   activity supervised   clutter free environment maintained   increased rounding and observation   increase visualization of patient   lighting adjusted   patient and family education   room near nurse's station   room organization consistent   safety round/check completed   supervised activity  Intervention: Prevent Skin Injury  Recent Flowsheet Documentation  Taken 3/28/2025 0600 by Ramos Robles RN  Body Position:   position changed independently   weight shifting  Intervention: Prevent Infection  Recent Flowsheet Documentation  Taken 3/28/2025 0600 by Ramos Robles RN  Infection Prevention:   equipment surfaces disinfected   hand hygiene promoted   personal protective equipment utilized   rest/sleep promoted   single patient room provided   environmental surveillance " performed  Goal: Optimal Comfort and Wellbeing  Outcome: Progressing  Intervention: Provide Person-Centered Care  Recent Flowsheet Documentation  Taken 3/28/2025 0600 by Ramos Robles RN  Trust Relationship/Rapport:   care explained   choices provided   emotional support provided   empathic listening provided   questions answered   questions encouraged   reassurance provided   thoughts/feelings acknowledged  Goal: Readiness for Transition of Care  Outcome: Progressing   VSS on RA. Pt is alert and oriented with intermittent confusion, able to make needs known. Pt denies of pain or SOB. No acute events reported.

## 2025-03-29 ENCOUNTER — APPOINTMENT (OUTPATIENT)
Dept: PHYSICAL THERAPY | Facility: CLINIC | Age: 75
End: 2025-03-29
Attending: SURGERY
Payer: COMMERCIAL

## 2025-03-29 LAB
ANION GAP SERPL CALCULATED.3IONS-SCNC: 11 MMOL/L (ref 7–15)
BUN SERPL-MCNC: 23.2 MG/DL (ref 8–23)
CALCIUM SERPL-MCNC: 9 MG/DL (ref 8.8–10.4)
CHLORIDE SERPL-SCNC: 105 MMOL/L (ref 98–107)
CREAT SERPL-MCNC: 0.7 MG/DL (ref 0.51–0.95)
EGFRCR SERPLBLD CKD-EPI 2021: 90 ML/MIN/1.73M2
ERYTHROCYTE [DISTWIDTH] IN BLOOD BY AUTOMATED COUNT: 15 % (ref 10–15)
FERRITIN SERPL-MCNC: 18 NG/ML (ref 11–328)
FOLATE SERPL-MCNC: 28.2 NG/ML (ref 4.6–34.8)
GLUCOSE BLDC GLUCOMTR-MCNC: 105 MG/DL (ref 70–99)
GLUCOSE BLDC GLUCOMTR-MCNC: 173 MG/DL (ref 70–99)
GLUCOSE BLDC GLUCOMTR-MCNC: 199 MG/DL (ref 70–99)
GLUCOSE BLDC GLUCOMTR-MCNC: 199 MG/DL (ref 70–99)
GLUCOSE BLDC GLUCOMTR-MCNC: 223 MG/DL (ref 70–99)
GLUCOSE SERPL-MCNC: 180 MG/DL (ref 70–99)
HCO3 SERPL-SCNC: 24 MMOL/L (ref 22–29)
HCT VFR BLD AUTO: 29.1 % (ref 35–47)
HGB BLD-MCNC: 9.6 G/DL (ref 11.7–15.7)
IRON BINDING CAPACITY (ROCHE): 268 UG/DL (ref 240–430)
IRON SATN MFR SERPL: 8 % (ref 15–46)
IRON SERPL-MCNC: 21 UG/DL (ref 37–145)
MAGNESIUM SERPL-MCNC: 1.8 MG/DL (ref 1.7–2.3)
MCH RBC QN AUTO: 27.7 PG (ref 26.5–33)
MCHC RBC AUTO-ENTMCNC: 33 G/DL (ref 31.5–36.5)
MCV RBC AUTO: 84 FL (ref 78–100)
PHOSPHATE SERPL-MCNC: 4.5 MG/DL (ref 2.5–4.5)
PLATELET # BLD AUTO: 215 10E3/UL (ref 150–450)
POTASSIUM SERPL-SCNC: 4 MMOL/L (ref 3.4–5.3)
RBC # BLD AUTO: 3.46 10E6/UL (ref 3.8–5.2)
SODIUM SERPL-SCNC: 140 MMOL/L (ref 135–145)
VIT B12 SERPL-MCNC: 452 PG/ML (ref 232–1245)
VIT D+METAB SERPL-MCNC: 54 NG/ML (ref 20–50)
WBC # BLD AUTO: 9.6 10E3/UL (ref 4–11)

## 2025-03-29 PROCEDURE — 85018 HEMOGLOBIN: CPT

## 2025-03-29 PROCEDURE — 250N000013 HC RX MED GY IP 250 OP 250 PS 637

## 2025-03-29 PROCEDURE — 85041 AUTOMATED RBC COUNT: CPT

## 2025-03-29 PROCEDURE — 99233 SBSQ HOSP IP/OBS HIGH 50: CPT | Performed by: INTERNAL MEDICINE

## 2025-03-29 PROCEDURE — 99232 SBSQ HOSP IP/OBS MODERATE 35: CPT

## 2025-03-29 PROCEDURE — 82607 VITAMIN B-12: CPT | Performed by: INTERNAL MEDICINE

## 2025-03-29 PROCEDURE — 36415 COLL VENOUS BLD VENIPUNCTURE: CPT

## 2025-03-29 PROCEDURE — 82746 ASSAY OF FOLIC ACID SERUM: CPT | Performed by: INTERNAL MEDICINE

## 2025-03-29 PROCEDURE — 82947 ASSAY GLUCOSE BLOOD QUANT: CPT

## 2025-03-29 PROCEDURE — 120N000002 HC R&B MED SURG/OB UMMC

## 2025-03-29 PROCEDURE — 83550 IRON BINDING TEST: CPT | Performed by: INTERNAL MEDICINE

## 2025-03-29 PROCEDURE — 250N000013 HC RX MED GY IP 250 OP 250 PS 637: Performed by: INTERNAL MEDICINE

## 2025-03-29 PROCEDURE — 97116 GAIT TRAINING THERAPY: CPT | Mod: GP

## 2025-03-29 PROCEDURE — 82306 VITAMIN D 25 HYDROXY: CPT | Performed by: INTERNAL MEDICINE

## 2025-03-29 PROCEDURE — 82728 ASSAY OF FERRITIN: CPT | Performed by: INTERNAL MEDICINE

## 2025-03-29 PROCEDURE — 83540 ASSAY OF IRON: CPT | Performed by: INTERNAL MEDICINE

## 2025-03-29 PROCEDURE — 80048 BASIC METABOLIC PNL TOTAL CA: CPT

## 2025-03-29 PROCEDURE — 84100 ASSAY OF PHOSPHORUS: CPT | Performed by: NURSE PRACTITIONER

## 2025-03-29 PROCEDURE — 83735 ASSAY OF MAGNESIUM: CPT | Performed by: NURSE PRACTITIONER

## 2025-03-29 RX ORDER — FERROUS GLUCONATE 324(38)MG
324 TABLET ORAL
Status: DISCONTINUED | OUTPATIENT
Start: 2025-03-30 | End: 2025-04-03 | Stop reason: HOSPADM

## 2025-03-29 RX ORDER — IRBESARTAN 150 MG/1
150 TABLET ORAL EVERY EVENING
Status: DISCONTINUED | OUTPATIENT
Start: 2025-03-29 | End: 2025-04-02

## 2025-03-29 RX ORDER — ASCORBIC ACID 500 MG
500 TABLET ORAL EVERY MORNING
Status: DISCONTINUED | OUTPATIENT
Start: 2025-03-30 | End: 2025-04-03 | Stop reason: HOSPADM

## 2025-03-29 RX ADMIN — PREGABALIN 100 MG: 100 CAPSULE ORAL at 10:05

## 2025-03-29 RX ADMIN — Medication 25 MCG: at 19:53

## 2025-03-29 RX ADMIN — Medication 1 TABLET: at 19:54

## 2025-03-29 RX ADMIN — VALACYCLOVIR HYDROCHLORIDE 500 MG: 500 TABLET, FILM COATED ORAL at 09:11

## 2025-03-29 RX ADMIN — ESCITALOPRAM OXALATE 20 MG: 10 TABLET ORAL at 09:11

## 2025-03-29 RX ADMIN — METOPROLOL TARTRATE 100 MG: 25 TABLET, FILM COATED ORAL at 09:11

## 2025-03-29 RX ADMIN — TIZANIDINE 4 MG: 2 TABLET ORAL at 19:52

## 2025-03-29 RX ADMIN — LEVETIRACETAM 500 MG: 500 TABLET, FILM COATED ORAL at 09:11

## 2025-03-29 RX ADMIN — ESTRADIOL 1 MG: 1 TABLET ORAL at 09:11

## 2025-03-29 RX ADMIN — CEPHALEXIN 250 MG: 250 CAPSULE ORAL at 09:11

## 2025-03-29 RX ADMIN — ROSUVASTATIN CALCIUM 5 MG: 5 TABLET, FILM COATED ORAL at 19:54

## 2025-03-29 RX ADMIN — ACETAMINOPHEN 650 MG: 325 TABLET, FILM COATED ORAL at 22:15

## 2025-03-29 RX ADMIN — PREGABALIN 200 MG: 100 CAPSULE ORAL at 19:53

## 2025-03-29 RX ADMIN — APIXABAN 5 MG: 5 TABLET, FILM COATED ORAL at 09:11

## 2025-03-29 RX ADMIN — PANTOPRAZOLE SODIUM 40 MG: 40 TABLET, DELAYED RELEASE ORAL at 09:11

## 2025-03-29 RX ADMIN — METOPROLOL TARTRATE 100 MG: 25 TABLET, FILM COATED ORAL at 19:53

## 2025-03-29 RX ADMIN — APIXABAN 5 MG: 5 TABLET, FILM COATED ORAL at 19:54

## 2025-03-29 RX ADMIN — LEVETIRACETAM 500 MG: 500 TABLET, FILM COATED ORAL at 19:53

## 2025-03-29 RX ADMIN — IRBESARTAN 150 MG: 150 TABLET ORAL at 19:53

## 2025-03-29 ASSESSMENT — ACTIVITIES OF DAILY LIVING (ADL)
ADLS_ACUITY_SCORE: 64
ADLS_ACUITY_SCORE: 61
ADLS_ACUITY_SCORE: 61
ADLS_ACUITY_SCORE: 64
ADLS_ACUITY_SCORE: 61
ADLS_ACUITY_SCORE: 64
ADLS_ACUITY_SCORE: 60
ADLS_ACUITY_SCORE: 61
ADLS_ACUITY_SCORE: 64
ADLS_ACUITY_SCORE: 60
ADLS_ACUITY_SCORE: 64
ADLS_ACUITY_SCORE: 64
ADLS_ACUITY_SCORE: 61
ADLS_ACUITY_SCORE: 64
ADLS_ACUITY_SCORE: 64
ADLS_ACUITY_SCORE: 61

## 2025-03-29 NOTE — PROGRESS NOTES
Inpatient Diabetes Management Service: Daily Progress Note     HPI: Paige Mari is a 74 year old with Type 1 Diabetes Mellitus (HARRIETT variant), managed with Omnipod 5 insulin pump and complicated by neuropathy and mild non proliferative retinopathy, as well as a comorbid history of paroxysmal Afib, HTN, HLD,hypothyroidism, KP, and COPD, who was admitted on 3/25/2025 with DKA. Inpatient Diabetes Service consulted for assistance with glycemic management.          Assessment/Plan:     Assessment:   Type 1 Diabetes Mellitus (HARRIETT), complicated by nephropathy, retinopathy and microalbuminuria. Sub-optimal and worsening control  (A1c 9.9 %, Hgb: 13.4)  DKA (BicarbL 15, pH 7.19, glucose 569, ketones 5.62) - resolved  Lactic acidosis (5.0)- resolved  AMS with question of cognitive decline  Concern for ability to care for self      Plan/Recommendations:    -Decrease Lantus 34--> 28 units q 24 hrs at 1500   -  Novolog Meal Coverage: 1 units per 3 g --> 1 per 5 g CHO, TID AC and PRN with snacks/supplements   -  Novolog correction scale: high resistance 1:25 >140 TID AC, 1:25 >200 HS & 0200 (starting with dinner)   - BG monitoring: TID AC, HS, 0200 (starting with dinner)   - Hypoglycemia protocol    - Carb counting protocol      Discussion:   Glucose elevated to 373 yesterday, appears to be post prandial as patient was given 9 units of meal time insulin at 1048 and BG checked at 1118 ( less than 1 hour later). Correction not given until 1230. In the future, would recommend repeat BG check at the time correction is given, as correction of the post-prandial at a later time may cause precipitous drops in BG.     Fasting BG of 173. Received 14 units of insulin with breakfast (correction + ICR to cover meal was up to 1:3) with BG down to 105 about 3 and a half hours following. Given drop, will reduce the ICR back to 1:5 today. Glucotoxicity may be resolving so will also reduce Lantus back to 28 units for  "Preventive Care Visit  [facility]  Camelia Ramirez NP,    May 1, 2023  Assessment & Plan        Solid food introduction and progression reviewed     Night wakening reviewed / mom worried about weight gain , taking about 20 oz EBM at day care and going to breast three times a day     Nilesh reviewed and score 10 today .  Mom tells me \" little things bother me too much\"  Mom denies any concern with inability to care for infant . Mom plans to reach out to her OB for next best steps     Wt Readings from Last 3 Encounters:   23 14 lb 7 oz (6.549 kg) (4 %, Z= -1.78)*   23 13 lb 1 oz (5.925 kg) (5 %, Z= -1.66)*   23 12 lb 11.7 oz (5.775 kg) (9 %, Z= -1.36)*     * Growth percentiles are based on WHO (Boys, 0-2 years) data.         6 month old, here for preventive care.      Growth      Normal OFC, length and weight    Immunizations   I provided face to face vaccine counseling, answered questions, and explained the benefits and risks of the vaccine components ordered today including:  COVID-19, UOdE-ILD-MYT-HepB (Vaxelis ) and Pneumococcal 13-valent Conjugate (Prevnar )    Anticipatory Guidance    Reviewed age appropriate anticipatory guidance.     stranger/ separation anxiety    reading to child    Reach Out & Read--book given    advancement of solid foods    fluoride (if needed)    breastfeeding or formula for 1 year    peanut introduction    sleep patterns    smoking exposure    childproof home    car seat    no walkers    Referrals/Ongoing Specialty Care  None  Verbal Dental Referral: No teeth yet  Dental Fluoride Varnish: No, no teeth yet.    Subjective           2023     1:12 PM   Additional Questions   Accompanied by Mother   Questions for today's visit Yes   Questions sleeping, solids, rash x5 days comes and goes on his torso dry and scaly doesnt seem to bother him   Surgery, major illness, or injury since last physical No     [place]  Depression Scale (EPDS) " Risk Assessment: Completed [place]        4/30/2023     2:00 PM   Social   Lives with Parent(s)   Who takes care of your child? Parent(s)    Grandparent(s)       Recent potential stressors None   History of trauma No   Family Hx mental health challenges (!) YES   Lack of transportation has limited access to appts/meds No   Difficulty paying mortgage/rent on time No   Lack of steady place to sleep/has slept in a shelter No         4/30/2023     2:00 PM   Health Risks/Safety   What type of car seat does your child use?  Infant car seat   Is your child's car seat forward or rear facing? Rear facing   Where does your child sit in the car?  Back seat   Are stairs gated at home? Not applicable   Do you use space heaters, wood stove, or a fireplace in your home? (!) YES   Are poisons/cleaning supplies and medications kept out of reach? Yes   Do you have guns/firearms in the home? No         4/30/2023     2:00 PM   TB Screening   Was your child born outside of the [place]? No         4/30/2023     2:00 PM   TB Screening: Consider immunosuppression as a risk factor for TB   Recent TB infection or positive TB test in family/close contacts No   Recent travel outside [place] (child/family/close contacts) No   Recent residence in high-risk group setting (correctional facility/health care facility/homeless shelter/refugee camp) No          4/30/2023     2:00 PM   Dental Screening   Have parents/caregivers/siblings had cavities in the last 2 years? No         4/30/2023     2:00 PM   Diet   Do you have questions about feeding your baby? (!) YES   Please specify:  When to start/how to advance solids   What does your baby eat? Breast milk   How does your baby eat? Breastfeeding/Nursing    Bottle   Vitamin or supplement use Vitamin D   In past 12 months, concerned food might run out Never true   In past 12 months, food has run out/couldn't afford more Never true         4/30/2023     2:00 PM   Elimination   Bowel or bladder  safety (about a 20% decrease)      Discharge Planning: (tentative)    Medications: TBD    Test Claims:  none needed.   Education:  Needs to be assessed closer to discharge.    Outpatient Follow-up:  recommend [facility]- Dr. Bardales        Please notify Inpatient Diabetes Service if changes are planned to steroids, nutrition, TPN/TF and anticipated procedures requiring prolonged NPO status.         Interval History/Review of Systems :   - The last 24 hours progress and nursing notes reviewed.  On the phone with . Awake and alert. No acute concerns     Scored 18/30 on SLUMS per MD notes. Scores <20 concerning for dementia.     Planned Procedures/Surgeries: none    Inpatient Glucose Control:       Recent Labs   Lab 03/29/25  0221 03/28/25  2023 03/28/25  1725 03/28/25  1118 03/28/25  0752 03/28/25  0554   * 249* 265* 373* 201* 212*             Medications Impacting Glycemia:   Steroids:  none   D5W-containing solutions/medications: D5 1/2 NS with 20 K per DKA protocol  - stopped 3/26  Other medications impacting glucose: none         Nutrition:   Orders Placed This Encounter      Moderate Consistent Carb (60 g CHO per Meal) Diet  Supplements: none   TF: none   TPN: none         Diabetes History: see full consult note for complete diabetes history   Diabetes Type and Duration: Diagnosed with      12/7/2021: C-peptide 0.7 ng/mL with concurrent venous glucose 176 mg/dL, MAXI Ab >250 (ULN 5.0 IU/mL), insulin Ab 0.6 (ULN 0.4 U/mL), IA-2 Ab 44.1 (ULN 7.4 U/mL), Zn T8 Ab 64.3 (ref range 0.0 - 15.0 U/mL)  -CT A/P 9/2021: Mild diffuse pancreatic atrophy  -12/7/2021: fructosamine 365 umol/L (corresponds to HbA1c ~9.0%), HbA1c 9.3%   -5/2024: HbA1c 6.8%  -2/2025: HbA1c 8.6%  - 3/2025: HbA1c 9.9%- worsening      PTA Medication Regimen:   Metformin XR 1000 mg BID (stable for many years)   Victoza 1.2 mg daily (stable dose for many years)      Settings via Glucoo- pump not present to be assessed  "3/26/25  Pump: Omnipod 5    Mode: Manual- patient had not connected CGM to run in Auto   CGM: Dexcom G 6 but not connected   Insulin: Novolog U-100   Basal Rates:  6761-5007: 0.5 units/hr  7299-9312: 0.35 units/hr   4964-1638: 0.4 units/hr   3673-4094: 0.6 units/hr      Total: 12 units daily      Bolus Settings:  Insulin Sensitivity Factor:  60  Insulin to CHO Ratio:  0000 to 2359 - 1 unit for every 20 grams     Target Glucose: 130mg/dL  Active insulin time: 4     Backup for Pump Failure: Lantus 12 units      Missing doses?: yes, pump removed Emil 3/23 and not replaced   Historical Diabetes Medications:   Insulin Detemir      Glucose monitoring device/frequency/trends: Dexcom G 6 - unknown as not linked to Valderm and unable to check via other apps.   Pump running in manual mode- evidence that is was not replaced on 3/23 noted below        Hypoglycemia PTA:   - Frequency: unknown  - Severity: unknown  - Awareness: hypoglycemia unawareness recorded in notes.   - Treatment: unknown      Outpatient Diabetes Provider: Dr. Bardales with [facility]   Formal Diabetes Education/Educator: yes in the past, no recent follow up        Physical Exam:   /70 (BP Location: Right arm)   Pulse 60   Temp 98.4  F (36.9  C) (Oral)   Resp 18   Ht 1.575 m (5' 2\")   Wt 60.8 kg (134 lb 0.6 oz)   SpO2 97%   BMI 24.52 kg/m      General Appearance: awake and alert. Pleasantly confused   HEENT: Normocephalic, atraumatic.   Lungs:  Breathing comfortably   Abdomen: Round, nondistended. Soft, nontender.   Extremities:  No significant lower extremity edema. Fluid movement of extremities.   Neuro: no focal deficits. Oriented to self, time. Disoriented to situation   Psych:  Calm         Data:     Lab Results   Component Value Date    A1C 9.9 (H) 03/25/2025    A1C 8.6 (H) 02/21/2025    A1C 6.8 (H) 05/10/2024    A1C 7.5 (H) 03/14/2024    A1C 7.4 (H) 07/25/2023       ROUTINE IP LABS (Last four results)  BMP  Recent Labs " "concerns? No concerns         4/30/2023     2:00 PM   Media Use   Hours per day of screen time (for entertainment) < 1         4/30/2023     2:00 PM   Sleep   Do you have any concerns about your child's sleep? (!) WAKING AT NIGHT    (!) FEEDING TO SLEEP    (!) NIGHTTIME FEEDING   Where does your baby sleep? Crib   In what position does your baby sleep? Back    (!) SIDE    (!) TUMMY         4/30/2023     2:00 PM   Vision/Hearing   Vision or hearing concerns No concerns         4/30/2023     2:00 PM   Development/ Social-Emotional Screen   Does your child receive any special services? No     Development  Screening too used, reviewed with parent or guardian: No screening tool used  Milestones (by observation/ exam/ report) 75-90% ile  PERSONAL/ SOCIAL/COGNITIVE:    Turns from strangers    Reaches for familiar people    Looks for objects when out of sight  LANGUAGE:    Laughs/ Squeals    Turns to voice/ name    Babbles  GROSS MOTOR:    Rolling    Pull to sit-no head lag    Sit with support  FINE MOTOR/ ADAPTIVE:    Puts objects in mouth    Raking grasp    Transfers hand to hand         Objective     Exam  Pulse 120   Temp 98.3  F (36.8  C) (Axillary)   Resp 28   Ht 2' 1.25\" (0.641 m)   Wt 14 lb 7 oz (6.549 kg)   HC 16.73\" (42.5 cm)   BMI 15.92 kg/m    23 %ile (Z= -0.74) based on WHO (Boys, 0-2 years) head circumference-for-age based on Head Circumference recorded on 5/1/2023.  4 %ile (Z= -1.78) based on WHO (Boys, 0-2 years) weight-for-age data using vitals from 5/1/2023.  4 %ile (Z= -1.71) based on WHO (Boys, 0-2 years) Length-for-age data based on Length recorded on 5/1/2023.  18 %ile (Z= -0.92) based on WHO (Boys, 0-2 years) weight-for-recumbent length data based on body measurements available as of 5/1/2023.    Physical Exam  GENERAL: Active, alert, in no acute distress.  SKIN: Clear. No significant rash, abnormal pigmentation or lesions  HEAD: Normocephalic. Normal fontanels and sutures.  EYES: Conjunctivae "   Lab 03/29/25  0221 03/28/25  2023 03/28/25  1725 03/28/25  1118 03/28/25  0752 03/28/25  0554 03/27/25  0532 03/27/25  0515 03/26/25  1053 03/26/25  1029 03/26/25  0657 03/26/25  0602   NA  --   --   --   --   --  140  --  139  --  133*  --  137   POTASSIUM  --   --   --   --   --  4.0  --  3.9  --  5.4*  --  3.6   CHLORIDE  --   --   --   --   --  108*  --  108*  --  104  --  103   BRUNO  --   --   --   --   --  8.8  --  8.4*  --  7.7*  --  8.8   CO2  --   --   --   --   --  22  --  22  --  18*  --  21*   BUN  --   --   --   --   --  19.4  --  19.7  --  21.8  --  23.3*   CR  --   --   --   --   --  0.67  --  0.75  --  0.60  --  0.67   * 249* 265* 373*   < > 212*   < > 117*   < > 558*   < > 201*    < > = values in this interval not displayed.     CBC  Recent Labs   Lab 03/28/25  0554 03/27/25  0515 03/26/25  0602 03/26/25  0030   WBC 11.8* 16.2* 21.9* 20.5*   RBC 3.77* 3.54* 4.04 4.49   HGB 10.5* 9.9* 10.8* 12.2   HCT 31.4* 29.4* 32.7* 36.7   MCV 83 83 81 82   MCH 27.9 28.0 26.7 27.2   MCHC 33.4 33.7 33.0 33.2   RDW 14.9 14.6 14.5 14.4    260 340 326     Inpatient Diabetes Service will continue to follow, please don't hesitate to contact the team with any questions or concerns.     Silvestre Hernandez, APRN, CNP   Inpatient Diabetes Management Service   Pager: [phone]   Available on Vocera      Plan discussed with patient, bedside RN, and primary team via this note.    To contact Inpatient Diabetes Service:     7 AM - 5 PM: Page the IDS LANNY following the patient that day (see filed or incomplete progress notes/consult notes under Endocrinology)    OR if uncertain of provider assignment: page job code [phone]    5 PM - 7 AM: First call after hours is to primary service.    For urgent after-hours questions, page job code for on call fellow: [phone]     I spent a total of 35 minutes on the date of the encounter doing prep/post-work, chart review, history and exam, documentation and further activities per the  and cornea normal. Red reflexes present bilaterally.  EARS: Normal canals. Tympanic membranes are normal; gray and translucent.  NOSE: Normal without discharge.  MOUTH/THROAT: Clear. No oral lesions.  NECK: Supple, no masses.  LYMPH NODES: No adenopathy  LUNGS: Clear. No rales, rhonchi, wheezing or retractions  HEART: Regular rhythm. Normal S1/S2. No murmurs. Normal femoral pulses.  ABDOMEN: Soft, non-tender, not distended, no masses or hepatosplenomegaly. Normal umbilicus and bowel sounds.   GENITALIA: Normal male external genitalia. Marcus stage I,  Testes descended bilaterally, no hernia or hydrocele.    EXTREMITIES: Hips normal with negative Ortolani and Joseph. Symmetric creases and  no deformities  NEUROLOGIC: Normal tone throughout. Normal reflexes for age        Camelia Ramirez NP  [facility]   note including lab review, multidisciplinary communication, counseling the patient and/or coordinating care regarding acute hyper/hypoglycemic management, as well as discharge management and planning/communication.

## 2025-03-29 NOTE — PLAN OF CARE
"Goal Outcome Evaluation:      Plan of Care Reviewed With: patient    Overall Patient Progress: improving           VS: BP (!) 161/84 (BP Location: Right arm)   Pulse 72   Temp 98  F (36.7  C) (Oral)   Resp 18   Ht 1.575 m (5' 2\")   Wt 60.8 kg (134 lb 0.6 oz)   SpO2 98%   BMI 24.52 kg/m       O2: Room air    Output: External catheter in place    Last BM: 3/28/25   Activity: Assist x1    Up for meals? Yes    Skin: Visible skin intact    Pain: Denies pain    CMS: A/O x 4 - Denies chest pain and sob    Dressing: N/a    Diet: Regular diet    LDA: L. PICC HL    Equipment: Personal items, call light within reach    Plan: Plan of care ongoing   Additional Info:        "

## 2025-03-29 NOTE — PROGRESS NOTES
[facility]    Medicine Progress Note - Hospitalist Service, GOLD TEAM 18    Date of Admission:  3/25/2025    Assessment & Plan   Paige Mari is a 74 year old female with a past medical history of HTN, paroxysmal atrial fibrillation (on Eliquis), COPD, KP, and latent autoimmune diabetes mellitus who was admitted on 3/25/25 for DKA and hypertensive urgency. The patient was found by her  sitting on the toilet confused. She was then transported to the ED via EMS where she was found to be in DKA with blood sugars to 560, ketones of 5.6, pH 7.19, and lactic acid of 5.0. She was also found to be hypertensive eventually requiring nicardipine drip. Since weaned off the nicardipine drip, she is now transferring to the internal medicine service for ongoing cares.      Clinical updates 3/29/2025:  Scored 18/30 on slums testing 3/28.  Discussed with Silvestre Hernandez CNP at the diabetes team who was able to provide very helpful collateral information.  At the time when patient was first admitted she was able to have a long conversation with her daughter who noted that she has had cognitive decline progressing over recent years, her  sleeps until about 3 PM every day.  They have been encouraging them to go into assisted living but have not had any success in doing so.  She has had repeated hospitalizations for issues related to her inability to properly care for herself or her diabetes.  Her  by her estimation would not be helpful in managing her insulin or diabetes given his age and his own issues, including the fact that he sleeps until 3 PM daily because he had had a night shift job all his life.  It is noted that she was not using her insulin pump properly, was not feeling properly or giving bolus doses in her current subcu insulin needs are vastly higher than what she was receiving through her pump.   -Discussed with MSW 3/28.  Based on the above findings,  and the fact that the patient is insulin-dependent would strongly recommend assisted living with medication, insulin management provided by trained staff.  This time I do not believe she requires memory care or long-term care.  She is fully oriented, but clearly has some significant underlying cognitive impairment.  -Continue PT, OT  -MSW following for placement needs.   -Insulin regimen being adjusted by diabetes team, management greatly appreciated  -Repeat CBC, BMP 3/31  -Gave fluconazole 150 mg x 1 3/28 for complaints of vaginal yeast infection  -Start Avapro 150 mg at bedtime 3/29.  Was previously on olmesartan.  Allergy appears to be related to HCTZ causing hyponatremia.  -Recommending outpatient neuropsychology assessment for detailed assessment of cognitive function  -Attempted to contact patient's  again at 4:30 PM on 3/29, went straight to [facility].  Per patient, her daughter Karishma is currently home sick with double pneumonia.     DKA   Latent Autoimmune Diabetes Mellitus -   Presented to the ED after being found on the toilet by her  confused. ED work up consistent with DKA. Started on insulin gtt and IV fluids. Gap now closed, still with some acidosis.  PTA managed with metformin, victoza, and insulin pump, however has significant cognitive impairment and was not managing the device properly.  Transitioned to subcu insulin per diabetes team, blood sugars still elevated but overall improved.  Insulin needs far greater than what she was receiving through her pump, again supporting that she was not managing her diabetes properly.  Due to her cognitive impairment, she is not capable of safely managing her own insulin going forward, and per reports from patient's daughter her  would not be capable of managing this either.  -PTA pregabalin, 100 mg qam + 200 mg qpm for neuropathy   -OT/PT/SW following for discharge needs  Per endocrinology 3/29/2025:  -Decrease Lantus 34--> 28 units q 24  hrs at 1500   -  Novolog Meal Coverage: 1 units per 3 g --> 1 per 5 g CHO, TID AC and PRN with snacks/supplements   -  Novolog correction scale: high resistance 1:25 >140 TID AC, 1:25 >200 HS & 0200 (starting with dinner)   - BG monitoring: TID AC, HS, 0200 (starting with dinner)   - Hypoglycemia protocol    - Carb counting protocol         Leukocytosis, resolved  Suspect stress induced in setting of above DKA. Low suspicion for underlying infection at this time.  No localizing signs or symptoms of infection.  UA was bland, CXR normal.     Hypertensive urgency, resolved  Type 2 NSTEMI, likely demand ischemia  Paroxysmal atrial fibrillation  HTN   HLD  ECHO 3/26/2025: EF 65-70%, mild concentric LVH.  Normal RV function.  No significant valvular disease.  Blood pressure in ED ~240 systolic. She was given labetalol 10 mg x2 with brief improvement but ultimately needed nicardipine infusion. Subsequently discontinued when blood pressure overcorrected. BNP elevated to 2206.  Euvolemic clinically.  Highly suspect noncompliance with medications PTA given patient's cognitive impairment, with rebound hypertension with noncompliance with clonidine.  Has listed allergy to amlodipine causing delirium, HCTZ causing hyponatremia.  Blood pressure somewhat labile but overall improved.  Had been on olmesartan in the past for CKD with proteinuria which per notes she tolerated well.  -Continue PTA Eliquis  -Continue clonidine patch, however would consider weaning off prior to discharge unless she has adequate medication oversight arranged given risk of rebound hypertension in the setting of noncompliance  -Continue metoprolol  -Start Avapro 150 mg at bedtime 3/29  -BMP 3/31     Recurrent UTIs  Urinary Retention -   On Keflex for UTI prophylaxis.  UA 3/25 nemesio.  Complaining of vaginal yeast symptoms 3/28  -continue PTA Keflex   -Fluconazole 150 mg x 1 3/28     Acute Metabolic Encephalopathy  Cognitive Impairment  Probable Dementia    Patient was unable to give her own name, where she is, why she's here at the time of admission and was noted to be alert and pleasantly confused.  Per endocrinology note, provider spoke with daughter who noted several years of declining cognition. CT head negative in outside ED. TSH, LFTs, albumin normal.  Suspect some element of acute confusion in setting of acute illness as above.  With correction of metabolic derangements is now fully oriented, speech normal.  Scored 18/30 on slums per OT on 3/28.  Collateral information from daughter combined with repeated hospitalizations for what is likely noncompliance or inability to appropriately care for herself favor significant cognitive impairment and probable at least mild dementia.  Is not capable of managing her own insulin regimen going forward.  Per collateral information,  would not be able to provide this oversight either.  Based on her current cognitive status, assisted living with medication, insulin management by nursing staff would be appropriate.  I do not believe she requires memory care or LTC at this time.  -PT, OT  -social work consult following for safe disposition, again recommend assisted living with medication management, insulin management by staff  -At patient's request, attempted to contact her  at 4:30 PM on 3/29, however went straight to voicemail.     Partial Seizures  MDD  Generalized Anxiety Disorder  - PTA tizanidine  - PTA duloxetine  - PTA Keppra     Normocytic Anemia  Iron deficiency  Presenting hemoglobin 13.4, MCV 83.  Hemoglobin trended down to 9.9 following IV fluids, now trending back up off IV fluids.  No signs or symptoms of bleeding clinically.  B12, folic acid normal, iron studies consistent with LAZARO 3/29.  -Start iron/vitamin C daily 3/29  -CBC 3/31          Diet: Moderate Consistent Carb (60 g CHO per Meal) Diet    DVT Prophylaxis: DOAC  Gomes Catheter: Not present  Lines: PRESENT      PICC 03/25/25 Triple Lumen  "Left Basilic medication drips-Site Assessment: WDL      Cardiac Monitoring: None  Code Status: No CPR- Pre-arrest intubation OK      Clinically Significant Risk Factors          # Hyperchloremia: Highest Cl = 108 mmol/L in last 2 days, will monitor as appropriate                # Hypertension: Noted on problem list           # DMII: A1C = 9.9 % (Ref range: <5.7 %) within past 6 months, PRESENT ON ADMISSION        # Financial/Environmental Concerns: none         Social Drivers of Health    Tobacco Use: High Risk (1/28/2025)    Patient History     Smoking Tobacco Use: Every Day     Smokeless Tobacco Use: Never          Disposition Plan     Medically Ready for Discharge: Anticipated in 2-4 Days             Aman Powell MD  Hospitalist Service, GOLD TEAM 18  [facility]  Securely message with [employer] (more info)  Text page via [employer] Paging/Directory   See signed in provider for up to date coverage information  ______________________________________________________________________    Interval History   Doing well.  \"I feel like 1 million bucks\".  Wishes she could go home to be with her  and her cat.  Discussed discharge planning and are very strong concerns about her ability or her 's ability to supervise.  Diabetes regimen and insulin.  She was understanding of this.  She does state that she and her  have discussed assisted living in the past but cannot afford it.  Later on she became tearful thinking about not returning home.  States her daughter Karishma is at home with pneumonia.    Physical Exam   Vital Signs: Temp: 97.9  F (36.6  C) Temp src: Oral BP: (!) 151/67 Pulse: 61   Resp: 18 SpO2: 99 % O2 Device: None (Room air)    Weight: 134 lbs .63 oz  General: Alert and oriented x 4.  Very pleasant.  Speech normal.  Affect appropriate.  HEENT: Atraumatic.  No icterus or injection.  OP moist and clear  Chest: CTA bilaterally  CV: RRR.  No " murmurs.  Abdomen: Obese.  NABS.  Soft, nontender, nondistended  Extremities: No edema  Neuro: CN II through XII grossly intact.  Strength 5/5 symmetrically.  No tremors.  Normal finger-nose.          Data   Imaging results reviewed over the past 24 hrs:   No results found for this or any previous visit (from the past 24 hours).  Most Recent 3 CBC's:  Recent Labs   Lab Test 03/29/25  0650 03/28/25  0554 03/27/25  0515   WBC 9.6 11.8* 16.2*   HGB 9.6* 10.5* 9.9*   MCV 84 83 83    247 260     Most Recent 3 BMP's:  Recent Labs   Lab Test 03/29/25  1322 03/29/25  0803 03/29/25  0650 03/28/25  0752 03/28/25  0554 03/27/25  0532 03/27/25  0515   NA  --   --  140  --  140  --  139   POTASSIUM  --   --  4.0  --  4.0  --  3.9   CHLORIDE  --   --  105  --  108*  --  108*   CO2  --   --  24  --  22  --  22   BUN  --   --  23.2*  --  19.4  --  19.7   CR  --   --  0.70  --  0.67  --  0.75   ANIONGAP  --   --  11  --  10  --  9   BRUNO  --   --  9.0  --  8.8  --  8.4*   * 173* 180*   < > 212*   < > 117*    < > = values in this interval not displayed.     Most Recent 2 LFT's:  Recent Labs   Lab Test 03/26/25  0030 08/31/23  1104   AST 19 25   ALT 15 17   ALKPHOS 79 68   BILITOTAL 0.7 0.5     Most Recent 6 Bacteria Isolates From Any Culture (See EPIC Reports for Culture Details):No lab results found.  Most Recent TSH and T4:  Recent Labs   Lab Test 03/25/25  1847 05/10/23  1430 03/22/23  1557   TSH 1.28   < > 3.12   T4  --   --  1.22    < > = values in this interval not displayed.     Most Recent Hemoglobin A1c:  Recent Labs   Lab Test 03/25/25 1847   A1C 9.9*     Most Recent Urinalysis:  Recent Labs   Lab Test 03/25/25  1930   COLOR Colorless   APPEARANCE Clear   URINEGLC >1000*   URINEBILI Negative   URINEKETONE 100*   SG 1.025   UBLD 0.06 mg/dL*   URINEPH 5.5   PROTEIN 100*   NITRITE Negative   LEUKEST Negative   RBCU 1   WBCU <1     Most Recent ABG:  Recent Labs   Lab Test 07/24/23  0140   PH 7.42     Most Recent  ESR & CRP:  Recent Labs   Lab Test 03/13/24 2012 01/22/22  0734   SED  --  6   CRP  --  3.1*   CRPI <3.00  --      Most Recent Anemia Panel:  Recent Labs   Lab Test 03/29/25  0650   WBC 9.6   HGB 9.6*   HCT 29.1*   MCV 84      IRON 21*   IRONSAT 8*      MILANA 18   B12 452   FOLIC 28.2     Most Recent CPK:  Recent Labs   Lab Test 03/03/21  1206   CKT 57

## 2025-03-29 NOTE — PLAN OF CARE
[id number]    Goal Outcome Evaluation:      Plan of Care Reviewed With: patient  Overall Patient Progress: no change  Overall Patient Progress: no change    VS: Vitals stable. Last /70  CV/Resp: Denies SOB and chest pain   Neuro/MSK: A&O x4. Denies numbness & tingling   Pain: Arthritic pain on joints - tylenol given   GI/: Continent of B&B. LBM 3/2. Purewick in place  Diet: Moderate consistent carb diet. Thin liquids, takes pills whole    Activity: Has not been OOB. Ax1 with walker   Skin: Redness on perineum  Lines/Access: L PICC     Shift Summary: Last . No other changes this shift

## 2025-03-29 NOTE — PLAN OF CARE
"Goal Outcome Evaluation:      Plan of Care Reviewed With: patient    Overall Patient Progress: improvingOverall Patient Progress: improving       VS: BP (!) 164/71 (BP Location: Right arm)   Pulse 87   Temp 97.8  F (36.6  C) (Oral)   Resp 18   Ht 1.575 m (5' 2\")   Wt 60.8 kg (134 lb 0.6 oz)   SpO2 98%   BMI 24.52 kg/m       O2: Room air    Output: Voids with external catherer    Last BM: 03/29/25   Activity: SBA   Up for meals? Yes   Skin: Visible skin intact , mid redness on perineum , barrier cream applied    Pain: Denies pain    CMS: Alert and orientated x4 - Denies chest pain and sob    Dressing: N/a    Diet: Moderate consistent carb diet - BS check with meals and carb coverage    LDA: L. PICC HL    Equipment: Personal items, call light within reach    Plan: Plan of care ongoing    Additional Info:           "

## 2025-03-30 ENCOUNTER — APPOINTMENT (OUTPATIENT)
Dept: OCCUPATIONAL THERAPY | Facility: CLINIC | Age: 75
End: 2025-03-30
Attending: SURGERY
Payer: COMMERCIAL

## 2025-03-30 LAB
GLUCOSE BLDC GLUCOMTR-MCNC: 138 MG/DL (ref 70–99)
GLUCOSE BLDC GLUCOMTR-MCNC: 172 MG/DL (ref 70–99)
GLUCOSE BLDC GLUCOMTR-MCNC: 177 MG/DL (ref 70–99)
GLUCOSE BLDC GLUCOMTR-MCNC: 188 MG/DL (ref 70–99)
GLUCOSE BLDC GLUCOMTR-MCNC: 191 MG/DL (ref 70–99)
GLUCOSE BLDC GLUCOMTR-MCNC: 310 MG/DL (ref 70–99)
GLUCOSE BLDC GLUCOMTR-MCNC: 340 MG/DL (ref 70–99)
GLUCOSE BLDC GLUCOMTR-MCNC: 70 MG/DL (ref 70–99)

## 2025-03-30 PROCEDURE — 99418 PROLNG IP/OBS E/M EA 15 MIN: CPT | Performed by: NURSE PRACTITIONER

## 2025-03-30 PROCEDURE — 97535 SELF CARE MNGMENT TRAINING: CPT | Mod: GO

## 2025-03-30 PROCEDURE — 250N000013 HC RX MED GY IP 250 OP 250 PS 637

## 2025-03-30 PROCEDURE — 250N000013 HC RX MED GY IP 250 OP 250 PS 637: Performed by: INTERNAL MEDICINE

## 2025-03-30 PROCEDURE — 99232 SBSQ HOSP IP/OBS MODERATE 35: CPT | Performed by: INTERNAL MEDICINE

## 2025-03-30 PROCEDURE — 120N000002 HC R&B MED SURG/OB UMMC

## 2025-03-30 PROCEDURE — 99233 SBSQ HOSP IP/OBS HIGH 50: CPT

## 2025-03-30 PROCEDURE — 99223 1ST HOSP IP/OBS HIGH 75: CPT | Performed by: NURSE PRACTITIONER

## 2025-03-30 RX ADMIN — METOPROLOL TARTRATE 100 MG: 25 TABLET, FILM COATED ORAL at 08:06

## 2025-03-30 RX ADMIN — Medication 25 MCG: at 19:39

## 2025-03-30 RX ADMIN — APIXABAN 5 MG: 5 TABLET, FILM COATED ORAL at 19:40

## 2025-03-30 RX ADMIN — TIZANIDINE 4 MG: 2 TABLET ORAL at 19:41

## 2025-03-30 RX ADMIN — ROSUVASTATIN CALCIUM 5 MG: 5 TABLET, FILM COATED ORAL at 19:41

## 2025-03-30 RX ADMIN — ESCITALOPRAM OXALATE 20 MG: 10 TABLET ORAL at 08:07

## 2025-03-30 RX ADMIN — LEVETIRACETAM 500 MG: 500 TABLET, FILM COATED ORAL at 19:39

## 2025-03-30 RX ADMIN — CEPHALEXIN 250 MG: 250 CAPSULE ORAL at 08:06

## 2025-03-30 RX ADMIN — LEVETIRACETAM 500 MG: 500 TABLET, FILM COATED ORAL at 08:07

## 2025-03-30 RX ADMIN — PANTOPRAZOLE SODIUM 40 MG: 40 TABLET, DELAYED RELEASE ORAL at 08:06

## 2025-03-30 RX ADMIN — FERROUS GLUCONATE 324 MG: 324 TABLET ORAL at 08:07

## 2025-03-30 RX ADMIN — ESTRADIOL 1 MG: 1 TABLET ORAL at 08:07

## 2025-03-30 RX ADMIN — OXYCODONE HYDROCHLORIDE AND ACETAMINOPHEN 500 MG: 500 TABLET ORAL at 08:07

## 2025-03-30 RX ADMIN — PREGABALIN 100 MG: 100 CAPSULE ORAL at 08:07

## 2025-03-30 RX ADMIN — APIXABAN 5 MG: 5 TABLET, FILM COATED ORAL at 08:07

## 2025-03-30 RX ADMIN — METOPROLOL TARTRATE 100 MG: 25 TABLET, FILM COATED ORAL at 19:40

## 2025-03-30 RX ADMIN — IRBESARTAN 150 MG: 150 TABLET ORAL at 19:39

## 2025-03-30 RX ADMIN — Medication 1 TABLET: at 19:39

## 2025-03-30 RX ADMIN — PREGABALIN 200 MG: 100 CAPSULE ORAL at 19:40

## 2025-03-30 RX ADMIN — VALACYCLOVIR HYDROCHLORIDE 500 MG: 500 TABLET, FILM COATED ORAL at 08:06

## 2025-03-30 ASSESSMENT — ACTIVITIES OF DAILY LIVING (ADL)
ADLS_ACUITY_SCORE: 62
ADLS_ACUITY_SCORE: 62
ADLS_ACUITY_SCORE: 61
ADLS_ACUITY_SCORE: 62
ADLS_ACUITY_SCORE: 61
ADLS_ACUITY_SCORE: 61
ADLS_ACUITY_SCORE: 62
ADLS_ACUITY_SCORE: 61
ADLS_ACUITY_SCORE: 62
ADLS_ACUITY_SCORE: 61
ADLS_ACUITY_SCORE: 62
ADLS_ACUITY_SCORE: 62
ADLS_ACUITY_SCORE: 61
ADLS_ACUITY_SCORE: 62
ADLS_ACUITY_SCORE: 61
ADLS_ACUITY_SCORE: 62
ADLS_ACUITY_SCORE: 61

## 2025-03-30 NOTE — PROGRESS NOTES
Inpatient Diabetes Management Service: Daily Progress Note     HPI: Paige Mari is a 74 year old with Type 1 Diabetes Mellitus (HARRIETT variant), managed with Omnipod 5 insulin pump and complicated by neuropathy and mild non proliferative retinopathy, as well as a comorbid history of paroxysmal Afib, HTN, HLD,hypothyroidism, KP, and COPD, who was admitted on 3/25/2025 with DKA. Inpatient Diabetes Service consulted for assistance with glycemic management.          Assessment/Plan:     Assessment:   Type 1 Diabetes Mellitus (HARRIETT), complicated by nephropathy, retinopathy and microalbuminuria. Sub-optimal and worsening control  (A1c 9.9 %, Hgb: 13.4)  DKA (BicarbL 15, pH 7.19, glucose 569, ketones 5.62) - resolved  Lactic acidosis (5.0)- resolved  AMS with question of cognitive decline  Concern for ability to care for self      Plan/Recommendations:    -Lantus 28--> 34 units q 24 hrs at 1500   -  Novolog Meal Coverage: 1 units per 5 g TID AC and PRN with snacks/supplements   -  Novolog correction scale: high resistance 1:25 >140 TID AC, 1:25 >200 HS & 0200 (starting with dinner)   - BG monitoring: TID AC, HS, 0200 (starting with dinner)   - Hypoglycemia protocol    - Carb counting protocol      Discussion:   Hyperglycemia continues save one lower glucose following breakfast yesterday with ICR of 1:3, now back to 1:5 and stable trends. Fasting BG of 188 so will increase Lantus back to 34 units today.     I spent some time talking with the patient about her diabetes care today. I expressed my concern about her confusion leading up to admission and ability to do insulin injections correctly. The patient is aware she has type 1 diabetes which requires insulin. 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 Paige says she wears an insulin pump but has been having trouble getting supplies delivered to her apartment due to locks on  "mailboxes. Due to this, she says she has maybe been out of her pump for about 2 weeks. The glucoo data reveals she was wearing her pump until last Emil 3/23 and running in manual mode. She says not having access to the pump is likely what brought her in. I asked Paige what kind of long acting insulin she takes if she is not wearing her pump. She says she does not remember and asked me to provide some brand names. She recognized Tresiba and says she would take this. When asked how much she would take (how many units), Paige could not provide a number. I also asked Paige if she was taking short acting insulin, she says that it would \"depend on what she ate\" but that she did not have a \"sliding scale.\" I mentioned that it is important for Paige to know how to use both her long and short acting insulin to safely return home and that at this point I remain concerned about her ability to do so. Paige says she desires to go home and that her  will help her. I am unable to meet with the  today to assess his understanding and ability to assist, but it was expressed to me by the patient's daughter, Merline, that patient's  is not knowledgeable of patient's diabetes care. I also expressed to the patient that I can see she is not using her insulin pump to its full ability, that it is not connected to her CGM, and that she is not giving meal time boluses. Paige says she \"never received much training on the pump.\" This would be unusual as she does follow with [facility], though I note large gaps between her visits and no recent visits with diabetic educators despite referrals to do so. Ongoing concerns about patient's ability to manage diabetes care. I encouraged patient to try to count her carbs and estimate how much short acting insulin she would need. Plan explained to bedside RN and asked to document patient's ability and success in doing so.     12:27 PM Addendum: Spoke with social " work regarding disposition planning. See note by RICHMOND Bonilla for details around conversation. Briefly, Kandy expressed that patient continues to voice a desire to go home and that cost of higher level of care may be prohibitive. IT is reasonable to explore whether patient's  can assist with diabetes management in order to continue to explore the option for patient to go home. Will place consult for diabetes educators to see if they can arrange a time to meet with patient and  this week and provide teaching on insulin injections for diabetes management.      Discharge Planning: (tentative)    Medications: TBD    Test Claims:  sent for please check coverage for novolog, lantus/tresiba, CGM and glucometer on 3/30  Education:  Ordered 3/30- teaching for patient and  on subcutaneous insulin to see if patient is able to go home or needs higher level of care   Outpatient Follow-up:  recommend [facility] Endocrinology- Dr. Bardales      Please notify Inpatient Diabetes Service if changes are planned to steroids, nutrition, TPN/TF and anticipated procedures requiring prolonged NPO status.         Interval History/Review of Systems :   - The last 24 hours progress and nursing notes reviewed.  On the phone with . Awake and alert. No acute concerns     Scored 18/30 on SLUMS per MD notes. Scores <20 concerning for dementia.     Carried forward from consult note:   Daughter, Merline called over the phone who provides additional history. Per Merline patient has struggled to care for herself over the past few years. Has had multiple admissions for confusion, but ultimately resolves and she is discharged home, only for the cycle to repeat again. No documented dementia though daughter reports trouble with memory for some time. Patient will omit insulin or forget to replace her insulin pump. Lives with , but Merline states he does not have insight and is unaware of how to assist with  diabetes management, insulin injections, and insulin pump. Arline expresses concern over mother returning home as she feels she is not able to continue managing her diabetes care independently.     Planned Procedures/Surgeries: none    Inpatient Glucose Control:       Recent Labs   Lab 03/30/25  0211 03/29/25  2159 03/29/25  1654 03/29/25  1322 03/29/25  0803 03/29/25  0650   * 199* 223* 105* 173* 180*             Medications Impacting Glycemia:   Steroids:  none   D5W-containing solutions/medications: D5 1/2 NS with 20 K per DKA protocol  - stopped 3/26  Other medications impacting glucose: none         Nutrition:   Orders Placed This Encounter      Moderate Consistent Carb (60 g CHO per Meal) Diet  Supplements: none   TF: none   TPN: none         Diabetes History: see full consult note for complete diabetes history   Diabetes Type and Duration: Type 1 DM, HARRIETT. Initially diagnosed around age 49 and was immediately started on insulin. Was also on Metoformin and Victoza at stable dosing for many years.      12/7/2021: C-peptide 0.7 ng/mL with concurrent venous glucose 176 mg/dL, MAXI Ab >250 (ULN 5.0 IU/mL), insulin Ab 0.6 (ULN 0.4 U/mL), IA-2 Ab 44.1 (ULN 7.4 U/mL), Zn T8 Ab 64.3 (ref range 0.0 - 15.0 U/mL)  -CT A/P 9/2021: Mild diffuse pancreatic atrophy  -12/7/2021: fructosamine 365 umol/L (corresponds to HbA1c ~9.0%), HbA1c 9.3%   -5/2024: HbA1c 6.8%  -2/2025: HbA1c 8.6%  - 3/2025: HbA1c 9.9%- worsening      PTA Medication Regimen:   Metformin XR 1000 mg BID (stable for many years)   Victoza 1.2 mg daily (stable dose for many years)      Settings via Glucoo- pump not present to be assessed 3/26/25  Pump: Omnipod 5    Mode: Manual- patient had not connected CGM to run in Auto   CGM: Dexcom G 6 but not connected   Insulin: Novolog U-100   Basal Rates:  2634-4071: 0.5 units/hr  3313-7156: 0.35 units/hr   2337-8346: 0.4 units/hr   1254-2144: 0.6 units/hr      Total: 12 units daily      Bolus  "Settings:  Insulin Sensitivity Factor:  60  Insulin to CHO Ratio:  0000 to 2359 - 1 unit for every 20 grams     Target Glucose: 130mg/dL  Active insulin time: 4     Backup for Pump Failure: Lantus 12 units      Missing doses?: yes, pump removed Emil 3/23 and not replaced   Historical Diabetes Medications:   Insulin Detemir      Glucose monitoring device/frequency/trends: Dexcom G 6 - unknown as not linked to [employer] and unable to check via other apps.   Pump running in manual mode- evidence that is was not replaced on 3/23 noted below        Hypoglycemia PTA:   - Frequency: unknown  - Severity: unknown  - Awareness: hypoglycemia unawareness recorded in notes.   - Treatment: unknown      Outpatient Diabetes Provider: Dr. Bardales with [facility]   Formal Diabetes Education/Educator: yes in the past, no recent follow up        Physical Exam:   /76 (BP Location: Right arm)   Pulse 87   Temp 97.8  F (36.6  C) (Oral)   Resp 18   Ht 1.575 m (5' 2\")   Wt 60.8 kg (134 lb 0.6 oz)   SpO2 98%   BMI 24.52 kg/m      General Appearance: awake and alert. Pleasantly confused   HEENT: Normocephalic, atraumatic.   Lungs:  Breathing comfortably   Abdomen: Round, nondistended. Soft, nontender.   Extremities:  No significant lower extremity edema. Fluid movement of extremities.   Neuro: no focal deficits. Oriented to self, time. Disoriented to situation   Psych:  Calm         Data:     Lab Results   Component Value Date    A1C 9.9 (H) 03/25/2025    A1C 8.6 (H) 02/21/2025    A1C 6.8 (H) 05/10/2024    A1C 7.5 (H) 03/14/2024    A1C 7.4 (H) 07/25/2023       ROUTINE IP LABS (Last four results)  BMP  Recent Labs   Lab 03/30/25  0211 03/29/25  2159 03/29/25  1654 03/29/25  1322 03/29/25  0803 03/29/25  0650 03/28/25  0752 03/28/25  0554 03/27/25  0532 03/27/25  0515 03/26/25  1053 03/26/25  1029   NA  --   --   --   --   --  140  --  140  --  139  --  133*   POTASSIUM  --   --   --   --   --  4.0  --  4.0  --  3.9  " --  5.4*   CHLORIDE  --   --   --   --   --  105  --  108*  --  108*  --  104   BRUNO  --   --   --   --   --  9.0  --  8.8  --  8.4*  --  7.7*   CO2  --   --   --   --   --  24  --  22  --  22  --  18*   BUN  --   --   --   --   --  23.2*  --  19.4  --  19.7  --  21.8   CR  --   --   --   --   --  0.70  --  0.67  --  0.75  --  0.60   * 199* 223* 105*   < > 180*   < > 212*   < > 117*   < > 558*    < > = values in this interval not displayed.     CBC  Recent Labs   Lab 03/29/25  0650 03/28/25  0554 03/27/25  0515 03/26/25  0602   WBC 9.6 11.8* 16.2* 21.9*   RBC 3.46* 3.77* 3.54* 4.04   HGB 9.6* 10.5* 9.9* 10.8*   HCT 29.1* 31.4* 29.4* 32.7*   MCV 84 83 83 81   MCH 27.7 27.9 28.0 26.7   MCHC 33.0 33.4 33.7 33.0   RDW 15.0 14.9 14.6 14.5    247 260 340     Inpatient Diabetes Service will continue to follow, please don't hesitate to contact the team with any questions or concerns.     JOLYNN Shore, CNP   Inpatient Diabetes Management Service   Pager: [phone]   Available on Vocera      Plan discussed with patient, bedside RN, and primary team via this note.    To contact Inpatient Diabetes Service:     7 AM - 5 PM: Page the IDS LANNY following the patient that day (see filed or incomplete progress notes/consult notes under Endocrinology)    OR if uncertain of provider assignment: page job code [phone]    5 PM - 7 AM: First call after hours is to primary service.    For urgent after-hours questions, page job code for on call fellow: [phone]     I spent a total of 50 minutes on the date of the encounter doing prep/post-work, chart review, history and exam, documentation and further activities per the note including lab review, multidisciplinary communication, counseling the patient and/or coordinating care regarding acute hyper/hypoglycemic management, as well as discharge management and planning/communication.

## 2025-03-30 NOTE — PROGRESS NOTES
"[id number]    VS: BP (!) 148/74 (Patient Position: Sitting)   Pulse 69   Temp 98.7  F (37.1  C) (Oral)   Resp 17   Ht 1.575 m (5' 2\")   Wt 60.8 kg (134 lb 0.6 oz)   SpO2 99%   BMI 24.52 kg/m      O2: Stable on on RA Denies SOB   Output: Continent of bowel and bladder, voids spontaneously w/o difficulty   Last BM:    Activity: SBA   Skin: X scattered bruising   Pain: denies              CMS: A&Ox4  denies chest pain, n/v, numbness/tingling, and dizziness   Dressing: none   Diet: Consistent carb   LDA: LP ICC double lumen   Equipment: Personal belongings   Plan: Call light in reach, continue POC   Additional Info:         "

## 2025-03-30 NOTE — CONSULTS
Initial Psychiatric Consult   Consult date: March 30, 2025       Contacts:    Attending Physician: Michele Nicole MD     Current Outpatient Psychiatrist:    Primary Care Provider: Scar Tan  Family/friends: Liam Mari/ [phone]  Family/friends:  Merline Kaye  [phone]       CC:      Presented to hospital after found by  sitting on the toilette confused.           Reason for Consult:       Psychiatry IP Consult request for: cognitive impairment. Hospitalized with severe DKA, serious concerns re:ability to self-manage insulin. Recommended AL, but pt insists on returning home.   Requesting Provider:  Aman Powell MD          HPI:   This patient is a 74 year old year old  female was admitted on 3/25/2025 for DKA and hypertensive urgency.   She was admitted to ICU and then later transferred to internal medicine service.     Today, Paige was sleeping when writer entered the room.  She awakened easily when her name was said.  She reported her mood is alright and explained yesterday she had cried all day. Someone had come to do a memory test with her and she did terrible because she had been crying.  She reported the really nice doctor that sees her every day, did the test with her again this morning and she did better.  (Per Dr Powell's note SLUMS score 23/30, previous score on 3/28 was 18/30).  She reports she has been upset because she is being told she needs to move into assisted living.  She reports she was having suicidal thoughts yesterday because she was being told she could not go home. She denied having a plan but said she would figure a way out.      She reports she knows she came really close to death.  The EMS guys told her.  She reports she had ordered insulin pumps, they just never got sent.  She reports she kept calling about the pumps.  When asked if she was leaving messages or talking to a person, she said she was leaving messages.  She  reports she was without insulin for a couple of weeks.  She could not remember the names of her medications, she said it was because she had just woke up. Typically, she will use a weekly pill box and set her medications up for the weeks.  She does not want to be in the hospital. She loves her  and wants to go home to live with him and her cat.  She said if she has to stay here, her cat has to be able to come here.      She reports her  does not work.  She says he is old (81 y/o), he worked night shift his whole life and so he had different hours that her.  She said he has told her he will change his hours so he can help her.      Brief Therapeutic Intervention(s):   Provided rapport building, active listening, unconditional positive regard, and validation.         Collaborative information from chart and phone calls:      Care Management note by Kandy FRAGOSO on 3/30/2025:     ..  Diabetes management is the only barrier to patient returning safely to her home with assistance from her .  A diabetic educator consult order has been placed to meet with Paige and her  to determine if they can manage her diabetes at home independently.       If patient is unable to manage her diabetic cares and  is also unable, we would then have to explore a higher level of care. Reportedly, patient is unable to afford Assisted Living (this would be most appropriate setting given that medication management is the only need) or skilled nursing facility. Patient would have to apply for Medical Assistance (if she qualifies) and would likely be in the hospital for several months while waiting for approval.     Per Aman Powell MD note on 3/30/2025:  Cognitive Impairment  Patient was unable to give her own name, where she is, why she's here at the time of admission and was noted to be alert and pleasantly confused.  Per endocrinology note, provider spoke with daughter who noted several years of  "declining cognition. CT head negative in outside ED. TSH, LFTs, albumin normal.  Suspect some element of acute confusion in setting of acute illness as above.  With correction of metabolic derangements is now fully oriented, speech normal.  Scored 18/30 on SLUMS per OT on 3/28.  Repeat SLUMS score 23/30 by Dr. Powell on 3/30/25.  Results uploaded to media tab.  Collateral information from daughter combined with repeated hospitalizations for what is likely noncompliance or inability to appropriately manage her insulin due to cognitive impairment demonstrates that she is not capable of independently managing her insulin regimen going forward.  Per collateral information,  would likely not be able to provide this oversight either.     On 3/30 the patient stated that she had discussed things with her  and that they have decided with certainty that she will be returning home with her .  She said her  would be willing to change his sleep schedule and receive training/education on diabetes management in order to assist her.  I was able to speak to patient's daughter Karishma 3/30.  She confirmed the above.  She lives 2 blocks away and is planning to check on them daily.  She believes her parents should be in assisted living, but understands the concerns and financial concerns.  They are living on Social Security.  Per Karishma \"I just want them to be happy, healthy and safe\".  Karishma does understand that in order for her mother to return home we will have to find a safer plan for her insulin management.        Past Psychiatric History:       Past Diagnosis: MDD, MAXI    Past medication trials:  Abilify  Celexa        Substance Use and History:     Hx of medicinal marijuana for chronic pain.         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), managed as type 1 (H)     Created by Conversion       Mood disorder 11/24/2014    Multiple closed fractures of facial bone, initial encounter (H)     KP (obstructive sleep apnea) 05/15/2015    Paroxysmal atrial fibrillation (H) 09/22/2021    Small bowel obstruction (H) 09/18/2021    Status post total knee replacement 11/24/2014     And:   Diabetes Mellitus type 2  Fibromyalgia  Chronic pain - hx of medical marijuana use  Metabolic encephalopathy    PAST SURGICAL HISTORY:   Past Surgical History:   Procedure Laterality Date    APPENDECTOMY      at age 3    ARTHROSCOPY KNEE WITH MENISCECTOMY  3/10/14    partial medial and lateral meniscectomies, subpatellar chondroplasty    BACK SURGERY      BLEPHAROPLASTY Bilateral 8/17/2015    Procedure: BLEPHAROPLASTY BILATERAL UPPER LIDS;  Surgeon: Chasidy Bryant MD;  Location: [facility];  Service:     BOWEL RESECTION  12 years ago    COLON SURGERY  2004    sigmoid colectomy    DILATION AND CURETTAGE      x3    EP LOOP RECORDER IMPLANT N/A 8/21/2023    Procedure: Loop Recorder Implant;  Surgeon: Nolvia Ruiz MD;  Location: [facility]    HYSTERECTOMY      age 39    IR LUMBAR PUNCTURE  1/22/2022    JOINT REPLACEMENT      OOPHORECTOMY      OTHER SURGICAL HISTORY  2005    bowel obstruction    PICC TRIPLE LUMEN PLACEMENT  9/19/2021         PICC TRIPLE LUMEN PLACEMENT  1/22/2022         CT ARTHRODESIS ANT INTERBODY MIN DISCECTOMY,LUMBAR N/A 1/30/2015    Procedure: ANTERIOR LUMBAR INTERBODY FUSION L4-5, INTERBODY GRAFT, BMP INSTRUMENTATION;  Surgeon: Zeeshan Guillaume MD;  Location: [facility] OR;  Service: Spine    TOE SURGERY      TONSILLECTOMY & ADENOIDECTOMY      age 10    ZZC REMOVAL OF OVARY(S)    "   Description: Oophorectomy;  Recorded: 06/03/2010;    ZC REMOVAL OF OVARY(S)      Description: Oophorectomy;  Recorded: 06/03/2010;    ZZC TOTAL ABDOM HYSTERECTOMY      Description: Hysterectomy;  Recorded: 06/03/2010;    ZZC TOTAL ABDOM HYSTERECTOMY      Description: Hysterectomy;  Recorded: 06/03/2010;    ZZC TOTAL KNEE ARTHROPLASTY Right 11/24/2014    Procedure: RIGHT TOTAL KNEE ARTHROPLASTY;  Surgeon: Jules Harris MD;  Location: [facility];  Service: Orthopedics             Family History:     FAMILY HISTORY:   Family History   Problem Relation Age of Onset    Breast Cancer No family hx of        Family Psychiatric History: unknown at this time.         Social History:     SOCIAL HISTORY:   Social History     Tobacco Use    Smoking status: Every Day     Current packs/day: 0.00     Average packs/day: 0.5 packs/day for 19.0 years (9.5 ttl pk-yrs)     Types: Cigarettes     Start date: 9/15/2002     Last attempt to quit: 9/15/2021     Years since quitting: 3.5    Smokeless tobacco: Never    Tobacco comments:     Seen as Inpatient by Tobacco Treatment and history updated on 8/21/2023, at time smoking 1 cigarette per day   Substance Use Topics    Alcohol use: No       Medical marijuana for chronic pain per chart review.          Physical ROS:   The 10 point Review of Systems is negative other than noted in the HPI or here.           Medications and Allergies:     Allergies   Allergen Reactions    Morphine Other (See Comments)     \"make me delirious,\"  \"nightmares\"    Nitrofurantoin     Adhesive [Cyanoacrylate] Other (See Comments)     Can only tolerate tape for short periods of time shelia in sensitive areas    Amlodipine Unknown and Other (See Comments)     Other reaction(s): delerium    Doxazosin Other (See Comments)     Other reaction(s): feels foggy    Irbesartan-Hydrochlorothiazide Other (See Comments)     Other reaction(s): hyponatremia    Other Allergy (See Comments) [External Allergen Needs " Reconciliation - See Comment] Unknown     Other reaction(s): skin rash    Prednisone Unknown     Caused extremely high blood sugars    Tramadol Other (See Comments)     Possible seizure activity    Trazodone Unknown     Other reaction(s): hung over       Current Facility-Administered Medications   Medication Dose Route Frequency Provider Last Rate Last Admin    apixaban ANTICOAGULANT (ELIQUIS) tablet 5 mg  5 mg Oral BID Mike Dunlap PA-C   5 mg at 03/30/25 0807    cephALEXin (KEFLEX) capsule 250 mg  250 mg Oral QAM Haroldo Caro NP   250 mg at 03/30/25 0806    cloNIDine (CATAPRES-TTS3) 0.3 MG/24HR WK patch 1 patch  1 patch Transdermal Weekly Haroldo Caro NP   1 patch at 03/26/25 1503    And    cloNIDine (CATAPRES-TTS1) Patch in Place   Transdermal Q8H [facility] Haroldo Caro NP        escitalopram (LEXAPRO) tablet 20 mg  20 mg Oral QAM Haroldo Caro NP   20 mg at 03/30/25 0807    estradiol (ESTRACE) tablet 1 mg  1 mg Oral QAM Haroldo Caro NP   1 mg at 03/30/25 0807    ferrous gluconate (FERGON) tablet 324 mg  324 mg Oral Daily with breakfast Aman Powell MD   324 mg at 03/30/25 0807    insulin aspart (NovoLOG) injection (RAPID ACTING)   Subcutaneous TID w/meals Silvestre Hernandez APRN CNP   13 Units at 03/30/25 1245    insulin aspart (NovoLOG) injection (RAPID ACTING)  1-10 Units Subcutaneous TID AC Wen Santos PA-C   7 Units at 03/30/25 1244    insulin aspart (NovoLOG) injection (RAPID ACTING)  1-7 Units Subcutaneous BID Wen Santos PA-C   2 Units at 03/28/25 2211    insulin glargine (LANTUS PEN) injection 34 Units  34 Units Subcutaneous Q24H Silvestre Hernandez APRN CNP        irbesartan (AVAPRO) tablet 150 mg  150 mg Oral QPM Aman Powell MD   150 mg at 03/29/25 1953    levETIRAcetam (KEPPRA) tablet 500 mg  500 mg Oral BID Haroldo Caro NP   500 mg at 03/30/25 0807    metoprolol tartrate (LOPRESSOR) tablet 100 mg  100 mg Oral BID Haroldo Caro NP   100 mg at 03/30/25 0806     multivitamin w/minerals (THERA-VIT-M) tablet 1 tablet  1 tablet Oral QPM Haroldo Caro, NP   1 tablet at 03/29/25 1954    pantoprazole (PROTONIX) EC tablet 40 mg  40 mg Oral QAM Haroldo Caro, NP   40 mg at 03/30/25 0806    pregabalin (LYRICA) capsule 100 mg  100 mg Oral QAM Haroldo Caro, NP   100 mg at 03/30/25 0807    pregabalin (LYRICA) capsule 200 mg  200 mg Oral QPM Haroldo Caro, NP   200 mg at 03/29/25 1953    rosuvastatin (CRESTOR) tablet 5 mg  5 mg Oral QPM Haroldo Caro, NP   5 mg at 03/29/25 1954    tiZANidine (ZANAFLEX) tablet 4 mg  4 mg Oral QPM Haroldo Caro, NP   4 mg at 03/29/25 1952    valACYclovir (VALTREX) tablet 500 mg  500 mg Oral QAM Haroldo Caro NP   500 mg at 03/30/25 0806    vitamin C (ASCORBIC ACID) tablet 500 mg  500 mg Oral QAM Aman Powell MD   500 mg at 03/30/25 0807    Vitamin D3 (CHOLECALCIFEROL) tablet 25 mcg  25 mcg Oral QPM Haroldo Caro NP   25 mcg at 03/29/25 1953              Labs:     Recent Results (from the past 72 hours)   Glucose by meter    Collection Time: 03/27/25  2:58 PM   Result Value Ref Range    GLUCOSE BY METER POCT 125 (H) 70 - 99 mg/dL   Glucose by meter    Collection Time: 03/27/25  4:45 PM   Result Value Ref Range    GLUCOSE BY METER POCT 57 (L) 70 - 99 mg/dL   Glucose by meter    Collection Time: 03/27/25  5:03 PM   Result Value Ref Range    GLUCOSE BY METER POCT 65 (L) 70 - 99 mg/dL   Glucose by meter    Collection Time: 03/27/25  5:17 PM   Result Value Ref Range    GLUCOSE BY METER POCT 71 70 - 99 mg/dL   Glucose by meter    Collection Time: 03/27/25  5:34 PM   Result Value Ref Range    GLUCOSE BY METER POCT 99 70 - 99 mg/dL   Glucose by meter    Collection Time: 03/27/25  5:48 PM   Result Value Ref Range    GLUCOSE BY METER POCT 168 (H) 70 - 99 mg/dL   Glucose by meter    Collection Time: 03/27/25  7:07 PM   Result Value Ref Range    GLUCOSE BY METER POCT 175 (H) 70 - 99 mg/dL   Glucose by meter    Collection Time: 03/27/25  8:15  PM   Result Value Ref Range    GLUCOSE BY METER POCT 230 (H) 70 - 99 mg/dL   Glucose by meter    Collection Time: 03/27/25  9:39 PM   Result Value Ref Range    GLUCOSE BY METER POCT 288 (H) 70 - 99 mg/dL   Glucose by meter    Collection Time: 03/28/25  1:40 AM   Result Value Ref Range    GLUCOSE BY METER POCT 252 (H) 70 - 99 mg/dL   Basic metabolic panel    Collection Time: 03/28/25  5:54 AM   Result Value Ref Range    Sodium 140 135 - 145 mmol/L    Potassium 4.0 3.4 - 5.3 mmol/L    Chloride 108 (H) 98 - 107 mmol/L    Carbon Dioxide (CO2) 22 22 - 29 mmol/L    Anion Gap 10 7 - 15 mmol/L    Urea Nitrogen 19.4 8.0 - 23.0 mg/dL    Creatinine 0.67 0.51 - 0.95 mg/dL    GFR Estimate >90 >60 mL/min/1.73m2    Calcium 8.8 8.8 - 10.4 mg/dL    Glucose 212 (H) 70 - 99 mg/dL   CBC with platelets    Collection Time: 03/28/25  5:54 AM   Result Value Ref Range    WBC Count 11.8 (H) 4.0 - 11.0 10e3/uL    RBC Count 3.77 (L) 3.80 - 5.20 10e6/uL    Hemoglobin 10.5 (L) 11.7 - 15.7 g/dL    Hematocrit 31.4 (L) 35.0 - 47.0 %    MCV 83 78 - 100 fL    MCH 27.9 26.5 - 33.0 pg    MCHC 33.4 31.5 - 36.5 g/dL    RDW 14.9 10.0 - 15.0 %    Platelet Count 247 150 - 450 10e3/uL   Phosphorus    Collection Time: 03/28/25  5:54 AM   Result Value Ref Range    Phosphorus 3.8 2.5 - 4.5 mg/dL   Magnesium    Collection Time: 03/28/25  5:54 AM   Result Value Ref Range    Magnesium 2.1 1.7 - 2.3 mg/dL   Glucose by meter    Collection Time: 03/28/25  7:52 AM   Result Value Ref Range    GLUCOSE BY METER POCT 201 (H) 70 - 99 mg/dL   Glucose by meter    Collection Time: 03/28/25 11:18 AM   Result Value Ref Range    GLUCOSE BY METER POCT 373 (H) 70 - 99 mg/dL   Glucose by meter    Collection Time: 03/28/25  5:25 PM   Result Value Ref Range    GLUCOSE BY METER POCT 265 (H) 70 - 99 mg/dL   Glucose by meter    Collection Time: 03/28/25  8:23 PM   Result Value Ref Range    GLUCOSE BY METER POCT 249 (H) 70 - 99 mg/dL   Glucose by meter    Collection Time: 03/29/25  2:21  AM   Result Value Ref Range    GLUCOSE BY METER POCT 199 (H) 70 - 99 mg/dL   Basic metabolic panel    Collection Time: 03/29/25  6:50 AM   Result Value Ref Range    Sodium 140 135 - 145 mmol/L    Potassium 4.0 3.4 - 5.3 mmol/L    Chloride 105 98 - 107 mmol/L    Carbon Dioxide (CO2) 24 22 - 29 mmol/L    Anion Gap 11 7 - 15 mmol/L    Urea Nitrogen 23.2 (H) 8.0 - 23.0 mg/dL    Creatinine 0.70 0.51 - 0.95 mg/dL    GFR Estimate 90 >60 mL/min/1.73m2    Calcium 9.0 8.8 - 10.4 mg/dL    Glucose 180 (H) 70 - 99 mg/dL   CBC with platelets    Collection Time: 03/29/25  6:50 AM   Result Value Ref Range    WBC Count 9.6 4.0 - 11.0 10e3/uL    RBC Count 3.46 (L) 3.80 - 5.20 10e6/uL    Hemoglobin 9.6 (L) 11.7 - 15.7 g/dL    Hematocrit 29.1 (L) 35.0 - 47.0 %    MCV 84 78 - 100 fL    MCH 27.7 26.5 - 33.0 pg    MCHC 33.0 31.5 - 36.5 g/dL    RDW 15.0 10.0 - 15.0 %    Platelet Count 215 150 - 450 10e3/uL   Vitamin B12    Collection Time: 03/29/25  6:50 AM   Result Value Ref Range    Vitamin B12 452 232 - 1,245 pg/mL   Folate    Collection Time: 03/29/25  6:50 AM   Result Value Ref Range    Folic Acid 28.2 4.6 - 34.8 ng/mL   Iron & Iron Binding Capacity    Collection Time: 03/29/25  6:50 AM   Result Value Ref Range    Iron 21 (L) 37 - 145 ug/dL    Iron Binding Capacity 268 240 - 430 ug/dL    Iron Sat Index 8 (L) 15 - 46 %   Ferritin    Collection Time: 03/29/25  6:50 AM   Result Value Ref Range    Ferritin 18 11 - 328 ng/mL   Vitamin D Deficiency    Collection Time: 03/29/25  6:50 AM   Result Value Ref Range    Vitamin D, Total (25-Hydroxy) 54 (H) 20 - 50 ng/mL   Magnesium    Collection Time: 03/29/25  6:50 AM   Result Value Ref Range    Magnesium 1.8 1.7 - 2.3 mg/dL   Phosphorus    Collection Time: 03/29/25  6:50 AM   Result Value Ref Range    Phosphorus 4.5 2.5 - 4.5 mg/dL   Glucose by meter    Collection Time: 03/29/25  8:03 AM   Result Value Ref Range    GLUCOSE BY METER POCT 173 (H) 70 - 99 mg/dL   Glucose by meter    Collection  "Time: 03/29/25  1:22 PM   Result Value Ref Range    GLUCOSE BY METER POCT 105 (H) 70 - 99 mg/dL   Glucose by meter    Collection Time: 03/29/25  4:54 PM   Result Value Ref Range    GLUCOSE BY METER POCT 223 (H) 70 - 99 mg/dL   Glucose by meter    Collection Time: 03/29/25  9:59 PM   Result Value Ref Range    GLUCOSE BY METER POCT 199 (H) 70 - 99 mg/dL   Glucose by meter    Collection Time: 03/30/25  2:11 AM   Result Value Ref Range    GLUCOSE BY METER POCT 177 (H) 70 - 99 mg/dL   Glucose by meter    Collection Time: 03/30/25  7:39 AM   Result Value Ref Range    GLUCOSE BY METER POCT 188 (H) 70 - 99 mg/dL   Glucose by meter    Collection Time: 03/30/25 11:11 AM   Result Value Ref Range    GLUCOSE BY METER POCT 340 (H) 70 - 99 mg/dL   Glucose by meter    Collection Time: 03/30/25 12:07 PM   Result Value Ref Range    GLUCOSE BY METER POCT 310 (H) 70 - 99 mg/dL              Physical and Psychiatric Examination:     BP (!) 158/87 (BP Location: Right arm)   Pulse 69   Temp 97.7  F (36.5  C) (Oral)   Resp 20   Ht 1.575 m (5' 2\")   Wt 60.8 kg (134 lb 0.6 oz)   SpO2 99%   BMI 24.52 kg/m    Weight is 134 lbs .63 oz 5' 2\" Body mass index is 24.52 kg/m .    3/26/2025 QTc: 530    Physical Exam:  I have reviewed the physical exam as documented by by the medical team and agree with findings and assessment and have no additional findings to add at this time.       Mental Status Exam:    Appearance: awake, dressed in hospital gown.   Attitude:  cooperative  Eye Contact:  good  Mood:  better, \"yesterday I was ready to walk out of here.\"   Affect:  mood congruent  Speech:  clear, coherent  Language: Fluent in english   Psychomotor Behavior:  no evidence of tardive dyskinesia, dystonia, or tics  Thought Process:  linear  Associations:  no loose associations  Thought Content:  no evidence of suicidal ideation or homicidal ideation and no evidence of psychotic thought, endorsed having SI earlier in the day  Insight:  " fair  Judgement:  fair  Oriented to:  time, person, and place  Attention Span and Concentration:  intact  Recent and Remote Memory:  fair  Fund of Knowledge: Appropriate   Gait and Station:          DSM-5 Diagnosis:   Primary Diagnosis:   Unspecified depression     Secondary Diagnosis:   Unspecified anxiety      Medical Diagnosis:   DM2 - meds per medical team.   Essential htn - meds per medical team.   GERD - meds per medical team.   Peripheral neuropathy - meds per medical team.   Partial seizures - meds per medical team.   Paroxysmal A Fib - meds per medical team.             Assessment:   This patient is a 74 year old year old  female with a past psychiatric history of  depression and anxiety, who was admitted inpatient on 3/25/2025  for DKA.   Psychiatry was consulted for possible cognitive impairment. Patient reported she had a memory test yesterday and again this AM.  (SLUMS scores 23/20 this AM and 18/30 yesterday).  The patient reported having suicidal thoughts if she could not go home and live with her  and cat.      Significant symptoms are noted in the HPI. There is genetic loading for  unknown at this time.  .  Medical history does appear to be significant for seizures, DM2, Htn, GERD, neuropathy, and A fib.  Substance use does not appear to be playing a contributing role in the patient's presentation.  Major stressors are medical issues.  Patient appears to cope with stress/frustration/emotion by withdrawing.  Patient's support system includes family. Protective factors: family and engaged in treatment. Risk factors:  complex medical diagnoses and mild cognitive impairment . Patient Strengths: help seeking, engaging with ED treatment team, and love for her  and cat.     Based on interview with patient and patient's guardian/parent, record review, conversations ED staff, [facility] staff and ED attending, the patient meets criteria for unspecified depression and cognitive impairment (per  SLUM scores in EHR).  Current medications are listed below.    No medication recommendations.  Other recommendations are listed below.      Per chart review, the barrier to her going home is diabetic education.  A consult was placed, she and her  will meet with the diabetic educator to determine if they will be able to safely manage her diabetes/insulin at home.      Risk for harm is moderate-high.    Brief Therapeutic Intervention(s):   Provided rappport building, active listening, unconditional positive regard, and validation.    Legal Status: Voluntary            Summary of Recommendations:     Communicated recommendations with Jim Powell MD via [facility].    No medication recommendations  Consider neuropych consult for additional more in depth cognitive testing or neurologist specializing in dementia.       Attestation:  Patient was seen and evaluated on 2025.  Patient time: 20 minutes  Reviewed labs, EKG, and relevant imagin minutes  Family/guardian/other time:  minutes  Team time:  10 minutes  Chart review: 30 minutes  Documentation: 35 minutes    95 total minutes was spent on the date of the encounter doing chart review, review of outside records, review of test results, interpretation of tests, patient visit, documentation, discussing with other providers, discussing with family.       Mary Lou Bergman DNP, APRN, CNP  Consult/Liaison Psychiatry   [facility]

## 2025-03-30 NOTE — PLAN OF CARE
"Goal Outcome Evaluation:      Plan of Care Reviewed With: patient    Overall Patient Progress: improvingOverall Patient Progress: improving      VS: /76 (BP Location: Right arm)   Pulse 87   Temp 97.8  F (36.6  C) (Oral)   Resp 18   Ht 1.575 m (5' 2\")   Wt 60.8 kg (134 lb 0.6 oz)   SpO2 98%   BMI 24.52 kg/m       O2: SPO2>98% and stable on Ra. Denies chest pain or SOB.    Output: Continent of bowl of bladder .    Last BM: 3/28/2025                                                    Activity: SBA       Skin: Intact. Pt has redness of perineum. Applied barrier cream   Pain: Denies pain   CMS: A&OX4 confused   Dressing: N/A   Diet: Consistent carb. Diet . Carb coverage Pt is in BG ck . Need full assistant when pt have meal.     LDA: L PICC HL    Equipment: IV pole, personal belongings    Plan: STANDARD precaution is maintained. Call light within reach, bed in a low position.   Additional Info: No acute change during the shift. Pt refused vitals after 23           "

## 2025-03-30 NOTE — PROGRESS NOTES
Occupational Therapy Discharge Summary    Reason for therapy discharge:    All goals and outcomes met, no further needs identified.    Progress towards therapy goal(s). See [facility] on Care [facility] record for goal details.  Goals met    Therapy recommendation(s):    No further therapy is recommended.Pt would benefit from OP neuropsych for cognitive eval.

## 2025-03-30 NOTE — PROGRESS NOTES
"[facility]    Medicine Progress Note - Hospitalist Service, GOLD TEAM 18    Date of Admission:  3/25/2025    Assessment & Plan   Paige Mari is a 74 year old female with a past medical history of HTN, paroxysmal atrial fibrillation (on Eliquis), COPD, KP, and latent autoimmune diabetes mellitus who was admitted on 3/25/25 for DKA and hypertensive urgency. The patient was found by her  sitting on the toilet confused. She was then transported to the ED via EMS where she was found to be in DKA with blood sugars to 560, ketones of 5.6, pH 7.19, and lactic acid of 5.0. She was also found to be hypertensive eventually requiring nicardipine drip. Since weaned off the nicardipine drip, she is now transferring to the internal medicine service for ongoing cares.      Clinical updates 3/30/2025:  I personally repeated her slums testing today, score improved to 23/30.  Results uploaded to media tab.  Previously scored 18/30 on slums testing 3/28.  Patient had become quite tearful and upset on 3/29 when I suggested she needed to move into an assisted living facility.  She was very worried about the cost, she and her  live on Social Security.  She also misses her home, her cat and her .  Today patient's mood was much more upbeat, then she stated \"I feel better because I have made some decisions\".  She went on to state that she had spoken with her  yesterday evening and they decided she is is going to return home \"I do not care if I die, but I want to die at home\".  As discussed below, despite improving cognition, she continues to be at risk for recurrent, serious consequences from inability to fully comply with her diabetes insulin regimen.  She was hospitalized with severe DKA during this admission.  Discussed with daughter Karishma as below.  -will obtain psychiatry consult to assess decision making capacity  -will again attempt to contact patient's " " later in the afternoon when he is awake.  -Endocrinology consulting diabetes education, potentially we could have them work with the patient and her  to see if they are capable of managing a simplified insulin regimen      Acute Metabolic Encephalopathy, resolved  Cognitive Impairment  Patient was unable to give her own name, where she is, why she's here at the time of admission and was noted to be alert and pleasantly confused.  Per endocrinology note, provider spoke with daughter who noted several years of declining cognition. CT head negative in outside ED. TSH, LFTs, albumin normal.  Suspect some element of acute confusion in setting of acute illness as above.  With correction of metabolic derangements is now fully oriented, speech normal.  Scored 18/30 on SLUMS per OT on 3/28.  Repeat SLUMS score 23/30 by Dr. Powell on 3/30/25.  Results uploaded to media tab.  Collateral information from daughter combined with repeated hospitalizations for what is likely noncompliance or inability to appropriately manage her insulin due to cognitive impairment demonstrates that she is not capable of independently managing her insulin regimen going forward.  Per collateral information,  would likely not be able to provide this oversight either.   On 3/30 the patient stated that she had discussed things with her  and that they have decided with certainty that she will be returning home with her .  She said her  would be willing to change his sleep schedule and receive training/education on diabetes management in order to assist her.  I was able to speak to patient's daughter Karishma 3/30.  She confirmed the above.  She lives 2 blocks away and is planning to check on them daily.  She believes her parents should be in assisted living, but understands the concerns and financial concerns.  They are living on Social Security.  Per Karishma \"I just want them to be happy, healthy and safe\".  Karishma " does understand that in order for her mother to return home we will have to find a safer plan for her insulin management.  -PT, OT  -Recommend outpatient neuropsychology testing  -social work consult following for safe disposition, again recommend assisted living with medication management, insulin management by staff  -Query if patient and her  would qualify for elderly waiver for assisted living expenses.  They are living [place] on Social Security and live in an apartment that they rent.  -will consider reaching out to pt's primary endocrinologist, Dr. Bardales on 3/31  -Given concerns about cognition and home safety, will request psychiatry consult to assess decision making capacity     Partial Seizures  MDD  Generalized Anxiety Disorder  - PTA tizanidine  - PTA duloxetine  - PTA Keppra     DKA   Latent Autoimmune Diabetes Mellitus -   Presented to the ED after being found on the toilet by her  confused. ED work up consistent with DKA. Started on insulin gtt and IV fluids. Gap now closed, still with some acidosis.  PTA managed with metformin, victoza, and insulin pump, however has significant cognitive impairment and was not managing the device properly.  Transitioned to subcu insulin per diabetes team, blood sugars still elevated but overall improved.  Insulin needs far greater than what she was receiving through her pump, again supporting that she was not managing her diabetes properly.  Due to her cognitive impairment, she is not capable of safely managing her own insulin going forward, and per reports from patient's daughter her  would not be capable of managing this either.  -PTA pregabalin, 100 mg qam + 200 mg qpm for neuropathy   -OT/PT/SW following for discharge needs  Per endocrinology 3/30/2025:  -Lantus 28--> 34 units q 24 hrs at 1500   -  Novolog Meal Coverage: 1 units per 5 g TID AC and PRN with snacks/supplements   -  Novolog correction scale: high resistance 1:25 >140 TID AC,  1:25 >200 HS & 0200 (starting with dinner)   - BG monitoring: TID AC, HS, 0200 (starting with dinner)   - Hypoglycemia protocol    - Carb counting protocol        Leukocytosis, resolved  Suspect stress induced in setting of above DKA. Low suspicion for underlying infection at this time.  No localizing signs or symptoms of infection.  UA was bland, CXR normal.     Hypertensive urgency, resolved  Type 2 NSTEMI, likely demand ischemia  Paroxysmal atrial fibrillation  HTN   HLD  ECHO 3/26/2025: EF 65-70%, mild concentric LVH.  Normal RV function.  No significant valvular disease.  Blood pressure in ED ~240 systolic. She was given labetalol 10 mg x2 with brief improvement but ultimately needed nicardipine infusion. Subsequently discontinued when blood pressure overcorrected. BNP elevated to 2206.  Euvolemic clinically.  Highly suspect noncompliance with medications PTA given patient's cognitive impairment, with rebound hypertension with noncompliance with clonidine.  Has listed allergy to amlodipine causing delirium, HCTZ causing hyponatremia.  Blood pressure somewhat labile but overall improved.  Had been on olmesartan in the past for CKD with proteinuria which per notes she tolerated well.  -Continue PTA Eliquis  -Continue clonidine patch, however would consider weaning off prior to discharge unless she has adequate medication oversight arranged given risk of rebound hypertension in the setting of noncompliance  -Continue metoprolol  -Started Avapro 150 mg at bedtime 3/29  -BMP 3/31     Recurrent UTIs  Urinary Retention -   On Keflex for UTI prophylaxis.  UA 3/25 bland.  Complaining of vaginal yeast symptoms 3/28  -continue PTA Keflex   -Fluconazole 150 mg x 1 3/28      Normocytic Anemia  Iron deficiency  Presenting hemoglobin 13.4, MCV 83.  Hemoglobin trended down to 9.9 following IV fluids, now trending back up off IV fluids.  No signs or symptoms of bleeding clinically.  B12, folic acid normal, iron studies  "consistent with LAZARO 3/29.  -Start iron/vitamin C daily 3/29  -CBC 3/31          Diet: Moderate Consistent Carb (60 g CHO per Meal) Diet    DVT Prophylaxis: DOAC  Gomes Catheter: Not present  Lines: PRESENT      PICC 03/25/25 Triple Lumen Left Basilic medication drips-Site Assessment: WDL      Cardiac Monitoring: None  Code Status: No CPR- Pre-arrest intubation OK      Clinically Significant Risk Factors                     # Hypertension: Noted on problem list           # DMII: A1C = 9.9 % (Ref range: <5.7 %) within past 6 months         # Financial/Environmental Concerns: none         Social Drivers of Health    Tobacco Use: High Risk (1/28/2025)    Patient History     Smoking Tobacco Use: Every Day     Smokeless Tobacco Use: Never          Disposition Plan     Medically Ready for Discharge: Anticipated in 2-4 Days             Aman Powell MD  Hospitalist Service, GOLD TEAM 18  [facility]  Securely message with [employer] (more info)  Text page via [facility] Paging/Directory   See signed in provider for up to date coverage information  ______________________________________________________________________    Interval History   Mood much more upbeat today \"I made a decision\".  She goes on to state that she spoke with her  yesterday and they made some decisions.  She states on going home, I do not care if I die but I want to die at home if I die anywhere and I want to be with my  and my cat.  She was very cooperative, completed repeat slums testing with me.  Scored 23/30.  Blood sugars remain elevated, insulin adjusted by diabetes team.    Physical Exam   Vital Signs: Temp: 97.7  F (36.5  C) Temp src: Oral BP: (!) 158/87 Pulse: 69   Resp: 20 SpO2: 99 % O2 Device: None (Room air)    Weight: 134 lbs .63 oz  General: Alert and oriented x 4.  Very pleasant.  Speech normal.  Affect appropriate.  HEENT: Atraumatic.  No icterus or injection.  OP moist and " clear  Chest: CTA bilaterally  CV: RRR.  No murmurs.  Abdomen: Obese.  NABS.  Soft, nontender, nondistended  Extremities: No edema  Neuro: CN II through XII grossly intact.  Strength 5/5 symmetrically.  No tremors.  Normal finger-nose.          Data   Imaging results reviewed over the past 24 hrs:   No results found for this or any previous visit (from the past 24 hours).  Most Recent 3 CBC's:  Recent Labs   Lab Test 03/29/25  0650 03/28/25  0554 03/27/25  0515   WBC 9.6 11.8* 16.2*   HGB 9.6* 10.5* 9.9*   MCV 84 83 83    247 260     Most Recent 3 BMP's:  Recent Labs   Lab Test 03/30/25  1207 03/30/25  1111 03/30/25  0739 03/29/25  0803 03/29/25  0650 03/28/25  0752 03/28/25  0554 03/27/25  0532 03/27/25  0515   NA  --   --   --   --  140  --  140  --  139   POTASSIUM  --   --   --   --  4.0  --  4.0  --  3.9   CHLORIDE  --   --   --   --  105  --  108*  --  108*   CO2  --   --   --   --  24  --  22  --  22   BUN  --   --   --   --  23.2*  --  19.4  --  19.7   CR  --   --   --   --  0.70  --  0.67  --  0.75   ANIONGAP  --   --   --   --  11  --  10  --  9   BRUNO  --   --   --   --  9.0  --  8.8  --  8.4*   * 340* 188*   < > 180*   < > 212*   < > 117*    < > = values in this interval not displayed.     Most Recent 2 LFT's:  Recent Labs   Lab Test 03/26/25  0030 08/31/23  1104   AST 19 25   ALT 15 17   ALKPHOS 79 68   BILITOTAL 0.7 0.5     Most Recent 6 Bacteria Isolates From Any Culture (See EPIC Reports for Culture Details):No lab results found.  Most Recent TSH and T4:  Recent Labs   Lab Test 03/25/25  1847 05/10/23  1430 03/22/23  1557   TSH 1.28   < > 3.12   T4  --   --  1.22    < > = values in this interval not displayed.     Most Recent Hemoglobin A1c:  Recent Labs   Lab Test 03/25/25  1847   A1C 9.9*     Most Recent Urinalysis:  Recent Labs   Lab Test 03/25/25  1930   COLOR Colorless   APPEARANCE Clear   URINEGLC >1000*   URINEBILI Negative   URINEKETONE 100*   SG 1.025   UBLD 0.06 mg/dL*    URINEPH 5.5   PROTEIN 100*   NITRITE Negative   LEUKEST Negative   RBCU 1   WBCU <1     Most Recent ABG:  Recent Labs   Lab Test 07/24/23  0140   PH 7.42     Most Recent ESR & CRP:  Recent Labs   Lab Test 03/13/24 2012 01/22/22  0734   SED  --  6   CRP  --  3.1*   CRPI <3.00  --      Most Recent Anemia Panel:  Recent Labs   Lab Test 03/29/25  0650   WBC 9.6   HGB 9.6*   HCT 29.1*   MCV 84      IRON 21*   IRONSAT 8*      MILANA 18   B12 452   FOLIC 28.2     Most Recent CPK:  Recent Labs   Lab Test 03/03/21  1206   CKT 57

## 2025-03-30 NOTE — PROGRESS NOTES
"Care Management Follow Up    Length of Stay (days): 5    Expected Discharge Date: 04/04/2025     Concerns to be Addressed: discharge planning, cognitive/perceptual, financial/insurance, home safety  Pt is unable to care for self and manage diseases at this time  Patient plan of care discussed at interdisciplinary rounds: Yes    Anticipated Discharge Disposition: Home, Skilled Nursing Facility, Other (Comments) (assisted living)     Anticipated Discharge Services: Other (see comment) (TBD)  Anticipated Discharge DME:  (TBD)    Patient/family educated on Medicare website which has current facility and service quality ratings: no  Education Provided on the Discharge Plan: No (plan undetermined)  Patient/Family in Agreement with the Plan:  (plan is undetermined)    Referrals Placed by CM/SW:  None at this time  Private pay costs discussed: Not applicable    Discussed  Partnership in Safe Discharge Planning  document with patient/family: No     Handoff Completed: No, handoff not indicated or clinically appropriate    Additional Information:    Per hospitalist and previous care management notes, there is concern about patient's ability to manage her diabetes. She cannot safely be discharged home until there is a plan to manage diabetes. She is not showing ability to manage this and there is a concern for cognition as is shown by her SLUMS score of 18/30.   It is important to note that both PT and OT are stating that patient is safe to go home with assistance from . The only barrier at this time is diabetes management.  Per hospitalist, patient was in tears this morning stating that she cannot afford Assisted Living. Patient also maintains that her preference would be to return home.    Per JOLYNN Shore CNP note from 3/30, \"Paige says she desires to go home and that her  will help her. I am unable to meet with the  today to assess his understanding and ability to assist, but it was expressed to " "me by the patient's daughter, Merline, that patient's  is not knowledgeable of patient's diabetes care.\" It would be reasonable to explore whether patient's  can assist with diabetes management, in order to attempt to explore option of going home before moving to higher level of care.    MIRANDA spoke with Silvestre via [employer]. Silvestre will place consult for diabetic educators to meet with patient and  this week. She has also asked nursing staff to see if they can have patient be more independent in her cares (namely insulin) and documenting how it is going. FITZamericaalek stated that it is unlikely that patient will be able to resume the insulin pump but that insulin injections may be an option if someone is able and willing to assist. Silvestre stated that patient's daughter reported concerns about patient's 's ability to manage the insulin, but it is reasonable to have formal evaluation of this from hospital team.     If patient discharges home, could consider home care nursing if able to secure, perhaps even a few times a week depending on availability and need.     If patient is unable to manage her diabetic cares and  is also unable, we would then have to explore a higher level of care. Reportedly, patient is unable to afford Assisted Living (this would be most appropriate setting given that medication management is the only need) or skilled nursing facility. Patient would have to apply for Medical Assistance (if she qualifies) and would likely be in the hospital for several months while waiting for approval.     Next Steps:     Monitor for results from evaluation with diabetes educators on whether patient's  can assist with managing diabetes/insulin. If /patient can manage, proceed with discharge home. Would suggest a home care nursing referral.    If it is determined that patient and  CANNOT manage diabetes, will need to explore higher level of care. Likely that " patient will need Medical Assistance to pay for such facility.          Kandy Almanza, [place] LSW  3/30/2025       Social Work and Care Management Department       SEARCHABLE in [facility] - search SOCIAL WORK       [place] (0800 - 1630) Saturday and Sunday     Units: 4A Vocera, 4C Vocera, & 4E Vocera        Units: 5A [id number] Vocera, 5A 5928-8711 Vocera , BMT SW 1 BMT SW 2, BMT SW 3 & BMT SW 4  5C Off Service 5401 - 5416  5C Off Service [id number]     Units: 6A Vocera & 6B Vocera      Units: 6C Vocera     Units: 7A Vocera & 7B Vocera      Units: 7C Med Surg 7401 thru 7418 and 7C Med Surg 7502 thru 7521      Unit: [place] ED Vocera & [place] Obs Vocera     [facility] ([id number]) Saturday and Sunday      Units: 5 Ortho Vocera, 5 Med Surg Vocera & WB ED Vocera     Units: 6 Med Surg Vocera, 8 Med Surg Vocera, & 10 ICU Vocera      After hours Vocera [facility] and After Hours Vocera [place]     Please NOTE changes to times below:    **Saturday & Sunday (1630 - 2030)    **Mon-Fri (2719-0163)     **[facility] Recognized Holidays  ([id number])    Units: ALL   - see above VOCERA links to units

## 2025-03-31 LAB
ANION GAP SERPL CALCULATED.3IONS-SCNC: 12 MMOL/L (ref 7–15)
BACTERIA BLD CULT: NO GROWTH
BACTERIA BLD CULT: NO GROWTH
BUN SERPL-MCNC: 24 MG/DL (ref 8–23)
CALCIUM SERPL-MCNC: 10 MG/DL (ref 8.8–10.4)
CHLORIDE SERPL-SCNC: 105 MMOL/L (ref 98–107)
CREAT SERPL-MCNC: 0.81 MG/DL (ref 0.51–0.95)
EGFRCR SERPLBLD CKD-EPI 2021: 75 ML/MIN/1.73M2
ERYTHROCYTE [DISTWIDTH] IN BLOOD BY AUTOMATED COUNT: 15.3 % (ref 10–15)
GLUCOSE BLDC GLUCOMTR-MCNC: 108 MG/DL (ref 70–99)
GLUCOSE BLDC GLUCOMTR-MCNC: 176 MG/DL (ref 70–99)
GLUCOSE BLDC GLUCOMTR-MCNC: 184 MG/DL (ref 70–99)
GLUCOSE BLDC GLUCOMTR-MCNC: 193 MG/DL (ref 70–99)
GLUCOSE BLDC GLUCOMTR-MCNC: 46 MG/DL (ref 70–99)
GLUCOSE BLDC GLUCOMTR-MCNC: 95 MG/DL (ref 70–99)
GLUCOSE SERPL-MCNC: 67 MG/DL (ref 70–99)
HCO3 SERPL-SCNC: 26 MMOL/L (ref 22–29)
HCT VFR BLD AUTO: 32 % (ref 35–47)
HGB BLD-MCNC: 10.6 G/DL (ref 11.7–15.7)
MCH RBC QN AUTO: 27.2 PG (ref 26.5–33)
MCHC RBC AUTO-ENTMCNC: 33.1 G/DL (ref 31.5–36.5)
MCV RBC AUTO: 82 FL (ref 78–100)
PLATELET # BLD AUTO: 239 10E3/UL (ref 150–450)
POTASSIUM SERPL-SCNC: 4.3 MMOL/L (ref 3.4–5.3)
RBC # BLD AUTO: 3.89 10E6/UL (ref 3.8–5.2)
SODIUM SERPL-SCNC: 143 MMOL/L (ref 135–145)
WBC # BLD AUTO: 10 10E3/UL (ref 4–11)

## 2025-03-31 PROCEDURE — 99232 SBSQ HOSP IP/OBS MODERATE 35: CPT | Performed by: INTERNAL MEDICINE

## 2025-03-31 PROCEDURE — 250N000013 HC RX MED GY IP 250 OP 250 PS 637: Performed by: INTERNAL MEDICINE

## 2025-03-31 PROCEDURE — 250N000013 HC RX MED GY IP 250 OP 250 PS 637

## 2025-03-31 PROCEDURE — 36415 COLL VENOUS BLD VENIPUNCTURE: CPT | Performed by: INTERNAL MEDICINE

## 2025-03-31 PROCEDURE — 99233 SBSQ HOSP IP/OBS HIGH 50: CPT | Performed by: NURSE PRACTITIONER

## 2025-03-31 PROCEDURE — 85014 HEMATOCRIT: CPT | Performed by: INTERNAL MEDICINE

## 2025-03-31 PROCEDURE — 120N000002 HC R&B MED SURG/OB UMMC

## 2025-03-31 PROCEDURE — 80048 BASIC METABOLIC PNL TOTAL CA: CPT | Performed by: INTERNAL MEDICINE

## 2025-03-31 RX ADMIN — METOPROLOL TARTRATE 100 MG: 25 TABLET, FILM COATED ORAL at 08:41

## 2025-03-31 RX ADMIN — Medication 25 MCG: at 21:28

## 2025-03-31 RX ADMIN — APIXABAN 5 MG: 5 TABLET, FILM COATED ORAL at 08:42

## 2025-03-31 RX ADMIN — APIXABAN 5 MG: 5 TABLET, FILM COATED ORAL at 21:45

## 2025-03-31 RX ADMIN — PREGABALIN 100 MG: 100 CAPSULE ORAL at 08:42

## 2025-03-31 RX ADMIN — ESCITALOPRAM OXALATE 20 MG: 10 TABLET ORAL at 08:41

## 2025-03-31 RX ADMIN — TIZANIDINE 4 MG: 2 TABLET ORAL at 21:28

## 2025-03-31 RX ADMIN — OXYCODONE HYDROCHLORIDE AND ACETAMINOPHEN 500 MG: 500 TABLET ORAL at 08:42

## 2025-03-31 RX ADMIN — ROSUVASTATIN CALCIUM 5 MG: 5 TABLET, FILM COATED ORAL at 21:28

## 2025-03-31 RX ADMIN — CEPHALEXIN 250 MG: 250 CAPSULE ORAL at 08:41

## 2025-03-31 RX ADMIN — PREGABALIN 200 MG: 100 CAPSULE ORAL at 21:28

## 2025-03-31 RX ADMIN — Medication 1 TABLET: at 21:27

## 2025-03-31 RX ADMIN — LEVETIRACETAM 500 MG: 500 TABLET, FILM COATED ORAL at 08:41

## 2025-03-31 RX ADMIN — PANTOPRAZOLE SODIUM 40 MG: 40 TABLET, DELAYED RELEASE ORAL at 08:42

## 2025-03-31 RX ADMIN — METOPROLOL TARTRATE 100 MG: 25 TABLET, FILM COATED ORAL at 21:28

## 2025-03-31 RX ADMIN — LEVETIRACETAM 500 MG: 500 TABLET, FILM COATED ORAL at 21:28

## 2025-03-31 RX ADMIN — VALACYCLOVIR HYDROCHLORIDE 500 MG: 500 TABLET, FILM COATED ORAL at 08:42

## 2025-03-31 RX ADMIN — IRBESARTAN 150 MG: 150 TABLET ORAL at 21:45

## 2025-03-31 RX ADMIN — CETIRIZINE HYDROCHLORIDE 10 MG: 10 TABLET, FILM COATED ORAL at 21:31

## 2025-03-31 RX ADMIN — ESTRADIOL 1 MG: 1 TABLET ORAL at 08:42

## 2025-03-31 RX ADMIN — FERROUS GLUCONATE 324 MG: 324 TABLET ORAL at 08:42

## 2025-03-31 ASSESSMENT — ACTIVITIES OF DAILY LIVING (ADL)
ADLS_ACUITY_SCORE: 62

## 2025-03-31 NOTE — PROGRESS NOTES
[facility]    Medicine Progress Note - Hospitalist Service, GOLD TEAM 18    Date of Admission:  3/25/2025    Assessment & Plan   Paige Mari is a 74 year old female with a past medical history of HTN, paroxysmal atrial fibrillation (on Eliquis), COPD, KP, and latent autoimmune diabetes mellitus who was admitted on 3/25/25 for DKA and hypertensive urgency. The patient was found by her  sitting on the toilet confused. She was then transported to the ED via EMS where she was found to be in DKA with blood sugars to 560, ketones of 5.6, pH 7.19, and lactic acid of 5.0. She was also found to be hypertensive eventually requiring nicardipine drip. Since weaned off the nicardipine drip, she is now transferring to the internal medicine service for ongoing cares.        Acute Metabolic Encephalopathy, resolved  Cognitive Impairment  Patient was unable to give her own name, where she is, why she's here at the time of admission and was noted to be alert and pleasantly confused.  Per endocrinology note, provider spoke with daughter who noted several years of declining cognition. CT head negative in outside ED. TSH, LFTs, albumin normal.  Suspect some element of acute confusion in setting of acute illness as above.  With correction of metabolic derangements is now fully oriented, speech normal.  Scored 18/30 on SLUMS per OT on 3/28.  Repeat SLUMS score 23/30 by Dr. Powell on 3/30/25.  Results uploaded to media tab.  Collateral information from daughter combined with repeated hospitalizations for what is likely noncompliance or inability to appropriately manage her insulin due to cognitive impairment demonstrates that she is not capable of independently managing her insulin regimen going forward.  Per collateral information,  would likely not be able to provide this oversight either.   On 3/30 the patient stated that she had discussed things with her  and that  "they have decided with certainty that she will be returning home with her .  She said her  would be willing to change his sleep schedule and receive training/education on diabetes management in order to assist her.  I was able to speak to patient's daughter Karishma 3/30.  She confirmed the above.  She lives 2 blocks away and is planning to check on them daily.  She believes her parents should be in assisted living, but understands the concerns and financial concerns.  They are living on Social Security.  Per Karishma \"I just want them to be happy, healthy and safe\".  Karishma does understand that in order for her mother to return home we will have to find a safer plan for her insulin management.  -PT, OT  -Recommend outpatient neuropsychology testing  -social work consult following for safe disposition.   -would expect patient and her  would qualify for elderly waiver for assisted living expenses.  They are living soley on Social Security and live in an apartment that they rent and have no additional financial resources.  Discussed with patient, her daughter and with MSW.  Would initiate MA process immediately as patient and her  as well as their daughter understand that they will eventually most certainly require assisted living.      Severe DKA   Latent Autoimmune Diabetes Mellitus -   Presented to the ED after being found on the toilet by her  confused. ED work up consistent with DKA. Started on insulin gtt and IV fluids. Gap now closed, still with some acidosis.  PTA managed with metformin, victoza, and insulin pump, however has significant cognitive impairment and was not managing the device properly.  Transitioned to subcu insulin per diabetes team, blood sugars still elevated but overall improved.  Insulin needs far greater than what she was receiving through her pump, again supporting that she was not managing her diabetes properly.  Due to her cognitive impairment, she is not " capable of safely managing her own insulin going forward, and per reports from patient's daughter her  would not be capable of managing this either.  -PTA pregabalin, 100 mg qam + 200 mg qpm for neuropathy   -OT/PT/SW following for discharge needs  -Will need to simplify diabetes regimen is much as possible.  Goal is safety, not tight control going forward given patient's insistence on returning home.  Would favor discontinuing carb counting for simplicity and replacing this with fixed dose mealtime insulin.  Per endocrinology 3/31/2025:  - reduce Lantus to 30 units from 34 units q 24 hrs at 1500  - reduce Novolog Meal Coverage to 1:6 g carbs from 1 unit per 5 grams CHO, TID AC and PRN with snacks/supplements  - Novolog Correction Scale: high resistance (ISF: 25) TID AC / HS , stop 0200 correction  - BG Monitoring: TID AC, HS, 0200  - Hypoglycemia protocol  - Carb counting protocol   - hold PTA metformin and victoza  - diabetes education consulted, please involve  as well.  Currently planning to meet with  and patient on 4/2 for education and training.     Partial Seizures  MDD  Generalized Anxiety Disorder  - PTA tizanidine  - PTA duloxetine  - PTA Keppra     Leukocytosis, resolved  Suspect stress induced in setting of above DKA. Low suspicion for underlying infection at this time.  No localizing signs or symptoms of infection.  UA was bland, CXR normal.     Hypertensive urgency, resolved  Type 2 NSTEMI, likely demand ischemia  Paroxysmal atrial fibrillation  HTN   HLD  ECHO 3/26/2025: EF 65-70%, mild concentric LVH.  Normal RV function.  No significant valvular disease.  Blood pressure in ED ~240 systolic. She was given labetalol 10 mg x2 with brief improvement but ultimately needed nicardipine infusion. Subsequently discontinued when blood pressure overcorrected. BNP elevated to 2206.  Euvolemic clinically.  Highly suspect noncompliance with medications PTA given patient's cognitive  impairment, with rebound hypertension with noncompliance with clonidine.  Has listed allergy to amlodipine causing delirium, HCTZ causing hyponatremia.  Blood pressure somewhat labile but overall improved.  Had been on olmesartan in the past for CKD with proteinuria which per notes she tolerated well.  Started Avapro 3/29, blood pressures much better.  BMP normal 3/31.  -Continue PTA Eliquis  -Continue clonidine patch, however would consider weaning off prior to discharge unless she has adequate medication oversight arranged given risk of rebound hypertension in the setting of noncompliance  -Continue metoprolol  -Started Avapro 150 mg at bedtime 3/29  -BMP 4/2     Recurrent UTIs  Urinary Retention  Probable yeast vaginitis:  On Keflex for UTI prophylaxis.  UA 3/25 bland.  Complaining of vaginal yeast symptoms 3/28, high risk given hyperglycemia, chronic antibiotics.  Symptoms resolved following fluconazole given 3/28.  -continue PTA Keflex       Normocytic Anemia  Iron deficiency  Presenting hemoglobin 13.4, MCV 83.  Hemoglobin trended down to 9.9 following IV fluids, now trending back up off IV fluids.  No signs or symptoms of bleeding clinically.  B12, folic acid normal, iron studies consistent with LAZARO 3/29.  Hemoglobin stable, improved at 10.6 on 3/31  -Started iron/vitamin C daily 3/29        Diet: Moderate Consistent Carb (60 g CHO per Meal) Diet    DVT Prophylaxis: DOAC  Gomes Catheter: Not present  Lines: PRESENT      PICC 03/25/25 Triple Lumen Left Basilic medication drips-Site Assessment: WDL      Cardiac Monitoring: None  Code Status: No CPR- Pre-arrest intubation OK      Clinically Significant Risk Factors                     # Hypertension: Noted on problem list           # DMII: A1C = 9.9 % (Ref range: <5.7 %) within past 6 months         # Financial/Environmental Concerns: none         Social Drivers of Health    Tobacco Use: High Risk (1/28/2025)    Patient History     Smoking Tobacco Use: Every Day      Smokeless Tobacco Use: Never          Disposition Plan     Medically Ready for Discharge: Anticipated in 2-4 Days             Aman Powell MD  Hospitalist Service, GOLD TEAM 18  [facility]  Securely message with Lemon (more info)  Text page via [facility] Paging/Directory   See signed in provider for up to date coverage information  ______________________________________________________________________    Interval History   Doing well, mood upbeat.  States her  is coming today but likely will not come tomorrow because it will be snowing and he should not drive in bad weather.  The plan at this time is for him to come again on Wednesday to meet with diabetic education for teaching.  Denies any complaints.    Physical Exam   Vital Signs: Temp: 97.6  F (36.4  C) Temp src: Oral BP: (!) 151/90 Pulse: 60   Resp: 17 SpO2: 98 % O2 Device: None (Room air)    Weight: 134 lbs .63 oz  General: Alert and oriented x 4.  Very pleasant.  Speech normal.  Affect appropriate.  HEENT: Atraumatic.  No icterus or injection.  OP moist and clear  Chest: CTA bilaterally  CV: RRR.  No murmurs.  Abdomen: Obese.  NABS.  Soft, nontender, nondistended  Extremities: No edema  Neuro: CN II through XII grossly intact.  Strength 5/5 symmetrically.  No tremors.  Normal finger-nose.          Data   Imaging results reviewed over the past 24 hrs:   No results found for this or any previous visit (from the past 24 hours).  Most Recent 3 CBC's:  Recent Labs   Lab Test 03/31/25  0631 03/29/25  0650 03/28/25  0554   WBC 10.0 9.6 11.8*   HGB 10.6* 9.6* 10.5*   MCV 82 84 83    215 247     Most Recent 3 BMP's:  Recent Labs   Lab Test 03/31/25  1151 03/31/25  0932 03/31/25  0631 03/29/25  0803 03/29/25  0650 03/28/25  0752 03/28/25  0554   NA  --   --  143  --  140  --  140   POTASSIUM  --   --  4.3  --  4.0  --  4.0   CHLORIDE  --   --  105  --  105  --  108*   CO2  --   --  26  --  24  --   22   BUN  --   --  24.0*  --  23.2*  --  19.4   CR  --   --  0.81  --  0.70  --  0.67   ANIONGAP  --   --  12  --  11  --  10   BRUNO  --   --  10.0  --  9.0  --  8.8   * 176* 67*   < > 180*   < > 212*    < > = values in this interval not displayed.     Most Recent 2 LFT's:  Recent Labs   Lab Test 03/26/25  0030 08/31/23  1104   AST 19 25   ALT 15 17   ALKPHOS 79 68   BILITOTAL 0.7 0.5     Most Recent 6 Bacteria Isolates From Any Culture (See EPIC Reports for Culture Details):No lab results found.  Most Recent TSH and T4:  Recent Labs   Lab Test 03/25/25  1847 05/10/23  1430 03/22/23  1557   TSH 1.28   < > 3.12   T4  --   --  1.22    < > = values in this interval not displayed.     Most Recent Hemoglobin A1c:  Recent Labs   Lab Test 03/25/25  1847   A1C 9.9*     Most Recent Urinalysis:  Recent Labs   Lab Test 03/25/25  1930   COLOR Colorless   APPEARANCE Clear   URINEGLC >1000*   URINEBILI Negative   URINEKETONE 100*   SG 1.025   UBLD 0.06 mg/dL*   URINEPH 5.5   PROTEIN 100*   NITRITE Negative   LEUKEST Negative   RBCU 1   WBCU <1     Most Recent ABG:  Recent Labs   Lab Test 07/24/23  0140   PH 7.42     Most Recent ESR & CRP:  Recent Labs   Lab Test 03/13/24 2012 01/22/22  0734   SED  --  6   CRP  --  3.1*   CRPI <3.00  --      Most Recent Anemia Panel:  Recent Labs   Lab Test 03/31/25  0631 03/29/25  0650   WBC 10.0 9.6   HGB 10.6* 9.6*   HCT 32.0* 29.1*   MCV 82 84    215   IRON  --  21*   IRONSAT  --  8*   FEB  --  268   MILANA  --  18   B12  --  452   FOLIC  --  28.2     Most Recent CPK:  Recent Labs   Lab Test 03/03/21  1206   CKT 57

## 2025-03-31 NOTE — PROGRESS NOTES
Inpatient Diabetes Management Service: Daily Progress Note     HPI: Paige Mari is a 74 year old with Type 1 Diabetes Mellitus (HARRIETT variant), managed with Omnipod 5 insulin pump and complicated by neuropathy and mild non proliferative retinopathy, as well as a comorbid history of paroxysmal Afib, HTN, HLD,hypothyroidism, KP, and COPD, who was admitted on 3/25/2025 with DKA. Inpatient Diabetes Service consulted for assistance with glycemic management.             Assessment/Plan:     Assessment:   Type 1 Diabetes Mellitus (HARRIETT), complicated by nephropathy, retinopathy and microalbuminuria. Sub-optimal and worsening control  (A1c 9.9 %, Hgb: 13.4)  DKA (BicarbL 15, pH 7.19, glucose 569, ketones 5.62) - resolved  Lactic acidosis (5.0)- resolved  AMS with question of cognitive decline  Concern for ability to care for self     Plan/Recommendations:   - reduce Lantus to 30 units from 34 units q 24 hrs at 1500  - reduce Novolog Meal Coverage to 1:6 g carbs from 1 unit per 5 grams CHO, TID AC and PRN with snacks/supplements  - Novolog Correction Scale: high resistance (ISF: 25) TID AC / HS , stop 0200 correction  - BG Monitoring: TID AC, HS, 0200  - Hypoglycemia protocol  - Carb counting protocol   - hold PTA metformin and victoza  - diabetes education consulted, please involve  as well    Discussion:   Yesterday afternoon BG to  70 after carb coverage and correction at lunch time.  Lantus 34 units daily. BG 95 at 0130.  Will reduce lantus slightly for safety. Needs diabetes education to meet with  to see if patient can manage her diabetes (likely not) and more likely that  will need to assist with insulin management.    Discharge Planning: (tentative)  Medications: stop metformin and victoza. TBD, likely lantus and novolog, doses TBD depending on DM education with   Test Claims: done 3/31/25  Education: needs diabetes education  Outpatient Follow-up:  recommend [facility] w/ Dr. Bardales, has appt on 7/22/25, but will need follow up 1-2 weeks after discharge, order placed in epic on 3/31/25    Please notify Inpatient Diabetes Service if changes are planned to steroids, nutrition, TPN/TF and anticipated procedures requiring prolonged NPO status.         Interval History/Review of Systems :   The last 24 hours progress and nursing notes reviewed.  Scored 18/30 on SLUMS per MD notes. Scores <20 concerning for dementia. Patient tells me today, \" I don't have dementia.\"   not in room.  Patient would like to discharge to home.  Needs diabetes education to meet with her and .     Carried forward from consult note:   Daughter, Merline called over the phone who provides additional history. Per Merline patient has struggled to care for herself over the past few years. Has had multiple admissions for confusion, but ultimately resolves and she is discharged home, only for the cycle to repeat again. No documented dementia though daughter reports trouble with memory for some time. Patient will omit insulin or forget to replace her insulin pump. Lives with , but Merline states he does not have insight and is unaware of how to assist with diabetes management, insulin injections, and insulin pump. Arline expresses concern over mother returning home as she feels she is not able to continue managing her diabetes care independently.     Planned Procedures/Surgeries: none    Inpatient Glucose Control:       Recent Labs   Lab 03/31/25  0134 03/30/25  2204 03/30/25  1826 03/30/25  1638 03/30/25  1525 03/30/25  1207   GLC 95 138* 191* 70 172* 310*             Medications Impacting Glycemia:   Steroids:  none   D5W-containing solutions/medications: none   Other medications impacting glucose: none         Nutrition:   Orders Placed This Encounter      Moderate Consistent Carb (60 g CHO per Meal) Diet    Supplements: none   TF: none   TPN: none         Diabetes " History: see full consult note for complete diabetes history   Diabetes Type and Duration: Type 1 DM, HARRIETT. Initially diagnosed around age 49 and was immediately started on insulin. Was also on Metoformin and Victoza at stable dosing for many years.      12/7/2021: C-peptide 0.7 ng/mL with concurrent venous glucose 176 mg/dL, MAXI Ab >250 (ULN 5.0 IU/mL), insulin Ab 0.6 (ULN 0.4 U/mL), IA-2 Ab 44.1 (ULN 7.4 U/mL), Zn T8 Ab 64.3 (ref range 0.0 - 15.0 U/mL)  -CT A/P 9/2021: Mild diffuse pancreatic atrophy  -12/7/2021: fructosamine 365 umol/L (corresponds to HbA1c ~9.0%), HbA1c 9.3%   -5/2024: HbA1c 6.8%  -2/2025: HbA1c 8.6%  - 3/2025: HbA1c 9.9%- worsening      PTA Medication Regimen:   Metformin XR 1000 mg BID (stable for many years)   Victoza 1.2 mg daily (stable dose for many years)      Settings via Glucoo- pump not present to be assessed 3/26/25  Pump: Omnipod 5    Mode: Manual- patient had not connected CGM to run in Auto   CGM: Dexcom G 6 but not connected   Insulin: Novolog U-100   Basal Rates:  5792-0695: 0.5 units/hr  0304-7989: 0.35 units/hr   1645-5399: 0.4 units/hr   1785-0813: 0.6 units/hr      Total: 12 units daily      Bolus Settings:  Insulin Sensitivity Factor:  60  Insulin to CHO Ratio:  0000 to 2359 - 1 unit for every 20 grams     Target Glucose: 130mg/dL  Active insulin time: 4     Backup for Pump Failure: Lantus 12 units      Missing doses?: yes, pump removed Emil 3/23 and not replaced   Historical Diabetes Medications:   Insulin Detemir      Glucose monitoring device/frequency/trends: Dexcom G 6cgm      Hypoglycemia PTA:   - Frequency: unknown  - Severity: unknown  - Awareness: hypoglycemia unawareness recorded in notes.   - Treatment: unknown      Outpatient Diabetes Provider: Dr. Bardales with [facility]   Formal Diabetes Education/Educator: yes in the past, no recent follow up     Physical Exam:   /79 (BP Location: Right arm)   Pulse 55   Temp 98.8  F (37.1  C) (Oral)   " Resp 18   Ht 1.575 m (5' 2\")   Wt 60.8 kg (134 lb 0.6 oz)   SpO2 98%   BMI 24.52 kg/m    General:  well appearing, in no acute distress.  Lungs:  unremarkable, no new cough, no SOB  MSK:   moving all extremities  Mental Status:  Alert and oriented x3  Psych:   Cooperative, good eye contact, full/appropriate affect           Data:     Lab Results   Component Value Date    A1C 9.9 (H) 03/25/2025    A1C 8.6 (H) 02/21/2025    A1C 6.8 (H) 05/10/2024    A1C 7.5 (H) 03/14/2024    A1C 7.4 (H) 07/25/2023       ROUTINE IP LABS (Last four results)  BMP  Recent Labs   Lab 03/31/25  0134 03/30/25  2204 03/30/25  1826 03/30/25  1638 03/29/25  0803 03/29/25  0650 03/28/25  0752 03/28/25  0554 03/27/25  0532 03/27/25  0515 03/26/25  1053 03/26/25  1029   NA  --   --   --   --   --  140  --  140  --  139  --  133*   POTASSIUM  --   --   --   --   --  4.0  --  4.0  --  3.9  --  5.4*   CHLORIDE  --   --   --   --   --  105  --  108*  --  108*  --  104   BRUNO  --   --   --   --   --  9.0  --  8.8  --  8.4*  --  7.7*   CO2  --   --   --   --   --  24  --  22  --  22  --  18*   BUN  --   --   --   --   --  23.2*  --  19.4  --  19.7  --  21.8   CR  --   --   --   --   --  0.70  --  0.67  --  0.75  --  0.60   GLC 95 138* 191* 70   < > 180*   < > 212*   < > 117*   < > 558*    < > = values in this interval not displayed.     CBC  Recent Labs   Lab 03/31/25  0631 03/29/25  0650 03/28/25  0554 03/27/25  0515   WBC 10.0 9.6 11.8* 16.2*   RBC 3.89 3.46* 3.77* 3.54*   HGB 10.6* 9.6* 10.5* 9.9*   HCT 32.0* 29.1* 31.4* 29.4*   MCV 82 84 83 83   MCH 27.2 27.7 27.9 28.0   MCHC 33.1 33.0 33.4 33.7   RDW 15.3* 15.0 14.9 14.6    215 247 260     INR  Recent Labs   Lab 03/26/25  0030   INR 1.21*       Inpatient Diabetes Service will continue to follow, please don't hesitate to contact the team with any questions or concerns.     JOLYNN Fajardo CNP    Plan discussed with patient, diabetes education, and primary team via this note.    To " contact Inpatient Diabetes Service:     7 AM - 5 PM: Page the IDS LANNY following the patient that day (see filed or incomplete progress notes/consult notes under Endocrinology)    OR if uncertain of provider assignment: page job code [phone]    5 PM - 7 AM: First call after hours is to primary service.    For urgent after-hours questions, page job code for on call fellow: [phone]     I spent a total of 50 minutes on the date of the encounter doing prep/post-work, chart review, history and exam, documentation and further activities per the note including lab review, multidisciplinary communication, counseling the patient and/or coordinating care regarding acute hyper/hypoglycemic management, as well as discharge management and planning/communication.

## 2025-03-31 NOTE — PLAN OF CARE
"Goal Outcome Evaluation:      Plan of Care Reviewed With: patient    Overall Patient Progress: no changeOverall Patient Progress: no change       VS: BP (!) 151/90 (BP Location: Right arm)   Pulse 60   Temp 97.6  F (36.4  C) (Oral)   Resp 17   Ht 1.575 m (5' 2\")   Wt 60.8 kg (134 lb 0.6 oz)   SpO2 98%   BMI 24.52 kg/m       O2: 98% on R/A   Output: Continent of bowel and bladder   Last BM: 3/31/25   Activity: Assist of one walker GB   Up for meals? bedside   Skin: Scratches to right arm   Pain: denies   CMS: Alert and oriented   Dressing: intact   Diet: Regular, whole thin   LDA: Left PICC triple lumen   Equipment: Walker, GB   Plan: Cont. Poc    Additional Info: Patient remains alert and oriented with forgetfulness,  call light appropriate. Denies chest pain, SOB, N/V.  BG, carb count, and sliding scale. Blood glucose at dinner was 46 ran protocol, orange juice given, came up to 108 . Had dinner, carb count given.       "

## 2025-03-31 NOTE — PHARMACY-ADMISSION MEDICATION HISTORY
Admission Medication History completed by Haroldo parisi on 3/25/25. Please see Pharmacy - Admission Medication History note under previous encounter at [facility]. for information regarding prior to admission medications.    Gretta Washington, PharmD   Clinical Pharmacist

## 2025-03-31 NOTE — PLAN OF CARE
[id number]    Goal Outcome Evaluation:      Plan of Care Reviewed With: patient    Overall Patient Progress: no changeOverall Patient Progress: no change    Pt is alert and oriented, able to make needs known. Moderate Consistent carb diet. VSS on room air, denies pain or shortness of breath. Continent of bowel and bladder. Last BM 3/30/25. Left PICC triple lumen. SBA Walker and gait belt. BG 95 mg/dl. Bed alarm on for safety. Call light within reach. Continue with POC.

## 2025-03-31 NOTE — CONSULTS
Consulted to run a test claim for Novolog, Lantus/Tresiba, CGM, & Glucometer.    Patient has pharmacy benefits through [facility] Medicare Advantage. Per insurance, the following are covered and preferred under the patient's plans:     Lantus pens/vials - $35  Novolog pens/vials - $35  Humalog pens/vials - $35  Lyumjev pens/vials - $35  Contour meter/supplies - $0  Dexcom reader/sensors - $60.89/$76.65 (sensors too soon until 4/7/35--on Dexcom G6 PTA)  Freestyle Betty 1/2/3 reader/sensors - $13.85/$? (sensors too soon until 4/7/35--on Dexcom G6 PTA)     The following are not covered:  Tresiba  Basaglar  Semglee  Insulin Aspart  Insulin Lispro  Accu-chek meter/supplies  Freestyle meter/supplies  OneTouch meter/supplies      Please feel free to contact me with any other test claims, prior authorizations, or insurance questions regarding outpatient medications.     Thanks!      Mary Ann Seymour CPhT  Discharge Pharmacy Liaison  [facility]  Pronouns: She/Her/Hers    Securely message with [employer], Epic Secure Chat, or [employer]  Phone: [phone]  Fax: [phone]  [email or web address]

## 2025-04-01 LAB
GLUCOSE BLDC GLUCOMTR-MCNC: 103 MG/DL (ref 70–99)
GLUCOSE BLDC GLUCOMTR-MCNC: 110 MG/DL (ref 70–99)
GLUCOSE BLDC GLUCOMTR-MCNC: 116 MG/DL (ref 70–99)
GLUCOSE BLDC GLUCOMTR-MCNC: 208 MG/DL (ref 70–99)
GLUCOSE BLDC GLUCOMTR-MCNC: 46 MG/DL (ref 70–99)
GLUCOSE BLDC GLUCOMTR-MCNC: 52 MG/DL (ref 70–99)
GLUCOSE BLDC GLUCOMTR-MCNC: 57 MG/DL (ref 70–99)
GLUCOSE BLDC GLUCOMTR-MCNC: 84 MG/DL (ref 70–99)
GLUCOSE BLDC GLUCOMTR-MCNC: 87 MG/DL (ref 70–99)
GLUCOSE BLDC GLUCOMTR-MCNC: 92 MG/DL (ref 70–99)
GLUCOSE BLDC GLUCOMTR-MCNC: 98 MG/DL (ref 70–99)
GLUCOSE BLDC GLUCOMTR-MCNC: 99 MG/DL (ref 70–99)

## 2025-04-01 PROCEDURE — 250N000013 HC RX MED GY IP 250 OP 250 PS 637: Performed by: STUDENT IN AN ORGANIZED HEALTH CARE EDUCATION/TRAINING PROGRAM

## 2025-04-01 PROCEDURE — 120N000002 HC R&B MED SURG/OB UMMC

## 2025-04-01 PROCEDURE — 99232 SBSQ HOSP IP/OBS MODERATE 35: CPT | Performed by: STUDENT IN AN ORGANIZED HEALTH CARE EDUCATION/TRAINING PROGRAM

## 2025-04-01 PROCEDURE — 250N000009 HC RX 250: Performed by: STUDENT IN AN ORGANIZED HEALTH CARE EDUCATION/TRAINING PROGRAM

## 2025-04-01 PROCEDURE — 250N000013 HC RX MED GY IP 250 OP 250 PS 637

## 2025-04-01 PROCEDURE — 250N000013 HC RX MED GY IP 250 OP 250 PS 637: Performed by: INTERNAL MEDICINE

## 2025-04-01 PROCEDURE — 999N000007 HC SITE CHECK

## 2025-04-01 RX ORDER — CETIRIZINE HYDROCHLORIDE 5 MG/1
5 TABLET ORAL AT BEDTIME
Status: DISCONTINUED | OUTPATIENT
Start: 2025-04-01 | End: 2025-04-03 | Stop reason: HOSPADM

## 2025-04-01 RX ORDER — CETIRIZINE HYDROCHLORIDE 5 MG/1
5 TABLET ORAL AT BEDTIME
Status: DISCONTINUED | OUTPATIENT
Start: 2025-04-01 | End: 2025-04-01

## 2025-04-01 RX ORDER — LIRAGLUTIDE 6 MG/ML
1.2 INJECTION SUBCUTANEOUS DAILY
Status: DISCONTINUED | OUTPATIENT
Start: 2025-04-01 | End: 2025-04-03 | Stop reason: HOSPADM

## 2025-04-01 RX ORDER — METFORMIN HYDROCHLORIDE 500 MG/1
1000 TABLET, EXTENDED RELEASE ORAL 2 TIMES DAILY WITH MEALS
Status: DISCONTINUED | OUTPATIENT
Start: 2025-04-01 | End: 2025-04-03 | Stop reason: HOSPADM

## 2025-04-01 RX ADMIN — CETIRIZINE HYDROCHLORIDE 5 MG: 5 TABLET ORAL at 22:20

## 2025-04-01 RX ADMIN — ROSUVASTATIN CALCIUM 5 MG: 5 TABLET, FILM COATED ORAL at 20:49

## 2025-04-01 RX ADMIN — PREGABALIN 200 MG: 100 CAPSULE ORAL at 20:48

## 2025-04-01 RX ADMIN — OXYCODONE HYDROCHLORIDE AND ACETAMINOPHEN 500 MG: 500 TABLET ORAL at 07:47

## 2025-04-01 RX ADMIN — Medication 25 MCG: at 20:49

## 2025-04-01 RX ADMIN — ESTRADIOL 1 MG: 1 TABLET ORAL at 07:47

## 2025-04-01 RX ADMIN — LEVETIRACETAM 500 MG: 500 TABLET, FILM COATED ORAL at 07:47

## 2025-04-01 RX ADMIN — CEPHALEXIN 250 MG: 250 CAPSULE ORAL at 07:47

## 2025-04-01 RX ADMIN — PANTOPRAZOLE SODIUM 40 MG: 40 TABLET, DELAYED RELEASE ORAL at 07:47

## 2025-04-01 RX ADMIN — METFORMIN ER 500 MG 1000 MG: 500 TABLET ORAL at 09:20

## 2025-04-01 RX ADMIN — PREGABALIN 100 MG: 100 CAPSULE ORAL at 07:47

## 2025-04-01 RX ADMIN — Medication 1 TABLET: at 20:49

## 2025-04-01 RX ADMIN — METOPROLOL TARTRATE 100 MG: 25 TABLET, FILM COATED ORAL at 07:47

## 2025-04-01 RX ADMIN — TIZANIDINE 4 MG: 2 TABLET ORAL at 20:49

## 2025-04-01 RX ADMIN — ESCITALOPRAM OXALATE 20 MG: 10 TABLET ORAL at 07:46

## 2025-04-01 RX ADMIN — DEXTROSE 15 G: 15 GEL ORAL at 22:49

## 2025-04-01 RX ADMIN — IRBESARTAN 150 MG: 150 TABLET ORAL at 20:49

## 2025-04-01 RX ADMIN — DEXTROSE 30 G: 15 GEL ORAL at 22:29

## 2025-04-01 RX ADMIN — VALACYCLOVIR HYDROCHLORIDE 500 MG: 500 TABLET, FILM COATED ORAL at 07:47

## 2025-04-01 RX ADMIN — FERROUS GLUCONATE 324 MG: 324 TABLET ORAL at 07:47

## 2025-04-01 RX ADMIN — LIRAGLUTIDE 1.2 MG: 6 INJECTION SUBCUTANEOUS at 09:19

## 2025-04-01 RX ADMIN — APIXABAN 5 MG: 5 TABLET, FILM COATED ORAL at 20:49

## 2025-04-01 RX ADMIN — METOPROLOL TARTRATE 100 MG: 25 TABLET, FILM COATED ORAL at 20:49

## 2025-04-01 RX ADMIN — LEVETIRACETAM 500 MG: 500 TABLET, FILM COATED ORAL at 20:48

## 2025-04-01 RX ADMIN — APIXABAN 5 MG: 5 TABLET, FILM COATED ORAL at 07:47

## 2025-04-01 ASSESSMENT — ACTIVITIES OF DAILY LIVING (ADL)
ADLS_ACUITY_SCORE: 62

## 2025-04-01 NOTE — PROGRESS NOTES
[facility]    Medicine Progress Note - Hospitalist Service, GOLD TEAM 20    Date of Admission:  3/25/2025    Assessment & Plan   Paige Mari is a 74 year old female with a past medical history of HTN, paroxysmal atrial fibrillation (on Eliquis), COPD, KP, and latent autoimmune diabetes mellitus who was admitted on 3/25/25 for DKA and hypertensive urgency. The patient was found by her  sitting on the toilet confused. She was then transported to the ED via EMS where she was found to be in DKA with blood sugars to 560, ketones of 5.6, pH 7.19, and lactic acid of 5.0. She was also found to be hypertensive eventually requiring nicardipine drip. Since weaned off the nicardipine drip, she is now transferring to the internal medicine service for ongoing cares.      Updates from today  - Scheduled Zyrtec 5 mg at bedtime for hand itching  - Diabetic educator to meet with patient and  tomorrow 4/2 to assess ability to handle medications at home  - Tried to call daughter Merline with listed phone number to give update and get her opinion on everything going on with her mother and father. No answer and voicemail was unavailable.   - BMP and CBC in AM 4/2, if adequate, can likely space out labs to every 3 days if not weekly pending placement/dispo plan  - Appreciate care management help with disposition pending results of diabetic educator meeting      Acute Metabolic Encephalopathy, resolved  Cognitive Impairment  -On admission, patient was alert but confused and disoriented x4. Per endocrinology note, provider spoke with daughter who noted several years of declining cognition. CT head negative in outside ED. TSH, LFTs, albumin normal.  Suspect some element of acute confusion in setting of acute illness as above.  With correction of metabolic derangements is now fully oriented, speech normal.    -Scored 18/30 on SLUMS per OT on 3/28.  Repeat SLUMS score 23/30 by  "Dr. Powell on 3/30/25.  Results uploaded to media tab.  Collateral information from daughter combined with repeated hospitalizations for what is likely noncompliance or inability to appropriately manage her insulin due to cognitive impairment demonstrates that she is not capable of independently managing her insulin regimen going forward.  Per collateral information,  would likely not be able to provide this oversight either.   -On 3/30 the patient stated that she had discussed things with her  and that they have decided with certainty that she will be returning home with her .  She said her  would be willing to change his sleep schedule and receive training/education on diabetes management in order to assist her.  I was able to speak to patient's daughter Karisham 3/30.  She confirmed the above.  She lives 2 blocks away and is planning to check on them daily.  She believes her parents should be in assisted living, but understands the concerns and financial concerns.  They are living on Social Security.  Per Karishma \"I just want them to be happy, healthy and safe\".  Karishma does understand that in order for her mother to return home we will have to find a safer plan for her insulin management.  -PT, OT  -Recommend outpatient neuropsychology testing  -Social work consult following for safe disposition.   -Would expect patient and her  would qualify for elderly waiver for assisted living expenses.  They are living soley on Social Security and live in an apartment that they rent and have no additional financial resources.  Discussed with patient, her daughter and with MSW.  Would initiate MA process immediately as patient and her  as well as their daughter understand that they will eventually most certainly require assisted living.      Severe DKA   Latent Autoimmune Diabetes Mellitus -   Presented to the ED after being found on the toilet by her  confused. ED work up consistent " with DKA. Started on insulin gtt and IV fluids. Gap now closed, still with some acidosis.  PTA managed with metformin, victoza, and insulin pump, however has significant cognitive impairment and was not managing the device properly.  Transitioned to subcu insulin per diabetes team, blood sugars still elevated but overall improved.  Insulin needs far greater than what she was receiving through her pump, again supporting that she was not managing her diabetes properly.  Due to her cognitive impairment, she is not capable of safely managing her own insulin going forward, and per reports from patient's daughter her  would not be capable of managing this either.  -PTA pregabalin, 100 mg qam + 200 mg qpm for neuropathy   -OT/PT/SW following for discharge needs  -Will need to simplify diabetes regimen is much as possible.  Goal is safety, not tight control going forward given patient's insistence on returning home.  Would favor discontinuing carb counting for simplicity and replacing this with fixed dose mealtime insulin.  Per endocrinology 4/1/2025:  - Decrease Lantus 30 --> 22 units units q 24 hrs at 1500  - Novolog Meal Coverage:  - breakfast: 1:6 --> 1:8 g cho  - lunch: relax 1:6 --> 1:10 g cho   - dinner: 1:6 --> 1:10 g cho  - snacks/supplements: 1:6 --> 1:10 g cho  - Novolog Correction Scale: high resistance (ISF: 25) TID AC / HS. 0200 discontinued 3/31.   - Resume PTA Metformin ER 1,000 mg BID   - Resume PTA Victoza 1.2 mg daily   - BG Monitoring: TID AC, HS, 0200  - Hypoglycemia protocol  - Carb counting protocol   - hold PTA metformin and victoza  - diabetes education consulted, please involve  as well.  Currently planning to meet with  and patient on 4/2 for education and training.     Partial Seizures  MDD  Generalized Anxiety Disorder  - PTA tizanidine  - PTA duloxetine  - PTA Keppra     Leukocytosis, resolved  Suspect stress induced in setting of above DKA. Low suspicion for underlying  infection at this time.  No localizing signs or symptoms of infection.  UA was bland, CXR normal.     Hypertensive urgency, resolved  Type 2 NSTEMI, likely demand ischemia  Paroxysmal atrial fibrillation  HTN   HLD  ECHO 3/26/2025: EF 65-70%, mild concentric LVH.  Normal RV function.  No significant valvular disease.  Blood pressure in ED ~240 systolic. She was given labetalol 10 mg x2 with brief improvement but ultimately needed nicardipine infusion. Subsequently discontinued when blood pressure overcorrected. BNP elevated to 2206.  Euvolemic clinically.  Highly suspect noncompliance with medications PTA given patient's cognitive impairment, with rebound hypertension with noncompliance with clonidine.  Has listed allergy to amlodipine causing delirium, HCTZ causing hyponatremia.  Blood pressure somewhat labile but overall improved.  Had been on olmesartan in the past for CKD with proteinuria which per notes she tolerated well.  Started Avapro 3/29, blood pressures much better.  BMP normal 3/31.  -Continue PTA Eliquis  -Continue clonidine patch - she notes she was trialed on many medications in the past and only the clonidine patch helped   -Continue metoprolol  -Avapro 150 mg at bedtime 3/29     Recurrent UTIs  Urinary Retention  Probable yeast vaginitis:  On Keflex for UTI prophylaxis.  UA 3/25 bland.  Complaining of vaginal yeast symptoms 3/28, high risk given hyperglycemia, chronic antibiotics.  Symptoms resolved following fluconazole given 3/28.  -PTA Keflex ppx      Normocytic Anemia  Iron deficiency  Presenting hemoglobin 13.4, MCV 83.  Hemoglobin trended down to 9.9 following IV fluids, now trending back up off IV fluids.  No signs or symptoms of bleeding clinically.  B12, folic acid normal, iron studies consistent with LAZARO 3/29.  Hemoglobin stable, improved at 10.6 on 3/31  -Iron/vitamin C daily 3/29        Diet: Moderate Consistent Carb (60 g CHO per Meal) Diet    DVT Prophylaxis: DOAC  Gomes Catheter: Not  "present  Lines: PRESENT             Cardiac Monitoring: None  Code Status: No CPR- Pre-arrest intubation OK      Clinically Significant Risk Factors                     # Hypertension: Noted on problem list           # DMII: A1C = 9.9 % (Ref range: <5.7 %) within past 6 months         # Financial/Environmental Concerns: none         Social Drivers of Health    Tobacco Use: High Risk (1/28/2025)    Patient History     Smoking Tobacco Use: Every Day     Smokeless Tobacco Use: Never          Disposition Plan     Medically Ready for Discharge: Anticipated in 2-4 Days             Willy Rankin MD  Hospitalist Service, GOLD TEAM 20  [facility]  Securely message with [employer] (more info)  Text page via [facility] Paging/Directory   See signed in provider for up to date coverage information  ______________________________________________________________________    Interval History   Doing well, no complaints. Eating breakfast without issue. Notes that she has meeting with her  and diabetic educator tomorrow. Hopes to go home. She misses her cat \"Russy\"    Physical Exam   Vital Signs: Temp: 97.4  F (36.3  C) Temp src: Oral BP: (!) 141/85 Pulse: 57   Resp: 18 SpO2: 98 % O2 Device: None (Room air)    Weight: 134 lbs .63 oz  General: Alert and oriented x 4.  Very pleasant.  Speech normal.  Affect appropriate.  HEENT: Atraumatic.  No icterus or injection.  OP moist and clear  Chest: CTA bilaterally  CV: RRR.  No murmurs.  Abdomen: Obese.  NABS.  Soft, nontender, nondistended  Extremities: No edema  Neuro: CN II through XII grossly intact.  Strength 5/5 symmetrically.  Mild RUE tremor.        Data   Imaging results reviewed over the past 24 hrs:   No results found for this or any previous visit (from the past 24 hours).  Most Recent 3 CBC's:  Recent Labs   Lab Test 03/31/25  0631 03/29/25  0650 03/28/25  0554   WBC 10.0 9.6 11.8*   HGB 10.6* 9.6* 10.5*   MCV 82 84 83    215 " 247     Most Recent 3 BMP's:  Recent Labs   Lab Test 04/01/25  0754 04/01/25  0155 03/31/25  2105 03/31/25  0932 03/31/25  0631 03/29/25  0803 03/29/25  0650 03/28/25  0752 03/28/25  0554   NA  --   --   --   --  143  --  140  --  140   POTASSIUM  --   --   --   --  4.3  --  4.0  --  4.0   CHLORIDE  --   --   --   --  105  --  105  --  108*   CO2  --   --   --   --  26  --  24  --  22   BUN  --   --   --   --  24.0*  --  23.2*  --  19.4   CR  --   --   --   --  0.81  --  0.70  --  0.67   ANIONGAP  --   --   --   --  12  --  11  --  10   BRUNO  --   --   --   --  10.0  --  9.0  --  8.8   GLC 92 116* 184*   < > 67*   < > 180*   < > 212*    < > = values in this interval not displayed.     Most Recent 2 LFT's:  Recent Labs   Lab Test 03/26/25  0030 08/31/23  1104   AST 19 25   ALT 15 17   ALKPHOS 79 68   BILITOTAL 0.7 0.5     Most Recent 6 Bacteria Isolates From Any Culture (See EPIC Reports for Culture Details):No lab results found.  Most Recent TSH and T4:  Recent Labs   Lab Test 03/25/25  1847 05/10/23  1430 03/22/23  1557   TSH 1.28   < > 3.12   T4  --   --  1.22    < > = values in this interval not displayed.     Most Recent Hemoglobin A1c:  Recent Labs   Lab Test 03/25/25 1847   A1C 9.9*     Most Recent Urinalysis:  Recent Labs   Lab Test 03/25/25  1930   COLOR Colorless   APPEARANCE Clear   URINEGLC >1000*   URINEBILI Negative   URINEKETONE 100*   SG 1.025   UBLD 0.06 mg/dL*   URINEPH 5.5   PROTEIN 100*   NITRITE Negative   LEUKEST Negative   RBCU 1   WBCU <1     Most Recent ABG:  Recent Labs   Lab Test 07/24/23  0140   PH 7.42     Most Recent ESR & CRP:  Recent Labs   Lab Test 03/13/24 2012 01/22/22  0734   SED  --  6   CRP  --  3.1*   CRPI <3.00  --      Most Recent Anemia Panel:  Recent Labs   Lab Test 03/31/25  0631 03/29/25  0650   WBC 10.0 9.6   HGB 10.6* 9.6*   HCT 32.0* 29.1*   MCV 82 84    215   IRON  --  21*   IRONSAT  --  8*   FEB  --  268   MILANA  --  18   B12  --  452   FOLIC  --  28.2     Most  Recent CPK:  Recent Labs   Lab Test 03/03/21  1206   CKT 57

## 2025-04-01 NOTE — PLAN OF CARE
"Goal Outcome Evaluation:      Plan of Care Reviewed With: patient    Overall Patient Progress: improvingOverall Patient Progress: improving     Shift: 0700 - 1530    VS: Blood pressure (!) 141/85, pulse 57, temperature 97.4  F (36.3  C), temperature source Oral, resp. rate 18, height 1.575 m (5' 2\"), weight 60.8 kg (134 lb 0.6 oz), SpO2 98%.     Pain:  Pt denies any pain this shift.   Neuro: Alert and oriented   Resp: WDL  Diet: Consistent carb diet. Carb coverage   Skin: Scratches to right arm.   LDA: NA  Activity: Assist x1 with walker and gb  Output: External cath in place.   Additional Info/shift updates: No acute events this shift.   Call light within reach     Plan of care ongoing       "

## 2025-04-01 NOTE — PROGRESS NOTES
Patient has triple lumen PICC and is not receiving any scheduled IV medications.  Spoke with bedside RN who agrees.    Message sent to Dr. Flynn Rankin requesting order for removal.

## 2025-04-01 NOTE — CONSULTS
"Consult received for Vascular access care.  See LDA for details. For additional needs place \"Nursing to Consult for Vascular Access\" [id number] order in EPIC.  "

## 2025-04-01 NOTE — PROGRESS NOTES
Inpatient Diabetes Management Service: Daily Progress Note     HPI: Paige Mari is a 74 year old with Type 1 Diabetes Mellitus (HARRIETT variant), managed with Omnipod 5 insulin pump and complicated by neuropathy and mild non proliferative retinopathy, as well as a comorbid history of paroxysmal Afib, HTN, HLD,hypothyroidism, KP, and COPD, who was admitted on 3/25/2025 with DKA. Inpatient Diabetes Service consulted for assistance with glycemic management.             Assessment/Plan:     Assessment:   Type 1 Diabetes Mellitus (HARRIETT), complicated by nephropathy, retinopathy and microalbuminuria. Sub-optimal and worsening control  (A1c 9.9 %, Hgb: 13.4)  DKA (BicarbL 15, pH 7.19, glucose 569, ketones 5.62) - resolved  Lactic acidosis (5.0)- resolved  AMS with question of cognitive decline  Concern for ability to care for self     Plan/Recommendations:   - decrease Lantus 30 --> 22 units units q 24 hrs at 1500  - Novolog Meal Coverage:  - breakfast: 1:6 --> 1:8 g cho  - lunch: relax 1:6 --> 1:10 g cho   - dinner: 1:6 --> 1:10 g cho  - snacks/supplements: 1:6 --> 1:10 g cho  - Novolog Correction Scale: high resistance (ISF: 25) TID AC / HS. 0200 discontinued 3/31.   - Resume PTA Metformin ER 1,000 mg BID   - Resume PTA Victoza 1.2 mg daily   - BG Monitoring: TID AC, HS, 0200  - Hypoglycemia protocol  - Carb counting protocol   - diabetes education consulted, please involve  as well    Discussion: Significant hypoglycemia pre-dinner yesterday (46 mg/dL), ICR was 1:5 g cho for lunch yesterday and relaxed to 1:6 g cho for dinner. Fasting BG trending lower. Will resume PTA Metformin and Victoza today and reduce all insulin. Diabetes educator meeting with patient and  tomorrow 4/2 at noon to assess 's ability to help patient manage her diabetes at discharge as patient is too forgetful to self-manage.     Spoke with pharm liaison 4/1: missy 3 reader is $13.85 but unable to see  cost of betty 3 sensors until able to fill 4/7/25. Patient would like to switch from Dexcom to Betty if cheaper but we will be unable to verify this. Currently has Dexcom G6 (wearing on abdomen) but doesn't have a way to see the readings (no reader and uses track phone). Since not using insulin pump anymore, she would be able to switch to betty 3 if cheaper.       Discharge Planning: (tentative)  Medications: Metformin, Victoza, once daily lantus and novolog set meal doses. Doses TBD depending on DM education with .  Test Claims: done 3/31/25 --> plan for Lantus pens and Novolog Pens.     Education: Ordered 3/30/25, to be completed 4/2 at 1200 with patient and .  Outpatient Follow-up:  recommend [facility] Endocrinology w/ Dr. Bardales, has appt on 7/22/25, but will need follow up 1-2 weeks after discharge, order placed in [facility] on 3/31/25.    Please notify Inpatient Diabetes Service if changes are planned to steroids, nutrition, TPN/TF and anticipated procedures requiring prolonged NPO status.         Interval History/Review of Systems :   - The last 24 hours progress and nursing notes reviewed.  - Feeling well today, no acute concerns. Happy to have meals cooked for her and to not have to cook. Normal appetite without nausea or emesis.  - Felt symptoms of hypoglycemia yesterday, profuse sweating.   - Wearing Dexcom G6 on abdomen but doesn't have a way to see the readings - has track phone.    - She is excited about the thought of switching back to injections, she was frustrated with high cost and issues obtaining the insulin pump.      Carried forward from consult note:   Daughter, Merline called over the phone who provides additional history. Per Merline patient has struggled to care for herself over the past few years. Has had multiple admissions for confusion, but ultimately resolves and she is discharged home, only for the cycle to repeat again. No documented dementia though daughter reports  trouble with memory for some time. Patient will omit insulin or forget to replace her insulin pump. Lives with , but Merline states he does not have insight and is unaware of how to assist with diabetes management, insulin injections, and insulin pump. Arline expresses concern over mother returning home as she feels she is not able to continue managing her diabetes care independently.     Planned Procedures/Surgeries: none    Inpatient Glucose Control:       Recent Labs   Lab 04/01/25  0155 03/31/25  2105 03/31/25  1803 03/31/25  1741 03/31/25  1151 03/31/25  0932   * 184* 108* 46* 193* 176*             Medications Impacting Glycemia:   Steroids:  none   D5W-containing solutions/medications: none   Other medications impacting glucose: none         Nutrition:   Orders Placed This Encounter      Moderate Consistent Carb (60 g CHO per Meal) Diet  Supplements: none   TF: none   TPN: none         Diabetes History: see full consult note for complete diabetes history   Diabetes Type and Duration: Type 1 DM, HARRIETT. Initially diagnosed around age 49 and was immediately started on insulin. Was also on Metoformin and Victoza at stable dosing for many years.      12/7/2021: C-peptide 0.7 ng/mL with concurrent venous glucose 176 mg/dL, MAXI Ab >250 (ULN 5.0 IU/mL), insulin Ab 0.6 (ULN 0.4 U/mL), IA-2 Ab 44.1 (ULN 7.4 U/mL), Zn T8 Ab 64.3 (ref range 0.0 - 15.0 U/mL)  -CT A/P 9/2021: Mild diffuse pancreatic atrophy  -12/7/2021: fructosamine 365 umol/L (corresponds to HbA1c ~9.0%), HbA1c 9.3%   -5/2024: HbA1c 6.8%  -2/2025: HbA1c 8.6%  - 3/2025: HbA1c 9.9%- worsening      PTA Medication Regimen:   Metformin XR 1000 mg BID (stable for many years)   Victoza 1.2 mg daily (stable dose for many years)      Settings via Glucoo- pump not present to be assessed 3/26/25  Pump: Omnipod 5    Mode: Manual- patient had not connected CGM to run in Auto   CGM: Dexcom G 6 but not connected   Insulin: Novolog U-100   Basal  "Rates:  2407-3899: 0.5 units/hr  5282-8547: 0.35 units/hr   9893-4365: 0.4 units/hr   8650-0713: 0.6 units/hr      Total: 12 units daily      Bolus Settings:  Insulin Sensitivity Factor:  60  Insulin to CHO Ratio:  0000 to 2359 - 1 unit for every 20 grams     Target Glucose: 130mg/dL  Active insulin time: 4     Backup for Pump Failure: Lantus 12 units      Missing doses?: yes, pump removed Emil 3/23 and not replaced   Historical Diabetes Medications:   Insulin Detemir      Glucose monitoring device/frequency/trends: Dexcom G 6cgm      Hypoglycemia PTA:   - Frequency: unknown  - Severity: unknown  - Awareness: hypoglycemia unawareness recorded in notes.   - Treatment: unknown      Outpatient Diabetes Provider: Dr. Bardales with [facility] Endocrinology   Formal Diabetes Education/Educator: yes in the past, no recent follow up       Physical Exam:   /60 (BP Location: Right arm)   Pulse 60   Temp 97.4  F (36.3  C) (Oral)   Resp 18   Ht 1.575 m (5' 2\")   Wt 60.8 kg (134 lb 0.6 oz)   SpO2 99%   BMI 24.52 kg/m    General:  well appearing, in no acute distress.  Lungs:  breathing comfortably on room air, no cough  MSK:   moving all extremities  Mental Status:  Alert and oriented x3  Psych:   Cooperative, good eye contact, full/appropriate affect         Data:     Lab Results   Component Value Date    A1C 9.9 (H) 03/25/2025    A1C 8.6 (H) 02/21/2025    A1C 6.8 (H) 05/10/2024    A1C 7.5 (H) 03/14/2024    A1C 7.4 (H) 07/25/2023       ROUTINE IP LABS (Last four results)  BMP  Recent Labs   Lab 04/01/25  0155 03/31/25  2105 03/31/25  1803 03/31/25  1741 03/31/25  0932 03/31/25  0631 03/29/25  0803 03/29/25  0650 03/28/25  0752 03/28/25  0554 03/27/25  0532 03/27/25  0515   NA  --   --   --   --   --  143  --  140  --  140  --  139   POTASSIUM  --   --   --   --   --  4.3  --  4.0  --  4.0  --  3.9   CHLORIDE  --   --   --   --   --  105  --  105  --  108*  --  108*   BRUNO  --   --   --   --   --  10.0  --  9.0  -- "  8.8  --  8.4*   CO2  --   --   --   --   --  26  --  24  --  22  --  22   BUN  --   --   --   --   --  24.0*  --  23.2*  --  19.4  --  19.7   CR  --   --   --   --   --  0.81  --  0.70  --  0.67  --  0.75   * 184* 108* 46*   < > 67*   < > 180*   < > 212*   < > 117*    < > = values in this interval not displayed.     CBC  Recent Labs   Lab 03/31/25  0631 03/29/25  0650 03/28/25  0554 03/27/25  0515   WBC 10.0 9.6 11.8* 16.2*   RBC 3.89 3.46* 3.77* 3.54*   HGB 10.6* 9.6* 10.5* 9.9*   HCT 32.0* 29.1* 31.4* 29.4*   MCV 82 84 83 83   MCH 27.2 27.7 27.9 28.0   MCHC 33.1 33.0 33.4 33.7   RDW 15.3* 15.0 14.9 14.6    [phone]     Inpatient Diabetes Service will continue to follow, please don't hesitate to contact the team with any questions or concerns.     Wen Santos PA-C  Inpatient Diabetes Service  Pager: [phone]  Available on vocera    Plan discussed with patient, diabetes education, and primary team via this note.    To contact Inpatient Diabetes Service:     7 AM - 5 PM: Page the [employer] [facility] following the patient that day (see filed or incomplete progress notes/consult notes under Endocrinology)    OR if uncertain of provider assignment: page job code [phone]    5 PM - 7 AM: First call after hours is to primary service.    For urgent after-hours questions, page job code for on call fellow: [phone]     I spent a total of 40 minutes on the date of the encounter doing prep/post-work, chart review, history and exam, documentation and further activities per the note including lab review, multidisciplinary communication, counseling the patient and/or coordinating care regarding acute hyper/hypoglycemic management, as well as discharge management and planning/communication.

## 2025-04-01 NOTE — PROGRESS NOTES
Brief Care Management Note:    Pt/spouse to complete diabetic education tomorrow to determine if pt is able to discharge home with spouse's support. Writer spoke with pt's daughter, Merline, to discuss discharge dispo. Merline felt that pt's spouse would be able to be taught diabetic management and did not express concerns. Writer confirmed appointment with Diabetic Education at noon tomorrow 4/2. Merline cannot attend.     Discussed potential additional supports in the community (Elderly Waiver, Medical Assistance). Referral sent to Financial Counseling to initiate MA application should pt need placement. Writer encouraged Merline to complete [employer] referral form via [facility] Website as EW services take many months for approval.     Pending success with DE tomorrow, potential discharge home with home care. Referral sent to [facility] for PT/OT/RN/SW/HHA. Care Management will continue to follow.    Next Steps:  Confirm home care/ability to manage diabetes  IMM  Standard discharge practices      Carol Car RN   Nurse Coordinator    6 Med/Surg  Phone: [phone]    Social Work and Care Management Department     SEARCHABLE in [facility] - search CARE COORDINATOR   [facility] ([phone]) Saturday & Sunday; ([phone]) [facility] Recognized Holidays   Units: 6 Med Surg Vocera & 8 Med Surg Vocera Pager [phone]

## 2025-04-02 LAB
ANION GAP SERPL CALCULATED.3IONS-SCNC: 9 MMOL/L (ref 7–15)
BUN SERPL-MCNC: 21.1 MG/DL (ref 8–23)
CALCIUM SERPL-MCNC: 9.8 MG/DL (ref 8.8–10.4)
CHLORIDE SERPL-SCNC: 102 MMOL/L (ref 98–107)
CREAT SERPL-MCNC: 0.8 MG/DL (ref 0.51–0.95)
EGFRCR SERPLBLD CKD-EPI 2021: 76 ML/MIN/1.73M2
ERYTHROCYTE [DISTWIDTH] IN BLOOD BY AUTOMATED COUNT: 15.9 % (ref 10–15)
GLUCOSE BLDC GLUCOMTR-MCNC: 154 MG/DL (ref 70–99)
GLUCOSE BLDC GLUCOMTR-MCNC: 168 MG/DL (ref 70–99)
GLUCOSE BLDC GLUCOMTR-MCNC: 190 MG/DL (ref 70–99)
GLUCOSE BLDC GLUCOMTR-MCNC: 219 MG/DL (ref 70–99)
GLUCOSE BLDC GLUCOMTR-MCNC: 221 MG/DL (ref 70–99)
GLUCOSE BLDC GLUCOMTR-MCNC: 245 MG/DL (ref 70–99)
GLUCOSE BLDC GLUCOMTR-MCNC: 252 MG/DL (ref 70–99)
GLUCOSE BLDC GLUCOMTR-MCNC: 351 MG/DL (ref 70–99)
GLUCOSE BLDC GLUCOMTR-MCNC: 373 MG/DL (ref 70–99)
GLUCOSE SERPL-MCNC: 244 MG/DL (ref 70–99)
HCO3 SERPL-SCNC: 28 MMOL/L (ref 22–29)
HCT VFR BLD AUTO: 30.8 % (ref 35–47)
HGB BLD-MCNC: 10 G/DL (ref 11.7–15.7)
MCH RBC QN AUTO: 27.3 PG (ref 26.5–33)
MCHC RBC AUTO-ENTMCNC: 32.5 G/DL (ref 31.5–36.5)
MCV RBC AUTO: 84 FL (ref 78–100)
PLATELET # BLD AUTO: 262 10E3/UL (ref 150–450)
POTASSIUM SERPL-SCNC: 4.6 MMOL/L (ref 3.4–5.3)
RBC # BLD AUTO: 3.66 10E6/UL (ref 3.8–5.2)
SODIUM SERPL-SCNC: 139 MMOL/L (ref 135–145)
WBC # BLD AUTO: 9 10E3/UL (ref 4–11)

## 2025-04-02 PROCEDURE — 250N000013 HC RX MED GY IP 250 OP 250 PS 637: Performed by: STUDENT IN AN ORGANIZED HEALTH CARE EDUCATION/TRAINING PROGRAM

## 2025-04-02 PROCEDURE — 120N000002 HC R&B MED SURG/OB UMMC

## 2025-04-02 PROCEDURE — 99232 SBSQ HOSP IP/OBS MODERATE 35: CPT | Performed by: INTERNAL MEDICINE

## 2025-04-02 PROCEDURE — 99233 SBSQ HOSP IP/OBS HIGH 50: CPT | Performed by: STUDENT IN AN ORGANIZED HEALTH CARE EDUCATION/TRAINING PROGRAM

## 2025-04-02 PROCEDURE — 85014 HEMATOCRIT: CPT | Performed by: STUDENT IN AN ORGANIZED HEALTH CARE EDUCATION/TRAINING PROGRAM

## 2025-04-02 PROCEDURE — 36415 COLL VENOUS BLD VENIPUNCTURE: CPT | Performed by: STUDENT IN AN ORGANIZED HEALTH CARE EDUCATION/TRAINING PROGRAM

## 2025-04-02 PROCEDURE — 250N000013 HC RX MED GY IP 250 OP 250 PS 637

## 2025-04-02 PROCEDURE — 250N000013 HC RX MED GY IP 250 OP 250 PS 637: Performed by: INTERNAL MEDICINE

## 2025-04-02 PROCEDURE — 80048 BASIC METABOLIC PNL TOTAL CA: CPT | Performed by: STUDENT IN AN ORGANIZED HEALTH CARE EDUCATION/TRAINING PROGRAM

## 2025-04-02 PROCEDURE — 82310 ASSAY OF CALCIUM: CPT | Performed by: STUDENT IN AN ORGANIZED HEALTH CARE EDUCATION/TRAINING PROGRAM

## 2025-04-02 RX ORDER — IRBESARTAN 75 MG/1
75 TABLET ORAL EVERY EVENING
Status: DISCONTINUED | OUTPATIENT
Start: 2025-04-02 | End: 2025-04-03 | Stop reason: HOSPADM

## 2025-04-02 RX ADMIN — CETIRIZINE HYDROCHLORIDE 5 MG: 5 TABLET ORAL at 22:06

## 2025-04-02 RX ADMIN — APIXABAN 5 MG: 5 TABLET, FILM COATED ORAL at 07:56

## 2025-04-02 RX ADMIN — ESCITALOPRAM OXALATE 20 MG: 10 TABLET ORAL at 07:56

## 2025-04-02 RX ADMIN — METFORMIN ER 500 MG 1000 MG: 500 TABLET ORAL at 17:32

## 2025-04-02 RX ADMIN — ESTRADIOL 1 MG: 1 TABLET ORAL at 07:55

## 2025-04-02 RX ADMIN — PREGABALIN 100 MG: 100 CAPSULE ORAL at 07:56

## 2025-04-02 RX ADMIN — OXYCODONE HYDROCHLORIDE AND ACETAMINOPHEN 500 MG: 500 TABLET ORAL at 07:56

## 2025-04-02 RX ADMIN — Medication 1 TABLET: at 20:29

## 2025-04-02 RX ADMIN — VALACYCLOVIR HYDROCHLORIDE 500 MG: 500 TABLET, FILM COATED ORAL at 07:56

## 2025-04-02 RX ADMIN — Medication 25 MCG: at 20:29

## 2025-04-02 RX ADMIN — IRBESARTAN 75 MG: 75 TABLET ORAL at 22:06

## 2025-04-02 RX ADMIN — PREGABALIN 200 MG: 100 CAPSULE ORAL at 20:29

## 2025-04-02 RX ADMIN — PANTOPRAZOLE SODIUM 40 MG: 40 TABLET, DELAYED RELEASE ORAL at 07:56

## 2025-04-02 RX ADMIN — TIZANIDINE 4 MG: 2 TABLET ORAL at 20:29

## 2025-04-02 RX ADMIN — ROSUVASTATIN CALCIUM 5 MG: 5 TABLET, FILM COATED ORAL at 20:29

## 2025-04-02 RX ADMIN — APIXABAN 5 MG: 5 TABLET, FILM COATED ORAL at 20:29

## 2025-04-02 RX ADMIN — CEPHALEXIN 250 MG: 250 CAPSULE ORAL at 07:56

## 2025-04-02 RX ADMIN — METFORMIN ER 500 MG 1000 MG: 500 TABLET ORAL at 07:56

## 2025-04-02 RX ADMIN — LIRAGLUTIDE 1.2 MG: 6 INJECTION SUBCUTANEOUS at 07:57

## 2025-04-02 RX ADMIN — LEVETIRACETAM 500 MG: 500 TABLET, FILM COATED ORAL at 07:56

## 2025-04-02 RX ADMIN — CLONIDINE 1 PATCH: 0.3 PATCH TRANSDERMAL at 14:36

## 2025-04-02 RX ADMIN — LEVETIRACETAM 500 MG: 500 TABLET, FILM COATED ORAL at 20:28

## 2025-04-02 RX ADMIN — METOPROLOL TARTRATE 100 MG: 25 TABLET, FILM COATED ORAL at 20:29

## 2025-04-02 RX ADMIN — METOPROLOL TARTRATE 100 MG: 25 TABLET, FILM COATED ORAL at 07:56

## 2025-04-02 RX ADMIN — FERROUS GLUCONATE 324 MG: 324 TABLET ORAL at 07:56

## 2025-04-02 ASSESSMENT — ACTIVITIES OF DAILY LIVING (ADL)
ADLS_ACUITY_SCORE: 62
ADLS_ACUITY_SCORE: 65
ADLS_ACUITY_SCORE: 62
ADLS_ACUITY_SCORE: 65
ADLS_ACUITY_SCORE: 62
ADLS_ACUITY_SCORE: 65
ADLS_ACUITY_SCORE: 62
ADLS_ACUITY_SCORE: 62
ADLS_ACUITY_SCORE: 65
ADLS_ACUITY_SCORE: 62
ADLS_ACUITY_SCORE: 62
ADLS_ACUITY_SCORE: 65
ADLS_ACUITY_SCORE: 62
ADLS_ACUITY_SCORE: 65
ADLS_ACUITY_SCORE: 62

## 2025-04-02 NOTE — PROVIDER NOTIFICATION
Pg provider for clarifications if Q1hr BG needed to be continued, provider clarified that we should do 1 BG in 2 hrs then proceed to q4hrs.

## 2025-04-02 NOTE — PLAN OF CARE
Goal Outcome Evaluation:0000 to 0730      Plan of Care Reviewed With: patient    Overall Patient Progress: no changeOverall Patient Progress: no change     Alert and orientated x4, able to make needs known. VSS on RA. Denies CP, SOB, and N/V. Denies pain overnight, Baseline N/T in all extremities. Continent of bladder, purewick in place overnight, replaced at 0100, continent of bowel had a bowel movement overnight. No skin issues present. On regular diet, was changed on 4/1/25 and takes medications whole with thin liquids. Got new IV access at 0000 in L lower forearm-SL. Q1hr BG checks until 0138, then moved to q2hr for one time, checked at 0330 for the q2hr BG, was 373, pg provider, provider and writer discussed over the phone to provide 1 time dose of 7 units of insulin then proceed to recheck in 4 hours. Clonidine pt in place on L shoulder. Call light in reach, bed alarm on for safety. Plan is glucose stability and monitoring.     Vital signs:  Temp: 97.9  F (36.6  C) Temp src: Oral BP: 118/60 Pulse: 80   Resp: 15 SpO2: 94 % O2 Device: None (Room air)

## 2025-04-02 NOTE — PROGRESS NOTES
Inpatient Diabetes Management Service: Daily Progress Note     HPI: Paige Mari is a 74 year old with Type 1 Diabetes Mellitus (HARRIETT variant), managed with Omnipod 5 insulin pump and complicated by neuropathy and mild non proliferative retinopathy, as well as a comorbid history of paroxysmal Afib, HTN, HLD,hypothyroidism, KP, and COPD, who was admitted on 3/25/2025 with DKA. Inpatient Diabetes Service consulted for assistance with glycemic management.             Assessment/Plan:     Assessment:   Type 1 Diabetes Mellitus (HARRIETT), complicated by nephropathy, retinopathy and microalbuminuria. Sub-optimal and worsening control  (A1c 9.9 %, Hgb: 13.4)  DKA (BicarbL 15, pH 7.19, glucose 569, ketones 5.62) - resolved  Lactic acidosis (5.0)- resolved  AMS with question of cognitive decline  Concern for ability to care for self     Plan/Recommendations:   - decrease Lantus 22 --> 16 units units q 24 hrs at 1500  - decrease Novolog Meal Coverage:  - breakfast: 1:8 --> 12 g cho  - lunch: relax 1:10 --> 1:18 g cho   - dinner: 1:10 --> 1:18 g cho  - snacks/supplements: 1:10 --> 1:18 g cho  - relax Novolog Correction Scale: 1/50 >140 TID AC, 1/50 >200 HS & 0200 (medium resistance)   - Continue PTA Metformin ER 1,000 mg BID   - Continue PTA Victoza 1.2 mg daily   - BG Monitoring: TID AC, HS, 0200  - Hypoglycemia protocol  - Carb counting protocol   - diabetes education consulted, please involve  as well    Discussion: Significant hypoglycemia at bedtime last night (46 mg/dL) and tight control pre-dinner (patient given juice in the afternoon). Insulin needs are drastically reducing compared to earlier this admission, now closer to PTA insulin pump settings. Resumed metformin and victoza yesterday too. Further reduce all insulins today. Patient did not eat breakfast today.    Diabetes educator meeting with patient and  today at noon to assess 's ability to help patient manage her  "diabetes at discharge as patient is too forgetful to self-manage, see their note for details.     Discharge Planning: (tentative)  Medications: Metformin, Victoza, once daily lantus and novolog set meal doses plus correction scale. Needs dexcom G6 reader, has sensors and transmitter  Test Claims: done 3/31/25 --> plan for Lantus pens and Novolog Pens. Pharm liaison unable to check cost of Betty 3 sensors until 4/7/25 (have patient stay on dexcom for now)     Education: Ordered 3/30/25, to be completed 4/2 at 1200 with patient and .  Outpatient Follow-up:  recommend [facility] scheduled 4/14/25 with diabetes educator (Ana Sanchez) and scheduled 5/13/25 w/ Dr. Bardales. Order placed in [facility] on 3/31/25 (these appointments already made through patient's daughter)     TENTATIVE Diabetes Management Discharge Plan  Instructions to patient were posted in AVS and discussed on day of discharge.   Medications and supplies are to be ordered by primary service on discharge.   *please use the DIAB non-branded discharge supply order set ([id number])*    Patient will need the following supplies prescribed: (recommend prescribing all new supplies for patient)   Dexcom G6 reader   Lantus Solostar pens  Novolog Flexpens  \"BD\" (32G x 4mm) insulin pen needles  Glucometer (if brand of meter not known, can be ordered \"no brand specified\" and note to pharmacy: \"can substitute per insurance coverage\")  Lancets  Test strips  Sharps container  Alcohol swabs     Blood glucose monitoring: Three times daily before meals, and at bedtime. Check via Dexcom G6. Glucometer as back-up.     Blood Glucose (BG) goals: 100 - 200 mg/dL before meals    Glucose Control Regimen:  1) Metformin ER 1,000 mg twice daily with breakfast and dinner    2) Victoza 1.2 mg injection once daily in the morning     3) Long-acting insulin: glargine (Lantus) - take 16 units once daily at 5PM. Take at same time each day.     4) Rapid-acting insulin: " aspart (Novolog) carbohydrate coverage/mealtime insulin  Breakfast: 5 units  Lunch: 4 units  Dinner: 4 units  Snacks: 2 units     5) Rapid-acting insulin: aspart (Novolog) correction - see chart below. Take for high blood glucoses three times daily before meals and at bedtime.  Add the correction dose to the carbohydrate coverage/mealtime insulin dose and give in one injection--ideally 10-15 minutes before a meal.  You may take the correction dose even if you skip a meal (as long as it has been 4 hours since previous correction dose).     Pre-meal correction scale:    Blood Glucose Insulin Novolog Before Meals:   Less than 150 0 units   150 -199  1 units   200 - 249 2 units   250 - 299 3 units   300 - 349 4 units    350 - 399  5 units   400 - 449 6 units   450 or more 7 units     Bedtime correction scale:     Blood Glucose Insulin Novolog At Bedtime:   Less than 200 0 units   200-249  1 units   250 - 299 2 units   300 - 349 3 units   350 - 399 4 units    400 or more 5 units         Outpatient Follow-Up: As scheduled 4/14/2025 with diabetes educator, Ana Sanchez and scheduled 5/13/2025 with Dr. Bardales. You can call the [facility] at [phone] if you have scheduling questions or do not hear from them within a few days of discharge. Follow up sooner if blood glucose runs consistently greater than 200 mg/dL or if having more than two episodes less than 70 mg/dL.     If you have urgent questions or concerns regarding your blood sugars or insulin, you may contact [phone] (the main hospital ). Ask to speak with the Endocrinologist on call.     Your target A1c value is less than 8% to help prevent future complications from diabetes. Your most recent A1c is 9.9%.      Thank you for letting the Diabetes Management Team be involved in your care!      Please notify Inpatient Diabetes Service if changes are planned to steroids, nutrition, TPN/TF and anticipated procedures requiring prolonged  NPO status.         Interval History/Review of Systems :   - The last 24 hours progress and nursing notes reviewed.  - Feeling tired, stayed up until 2AM due to BG concerns. No nausea, emesis, or diarrhea.   - Wearing Dexcom G6 on abdomen but unable to see BG readings, need reader prescribed at discharge.      Carried forward from consult note:   Daughter, Merline called over the phone who provides additional history. Per Merline patient has struggled to care for herself over the past few years. Has had multiple admissions for confusion, but ultimately resolves and she is discharged home, only for the cycle to repeat again. No documented dementia though daughter reports trouble with memory for some time. Patient will omit insulin or forget to replace her insulin pump. Lives with , but Merline states he does not have insight and is unaware of how to assist with diabetes management, insulin injections, and insulin pump. Arline expresses concern over mother returning home as she feels she is not able to continue managing her diabetes care independently.     Planned Procedures/Surgeries: none    Inpatient Glucose Control:       Recent Labs   Lab 04/02/25  0420 04/02/25  0342 04/02/25  0138 04/02/25  0036 04/01/25  2319 04/01/25  2305   * 373* 252* 221* 103* 98             Medications Impacting Glycemia:   Steroids:  none   D5W-containing solutions/medications: none   Other medications impacting glucose: none         Nutrition:   Orders Placed This Encounter      Regular Diet Adult  Supplements: none   TF: none   TPN: none         Diabetes History: see full consult note for complete diabetes history   Diabetes Type and Duration: Type 1 DM, HARRIETT. Initially diagnosed around age 49 and was immediately started on insulin. Was also on Metoformin and Victoza at stable dosing for many years.      12/7/2021: C-peptide 0.7 ng/mL with concurrent venous glucose 176 mg/dL, MAXI Ab >250 (ULN 5.0 IU/mL), insulin Ab  "0.6 (ULN 0.4 U/mL), IA-2 Ab 44.1 (ULN 7.4 U/mL), Zn T8 Ab 64.3 (ref range 0.0 - 15.0 U/mL)  -CT A/P 9/2021: Mild diffuse pancreatic atrophy  -12/7/2021: fructosamine 365 umol/L (corresponds to HbA1c ~9.0%), HbA1c 9.3%   -5/2024: HbA1c 6.8%  -2/2025: HbA1c 8.6%  - 3/2025: HbA1c 9.9%- worsening      PTA Medication Regimen:   Metformin XR 1000 mg BID (stable for many years)   Victoza 1.2 mg daily (stable dose for many years)      Settings via Glucoo- pump not present to be assessed 3/26/25  Pump: Omnipod 5    Mode: Manual- patient had not connected CGM to run in Auto   CGM: Dexcom G 6 but not connected   Insulin: Novolog U-100   Basal Rates:  6394-3963: 0.5 units/hr  1156-8041: 0.35 units/hr   0140-9138: 0.4 units/hr   3745-7314: 0.6 units/hr      Total: 12 units daily      Bolus Settings:  Insulin Sensitivity Factor:  60  Insulin to CHO Ratio:  0000 to 2359 - 1 unit for every 20 grams     Target Glucose: 130mg/dL  Active insulin time: 4     Backup for Pump Failure: Lantus 12 units      Missing doses?: yes, pump removed Emil 3/23 and not replaced   Historical Diabetes Medications:   Insulin Detemir      Glucose monitoring device/frequency/trends: Dexcom G 6cgm      Hypoglycemia PTA:   - Frequency: unknown  - Severity: unknown  - Awareness: hypoglycemia unawareness recorded in notes.   - Treatment: unknown      Outpatient Diabetes Provider: Dr. Bardales with [facility]   Formal Diabetes Education/Educator: yes in the past, no recent follow up       Physical Exam:   /60 (BP Location: Right arm, Patient Position: Left side, Cuff Size: Adult Regular)   Pulse 80   Temp 97.9  F (36.6  C) (Oral)   Resp 15   Ht 1.575 m (5' 2\")   Wt 60.8 kg (134 lb 0.6 oz)   SpO2 94%   BMI 24.52 kg/m    General:  well appearing, in no acute distress.  Lungs:  breathing comfortably on room air, no cough  MSK:   moving all extremities  Psych:   pleasant, good eye contact, full/appropriate affect         Data:     Lab " Results   Component Value Date    A1C 9.9 (H) 03/25/2025    A1C 8.6 (H) 02/21/2025    A1C 6.8 (H) 05/10/2024    A1C 7.5 (H) 03/14/2024    A1C 7.4 (H) 07/25/2023       ROUTINE IP LABS (Last four results)  BMP  Recent Labs   Lab 04/02/25  0420 04/02/25  0342 04/02/25  0138 04/02/25  0036 03/31/25  0932 03/31/25  0631 03/29/25  0803 03/29/25  0650 03/28/25  0752 03/28/25  0554 03/27/25  0532 03/27/25  0515   NA  --   --   --   --   --  143  --  140  --  140  --  139   POTASSIUM  --   --   --   --   --  4.3  --  4.0  --  4.0  --  3.9   CHLORIDE  --   --   --   --   --  105  --  105  --  108*  --  108*   BRUNO  --   --   --   --   --  10.0  --  9.0  --  8.8  --  8.4*   CO2  --   --   --   --   --  26  --  24  --  22  --  22   BUN  --   --   --   --   --  24.0*  --  23.2*  --  19.4  --  19.7   CR  --   --   --   --   --  0.81  --  0.70  --  0.67  --  0.75   * 373* 252* 221*   < > 67*   < > 180*   < > 212*   < > 117*    < > = values in this interval not displayed.     CBC  Recent Labs   Lab 03/31/25  0631 03/29/25  0650 03/28/25  0554 03/27/25  0515   WBC 10.0 9.6 11.8* 16.2*   RBC 3.89 3.46* 3.77* 3.54*   HGB 10.6* 9.6* 10.5* 9.9*   HCT 32.0* 29.1* 31.4* 29.4*   MCV 82 84 83 83   MCH 27.2 27.7 27.9 28.0   MCHC 33.1 33.0 33.4 33.7   RDW 15.3* 15.0 14.9 14.6    [phone]     Inpatient Diabetes Service will continue to follow, please don't hesitate to contact the team with any questions or concerns.     Wen Santos PA-C  Inpatient Diabetes Service  Pager: [phone]  Available on vocera    Plan discussed with patient, diabetes education, and primary team via this note.    To contact Inpatient Diabetes Service:     7 AM - 5 PM: Page the [employer] [facility] following the patient that day (see filed or incomplete progress notes/consult notes under Endocrinology)    OR if uncertain of provider assignment: page job code [phone]    5 PM - 7 AM: First call after hours is to primary service.    For urgent after-hours questions, page  job code for on call fellow: [phone]     I spent a total of 50 minutes on the date of the encounter doing prep/post-work, chart review, history and exam, documentation and further activities per the note including lab review, multidisciplinary communication, counseling the patient and/or coordinating care regarding acute hyper/hypoglycemic management, as well as discharge management and planning/communication.

## 2025-04-02 NOTE — PLAN OF CARE
"Goal Outcome Evaluation:      Plan of Care Reviewed With: patient    Overall Patient Progress: no changeOverall Patient Progress: no change     Shift: 0700 - 1530     VS: Blood pressure 120/68, pulse 79, temperature 97.8  F (36.6  C), temperature source Oral, resp. rate 18, height 1.575 m (5' 2\"), weight 60.8 kg (134 lb 0.6 oz), SpO2 97%.   Pain:  Pt denies any pain this shift.   Neuro: Alert and oriented   Resp: WDL  Diet: Regular diet  Skin: Scratches to right arm.   LDA: L-PIV  Activity: Assist x1 with walker and gb  Output: External cath in place.   Additional Info/shift updates: Pt did not eat breakfast this morning. No carb coverage was given for breakfast.   Call light within reach      Plan of care ongoing       "

## 2025-04-02 NOTE — PLAN OF CARE
3322-2627    Pt alert and oriented times 3-4, use purewick, continent bowel. No PIV.  BS went down 57, at 2200 and juice given which was not helped, went down to 46 glucose gel given and went up to 52 and went up to 98, last BS for this shift is 103. Bed in low position, call light with in reach and continue with POC.       Goal Outcome Evaluation:      Plan of Care Reviewed With: patient    Overall Patient Progress: improvingOverall Patient Progress: improving           Problem: Adult Inpatient Plan of Care  Goal: Plan of Care Review  Description: The Plan of Care Review/Shift note should be completed every shift.  The Outcome Evaluation is a brief statement about your assessment that the patient is improving, declining, or no change.  This information will be displayed automatically on your shiftnote.  Flowsheets (Taken 4/1/2025 2242)  Plan of Care Reviewed With: patient  Overall Patient Progress: improving  Goal: Absence of Hospital-Acquired Illness or Injury  Intervention: Identify and Manage Fall Risk  Recent Flowsheet Documentation  Taken 4/1/2025 1800 by Ynes De Anda RN  Safety Promotion/Fall Prevention: activity supervised  Intervention: Prevent Skin Injury  Recent Flowsheet Documentation  Taken 4/1/2025 1800 by Ynes De Anda RN  Body Position: position changed independently  Intervention: Prevent Infection  Recent Flowsheet Documentation  Taken 4/1/2025 1800 by Ynes De Anda RN  Infection Prevention:   single patient room provided   rest/sleep promoted   hand hygiene promoted  Goal: Optimal Comfort and Wellbeing  Intervention: Provide Person-Centered Care  Recent Flowsheet Documentation  Taken 4/1/2025 1800 by Ynes De Anda RN  Trust Relationship/Rapport:   care explained   choices provided     Problem: Delirium  Goal: Optimal Coping  Intervention: Optimize Psychosocial Adjustment to Delirium  Recent Flowsheet Documentation  Taken 4/1/2025 1800 by Ynes De Anda, RN  Supportive  Measures: active listening utilized  Goal: Improved Behavioral Control  Intervention: Minimize Safety Risk  Recent Flowsheet Documentation  Taken 4/1/2025 1800 by Ynes De Anda RN  Enhanced Safety Measures: room near unit station  Trust Relationship/Rapport:   care explained   choices provided     Problem: Comorbidity Management  Goal: Blood Glucose Levels Within Targeted Range  Intervention: Monitor and Manage Glycemia  Recent Flowsheet Documentation  Taken 4/1/2025 1800 by Ynes De Anda RN  Medication Review/Management: medications reviewed  Goal: Blood Pressure in Desired Range  Intervention: Maintain Blood Pressure Management  Recent Flowsheet Documentation  Taken 4/1/2025 1800 by Ynes De Anda RN  Medication Review/Management: medications reviewed     Problem: Diabetes  Goal: Optimal Coping  Intervention: Support Wellbeing and Self-Management Success  Recent Flowsheet Documentation  Taken 4/1/2025 1800 by Ynes De Anda RN  Supportive Measures: active listening utilized  Goal: Optimal Functional Ability  Intervention: Optimize Functional Ability  Recent Flowsheet Documentation  Taken 4/1/2025 1800 by Ynes De Anda RN  Assistive Device Utilized: walker  Activity Management: activity adjusted per tolerance  Activity Assistance Provided: assistance, stand-by

## 2025-04-02 NOTE — CONSULTS
Diabetes CNS/Educator Consult    Paige Mari is a 74 year old female per notes with Type 1 Diabetes Mellitus (HARRIETT variant), managed with Omnipod 5 insulin pump and complicated by neuropathy and mild non proliferative retinopathy, as well as a comorbid history of paroxysmal Afib, HTN, HLD,hypothyroidism, KP, and COPD, who was admitted on 3/25/2025 with DKA     Diabetes CNS/Educator consulted for Concern for cognitive limitations impacting patient's ability to manage insulin and care for self. Discussions with team that Paige and  do not want to go to an assisted living facility and would like to explore whether  can be taught insulin injections and help manage care. Paige has T1DM (HARRIETT). A1c 9.9% on 3/25/25. PTA Paige was managing with metformin XR 1000 mg BID, Victoza 1.2 mg daily, and Novolog via Omnipod 5 insulin pump with Dexcom G6 CGM.     Per Aman Powell MD note on 3/30/2025:  Scored 18/30 on SLUMS per OT on 3/28. Repeat SLUMS score 23/30 by Dr. Powell on 3/30/25. Collateral information from daughter combined with repeated hospitalizations for what is likely noncompliance or inability to appropriately manage her insulin due to cognitive impairment demonstrates that she is not capable of independently managing her insulin regimen going forward. Per collateral information,  would likely not be able to provide this oversight either.     Inpatient Diabetes Service is following for glycemic management. Please see their notes for plan.     Met with Paige and  (Liam) at bedside for education. Daughter (Merline) was not able to make it. The goal is that  will be assisting Paige with managing diabetes cares at home due to ongoing cognitive limitation concerns. Liam is [place] and uses hearing aids. He shared many times throughout discussion that he was feeling very anxious about learning insulin pump and CGM as he is not very comfortable with technology. Explained multiple  "times that Paige will not be using insulin pump. Liam then said that he is okay with \"bugging her all the time if she checked her blood sugar.\" Writer explained clear expectations of team that Liam would learn about diabetes, importance of insulin as a life-saving medication, understanding blood glucose values and associated management, be able to perform diabetes skills if Paige is not able to, and problem solving. Would not simply be asking Paige if she performed cares or not. Liam says he will adjust his sleep schedule (he typically will sleep at ~4am and be awake in the afternoon/evening) to help support Paige. Liam expressed financial concerns--SW aware.    Diet: Regular Diet Adult       Barriers to diabetes management: financial strain and cognitive deficits      Diabetes Education Provided:  Discussed reason for requiring blood glucose monitoring and insulin with T1DM. Discussed DKA and associated risks. Provided \"Understanding Diabetes\" handout.   Discussed insulin glargine and aspart action, dose, onset, duration, and frequency. Proper insulin pen storage, preparation, and injection technique, pen needle disposal in sharps container. Discussed site selection and rotation. Liam successfully demonstrated administration with demo pen and injection pad. He then set up insulin pen with pen needle and priming, and Paige self-administered insulin injection. Provided \"4 mm pen needle - tips for good injection practice\" handout.   Frequency of BG testing discussed. BG targets discussed. Opened and set up Contour Next Glucose Meter. Reviewed lancing device set up, safe use, and lancet disposal in sharps. Reviewed meter set up, memory storage, and testing. Reviewed fingertip site selection and hygiene. Liam successfully demonstrated blood glucose check with Contour Next Glucose Meter.    Discussed signs and symptoms of hypoglycemia. Reviewed treating blood glucose below 70 mg/dL with 15 grams of carbohydrates. " Discussed examples of 15 grams of CHO. Paige says she prefers skittles or jelly beans. Reviewed treating blood glucose less than or equal to 50 mg/dL with 30 grams of CHO. Discussed rechecking BG 15 minutes after treatment and retreating if necessary.   Advised calling provider for consistently elevated BGs and/or 2 episodes of lows.    Discussed following up with Endocrinology in 1-2 weeks after discharge.     Discussion:  Diabetes education completed. Paige understands diabetes plans and skills. Paige may continue to use Dexcom G6 CGM at home, but needs a new  device. Did not review Dexcom G6 CGM system with Liam as he was overwhelmed. Liam was engaged and motivated to learn. He required multiple repetition of diabetes concepts and review of insulin plan. He did well with skills. Encouraged Liam to review insulin plan again on his own and come back with questions. He wanted to know the decision on if discharge is tomorrow and at what time. Explained it is not concrete at this point and will depend on BG trends overnight. Liam says he will wait for call from team on when discharge will be so he knows when to come. Paige stated that plan is manageable. All questions answered. RN aware. Discussed with Inpatient Diabetes Service [facility]. Notified Medicine MD.     Supplies Needed at Discharge: please use the DIAB non-branded discharge supply order set ([id number])   Dexcom G6   Contour Next glucose meter   Contour Next  test strips   Contour Microlet lancets   Sharps container   Alcohol wipes    B-D 4 mm juan pablo pen needles   Lantus solostar pens   Novolog flexpens   Victoza per Endo  Metformin ER per Endo    TENTATIVE Diabetes Plan Reviewed: instructed to refer to discharge papers for final plan ( found the daily schedule to be more helpful--please include for final plan)    Blood glucose monitoring: Three times daily before meals, and at bedtime.   Blood Glucose (BG) goals:  mg/dL before meals,  less than 180 mg/dL 2 hrs after meals.      Glucose Control Regimen:  1) Metformin ER 1,000 mg twice daily. Take with breakfast and dinner     2) Victoza 1.2 mg injection once daily in the morning      3) Long-acting insulin: glargine (Lantus) - take 16 units once daily at 5:00 PM. Take at same time each day.     4) Rapid-acting insulin: aspart (Novolog) carbohydrate coverage/mealtime insulin  Breakfast: 5 units  Lunch: 3 units  Dinner: 3 units  Snacks: 2 units      5) Rapid-acting insulin: aspart (Novolog) correction - see chart below. Take for high blood glucoses three times daily before meals and at bedtime.  Add the correction dose to the carbohydrate coverage/mealtime insulin dose and give in one injection--ideally 10-15 minutes before a meal.  You may take the correction dose even if you skip a meal (as long as it has been 4 hours since previous correction dose).      Pre-meal correction scale:     Blood Glucose Insulin Novolog Before Meals:   Less than 150 0 units   150 -199  1 unit   200 - 249 2 units   250 - 299 3 units   300 - 349 4 units    350 - 399  5 units   400 - 449 6 units   450 or more 7 units      Bedtime correction scale:      Blood Glucose Insulin Novolog At Bedtime:   Less than 200 0 units   200-249  1 unit   250 - 299 2 units   300 - 349 3 units   350 - 399 4 units    400 or more 5 units       Daily Diabetes Management Plan   Before Breakfast   Check blood glucose even if you skip breakfast.  Take Novolog 5 units (if eating) plus correction scale. Give in one injection.  Eat breakfast    Also take Metformin (pill) 1000 mg with breakfast  Take Victoza (injection) 1.2 mg once daily in the morning     Before Lunch   Check blood glucose even if you skip lunch.  Take Novolog 3 units (if eating) plus correction scale. Give in one injection.  Eat lunch     Before Supper   Check blood glucose even if you skip supper.  Take Novolog 3 units (if eating) plus correction scale. Give in one injection.  Eat  supper    Also take Metformin (pill) 1000 mg with supper     5:00 PM   Take Lantus 16 units once daily. Take at 5:00 PM each day.     Bedtime   Check blood glucose  Take Novolog correction scale if blood glucose is 200 mg/dL or higher.      Snacks   Take Novolog 2 units  Eat snack           Outpatient Follow-Up: An appointment request was sent to the [facility] coordinator to schedule your outpatient diabetes appointment 1-2 weeks from discharge. You can call the [facility] at [phone] if you have scheduling questions or do not hear from them within a few days of discharge. Follow up sooner if blood glucose runs consistently greater than 200 mg/dL or if having more than two episodes less than 70 mg/dL.     If you have urgent questions or concerns regarding your blood sugars or insulin, you may contact [phone] (the main hospital ). Ask to speak with the Endocrinologist on call.     Your target A1c value is less than 7% to help prevent future complications from diabetes. Your most recent A1c is 9.9%.      Thank you for letting the Diabetes Management Team be involved in your care!      Hypoglycemia (Low Blood Glucose) Management:  Signs/symptoms:  Shaking, sweating, fast heartbeat  Feeling dizzy, tired, or weak   Feeling anxious and easy to irritate  Feeling nervous, crabby, or confused  Hunger  Vision problems, headache  Numb or tingling mouth    If blood glucose is 51 to 70:   Eat or drink 15 grams of carbohydrate. Examples:   1/2 cup (4 ounces) apple juice or regular soda pop, or   1 cup (8 ounces) milk, or   15 skittles, or   3 to 4 glucose tablets.   Re-check your blood glucose in 15 minutes.   Repeat these steps every 15 minutes until your blood glucose is above 80.       If blood glucose is 50 or less:   Eat or drink 30 grams of carbohydrate. Examples:   1 cup (8 ounces) apple juice or regular soda pop, or   2 cups (16 ounces) milk, or   1 banana, or   6 to 8  glucose tablets.   Re-check your blood glucose in 15 minutes.   Repeat these steps every 15 minutes until your blood glucose is above 80.     JOLYNN Juarez, [place], [facility]  Diabetes Educator  Pager: [phone]  Office: [phone]  Securely message with [employer]

## 2025-04-02 NOTE — PROGRESS NOTES
Assumed patient care around [id number].    Patient alert and oriented x4. Able to make needs known, call light within reach. Patient denies SOB, chest pain, pain, and nausea. VSS on RA. Patient Ax1 with walker and gait belt. Patient shower completed thi shift. Patient resting comfortably in bed.      Continue with plan of care.

## 2025-04-02 NOTE — CONSULTS
Consulted to run a test claim for Apixaban vs other DOACs.    Patient has pharmacy benefits through [facility] Medicare Advantage. Per insurance, the following are covered and preferred under the patient's plans:     Dabigatran caps - $67.54/mo  Xarelto tabs - $146.05/mo  Eliquis tabs - $148.06/mo     The following are not covered:  Pradaxa caps  Savaysa tabs    Not eligible for copay cards due to Medicare plan.      Please feel free to contact me with any other test claims, prior authorizations, or insurance questions regarding outpatient medications.     Thanks!      Mary Ann Seymour CPhT  Discharge Pharmacy Liaison  [facility]  Pronouns: She/Her/Hers    Securely message with Voc[employer], Epic Secure Chat, or [employer]  Phone: [phone]  Fax: [phone]  [email or web address]

## 2025-04-02 NOTE — PROGRESS NOTES
[facility]    Medicine Progress Note - Hospitalist Service, GOLD TEAM 18    Date of Admission:  3/25/2025    Assessment & Plan     Paige Mari is a 74 year old female with a past medical history of HTN, paroxysmal atrial fibrillation (on Eliquis), COPD, KP, and latent autoimmune diabetes mellitus who was admitted on 3/25/25 for DKA and hypertensive urgency. The patient was found by her  sitting on the toilet confused. She was then transported to the ED via EMS where she was found to be in DKA with blood sugars to 560, ketones of 5.6, pH 7.19, and lactic acid of 5.0. She was also found to be hypertensive eventually requiring nicardipine drip. Since weaned off the nicardipine drip, she was transferred to the internal medicine service for ongoing cares.      Updates from today    Endocrinology following, managing diabetes.  Appreciate input.-  -Diabetic education  -Pharmacy lesion consultation for DOAC coverage, as patient paying high co-pay with apixaban currently  -Discussed with patient, family at bedside.    Acute Metabolic Encephalopathy, resolved  Cognitive Impairment  -On admission, patient was alert but confused and disoriented x4. Per endocrinology note, provider spoke with daughter who noted several years of declining cognition. CT head negative in outside ED. TSH, LFTs, albumin normal.  Suspect some element of acute confusion in setting of acute illness as above.  With correction of metabolic derangements is now fully oriented, speech normal.    -Scored 18/30 on SLUMS per OT on 3/28.  Repeat SLUMS score 23/30 by Dr. Powell on 3/30/25.  Results uploaded to media tab.  Collateral information from daughter combined with repeated hospitalizations for what is likely noncompliance or inability to appropriately manage her insulin due to cognitive impairment demonstrates that she is not capable of independently managing her insulin regimen going forward.     -On 3/30 the patient stated that she had discussed things with her  and that they have decided with certainty that she will be returning home with her .  She said her  would be willing to change his sleep schedule and receive training/education on diabetes management in order to assist her.     -PT, OT  -Recommend outpatient neuropsychology testing  -Social work consult following for safe disposition planning, plus other financial, living arrangements.       Severe DKA   Latent Autoimmune Diabetes Mellitus -   Presented to the ED after being found on the toilet by her  confused. ED work up consistent with DKA. Started on insulin gtt and IV fluids. Gap now closed, still with some acidosis.  PTA managed with metformin, victoza, and insulin pump, however has significant cognitive impairment and was not managing the device properly.  Transitioned to subcu insulin per diabetes team, blood sugars still elevated but overall improved.  Insulin needs far greater than what she was receiving through her pump, again supporting that she was not managing her diabetes properly.  Due to her cognitive impairment, she is not capable of safely managing her own insulin going forward, and per reports from patient's daughter her  would not be capable of managing this either.    -Endocrinology managing diabetic regimen including insulin regimen, see endocrinology note for the details.  -PTA pregabalin, 100 mg qam + 200 mg qpm for neuropathy   -OT/PT/SW following for discharge needs  -Diabetes education consulted, please involve  as well.  Currently planning to meet with  and patient on 4/2 for education and training.     Partial Seizures  MDD  Generalized Anxiety Disorder  - PTA tizanidine  - PTA duloxetine  - PTA Keppra     Leukocytosis, resolved  Suspect stress induced in setting of above DKA. Low suspicion for underlying infection at this time.  No localizing signs or symptoms of  infection.  UA was bland, CXR normal.     Hypertensive urgency, resolved  Type 2 NSTEMI, likely demand ischemia  Paroxysmal atrial fibrillation  HTN   HLD  ECHO 3/26/2025: EF 65-70%, mild concentric LVH.  Normal RV function.  No significant valvular disease.  Blood pressure in ED ~240 systolic. She was given labetalol 10 mg x2 with brief improvement but ultimately needed nicardipine infusion. Subsequently discontinued when blood pressure overcorrected. BNP elevated to 2206.  Euvolemic clinically.  Highly suspect noncompliance with medications PTA given patient's cognitive impairment, with rebound hypertension with noncompliance with clonidine.  Has listed allergy to amlodipine causing delirium, HCTZ causing hyponatremia.  Blood pressure somewhat labile but overall improved.  Had been on olmesartan in the past for CKD with proteinuria which per notes she tolerated well.  Started Avapro 3/29, blood pressures much better.  BMP normal 3/31.  -Continue PTA Eliquis  -Continue clonidine patch - she notes she was trialed on many medications in the past and only the clonidine patch helped   -Continue metoprolol  -Avapro 150 mg at bedtime 3/29--> 75 mg HS 4/2     Recurrent UTIs  Urinary Retention  Probable yeast vaginitis:  On Keflex for UTI prophylaxis.  UA 3/25 bland.  Complaining of vaginal yeast symptoms 3/28, high risk given hyperglycemia, chronic antibiotics.  Symptoms resolved following fluconazole given 3/28.  -PTA Keflex ppx      Normocytic Anemia  Iron deficiency  Presenting hemoglobin 13.4, MCV 83.  Hemoglobin trended down to 9.9 following IV fluids, now trending back up off IV fluids.  No signs or symptoms of bleeding clinically.  B12, folic acid normal, iron studies consistent with LAZARO 3/29.  Hemoglobin stable, improved at 10.6 on 3/31  -Iron/vitamin C daily 3/29        Diet: Regular Diet Adult    DVT Prophylaxis: DOAC  Gomes Catheter: Not present  Lines: None       Cardiac Monitoring: None  Code Status: No CPR-  Pre-arrest intubation OK      Clinically Significant Risk Factors                     # Hypertension: Noted on problem list           # DMII: A1C = 9.9 % (Ref range: <5.7 %) within past 6 months         # Financial/Environmental Concerns: none         Social Drivers of Health    Tobacco Use: High Risk (1/28/2025)    Patient History     Smoking Tobacco Use: Every Day     Smokeless Tobacco Use: Never          Disposition Plan     Medically Ready for Discharge: Anticipated Tomorrow             Rui Carlin MD  Hospitalist Service, GOLD TEAM 18  [facility]  Securely message with [employer] (more info)  Text page via [facility] Paging/Directory   See signed in provider for up to date coverage information  ______________________________________________________________________    Interval History   Doing well, no complaints.  Blood glucose better controlled. Diabetic educator following.   No fever or chills or NV  Physical Exam   Vital Signs: Temp: 97.8  F (36.6  C) Temp src: Oral BP: 120/68 Pulse: 79   Resp: 18 SpO2: 97 % O2 Device: None (Room air)    Weight: 134 lbs .63 oz    General: Alert interactive. Very pleasant.  nad  HEENT: AT/NC.   No icterus or injection.  OP moist and clear  Chest: non labored. On RA.   CV: RRR.    Abdomen:Soft, nontender, nondistended  Extremities: No edema  Neuro: grossly intact.        Data   Imaging results reviewed over the past 24 hrs:   No results found for this or any previous visit (from the past 24 hours).  Most Recent 3 CBC's:  Recent Labs   Lab Test 04/02/25  0753 03/31/25  0631 03/29/25  0650   WBC 9.0 10.0 9.6   HGB 10.0* 10.6* 9.6*   MCV 84 82 84    239 215     Most Recent 3 BMP's:  Recent Labs   Lab Test 04/02/25  0911 04/02/25  0755 04/02/25  0753 03/31/25  0932 03/31/25  0631 03/29/25  0803 03/29/25  0650   NA  --   --  139  --  143  --  140   POTASSIUM  --   --  4.6  --  4.3  --  4.0   CHLORIDE  --   --  102  --  105  --  105    CO2  --   --  28  --  26 --  24   BUN  --   --  21.1  --  24.0*  --  23.2*   CR  --   --  0.80  --  0.81  --  0.70   ANIONGAP  --   --  9  --  12  --  11   BRUNO  --   --  9.8  --  10.0  --  9.0   * 245* 244*   < > 67*   < > 180*    < > = values in this interval not displayed.     Most Recent 2 LFT's:  Recent Labs   Lab Test 03/26/25  0030 08/31/23  1104   AST 19 25   ALT 15 17   ALKPHOS 79 68   BILITOTAL 0.7 0.5     Most Recent 6 Bacteria Isolates From Any Culture (See EPIC Reports for Culture Details):No lab results found.  Most Recent TSH and T4:  Recent Labs   Lab Test 03/25/25  1847 05/10/23  1430 03/22/23  1557   TSH 1.28   < > 3.12   T4  --   --  1.22    < > = values in this interval not displayed.     Most Recent Hemoglobin A1c:  Recent Labs   Lab Test 03/25/25 1847   A1C 9.9*     Most Recent Urinalysis:  Recent Labs   Lab Test 03/25/25  1930   COLOR Colorless   APPEARANCE Clear   URINEGLC >1000*   URINEBILI Negative   URINEKETONE 100*   SG 1.025   UBLD 0.06 mg/dL*   URINEPH 5.5   PROTEIN 100*   NITRITE Negative   LEUKEST Negative   RBCU 1   WBCU <1     Most Recent ABG:  Recent Labs   Lab Test 07/24/23  0140   PH 7.42     Most Recent ESR & CRP:  Recent Labs   Lab Test 03/13/24 2012 01/22/22  0734   SED  --  6   CRP  --  3.1*   CRPI <3.00  --      Most Recent Anemia Panel:  Recent Labs   Lab Test 04/02/25  0753 03/31/25  0631 03/29/25  0650   WBC 9.0   < > 9.6   HGB 10.0*   < > 9.6*   HCT 30.8*   < > 29.1*   MCV 84   < > 84      < > 215   IRON  --   --  21*   IRONSAT  --   --  8*   FEB  --   --  268   MILANA  --   --  18   B12  --   --  452   FOLIC  --   --  28.2    < > = values in this interval not displayed.     Most Recent CPK:  Recent Labs   Lab Test 03/03/21  1206   CKT 57

## 2025-04-02 NOTE — PROGRESS NOTES
Brief Medicine Cross-Cover Note:    Was notified by RN regarding regarding BG this evening of 46. BG has been labile past few days. Endocrinology following with adjustments made today:   - Decrease Lantus 30 --> 22 units units q 24 hrs at 1500   - Novolog Meal Coverage (hold for BG less than 60):  - breakfast: 1:6 --> 1:8 g cho  - lunch: relax 1:6 --> 1:10 g cho   - dinner: 1:6 --> 1:10 g cho  - snacks/supplements: 1:6 --> 1:10 g cho    Patient seen at bedside. She reports appetite has been okay, and she has been eating the full amount of food that she received carb coverage for. She is not symptomatic with the hypoglycemia. BG improved to 98 --> 103 after total of 45g glucose gel. Additionally, patient given chocolate ice cream.     - Will hold carb coverage insulin for now and liberalize diet from carb controlled to regular.   - Hold metformin   - Monitor BG Q1H until reliably stable, then can space out again.   -If carb coverage is resumed, would hold for less than 80 (instead of 60).   - Appreciate Endocrinology assistance    Gilbert Chauhan PA-C on 4/1/2025 at 11:32 PM

## 2025-04-02 NOTE — PROVIDER NOTIFICATION
Pg provider for BG of 373 and clarification if we still wanted to proceed with q4hr BG checks. Provider called back, discussed over the phone that we will proceed with one time dose of 7Units of aspart insulin then recheck in 4 hours.

## 2025-04-03 VITALS
HEIGHT: 62 IN | OXYGEN SATURATION: 97 % | DIASTOLIC BLOOD PRESSURE: 75 MMHG | BODY MASS INDEX: 24.67 KG/M2 | SYSTOLIC BLOOD PRESSURE: 140 MMHG | TEMPERATURE: 98 F | WEIGHT: 134.04 LBS | HEART RATE: 76 BPM | RESPIRATION RATE: 18 BRPM

## 2025-04-03 LAB
GLUCOSE BLDC GLUCOMTR-MCNC: 182 MG/DL (ref 70–99)
GLUCOSE BLDC GLUCOMTR-MCNC: 196 MG/DL (ref 70–99)
GLUCOSE BLDC GLUCOMTR-MCNC: 245 MG/DL (ref 70–99)

## 2025-04-03 PROCEDURE — 250N000013 HC RX MED GY IP 250 OP 250 PS 637: Performed by: INTERNAL MEDICINE

## 2025-04-03 PROCEDURE — 250N000013 HC RX MED GY IP 250 OP 250 PS 637

## 2025-04-03 PROCEDURE — 250N000013 HC RX MED GY IP 250 OP 250 PS 637: Performed by: STUDENT IN AN ORGANIZED HEALTH CARE EDUCATION/TRAINING PROGRAM

## 2025-04-03 PROCEDURE — 99239 HOSP IP/OBS DSCHRG MGMT >30: CPT | Performed by: INTERNAL MEDICINE

## 2025-04-03 PROCEDURE — 99232 SBSQ HOSP IP/OBS MODERATE 35: CPT | Performed by: NURSE PRACTITIONER

## 2025-04-03 RX ORDER — METFORMIN HYDROCHLORIDE 500 MG/1
1000 TABLET, EXTENDED RELEASE ORAL 2 TIMES DAILY
Qty: 60 TABLET | Refills: 1 | Status: SHIPPED | OUTPATIENT
Start: 2025-04-03

## 2025-04-03 RX ORDER — BLOOD PRESSURE TEST KIT
KIT MISCELLANEOUS
Qty: 200 EACH | Refills: 1 | Status: SHIPPED | OUTPATIENT
Start: 2025-04-03

## 2025-04-03 RX ORDER — PROCHLORPERAZINE 25 MG/1
SUPPOSITORY RECTAL
Qty: 9 EACH | Refills: 1 | Status: SHIPPED | OUTPATIENT
Start: 2025-04-03

## 2025-04-03 RX ORDER — INSULIN ASPART 100 [IU]/ML
INJECTION, SOLUTION INTRAVENOUS; SUBCUTANEOUS
Qty: 15 ML | Refills: 1 | Status: SHIPPED | OUTPATIENT
Start: 2025-04-03

## 2025-04-03 RX ORDER — ASCORBIC ACID 500 MG
500 TABLET ORAL EVERY MORNING
Qty: 30 TABLET | Refills: 1 | Status: SHIPPED | OUTPATIENT
Start: 2025-04-04

## 2025-04-03 RX ORDER — IRBESARTAN 75 MG/1
75 TABLET ORAL EVERY EVENING
Qty: 30 TABLET | Refills: 1 | Status: SHIPPED | OUTPATIENT
Start: 2025-04-03

## 2025-04-03 RX ORDER — CONTAINER,EMPTY
EACH MISCELLANEOUS
Qty: 1 EACH | Refills: 1 | Status: SHIPPED | OUTPATIENT
Start: 2025-04-03

## 2025-04-03 RX ADMIN — VALACYCLOVIR HYDROCHLORIDE 500 MG: 500 TABLET, FILM COATED ORAL at 07:51

## 2025-04-03 RX ADMIN — FERROUS GLUCONATE 324 MG: 324 TABLET ORAL at 07:51

## 2025-04-03 RX ADMIN — PANTOPRAZOLE SODIUM 40 MG: 40 TABLET, DELAYED RELEASE ORAL at 07:46

## 2025-04-03 RX ADMIN — OXYCODONE HYDROCHLORIDE AND ACETAMINOPHEN 500 MG: 500 TABLET ORAL at 07:46

## 2025-04-03 RX ADMIN — APIXABAN 5 MG: 5 TABLET, FILM COATED ORAL at 07:46

## 2025-04-03 RX ADMIN — LIRAGLUTIDE 1.2 MG: 6 INJECTION SUBCUTANEOUS at 08:01

## 2025-04-03 RX ADMIN — PREGABALIN 100 MG: 100 CAPSULE ORAL at 07:54

## 2025-04-03 RX ADMIN — ESCITALOPRAM OXALATE 20 MG: 10 TABLET ORAL at 07:53

## 2025-04-03 RX ADMIN — METFORMIN ER 500 MG 1000 MG: 500 TABLET ORAL at 07:45

## 2025-04-03 RX ADMIN — METOPROLOL TARTRATE 100 MG: 25 TABLET, FILM COATED ORAL at 07:47

## 2025-04-03 RX ADMIN — CEPHALEXIN 250 MG: 250 CAPSULE ORAL at 07:45

## 2025-04-03 RX ADMIN — ESTRADIOL 1 MG: 1 TABLET ORAL at 07:51

## 2025-04-03 RX ADMIN — LEVETIRACETAM 500 MG: 500 TABLET, FILM COATED ORAL at 07:57

## 2025-04-03 ASSESSMENT — ACTIVITIES OF DAILY LIVING (ADL)
ADLS_ACUITY_SCORE: 65

## 2025-04-03 NOTE — PLAN OF CARE
"Goal Outcome Evaluation:      Plan of Care Reviewed With: patient    Overall Patient Progress: improvingOverall Patient Progress: improving     Shift: 0700 - 1530     VS: Blood pressure (!) 140/75, pulse 76, temperature 98  F (36.7  C), temperature source Oral, resp. rate 18, height 1.575 m (5' 2\"), weight 60.8 kg (134 lb 0.6 oz), SpO2 97%.     Pain:  Pt denies any pain this shift.   Neuro: Alert and oriented x4.   Resp: WDL  Diet: Regular diet  Skin: Scratches to right arm   LDA: NA  Activity: Assist x1   Output: Continent of bowel and bladder.   Additional Info/shift updates: Pt is discharging today.   Call light within reach     Plan of care ongoing       "

## 2025-04-03 NOTE — PROGRESS NOTES
Care Management Discharge Note    Discharge Date: 04/03/2025       Discharge Disposition: Home, Skilled Nursing Facility, Other (Comments) (assisted living/memory care)    Discharge Services: Home care RN/PT/OT/SW/HHA    Discharge DME:  Diabetic supplies    Discharge Transportation: Family    Private pay costs discussed: Not applicable    Does the patient's insurance plan have a 3 day qualifying hospital stay waiver?  No    PAS Confirmation Code:  NA  Patient/family educated on Medicare website which has current facility and service quality ratings: no    Education Provided on the Discharge Plan: No (plan undetermined)  Persons Notified of Discharge Plans: Pt, family, home care agency  Patient/Family in Agreement with the Plan:  Yes    Handoff Referral Completed: Yes, non-[facility] PCP: External handoff communication completed    Additional Information:  Per provider, pt medically ready for discharge home today. Diabetic education completed yesterday. Home care RN can reinforce teaching at home. Home care SW can assist family in applying for [facility] services and additional support at home. Writer spoke with [facility] liaison who informed services will start tomorrow. Family will transport. No further discharge needs.       [facility] ([facility])    Services Available   Home Health Services      Address   [address]  [place] [address]             Contact Information    [phone]            Carol Car, DEMETRIS   Nurse Coordinator    6 Med/Surg  Phone: [phone]    Social Work and Care Management Department     SEARCHABLE in [facility] - search CARE COORDINATOR   [facility] [place] ([phone]) Saturday & Sunday; ([phone]) [facility] Recognized Holidays   Units: 6 Med Surg Vocera & 8 Med Surg Vocera Pager [phone]'

## 2025-04-03 NOTE — PROGRESS NOTES
Shift [id number]    Goal Outcome Evaluation:       Plan of Care Reviewed With: patient     Overall Patient Progress: no change     Outcome Evaluation: Pt AOx4. On RA. Able to make needs known. Denies SOB or chest pain. Pt Ax1 with walker and gait belt. Purewick overnight per pt request - 450ml output. No acute events overnight. Continue with POC.     BG @ 5990 - 445.

## 2025-04-03 NOTE — PROGRESS NOTES
Inpatient Diabetes Management Service: Daily Progress Note     HPI: Paige Mari is a 74 year old with Type 1 Diabetes Mellitus (HARRIETT variant), managed with Omnipod 5 insulin pump and complicated by neuropathy and mild non proliferative retinopathy, as well as a comorbid history of paroxysmal Afib, HTN, HLD,hypothyroidism, KP, and COPD, who was admitted on 3/25/2025 with DKA. Inpatient Diabetes Service consulted for assistance with glycemic management.          Assessment/Plan:     Assessment:   Type 1 Diabetes Mellitus (HARRIETT), complicated by nephropathy, retinopathy and microalbuminuria. Sub-optimal and worsening control  (A1c 9.9 %, Hgb: 13.4)  DKA (BicarbL 15, pH 7.19, glucose 569, ketones 5.62) - resolved  Lactic acidosis (5.0)- resolved  AMS with question of cognitive decline  Concern for ability to care for self      Plan/Recommendations:   - Increase Lantus 16 --> 18 units units q 24 hrs at 1500  - decrease Novolog Meal Coverage:  - breakfast: 1:12 --> 15 g cho  - lunch: 1:18 increase 15 g cho   - dinner: 1:18 g cho increase 15 g cho   - snacks/supplements: 1:18 g cho  - Continue Novolog Correction Scale: 1/50 >140 TID AC, 1/50 >200 HS & 0200 (medium resistance)   - Continue PTA Metformin ER 1,000 mg BID   - Continue PTA Victoza 1.2 mg daily   - BG Monitoring: TID AC, HS, 0200  - Hypoglycemia protocol  - Carb counting protocol   - diabetes education consulted, please involve  as well     Discussion: BGs elevated into the 190 range overnight. Will increase Lantus today. BG elevated to 190 after dinner. Will nith. BG at dinner Significant hypoglycemia at bedtime last night (46 mg/dL) and tight control pre-dinner (patient given juice in the afternoon). Insulin needs are drastically reducing compared to earlier this admission, now closer to PTA insulin pump settings. Resumed metformin and victoza yesterday too. Further reduce all insulins today. Patient did not eat breakfast  "today.     Diabetes educator meeting with patient and  today at noon to assess 's ability to help patient manage her diabetes at discharge as patient is too forgetful to self-manage, see their note for details.      Discharge Planning:   Medications: Metformin, Victoza, once daily lantus and novolog set meal doses plus correction scale. Needs dexcom G6 reader, has sensors and transmitter  Test Claims: done 3/31/25 --> plan for Lantus pens and Novolog Pens. Pharm liaison unable to check cost of Betty 3 sensors until 4/7/25 (have patient stay on dexcom for now)     Education: Ordered 3/30/25, to be completed 4/2 at 1200 with patient and .  Outpatient Follow-up:  recommend [facility] scheduled 4/14/25 with diabetes educator (Ana Sanchez) and scheduled 5/13/25 w/ Dr. Bardales. Order placed in [facility] on 3/31/25 (these appointments already made through patient's daughter)      Diabetes Management Discharge Plan  Instructions to patient were posted in AVS and discussed on day of discharge.   Medications and supplies are to be ordered by primary service on discharge.   *please use the DIAB non-branded discharge supply order set ([id number])*     Patient will need the following supplies prescribed: (recommend prescribing all new supplies for patient)   Dexcom G6 reader   Lantus Solostar pens  Novolog Flexpens  \"BD\" (32G x 4mm) insulin pen needles  Glucometer (if brand of meter not known, can be ordered \"no brand specified\" and note to pharmacy: \"can substitute per insurance coverage\")  Lancets  Test strips  Sharps container  Alcohol swabs      Blood glucose monitoring: Three times daily before meals, and at bedtime. Check via Dexcom G6. Glucometer as back-up.     Blood Glucose (BG) goals: 100 - 200 mg/dL before meals     Glucose Control Regimen:  1) Metformin ER 1,000 mg twice daily with breakfast and dinner     2) Victoza 1.2 mg injection once daily in the morning      3) Long-acting " insulin: glargine (Lantus) - take 18 units once daily at 5PM. Take at same time each day.     4) Rapid-acting insulin: aspart (Novolog) carbohydrate coverage/mealtime insulin  Breakfast: 5 units  Lunch: 4 units  Dinner: 4 units  Snacks: 2 units      5) Rapid-acting insulin: aspart (Novolog) correction - see chart below. Take for high blood glucoses three times daily before meals and at bedtime.  Add the correction dose to the carbohydrate coverage/mealtime insulin dose and give in one injection--ideally 10-15 minutes before a meal.  You may take the correction dose even if you skip a meal (as long as it has been 4 hours since previous correction dose).      Pre-meal correction scale:     Blood Glucose Insulin Novolog Before Meals:   Less than 150 0 units   150 -199  1 units   200 - 249 2 units   250 - 299 3 units   300 - 349 4 units    350 - 399  5 units   400 - 449 6 units   450 or more 7 units      Bedtime correction scale:      Blood Glucose Insulin Novolog At Bedtime:   Less than 200 0 units   200-249  1 units   250 - 299 2 units   300 - 349 3 units   350 - 399 4 units    400 or more 5 units            Outpatient Follow-Up: As scheduled 4/14/2025 with diabetes educator, Ana Sanchez and scheduled 5/13/2025 with Dr. Bardales. You can call the [facility] at [phone] if you have scheduling questions or do not hear from them within a few days of discharge. Follow up sooner if blood glucose runs consistently greater than 200 mg/dL or if having more than two episodes less than 70 mg/dL.     If you have urgent questions or concerns regarding your blood sugars or insulin, you may contact [phone] (the main hospital ). Ask to speak with the Endocrinologist on call.     Your target A1c value is less than 8% to help prevent future complications from diabetes. Your most recent A1c is 9.9%.      Thank you for letting the Diabetes Management Team be involved in your care!    Please notify  Inpatient Diabetes Service if changes are planned to steroids, nutrition, TPN/TF and anticipated procedures requiring prolonged NPO status.         Interval History/Review of Systems :   The last 24 hours progress and nursing notes reviewed.  - No nausea, emesis, or diarrhea.   - Ate breakfast well this morning.       Carried forward from consult note:   Daughter, Merline called over the phone who provides additional history. Per Merline patient has struggled to care for herself over the past few years. Has had multiple admissions for confusion, but ultimately resolves and she is discharged home, only for the cycle to repeat again. No documented dementia though daughter reports trouble with memory for some time. Patient will omit insulin or forget to replace her insulin pump. Lives with , but Merline states he does not have insight and is unaware of how to assist with diabetes management, insulin injections, and insulin pump. Arline expresses concern over mother returning home as she feels she is not able to continue managing her diabetes care independently.     Planned Procedures/Surgeries: none    Inpatient Glucose Control:       Recent Labs   Lab 04/03/25  0736 04/03/25  0200 04/02/25  2123 04/02/25  1721 04/02/25  1124 04/02/25  0911   * 196* 190* 154* 168* 219*             Medications Impacting Glycemia:   Steroids:  none   D5W-containing solutions/medications: none   Other medications impacting glucose: none            Nutrition:   Orders Placed This Encounter      Regular Diet Adult    Supplements: none   TF: none   TPN: none         Diabetes History: see full consult note for complete diabetes history   Diabetes Type and Duration: Type 1 DM, HARRIETT. Initially diagnosed around age 49 and was immediately started on insulin. Was also on Metoformin and Victoza at stable dosing for many years.      12/7/2021: C-peptide 0.7 ng/mL with concurrent venous glucose 176 mg/dL, MAXI Ab >250 (ULN 5.0  "IU/mL), insulin Ab 0.6 (ULN 0.4 U/mL), IA-2 Ab 44.1 (ULN 7.4 U/mL), Zn T8 Ab 64.3 (ref range 0.0 - 15.0 U/mL)  -CT A/P 9/2021: Mild diffuse pancreatic atrophy  -12/7/2021: fructosamine 365 umol/L (corresponds to HbA1c ~9.0%), HbA1c 9.3%   -5/2024: HbA1c 6.8%  -2/2025: HbA1c 8.6%  - 3/2025: HbA1c 9.9%- worsening      PTA Medication Regimen:   Metformin XR 1000 mg BID (stable for many years)   Victoza 1.2 mg daily (stable dose for many years)      Settings via Glucoo- pump not present to be assessed 3/26/25  Pump: Omnipod 5    Mode: Manual- patient had not connected CGM to run in Auto   CGM: Dexcom G 6 but not connected   Insulin: Novolog U-100   Basal Rates:  8899-8466: 0.5 units/hr  8579-1734: 0.35 units/hr   7636-8109: 0.4 units/hr   3450-5334: 0.6 units/hr      Total: 12 units daily      Bolus Settings:  Insulin Sensitivity Factor:  60  Insulin to CHO Ratio:  0000 to 2359 - 1 unit for every 20 grams     Target Glucose: 130mg/dL  Active insulin time: 4     Backup for Pump Failure: Lantus 12 units      Missing doses?: yes, pump removed Emil 3/23 and not replaced   Historical Diabetes Medications:   Insulin Detemir      Glucose monitoring device/frequency/trends: Dexcom G 6cgm        Hypoglycemia PTA:   - Frequency: unknown  - Severity: unknown  - Awareness: hypoglycemia unawareness recorded in notes.   - Treatment: unknown      Outpatient Diabetes Provider: Dr. Bardales with [facility]   Formal Diabetes Education/Educator: yes in the past, no recent follow up        Physical Exam:   BP (!) 140/75 (BP Location: Right arm)   Pulse 76   Temp 98  F (36.7  C) (Oral)   Resp 18   Ht 1.575 m (5' 2\")   Wt 60.8 kg (134 lb 0.6 oz)   SpO2 97%   BMI 24.52 kg/m    General:  well appearing, in no acute distress.  HEENT:  NC/AT  Lungs:  unremarkable  ABD:  rounded, soft, non-tender  Skin:  warm and dry  MSK:   moving all extremities    Psych:   Cooperative, good eye contact, full/appropriate affect           " Data:     Lab Results   Component Value Date    A1C 9.9 (H) 03/25/2025    A1C 8.6 (H) 02/21/2025    A1C 6.8 (H) 05/10/2024    A1C 7.5 (H) 03/14/2024    A1C 7.4 (H) 07/25/2023       ROUTINE IP LABS (Last four results)  BMP  Recent Labs   Lab 04/03/25  0736 04/03/25  0200 04/02/25 2123 04/02/25  1721 04/02/25  0755 04/02/25  0753 03/31/25  0932 03/31/25  0631 03/29/25  0803 03/29/25  0650 03/28/25  0752 03/28/25  0554   NA  --   --   --   --   --  139  --  143  --  140  --  140   POTASSIUM  --   --   --   --   --  4.6  --  4.3  --  4.0  --  4.0   CHLORIDE  --   --   --   --   --  102  --  105  --  105  --  108*   BRUNO  --   --   --   --   --  9.8  --  10.0  --  9.0  --  8.8   CO2  --   --   --   --   --  28  --  26  --  24  --  22   BUN  --   --   --   --   --  21.1  --  24.0*  --  23.2*  --  19.4   CR  --   --   --   --   --  0.80  --  0.81  --  0.70  --  0.67   * 196* 190* 154*   < > 244*   < > 67*   < > 180*   < > 212*    < > = values in this interval not displayed.     CBC  Recent Labs   Lab 04/02/25  0753 03/31/25  0631 03/29/25  0650 03/28/25  0554   WBC 9.0 10.0 9.6 11.8*   RBC 3.66* 3.89 3.46* 3.77*   HGB 10.0* 10.6* 9.6* 10.5*   HCT 30.8* 32.0* 29.1* 31.4*   MCV 84 82 84 83   MCH 27.3 27.2 27.7 27.9   MCHC 32.5 33.1 33.0 33.4   RDW 15.9* 15.3* 15.0 14.9    239 215 247     INRNo lab results found in last 7 days.    Inpatient Diabetes Service will continue to follow, please don't hesitate to contact the team with any questions or concerns.     JOLYNN Redmond CNP    Plan discussed with patient, bedside RN, and primary team via this note.    To contact Inpatient Diabetes Service:     7 AM - 5 PM: Page the IDS LANNY following the patient that day (see filed or incomplete progress notes/consult notes under Endocrinology)    OR if uncertain of provider assignment: page job code [phone]    5 PM - 7 AM: First call after hours is to primary service.    For urgent after-hours questions, page job code for  on call fellow: [phone]     I spent a total of 45 minutes on the date of the encounter doing prep/post-work, chart review, history and exam, documentation and further activities per the note including lab review, multidisciplinary communication, counseling the patient and/or coordinating care regarding acute hyper/hypoglycemic management, as well as discharge management and planning/communication.

## 2025-04-03 NOTE — PROGRESS NOTES
6MS DISCHARGE    D: Patient discharged to Home at 1700. Patient accompanied by family.    I: Discharge prescriptions given to patient. All discharge medications and instructions reviewed with Patient. Patient instructed to seek care if experiencing worsening symptoms. Other phone numbers to call with questions or concerns after discharge reviewed. PIV removed. Education completed.    A: Patient verbalized understanding of discharge medications and instructions. Prescribed home medications given to patient.  Belongings returned to patient.    P: Patient to follow-up  with PCP.

## 2025-04-03 NOTE — DISCHARGE SUMMARY
[facility]  Hospitalist Discharge Summary      Date of Admission:  3/25/2025  Date of Discharge:  4/3/2025  Discharging Provider: Rui Carlin MD  Discharge Service: Hospitalist Service, GOLD TEAM 18    Discharge Diagnoses   ***    Clinically Significant Risk Factors     # DMII: A1C = 9.9 % (Ref range: <5.7 %) within past 6 months       Follow-ups Needed After Discharge   Follow-up Appointments       ADULT [facility] Specialty Follow-up and recommended labs and tests      Follow up with Endocrinology as advised.       Outpatient Follow-Up: As scheduled 4/14/2025 with diabetes educator, Ana Sanchez and scheduled 5/13/2025 with Dr. Bardales. You can call the [facility] at [phone] if you have scheduling questions or do not hear from them within a few days of discharge. Follow up sooner if blood glucose runs consistently greater than 200 mg/dL or if having more than two episodes less than 70 mg/dL.     If you have urgent questions or concerns regarding your blood sugars or insulin, you may contact [phone] (the main hospital ). Ask to speak with the Endocrinologist on call.         Appointments on [place] and/or [facility] (with [facility] or [facility] provider or service). Call [phone] if you haven't heard regarding these appointments within 7 days of discharge.        Follow Up ([facility])      Follow up with [facility] Endocrinology with Dr. Bardales in 1-2 weeks for hospital follow up      Appointments on [place] and/or [facility] (with [facility] or [facility] provider or service). Call [phone] if you haven't heard regarding these appointments within 7 days of discharge.        Hospital Follow-up with Existing Primary Care Provider (PCP)          Schedule Primary Care visit within: 7 Days   Recommended labs and Imaging (to be ordered by Primary Care Provider): fu labs: CBC, BMP           {Additional follow-up instructions/to-do's  "for PCP    :***}    Unresulted Labs Ordered in the Past 30 Days of this Admission       No orders found from 2/23/2025 to 3/26/2025.        These results will be followed up by ***    Discharge Disposition   {Discharge to:5303656::\"Discharged to home\"}  Condition at discharge: {CONDITION:477554::\"Stable\"}    Hospital Course   {OPTIONAL -- use to select A&P section   :257291}    Consultations This Hospital Stay   MEDICATION HISTORY IP PHARMACY CONSULT  SOCIAL WORK IP CONSULT  ENDOCRINE DIABETES ADULT IP CONSULT  PHYSICAL THERAPY ADULT IP CONSULT  OCCUPATIONAL THERAPY ADULT IP CONSULT  CARE MANAGEMENT / SOCIAL WORK IP CONSULT  CARE MANAGEMENT / SOCIAL WORK IP CONSULT  PHARMACY LIAISON FOR MEDICATION COVERAGE CONSULT  PSYCHIATRY IP CONSULT  DIABETES EDUCATION IP CONSULT  NURSING TO CONSULT FOR VASCULAR ACCESS CARE IP CONSULT  PHARMACY LIAISON FOR MEDICATION COVERAGE CONSULT    Code Status   No CPR- Pre-arrest intubation OK    Time Spent on this Encounter   {How much time did you spend on discharge?:30475001}       Rui Carlin MD  [facility] SUR[facility]  [address] [place] [id number]  Phone: [phone]  Fax: [phone]  ______________________________________________________________________    Physical Exam   Vital Signs: Temp: 98  F (36.7  C) Temp src: Oral BP: (!) 140/75 Pulse: 76   Resp: 18 SpO2: 97 % O2 Device: None (Room air)    Weight: 134 lbs .63 oz  {Recommend personal SmartPhrase or Notewriter for exam (OPTIONAL)   :444479}       Primary Care Physician   Scar Tan    Discharge Orders      Adult Neuropsychology  Referral      Home Care Referral      Follow Up ([facility])    Follow up with [facility] Endocrinology with Dr. Bardales in 1-2 weeks for hospital follow up      Appointments on [place] and/or [facility] (with [facility] or [facility] provider or service). Call [phone] if you haven't heard regarding these appointments within 7 days of discharge. "     Reason for your hospital stay    Type 1 Diabetes Mellitus (HARRIETT), complicated by nephropathy, retinopathy and microalbuminuria. Sub-optimal and worsening control  (A1c 9.9 %, Hgb: 13.4)  DKA (BicarbL 15, pH 7.19, glucose 569, ketones 5.62) - resolved  Lactic acidosis (5.0)- resolved  AMS with question of cognitive decline  Concern for ability to care for self     Activity    Your activity upon discharge: activity as tolerated     ADULT [facility] Specialty Follow-up and recommended labs and tests    Follow up with Endocrinology as advised.       Outpatient Follow-Up: As scheduled 4/14/2025 with diabetes educator, Ana Sanchez and scheduled 5/13/2025 with Dr. Bardales. You can call the [facility] at [phone] if you have scheduling questions or do not hear from them within a few days of discharge. Follow up sooner if blood glucose runs consistently greater than 200 mg/dL or if having more than two episodes less than 70 mg/dL.     If you have urgent questions or concerns regarding your blood sugars or insulin, you may contact [phone] (the main hospital ). Ask to speak with the Endocrinologist on call.         Appointments on [place] and/or [facility] (with [facility] or [facility] provider or service). Call [phone] if you haven't heard regarding these appointments within 7 days of discharge.     Diet    Follow this diet upon discharge: Current Diet:Orders Placed This Encounter      Regular Diet Adult     Hospital Follow-up with Existing Primary Care Provider (PCP)            Significant Results and Procedures   {Data for Discharge Summary:444198}    Discharge Medications   Current Discharge Medication List        START taking these medications    Details   Alcohol Swabs PADS Use to swab the area of the injection or neri as directed Per insurance coverage  Qty: 200 each, Refills: 1    Associated Diagnoses: Diabetic ketoacidosis without coma associated with type 1 diabetes mellitus  (H)      blood glucose (NO BRAND SPECIFIED) lancets standard To use to test glucose level in the blood Use to test blood sugar  4  times daily as directed. To accompany glucose monitor brands per insurance coverage.  Qty: 200 each, Refills: 1    Associated Diagnoses: Diabetic ketoacidosis without coma associated with type 1 diabetes mellitus (H)      blood glucose (NO BRAND SPECIFIED) test strip To use to test glucose level in the blood Use to test blood sugar  4 times daily as directed. To accompany glucose monitor brands per insurance coverage.  Qty: 200 strip, Refills: 0    Associated Diagnoses: Diabetic ketoacidosis without coma associated with type 1 diabetes mellitus (H)      blood glucose monitoring (NO BRAND SPECIFIED) meter device kit Use as directed Per insurance coverage. Three times daily before meals, and at bedtime.  Qty: 1 kit, Refills: 0    Associated Diagnoses: Diabetic ketoacidosis without coma associated with type 1 diabetes mellitus (H)      !! glucose (BD GLUCOSE) 4 g chewable tablet Take 4 tablets by mouth every 15 minutes as needed for low blood sugar.  Qty: 50 tablet, Refills: 1    Associated Diagnoses: Diabetic ketoacidosis without coma associated with type 1 diabetes mellitus (H)      !! insulin aspart (NOVOLOG FLEXPEN) 100 UNIT/ML pen Novolog Flexpen: 5 units with breakfast, 4 units with lunch, 4 units with dinner and 2 Units with snacks.  Qty: 15 mL, Refills: 1    Associated Diagnoses: Diabetic ketoacidosis without coma associated with type 1 diabetes mellitus (H)      !! insulin aspart (NOVOLOG PEN) 100 UNIT/ML pen Inject 1-7 Units subcutaneously 3 times daily (before meals). Correction Scale -     Pre-meal correction scale:     Blood Glucose Insulin Novolog Before Meals:  Less than 150   0 units  150 -199  1 units  200 - 249 2 units  250 - 299 3 units  300 - 349 4 units   350 - 399  5 units  400 - 449 6 units  450 or more 7 units    To be given with prandial insulin, and based on  pre-meal blood glucose. Administering insulin within 5 minutes of the start of the meal is ideal. Administer insulin no more than 30 minutes after the start of the meal, unless directed otherwise by provider.      !! insulin aspart (NOVOLOG PEN) 100 UNIT/ML pen Inject 1-5 Units subcutaneously at bedtime. MEDIUM INSULIN RESISTANCE DOSING  Do Not give Correction Insulin if BG less than  200. For  - 249 give 1 units. For  - 299 give 2 units. For  - 349 give 3 units. For  -399 give 4 units. For BG greater than or equal to 400 give 5 units. Notify provider if glucose greater than or equal to 350 mg/dL after administration of correction dose.  Qty: 15 mL, Refills: 0    Associated Diagnoses: Diabetic ketoacidosis without coma associated with type 1 diabetes mellitus (H)      insulin glargine (LANTUS PEN) 100 UNIT/ML pen Inject 18 Units subcutaneously every 24 hours.  Qty: 15 mL, Refills: 1    Comments: If Lantus is not covered by insurance, may substitute Basaglar or Semglee or other insulin glargine product per insurance preference at same dose and frequency.    Associated Diagnoses: Diabetic ketoacidosis without coma associated with type 1 diabetes mellitus (H)      insulin pen needle (32G X 4 MM) 32G X 4 MM miscellaneous Use as directed by provider Per insurance coverage Three times daily before meals, and at bedtime.  Qty: 200 each, Refills: 1    Associated Diagnoses: Diabetic ketoacidosis without coma associated with type 1 diabetes mellitus (H)      irbesartan (AVAPRO) 75 MG tablet Take 1 tablet (75 mg) by mouth every evening.  Qty: 30 tablet, Refills: 1    Associated Diagnoses: Diabetic ketoacidosis without coma associated with type 1 diabetes mellitus (H)      Sharps Container MISC Use as directed to dispose of needles, lancets and other sharps  Qty: 1 each, Refills: 1    Associated Diagnoses: Diabetic ketoacidosis without coma associated with type 1 diabetes mellitus (H)      vitamin C  (ASCORBIC ACID) 500 MG tablet Take 1 tablet (500 mg) by mouth every morning.  Qty: 30 tablet, Refills: 1    Associated Diagnoses: Diabetic ketoacidosis without coma associated with type 1 diabetes mellitus (H)       !! - Potential duplicate medications found. Please discuss with provider.        CONTINUE these medications which have CHANGED    Details   Continuous Glucose Sensor (DEXCOM G6 SENSOR) MISC Change every 10 days.  Qty: 9 each, Refills: 1    Associated Diagnoses: Type 1 diabetes mellitus with hyperglycemia (H)      metFORMIN (GLUCOPHAGE XR) 500 MG 24 hr tablet Take 2 tablets (1,000 mg) by mouth 2 times daily.  Qty: 60 tablet, Refills: 1    Associated Diagnoses: Diabetic ketoacidosis without coma associated with type 1 diabetes mellitus (H)           CONTINUE these medications which have NOT CHANGED    Details   acetaminophen (TYLENOL) 500 MG tablet Take 1,000 mg by mouth 2 times daily.      albuterol (PROAIR HFA/PROVENTIL HFA/VENTOLIN HFA) 108 (90 Base) MCG/ACT inhaler Inhale 2 puffs into the lungs every 6 hours as needed for shortness of breath, wheezing or cough      apixaban ANTICOAGULANT (ELIQUIS) 5 MG tablet Take 1 tablet (5 mg) by mouth 2 times daily.  Qty: 60 tablet, Refills: 11    Associated Diagnoses: Paroxysmal atrial fibrillation (H)      cephALEXin (KEFLEX) 250 MG capsule Take 250 mg by mouth every morning.      cetirizine (ZYRTEC) 10 MG tablet Take 10 mg by mouth daily as needed for allergies      cholecalciferol (VITAMIN D3) 25 mcg (1000 units) capsule Take 1,000 Units by mouth every evening.      cloNIDine (CATAPRES-TTS3) 0.3 MG/24HR WK patch Place 1 patch onto the skin once a week (Sunday)      Continuous Blood Gluc  (DEXCOM G6 ) TORRIE Use to read blood sugars as per 's instructions.  Qty: 1 each, Refills: 0    Associated Diagnoses: Type 1 diabetes mellitus with hyperglycemia (H)      Continuous Glucose Transmitter (DEXCOM G6 TRANSMITTER) MISC CHANGE EVERY 3  MONTHS.  Qty: 1 each, Refills: 1    Associated Diagnoses: Type 1 diabetes mellitus with hyperglycemia (H)      diclofenac sodium (VOLTAREN) 1 % Gel Apply 1 g topically daily as needed (pain)      DULoxetine (CYMBALTA) 60 MG capsule Take 60 mg by mouth every morning.      escitalopram (LEXAPRO) 20 MG tablet Take 20 mg by mouth every morning.      estradiol (ESTRACE) 1 MG tablet Take 1 mg by mouth every morning.      fluticasone (FLONASE) 50 MCG/ACT nasal spray Spray 1 spray into both nostrils daily as needed for rhinitis or allergies      Glucagon (GVOKE HYPOPEN) 1 MG/0.2ML pen Inject 0.2 mLs (1 mg) into the muscle as needed for low blood sugar  Qty: 0.4 mL, Refills: 1    Associated Diagnoses: Type 1 diabetes mellitus with diabetic polyneuropathy (H)      !! glucose (BD GLUCOSE) 5 g chewable tablet Take 2 tablets (10 g) by mouth daily as needed (hypoglycemia)  Qty: 300 tablet, Refills: 3    Associated Diagnoses: Type 1 diabetes mellitus with hyperglycemia (H)      insulin aspart (NOVOLOG VIAL) 100 UNITS/ML vial For Pump refill      Insulin Disposable Pump (OMNIPOD 5 G6 INTRO, GEN 5,) KIT 1 each daily  Qty: 1 kit, Refills: 1    Associated Diagnoses: Diabetes mellitus type 1 (H)      Insulin Disposable Pump (OMNIPOD 5 PODS, GEN 5,) MISC USE AS DIRECTED AND CHANGE EVERY 3 DAYS  Qty: 30 each, Refills: 0    Associated Diagnoses: Diabetes mellitus type 1 (H)      INSULIN PUMP - OUTPATIENT Inject subcutaneously. Date Last Updated: 2/25/25  ICR 1:20, BG target and BG correction threshold both at 130 mg/dL, sensitivity 60 mg/dL, and basal rate delivers 12 units/day.      levETIRAcetam (KEPPRA) 500 MG tablet Take 500 mg by mouth 2 times daily.      liraglutide (VICTOZA PEN) 18 MG/3ML solution Inject 1.2 mg subcutaneously daily      magnesium oxide (MAG-OX) 400 MG tablet Take 400 mg by mouth every evening.      medical cannabis (Patient's own supply) 1 Dose by Other route See Admin Instructions [employer] [place] Vape- 1-4  "puffs HS PRN      metoprolol succinate ER (TOPROL-XL) 100 MG 24 hr tablet Take 100 mg by mouth 2 times daily      Multiple Vitamins-Minerals (MULTIVITAMIN WOMEN 50+) TABS Take 1 tablet by mouth every evening.      nystatin (MYCOSTATIN) 185266 UNIT/GM external powder Apply topically 2 times daily as needed      omeprazole (PRILOSEC) 20 MG capsule Take 1 capsule by mouth every morning      !! pregabalin (LYRICA) 100 MG capsule Take 100 mg by mouth every morning. In addition, take 2 x 100 (dose = 200 mg) capsules every evening.      !! pregabalin (LYRICA) 100 MG capsule Take 200 mg by mouth every evening. In addition, take one 100 capsule every morning. .      rosuvastatin (CRESTOR) 5 MG tablet Take 5 mg by mouth every evening.      tiZANidine (ZANAFLEX) 2 MG tablet Take 4 mg by mouth every evening.      valACYclovir (VALTREX) 500 MG tablet Take 500 mg by mouth every morning.       !! - Potential duplicate medications found. Please discuss with provider.        STOP taking these medications       insulin detemir (LEVEMIR VIAL) 100 UNIT/ML vial Comments:   Reason for Stopping:             Allergies   Allergies   Allergen Reactions    Morphine Other (See Comments)     \"make me delirious,\"  \"nightmares\"    Nitrofurantoin     Adhesive [Cyanoacrylate] Other (See Comments)     Can only tolerate tape for short periods of time shelia in sensitive areas    Amlodipine Unknown and Other (See Comments)     Other reaction(s): delerium    Doxazosin Other (See Comments)     Other reaction(s): feels foggy    Hydrochlorothiazide      hyponatremia    Other Allergy (See Comments) [External Allergen Needs Reconciliation - See Comment] Unknown     Other reaction(s): skin rash    Prednisone Unknown     Caused extremely high blood sugars    Tramadol Other (See Comments)     Possible seizure activity    Trazodone Unknown     Other reaction(s): hung over     " Trace pulmonic insufficiency is present.     Vessels  The aorta root is normal. The inferior vena cava was normal in size with  preserved respiratory variability. IVC diameter <2.1 cm collapsing >50% with  sniff suggests a normal RA pressure of 3 mmHg.     Pericardium  No pericardial effusion is present. Prominent epicardial fat is noted.     Compared to Previous Study  This study was compared with the study from 3/14/24 . No significant changes  noted.     ______________________________________________________________________________  MMode/2D Measurements & Calculations  IVSd: 1.3 cm  LVIDd: 3.9 cm  LVIDs: 2.2 cm  LVPWd: 1.1 cm  FS: 43.1 %  LV mass(C)d: 165.9 grams  LV mass(C)dI: 99.4 grams/m2     Ao root diam: 2.9 cm  asc Aorta Diam: 3.2 cm  LVOT diam: 1.9 cm  LVOT area: 2.8 cm2  Ao root diam index Ht(cm/m): 1.8  Ao root diam index BSA (cm/m2): 1.7  Asc Ao diam index BSA (cm/m2): 1.9  Asc Ao diam index Ht(cm/m): 1.9  LA Volume (BP): 46.8 ml  LA Volume Index (BP): 28.0 ml/m2  RWT: 0.57     Doppler Measurements & Calculations  MV E max casie: 106.0 cm/sec  MV A max casie: 102.0 cm/sec  MV E/A: 1.0  MV dec time: 0.20 sec  E/E' avg: 15.2  Lateral E/e': 13.5  Medial E/e': 16.8     ______________________________________________________________________________  Report approved by: Juan Diego Miller MD on 03/26/2025 03:46 PM             Discharge Medications   Current Discharge Medication List        START taking these medications    Details   Alcohol Swabs PADS Use to swab the area of the injection or neri as directed Per insurance coverage  Qty: 200 each, Refills: 1    Associated Diagnoses: Diabetic ketoacidosis without coma associated with type 1 diabetes mellitus (H)      blood glucose (NO BRAND SPECIFIED) lancets standard To use to test glucose level in the blood Use to test blood sugar  4  times daily as directed. To accompany glucose monitor brands per insurance coverage.  Qty: 200 each, Refills: 1    Associated  Diagnoses: Diabetic ketoacidosis without coma associated with type 1 diabetes mellitus (H)      blood glucose (NO BRAND SPECIFIED) test strip To use to test glucose level in the blood Use to test blood sugar  4 times daily as directed. To accompany glucose monitor brands per insurance coverage.  Qty: 200 strip, Refills: 0    Associated Diagnoses: Diabetic ketoacidosis without coma associated with type 1 diabetes mellitus (H)      blood glucose monitoring (NO BRAND SPECIFIED) meter device kit Use as directed Per insurance coverage. Three times daily before meals, and at bedtime.  Qty: 1 kit, Refills: 0    Associated Diagnoses: Diabetic ketoacidosis without coma associated with type 1 diabetes mellitus (H)      !! glucose (BD GLUCOSE) 4 g chewable tablet Take 4 tablets by mouth every 15 minutes as needed for low blood sugar.  Qty: 50 tablet, Refills: 1    Associated Diagnoses: Diabetic ketoacidosis without coma associated with type 1 diabetes mellitus (H)      !! insulin aspart (NOVOLOG FLEXPEN) 100 UNIT/ML pen Novolog Flexpen: 5 units with breakfast, 4 units with lunch, 4 units with dinner and 2 Units with snacks.  Qty: 15 mL, Refills: 1    Associated Diagnoses: Diabetic ketoacidosis without coma associated with type 1 diabetes mellitus (H)      !! insulin aspart (NOVOLOG PEN) 100 UNIT/ML pen Inject 1-7 Units subcutaneously 3 times daily (before meals). Correction Scale -     Pre-meal correction scale:     Blood Glucose Insulin Novolog Before Meals:  Less than 150   0 units  150 -199  1 units  200 - 249 2 units  250 - 299 3 units  300 - 349 4 units   350 - 399  5 units  400 - 449 6 units  450 or more 7 units    To be given with prandial insulin, and based on pre-meal blood glucose. Administering insulin within 5 minutes of the start of the meal is ideal. Administer insulin no more than 30 minutes after the start of the meal, unless directed otherwise by provider.      !! insulin aspart (NOVOLOG PEN) 100 UNIT/ML pen  Inject 1-5 Units subcutaneously at bedtime. MEDIUM INSULIN RESISTANCE DOSING  Do Not give Correction Insulin if BG less than  200. For  - 249 give 1 units. For  - 299 give 2 units. For  - 349 give 3 units. For  -399 give 4 units. For BG greater than or equal to 400 give 5 units. Notify provider if glucose greater than or equal to 350 mg/dL after administration of correction dose.  Qty: 15 mL, Refills: 0    Associated Diagnoses: Diabetic ketoacidosis without coma associated with type 1 diabetes mellitus (H)      insulin glargine (LANTUS PEN) 100 UNIT/ML pen Inject 18 Units subcutaneously every 24 hours.  Qty: 15 mL, Refills: 1    Comments: If Lantus is not covered by insurance, may substitute Basaglar or Semglee or other insulin glargine product per insurance preference at same dose and frequency.    Associated Diagnoses: Diabetic ketoacidosis without coma associated with type 1 diabetes mellitus (H)      insulin pen needle (32G X 4 MM) 32G X 4 MM miscellaneous Use as directed by provider Per insurance coverage Three times daily before meals, and at bedtime.  Qty: 200 each, Refills: 1    Associated Diagnoses: Diabetic ketoacidosis without coma associated with type 1 diabetes mellitus (H)      irbesartan (AVAPRO) 75 MG tablet Take 1 tablet (75 mg) by mouth every evening.  Qty: 30 tablet, Refills: 1    Associated Diagnoses: Diabetic ketoacidosis without coma associated with type 1 diabetes mellitus (H)      Sharps Container MISC Use as directed to dispose of needles, lancets and other sharps  Qty: 1 each, Refills: 1    Associated Diagnoses: Diabetic ketoacidosis without coma associated with type 1 diabetes mellitus (H)      vitamin C (ASCORBIC ACID) 500 MG tablet Take 1 tablet (500 mg) by mouth every morning.  Qty: 30 tablet, Refills: 1    Associated Diagnoses: Diabetic ketoacidosis without coma associated with type 1 diabetes mellitus (H)       !! - Potential duplicate medications found. Please  discuss with provider.        CONTINUE these medications which have CHANGED    Details   Continuous Glucose Sensor (DEXCOM G6 SENSOR) MISC Change every 10 days.  Qty: 9 each, Refills: 1    Associated Diagnoses: Type 1 diabetes mellitus with hyperglycemia (H)      metFORMIN (GLUCOPHAGE XR) 500 MG 24 hr tablet Take 2 tablets (1,000 mg) by mouth 2 times daily.  Qty: 60 tablet, Refills: 1    Associated Diagnoses: Diabetic ketoacidosis without coma associated with type 1 diabetes mellitus (H)           CONTINUE these medications which have NOT CHANGED    Details   acetaminophen (TYLENOL) 500 MG tablet Take 1,000 mg by mouth 2 times daily.      albuterol (PROAIR HFA/PROVENTIL HFA/VENTOLIN HFA) 108 (90 Base) MCG/ACT inhaler Inhale 2 puffs into the lungs every 6 hours as needed for shortness of breath, wheezing or cough      apixaban ANTICOAGULANT (ELIQUIS) 5 MG tablet Take 1 tablet (5 mg) by mouth 2 times daily.  Qty: 60 tablet, Refills: 11    Associated Diagnoses: Paroxysmal atrial fibrillation (H)      cephALEXin (KEFLEX) 250 MG capsule Take 250 mg by mouth every morning.      cetirizine (ZYRTEC) 10 MG tablet Take 10 mg by mouth daily as needed for allergies      cholecalciferol (VITAMIN D3) 25 mcg (1000 units) capsule Take 1,000 Units by mouth every evening.      cloNIDine (CATAPRES-TTS3) 0.3 MG/24HR WK patch Place 1 patch onto the skin once a week (Sunday)      Continuous Blood Gluc  (DEXCOM G6 ) TORRIE Use to read blood sugars as per 's instructions.  Qty: 1 each, Refills: 0    Associated Diagnoses: Type 1 diabetes mellitus with hyperglycemia (H)      Continuous Glucose Transmitter (DEXCOM G6 TRANSMITTER) MISC CHANGE EVERY 3 MONTHS.  Qty: 1 each, Refills: 1    Associated Diagnoses: Type 1 diabetes mellitus with hyperglycemia (H)      diclofenac sodium (VOLTAREN) 1 % Gel Apply 1 g topically daily as needed (pain)      DULoxetine (CYMBALTA) 60 MG capsule Take 60 mg by mouth every morning.       escitalopram (LEXAPRO) 20 MG tablet Take 20 mg by mouth every morning.      estradiol (ESTRACE) 1 MG tablet Take 1 mg by mouth every morning.      fluticasone (FLONASE) 50 MCG/ACT nasal spray Spray 1 spray into both nostrils daily as needed for rhinitis or allergies      Glucagon (GVOKE HYPOPEN) 1 MG/0.2ML pen Inject 0.2 mLs (1 mg) into the muscle as needed for low blood sugar  Qty: 0.4 mL, Refills: 1    Associated Diagnoses: Type 1 diabetes mellitus with diabetic polyneuropathy (H)      !! glucose (BD GLUCOSE) 5 g chewable tablet Take 2 tablets (10 g) by mouth daily as needed (hypoglycemia)  Qty: 300 tablet, Refills: 3    Associated Diagnoses: Type 1 diabetes mellitus with hyperglycemia (H)      insulin aspart (NOVOLOG VIAL) 100 UNITS/ML vial For Pump refill      Insulin Disposable Pump (OMNIPOD 5 G6 INTRO, GEN 5,) KIT 1 each daily  Qty: 1 kit, Refills: 1    Associated Diagnoses: Diabetes mellitus type 1 (H)      Insulin Disposable Pump (OMNIPOD 5 PODS, GEN 5,) MISC USE AS DIRECTED AND CHANGE EVERY 3 DAYS  Qty: 30 each, Refills: 0    Associated Diagnoses: Diabetes mellitus type 1 (H)      INSULIN PUMP - OUTPATIENT Inject subcutaneously. Date Last Updated: 2/25/25  ICR 1:20, BG target and BG correction threshold both at 130 mg/dL, sensitivity 60 mg/dL, and basal rate delivers 12 units/day.      levETIRAcetam (KEPPRA) 500 MG tablet Take 500 mg by mouth 2 times daily.      liraglutide (VICTOZA PEN) 18 MG/3ML solution Inject 1.2 mg subcutaneously daily      magnesium oxide (MAG-OX) 400 MG tablet Take 400 mg by mouth every evening.      medical cannabis (Patient's own supply) 1 Dose by Other route See Admin Instructions [employer]ine Vape- 1-4 puffs HS PRN      metoprolol succinate ER (TOPROL-XL) 100 MG 24 hr tablet Take 100 mg by mouth 2 times daily      Multiple Vitamins-Minerals (MULTIVITAMIN WOMEN 50+) TABS Take 1 tablet by mouth every evening.      nystatin (MYCOSTATIN) 875210 UNIT/GM external powder Apply  "topically 2 times daily as needed      omeprazole (PRILOSEC) 20 MG capsule Take 1 capsule by mouth every morning      !! pregabalin (LYRICA) 100 MG capsule Take 100 mg by mouth every morning. In addition, take 2 x 100 (dose = 200 mg) capsules every evening.      !! pregabalin (LYRICA) 100 MG capsule Take 200 mg by mouth every evening. In addition, take one 100 capsule every morning. .      rosuvastatin (CRESTOR) 5 MG tablet Take 5 mg by mouth every evening.      tiZANidine (ZANAFLEX) 2 MG tablet Take 4 mg by mouth every evening.      valACYclovir (VALTREX) 500 MG tablet Take 500 mg by mouth every morning.       !! - Potential duplicate medications found. Please discuss with provider.        STOP taking these medications       insulin detemir (LEVEMIR VIAL) 100 UNIT/ML vial Comments:   Reason for Stopping:             Allergies   Allergies   Allergen Reactions    Morphine Other (See Comments)     \"make me delirious,\"  \"nightmares\"    Nitrofurantoin     Adhesive [Cyanoacrylate] Other (See Comments)     Can only tolerate tape for short periods of time shelia in sensitive areas    Amlodipine Unknown and Other (See Comments)     Other reaction(s): delerium    Doxazosin Other (See Comments)     Other reaction(s): feels foggy    Hydrochlorothiazide      hyponatremia    Other Allergy (See Comments) [External Allergen Needs Reconciliation - See Comment] Unknown     Other reaction(s): skin rash    Prednisone Unknown     Caused extremely high blood sugars    Tramadol Other (See Comments)     Possible seizure activity    Trazodone Unknown     Other reaction(s): hung over     "

## 2025-04-03 NOTE — PLAN OF CARE
"Goal Outcome Evaluation:  BP (!) 154/73   Pulse 72   Temp 97.8  F (36.6  C) (Oral)   Resp 18   Ht 1.575 m (5' 2\")   Wt 60.8 kg (134 lb 0.6 oz)   SpO2 98%   BMI 24.52 kg/m      Problem: Adult Inpatient Plan of Care  Goal: Plan of Care Review  Description: The Plan of Care Review/Shift note should be completed every shift.  The Outcome Evaluation is a brief statement about your assessment that the patient is improving, declining, or no change.  This information will be displayed automatically on your shiftnote.  Outcome: Progressing  Flowsheets (Taken 4/2/2025 2134)  Outcome Evaluation: No acute changes this shift, continues at baseline. B/G @ 2120 190. Patient denies pain, continues on R/A. Safety measures in place, call light within mreach, plan of care continues.  Plan of Care Reviewed With: patient  Overall Patient Progress: no change  Goal: Patient-Specific Goal (Individualized)  Description: You can add care plan individualizations to a care plan. Examples of Individualization might be:  \"Parent requests to be called daily at 9am for status\", \"I have a hard time hearing out of my right ear\", or \"Do not touch me to wake me up as it startlesme\".  Outcome: Progressing  Goal: Absence of Hospital-Acquired Illness or Injury  Outcome: Progressing  Goal: Optimal Comfort and Wellbeing  Outcome: Progressing  Goal: Readiness for Transition of Care  Outcome: Progressing     Problem: Comorbidity Management  Goal: Blood Glucose Levels Within Targeted Range  Outcome: Progressing  Goal: Blood Pressure in Desired Range  Outcome: Progressing     Problem: Diabetes  Goal: Optimal Coping  Outcome: Progressing  Goal: Optimal Functional Ability  Outcome: Progressing  Goal: Blood Glucose Level Within Target Range  Outcome: Progressing  Goal: Minimize Risk of Hypoglycemia  Outcome: Progressing       Plan of Care Reviewed With: patient    Overall Patient Progress: no changeOverall Patient Progress: no change    Outcome Evaluation: " No acute changes this shift, continues at baseline. B/G @ 2673 190. Patient denies pain, continues on R/A. Safety measures in place, call light within mreach, plan of care continues.       Parent(s)

## 2025-04-03 NOTE — DISCHARGE INSTRUCTIONS
"   Diabetes Management Discharge Plan  Instructions to patient were posted in AVS and discussed on day of discharge.   Medications and supplies are to be ordered by primary service on discharge.   *please use the DIAB non-branded discharge supply order set ([id number])*     Patient will need the following supplies prescribed: (recommend prescribing all new supplies for patient)   Dexcom G6 reader   Lantus Solostar pens  Novolog Flexpens  \"BD\" (32G x 4mm) insulin pen needles  Glucometer (if brand of meter not known, can be ordered \"no brand specified\" and note to pharmacy: \"can substitute per insurance coverage\")  Lancets  Test strips  Sharps container  Alcohol swabs      Blood glucose monitoring: Three times daily before meals, and at bedtime. Check via Dexcom G6. Glucometer as back-up.     Blood Glucose (BG) goals: 100 - 200 mg/dL before meals     Glucose Control Regimen:  1) Metformin ER 1,000 mg twice daily with breakfast and dinner     2) Victoza 1.2 mg injection once daily in the morning      3) Long-acting insulin: glargine (Lantus) - take 18 units once daily at 5PM. Take at same time each day.     4) Rapid-acting insulin: aspart (Novolog) carbohydrate coverage/mealtime insulin  Breakfast: 5 units  Lunch: 4 units  Dinner: 4 units  Snacks: 2 units      5) Rapid-acting insulin: aspart (Novolog) correction - see chart below. Take for high blood glucoses three times daily before meals and at bedtime.  Add the correction dose to the carbohydrate coverage/mealtime insulin dose and give in one injection--ideally 10-15 minutes before a meal.  You may take the correction dose even if you skip a meal (as long as it has been 4 hours since previous correction dose).      Pre-meal correction scale:     Blood Glucose Insulin Novolog Before Meals:   Less than 150 0 units   150 -199  1 units   200 - 249 2 units   250 - 299 3 units   300 - 349 4 units    350 - 399  5 units   400 - 449 6 units   450 or more 7 units      Bedtime " correction scale:      Blood Glucose Insulin Novolog At Bedtime:   Less than 200 0 units   200-249  1 units   250 - 299 2 units   300 - 349 3 units   350 - 399 4 units    400 or more 5 units            Outpatient Follow-Up: As scheduled 4/14/2025 with diabetes educator, Ana Sanchez and scheduled 5/13/2025 with Dr. Bardales. You can call the [facility] at [phone] if you have scheduling questions or do not hear from them within a few days of discharge. Follow up sooner if blood glucose runs consistently greater than 200 mg/dL or if having more than two episodes less than 70 mg/dL.     If you have urgent questions or concerns regarding your blood sugars or insulin, you may contact [phone] (the main hospital ). Ask to speak with the Endocrinologist on call.     Your target A1c value is less than 8% to help prevent future complications from diabetes. Your most recent A1c is 9.9%.      Thank you for letting the Diabetes Management Team be involved in your care!

## 2025-04-05 ENCOUNTER — HEALTH MAINTENANCE LETTER (OUTPATIENT)
Age: 75
End: 2025-04-05

## 2025-04-11 ENCOUNTER — LAB REQUISITION (OUTPATIENT)
Dept: LAB | Facility: CLINIC | Age: 75
End: 2025-04-11
Payer: COMMERCIAL

## 2025-04-11 DIAGNOSIS — I10 ESSENTIAL (PRIMARY) HYPERTENSION: ICD-10-CM

## 2025-04-11 LAB
BASOPHILS # BLD AUTO: 0.1 10E3/UL (ref 0–0.2)
BASOPHILS NFR BLD AUTO: 1 %
EOSINOPHIL # BLD AUTO: 0.2 10E3/UL (ref 0–0.7)
EOSINOPHIL NFR BLD AUTO: 2 %
ERYTHROCYTE [DISTWIDTH] IN BLOOD BY AUTOMATED COUNT: 16.2 % (ref 10–15)
HCT VFR BLD AUTO: 33.5 % (ref 35–47)
HGB BLD-MCNC: 10.5 G/DL (ref 11.7–15.7)
IMM GRANULOCYTES # BLD: 0 10E3/UL
IMM GRANULOCYTES NFR BLD: 0 %
LYMPHOCYTES # BLD AUTO: 3.4 10E3/UL (ref 0.8–5.3)
LYMPHOCYTES NFR BLD AUTO: 35 %
MCH RBC QN AUTO: 27.6 PG (ref 26.5–33)
MCHC RBC AUTO-ENTMCNC: 31.3 G/DL (ref 31.5–36.5)
MCV RBC AUTO: 88 FL (ref 78–100)
MONOCYTES # BLD AUTO: 0.8 10E3/UL (ref 0–1.3)
MONOCYTES NFR BLD AUTO: 8 %
NEUTROPHILS # BLD AUTO: 5.4 10E3/UL (ref 1.6–8.3)
NEUTROPHILS NFR BLD AUTO: 54 %
NRBC # BLD AUTO: 0 10E3/UL
NRBC BLD AUTO-RTO: 0 /100
PLATELET # BLD AUTO: 361 10E3/UL (ref 150–450)
RBC # BLD AUTO: 3.81 10E6/UL (ref 3.8–5.2)
WBC # BLD AUTO: 9.9 10E3/UL (ref 4–11)

## 2025-04-11 PROCEDURE — 80053 COMPREHEN METABOLIC PANEL: CPT | Mod: ORL | Performed by: FAMILY MEDICINE

## 2025-04-11 PROCEDURE — 85025 COMPLETE CBC W/AUTO DIFF WBC: CPT | Mod: ORL | Performed by: FAMILY MEDICINE

## 2025-04-12 LAB
ALBUMIN SERPL BCG-MCNC: 3.9 G/DL (ref 3.5–5.2)
ALP SERPL-CCNC: 73 U/L (ref 40–150)
ALT SERPL W P-5'-P-CCNC: 10 U/L (ref 0–50)
ANION GAP SERPL CALCULATED.3IONS-SCNC: 12 MMOL/L (ref 7–15)
AST SERPL W P-5'-P-CCNC: 20 U/L (ref 0–45)
BILIRUB SERPL-MCNC: 0.4 MG/DL
BUN SERPL-MCNC: 17.5 MG/DL (ref 8–23)
CALCIUM SERPL-MCNC: 9.6 MG/DL (ref 8.8–10.4)
CHLORIDE SERPL-SCNC: 100 MMOL/L (ref 98–107)
CREAT SERPL-MCNC: 0.82 MG/DL (ref 0.51–0.95)
EGFRCR SERPLBLD CKD-EPI 2021: 74 ML/MIN/1.73M2
GLUCOSE SERPL-MCNC: 205 MG/DL (ref 70–99)
HCO3 SERPL-SCNC: 25 MMOL/L (ref 22–29)
POTASSIUM SERPL-SCNC: 5.4 MMOL/L (ref 3.4–5.3)
PROT SERPL-MCNC: 6.3 G/DL (ref 6.4–8.3)
SODIUM SERPL-SCNC: 137 MMOL/L (ref 135–145)

## 2025-04-14 ENCOUNTER — TELEPHONE (OUTPATIENT)
Dept: ENDOCRINOLOGY | Facility: CLINIC | Age: 75
End: 2025-04-14

## 2025-04-14 DIAGNOSIS — E10.65 TYPE 1 DIABETES MELLITUS WITH HYPERGLYCEMIA (H): Primary | ICD-10-CM

## 2025-04-14 NOTE — TELEPHONE ENCOUNTER
Called pt to check to see if she had enough Dexcom sensors to get to appt with CDE. Pt states she is out and has been doing finger sticks. Pt states she was recently in the ICU d/t ketoacidosis because she ran out of omnipods and is now using insulin pens. States she would like to switch from Dexcom to Betty due to cost.

## 2025-04-14 NOTE — TELEPHONE ENCOUNTER
[facility] Call Center    Phone Message    May a detailed message be left on voicemail: yes     Reason for Call: Other: Pt was recently in the hospital and they advised her to get a prescription for a Betty sensor so that she is able to switch from the Dexcom over to the Betty. Please advise.      Action Taken: Other: Endo    Travel Screening: Not Applicable     Date of Service: 4/14/25

## 2025-04-15 ENCOUNTER — TELEPHONE (OUTPATIENT)
Dept: NEUROPSYCHOLOGY | Facility: CLINIC | Age: 75
End: 2025-04-15
Payer: COMMERCIAL

## 2025-04-15 RX ORDER — KETOROLAC TROMETHAMINE 30 MG/ML
1 INJECTION, SOLUTION INTRAMUSCULAR; INTRAVENOUS CONTINUOUS
Qty: 1 EACH | Refills: 0 | Status: SHIPPED | OUTPATIENT
Start: 2025-04-15

## 2025-04-15 RX ORDER — HYDROCHLOROTHIAZIDE 12.5 MG/1
CAPSULE ORAL
Qty: 6 EACH | Refills: 1 | Status: SHIPPED | OUTPATIENT
Start: 2025-04-15

## 2025-04-15 NOTE — TELEPHONE ENCOUNTER
Caller spoke with the pt to schedule Neuropsychology Eval.     Pt declined to schedule, stated they are not interested.      Yaima Argueta on 4/15/2025 at 10:47 AM

## 2025-04-19 DIAGNOSIS — E10.10 DIABETIC KETOACIDOSIS WITHOUT COMA ASSOCIATED WITH TYPE 1 DIABETES MELLITUS (H): ICD-10-CM

## 2025-04-21 RX ORDER — METFORMIN HYDROCHLORIDE 500 MG/1
TABLET, EXTENDED RELEASE ORAL
Qty: 360 TABLET | OUTPATIENT
Start: 2025-04-21

## 2025-04-21 NOTE — TELEPHONE ENCOUNTER
Requested Prescriptions   Pending Prescriptions Disp Refills    metFORMIN (GLUCOPHAGE XR) 500 MG 24 hr tablet [Pharmacy Med Name: METFORMIN HCL  MG TABLET] 360 tablet      Sig: TAKE 2 TABS (1000MG) BY MOUTH TWICE DAILY WITH MEALS       Biguanide Agents Failed - 4/21/2025 11:16 AM        Failed - Medication is active on med list and the sig matches. RN to manually verify dose and sig if red X/fail.     If the protocol passes (green check), you do not need to verify med dose and sig.    A prescription matches if they are the same clinical intention.    For Example: once daily and every morning are the same.    The protocol can not identify upper and lower case letters as matching and will fail.     For Example: Take 1 tablet (50 mg) by mouth daily     TAKE 1 TABLET (50 MG) BY MOUTH DAILY    For all fails (red x), verify dose and sig.    If the refill does match what is on file, the RN can still proceed to approve the refill request.       If they do not match, route to the appropriate provider.             Passed - Patient is age 10 or older        Passed - Patient has documented A1c within the specified period of time.     If HgbA1C is 8 or greater, it needs to be on file within the past 3 months.  If less than 8, must be on file within the past 6 months.     Recent Labs   Lab Test 03/25/25  1847   A1C 9.9*             Passed - Patient does NOT have a diagnosis of CHF.        Passed - Medication indicated for associated diagnosis     Medication is associated with one or more of the following diagnoses:     Gestational diabetes mellitus     Hyperinsulinar obesity     Hypersecretion of ovarian androgens    Non-alcoholic fatty liver    Polycystic ovarian syndrome               Pre-diabetes (DM 2 prevention)    Type 2 diabetes mellitus     Weight gain, antipsychotic therapy-induced    Impaired fasting glucose          Passed - Has GFR on file in past 12 months and most recent value is normal        Passed -  Recent (6 mo) or future (90 days) visit within the authorizing provider's specialty     The patient must have completed an in-person or virtual visit within the past 6 months or has a future visit scheduled within the next 90 days with the authorizing provider s specialty.  Urgent care and e-visits do not quality as an office visit for this protocol.          Passed - Patient is not pregnant        Passed - Patient has not had a positive pregnancy test within the past 12 mos.

## 2025-04-22 ENCOUNTER — TELEPHONE (OUTPATIENT)
Dept: ENDOCRINOLOGY | Facility: CLINIC | Age: 75
End: 2025-04-22
Payer: COMMERCIAL

## 2025-04-22 NOTE — TELEPHONE ENCOUNTER
"PLEASE FILL OUT FORM IN MEDIA TAB LABELED \"[facility] REFILL FORM\" AND FAX TO [facility]. THANKS!      Kim Hernandez [facility]  Endo Clinic Liaison  [facility]  [email or web address]   [email or web address]  Phone: [phone]  Fax: [phone]    " Jean Carlos Rodriguez), Obstetrics and Gynecology  [address] [place] [address]  Phone: [phone]  Fax: [phone]

## 2025-04-23 NOTE — TELEPHONE ENCOUNTER
Refill form faxed to [facility] [employer] for Novolog insulin max dose of 30 units.

## 2025-05-02 ENCOUNTER — ALLIED HEALTH/NURSE VISIT (OUTPATIENT)
Dept: EDUCATION SERVICES | Facility: CLINIC | Age: 75
End: 2025-05-02
Payer: COMMERCIAL

## 2025-05-02 DIAGNOSIS — E13.9 LADA (LATENT AUTOIMMUNE DIABETES IN ADULTS), MANAGED AS TYPE 1 (H): Primary | ICD-10-CM

## 2025-05-02 PROCEDURE — G0108 DIAB MANAGE TRN  PER INDIV: HCPCS

## 2025-05-02 NOTE — PROGRESS NOTES
"Diabetes Self-Management Education & Support    Presents for: Follow-up    Type of Service: In Person Visit      Assessment    Patient seen today for follow up. She was hospitalized with DKA on 3/25/25. Prior to this she was using the Omnipod 5 in manual mode. Patient states she does not know what lead up to her DKA, she states she cannot really remember any of it. We reviewed s/s of DKA and that she cannot go without insulin. She needs to make sure she does not run out of insulin in the future and needs to call if any issues.     In looking at [facility] reports, her pump was running until about 7:30 PM on 3/23/25 (she reports then running out of pods). Patient states she is not interested in going back to the Omnipod.   She was using Dexcom but switched to Betty 3+ as it is cheaper. She has not yet started Betty, brings in scanner device and sensors she picked up at the pharmacy. Instructed pt on use of Betty 3+, set up her scanning device and started sensor today in clinic. Alerts set at 65 and 350 per pt preference.     She reports she is taking Lantus at 16 units per day and Novolog at a base dose of 5-3-3 and sliding scale that she was given when discharged:   150 -199 1 units   200 - 249 2 units   250 - 299 3 units   300 - 349 4 units    350 - 399 5 units   400 - 449 6 units   450 or more 7 units     [place] reports have her basal average at 11.7 units per day, bolus average at 4.4 units per day for a TDD of 16.1 units. Although her current dosing is higher than what she has been taking, pt denies any low BG since leaving the hospital. She reports she has been checking her BG with her meter before meals. Unfortunately, she did not bring meter today so we cannot assess and insulin adjustments. Reports FBG \"around 124\" and before lunch and dinner \"180-190.\"    She is also taking Metformin 1,000 mg BID and Victoza 1.2 mg/day. She is going to be changing from Victoza to Ozempic as the [facility] no longer distributes " Victoza. Per chart review, PAP paperwork was faxed 4/23/25.     Patient's most recent   Lab Results   Component Value Date    A1C 9.9 03/25/2025     is not meeting goal of <8.0    Diabetes knowledge and skills assessment:   Patient is knowledgeable in diabetes management concepts related to: Healthy Eating, Being Active, Monitoring, and Taking Medication    Based on learning assessment above, most appropriate setting for further diabetes education would be: Individual setting.    Care Plan and Education Provided:  Healthy Eating: Balanced meals and Consistency in amount and timing of carbohydrate intake  Being Active: Finding a physical activity routine that works for you and Precautions to take with exercise  Monitoring: Blood glucose versus Continuous Glucose Monitoring, Frequency of monitoring, Individual glucose targets, and CGM instruction: Patient was instructed on Freestyle Betty System: Freestyle Betty sensor: insertion technique, sensor site location and rotation, insulin administration in relation to sensor placement, sensor wear, reasons to remove sensor (MRI, CT, diathermy), Vitamin C & Aspirin effects on sensor and Betty Bicknell: frequency of scanning sensor, length of time data is visible, use of built in glucose meter, Precision X-tra test strips  Taking Medication: When to take medication(s)  Problem Solving: High glucose - causes, signs/symptoms, treatment and prevention, Low glucose - causes, signs/symptoms, treatment and prevention, Rule of 15 and carrying a carbohydrate source at all times in case of low glucose, and When to call a health care provider  Reducing Risks: Goal for A1c, how it relates to glucose and how often to check  Healthy Coping: Benefits of making appropriate lifestyle changes    Patient verbalized understanding of diabetes self-management education concepts discussed, opportunities for ongoing education and support, and recommendations provided today.    Plan    Betty 3+  "started today, to bring scanner to appt with Dr. Bardales on 5/13/25.   No changes today as no BG data.     Will follow up with CDE 6/12/25, agrees to call sooner w/ concerns.       See Care Plan for co-developed, patient-state behavior change goals.    Subjective/Objective  Paige is an 74 year old, presenting for the following diabetes education related to: Follow-up  Accompanied by: Self  Diabetes education in the past 24mo: Yes  Diabetes type: Type 1  Cultural Influences/Ethnic Background:  Not  or       Diabetes Symptoms & Complications:  Diabetes Related Symptoms: Fatigue       Patient Problem List and Family Medical History reviewed for relevant medical history, current medical status, and diabetes risk factors.    Vitals:  There were no vitals taken for this visit.  Estimated body mass index is 24.52 kg/m  as calculated from the following:    Height as of 3/27/25: 1.575 m (5' 2\").    Weight as of 3/27/25: 60.8 kg (134 lb 0.6 oz).   Last 3 BP:   BP Readings from Last 3 Encounters:   04/03/25 (!) 140/75   03/25/25 (!) 155/79   02/25/25 123/81       History   Smoking Status    Every Day    Types: Cigarettes   Smokeless Tobacco    Never       Labs:  Lab Results   Component Value Date    A1C 9.9 03/25/2025     Lab Results   Component Value Date     04/11/2025     04/03/2025    GLC 95 05/27/2022     Lab Results   Component Value Date    LDL 28 02/21/2025    LDL 99 07/06/2016     Direct Measure HDL   Date Value Ref Range Status   02/21/2025 63 >=50 mg/dL Final     GFR Estimate   Date Value Ref Range Status   04/11/2025 74 >60 mL/min/1.73m2 Final     Comment:     eGFR calculated using 2021 CKD-EPI equation.   03/03/2021 >60 >60 mL/min/1.73m2 Final     GFR, ESTIMATED POCT   Date Value Ref Range Status   11/22/2023 >60 >60 mL/min/1.73m2 Final     GFR Estimate If Black   Date Value Ref Range Status   03/03/2021 >60 >60 mL/min/1.73m2 Final     Lab Results   Component Value Date    CR 0.82 " 04/11/2025     Lab Results   Component Value Date    MICROL 16.9 02/21/2025    UMALCR 66.54 (H) 02/21/2025    UCRR 25.4 02/21/2025 5/2/2025   Healthy Eating   Healthy Eating Assessed Today Yes   Meals include Breakfast;Lunch;Dinner         5/2/2025   Being Active   Being Active Assessed Today Yes   Exercise: Yes         5/2/2025   Monitoring   Monitoring Assessed Today Yes   Did patient bring glucose meter to appointment?  Yes   Blood Glucose Meter CGM       Diabetes Medication(s)       Biguanides       metFORMIN (GLUCOPHAGE XR) 500 MG 24 hr tablet Take 2 tablets (1,000 mg) by mouth 2 times daily.       Diabetic Other       Glucagon (GVOKE HYPOPEN) 1 MG/0.2ML pen Inject 0.2 mLs (1 mg) into the muscle as needed for low blood sugar     glucose (BD GLUCOSE) 4 g chewable tablet Take 4 tablets by mouth every 15 minutes as needed for low blood sugar.     glucose (BD GLUCOSE) 5 g chewable tablet Take 2 tablets (10 g) by mouth daily as needed (hypoglycemia)       Insulin       insulin aspart (NOVOLOG FLEXPEN) 100 UNIT/ML pen Novolog Flexpen: 5 units with breakfast, 4 units with lunch, 4 units with dinner and 2 Units with snacks.     insulin aspart (NOVOLOG PEN) 100 UNIT/ML pen Inject 1-7 Units subcutaneously 3 times daily (before meals). Correction Scale -     Pre-meal correction scale:     Blood Glucose Insulin Novolog Before Meals:  Less than 150   0 units  150 -199  1 units  200 - 249 2 units  250 - 299 3 units  300 - 349 4 units   350 - 399  5 units  400 - 449 6 units  450 or more 7 units    To be given with prandial insulin, and based on pre-meal blood glucose. Administering insulin within 5 minutes of the start of the meal is ideal. Administer insulin no more than 30 minutes after the start of the meal, unless directed otherwise by provider.     insulin aspart (NOVOLOG PEN) 100 UNIT/ML pen Inject 1-5 Units subcutaneously at bedtime. MEDIUM INSULIN RESISTANCE DOSING  Do Not give Correction Insulin if BG less  than  200. For  - 249 give 1 units. For  - 299 give 2 units. For  - 349 give 3 units. For  -399 give 4 units. For BG greater than or equal to 400 give 5 units. Notify provider if glucose greater than or equal to 350 mg/dL after administration of correction dose.     insulin aspart (NOVOLOG VIAL) 100 UNITS/ML vial For Pump refill     insulin glargine (LANTUS PEN) 100 UNIT/ML pen Inject 18 Units subcutaneously every 24 hours.       Incretin Mimetic Agents       liraglutide (VICTOZA PEN) 18 MG/3ML solution Inject 1.2 mg subcutaneously daily              5/2/2025   Taking Medications   Taking Medication Assessed Today Yes   Current Treatments Oral Medication (taken by mouth);Non-insulin Injectables;Insulin Injections   Given by Patient         1/26/2024   Problem Solving   Patient carries a carbohydrate source Yes   Hypoglycemia Dizziness/Lightheadedness;Sleepiness;Feeling shaky   Hypoglycemia Complications None           5/2/2025   Reducing Risks   Reducing Risks Assessed Today Yes         5/2/2025   Healthy Coping: Diabetes Distress Assessment   Healthy Coping Assessed Today Yes   Informal Support system: Family       Ana Sanchez RN CDE   Time Spent: 60 minutes  Encounter Type: Individual    Any diabetes medication dose changes were made via the CDCES Standing Orders under the patient's referring provider.

## 2025-05-02 NOTE — LETTER
5/2/2025         RE: Paige Mari  [address]  [place] [address]        Dear Colleague,    Thank you for referring your patient, Paige Mari, to the [facility]. Please see a copy of my visit note below.    Diabetes Self-Management Education & Support    Presents for: Follow-up    Type of Service: In Person Visit      Assessment    Patient seen today for follow up. She was hospitalized with DKA on 3/25/25. Prior to this she was using the Omnipod 5 in manual mode. Patient states she does not know what lead up to her DKA, she states she cannot really remember any of it. We reviewed s/s of DKA and that she cannot go without insulin. She needs to make sure she does not run out of insulin in the future and needs to call if any issues.     In looking at [facility] reports, her pump was running until about 7:30 PM on 3/23/25 (she reports then running out of pods). Patient states she is not interested in going back to the Omnipod.   She was using Dexcom but switched to Betty 3+ as it is cheaper. She has not yet started Betty, brings in scanner device and sensors she picked up at the pharmacy. Instructed pt on use of Betty 3+, set up her scanning device and started sensor today in clinic. Alerts set at 65 and 350 per pt preference.     She reports she is taking Lantus at 16 units per day and Novolog at a base dose of 5-3-3 and sliding scale that she was given when discharged:   150 -199 1 units   200 - 249 2 units   250 - 299 3 units   300 - 349 4 units    350 - 399 5 units   400 - 449 6 units   450 or more 7 units     [place] reports have her basal average at 11.7 units per day, bolus average at 4.4 units per day for a TDD of 16.1 units. Although her current dosing is higher than what she has been taking, pt denies any low BG since leaving the hospital. She reports she has been checking her BG with her meter before meals. Unfortunately, she did not bring meter today so  "we cannot assess and insulin adjustments. Reports FBG \"around 124\" and before lunch and dinner \"180-190.\"    She is also taking Metformin 1,000 mg BID and Victoza 1.2 mg/day. She is going to be changing from Victoza to Ozempic as the [facility] no longer distributes Victoza. Per chart review, PAP paperwork was faxed 4/23/25.     Patient's most recent   Lab Results   Component Value Date    A1C 9.9 03/25/2025     is not meeting goal of <8.0    Diabetes knowledge and skills assessment:   Patient is knowledgeable in diabetes management concepts related to: Healthy Eating, Being Active, Monitoring, and Taking Medication    Based on learning assessment above, most appropriate setting for further diabetes education would be: Individual setting.    Care Plan and Education Provided:  Healthy Eating: Balanced meals and Consistency in amount and timing of carbohydrate intake  Being Active: Finding a physical activity routine that works for you and Precautions to take with exercise  Monitoring: Blood glucose versus Continuous Glucose Monitoring, Frequency of monitoring, Individual glucose targets, and CGM instruction: Patient was instructed on Freestyle Betty System: Freestyle Betty sensor: insertion technique, sensor site location and rotation, insulin administration in relation to sensor placement, sensor wear, reasons to remove sensor (MRI, CT, diathermy), Vitamin C & Aspirin effects on sensor and Betty [place]: frequency of scanning sensor, length of time data is visible, use of built in glucose meter, Precision X-tra test strips  Taking Medication: When to take medication(s)  Problem Solving: High glucose - causes, signs/symptoms, treatment and prevention, Low glucose - causes, signs/symptoms, treatment and prevention, Rule of 15 and carrying a carbohydrate source at all times in case of low glucose, and When to call a health care provider  Reducing Risks: Goal for A1c, how it relates to glucose and how often to " "check  Healthy Coping: Benefits of making appropriate lifestyle changes    Patient verbalized understanding of diabetes self-management education concepts discussed, opportunities for ongoing education and support, and recommendations provided today.    Plan    Betty 3+ started today, to bring scanner to appt with Dr. Bardales on 5/13/25.   No changes today as no BG data.     Will follow up with CDE 6/12/25, agrees to call sooner w/ concerns.       See Care Plan for co-developed, patient-state behavior change goals.    Subjective/Objective  Paige is an 74 year old, presenting for the following diabetes education related to: Follow-up  Accompanied by: Self  Diabetes education in the past 24mo: Yes  Diabetes type: Type 1  Cultural Influences/Ethnic Background:  Not  or       Diabetes Symptoms & Complications:  Diabetes Related Symptoms: Fatigue       Patient Problem List and Family Medical History reviewed for relevant medical history, current medical status, and diabetes risk factors.    Vitals:  There were no vitals taken for this visit.  Estimated body mass index is 24.52 kg/m  as calculated from the following:    Height as of 3/27/25: 1.575 m (5' 2\").    Weight as of 3/27/25: 60.8 kg (134 lb 0.6 oz).   Last 3 BP:   BP Readings from Last 3 Encounters:   04/03/25 (!) 140/75   03/25/25 (!) 155/79   02/25/25 123/81       History   Smoking Status     Every Day     Types: Cigarettes   Smokeless Tobacco     Never       Labs:  Lab Results   Component Value Date    A1C 9.9 03/25/2025     Lab Results   Component Value Date     04/11/2025     04/03/2025    GLC 95 05/27/2022     Lab Results   Component Value Date    LDL 28 02/21/2025    LDL 99 07/06/2016     Direct Measure HDL   Date Value Ref Range Status   02/21/2025 63 >=50 mg/dL Final     GFR Estimate   Date Value Ref Range Status   04/11/2025 74 >60 mL/min/1.73m2 Final     Comment:     eGFR calculated using 2021 CKD-EPI equation. "   03/03/2021 >60 >60 mL/min/1.73m2 Final     GFR, ESTIMATED POCT   Date Value Ref Range Status   11/22/2023 >60 >60 mL/min/1.73m2 Final     GFR Estimate If Black   Date Value Ref Range Status   03/03/2021 >60 >60 mL/min/1.73m2 Final     Lab Results   Component Value Date    CR 0.82 04/11/2025     Lab Results   Component Value Date    MICROL 16.9 02/21/2025    UMALCR 66.54 (H) 02/21/2025    UCRR 25.4 02/21/2025 5/2/2025   Healthy Eating   Healthy Eating Assessed Today Yes   Meals include Breakfast;Lunch;Dinner         5/2/2025   Being Active   Being Active Assessed Today Yes   Exercise: Yes         5/2/2025   Monitoring   Monitoring Assessed Today Yes   Did patient bring glucose meter to appointment?  Yes   Blood Glucose Meter CGM       Diabetes Medication(s)       Biguanides       metFORMIN (GLUCOPHAGE XR) 500 MG 24 hr tablet Take 2 tablets (1,000 mg) by mouth 2 times daily.       Diabetic Other       Glucagon (GVOKE HYPOPEN) 1 MG/0.2ML pen Inject 0.2 mLs (1 mg) into the muscle as needed for low blood sugar     glucose (BD GLUCOSE) 4 g chewable tablet Take 4 tablets by mouth every 15 minutes as needed for low blood sugar.     glucose (BD GLUCOSE) 5 g chewable tablet Take 2 tablets (10 g) by mouth daily as needed (hypoglycemia)       Insulin       insulin aspart (NOVOLOG FLEXPEN) 100 UNIT/ML pen Novolog Flexpen: 5 units with breakfast, 4 units with lunch, 4 units with dinner and 2 Units with snacks.     insulin aspart (NOVOLOG PEN) 100 UNIT/ML pen Inject 1-7 Units subcutaneously 3 times daily (before meals). Correction Scale -     Pre-meal correction scale:     Blood Glucose Insulin Novolog Before Meals:  Less than 150   0 units  150 -199  1 units  200 - 249 2 units  250 - 299 3 units  300 - 349 4 units   350 - 399  5 units  400 - 449 6 units  450 or more 7 units    To be given with prandial insulin, and based on pre-meal blood glucose. Administering insulin within 5 minutes of the start of the meal is  ideal. Administer insulin no more than 30 minutes after the start of the meal, unless directed otherwise by provider.     insulin aspart (NOVOLOG PEN) 100 UNIT/ML pen Inject 1-5 Units subcutaneously at bedtime. MEDIUM INSULIN RESISTANCE DOSING  Do Not give Correction Insulin if BG less than  200. For  - 249 give 1 units. For  - 299 give 2 units. For  - 349 give 3 units. For  -399 give 4 units. For BG greater than or equal to 400 give 5 units. Notify provider if glucose greater than or equal to 350 mg/dL after administration of correction dose.     insulin aspart (NOVOLOG VIAL) 100 UNITS/ML vial For Pump refill     insulin glargine (LANTUS PEN) 100 UNIT/ML pen Inject 18 Units subcutaneously every 24 hours.       Incretin Mimetic Agents       liraglutide (VICTOZA PEN) 18 MG/3ML solution Inject 1.2 mg subcutaneously daily              5/2/2025   Taking Medications   Taking Medication Assessed Today Yes   Current Treatments Oral Medication (taken by mouth);Non-insulin Injectables;Insulin Injections   Given by Patient         1/26/2024   Problem Solving   Patient carries a carbohydrate source Yes   Hypoglycemia Dizziness/Lightheadedness;Sleepiness;Feeling shaky   Hypoglycemia Complications None           5/2/2025   Reducing Risks   Reducing Risks Assessed Today Yes         5/2/2025   Healthy Coping: Diabetes Distress Assessment   Healthy Coping Assessed Today Yes   Informal Support system: Family       Ana Sanchez RN CDE   Time Spent: 60 minutes  Encounter Type: Individual    Any diabetes medication dose changes were made via the CDCES Standing Orders under the patient's referring provider.

## 2025-05-11 DIAGNOSIS — G40.209 LOCALIZATION-RELATED (FOCAL) (PARTIAL) SYMPTOMATIC EPILEPSY AND EPILEPTIC SYNDROMES WITH COMPLEX PARTIAL SEIZURES, NOT INTRACTABLE, WITHOUT STATUS EPILEPTICUS (H): ICD-10-CM

## 2025-05-12 NOTE — PROGRESS NOTES
Subjective:    Established patient    Paige Mari is a 74 year old female who presents for DM.      Current DM therapy:  -Lantus 16 units  -NovoLog 5-3-3 plus CS  150 -199 1 units   200 - 249 2 units   250 - 299 3 units   300 - 349 4 units    350 - 399 5 units   400 - 449 6 units   450 or more 7 units   -Metformin XR 1000 mg BID    -Victoza 1.2 mg daily - generally well tolerated (only 1 pen remaining)    Previous therapy:  -Omnipod 5 with NovoLog U-100 - stopped 3/23/2025    UTI 12/2024.     She had DKA 3/25/2025 (per Ana's note she may have run out of pods on 3/23/2025).     She saw Ana Sanchez 5/2/2025 and they started the FS Betty 3+.     Objective:    BMI 28.2 kg/m2, /60    2/2025: BMI 27.9 kg/m2, /81    5/2024: BMI 27 kg/m2, /82, DM foot exam today: no ulcerations, reduced DP pulses bilaterally but palpable.     10/2023: BMI 26.2 kg/m2, /78. Pleasant and conversational.    12/2021: DM foot exam performed and she has absent sensation to monofilament testing bilaterally. Reduced pedal pulses. No ulcers/skinbreakdown.    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, DM eye exam 4/2024  # Hypertension, microalbuminuria, irbesartan is on her medication list but she isn't sure if she takes it  -4/2025: GFR 74  -5/2024: urine microalbumin undetectable   -2/2025: urine microalbumin 67 mg/g Cr  -3/2022: paired aldosterone 6.4 ng/dL and PRA 0.1 ng/mL/hr  -7/2023: serum aldosterone 4.3 ng/dL, PRA <0.1 ng/mL/hour  # Painful peripheral neuropathy, no  prior DM foot ulcerations, on neuropathic pharmacologic therapy  # No prior ASCVD event, CAD, not on ASA  # Mixed hyperlipidemia, on rosuvastatin 5 mg daily   -2/2025: , HDL 63, LDL 28,   -She has a prior statin intolerance (myalgias)   # Hepatic steatosis  # Other  -2/2025: B12 normal, MMA normal  -3/2025: B12 normal   -5/2024: Celiac screen negative     CGM reviewed in detail. Over the past 2 weeks: GMI 7.6%, 56% TIR, 0% low, 34% high, 10% very high. Frequent postprandial hyperglycemia.     We reviewed her recent DKA episode and her preference for insulin pump therapy vs. Injections. She prefers injections for now.    RECOMMEND:    -Lantus 16 units once daily (Degludec would be ideal but she receives Lantus free of cost from the )     -NovoLog increase to: 6 units with breakfast, 4 units with noon meal, 4 units with evening meal PLUS correction scale:  150 -199 1 unit  200 - 249 2 units   250 - 299 3 units   300 - 349 4 units    >350 - 399 5 units     Expect a phone call from our pharmacy team to set up an appointment to help make the transition from Victoza to Ozempic. For now, continue Victoza. I placed an MTM referral. She tells me the  no longer provides Victoza but will provide Ozempic. We'll start Ozempic at 0.25 mg weekly and we reviewed potential adverse effects. MTM to help titrate Ozempic and insulin. We'll want to avoid significant weight loss with Ozempic.  Dr. Bardales will review your blood sugars in 1 week. At that time I'll recommend additional insulin adjustment PRN and place follow-up orders.   See if your daughter can attend the appointment on June 12th with Ana Sanchez at 2 PM so we can set up glucose data sharing with your daughter (Betty Link Up)  When you see Ana next month, can also review the InPen if you prefer to stay on injections.      She has glucagon available at home.     She is set up to see Ana Sanchez 6/12/25 and a return visit with me  7/22/25.      # Thyroid function     In the past has been on both thyroid hormone and methimazole at various points in time. She has not taken a medication for her thyroid in years.      12/2021: TSH, T4, T3 all normal, TSI undetectable, TRAb undetectable, TPO Ab significantly elevated     4/2023: TSH and free T4 normal   2/2025: TSH 4.02 (normal and stable)   3/25/2025: TSH 1.28      Should have her TSH checked yearly, or at any time if symptoms of thyrotoxicosis or hypothyroidism.      # Bone and mineral metabolism    Many normal calcium values over the years. Serum calcium high 2x remotely: She had a remote mildly elevated PTH drawn shortly after a mildly elevated calcium value (10.6 with ULN 10.5 mg/dL), but on the day that PTH was drawn serum calcium was normal and serum phosphorous was mildly elevated. Serum calcium value of 12.7 mg/dL when she had an JACKSON in 2014. 1 recent serum calcium 10.8 mg/dL (ULN 10.4) without albumin the day she presented with DKA 3/25/2025.      Serum phosphorous and alkaine phosphatase have been normal. 12/2021: UPEP and SPEP negative      DEXA 11/14/2022:  -BMD at the spine, distal 1/3 radius, and both hips is normal     No prior low impact fractures.     CT A/P 12/2024: 1 mm nonobstructing kidney stone     3/2022: PTH 25 pg/mL, Cr, calcium, albumin, phosphorous all normal     3/2023: serum calcium normal, albumin normal     3/2025: 25-OH vitamin D mildly elevated     5/13/2025: she takes vitamin D 1000 units daily and a MVI, no calcium supplement     # Partially empty sella    I reviewed her CT head 9/2022 and agree with the radiology read of partially empty sella. No history of pituitary pathology.    CT head 8/17/2023: per radiology - stable partially empty sella morphology, I reviewed the images and agree     CT head 3/13/2024: stable partially empty sella on my review     3/22/2023: afternoon serum cortisol 10.0 mcg/dL, DHEA-S undetectable, TSH and free T4 normal, prolactin  normal, IGF-1 normal      41 minutes spent on the date of the encounter doing chart review, history and exam, documentation and further activities as noted above. This did not include time spent on CGM review.     The longitudinal plan of care for the diagnosis(es)/condition(s) as documented were addressed during this visit. Due to the added complexity in care, I will continue to support Paige in the subsequent management and with ongoing continuity of care.

## 2025-05-12 NOTE — TELEPHONE ENCOUNTER
Refill request for the following medication (s) listed below.    Pending Prescriptions:                       Disp   Refills    levETIRAcetam (KEPPRA) 500 MG tablet [Pha*28 tab*             Sig: TAKE 1 TABLET (500 MG) BY MOUTH 2 TIMES DAILY.           DUE FOR FOLLOW UP WITH DR KATHLEEN        Last office visit provider:  02/07/2025  Next appointment scheduled: None      Medication T'd for review and signature    Jayda JENKINS

## 2025-05-13 ENCOUNTER — OFFICE VISIT (OUTPATIENT)
Dept: ENDOCRINOLOGY | Facility: CLINIC | Age: 75
End: 2025-05-13
Payer: COMMERCIAL

## 2025-05-13 VITALS
SYSTOLIC BLOOD PRESSURE: 100 MMHG | DIASTOLIC BLOOD PRESSURE: 60 MMHG | BODY MASS INDEX: 28.17 KG/M2 | WEIGHT: 154 LBS | HEART RATE: 82 BPM

## 2025-05-13 DIAGNOSIS — E23.6 EMPTY SELLA: ICD-10-CM

## 2025-05-13 DIAGNOSIS — E88.819 INSULIN RESISTANCE: ICD-10-CM

## 2025-05-13 DIAGNOSIS — Z79.4 LONG TERM (CURRENT) USE OF INSULIN (H): ICD-10-CM

## 2025-05-13 DIAGNOSIS — I65.23 CAROTID ATHEROSCLEROSIS, BILATERAL: ICD-10-CM

## 2025-05-13 DIAGNOSIS — E10.42 TYPE 1 DIABETES MELLITUS WITH DIABETIC POLYNEUROPATHY (H): ICD-10-CM

## 2025-05-13 DIAGNOSIS — I10 HYPERTENSION, UNSPECIFIED TYPE: ICD-10-CM

## 2025-05-13 DIAGNOSIS — K76.0 HEPATIC STEATOSIS: ICD-10-CM

## 2025-05-13 DIAGNOSIS — R80.9 TYPE 1 DIABETES MELLITUS WITH MICROALBUMINURIA (H): ICD-10-CM

## 2025-05-13 DIAGNOSIS — E10.10 DIABETIC KETOACIDOSIS WITHOUT COMA ASSOCIATED WITH TYPE 1 DIABETES MELLITUS (H): ICD-10-CM

## 2025-05-13 DIAGNOSIS — E78.2 MIXED HYPERLIPIDEMIA: ICD-10-CM

## 2025-05-13 DIAGNOSIS — E10.29 TYPE 1 DIABETES MELLITUS WITH MICROALBUMINURIA (H): ICD-10-CM

## 2025-05-13 DIAGNOSIS — E10.65 TYPE 1 DIABETES MELLITUS WITH HYPERGLYCEMIA (H): Primary | ICD-10-CM

## 2025-05-13 PROCEDURE — 95251 CONT GLUC MNTR ANALYSIS I&R: CPT | Performed by: INTERNAL MEDICINE

## 2025-05-13 PROCEDURE — G2211 COMPLEX E/M VISIT ADD ON: HCPCS | Performed by: INTERNAL MEDICINE

## 2025-05-13 PROCEDURE — 3078F DIAST BP <80 MM HG: CPT | Performed by: INTERNAL MEDICINE

## 2025-05-13 PROCEDURE — 99215 OFFICE O/P EST HI 40 MIN: CPT | Mod: 25 | Performed by: INTERNAL MEDICINE

## 2025-05-13 PROCEDURE — 3074F SYST BP LT 130 MM HG: CPT | Performed by: INTERNAL MEDICINE

## 2025-05-13 RX ORDER — LEVETIRACETAM 500 MG/1
TABLET ORAL
Qty: 28 TABLET | Refills: 1 | Status: SHIPPED | OUTPATIENT
Start: 2025-05-13

## 2025-05-13 NOTE — PROGRESS NOTES
Medication Therapy Management (MTM) Encounter    ASSESSMENT:                            Medication Adherence/Access: No issues identified.    Diabetes   ***      Hypertension   ***       Hyperlipidemia   Stable.        Allergy   Stable.         GERD    Stable.        Mental Health   Depression  Stable.         Pain /Fibromyalgia  Stable.         Supplements   Stable.         Viral Suppression  Stable.        History of Seizures  Managed by neurology.       Recurrent UTI  Stable.        Hormone Replacement Therapy    Stable.        PLAN:                            Complete on hand supply of Victoza then the next day start Ozempic 0.25 mg subcutaneous weekly injection as prescribed by your endocrinologist.     Follow-up: No follow-ups on file.  Appointments in Next Year      2025 2:00 PM  (Arrive by 1:55 PM)  Diabetes Education with Ana Sanchez RN  [facility] ([facility]) [phone]     2025 12:00 AM  CARDIAC DEVICE CHECK - REMOTE with JOSE MIGUEL KRAUSE REMOTE DEVICE CHECK FROM HOME  [facility] ([facility] ) [phone]     SUBJECTIVE/OBJECTIVE:                          Paige Mari is a 74 year old female seen for a yearly medication review. She was referred to MTM by their insurance plan.      Reason for visit: Yearly med review.    Allergies/ADRs: Reviewed in chart  Past Medical History: Reviewed in chart  Tobacco: She reports that she has been smoking cigarettes. She started smoking about 22 years ago. She has a 9.5 pack-year smoking history. She has never used smokeless tobacco.Nicotine/Tobacco Cessation Plan  {Nicotine/Tobacco Cessation Plan:515931}  Smoking 1-2 cigs daily   Alcohol: none    Medication Adherence/Access: no issues reported.      Recheck HGB Iron?       Diabetes   NovoLOG FlexPen 100 UNIT/ML Solution Pen-injector, Si-5 units at bedtime per sliding scale Subcutaneous  Lantus  SoloStar 100 UNIT/ML Solution Pen-injector, 16 units Subcutaneous once a day   metFORMIN HCl 500 MG Tablet 2 tablets with a meal Orally 2 times a day  Liraglutide 18 MG/3ML Solution Pen-injector 1.2mg Subcutaneous daily  Aspirin 81mg daily  Glucagon as needed for low blood sugar     Patient reports no current medication side effects. Previously used Omnipod for insulin. Follows with Dr. Bardales for endocrinology at [facility]. 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     Per last endo note:   -***    ***    {sideeffects:669290}  Current diabetes symptoms: {Diabetes Symptoms:396470}  Diet/Exercise: ***     Blood sugar monitoring: Continuous Glucose Monitor see AGP below  Name: Paige Mari  YOB: 1950  Report Period: 2025 - 2025 (14 days)  Generated: 2025  Time CGM Active: 76%      Glucose Statistics and Targets  Average Glucose: 179 mg/dL  Glucose Management Indicator (GMI): 7.6%  Glucose Variability (%CV): 29.8%  Target Range: 70 - 180 mg/dL      Time in Ranges  Very High: >250 mg/dL --- 10%  High: 181 - 250 mg/dL --- 34%  Target Range: 70 - 180 mg/dL --- 56%  Low: 54 - 69 mg/dL --- 0%  Very Low: <54 mg/dL --- 0%    Eye exam in the last 12 months? { :217708}  Foot exam: {up to date:996967}    Hypertension   cloNIDine 0.3 MG/24HR Patch Weekly 1 patch to skin Transdermal change weekly  Metoprolol Succinate  MG Tablet 1 tablet Orally twice a day  Irbesartan 75 MG Tablet, Si tablet in the evening Orally Once a day   Apixaban 5 MG Tablet, Si tablet Orally twice a day      Patient reports no current medication side effects  Patient does not self-monitor blood pressure.    Checking bp at home 100/68 -- no symptoms   Pulse 82       Hyperlipidemia   Rosuvastatin Calcium 5 MG Tablet 1 tablet Orally Once a day.  Patient reports no significant myalgias or other side effects.       Allergy   {meds:278112:p}  Cetirizine HCl 10 MG Tablet,  Si tablet as needed Orally Once a day   Albuterol Sulfate  (90 Base) MCG/ACT Aerosol Solution, Si PUFFS INHALATION EVERY 6 HOURS AS NEEDED -- using more often recently due to shortness of breath - notes this is allergy related.   Flonase Allergy Relief 50 MCG/ACT Suspension, Si spray in each nostril as needed Nasally Once a day   Patient reports {:464055}.    {allergy:088072}   Patient feels that current therapy {IS NOT/IS:450868} effective.      GERD    Omeprazole 20 MG Capsule Delayed Release 1 cap Orally Once a day for stomach.   Patient feels that current regimen is effective.  Patient has not tried a trial off of therapy and is not interested in doing so.       Mental Health   Depression  Escitalopram Oxalate 20 MG Tablet 1 tablet Orally Once a day for mood  DULoxetine HCl 60 MG Capsule Delayed Release Particles 1 capsule Orally Once a day for mood/pain  Medical cannabis- vape, help with sleep  ***       Pain /Fibromyalgia  Acetaminophen 500 MG Tablet, Si tablets Orally twice a day   Continue Voltaren 1 % Gel, Si application as needed Externally daily    tiZANidine HCl 2 MG Tablet, Sig: TAKE TWO TABLETS BY MOUTH ONCE A DAY AT BEDTIME FOR MUSCLE SPASM 90   Pregabalin 100 MG Capsule, Si capsule in the AM and 2 capsules in the PM Orally twice a day  Pain type/location: ***  Pain is described as { :587141}.  Patient feels that current therapy {IS NOT/IS:891561} effective.   Patient reports the following side effects: {mtmpainse:461337}       Supplements   Vitamin D3 25 MCG (1000 UT) Capsule 1 capsule Orally Once a day  Magnesium Oxide 400 MG Tablet 1 tab(s) orally once a day.  Multivitamin women 50+ 1 tablet daily  No reported issues at this time.     Vitamin C 500 MG Tablet, Si tablet Orally Once a day   Multivitamin Adult (Minerals) - Tablet, Si tablet Orally daily  Magnesium Oxide 400 MG Tablet, Si tab(s) orally once a day        Viral Suppression  Valacyclovir 500 mg  tablet daily   Notes she has taken this for several years. Denies issues or side effects.      History of Seizures  Levetiracetam 500 mg tablet twice daily   Working with neurology for management.   ***    Recurrent UTI  Cephalexin 250 mg capsule by mouth daily   Follows with urology for management, Denies issues or side effects.     Hormone Replacement Therapy    Estradiol 1 MG Tablet,  1 tablet Orally Once a day  She has history of hysterectomy with oophorectomy. She has been on hormones for a long time. She has not tried a dose decrease in a long time. She thinks she is on it for her bones. She does not have history of blood clots. She does not have personal or family history of breast cancer.         Today's Vitals: There were no vitals taken for this visit.  ----------------      I spent 32 minutes with this patient today. All changes were made via collaborative practice agreement with Scar Tan MD and All changes were made via collaborative practice agreement with     A summary of these recommendations was sent via [employer].    Rere Davalos, PharmD  Medication Therapy Management Pharmacist     Telemedicine Visit Details  The patient's medications can be safely assessed via a telemedicine encounter.  Type of service:  Telephone visit  Originating Location (pt. Location): Home  {PROVIDER LOCATION On-site should be selected for visits conducted from your clinic location or adjoining [facility] hospital, academic office, or other nearby [facility] building. Off-site should be selected for all other provider locations, including home:150438}  Distant Location (provider location):  On-site  Start Time: 11:30 AM  End Time: 11:32 AM     Medication Therapy Recommendations  No medication therapy recommendations to display

## 2025-05-13 NOTE — PATIENT INSTRUCTIONS
Insulin dosing    -Lantus 16 units    -NovoLog 6 units with breakfast, 4 units with noon meal, 4 units with evening meal PLUS correction scale at mealtimes:    150 -199 1 unit  200 - 249 2 units   250 - 299 3 units   300 - 349 4 units    >350 - 399 5 units     Expect a phone call from our pharmacy team to set up an appointment to help make the transition from Victoza to Ozempic. For now, continue Victoza.  Dr. Bardales will review your blood sugars in 1 week.  See if your daughter can attend the appointment on June 12th with Ana Sanchez at 2 PM so we can set up glucose data sharing with your daughter (Betty Link Up)

## 2025-05-13 NOTE — LETTER
5/13/2025      Paige Mari  [address]  [place] [address]      Dear Colleague,    Thank you for referring your patient, Paige Mari, to the [facility]. Please see a copy of my visit note below.    Subjective:    Established patient    Paige Mari is a 74 year old female who presents for DM.      Current DM therapy:  -Lantus 16 units  -NovoLog 5-3-3 plus CS  150 -199 1 units   200 - 249 2 units   250 - 299 3 units   300 - 349 4 units    350 - 399 5 units   400 - 449 6 units   450 or more 7 units   -Metformin XR 1000 mg BID    -Victoza 1.2 mg daily - generally well tolerated (only 1 pen remaining)    Previous therapy:  -Omnipod 5 with NovoLog U-100 - stopped 3/23/2025    UTI 12/2024.     She had DKA 3/25/2025 (per Ana's note she may have run out of pods on 3/23/2025).     She saw Ana Sanchez 5/2/2025 and they started the FS Betty 3+.     Objective:    BMI 28.2 kg/m2, /60    2/2025: BMI 27.9 kg/m2, /81    5/2024: BMI 27 kg/m2, /82, DM foot exam today: no ulcerations, reduced DP pulses bilaterally but palpable.     10/2023: BMI 26.2 kg/m2, /78. Pleasant and conversational.    12/2021: DM foot exam performed and she has absent sensation to monofilament testing bilaterally. Reduced pedal pulses. No ulcers/skinbreakdown.    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, DM eye exam 4/2024  # Hypertension, microalbuminuria, irbesartan is on her medication list but she isn't sure if she takes  it  -4/2025: GFR 74  -5/2024: urine microalbumin undetectable   -2/2025: urine microalbumin 67 mg/g Cr  -3/2022: paired aldosterone 6.4 ng/dL and PRA 0.1 ng/mL/hr  -7/2023: serum aldosterone 4.3 ng/dL, PRA <0.1 ng/mL/hour  # Painful peripheral neuropathy, no prior DM foot ulcerations, on neuropathic pharmacologic therapy  # No prior ASCVD event, CAD, not on ASA  # Mixed hyperlipidemia, on rosuvastatin 5 mg daily   -2/2025: , HDL 63, LDL 28,   -She has a prior statin intolerance (myalgias)   # Hepatic steatosis  # Other  -2/2025: B12 normal, MMA normal  -3/2025: B12 normal   -5/2024: Celiac screen negative     CGM reviewed in detail. Over the past 2 weeks: GMI 7.6%, 56% TIR, 0% low, 34% high, 10% very high. Frequent postprandial hyperglycemia.     We reviewed her recent DKA episode and her preference for insulin pump therapy vs. Injections. She prefers injections for now.    RECOMMEND:    -Lantus 16 units once daily (Degludec would be ideal but she receives Lantus free of cost from the )     -NovoLog increase to: 6 units with breakfast, 4 units with noon meal, 4 units with evening meal PLUS correction scale:  150 -199 1 unit  200 - 249 2 units   250 - 299 3 units   300 - 349 4 units    >350 - 399 5 units     Expect a phone call from our pharmacy team to set up an appointment to help make the transition from Victoza to Ozempic. For now, continue Victoza. I placed an MTM referral. She tells me the  no longer provides Victoza but will provide Ozempic. We'll start Ozempic at 0.25 mg weekly and we reviewed potential adverse effects. MTM to help titrate Ozempic and insulin. We'll want to avoid significant weight loss with Ozempic.  Dr. Bardales will review your blood sugars in 1 week. At that time I'll recommend additional insulin adjustment PRN and place follow-up orders.   See if your daughter can attend the appointment on June 12th with Ana Sanchez at 2 PM so we can set up glucose  data sharing with your daughter (Betty Link Up)  When you see Ana next month, can also review the InPen if you prefer to stay on injections.      She has glucagon available at home.     She is set up to see Ana Laura 6/12/25 and a return visit with me 7/22/25.      # Thyroid function     In the past has been on both thyroid hormone and methimazole at various points in time. She has not taken a medication for her thyroid in years.      12/2021: TSH, T4, T3 all normal, TSI undetectable, TRAb undetectable, TPO Ab significantly elevated     4/2023: TSH and free T4 normal   2/2025: TSH 4.02 (normal and stable)   3/25/2025: TSH 1.28      Should have her TSH checked yearly, or at any time if symptoms of thyrotoxicosis or hypothyroidism.      # Bone and mineral metabolism    Many normal calcium values over the years. Serum calcium high 2x remotely: She had a remote mildly elevated PTH drawn shortly after a mildly elevated calcium value (10.6 with ULN 10.5 mg/dL), but on the day that PTH was drawn serum calcium was normal and serum phosphorous was mildly elevated. Serum calcium value of 12.7 mg/dL when she had an JACKSON in 2014. 1 recent serum calcium 10.8 mg/dL (ULN 10.4) without albumin the day she presented with DKA 3/25/2025.      Serum phosphorous and alkaine phosphatase have been normal. 12/2021: UPEP and SPEP negative      DEXA 11/14/2022:  -BMD at the spine, distal 1/3 radius, and both hips is normal     No prior low impact fractures.     CT A/P 12/2024: 1 mm nonobstructing kidney stone     3/2022: PTH 25 pg/mL, Cr, calcium, albumin, phosphorous all normal     3/2023: serum calcium normal, albumin normal     3/2025: 25-OH vitamin D mildly elevated     5/13/2025: she takes vitamin D 1000 units daily and a MVI, no calcium supplement     # Partially empty sella    I reviewed her CT head 9/2022 and agree with the radiology read of partially empty sella. No history of pituitary pathology.    CT head 8/17/2023: per  radiology - stable partially empty sella morphology, I reviewed the images and agree     CT head 3/13/2024: stable partially empty sella on my review     3/22/2023: afternoon serum cortisol 10.0 mcg/dL, DHEA-S undetectable, TSH and free T4 normal, prolactin normal, IGF-1 normal      41 minutes spent on the date of the encounter doing chart review, history and exam, documentation and further activities as noted above. This did not include time spent on CGM review.     The longitudinal plan of care for the diagnosis(es)/condition(s) as documented were addressed during this visit. Due to the added complexity in care, I will continue to support Paige in the subsequent management and with ongoing continuity of care.    Again, thank you for allowing me to participate in the care of your patient.        Sincerely,        Maynor Bardales MD    Electronically signed

## 2025-05-14 ENCOUNTER — VIRTUAL VISIT (OUTPATIENT)
Dept: PHARMACY | Facility: PHYSICIAN GROUP | Age: 75
End: 2025-05-14
Payer: COMMERCIAL

## 2025-05-14 ENCOUNTER — TELEPHONE (OUTPATIENT)
Dept: ENDOCRINOLOGY | Facility: CLINIC | Age: 75
End: 2025-05-14
Payer: COMMERCIAL

## 2025-05-14 DIAGNOSIS — R52 PAIN: ICD-10-CM

## 2025-05-14 DIAGNOSIS — Z79.890 NEED FOR PROPHYLACTIC HORMONE REPLACEMENT THERAPY (POSTMENOPAUSAL): ICD-10-CM

## 2025-05-14 DIAGNOSIS — E13.9: Primary | ICD-10-CM

## 2025-05-14 DIAGNOSIS — G40.209 COMPLEX PARTIAL SEIZURE (H): ICD-10-CM

## 2025-05-14 DIAGNOSIS — J30.2 SEASONAL ALLERGIC RHINITIS, UNSPECIFIED TRIGGER: ICD-10-CM

## 2025-05-14 DIAGNOSIS — B00.9 HERPES SIMPLEX VIRUS (HSV) INFECTION: ICD-10-CM

## 2025-05-14 DIAGNOSIS — N39.0 RECURRENT UTI: ICD-10-CM

## 2025-05-14 DIAGNOSIS — I10 BENIGN ESSENTIAL HYPERTENSION: ICD-10-CM

## 2025-05-14 DIAGNOSIS — K21.9 GASTROESOPHAGEAL REFLUX DISEASE, UNSPECIFIED WHETHER ESOPHAGITIS PRESENT: ICD-10-CM

## 2025-05-14 DIAGNOSIS — E78.2 MIXED HYPERLIPIDEMIA: ICD-10-CM

## 2025-05-14 DIAGNOSIS — Z78.9 TAKES DIETARY SUPPLEMENTS: ICD-10-CM

## 2025-05-14 DIAGNOSIS — F32.A DEPRESSION, UNSPECIFIED DEPRESSION TYPE: ICD-10-CM

## 2025-05-14 PROCEDURE — 99607 MTMS BY PHARM ADDL 15 MIN: CPT | Mod: 93 | Performed by: PHARMACIST

## 2025-05-14 PROCEDURE — 99605 MTMS BY PHARM NP 15 MIN: CPT | Mod: 93 | Performed by: PHARMACIST

## 2025-05-14 NOTE — TELEPHONE ENCOUNTER
MTM referral from: [facility] visit (referral by provider)    MTM referral outreach attempt #2 on May 14, 2025 at 9:21 AM      Outcome: Patient not reachable after several attempts, routed to Pharmacist Team/Provider as an FYI    Use hbc for the carrier/Plan on the flowsheet      [employer] Message Sent    Paula Fernandes CPhT  MTM

## 2025-05-14 NOTE — TELEPHONE ENCOUNTER
"As the patient is already approved, I have uploaded the refill/ medication change form to the media tab labeled \"Quin PAP Refill Form\" please fill out the Ozempic section. Total number of boxes needs to equal 4. Thanks!    Kim Hernandez [facility]  Endo Clinic Liaison  [facility]  [email or web address]   [email or web address]  Phone: [phone]  Fax: [phone]    "

## 2025-05-14 NOTE — LETTER
May 19, 2025  Paige Mari  [address]   [place] [address]    Dear Ms. Jacey, [facility]     Thank you for talking with me on May 14, 2025 about your health and medications. As a follow-up to our conversation, I have included two documents:      Your Recommended To-Do List has steps you should take to get the best results from your medications.  Your Medication List will help you keep track of your medications and how to take them.    If you want to talk about these documents, please call Rere Davalos RPH at phone: [phone], Monday-Friday 8-4:30pm.    I look forward to working with you and your doctors to make sure your medications work well for you.    Sincerely,  Rere Davalos RPH  [facility] Pharmacist, [facility]

## 2025-05-14 NOTE — LETTER
_  Medication List        Prepared on: May 14, 2025     Bring your Medication List when you go to the doctor, hospital, or   emergency room. And, share it with your family or caregivers.     Note any changes to how you take your medications.  Cross out medications when you no longer use them.    Medication How I take it Why I use it Prescriber   acetaminophen (TYLENOL) 500 MG tablet Take 1,000 mg by mouth 2 times daily. Pain Patient Reported   albuterol (PROAIR HFA/PROVENTIL HFA/VENTOLIN HFA) 108 (90 Base) MCG/ACT inhaler Inhale 2 puffs into the lungs every 6 hours as needed for shortness of breath, wheezing or cough Trouble Breathing Scar Tan MD   apixaban ANTICOAGULANT (ELIQUIS) 5 MG tablet Take 1 tablet (5 mg) by mouth 2 times daily. Paroxysmal Atrial Fibrillation (H) Elizabet Blanchard NP   cephALEXin (KEFLEX) 250 MG capsule Take 250 mg by mouth every morning. Prevent Urinary Treact Infections Julia Boykin PA-C   cetirizine (ZYRTEC) 10 MG tablet Take 10 mg by mouth daily as needed for allergies Hayfever Patient Reported   cloNIDine (CATAPRES-TTS3) 0.3 MG/24HR WK patch Place 1 patch onto the skin once a week (Sunday) High Blood Pressure Scar Tan MD   Continuous Glucose  (FREESTYLE JERRY 3 READER) TORRIE 1 each continuously. Type 1 diabetes mellitus with hyperglycemia (H) Maynor Bardales MD   Continuous Glucose Sensor (FREESTYLE JERRY 3 PLUS SENSOR) MISC Change every 15 days. Type 1 diabetes mellitus with hyperglycemia (H) Maynor Bardales MD   diclofenac sodium (VOLTAREN) 1 % Gel Apply 1 g topically daily as needed (pain) Joint Damage causing Pain and Loss of Function Patient Reported   DULoxetine (CYMBALTA) 60 MG capsule Take 60 mg by mouth every morning. Diabetes with Nerve Disease; Fibromyalgia Syndrome; Major Depressive Disorder Scar Tan MD   escitalopram (LEXAPRO) 20 MG tablet Take 20 mg by mouth every morning. Major Depressive Disorder Scar  Joseph Tan MD   estradiol (ESTRACE) 1 MG tablet Take 1 mg by mouth every morning. Excision of Both Ovaries; Postmenopausal Osteoporosis Scar Joseph Tan MD   fluticasone (FLONASE) 50 MCG/ACT nasal spray Spray 1 spray into both nostrils daily as needed for rhinitis or allergies Allergic Rhinitis; Stuffy Nose Patient Reported   Glucagon (GVOKE HYPOPEN) 1 MG/0.2ML pen Inject 0.2 mLs (1 mg) into the muscle as needed for low blood sugar Type 1 diabetes mellitus with diabetic polyneuropathy (H) Maynor Bardales MD   glucose (BD GLUCOSE) 4 g chewable tablet Take 4 tablets by mouth every 15 minutes as needed for low blood sugar. Diabetic ketoacidosis without coma associated with type 1 diabetes mellitus (H) Rui Carlin MD   insulin aspart (NOVOLOG PEN) 100 UNIT/ML pen 6 units with breakfast, 4 units with noon meal, 4 units with evening meal PLUS correction scale:  150 -199 1 unit  200 - 249 2 units   250 - 299 3 units   300 - 349 4 units    >350 - 399 5 units Diabetes Dr. Bardales   insulin glargine (LANTUS PEN) 100 UNIT/ML pen Inject 16 Units subcutaneously every 24 hours. Diabetes Dr. Bardales    irbesartan (AVAPRO) 75 MG tablet Take 1 tablet (75 mg) by mouth every evening. Diabetic ketoacidosis without coma associated with type 1 diabetes mellitus (H) Rui Carlin MD   levETIRAcetam (KEPPRA) 500 MG tablet TAKE 1 TABLET (500 MG) BY MOUTH 2 TIMES DAILY. DUE FOR FOLLOW UP WITH DR CELIS Localization-related (focal) (partial) symptomatic epilepsy and epileptic syndromes with complex partial seizures, not intractable, without status epilepticus (H) Jared Celis MD   magnesium oxide (MAG-OX) 400 MG tablet Take 400 mg by mouth every evening. General Health Patient Reported   metFORMIN (GLUCOPHAGE XR) 500 MG 24 hr tablet Take 2 tablets (1,000 mg) by mouth 2 times daily. Diabetic ketoacidosis without coma associated with type 1 diabetes mellitus (H) Rui Carlin MD   metoprolol succinate ER  (TOPROL-XL) 100 MG 24 hr tablet Take 100 mg by mouth 2 times daily High Blood Pressure Scar Tan MD   Multiple Vitamins-Minerals (MULTIVITAMIN WOMEN 50+) TABS Take 1 tablet by mouth every evening. General Health Patient Reported   nystatin (MYCOSTATIN) 261856 UNIT/GM external powder Apply topically 2 times daily as needed Skin Infection due to Candida Yeast Scar Tan MD   omeprazole (PRILOSEC) 20 MG capsule Take 1 capsule by mouth every morning Gastroesophageal Reflux Disease Scar Tan MD   pregabalin (LYRICA) 100 MG capsule Take 100 mg by mouth every morning. In addition, take 2 x 100 (dose = 200 mg) capsules every evening.  Nerve Pain Scar Tan   pregabalin (LYRICA) 100 MG capsule Take 200 mg by mouth every evening. In addition, take one 100 capsule every morning. . Nerve Pain Scar Tan   rosuvastatin (CRESTOR) 5 MG tablet Take 5 mg by mouth every evening. Cholesterol Scar Tan   semaglutide (OZEMPIC) 2 MG/3ML pen Inject 0.25 mg subcutaneously every 7 days. Diabetes Maynor Bardales MD   tiZANidine (ZANAFLEX) 2 MG tablet Take 4 mg by mouth every evening. Musculoskeletal Pain Sacr Tan MD   valACYclovir (VALTREX) 500 MG tablet Take 500 mg by mouth every morning. Prophylaxis of Herpes Simplex Scar Tan MD   vitamin C (ASCORBIC ACID) 500 MG tablet Take 1 tablet (500 mg) by mouth every morning. Diabetic ketoacidosis without coma associated with type 1 diabetes mellitus (H) Rui Carlin MD         Add new medications, over-the-counter drugs, herbals, vitamins, or  minerals in the blank rows below.    Medication How I take it Why I use it Prescriber                                      Allergies:      - Morphine - Other (See Comments)  - Nitrofurantoin  - Adhesive [cyanoacrylate] - Other (See Comments)  - Amlodipine - Unknown, Other (See Comments)  - Doxazosin - Other (See Comments)  -  Hydrochlorothiazide  - Other Allergy (see Comments) [external Allergen Needs Reconciliation - See Comment] - Unknown  - Prednisone - Unknown  - Tramadol - Other (See Comments)  - Trazodone - Unknown        Side effects I have had:      Not on File        Other Information:              My notes and questions:

## 2025-05-14 NOTE — LETTER
"Recommended To-Do List      Prepared on: May 14, 2025       You can get the best results from your medications by completing the items on this \"To-Do List.\"      Bring your To-Do List when you go to your doctor. And, share it with your family or caregivers.    My To-Do List:  What we talked about: What I should do:   The importance of taking your medication as intended    Reminder to take your medication as prescribed for semaglutide (OZEMPIC). Once you complete your on hand supply of Victoza you can start Ozempic one day later.           What we talked about: What I should do:                     "

## 2025-05-14 NOTE — TELEPHONE ENCOUNTER
[facility] Call Center    Phone Message    May a detailed message be left on voicemail: yes     Reason for Call: Other: Jah Jamison would like to speak with someone on the team asap. Jah Jamison would like the team to know pt BS is very unstable , this monring pt BS were at 95 and then just now at 221. Please call Yaquelin back asap. # [phone] Secure  Thank you!     Action Taken: Message routed to:  [facility]    Travel Screening: Not Applicable     Date of Service:

## 2025-05-14 NOTE — TELEPHONE ENCOUNTER
Is this patient supposed to be getting Ozempic via PAP now?    Kim Hernandez [facility]  Endo Clinic Liaison  [facility]  [email or web address]   [email or web address]  Phone: [phone]  Fax: [phone]

## 2025-05-20 NOTE — TELEPHONE ENCOUNTER
MTM Referral outreach attempt #3 was made on 05/20/2025.    Outcome: Called patient since have not been active on their [employer] account lately. Patient did not answer. A voicemail was left explaining more in-depth what MTM is and how we can best support them. Additionally, I provide my direct line so that the patient can call me directly for help with scheduling.      Patient called back a minute later. Patient stated that she already meet with a Primary MTM pharmacist earlier this week to discuss the change to Ozempic and does not feel like she also needs to see Endo MTM for this.

## 2025-05-20 NOTE — TELEPHONE ENCOUNTER
MTM Referral outreach attempt #3 was made on 05/20/2025.    Outcome: Called patient's mother since have not been active on their [employer] account lately. Patient's mom did not answer. A voicemail was left explaining more in-depth what MTM is and how we can best support them. Additionally, I provide my direct line so that the patient's parents can call me directly for help with scheduling.

## 2025-05-21 ENCOUNTER — ALLIED HEALTH/NURSE VISIT (OUTPATIENT)
Dept: ENDOCRINOLOGY | Facility: CLINIC | Age: 75
End: 2025-05-21
Payer: COMMERCIAL

## 2025-05-21 ENCOUNTER — TELEPHONE (OUTPATIENT)
Dept: ENDOCRINOLOGY | Facility: CLINIC | Age: 75
End: 2025-05-21

## 2025-05-21 DIAGNOSIS — E10.65 TYPE 1 DIABETES MELLITUS WITH HYPERGLYCEMIA (H): Primary | ICD-10-CM

## 2025-05-21 NOTE — PROGRESS NOTES
Pt in for a meter upload, uploaded and sent to the MD. Pt wants to know when she can start ozempic, we will discuss with the MD and call her.

## 2025-05-21 NOTE — TELEPHONE ENCOUNTER
Meter downloaded and emailed to the MD. Pt is wondering when she can start ozempic, as she states that the pharmacist did not tell her. Please advise.

## 2025-05-21 NOTE — TELEPHONE ENCOUNTER
----- Message from Maynor Bardales sent at 5/13/2025  8:21 AM CDT -----  Regarding: CGM download  Can her CGM (FS Betty 3+) be emailed to me? Thanks

## 2025-06-01 DIAGNOSIS — Z79.4 TYPE 2 DIABETES MELLITUS WITH DIABETIC POLYNEUROPATHY, WITH LONG-TERM CURRENT USE OF INSULIN (H): Primary | ICD-10-CM

## 2025-06-01 DIAGNOSIS — E11.42 TYPE 2 DIABETES MELLITUS WITH DIABETIC POLYNEUROPATHY, WITH LONG-TERM CURRENT USE OF INSULIN (H): Primary | ICD-10-CM

## 2025-06-01 DIAGNOSIS — E10.10 DIABETIC KETOACIDOSIS WITHOUT COMA ASSOCIATED WITH TYPE 1 DIABETES MELLITUS (H): ICD-10-CM

## 2025-06-02 ENCOUNTER — TELEPHONE (OUTPATIENT)
Dept: PHARMACY | Facility: CLINIC | Age: 75
End: 2025-06-02
Payer: COMMERCIAL

## 2025-06-02 DIAGNOSIS — E10.10 DIABETIC KETOACIDOSIS WITHOUT COMA ASSOCIATED WITH TYPE 1 DIABETES MELLITUS (H): ICD-10-CM

## 2025-06-02 RX ORDER — METFORMIN HYDROCHLORIDE 500 MG/1
TABLET, EXTENDED RELEASE ORAL
Qty: 360 TABLET | OUTPATIENT
Start: 2025-06-02

## 2025-06-02 RX ORDER — METFORMIN HYDROCHLORIDE 500 MG/1
1000 TABLET, EXTENDED RELEASE ORAL 2 TIMES DAILY
Qty: 60 TABLET | Refills: 1 | Status: CANCELLED | OUTPATIENT
Start: 2025-06-02

## 2025-06-09 RX ORDER — SEMAGLUTIDE 0.68 MG/ML
INJECTION, SOLUTION SUBCUTANEOUS
Qty: 2 ML | Refills: 0 | Status: SHIPPED | OUTPATIENT
Start: 2025-06-09

## 2025-06-10 NOTE — TELEPHONE ENCOUNTER
Patient gets this medication from [facility]. I have shredded the order so it stays on med list but will not be sent to [facility].    Kmi Hernandez [facility]  Endo Clinic Liaison  [facility]  [email or web address]   [email or web address]  Phone: [phone]  Fax: [phone]

## 2025-06-15 DIAGNOSIS — G40.209 LOCALIZATION-RELATED (FOCAL) (PARTIAL) SYMPTOMATIC EPILEPSY AND EPILEPTIC SYNDROMES WITH COMPLEX PARTIAL SEIZURES, NOT INTRACTABLE, WITHOUT STATUS EPILEPTICUS (H): ICD-10-CM

## 2025-06-16 RX ORDER — LEVETIRACETAM 500 MG/1
TABLET ORAL
Qty: 60 TABLET | Refills: 1 | Status: SHIPPED | OUTPATIENT
Start: 2025-06-16

## 2025-06-16 NOTE — TELEPHONE ENCOUNTER
Prescription approved per [facility] Refill Protocol.    Madiha Ortega RN, BSN  [facility]

## 2025-06-17 ENCOUNTER — DOCUMENTATION ONLY (OUTPATIENT)
Dept: ENDOCRINOLOGY | Facility: CLINIC | Age: 75
End: 2025-06-17
Payer: COMMERCIAL

## 2025-06-19 ENCOUNTER — TELEPHONE (OUTPATIENT)
Dept: ENDOCRINOLOGY | Facility: CLINIC | Age: 75
End: 2025-06-19
Payer: COMMERCIAL

## 2025-06-19 DIAGNOSIS — E10.10 DIABETIC KETOACIDOSIS WITHOUT COMA ASSOCIATED WITH TYPE 1 DIABETES MELLITUS (H): ICD-10-CM

## 2025-06-19 RX ORDER — METFORMIN HYDROCHLORIDE 500 MG/1
TABLET, EXTENDED RELEASE ORAL
Qty: 120 TABLET | Refills: 0 | Status: SHIPPED | OUTPATIENT
Start: 2025-06-19

## 2025-06-19 NOTE — TELEPHONE ENCOUNTER
Requested Prescriptions   Pending Prescriptions Disp Refills    metFORMIN (GLUCOPHAGE XR) 500 MG 24 hr tablet [Pharmacy Med Name: METFORMIN HCL  MG TABLET] 360 tablet      Sig: TAKE 2 TABS (1000MG) BY MOUTH TWICE DAILY WITH MEALS       Biguanide Agents Failed - 6/19/2025 12:40 PM        Failed - Medication is active on med list and the sig matches. RN to manually verify dose and sig if red X/fail.     If the protocol passes (green check), you do not need to verify med dose and sig.    A prescription matches if they are the same clinical intention.    For Example: once daily and every morning are the same.    The protocol can not identify upper and lower case letters as matching and will fail.     For Example: Take 1 tablet (50 mg) by mouth daily     TAKE 1 TABLET (50 MG) BY MOUTH DAILY    For all fails (red x), verify dose and sig.    If the refill does match what is on file, the RN can still proceed to approve the refill request.       If they do not match, route to the appropriate provider.             Passed - Patient is age 10 or older        Passed - Patient has documented A1c within the specified period of time.     If HgbA1C is 8 or greater, it needs to be on file within the past 3 months.  If less than 8, must be on file within the past 6 months.     Recent Labs   Lab Test 03/25/25  1847   A1C 9.9*             Passed - Patient does NOT have a diagnosis of CHF.        Passed - Medication indicated for associated diagnosis     Medication is associated with one or more of the following diagnoses:     Gestational diabetes mellitus     Hyperinsulinar obesity     Hypersecretion of ovarian androgens    Non-alcoholic fatty liver    Polycystic ovarian syndrome               Pre-diabetes (DM 2 prevention)    Type 2 diabetes mellitus     Weight gain, antipsychotic therapy-induced    Impaired fasting glucose          Passed - Has GFR on file in past 12 months and most recent value is normal        Passed - Recent  (6 month) or future (90 days) visit with the authorizing provider's specialty (provided they have been seen in the past 9 months)     The patient must have completed an in-person or virtual visit within the past 6 months or has a future visit scheduled within the next 90 days with the authorizing provider s specialty.  Urgent care and e-visits do not quality as an office visit for this protocol.          Passed - Patient is not pregnant        Passed - Patient has not had a positive pregnancy test within the past 12 mos.

## 2025-06-19 NOTE — TELEPHONE ENCOUNTER
[facility] Call [facility]    Phone Message    May a detailed message be left on voicemail: yes     Reason for Call: Other: Patient is wondering if her ozempic is truly at the clinic or if it went to [facility]. Patient states she got a vm on Monday writer  cannot find it documented.    Action Taken: Message routed to:  [facility] endo    Travel Screening: Not Applicable     Date of Service:

## 2025-07-07 ENCOUNTER — TELEPHONE (OUTPATIENT)
Dept: ENDOCRINOLOGY | Facility: CLINIC | Age: 75
End: 2025-07-07
Payer: COMMERCIAL

## 2025-07-07 NOTE — TELEPHONE ENCOUNTER
[facility] Call [facility]    Phone Message    May a detailed message be left on voicemail: yes     Reason for Call: Medication Question or concern regarding medication     Prescription Clarification    Name of Medication:   insulin glargine (LANTUS PEN) 100 UNIT/ML pen [[id number]] (Order [id number])     Prescribing Provider: Catia        What on the order needs clarification? Patient states manufacture stated they no longer carry this med advise thanks.      Action Taken: Message routed to:  [facility] endo    Travel Screening: Not Applicable     Date of Service:

## 2025-07-07 NOTE — TELEPHONE ENCOUNTER
Spoke to pharmacy- they spoke to their  and they do have it in stock/on hand. Pharmacy is able to fill as well.     LM for patient informing pharmacy is able to fill the medication and has it in stock.

## 2025-07-09 NOTE — TELEPHONE ENCOUNTER
Patient states that she does not get medication from pharmacy.  She gets it from the .  Patient would like a call back to further discuss.  States that an alternative medication is required for long acting insulin

## 2025-07-14 DIAGNOSIS — E10.65 TYPE 1 DIABETES MELLITUS WITH HYPERGLYCEMIA (H): Primary | ICD-10-CM

## 2025-07-14 DIAGNOSIS — R79.89 HIGH SERUM VITAMIN D: ICD-10-CM

## 2025-07-15 ENCOUNTER — LAB (OUTPATIENT)
Dept: LAB | Facility: CLINIC | Age: 75
End: 2025-07-15
Payer: COMMERCIAL

## 2025-07-15 DIAGNOSIS — R79.89 HIGH SERUM VITAMIN D: ICD-10-CM

## 2025-07-15 DIAGNOSIS — E10.65 TYPE 1 DIABETES MELLITUS WITH HYPERGLYCEMIA (H): ICD-10-CM

## 2025-07-15 LAB
EST. AVERAGE GLUCOSE BLD GHB EST-MCNC: 189 MG/DL
HBA1C MFR BLD: 8.2 % (ref 0–5.6)

## 2025-07-15 PROCEDURE — 83036 HEMOGLOBIN GLYCOSYLATED A1C: CPT

## 2025-07-15 PROCEDURE — 36415 COLL VENOUS BLD VENIPUNCTURE: CPT

## 2025-07-15 NOTE — PROGRESS NOTES
__________________________________  ESTABLISHED PATIENT NEUROLOGY NOTE    DATE OF VISIT: 3/27/2023  MRN: [id number]  PATIENT NAME: Paige Mari  YOB: 1950    Chief Complaint   Patient presents with    Seizures     Patient states no seizures since last seen.  Cannot stay away, sleep most of the day and night.  Either really hot or cold.  Standing up, makes her dizzy.  Hard time walking straight.     Tremors     Tremors in both hands, started 6 month ago, seems to be getting worse.      SUBJECTIVE:                                                      HISTORY OF PRESENT ILLNESS:  Paige is here for follow up regarding migraines    Paige Mari is a 75-year-old female with a history of migraines,HTN, HLD, DM 2, diabetic polyneuropathy, COPD, A-fib, chronic pain and marijuana .  Per chart review, she has been seen in our clinic for tremors in the past by Dr. Celis.  She was followed by the [facility] clinic for her migraines and diabetic polyneuropathy.  She has had Botox in the past and was switched to Emgality which was deemed successful.  She had 1 or 2 breakthrough migraines each month while on Emgality per chart review.     03/27/23: Paige arrives to the clinic to establish care for migraines.  She states that she was seen in the [facility] clinic previously and her provider moved to [place].  She describes her migraines as pain that starts at the base of her skull and spreads throughout her whole head, worsening behind her eyes.  The pain is a 8 out of 10 nagging, throbbing constant pain.  Associated symptoms include photophobia and nausea.  She denies phonophobia and vomiting.  She denies an aura prior to her migraine.  Migraines typically last the full day.  She uses Tylenol as an abortive therapy.  She states she takes Tylenol daily for neuropathy and joint pain as well.  She feels her migraines are well controlled on Emgality.  She has 0 to 1 migraine per month while on Emgality.  Denies visual  changes, speech changes or neck rigidity.  Tried Botox in the past.    2/7/2024: Patient was seen by Dr. Celis, inpatient for complex partial seizures tremor.  Per review of his chart, patient was admitted to the hospital with confusion and EEG showed rhythmic activity suggesting seizure. She was briefly on Keppra that was since discontinued. She has experienced multiple episodes of generalized body twitches and brain fog along with passing out. During one such episode she fell down fracturing her facial bone. I suspect she is experiencing complex partial seizures and I have recommended: Restarting Keppra 500 mg twice daily.  Repeat EEG and check levels in 2 weeks.    07/16/25: Paige presents to the clinic today for follow-up after starting Keppra due to bouts of confusion and generalized body twitching.  Patient tells me she continues to take Keppra 500 mg twice daily and is tolerating well.  She denies adverse effects.  She denies loss of consciousness, zoning out or staring spells.  No involuntary generalized body twitching, brain fog or confusion.    Paige tells me that she has additional concerns of feeling lightheaded with ambulation, bilateral upper extremity tremor, feeling off balance when walking, low mood, worsening memory and constant fatigue.  She feels her depression is not well-controlled.  She denies the want to hurt herself or anyone else.  She is not interested in talk therapy at this time.  She is on antidepressant medication and will talk to her primary care provider about adjusting dosing.    Paige tells me that her headaches have resolved and she is no longer taking Emgality.  She is working with her primary care provider on many of the concerns outlined above.  She is not interested in neuropsych referral for cognitive testing.  -- Was recently hospitalized with metabolic encephalopathy, and severe DKA diabetic in March 2025.     Current Medications:   Current Outpatient Medications    Medication Sig Dispense Refill    acetaminophen (TYLENOL) 500 MG tablet Take 1,000 mg by mouth 2 times daily.      albuterol (PROAIR HFA/PROVENTIL HFA/VENTOLIN HFA) 108 (90 Base) MCG/ACT inhaler Inhale 2 puffs into the lungs every 6 hours as needed for shortness of breath, wheezing or cough      apixaban ANTICOAGULANT (ELIQUIS) 5 MG tablet Take 1 tablet (5 mg) by mouth 2 times daily. 60 tablet 11    cephALEXin (KEFLEX) 250 MG capsule Take 250 mg by mouth every morning.      cetirizine (ZYRTEC) 10 MG tablet Take 10 mg by mouth daily as needed for allergies      cloNIDine (CATAPRES-TTS3) 0.3 MG/24HR WK patch Place 1 patch onto the skin once a week (Sunday)      diclofenac sodium (VOLTAREN) 1 % Gel Apply 1 g topically daily as needed (pain)      DULoxetine (CYMBALTA) 60 MG capsule Take 60 mg by mouth every morning.      escitalopram (LEXAPRO) 20 MG tablet Take 20 mg by mouth every morning.      estradiol (ESTRACE) 1 MG tablet Take 1 mg by mouth every morning.      fluticasone (FLONASE) 50 MCG/ACT nasal spray Spray 1 spray into both nostrils daily as needed for rhinitis or allergies      Glucagon (GVOKE HYPOPEN) 1 MG/0.2ML pen Inject 0.2 mLs (1 mg) into the muscle as needed for low blood sugar 0.4 mL 1    glucose (BD GLUCOSE) 4 g chewable tablet Take 4 tablets by mouth every 15 minutes as needed for low blood sugar. 50 tablet 1    insulin aspart (NOVOLOG PEN) 100 UNIT/ML pen Inject 1-7 Units subcutaneously 3 times daily (before meals). Correction Scale -     Pre-meal correction scale:     Blood Glucose Insulin Novolog Before Meals:  Less than 150   0 units  150 -199  1 units  200 - 249 2 units  250 - 299 3 units  300 - 349 4 units   350 - 399  5 units  400 - 449 6 units  450 or more 7 units    To be given with prandial insulin, and based on pre-meal blood glucose. Administering insulin within 5 minutes of the start of the meal is ideal. Administer insulin no more than 30 minutes after the start of the meal, unless  directed otherwise by provider.      insulin aspart (NOVOLOG PEN) 100 UNIT/ML pen Inject 1-5 Units subcutaneously at bedtime. MEDIUM INSULIN RESISTANCE DOSING  Do Not give Correction Insulin if BG less than  200. For  - 249 give 1 units. For  - 299 give 2 units. For  - 349 give 3 units. For  -399 give 4 units. For BG greater than or equal to 400 give 5 units. Notify provider if glucose greater than or equal to 350 mg/dL after administration of correction dose. 15 mL 0    insulin glargine U-300 (TOUJEO SOLOSTAR) 300 UNIT/ML (1 units dial) pen Inject 16 Units subcutaneously at bedtime. 15 mL 0    irbesartan (AVAPRO) 75 MG tablet Take 1 tablet (75 mg) by mouth every evening. 30 tablet 1    levETIRAcetam (KEPPRA) 500 MG tablet Take 1 tablet (500 mg) by mouth 2 times daily. 180 tablet 3    magnesium oxide (MAG-OX) 400 MG tablet Take 400 mg by mouth every evening.      medical cannabis (Patient's own supply) 1 Dose by Other route See Admin Instructions [employer] Vape- 1-4 puffs HS PRN      metFORMIN (GLUCOPHAGE XR) 500 MG 24 hr tablet TAKE 2 TABS (1000MG) BY MOUTH TWICE DAILY WITH MEALS 120 tablet 0    metoprolol succinate ER (TOPROL-XL) 100 MG 24 hr tablet Take 100 mg by mouth 2 times daily      Multiple Vitamins-Minerals (MULTIVITAMIN WOMEN 50+) TABS Take 1 tablet by mouth every evening.      nystatin (MYCOSTATIN) 292193 UNIT/GM external powder Apply topically 2 times daily as needed      omeprazole (PRILOSEC) 20 MG capsule Take 1 capsule by mouth every morning      OZEMPIC, 0.25 OR 0.5 MG/DOSE, 2 MG/3ML pen INJECT 0.25 MG SUBCUTANEOUSLY EVERY 7 DAYS. 2 mL 0    pregabalin (LYRICA) 100 MG capsule Take 100 mg by mouth every morning. In addition, take 2 x 100 (dose = 200 mg) capsules every evening.      pregabalin (LYRICA) 100 MG capsule Take 200 mg by mouth every evening. In addition, take one 100 capsule every morning. .      rosuvastatin (CRESTOR) 5 MG tablet Take 5 mg by mouth every  evening.      tiZANidine (ZANAFLEX) 2 MG tablet Take 4 mg by mouth every evening.      valACYclovir (VALTREX) 500 MG tablet Take 500 mg by mouth every morning.      vitamin C (ASCORBIC ACID) 500 MG tablet Take 1 tablet (500 mg) by mouth every morning. 30 tablet 1    Alcohol Swabs PADS Use to swab the area of the injection or neri as directed Per insurance coverage 200 each 1    blood glucose (NO BRAND SPECIFIED) lancets standard To use to test glucose level in the blood Use to test blood sugar  4  times daily as directed. To accompany glucose monitor brands per insurance coverage. 200 each 1    blood glucose (NO BRAND SPECIFIED) test strip To use to test glucose level in the blood Use to test blood sugar  4 times daily as directed. To accompany glucose monitor brands per insurance coverage. 200 strip 0    blood glucose monitoring (NO BRAND SPECIFIED) meter device kit Use as directed Per insurance coverage. Three times daily before meals, and at bedtime. 1 kit 0    cholecalciferol (VITAMIN D3) 25 mcg (1000 units) capsule Take 1,000 Units by mouth every evening. (Patient not taking: Reported on 5/19/2025)      Continuous Glucose  (FREESTYLE JERRY 3 READER) TORRIE 1 each continuously. 1 each 0    Continuous Glucose Sensor (FREESTYLE JERRY 3 PLUS SENSOR) MISC Change every 15 days. 6 each 1    insulin glargine (LANTUS PEN) 100 UNIT/ML pen Inject 18 Units subcutaneously every 24 hours. (Patient taking differently: Inject 16 Units subcutaneously every evening.) 15 mL 1    insulin pen needle (32G X 4 MM) 32G X 4 MM miscellaneous Use as directed by provider Per insurance coverage Three times daily before meals, and at bedtime. 200 each 1    Sharps Container MISC Use as directed to dispose of needles, lancets and other sharps 1 each 1     No current facility-administered medications for this visit.     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  Surgical History:  She  has a past surgical history that includes TOTAL ABDOM HYSTERECTOMY; REMOVAL OF OVARY(S); appendectomy; Bowel Resection (12 years ago); Dilation and curettage; tonsillectomy & adenoidectomy; other surgical history (2005); Arthroscopy Knee With Meniscectomy (3/10/14); TOTAL KNEE ARTHROPLASTY (Right, 11/24/2014); TOTAL ABDOM HYSTERECTOMY; REMOVAL OF OVARY(S); joint replacement; Pr Arthrodesis Ant Interbody Min Discectomy,Lumbar (N/A, 1/30/2015); Oophorectomy; Hysterectomy; Colon surgery (2004); Blepharoplasty (Bilateral, 8/17/2015); back surgery; Toe Surgery; PICC/Midline Placement (9/19/2021); PICC/Midline Placement (1/22/2022); IR Lumbar Puncture (1/22/2022); and Loop Recorder Implant (N/A, 8/21/2023).  Family and Social History:  Reviewed, and she  reports that she has been smoking cigarettes. She started smoking about 22 years ago. She has a 9.5 pack-year smoking history. She has never used smokeless tobacco. She reports that she does not drink alcohol and does not use drugs.  Reviewed, and family history is not on file.    RECENT DIAGNOSTIC STUDIES:     Imaging:     EXAM: CT HEAD W/O CONTRAST  DATE: 3/25/2025  INDICATION: Sudden onset confusion, marked hypertension eval for ICH  IMPRESSION:  1.  No CT evidence for acute intracranial process.  2.  Mild chronic microvascular ischemic changes as above.    EXAM: CT HEAD WITHOUT CONTRAST  DATE/TIME:  09/23/2022, 1:53 PM  IMPRESSION:  1.  No CT evidence for acute intracranial process.  2.  Brain atrophy and presumed chronic microvascular ischemic changes as above.  3.  Stable appearance from previous head CT 01/20/2022.    EXAM: MR BRAIN W/O CONTRAST  DATE/TIME: 1/20/2022 3:14 PM                                                     IMPRESSION:  1. No acute intracranial abnormality.  2. Mild generalized volume loss and chronic microvascular ischemic change.    EEG 03/23/23  Impression: This is an abnormal EEG due to paroxysmal slowing of the background and theta range that suggests nonspecific generalized cerebral dysfunction.  Such slowing can be seen due to metabolic dysfunction or medication effect.  Clinical correlation is recommended.  No sharp activity was seen to suggest seizures.     Classification: Dysrhythmia grade II generalized     REVIEW OF SYSTEMS:                                                      10-point review of systems is negative except as mentioned above in HPI.    EXAM:                                                      Physical Exam:   Vitals: /63 (BP Location: Right arm, Patient Position: Sitting)   Pulse 79   Wt 70.8 kg (156 lb)   BMI 28.53 kg/m    BMI= Body mass index is 28.53 kg/m .  GENERAL: NAD.  HEENT: NC/AT.  PULM: Non-labored breathing.   Neurologic:  MENTAL STATUS: Alert, attentive. Speech is fluent. Normal comprehension. Normal concentration. Adequate fund of knowledge.   CRANIAL NERVES: Visual fields intact to confrontation. Pupils equally, round and reactive to light. Facial sensation and movement normal. EOM full. Hearing intact to conversation. Trapezius strength intact. Palate moves symmetrically. Tongue midline.  MOTOR: 5/5 in proximal and distal muscle groups of upper and lower extremities. Tone and bulk normal.    SENSATION: Normal light touch throughout.   COORDINATION: Normal finger nose finger. Finger tapping normal. Knee heel shin normal.  STATION AND  GAIT: Romberg Negative. Good postural reflexes. Casual gait shuffled gait, mildly abnormal gait on right.  right hand-dominant.      ASSESSMENT and PLAN:                                                      Assessment:    ICD-10-CM    1. Localization-related (focal) (partial) symptomatic epilepsy and epileptic syndromes with complex partial seizures, not intractable, without status epilepticus (H)  G40.209 Keppra (Levetiracetam) Level     levETIRAcetam (KEPPRA) 500 MG tablet        Paige Mari is a 75-year-old female with a history of migraines, diabetes, hypertension, hyperlipidemia, arthritis and diabetic neuropathy.  Patient presents to the clinic today after starting Keppra for episodes of confusion and generalized muscle twitching.  Patient states that her symptoms have fully resolved since being on Keppra 500 mg twice daily.  She has additional concerns that she will address with her primary care provider first.  We will have Paige reestablished with Dr. Celis for balance and tremor in the near future.  Paige understands and agrees with this plan.     Plan:  --- Continue Keppra 500 mg tablet twice daily  --- Labs: Keppra level  --- Try Nonpharmacological interventions like heat or cold, massage, or rest  --- Practice good headache hygiene: Avoid known triggers, get adequate sleep, manage stress levels, stay hydrated and eat nutritious meals  --- Plan on follow up in the Neurology Clinic next available with Dr. Celis.  --- Please feel free to reach out if you have any further questions or concerns.  --- Seek immediate medical attention if an emergency arises or if your health becomes progressively worse.     It was a pleasure meeting you today!     Total Time: Total time spent for face to face visit, reviewing labs/imaging studies, counseling and coordination of care was: 30 Minutes spent on the date of the encounter doing chart review, review of outside records, review of test results, patient visit and  documentation     This note was dictated using voice recognition software.  Any grammatical or context distortions are unintentional and inherent to the software.    Cindy Cook DNP, APRN, CNP  [facility]

## 2025-07-16 ENCOUNTER — OFFICE VISIT (OUTPATIENT)
Dept: NEUROLOGY | Facility: CLINIC | Age: 75
End: 2025-07-16
Payer: COMMERCIAL

## 2025-07-16 VITALS
HEART RATE: 79 BPM | DIASTOLIC BLOOD PRESSURE: 63 MMHG | WEIGHT: 156 LBS | SYSTOLIC BLOOD PRESSURE: 118 MMHG | BODY MASS INDEX: 28.53 KG/M2

## 2025-07-16 DIAGNOSIS — G40.209 LOCALIZATION-RELATED (FOCAL) (PARTIAL) SYMPTOMATIC EPILEPSY AND EPILEPTIC SYNDROMES WITH COMPLEX PARTIAL SEIZURES, NOT INTRACTABLE, WITHOUT STATUS EPILEPTICUS (H): ICD-10-CM

## 2025-07-16 RX ORDER — LEVETIRACETAM 500 MG/1
500 TABLET ORAL 2 TIMES DAILY
Qty: 180 TABLET | Refills: 3 | Status: SHIPPED | OUTPATIENT
Start: 2025-07-16

## 2025-07-16 NOTE — PATIENT INSTRUCTIONS
Plan:  --- Continue Keppra 500 mg tablet twice daily  --- Labs: Keppra level  --- Try Nonpharmacological interventions like heat or cold, massage, or rest  --- Practice good headache hygiene: Avoid known triggers, get adequate sleep, manage stress levels, stay hydrated and eat nutritious meals  --- Plan on follow up in the Neurology Clinic next available with Dr. Celis.  --- Please feel free to reach out if you have any further questions or concerns.  --- Seek immediate medical attention if an emergency arises or if your health becomes progressively worse.     It was a pleasure meeting you today!

## 2025-07-16 NOTE — NURSING NOTE
Chief Complaint   Patient presents with    Seizures     Patient states no seizures since last seen.  Cannot stay away, sleep most of the day and night.  Either really hot or cold.  Standing up, makes her dizzy.  Hard time walking straight.     Tremors     Tremors in both hands, started 6 month ago, seems to be getting worse.      Jayda Logan CMA on 7/16/2025 at 10:51 AM

## 2025-07-16 NOTE — LETTER
7/16/2025      Paige Mari  [address]  [place] [address]      Dear Colleague,    Thank you for referring your patient, Paige Mari, to the [facility] NEUR[facility] [place]. Please see a copy of my visit note below.      __________________________________  ESTABLISHED PATIENT NEUROLOGY NOTE    DATE OF VISIT: 3/27/2023  MRN: [id number]  PATIENT NAME: Paige Mari  YOB: 1950    Chief Complaint   Patient presents with     Seizures     Patient states no seizures since last seen.  Cannot stay away, sleep most of the day and night.  Either really hot or cold.  Standing up, makes her dizzy.  Hard time walking straight.      Tremors     Tremors in both hands, started 6 month ago, seems to be getting worse.      SUBJECTIVE:                                                      HISTORY OF PRESENT ILLNESS:  Paige is here for follow up regarding migraines    Paige Mari is a 75-year-old female with a history of migraines,HTN, HLD, DM 2, diabetic polyneuropathy, COPD, A-fib, chronic pain and marijuana .  Per chart review, she has been seen in our clinic for tremors in the past by Dr. Celis.  She was followed by the [facility] for her migraines and diabetic polyneuropathy.  She has had Botox in the past and was switched to Emgality which was deemed successful.  She had 1 or 2 breakthrough migraines each month while on Emgality per chart review.     03/27/23: Paige arrives to the clinic to establish care for migraines.  She states that she was seen in the [facility] clinic previously and her provider moved to [place].  She describes her migraines as pain that starts at the base of her skull and spreads throughout her whole head, worsening behind her eyes.  The pain is a 8 out of 10 nagging, throbbing constant pain.  Associated symptoms include photophobia and nausea.  She denies phonophobia and vomiting.  She denies an aura prior to her migraine.  Migraines  typically last the full day.  She uses Tylenol as an abortive therapy.  She states she takes Tylenol daily for neuropathy and joint pain as well.  She feels her migraines are well controlled on Emgality.  She has 0 to 1 migraine per month while on Emgality.  Denies visual changes, speech changes or neck rigidity.  Tried Botox in the past.    2/7/2024: Patient was seen by Dr. Celis, inpatient for complex partial seizures tremor.  Per review of his chart, patient was admitted to the hospital with confusion and EEG showed rhythmic activity suggesting seizure. She was briefly on Keppra that was since discontinued. She has experienced multiple episodes of generalized body twitches and brain fog along with passing out. During one such episode she fell down fracturing her facial bone. I suspect she is experiencing complex partial seizures and I have recommended: Restarting Keppra 500 mg twice daily.  Repeat EEG and check levels in 2 weeks.    07/16/25: Paige presents to the clinic today for follow-up after starting Keppra due to bouts of confusion and generalized body twitching.  Patient tells me she continues to take Keppra 500 mg twice daily and is tolerating well.  She denies adverse effects.  She denies loss of consciousness, zoning out or staring spells.  No involuntary generalized body twitching, brain fog or confusion.    Paige tells me that she has additional concerns of feeling lightheaded with ambulation, bilateral upper extremity tremor, feeling off balance when walking, low mood, worsening memory and constant fatigue.  She feels her depression is not well-controlled.  She denies the want to hurt herself or anyone else.  She is not interested in talk therapy at this time.  She is on antidepressant medication and will talk to her primary care provider about adjusting dosing.    Paige tells me that her headaches have resolved and she is no longer taking Emgality.  She is working with her primary care provider on  many of the concerns outlined above.  She is not interested in neuropsych referral for cognitive testing.  -- Was recently hospitalized with metabolic encephalopathy, and severe DKA diabetic in March 2025.     Current Medications:   Current Outpatient Medications   Medication Sig Dispense Refill     acetaminophen (TYLENOL) 500 MG tablet Take 1,000 mg by mouth 2 times daily.       albuterol (PROAIR HFA/PROVENTIL HFA/VENTOLIN HFA) 108 (90 Base) MCG/ACT inhaler Inhale 2 puffs into the lungs every 6 hours as needed for shortness of breath, wheezing or cough       apixaban ANTICOAGULANT (ELIQUIS) 5 MG tablet Take 1 tablet (5 mg) by mouth 2 times daily. 60 tablet 11     cephALEXin (KEFLEX) 250 MG capsule Take 250 mg by mouth every morning.       cetirizine (ZYRTEC) 10 MG tablet Take 10 mg by mouth daily as needed for allergies       cloNIDine (CATAPRES-TTS3) 0.3 MG/24HR WK patch Place 1 patch onto the skin once a week (Sunday)       diclofenac sodium (VOLTAREN) 1 % Gel Apply 1 g topically daily as needed (pain)       DULoxetine (CYMBALTA) 60 MG capsule Take 60 mg by mouth every morning.       escitalopram (LEXAPRO) 20 MG tablet Take 20 mg by mouth every morning.       estradiol (ESTRACE) 1 MG tablet Take 1 mg by mouth every morning.       fluticasone (FLONASE) 50 MCG/ACT nasal spray Spray 1 spray into both nostrils daily as needed for rhinitis or allergies       Glucagon (GVOKE HYPOPEN) 1 MG/0.2ML pen Inject 0.2 mLs (1 mg) into the muscle as needed for low blood sugar 0.4 mL 1     glucose (BD GLUCOSE) 4 g chewable tablet Take 4 tablets by mouth every 15 minutes as needed for low blood sugar. 50 tablet 1     insulin aspart (NOVOLOG PEN) 100 UNIT/ML pen Inject 1-7 Units subcutaneously 3 times daily (before meals). Correction Scale -     Pre-meal correction scale:     Blood Glucose Insulin Novolog Before Meals:  Less than 150   0 units  150 -199  1 units  200 - 249 2 units  250 - 299 3 units  300 - 349 4 units   350 - 399  5  units  400 - 449 6 units  450 or more 7 units    To be given with prandial insulin, and based on pre-meal blood glucose. Administering insulin within 5 minutes of the start of the meal is ideal. Administer insulin no more than 30 minutes after the start of the meal, unless directed otherwise by provider.       insulin aspart (NOVOLOG PEN) 100 UNIT/ML pen Inject 1-5 Units subcutaneously at bedtime. MEDIUM INSULIN RESISTANCE DOSING  Do Not give Correction Insulin if BG less than  200. For  - 249 give 1 units. For  - 299 give 2 units. For  - 349 give 3 units. For  -399 give 4 units. For BG greater than or equal to 400 give 5 units. Notify provider if glucose greater than or equal to 350 mg/dL after administration of correction dose. 15 mL 0     insulin glargine U-300 (TOUJEO SOLOSTAR) 300 UNIT/ML (1 units dial) pen Inject 16 Units subcutaneously at bedtime. 15 mL 0     irbesartan (AVAPRO) 75 MG tablet Take 1 tablet (75 mg) by mouth every evening. 30 tablet 1     levETIRAcetam (KEPPRA) 500 MG tablet Take 1 tablet (500 mg) by mouth 2 times daily. 180 tablet 3     magnesium oxide (MAG-OX) 400 MG tablet Take 400 mg by mouth every evening.       medical cannabis (Patient's own supply) 1 Dose by Other route See Admin Instructions [employer] Vape- 1-4 puffs HS PRN       metFORMIN (GLUCOPHAGE XR) 500 MG 24 hr tablet TAKE 2 TABS (1000MG) BY MOUTH TWICE DAILY WITH MEALS 120 tablet 0     metoprolol succinate ER (TOPROL-XL) 100 MG 24 hr tablet Take 100 mg by mouth 2 times daily       Multiple Vitamins-Minerals (MULTIVITAMIN WOMEN 50+) TABS Take 1 tablet by mouth every evening.       nystatin (MYCOSTATIN) 112335 UNIT/GM external powder Apply topically 2 times daily as needed       omeprazole (PRILOSEC) 20 MG capsule Take 1 capsule by mouth every morning       OZEMPIC, 0.25 OR 0.5 MG/DOSE, 2 MG/3ML pen INJECT 0.25 MG SUBCUTANEOUSLY EVERY 7 DAYS. 2 mL 0     pregabalin (LYRICA) 100 MG capsule Take 100  mg by mouth every morning. In addition, take 2 x 100 (dose = 200 mg) capsules every evening.       pregabalin (LYRICA) 100 MG capsule Take 200 mg by mouth every evening. In addition, take one 100 capsule every morning. .       rosuvastatin (CRESTOR) 5 MG tablet Take 5 mg by mouth every evening.       tiZANidine (ZANAFLEX) 2 MG tablet Take 4 mg by mouth every evening.       valACYclovir (VALTREX) 500 MG tablet Take 500 mg by mouth every morning.       vitamin C (ASCORBIC ACID) 500 MG tablet Take 1 tablet (500 mg) by mouth every morning. 30 tablet 1     Alcohol Swabs PADS Use to swab the area of the injection or neri as directed Per insurance coverage 200 each 1     blood glucose (NO BRAND SPECIFIED) lancets standard To use to test glucose level in the blood Use to test blood sugar  4  times daily as directed. To accompany glucose monitor brands per insurance coverage. 200 each 1     blood glucose (NO BRAND SPECIFIED) test strip To use to test glucose level in the blood Use to test blood sugar  4 times daily as directed. To accompany glucose monitor brands per insurance coverage. 200 strip 0     blood glucose monitoring (NO BRAND SPECIFIED) meter device kit Use as directed Per insurance coverage. Three times daily before meals, and at bedtime. 1 kit 0     cholecalciferol (VITAMIN D3) 25 mcg (1000 units) capsule Take 1,000 Units by mouth every evening. (Patient not taking: Reported on 5/19/2025)       Continuous Glucose  (FREESTYLE JERRY 3 READER) TORRIE 1 each continuously. 1 each 0     Continuous Glucose Sensor (FREESTYLE JERRY 3 PLUS SENSOR) MISC Change every 15 days. 6 each 1     insulin glargine (LANTUS PEN) 100 UNIT/ML pen Inject 18 Units subcutaneously every 24 hours. (Patient taking differently: Inject 16 Units subcutaneously every evening.) 15 mL 1     insulin pen needle (32G X 4 MM) 32G X 4 MM miscellaneous Use as directed by provider Per insurance coverage Three times daily before meals, and at  bedtime. 200 each 1     Sharps Container MISC Use as directed to dispose of needles, lancets and other sharps 1 each 1     No current facility-administered medications for this visit.     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  Surgical History:  She  has a past surgical history that includes TOTAL ABDOM HYSTERECTOMY; REMOVAL OF OVARY(S); appendectomy; Bowel Resection (12 years ago); Dilation and curettage; tonsillectomy & adenoidectomy; other surgical history (2005); Arthroscopy Knee With Meniscectomy (3/10/14); TOTAL KNEE ARTHROPLASTY (Right, 11/24/2014); TOTAL ABDOM HYSTERECTOMY; REMOVAL OF OVARY(S); joint replacement; Pr Arthrodesis Ant Interbody Min Discectomy,Lumbar (N/A, 1/30/2015); Oophorectomy; Hysterectomy; Colon surgery (2004); Blepharoplasty (Bilateral, 8/17/2015); back surgery; Toe Surgery; PICC/Midline Placement (9/19/2021); PICC/Midline Placement (1/22/2022); IR Lumbar Puncture (1/22/2022); and Loop Recorder Implant (N/A, 8/21/2023).  Family and Social History:  Reviewed, and she  reports that she has been smoking cigarettes. She started smoking about 22 years ago. She has a 9.5 pack-year smoking history. She has never used smokeless tobacco. She reports that she does not drink alcohol and does not use drugs.  Reviewed, and family history is not on file.    RECENT DIAGNOSTIC STUDIES:      Imaging:     EXAM: CT HEAD W/O CONTRAST  DATE: 3/25/2025  INDICATION: Sudden onset confusion, marked hypertension eval for ICH  IMPRESSION:  1.  No CT evidence for acute intracranial process.  2.  Mild chronic microvascular ischemic changes as above.    EXAM: CT HEAD WITHOUT CONTRAST  DATE/TIME: 09/23/2022, 1:53 PM  IMPRESSION:  1.  No CT evidence for acute intracranial process.  2.  Brain atrophy and presumed chronic microvascular ischemic changes as above.  3.  Stable appearance from previous head CT 01/20/2022.    EXAM: MR BRAIN W/O CONTRAST  DATE/TIME: 1/20/2022 3:14 PM                                                     IMPRESSION:  1. No acute intracranial abnormality.  2. Mild generalized volume loss and chronic microvascular ischemic change.    EEG 03/23/23  Impression: This is an abnormal EEG due to paroxysmal slowing of the background and theta range that suggests nonspecific generalized cerebral dysfunction.  Such slowing can be seen due to metabolic dysfunction or medication effect.  Clinical correlation is recommended.  No sharp activity was seen to suggest seizures.     Classification: Dysrhythmia grade II generalized     REVIEW OF SYSTEMS:                                                      10-point review of systems is negative except as mentioned above in HPI.    EXAM:                                                      Physical Exam:   Vitals: /63 (BP Location: Right arm, Patient Position: Sitting)   Pulse 79   Wt 70.8 kg (156 lb)   BMI 28.53 kg/m    BMI= Body mass index is 28.53 kg/m .  GENERAL: NAD.  HEENT: NC/AT.  PULM: Non-labored breathing.   Neurologic:  MENTAL STATUS: Alert, attentive. Speech is fluent. Normal comprehension. Normal concentration. Adequate fund of knowledge.   CRANIAL NERVES: Visual fields intact to confrontation. Pupils equally, round and reactive to light. Facial sensation and movement normal. EOM full. Hearing intact to conversation. Trapezius strength  intact. Palate moves symmetrically. Tongue midline.  MOTOR: 5/5 in proximal and distal muscle groups of upper and lower extremities. Tone and bulk normal.    SENSATION: Normal light touch throughout.   COORDINATION: Normal finger nose finger. Finger tapping normal. Knee heel shin normal.  STATION AND GAIT: Romberg Negative. Good postural reflexes. Casual gait shuffled gait, mildly abnormal gait on right.  right hand-dominant.      ASSESSMENT and PLAN:                                                      Assessment:    ICD-10-CM    1. Localization-related (focal) (partial) symptomatic epilepsy and epileptic syndromes with complex partial seizures, not intractable, without status epilepticus (H)  G40.209 Keppra (Levetiracetam) Level     levETIRAcetam (KEPPRA) 500 MG tablet        Paige Mari is a 75-year-old female with a history of migraines, diabetes, hypertension, hyperlipidemia, arthritis and diabetic neuropathy.  Patient presents to the clinic today after starting Keppra for episodes of confusion and generalized muscle twitching.  Patient states that her symptoms have fully resolved since being on Keppra 500 mg twice daily.  She has additional concerns that she will address with her primary care provider first.  We will have Paige reestablished with Dr. Celis for balance and tremor in the near future.  Paige understands and agrees with this plan.     Plan:  --- Continue Keppra 500 mg tablet twice daily  --- Labs: Keppra level  --- Try Nonpharmacological interventions like heat or cold, massage, or rest  --- Practice good headache hygiene: Avoid known triggers, get adequate sleep, manage stress levels, stay hydrated and eat nutritious meals  --- Plan on follow up in the Neurology Clinic next available with Dr. Celis.  --- Please feel free to reach out if you have any further questions or concerns.  --- Seek immediate medical attention if an emergency arises or if your health becomes progressively worse.      It was a pleasure meeting you today!     Total Time: Total time spent for face to face visit, reviewing labs/imaging studies, counseling and coordination of care was: 30 Minutes spent on the date of the encounter doing chart review, review of outside records, review of test results, patient visit and documentation     This note was dictated using voice recognition software.  Any grammatical or context distortions are unintentional and inherent to the software.    Cindy Cook DNP, APRN, CNP  [facility]         Again, thank you for allowing me to participate in the care of your patient.        Sincerely,        JOLYNN Naidu CNP    Electronically signed

## 2025-07-17 ENCOUNTER — TELEPHONE (OUTPATIENT)
Dept: ENDOCRINOLOGY | Facility: CLINIC | Age: 75
End: 2025-07-17
Payer: COMMERCIAL

## 2025-07-17 NOTE — TELEPHONE ENCOUNTER
Forms faxed to [employer] Patient Assistance Program for Novolog and Ozempic       I have uploaded refill form to media tab, please fill out and fax to [employer]. Thanks!    NOTE: GENERIC INSULINS ARE NO LONGER AVAILABLE FOR PAP, PLEASE MAKE SURE TO [facility] FOR BRAND NAME NOVOLOG

## 2025-07-19 LAB
DEPRECATED CALCIDIOL+CALCIFEROL SERPL-MC: <55 UG/L (ref 20–75)
VITAMIN D2 SERPL-MCNC: <5 UG/L
VITAMIN D3 SERPL-MCNC: 50 UG/L

## 2025-07-21 DIAGNOSIS — E10.10 DIABETIC KETOACIDOSIS WITHOUT COMA ASSOCIATED WITH TYPE 1 DIABETES MELLITUS (H): ICD-10-CM

## 2025-07-22 ENCOUNTER — OFFICE VISIT (OUTPATIENT)
Dept: ENDOCRINOLOGY | Facility: CLINIC | Age: 75
End: 2025-07-22
Payer: COMMERCIAL

## 2025-07-22 VITALS
WEIGHT: 153 LBS | DIASTOLIC BLOOD PRESSURE: 76 MMHG | HEART RATE: 80 BPM | SYSTOLIC BLOOD PRESSURE: 142 MMHG | BODY MASS INDEX: 27.98 KG/M2

## 2025-07-22 DIAGNOSIS — E10.65 TYPE 1 DIABETES MELLITUS WITH HYPERGLYCEMIA (H): Primary | ICD-10-CM

## 2025-07-22 DIAGNOSIS — E10.42 TYPE 1 DIABETES MELLITUS WITH DIABETIC POLYNEUROPATHY (H): ICD-10-CM

## 2025-07-22 DIAGNOSIS — R94.6 ABNORMAL FINDING ON THYROID FUNCTION TEST: ICD-10-CM

## 2025-07-22 DIAGNOSIS — K76.0 HEPATIC STEATOSIS: ICD-10-CM

## 2025-07-22 DIAGNOSIS — E66.3 OVERWEIGHT (BMI 25.0-29.9): ICD-10-CM

## 2025-07-22 DIAGNOSIS — I65.23 CAROTID ATHEROSCLEROSIS, BILATERAL: ICD-10-CM

## 2025-07-22 DIAGNOSIS — R80.9 TYPE 1 DIABETES MELLITUS WITH MICROALBUMINURIA (H): ICD-10-CM

## 2025-07-22 DIAGNOSIS — E88.819 INSULIN RESISTANCE: ICD-10-CM

## 2025-07-22 DIAGNOSIS — E10.29 TYPE 1 DIABETES MELLITUS WITH MICROALBUMINURIA (H): ICD-10-CM

## 2025-07-22 DIAGNOSIS — E78.2 MIXED HYPERLIPIDEMIA: ICD-10-CM

## 2025-07-22 DIAGNOSIS — E23.6 EMPTY SELLA: ICD-10-CM

## 2025-07-22 DIAGNOSIS — I10 HYPERTENSION, UNSPECIFIED TYPE: ICD-10-CM

## 2025-07-22 DIAGNOSIS — Z79.4 LONG TERM (CURRENT) USE OF INSULIN (H): ICD-10-CM

## 2025-07-22 NOTE — LETTER
7/22/2025      Paige Mari  [address]  [place] [address]      Dear Colleague,    Thank you for referring your patient, Paige Mari, to the [facility]. Please see a copy of my visit note below.    Subjective:    Established patient    Paige Mari is a 75 year old female who presents for DM.      Current DM therapy:  -Toujeo 16 units at bedtime   -NovoLog 6-4-4 plus CS  150 -199 1 unit  200 - 249 2 units   250 - 299 3 units   300 - 349 4 units    >350 - 399 5 units   -Metformin XR 1000 mg BID    -Ozempic 0.25 mg weekly; well tolerated     Previous therapy:  -Omnipod 5 with NovoLog U-100 - stopped 3/23/2025  -Victoza 1.2 mg daily - generally well tolerated (only 1 pen remaining)    UTI 12/2024.     She had DKA 3/25/2025 (per Ana's note she may have run out of pods on 3/23/2025).     She saw Ana Sanchez 5/2/2025 and they started the FS Betty 3+.     Objective:    BMI 27.98 kg/m2, /76. RRR. Appears well and in no distress.     5/2025: BMI 28.2 kg/m2, /60    5/2024: BMI 27 kg/m2, /82, DM foot exam today: no ulcerations, reduced DP pulses bilaterally but palpable.     10/2023: BMI 26.2 kg/m2, /78. Pleasant and conversational.    12/2021: DM foot exam performed and she has absent sensation to monofilament testing bilaterally. Reduced pedal pulses. No ulcers/skinbreakdown.    Assessment/Plan:    # Likely stable angina    Paige brought up today that for the past few months with exertion she's had LH, dyspnea, and chest heaviness. No symptoms at rest. She has a follow-up visit with her PCP on Thursday this week. We reviewed red flag symptoms that would prompt an ED visit in the interim.     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, DM eye exam 4/2024  -She will set up a local DM eye exam   # Hypertension, microalbuminuria, on irbesartan   -4/2025: GFR 74  -5/2024: urine microalbumin undetectable   -2/2025: urine microalbumin 67 mg/g Cr  -3/2022: paired aldosterone 6.4 ng/dL and PRA 0.1 ng/mL/hr  -7/2023: serum aldosterone 4.3 ng/dL, PRA <0.1 ng/mL/hour  # Painful peripheral neuropathy, no prior DM foot ulcerations, on neuropathic pharmacologic therapy  # No prior ASCVD event, CAD, not on ASA  # Mixed hyperlipidemia, on rosuvastatin 5 mg daily   -2/2025: , HDL 63, LDL 28,   -She has a prior statin intolerance (myalgias)   # Hepatic steatosis  # Other  -2/2025: B12 normal, MMA normal  -3/2025: B12 normal   -5/2024: Celiac screen negative     CGM reviewed in detail. Over the past 2 weeks: GMI 7.1%, 68% TIR, 1% low, 0% very low, 4% high, 27% very high.     Paige continues to prefer subcutaneous insulin injections over insulin pump therapy.     I don't recommend any changes in insulin dosing.  Increase Ozempic to 0.5 mg weekly - she gets this through the [facility] - I messaged our team to help coordinate the dose increase.  Labs ordered before our return visit 11/2025.   4.   Glucagon rx renewed and hypoglycemia management reviewed.      She met with an MTM pharmacist (Rere Davalos [facility]) 5/14/2025.      # Thyroid function     In the past has been on both thyroid hormone and methimazole at various points in time. She has not taken a medication for her thyroid in years.      12/2021: TSH, T4, T3 all normal, TSI undetectable, TRAb undetectable, TPO Ab significantly elevated     4/2023: TSH and free T4 normal   2/2025: TSH 4.02 (normal and stable)   3/25/2025: TSH 1.28      Should have her TSH  checked yearly, or at any time if symptoms of thyrotoxicosis or hypothyroidism.      # Bone and mineral metabolism    Many normal calcium values over the years. Serum calcium high a few times: She had a remote mildly elevated PTH drawn shortly after a mildly elevated calcium value (10.6 with ULN 10.5 mg/dL), but on the day that PTH was drawn serum calcium was normal and serum phosphorous was mildly elevated. Serum calcium value of 12.7 mg/dL when she had an JACKSON in 2014. 1 recent serum calcium 10.8 mg/dL (ULN 10.4) without albumin the day she presented with DKA 3/25/2025.      Serum phosphorous and alkaine phosphatase have been normal. 12/2021: UPEP and SPEP negative      DEXA 11/14/2022:  -BMD at the spine, distal 1/3 radius, and both hips is normal     No prior low impact fractures.     CT A/P 12/2024: 1 mm nonobstructing kidney stone     3/2022: PTH 25 pg/mL, Cr, calcium, albumin, phosphorous all normal     3/2023: serum calcium normal, albumin normal     3/2025: 25-OH vitamin D mildly elevated     5/13/2025: she takes vitamin D 1000 units daily and a MVI, no calcium supplement     7/2025: 25-OH vitamin D mid normal     # Partially empty sella    I reviewed her CT head 9/2022 and agree with the radiology read of partially empty sella. No history of pituitary pathology.    CT head 8/17/2023: per radiology - stable partially empty sella morphology, I reviewed the images and agree     CT head 3/13/2024: stable partially empty sella on my review     3/22/2023: afternoon serum cortisol 10.0 mcg/dL, DHEA-S undetectable, TSH and free T4 normal, prolactin normal, IGF-1 normal      45 minutes spent on the date of the encounter doing chart review, history and exam, documentation and further activities as noted above. This did not include time spent on CGM review.     The longitudinal plan of care for the diagnosis(es)/condition(s) as documented were addressed during this visit. Due to the added complexity in care, I will  continue to support Paige in the subsequent management and with ongoing continuity of care.    Again, thank you for allowing me to participate in the care of your patient.        Sincerely,        Maynor Bardales MD    Electronically signed

## 2025-07-22 NOTE — PROGRESS NOTES
Subjective:    Established patient    Paige Mari is a 75 year old female who presents for DM.      Current DM therapy:  -Toujeo 16 units at bedtime   -NovoLog 6-4-4 plus CS  150 -199 1 unit  200 - 249 2 units   250 - 299 3 units   300 - 349 4 units    >350 - 399 5 units   -Metformin XR 1000 mg BID    -Ozempic 0.25 mg weekly; well tolerated     Previous therapy:  -Omnipod 5 with NovoLog U-100 - stopped 3/23/2025  -Victoza 1.2 mg daily - generally well tolerated (only 1 pen remaining)    UTI 12/2024.     She had DKA 3/25/2025 (per Ana's note she may have run out of pods on 3/23/2025).     She saw Ana Sanchez 5/2/2025 and they started the FS Betty 3+.     Objective:    BMI 27.98 kg/m2, /76. RRR. Appears well and in no distress.     5/2025: BMI 28.2 kg/m2, /60    5/2024: BMI 27 kg/m2, /82, DM foot exam today: no ulcerations, reduced DP pulses bilaterally but palpable.     10/2023: BMI 26.2 kg/m2, /78. Pleasant and conversational.    12/2021: DM foot exam performed and she has absent sensation to monofilament testing bilaterally. Reduced pedal pulses. No ulcers/skinbreakdown.    Assessment/Plan:    # Likely stable angina    Paige brought up today that for the past few months with exertion she's had LH, dyspnea, and chest heaviness. No symptoms at rest. She has a follow-up visit with her PCP on Thursday this week. We reviewed red flag symptoms that would prompt an ED visit in the interim.     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, DM eye exam  4/2024  -She will set up a local DM eye exam   # Hypertension, microalbuminuria, on irbesartan   -4/2025: GFR 74  -5/2024: urine microalbumin undetectable   -2/2025: urine microalbumin 67 mg/g Cr  -3/2022: paired aldosterone 6.4 ng/dL and PRA 0.1 ng/mL/hr  -7/2023: serum aldosterone 4.3 ng/dL, PRA <0.1 ng/mL/hour  # Painful peripheral neuropathy, no prior DM foot ulcerations, on neuropathic pharmacologic therapy  # No prior ASCVD event, CAD, not on ASA  # Mixed hyperlipidemia, on rosuvastatin 5 mg daily   -2/2025: , HDL 63, LDL 28,   -She has a prior statin intolerance (myalgias)   # Hepatic steatosis  # Other  -2/2025: B12 normal, MMA normal  -3/2025: B12 normal   -5/2024: Celiac screen negative     CGM reviewed in detail. Over the past 2 weeks: GMI 7.1%, 68% TIR, 1% low, 0% very low, 4% high, 27% very high.     Paige continues to prefer subcutaneous insulin injections over insulin pump therapy.     I don't recommend any changes in insulin dosing.  Increase Ozempic to 0.5 mg weekly - she gets this through the [facility] - I messaged our team to help coordinate the dose increase.  Labs ordered before our return visit 11/2025.   4.   Glucagon rx renewed and hypoglycemia management reviewed.      She met with an MTM pharmacist (Rere Davalos [facility]) 5/14/2025.      # Thyroid function     In the past has been on both thyroid hormone and methimazole at various points in time. She has not taken a medication for her thyroid in years.      12/2021: TSH, T4, T3 all normal, TSI undetectable, TRAb undetectable, TPO Ab significantly elevated     4/2023: TSH and free T4 normal   2/2025: TSH 4.02 (normal and stable)   3/25/2025: TSH 1.28      Should have her TSH checked yearly, or at any time if symptoms of thyrotoxicosis or hypothyroidism.      # Bone and mineral metabolism    Many normal calcium values over the years. Serum calcium high a few times: She had a remote mildly elevated PTH drawn shortly after a mildly  elevated calcium value (10.6 with ULN 10.5 mg/dL), but on the day that PTH was drawn serum calcium was normal and serum phosphorous was mildly elevated. Serum calcium value of 12.7 mg/dL when she had an JACKSON in 2014. 1 recent serum calcium 10.8 mg/dL (ULN 10.4) without albumin the day she presented with DKA 3/25/2025.      Serum phosphorous and alkaine phosphatase have been normal. 12/2021: UPEP and SPEP negative      DEXA 11/14/2022:  -BMD at the spine, distal 1/3 radius, and both hips is normal     No prior low impact fractures.     CT A/P 12/2024: 1 mm nonobstructing kidney stone     3/2022: PTH 25 pg/mL, Cr, calcium, albumin, phosphorous all normal     3/2023: serum calcium normal, albumin normal     3/2025: 25-OH vitamin D mildly elevated     5/13/2025: she takes vitamin D 1000 units daily and a MVI, no calcium supplement     7/2025: 25-OH vitamin D mid normal     # Partially empty sella    I reviewed her CT head 9/2022 and agree with the radiology read of partially empty sella. No history of pituitary pathology.    CT head 8/17/2023: per radiology - stable partially empty sella morphology, I reviewed the images and agree     CT head 3/13/2024: stable partially empty sella on my review     3/22/2023: afternoon serum cortisol 10.0 mcg/dL, DHEA-S undetectable, TSH and free T4 normal, prolactin normal, IGF-1 normal      45 minutes spent on the date of the encounter doing chart review, history and exam, documentation and further activities as noted above. This did not include time spent on CGM review.     The longitudinal plan of care for the diagnosis(es)/condition(s) as documented were addressed during this visit. Due to the added complexity in care, I will continue to support Paige in the subsequent management and with ongoing continuity of care.

## 2025-07-23 RX ORDER — METFORMIN HYDROCHLORIDE 500 MG/1
TABLET, EXTENDED RELEASE ORAL
Qty: 360 TABLET | Refills: 0 | Status: SHIPPED | OUTPATIENT
Start: 2025-07-23

## 2025-07-23 NOTE — TELEPHONE ENCOUNTER
Requested Prescriptions   Pending Prescriptions Disp Refills    metFORMIN (GLUCOPHAGE XR) 500 MG 24 hr tablet [Pharmacy Med Name: METFORMIN HCL  MG TABLET] 360 tablet 1     Sig: TAKE 2 TABS (1000MG) BY MOUTH TWICE DAILY WITH MEALS       Biguanide Agents Passed - 7/23/2025  1:25 PM        Passed - Patient is age 10 or older        Passed - Patient has documented A1c within the specified period of time.     If HgbA1C is 8 or greater, it needs to be on file within the past 3 months.  If less than 8, must be on file within the past 6 months.     Recent Labs   Lab Test 07/15/25  1527   A1C 8.2*             Passed - Patient does NOT have a diagnosis of CHF.        Passed - Medication is active on med list and the sig matches. RN to manually verify dose and sig if red X/fail.     If the protocol passes (green check), you do not need to verify med dose and sig.    A prescription matches if they are the same clinical intention.    For Example: once daily and every morning are the same.    The protocol can not identify upper and lower case letters as matching and will fail.     For Example: Take 1 tablet (50 mg) by mouth daily     TAKE 1 TABLET (50 MG) BY MOUTH DAILY    For all fails (red x), verify dose and sig.    If the refill does match what is on file, the RN can still proceed to approve the refill request.       If they do not match, route to the appropriate provider.             Passed - Medication indicated for associated diagnosis     Medication is associated with one or more of the following diagnoses:     Gestational diabetes mellitus     Hyperinsulinar obesity     Hypersecretion of ovarian androgens    Non-alcoholic fatty liver    Polycystic ovarian syndrome               Pre-diabetes (DM 2 prevention)    Type 2 diabetes mellitus     Weight gain, antipsychotic therapy-induced    Impaired fasting glucose          Passed - Has GFR on file in past 12 months and most recent value is normal        Passed - Recent  (6 month) or future (90 days) visit with the authorizing provider's specialty (provided they have been seen in the past 9 months)     The patient must have completed an in-person or virtual visit within the past 6 months or has a future visit scheduled within the next 90 days with the authorizing provider s specialty.  Urgent care and e-visits do not quality as an office visit for this protocol.          Passed - Patient is not pregnant        Passed - Patient has not had a positive pregnancy test within the past 12 mos.

## 2025-07-26 ENCOUNTER — LAB REQUISITION (OUTPATIENT)
Dept: LAB | Facility: CLINIC | Age: 75
End: 2025-07-26
Payer: COMMERCIAL

## 2025-07-26 DIAGNOSIS — R19.7 DIARRHEA, UNSPECIFIED: ICD-10-CM

## 2025-07-26 LAB
C CAYETANENSIS DNA STL QL NAA+NON-PROBE: NEGATIVE
C DIFF TOX B STL QL: NEGATIVE
CAMPYLOBACTER DNA SPEC NAA+PROBE: NEGATIVE
CRYPTOSP DNA STL QL NAA+NON-PROBE: NEGATIVE
EC STX1+STX2 GENES STL QL NAA+NON-PROBE: NEGATIVE
G LAMBLIA DNA STL QL NAA+NON-PROBE: NEGATIVE
NOROVIRUS GI+II RNA STL QL NAA+NON-PROBE: NEGATIVE
SALMONELLA SP RPOD STL QL NAA+PROBE: NEGATIVE
SHIGELLA SP+EIEC IPAH ST NAA+NON-PROBE: NEGATIVE
VIBRIO DNA SPEC NAA+PROBE: NEGATIVE
Y ENTEROCOL DNA STL QL NAA+PROBE: NEGATIVE

## 2025-07-26 PROCEDURE — 87506 IADNA-DNA/RNA PROBE TQ 6-11: CPT | Mod: ORL | Performed by: PHYSICIAN ASSISTANT

## 2025-07-26 PROCEDURE — 87209 SMEAR COMPLEX STAIN: CPT | Mod: ORL | Performed by: PHYSICIAN ASSISTANT

## 2025-07-26 PROCEDURE — 87493 C DIFF AMPLIFIED PROBE: CPT | Mod: ORL | Performed by: PHYSICIAN ASSISTANT

## 2025-07-28 LAB
O+P STL MICRO: NEGATIVE
SPECIMEN TYPE: NORMAL

## 2025-08-06 ENCOUNTER — HOSPITAL ENCOUNTER (OUTPATIENT)
Dept: CARDIOLOGY | Facility: HOSPITAL | Age: 75
Discharge: HOME | End: 2025-08-06
Attending: FAMILY MEDICINE
Payer: COMMERCIAL

## 2025-08-06 ENCOUNTER — TRANSFERRED RECORDS (OUTPATIENT)
Dept: HEALTH INFORMATION MANAGEMENT | Facility: CLINIC | Age: 75
End: 2025-08-06

## 2025-08-06 ENCOUNTER — HOSPITAL ENCOUNTER (OUTPATIENT)
Dept: NUCLEAR MEDICINE | Facility: HOSPITAL | Age: 75
Discharge: HOME | End: 2025-08-06
Attending: FAMILY MEDICINE
Payer: COMMERCIAL

## 2025-08-06 DIAGNOSIS — R07.2 PRECORDIAL CHEST PAIN: ICD-10-CM

## 2025-08-06 LAB
CV STRESS CURRENT BP HE: NORMAL
CV STRESS CURRENT HR HE: 46
CV STRESS CURRENT HR HE: 51
CV STRESS CURRENT HR HE: 82
CV STRESS CURRENT HR HE: 83
CV STRESS CURRENT HR HE: 83
CV STRESS CURRENT HR HE: 84
CV STRESS CURRENT HR HE: 84
CV STRESS CURRENT HR HE: 85
CV STRESS CURRENT HR HE: 86
CV STRESS CURRENT HR HE: 89
CV STRESS CURRENT HR HE: 89
CV STRESS CURRENT HR HE: 93
CV STRESS CURRENT HR HE: 94
CV STRESS CURRENT HR HE: 96
CV STRESS CURRENT HR HE: 96
CV STRESS CURRENT HR HE: 97
CV STRESS CURRENT HR HE: 98
CV STRESS DEVIATION TIME HE: NORMAL
CV STRESS ECHO PERCENT HR HE: NORMAL
CV STRESS EXERCISE STAGE HE: NORMAL
CV STRESS FINAL RESTING BP HE: NORMAL
CV STRESS FINAL RESTING HR HE: 84
CV STRESS MAX HR HE: 99
CV STRESS MAX TREADMILL GRADE HE: 0
CV STRESS MAX TREADMILL SPEED HE: 0
CV STRESS PEAK DIA BP HE: NORMAL
CV STRESS PEAK SYS BP HE: NORMAL
CV STRESS PHASE HE: NORMAL
CV STRESS PROTOCOL HE: NORMAL
CV STRESS RESTING PT POSITION HE: NORMAL
CV STRESS ST DEVIATION AMOUNT HE: NORMAL
CV STRESS ST DEVIATION ELEVATION HE: NORMAL
CV STRESS ST EVELATION AMOUNT HE: NORMAL
CV STRESS TEST TYPE HE: NORMAL
CV STRESS TOTAL STAGE TIME MIN 1 HE: NORMAL
NUC STRESS EJECTION FRACTION: 79 %
RATE PRESSURE PRODUCT: NORMAL
STRESS ECHO BASELINE DIASTOLIC HE: 72
STRESS ECHO BASELINE HR: 81
STRESS ECHO BASELINE SYSTOLIC BP: 150
STRESS ECHO CALCULATED PERCENT HR: 69 %
STRESS ECHO LAST STRESS DIASTOLIC BP: 86
STRESS ECHO LAST STRESS HR: 98
STRESS ECHO LAST STRESS SYSTOLIC BP: 165
STRESS ECHO TARGET HR: 144

## 2025-08-06 PROCEDURE — A9500 TC99M SESTAMIBI: HCPCS

## 2025-08-06 PROCEDURE — 343N000001 HC RX 343 MED OP 636

## 2025-08-06 PROCEDURE — 78452 HT MUSCLE IMAGE SPECT MULT: CPT

## 2025-08-06 PROCEDURE — 93018 CV STRESS TEST I&R ONLY: CPT | Performed by: INTERNAL MEDICINE

## 2025-08-06 PROCEDURE — 93016 CV STRESS TEST SUPVJ ONLY: CPT | Performed by: INTERNAL MEDICINE

## 2025-08-06 PROCEDURE — 250N000011 HC RX IP 250 OP 636

## 2025-08-06 PROCEDURE — 93017 CV STRESS TEST TRACING ONLY: CPT

## 2025-08-06 PROCEDURE — 78452 HT MUSCLE IMAGE SPECT MULT: CPT | Mod: 26 | Performed by: INTERNAL MEDICINE

## 2025-08-06 RX ORDER — REGADENOSON 0.08 MG/ML
0.4 INJECTION, SOLUTION INTRAVENOUS ONCE
Status: COMPLETED | OUTPATIENT
Start: 2025-08-06 | End: 2025-08-06

## 2025-08-06 RX ORDER — AMINOPHYLLINE 25 MG/ML
50-100 INJECTION, SOLUTION INTRAVENOUS
Status: COMPLETED | OUTPATIENT
Start: 2025-08-06 | End: 2025-08-06

## 2025-08-06 RX ORDER — CAFFEINE CITRATE 20 MG/ML
60 SOLUTION INTRAVENOUS
Status: DISCONTINUED | OUTPATIENT
Start: 2025-08-06 | End: 2025-08-06 | Stop reason: HOSPADM

## 2025-08-06 RX ORDER — CAFFEINE 200 MG
200 TABLET ORAL
Status: DISCONTINUED | OUTPATIENT
Start: 2025-08-06 | End: 2025-08-06 | Stop reason: HOSPADM

## 2025-08-06 RX ORDER — ALBUTEROL SULFATE 0.83 MG/ML
2.5 SOLUTION RESPIRATORY (INHALATION)
Status: DISCONTINUED | OUTPATIENT
Start: 2025-08-06 | End: 2025-08-06 | Stop reason: HOSPADM

## 2025-08-06 RX ADMIN — REGADENOSON 0.4 MG: 0.08 INJECTION, SOLUTION INTRAVENOUS at 13:16

## 2025-08-06 RX ADMIN — TECHNETIUM TC 99M SESTAMIBI 8.8 MILLICURIE: 1 INJECTION INTRAVENOUS at 11:52

## 2025-08-06 RX ADMIN — TECHNETIUM TC 99M SESTAMIBI 32.9 MILLICURIE: 1 INJECTION INTRAVENOUS at 14:16

## 2025-08-06 RX ADMIN — AMINOPHYLLINE 50 MG: 25 INJECTION, SOLUTION INTRAVENOUS at 13:20

## 2025-08-10 DIAGNOSIS — E78.2 MIXED HYPERLIPIDEMIA: ICD-10-CM

## 2025-08-10 DIAGNOSIS — E10.65 TYPE 1 DIABETES MELLITUS WITH HYPERGLYCEMIA (H): ICD-10-CM

## 2025-08-11 RX ORDER — ROSUVASTATIN CALCIUM 5 MG/1
5 TABLET, COATED ORAL DAILY
Qty: 90 TABLET | Refills: 1 | Status: SHIPPED | OUTPATIENT
Start: 2025-08-11

## 2025-08-13 ENCOUNTER — TRANSCRIBE ORDERS (OUTPATIENT)
Dept: OTHER | Age: 75
End: 2025-08-13

## 2025-08-13 ENCOUNTER — MEDICAL CORRESPONDENCE (OUTPATIENT)
Dept: HEALTH INFORMATION MANAGEMENT | Facility: CLINIC | Age: 75
End: 2025-08-13
Payer: COMMERCIAL

## 2025-08-13 DIAGNOSIS — R94.39 ABNORMAL STRESS TEST: Primary | ICD-10-CM

## 2025-08-14 ENCOUNTER — PATIENT OUTREACH (OUTPATIENT)
Dept: CARE COORDINATION | Facility: CLINIC | Age: 75
End: 2025-08-14
Payer: COMMERCIAL